# Patient Record
Sex: MALE | Race: WHITE | NOT HISPANIC OR LATINO | Employment: OTHER | URBAN - METROPOLITAN AREA
[De-identification: names, ages, dates, MRNs, and addresses within clinical notes are randomized per-mention and may not be internally consistent; named-entity substitution may affect disease eponyms.]

---

## 2017-01-31 DIAGNOSIS — E11.22 TYPE 2 DIABETES MELLITUS WITH STAGE 4 CHRONIC KIDNEY DISEASE, WITHOUT LONG-TERM CURRENT USE OF INSULIN (HCC): Primary | ICD-10-CM

## 2017-01-31 DIAGNOSIS — I10 ESSENTIAL HYPERTENSION: ICD-10-CM

## 2017-01-31 DIAGNOSIS — N18.4 CKD (CHRONIC KIDNEY DISEASE) STAGE 4, GFR 15-29 ML/MIN (HCC): ICD-10-CM

## 2017-01-31 DIAGNOSIS — N18.4 TYPE 2 DIABETES MELLITUS WITH STAGE 4 CHRONIC KIDNEY DISEASE, WITHOUT LONG-TERM CURRENT USE OF INSULIN (HCC): Primary | ICD-10-CM

## 2017-01-31 DIAGNOSIS — E78.5 HYPERLIPIDEMIA, UNSPECIFIED HYPERLIPIDEMIA TYPE: ICD-10-CM

## 2017-02-05 ENCOUNTER — RESULTS ENCOUNTER (OUTPATIENT)
Dept: FAMILY MEDICINE CLINIC | Facility: CLINIC | Age: 67
End: 2017-02-05

## 2017-02-05 DIAGNOSIS — I10 ESSENTIAL HYPERTENSION: ICD-10-CM

## 2017-02-05 DIAGNOSIS — E11.22 TYPE 2 DIABETES MELLITUS WITH STAGE 4 CHRONIC KIDNEY DISEASE, WITHOUT LONG-TERM CURRENT USE OF INSULIN (HCC): ICD-10-CM

## 2017-02-05 DIAGNOSIS — N18.4 TYPE 2 DIABETES MELLITUS WITH STAGE 4 CHRONIC KIDNEY DISEASE, WITHOUT LONG-TERM CURRENT USE OF INSULIN (HCC): ICD-10-CM

## 2017-02-05 DIAGNOSIS — E78.5 HYPERLIPIDEMIA, UNSPECIFIED HYPERLIPIDEMIA TYPE: ICD-10-CM

## 2017-02-05 DIAGNOSIS — N18.4 CKD (CHRONIC KIDNEY DISEASE) STAGE 4, GFR 15-29 ML/MIN (HCC): ICD-10-CM

## 2017-02-07 LAB
ALBUMIN SERPL-MCNC: 4.2 G/DL (ref 3.5–5.2)
ALBUMIN/CREAT UR: 1243.1 MG/G CREAT (ref 0–30)
ALBUMIN/GLOB SERPL: 1.8 G/DL
ALP SERPL-CCNC: 59 U/L (ref 39–117)
ALT SERPL-CCNC: 19 U/L (ref 1–41)
AST SERPL-CCNC: 18 U/L (ref 1–40)
BILIRUB SERPL-MCNC: 0.4 MG/DL (ref 0.1–1.2)
BUN SERPL-MCNC: 31 MG/DL (ref 8–23)
BUN/CREAT SERPL: 12.6 (ref 7–25)
CALCIUM SERPL-MCNC: 9.6 MG/DL (ref 8.6–10.5)
CHLORIDE SERPL-SCNC: 103 MMOL/L (ref 98–107)
CO2 SERPL-SCNC: 23.9 MMOL/L (ref 22–29)
CREAT SERPL-MCNC: 2.46 MG/DL (ref 0.76–1.27)
CREAT UR-MCNC: 190.6 MG/DL
ERYTHROCYTE [DISTWIDTH] IN BLOOD BY AUTOMATED COUNT: 13 % (ref 11.5–14.5)
GLOBULIN SER CALC-MCNC: 2.4 GM/DL
GLUCOSE SERPL-MCNC: 105 MG/DL (ref 65–99)
HBA1C MFR BLD: 6.2 % (ref 4.8–5.6)
HCT VFR BLD AUTO: 42.9 % (ref 40.4–52.2)
HGB BLD-MCNC: 14.3 G/DL (ref 13.7–17.6)
MCH RBC QN AUTO: 30.5 PG (ref 27–32.7)
MCHC RBC AUTO-ENTMCNC: 33.3 G/DL (ref 32.6–36.4)
MCV RBC AUTO: 91.5 FL (ref 79.8–96.2)
MICROALBUMIN UR-MCNC: 2369.3 UG/ML
PLATELET # BLD AUTO: 158 10*3/MM3 (ref 140–500)
POTASSIUM SERPL-SCNC: 4.3 MMOL/L (ref 3.5–5.2)
PROT SERPL-MCNC: 6.6 G/DL (ref 6–8.5)
RBC # BLD AUTO: 4.69 10*6/MM3 (ref 4.6–6)
SODIUM SERPL-SCNC: 142 MMOL/L (ref 136–145)
WBC # BLD AUTO: 7.74 10*3/MM3 (ref 4.5–10.7)

## 2017-02-10 RX ORDER — LISINOPRIL 20 MG/1
20 TABLET ORAL DAILY
Qty: 90 TABLET | Refills: 0 | Status: SHIPPED | OUTPATIENT
Start: 2017-02-10 | End: 2017-05-08 | Stop reason: SDUPTHER

## 2017-02-10 RX ORDER — METOPROLOL SUCCINATE 50 MG/1
50 TABLET, EXTENDED RELEASE ORAL DAILY
Qty: 90 TABLET | Refills: 0 | Status: SHIPPED | OUTPATIENT
Start: 2017-02-10 | End: 2017-05-08 | Stop reason: SDUPTHER

## 2017-02-13 ENCOUNTER — OFFICE VISIT (OUTPATIENT)
Dept: FAMILY MEDICINE CLINIC | Facility: CLINIC | Age: 67
End: 2017-02-13

## 2017-02-13 VITALS
OXYGEN SATURATION: 97 % | WEIGHT: 243 LBS | SYSTOLIC BLOOD PRESSURE: 134 MMHG | BODY MASS INDEX: 34.79 KG/M2 | DIASTOLIC BLOOD PRESSURE: 88 MMHG | HEART RATE: 85 BPM | HEIGHT: 70 IN

## 2017-02-13 DIAGNOSIS — I10 ESSENTIAL HYPERTENSION: ICD-10-CM

## 2017-02-13 DIAGNOSIS — J30.9 ALLERGIC RHINITIS, UNSPECIFIED ALLERGIC RHINITIS TRIGGER, UNSPECIFIED RHINITIS SEASONALITY: ICD-10-CM

## 2017-02-13 DIAGNOSIS — N18.4 TYPE 2 DIABETES MELLITUS WITH STAGE 4 CHRONIC KIDNEY DISEASE, WITHOUT LONG-TERM CURRENT USE OF INSULIN (HCC): Primary | ICD-10-CM

## 2017-02-13 DIAGNOSIS — E78.5 HYPERLIPIDEMIA, UNSPECIFIED HYPERLIPIDEMIA TYPE: ICD-10-CM

## 2017-02-13 DIAGNOSIS — E11.22 TYPE 2 DIABETES MELLITUS WITH STAGE 4 CHRONIC KIDNEY DISEASE, WITHOUT LONG-TERM CURRENT USE OF INSULIN (HCC): Primary | ICD-10-CM

## 2017-02-13 DIAGNOSIS — K21.9 GASTROESOPHAGEAL REFLUX DISEASE, ESOPHAGITIS PRESENCE NOT SPECIFIED: ICD-10-CM

## 2017-02-13 PROCEDURE — 99214 OFFICE O/P EST MOD 30 MIN: CPT | Performed by: INTERNAL MEDICINE

## 2017-02-13 NOTE — PROGRESS NOTES
Subjective   Lefty Cai is a 66 y.o. male who presents today for:    Diabetes (4 month f/u & review labs); Hypertension; and Hyperlipidemia    History of Present Illness   Arterial HTN X years, maintained on the current regimen for quite some time. He also has CKD-IV (with proteinuria) following partial nephrectomy for cancer in 2007, followed by Dr. Reyes. BPs at home have been in the 100-120/60 range most days. He denies any side-effects or orthostatic symptoms.     He also has T2DM (since 2009) that is treated with TLCs only, partially due to his advanced renal disease.    He has been on cholesterol medicine for several years, tolerating the simvastatin without side effect.  He denies any cardiovascular or neurologic symptoms.    Mr. Cai  reports that he has quit smoking. His smoking use included Cigarettes. His smokeless tobacco use includes Snuff. He reports that he does not drink alcohol or use illicit drugs.         Current Outpatient Prescriptions:   •  aspirin 81 MG tablet, Take 81 mg by mouth daily., Disp: , Rfl:   •  Cholecalciferol (VITAMIN D3) 5000 UNITS capsule capsule, Take 5,000 Units by mouth 2 (two) times a week., Disp: , Rfl:   •  lisinopril (PRINIVIL,ZESTRIL) 20 MG tablet, Take 1 tablet by mouth Daily., Disp: 90 tablet, Rfl: 0  •  metoprolol succinate XL (TOPROL-XL) 50 MG 24 hr tablet, Take 1 tablet by mouth Daily., Disp: 90 tablet, Rfl: 0  •  simvastatin (ZOCOR) 40 MG tablet, Take 1 tablet by mouth Every Night., Disp: 90 tablet, Rfl: 1  •  traZODone (DESYREL) 50 MG tablet, Take 1 tablet by mouth Every Night., Disp: 90 tablet, Rfl: 1      The following portions of the patient's history were reviewed and updated as appropriate: allergies, current medications, past social history and problem list.    Review of Systems   Constitutional: Negative for diaphoresis.   HENT: Positive for postnasal drip.    Eyes: Negative for visual disturbance.   Respiratory: Negative for cough and chest  "tightness.    Cardiovascular: Negative for chest pain, palpitations and leg swelling.   Gastrointestinal: Negative for abdominal pain.        GERD with late meals.   Musculoskeletal: Negative for myalgias.   Allergic/Immunologic: Positive for environmental allergies.   Neurological: Negative for dizziness, syncope, numbness and headaches.   Hematological: Does not bruise/bleed easily.         Objective   Vitals:    02/13/17 1036   BP: 134/88   BP Location: Right arm   Patient Position: Sitting   Cuff Size: Large Adult   Pulse: 85   SpO2: 97%   Weight: 243 lb (110 kg)   Height: 70\" (177.8 cm)     Physical Exam  Obese white male, in no acute distress.  He is in good spirits.  Sclerae are anicteric and the conjunctivae are pink.  There is no periorbital edema.   No carotid bruit.  No thyromegaly or mass appreciated.  Regular rate and rhythm without murmur appreciated.  Abdomen is obese, but soft, nontender, without hepatomegaly or mass.  No abdominal bruit is noted.  No lower extremity edema and pedal pulses are full bilaterally.    Assessment/Plan   Lefty was seen today for diabetes, hypertension and hyperlipidemia.    Diagnoses and all orders for this visit:    Type 2 diabetes mellitus with stage 4 chronic kidney disease, without long-term current use of insulin  Comments:  A1c is 6.2%, which is a continued improvement from 6.6 and 6.3 last year.  Microalbuminuria has worsened since last time we checked.  Creatinine is stable.  Orders:  -     Hemoglobin A1c; Future  -     Comprehensive Metabolic Panel; Future  -     CBC & Differential; Future  -     Vitamin D 25 Hydroxy; Future    Essential hypertension  Comments:  Blood pressure is adequately controlled.  We will make no change in his medications.    Hyperlipidemia, unspecified hyperlipidemia type  Comments:  Labs from last year were perfect.  Recheck this coming summer.  Orders:  -     Lipid Panel; Future    Allergic rhinitis, unspecified allergic rhinitis " trigger, unspecified rhinitis seasonality  Comments:  OTC antihistamiines as outlined.    Gastroesophageal reflux disease, esophagitis presence not specified  Comments:  Famotidine 10 mg as needed; or omeprazole 20 mg sporadically.    Overall, he is doing fairly well.  Blood pressure is adequately controlled, creatinine is stable, microalbumin/creatinine ratio is 1240, but he was 1000 at one point last year, and he's been down in the 800s within the past 2 years, also.  He will see Dr. Reyes in 3 months, at which time he should have this rechecked.  We will make no change in his medications at this time.    His diabetes is very well controlled with therapeutic lifestyle changes only; he is on no medicines for diabetes.  His A1c is excellent at 6.2%.

## 2017-05-09 RX ORDER — LISINOPRIL 20 MG/1
20 TABLET ORAL DAILY
Qty: 90 TABLET | Refills: 1 | Status: SHIPPED | OUTPATIENT
Start: 2017-05-09 | End: 2017-11-07 | Stop reason: SDUPTHER

## 2017-05-09 RX ORDER — METOPROLOL SUCCINATE 50 MG/1
50 TABLET, EXTENDED RELEASE ORAL DAILY
Qty: 90 TABLET | Refills: 1 | Status: SHIPPED | OUTPATIENT
Start: 2017-05-09 | End: 2017-11-07 | Stop reason: SDUPTHER

## 2017-05-09 RX ORDER — TRAZODONE HYDROCHLORIDE 50 MG/1
50 TABLET ORAL NIGHTLY
Qty: 90 TABLET | Refills: 1 | Status: SHIPPED | OUTPATIENT
Start: 2017-05-09 | End: 2017-11-07 | Stop reason: SDUPTHER

## 2017-05-09 RX ORDER — SIMVASTATIN 40 MG
40 TABLET ORAL NIGHTLY
Qty: 90 TABLET | Refills: 1 | Status: SHIPPED | OUTPATIENT
Start: 2017-05-09 | End: 2017-11-07 | Stop reason: SDUPTHER

## 2017-08-01 ENCOUNTER — RESULTS ENCOUNTER (OUTPATIENT)
Dept: FAMILY MEDICINE CLINIC | Facility: CLINIC | Age: 67
End: 2017-08-01

## 2017-08-01 DIAGNOSIS — E78.5 HYPERLIPIDEMIA, UNSPECIFIED HYPERLIPIDEMIA TYPE: ICD-10-CM

## 2017-08-01 DIAGNOSIS — N18.4 TYPE 2 DIABETES MELLITUS WITH STAGE 4 CHRONIC KIDNEY DISEASE, WITHOUT LONG-TERM CURRENT USE OF INSULIN (HCC): ICD-10-CM

## 2017-08-01 DIAGNOSIS — E11.22 TYPE 2 DIABETES MELLITUS WITH STAGE 4 CHRONIC KIDNEY DISEASE, WITHOUT LONG-TERM CURRENT USE OF INSULIN (HCC): ICD-10-CM

## 2017-08-07 LAB
25(OH)D3+25(OH)D2 SERPL-MCNC: 27.6 NG/ML (ref 30–100)
ALBUMIN SERPL-MCNC: 4.1 G/DL (ref 3.5–5.2)
ALBUMIN/GLOB SERPL: 1.4 G/DL
ALP SERPL-CCNC: 55 U/L (ref 39–117)
ALT SERPL-CCNC: 22 U/L (ref 1–41)
AST SERPL-CCNC: 15 U/L (ref 1–40)
BASOPHILS # BLD AUTO: 0.03 10*3/MM3 (ref 0–0.2)
BASOPHILS NFR BLD AUTO: 0.4 % (ref 0–1.5)
BILIRUB SERPL-MCNC: 0.3 MG/DL (ref 0.1–1.2)
BUN SERPL-MCNC: 33 MG/DL (ref 8–23)
BUN/CREAT SERPL: 12.1 (ref 7–25)
CALCIUM SERPL-MCNC: 10.2 MG/DL (ref 8.6–10.5)
CHLORIDE SERPL-SCNC: 99 MMOL/L (ref 98–107)
CHOLEST SERPL-MCNC: 138 MG/DL (ref 0–200)
CO2 SERPL-SCNC: 23.9 MMOL/L (ref 22–29)
CREAT SERPL-MCNC: 2.72 MG/DL (ref 0.76–1.27)
EOSINOPHIL # BLD AUTO: 0.14 10*3/MM3 (ref 0–0.7)
EOSINOPHIL NFR BLD AUTO: 1.9 % (ref 0.3–6.2)
ERYTHROCYTE [DISTWIDTH] IN BLOOD BY AUTOMATED COUNT: 12.8 % (ref 11.5–14.5)
GLOBULIN SER CALC-MCNC: 3 GM/DL
GLUCOSE SERPL-MCNC: 113 MG/DL (ref 65–99)
HBA1C MFR BLD: 6.49 % (ref 4.8–5.6)
HCT VFR BLD AUTO: 45.6 % (ref 40.4–52.2)
HDLC SERPL-MCNC: 44 MG/DL (ref 40–60)
HGB BLD-MCNC: 15 G/DL (ref 13.7–17.6)
IMM GRANULOCYTES # BLD: 0 10*3/MM3 (ref 0–0.03)
IMM GRANULOCYTES NFR BLD: 0 % (ref 0–0.5)
LDLC SERPL CALC-MCNC: 57 MG/DL (ref 0–100)
LYMPHOCYTES # BLD AUTO: 2.21 10*3/MM3 (ref 0.9–4.8)
LYMPHOCYTES NFR BLD AUTO: 30.4 % (ref 19.6–45.3)
MCH RBC QN AUTO: 30.8 PG (ref 27–32.7)
MCHC RBC AUTO-ENTMCNC: 32.9 G/DL (ref 32.6–36.4)
MCV RBC AUTO: 93.6 FL (ref 79.8–96.2)
MONOCYTES # BLD AUTO: 0.57 10*3/MM3 (ref 0.2–1.2)
MONOCYTES NFR BLD AUTO: 7.8 % (ref 5–12)
NEUTROPHILS # BLD AUTO: 4.32 10*3/MM3 (ref 1.9–8.1)
NEUTROPHILS NFR BLD AUTO: 59.5 % (ref 42.7–76)
PLATELET # BLD AUTO: 158 10*3/MM3 (ref 140–500)
POTASSIUM SERPL-SCNC: 4.3 MMOL/L (ref 3.5–5.2)
PROT SERPL-MCNC: 7.1 G/DL (ref 6–8.5)
RBC # BLD AUTO: 4.87 10*6/MM3 (ref 4.6–6)
SODIUM SERPL-SCNC: 140 MMOL/L (ref 136–145)
TRIGL SERPL-MCNC: 186 MG/DL (ref 0–150)
VLDLC SERPL CALC-MCNC: 37.2 MG/DL (ref 5–40)
WBC # BLD AUTO: 7.27 10*3/MM3 (ref 4.5–10.7)

## 2017-08-14 ENCOUNTER — OFFICE VISIT (OUTPATIENT)
Dept: FAMILY MEDICINE CLINIC | Facility: CLINIC | Age: 67
End: 2017-08-14

## 2017-08-14 VITALS
HEART RATE: 77 BPM | DIASTOLIC BLOOD PRESSURE: 84 MMHG | HEIGHT: 70 IN | WEIGHT: 247.9 LBS | BODY MASS INDEX: 35.49 KG/M2 | OXYGEN SATURATION: 97 % | SYSTOLIC BLOOD PRESSURE: 150 MMHG

## 2017-08-14 DIAGNOSIS — N18.4 TYPE 2 DIABETES MELLITUS WITH STAGE 4 CHRONIC KIDNEY DISEASE, WITHOUT LONG-TERM CURRENT USE OF INSULIN (HCC): Primary | ICD-10-CM

## 2017-08-14 DIAGNOSIS — I10 ESSENTIAL HYPERTENSION: ICD-10-CM

## 2017-08-14 DIAGNOSIS — E78.5 HYPERLIPIDEMIA, UNSPECIFIED HYPERLIPIDEMIA TYPE: ICD-10-CM

## 2017-08-14 DIAGNOSIS — E11.22 TYPE 2 DIABETES MELLITUS WITH STAGE 4 CHRONIC KIDNEY DISEASE, WITHOUT LONG-TERM CURRENT USE OF INSULIN (HCC): Primary | ICD-10-CM

## 2017-08-14 DIAGNOSIS — E55.9 VITAMIN D DEFICIENCY: ICD-10-CM

## 2017-08-14 PROCEDURE — 99214 OFFICE O/P EST MOD 30 MIN: CPT | Performed by: INTERNAL MEDICINE

## 2017-08-14 RX ORDER — SODIUM BICARBONATE 650 MG/1
650 TABLET ORAL 3 TIMES DAILY
Refills: 0 | COMMUNITY
Start: 2017-08-12

## 2017-08-14 NOTE — PROGRESS NOTES
Subjective   Lefty Cai is a 67 y.o. male who presents today for:    Diabetes (6 month f/u & review labs); Hypertension; and Hyperlipidemia    History of Present Illness   Chronic, benign, essentia HTN for years, maintained on the current regimen for quite some time. He also has CKD-IV (with proteinuria) following partial nephrectomy for cancer in 2007, followed by Dr. Reyes. He denies any side-effects or orthostatic symptoms.      He also has T2DM (since 2009) that is treated with TLCs only, partially due to his advanced renal disease. He has not had an eye exam since his cataract surgery 2 years ago.     He has Vit D deficiency, also, treated with Vitamin d 2 days per week.    Mr. Cai  reports that he has quit smoking. His smoking use included Cigarettes. His smokeless tobacco use includes Snuff. He reports that he does not drink alcohol or use illicit drugs.     Allergies   Allergen Reactions   • Latex Other (See Comments)     Blisters       Current Outpatient Prescriptions:   •  aspirin 81 MG tablet, Take 81 mg by mouth daily., Disp: , Rfl:   •  Cholecalciferol (VITAMIN D3) 5000 UNITS capsule capsule, Take 5,000 Units by mouth 2 (two) times a week., Disp: , Rfl:   •  lisinopril (PRINIVIL,ZESTRIL) 20 MG tablet, Take 1 tablet by mouth Daily., Disp: 90 tablet, Rfl: 1  •  metoprolol succinate XL (TOPROL-XL) 50 MG 24 hr tablet, Take 1 tablet by mouth Daily., Disp: 90 tablet, Rfl: 1  •  simvastatin (ZOCOR) 40 MG tablet, Take 1 tablet by mouth Every Night., Disp: 90 tablet, Rfl: 1  •  sodium bicarbonate 650 MG tablet, Take 1 tablet by mouth 3 (Three) Times a Day., Disp: , Rfl: 0  •  traZODone (DESYREL) 50 MG tablet, Take 1 tablet by mouth Every Night., Disp: 90 tablet, Rfl: 1      Review of Systems   Constitutional: Positive for unexpected weight change (gain). Negative for diaphoresis and fatigue.   Eyes: Negative for visual disturbance.   Respiratory: Negative for cough and chest tightness.   "  Cardiovascular: Negative for chest pain, palpitations and leg swelling.   Gastrointestinal: Negative for abdominal pain.   Endocrine: Negative for polydipsia and polyuria.   Musculoskeletal: Negative for myalgias.   Skin: Negative for wound.   Neurological: Negative for dizziness, syncope, numbness and headaches.   Hematological: Does not bruise/bleed easily.     Sister has Type 1 DM with multiple complications.    Objective   Vitals:    08/14/17 1017   BP: 150/90   BP Location: Right arm   Patient Position: Sitting   Cuff Size: Large Adult   Pulse: 77   SpO2: 97%   Weight: 247 lb 14.4 oz (112 kg)   Height: 70\" (177.8 cm)     Physical Exam  Obese white male, in no acute distress.  He is in good spirits.  Sclerae are anicteric and the conjunctivae are pink.  There is no periorbital edema.   No carotid bruit.  No thyromegaly or mass appreciated.  Regular rate and rhythm without murmur appreciated.  Abdomen is obese, but soft, nontender, without hepatomegaly or mass.  No abdominal bruit is noted.  No lower extremity edema and pedal pulses are full bilaterally.  Monofilament testing is normal bilaterally.    Assessment/Plan   Lefty was seen today for diabetes, hypertension and hyperlipidemia.    Diagnoses and all orders for this visit:    Type 2 diabetes mellitus with stage 4 chronic kidney disease, without long-term current use of insulin - worsening    Essential hypertension- uncontrolled    Hyperlipidemia, unspecified hyperlipidemia type    Vitamin D deficiency- worsening.    See pt instructions.  No changes in meds except for the Vit D.  RTO in 3 months for recheck of DM and BP.  Labs were reviewed with the patient today.  "

## 2017-08-14 NOTE — PATIENT INSTRUCTIONS
Increase the vitamin D to 5000 Units once EVERY day.    Decrease the carbs in your diet (fewer starches and sweets).    Call [facility] for a diabetic eye exam.

## 2017-11-08 RX ORDER — TRAZODONE HYDROCHLORIDE 50 MG/1
TABLET ORAL
Qty: 90 TABLET | Refills: 1 | Status: SHIPPED | OUTPATIENT
Start: 2017-11-08 | End: 2018-05-06 | Stop reason: SDUPTHER

## 2017-11-08 RX ORDER — METOPROLOL SUCCINATE 50 MG/1
TABLET, EXTENDED RELEASE ORAL
Qty: 90 TABLET | Refills: 1 | Status: SHIPPED | OUTPATIENT
Start: 2017-11-08 | End: 2017-11-14 | Stop reason: SDUPTHER

## 2017-11-08 RX ORDER — LISINOPRIL 20 MG/1
TABLET ORAL
Qty: 90 TABLET | Refills: 1 | Status: SHIPPED | OUTPATIENT
Start: 2017-11-08 | End: 2018-05-07 | Stop reason: SDUPTHER

## 2017-11-08 RX ORDER — SIMVASTATIN 40 MG
TABLET ORAL
Qty: 90 TABLET | Refills: 1 | Status: SHIPPED | OUTPATIENT
Start: 2017-11-08 | End: 2018-05-06 | Stop reason: SDUPTHER

## 2017-11-14 ENCOUNTER — OFFICE VISIT (OUTPATIENT)
Dept: FAMILY MEDICINE CLINIC | Facility: CLINIC | Age: 67
End: 2017-11-14

## 2017-11-14 VITALS
HEART RATE: 81 BPM | BODY MASS INDEX: 35.42 KG/M2 | HEIGHT: 70 IN | WEIGHT: 247.4 LBS | DIASTOLIC BLOOD PRESSURE: 94 MMHG | OXYGEN SATURATION: 98 % | SYSTOLIC BLOOD PRESSURE: 146 MMHG

## 2017-11-14 DIAGNOSIS — Z23 FLU VACCINE NEED: ICD-10-CM

## 2017-11-14 DIAGNOSIS — E11.22 TYPE 2 DIABETES MELLITUS WITH STAGE 4 CHRONIC KIDNEY DISEASE, WITHOUT LONG-TERM CURRENT USE OF INSULIN (HCC): Primary | ICD-10-CM

## 2017-11-14 DIAGNOSIS — N18.4 TYPE 2 DIABETES MELLITUS WITH STAGE 4 CHRONIC KIDNEY DISEASE, WITHOUT LONG-TERM CURRENT USE OF INSULIN (HCC): Primary | ICD-10-CM

## 2017-11-14 DIAGNOSIS — I10 ESSENTIAL HYPERTENSION: ICD-10-CM

## 2017-11-14 LAB — HBA1C MFR BLD: 6.4 %

## 2017-11-14 PROCEDURE — 90662 IIV NO PRSV INCREASED AG IM: CPT | Performed by: INTERNAL MEDICINE

## 2017-11-14 PROCEDURE — G0008 ADMIN INFLUENZA VIRUS VAC: HCPCS | Performed by: INTERNAL MEDICINE

## 2017-11-14 PROCEDURE — 83036 HEMOGLOBIN GLYCOSYLATED A1C: CPT | Performed by: INTERNAL MEDICINE

## 2017-11-14 PROCEDURE — 99214 OFFICE O/P EST MOD 30 MIN: CPT | Performed by: INTERNAL MEDICINE

## 2017-11-14 RX ORDER — METOPROLOL SUCCINATE 100 MG/1
100 TABLET, EXTENDED RELEASE ORAL DAILY
Qty: 30 TABLET | Refills: 2 | Status: SHIPPED | OUTPATIENT
Start: 2017-11-14 | End: 2018-02-28 | Stop reason: SDUPTHER

## 2017-11-14 NOTE — PROGRESS NOTES
Subjective   Lefty Cai is a 67 y.o. male who presents today for:    Diabetes (3 month f/u) and Hypertension    History of Present Illness   Arterial HTN X years, maintained on the current regimen for quite some time.     He also has CKD-IV (with proteinuria) following partial nephrectomy for cancer in 2007, followed by Dr. Reyes.     He also has T2DM (since 2009) that is treated with TLCs only, partially due to his advanced renal disease.     He has been on cholesterol medicine for several years, tolerating the simvastatin without side effect.  He denies any cardiovascular or neurologic symptoms.    Mr. Cai  reports that he has quit smoking. His smoking use included Cigarettes. His smokeless tobacco use includes Snuff. He reports that he does not drink alcohol or use illicit drugs.     Allergies   Allergen Reactions   • Latex Other (See Comments)     Blisters       Current Outpatient Prescriptions:   •  aspirin 81 MG tablet, Take 81 mg by mouth daily., Disp: , Rfl:   •  Cholecalciferol (VITAMIN D3) 5000 UNITS capsule capsule, Take 5,000 Units by mouth 2 (two) times a week., Disp: , Rfl:   •  lisinopril (PRINIVIL,ZESTRIL) 20 MG tablet, take 1 tablet by mouth once daily, Disp: 90 tablet, Rfl: 1  •  metoprolol succinate XL (TOPROL-XL) 50 MG 24 hr tablet, take 1 tablet by mouth once daily, Disp: 90 tablet, Rfl: 1  •  simvastatin (ZOCOR) 40 MG tablet, take 1 tablet by mouth at bedtime, Disp: 90 tablet, Rfl: 1  •  sodium bicarbonate 650 MG tablet, Take 1 tablet by mouth 3 (Three) Times a Day., Disp: , Rfl: 0  •  traZODone (DESYREL) 50 MG tablet, take 1 tablet by mouth at bedtime if needed, Disp: 90 tablet, Rfl: 1      Review of Systems   Constitutional: Negative for diaphoresis and fatigue.   Eyes: Negative for visual disturbance.   Respiratory: Negative for cough and chest tightness.    Cardiovascular: Positive for leg swelling. Negative for chest pain and palpitations.   Endocrine: Negative for polydipsia  "and polyuria.   Skin: Negative for wound.   Neurological: Negative for dizziness, syncope, numbness and headaches.       Objective   Vitals:    11/14/17 1047   BP: 150/92   BP Location: Left arm   Patient Position: Sitting   Cuff Size: Adult   Pulse: 81   SpO2: 98%   Weight: 247 lb 6.4 oz (112 kg)   Height: 70\" (177.8 cm)     Physical Exam     Obese white male, in no acute distress.  He is in good spirits.  Sclerae are anicteric and the conjunctivae are pink.  There is no periorbital edema.   No carotid bruit.  No thyromegaly or mass appreciated.  Regular rate and rhythm without murmur appreciated.  Abdomen is obese, but soft, nontender, without hepatomegaly or mass.  No abdominal bruit is noted.  No lower extremity edema and pedal pulses are full bilaterally.    Assessment/Plan   Lefty was seen today for diabetes and hypertension.    Diagnoses and all orders for this visit:    Type 2 diabetes mellitus with stage 4 chronic kidney disease, without long-term current use of insulin  -     POC Glycosylated Hemoglobin (Hb A1C)    Essential hypertension  -     metoprolol succinate XL (TOPROL-XL) 100 MG 24 hr tablet; Take 1 tablet by mouth Daily.    Flu vaccine need  -     Flu Vaccine High Dose PF 65YR+ ([id number])    A1c is 6.4%, so we'll hold off prescribing any medications for him at this point.  Given his chronic kidney disease, options are limited to Actos and insulin.  We discussed this with him at length, especially in the context of his diet being the cornerstone of his treatment.  Exercise will be limited because of arthritis in his knees.    Blood pressure is elevated.  Diet changes will help with that also. We will have him increase the metoprolol to 100 mg once daily.  "

## 2018-01-30 DIAGNOSIS — E78.5 HYPERLIPIDEMIA, UNSPECIFIED HYPERLIPIDEMIA TYPE: ICD-10-CM

## 2018-01-30 DIAGNOSIS — I10 ESSENTIAL HYPERTENSION: Primary | ICD-10-CM

## 2018-01-30 DIAGNOSIS — N18.4 CKD (CHRONIC KIDNEY DISEASE) STAGE 4, GFR 15-29 ML/MIN (HCC): ICD-10-CM

## 2018-02-04 ENCOUNTER — RESULTS ENCOUNTER (OUTPATIENT)
Dept: FAMILY MEDICINE CLINIC | Facility: CLINIC | Age: 68
End: 2018-02-04

## 2018-02-04 DIAGNOSIS — E78.5 HYPERLIPIDEMIA, UNSPECIFIED HYPERLIPIDEMIA TYPE: ICD-10-CM

## 2018-02-04 DIAGNOSIS — I10 ESSENTIAL HYPERTENSION: ICD-10-CM

## 2018-02-04 DIAGNOSIS — N18.4 CKD (CHRONIC KIDNEY DISEASE) STAGE 4, GFR 15-29 ML/MIN (HCC): ICD-10-CM

## 2018-02-07 LAB
ALBUMIN SERPL-MCNC: 3.8 G/DL (ref 3.5–5.2)
ALBUMIN/GLOB SERPL: 1.6 G/DL
ALP SERPL-CCNC: 60 U/L (ref 39–117)
ALT SERPL-CCNC: 18 U/L (ref 1–41)
AST SERPL-CCNC: 16 U/L (ref 1–40)
BILIRUB SERPL-MCNC: 0.4 MG/DL (ref 0.1–1.2)
BUN SERPL-MCNC: 38 MG/DL (ref 8–23)
BUN/CREAT SERPL: 13.1 (ref 7–25)
CALCIUM SERPL-MCNC: 9.8 MG/DL (ref 8.6–10.5)
CHLORIDE SERPL-SCNC: 102 MMOL/L (ref 98–107)
CHOLEST SERPL-MCNC: 123 MG/DL (ref 0–200)
CO2 SERPL-SCNC: 22.4 MMOL/L (ref 22–29)
CREAT SERPL-MCNC: 2.9 MG/DL (ref 0.76–1.27)
ERYTHROCYTE [DISTWIDTH] IN BLOOD BY AUTOMATED COUNT: 13.3 % (ref 11.5–14.5)
GFR SERPLBLD CREATININE-BSD FMLA CKD-EPI: 22 ML/MIN/1.73
GFR SERPLBLD CREATININE-BSD FMLA CKD-EPI: 26 ML/MIN/1.73
GLOBULIN SER CALC-MCNC: 2.4 GM/DL
GLUCOSE SERPL-MCNC: 124 MG/DL (ref 65–99)
HCT VFR BLD AUTO: 43.6 % (ref 40.4–52.2)
HDLC SERPL-MCNC: 38 MG/DL (ref 40–60)
HGB BLD-MCNC: 14.3 G/DL (ref 13.7–17.6)
LDLC SERPL CALC-MCNC: 56 MG/DL (ref 0–100)
MCH RBC QN AUTO: 29.8 PG (ref 27–32.7)
MCHC RBC AUTO-ENTMCNC: 32.8 G/DL (ref 32.6–36.4)
MCV RBC AUTO: 90.8 FL (ref 79.8–96.2)
PLATELET # BLD AUTO: 189 10*3/MM3 (ref 140–500)
POTASSIUM SERPL-SCNC: 4.6 MMOL/L (ref 3.5–5.2)
PROT SERPL-MCNC: 6.2 G/DL (ref 6–8.5)
RBC # BLD AUTO: 4.8 10*6/MM3 (ref 4.6–6)
SODIUM SERPL-SCNC: 140 MMOL/L (ref 136–145)
TRIGL SERPL-MCNC: 146 MG/DL (ref 0–150)
VLDLC SERPL CALC-MCNC: 29.2 MG/DL (ref 5–40)
WBC # BLD AUTO: 9.35 10*3/MM3 (ref 4.5–10.7)

## 2018-02-14 ENCOUNTER — OFFICE VISIT (OUTPATIENT)
Dept: FAMILY MEDICINE CLINIC | Facility: CLINIC | Age: 68
End: 2018-02-14

## 2018-02-14 VITALS
HEIGHT: 70 IN | WEIGHT: 250.2 LBS | SYSTOLIC BLOOD PRESSURE: 124 MMHG | HEART RATE: 73 BPM | OXYGEN SATURATION: 98 % | DIASTOLIC BLOOD PRESSURE: 82 MMHG | BODY MASS INDEX: 35.82 KG/M2

## 2018-02-14 DIAGNOSIS — I10 ESSENTIAL HYPERTENSION: Primary | ICD-10-CM

## 2018-02-14 DIAGNOSIS — N18.4 TYPE 2 DIABETES MELLITUS WITH STAGE 4 CHRONIC KIDNEY DISEASE, WITHOUT LONG-TERM CURRENT USE OF INSULIN (HCC): ICD-10-CM

## 2018-02-14 DIAGNOSIS — E78.5 HYPERLIPIDEMIA, UNSPECIFIED HYPERLIPIDEMIA TYPE: ICD-10-CM

## 2018-02-14 DIAGNOSIS — N18.4 CKD (CHRONIC KIDNEY DISEASE) STAGE 4, GFR 15-29 ML/MIN (HCC): ICD-10-CM

## 2018-02-14 DIAGNOSIS — E11.22 TYPE 2 DIABETES MELLITUS WITH STAGE 4 CHRONIC KIDNEY DISEASE, WITHOUT LONG-TERM CURRENT USE OF INSULIN (HCC): ICD-10-CM

## 2018-02-14 PROCEDURE — 99214 OFFICE O/P EST MOD 30 MIN: CPT | Performed by: INTERNAL MEDICINE

## 2018-02-14 NOTE — PROGRESS NOTES
Subjective   Lefty Cai is a 67 y.o. male who presents today for:    Hypertension (3 month f/u & review labs) and Hyperlipidemia    History of Present Illness   Arterial HTN X years, maintained on the current regimen for quite some time. Toprol XL 50 was increased to 100 mg in November, 2017 when his BP was elevated.     He also has CKD-IV (with proteinuria) following partial nephrectomy for cancer in 2007, followed by Dr. Reyes.      He also has T2DM (since 2009) that is treated with TLCs only, partially due to his advanced renal disease.      He has been on cholesterol medicine for several years, tolerating the simvastatin without side effect.  He denies any cardiovascular or neurologic symptoms.       Mr. Cai  reports that he has quit smoking. His smoking use included Cigarettes. His smokeless tobacco use includes Snuff. He reports that he does not drink alcohol or use illicit drugs.     Allergies   Allergen Reactions   • Latex Other (See Comments)     Blisters       Current Outpatient Prescriptions:   •  aspirin 81 MG tablet, Take 81 mg by mouth daily., Disp: , Rfl:   •  Cholecalciferol (VITAMIN D3) 5000 UNITS capsule capsule, Take 5,000 Units by mouth 2 (two) times a week., Disp: , Rfl:   •  lisinopril (PRINIVIL,ZESTRIL) 20 MG tablet, take 1 tablet by mouth once daily, Disp: 90 tablet, Rfl: 1  •  metoprolol succinate XL (TOPROL-XL) 100 MG 24 hr tablet, Take 1 tablet by mouth Daily., Disp: 30 tablet, Rfl: 2  •  simvastatin (ZOCOR) 40 MG tablet, take 1 tablet by mouth at bedtime, Disp: 90 tablet, Rfl: 1  •  sodium bicarbonate 650 MG tablet, Take 1 tablet by mouth 3 (Three) Times a Day., Disp: , Rfl: 0  •  traZODone (DESYREL) 50 MG tablet, take 1 tablet by mouth at bedtime if needed, Disp: 90 tablet, Rfl: 1      Review of Systems  No lightheadedness or dizziness.  No chest pain.  No myalgias.  No cough.      Objective   Vitals:    02/14/18 1111   BP: 124/82   BP Location: Left arm   Patient Position:  "Sitting   Cuff Size: Large Adult   Pulse: 73   SpO2: 98%   Weight: 113 kg (250 lb 3.2 oz)   Height: 177.8 cm (70\")     Physical Exam  In good spirits.  Regular rate and rhythm.  No LE edema.    Assessment/Plan   Lefty was seen today for hypertension and hyperlipidemia.    Diagnoses and all orders for this visit:    Essential hypertension    Hyperlipidemia, unspecified hyperlipidemia type    Type 2 diabetes mellitus with stage 4 chronic kidney disease, without long-term current use of insulin    CKD (chronic kidney disease) stage 4, GFR 15-29 ml/min    BP isdoing well on the current regimen.      He is due to see his nephrologist in 3 months, at which time he should have his renal function rechecked.  His creatinine has risen a bit from 2.7 to 2.9.      Cholesterol levels look good. Labs were reviewed with the patient today.    Most recent A1c was 6.4%.  We will recheck that at his next office visit.  "

## 2018-02-28 DIAGNOSIS — I10 ESSENTIAL HYPERTENSION: ICD-10-CM

## 2018-02-28 RX ORDER — METOPROLOL SUCCINATE 100 MG/1
TABLET, EXTENDED RELEASE ORAL
Qty: 30 TABLET | Refills: 5 | Status: SHIPPED | OUTPATIENT
Start: 2018-02-28 | End: 2018-09-02 | Stop reason: SDUPTHER

## 2018-05-07 RX ORDER — TRAZODONE HYDROCHLORIDE 50 MG/1
TABLET ORAL
Qty: 90 TABLET | Refills: 1 | Status: SHIPPED | OUTPATIENT
Start: 2018-05-07 | End: 2018-12-10 | Stop reason: SDUPTHER

## 2018-05-07 RX ORDER — SIMVASTATIN 40 MG
TABLET ORAL
Qty: 90 TABLET | Refills: 1 | Status: SHIPPED | OUTPATIENT
Start: 2018-05-07 | End: 2018-12-10 | Stop reason: SDUPTHER

## 2018-05-07 RX ORDER — LISINOPRIL 20 MG/1
20 TABLET ORAL DAILY
Qty: 90 TABLET | Refills: 1 | Status: SHIPPED | OUTPATIENT
Start: 2018-05-07 | End: 2018-12-10 | Stop reason: SDUPTHER

## 2018-06-14 ENCOUNTER — OFFICE VISIT (OUTPATIENT)
Dept: FAMILY MEDICINE CLINIC | Facility: CLINIC | Age: 68
End: 2018-06-14

## 2018-06-14 VITALS
HEART RATE: 70 BPM | WEIGHT: 247.7 LBS | OXYGEN SATURATION: 98 % | HEIGHT: 70 IN | SYSTOLIC BLOOD PRESSURE: 120 MMHG | DIASTOLIC BLOOD PRESSURE: 88 MMHG | BODY MASS INDEX: 35.46 KG/M2

## 2018-06-14 DIAGNOSIS — N18.4 CKD (CHRONIC KIDNEY DISEASE) STAGE 4, GFR 15-29 ML/MIN (HCC): ICD-10-CM

## 2018-06-14 DIAGNOSIS — I10 ESSENTIAL HYPERTENSION: ICD-10-CM

## 2018-06-14 DIAGNOSIS — E11.22 TYPE 2 DIABETES MELLITUS WITH STAGE 4 CHRONIC KIDNEY DISEASE, WITHOUT LONG-TERM CURRENT USE OF INSULIN (HCC): Primary | ICD-10-CM

## 2018-06-14 DIAGNOSIS — N18.4 TYPE 2 DIABETES MELLITUS WITH STAGE 4 CHRONIC KIDNEY DISEASE, WITHOUT LONG-TERM CURRENT USE OF INSULIN (HCC): Primary | ICD-10-CM

## 2018-06-14 LAB
EXPIRATION DATE: ABNORMAL
HBA1C MFR BLD: 6.5 % (ref 4.8–5.6)
Lab: ABNORMAL

## 2018-06-14 PROCEDURE — 83036 HEMOGLOBIN GLYCOSYLATED A1C: CPT | Performed by: INTERNAL MEDICINE

## 2018-06-14 PROCEDURE — 36416 COLLJ CAPILLARY BLOOD SPEC: CPT | Performed by: INTERNAL MEDICINE

## 2018-06-14 PROCEDURE — 99213 OFFICE O/P EST LOW 20 MIN: CPT | Performed by: INTERNAL MEDICINE

## 2018-06-14 NOTE — PROGRESS NOTES
Subjective   Lefty Cai is a 68 y.o. male who presents today for:    Diabetes (4 month f/u)    History of Present Illness   He has T2DM (since 2009) that is treated with TLCs only, partially due to his advanced renal disease.  He does not check his sugars at home.  He denies any paresthesias.    Arterial HTN X years, maintained on the current regimen for quite some time. Toprol XL 50 was increased to 100 mg in November, 2017 when his BP was elevated.     He also has CKD-IV (with proteinuria) following partial nephrectomy for cancer in 2007, followed by Dr. Reyes.        Mr. Cai  reports that he has quit smoking. His smoking use included Cigarettes. His smokeless tobacco use includes Snuff. He reports that he does not drink alcohol or use drugs.     Allergies   Allergen Reactions   • Latex Other (See Comments)     Blisters       Current Outpatient Prescriptions:   •  aspirin 81 MG tablet, Take 81 mg by mouth daily., Disp: , Rfl:   •  Cholecalciferol (VITAMIN D3) 5000 UNITS capsule capsule, Take 5,000 Units by mouth 2 (two) times a week., Disp: , Rfl:   •  lisinopril (PRINIVIL,ZESTRIL) 20 MG tablet, Take 1 tablet by mouth Daily., Disp: 90 tablet, Rfl: 1  •  metoprolol succinate XL (TOPROL-XL) 100 MG 24 hr tablet, take 1 tablet by mouth once daily, Disp: 30 tablet, Rfl: 5  •  simvastatin (ZOCOR) 40 MG tablet, take 1 tablet by mouth at bedtime, Disp: 90 tablet, Rfl: 1  •  sodium bicarbonate 650 MG tablet, Take 1 tablet by mouth 3 (Three) Times a Day., Disp: , Rfl: 0  •  traZODone (DESYREL) 50 MG tablet, take 1 tablet by mouth at bedtime if needed, Disp: 90 tablet, Rfl: 1      Review of Systems   Constitutional: Negative for fatigue and unexpected weight change.   HENT: Positive for postnasal drip.    Eyes: Negative for visual disturbance.   Respiratory: Positive for shortness of breath (with exertion, chronic).    Cardiovascular: Negative for chest pain and leg swelling.   Endocrine: Negative for  "polydipsia and polyuria.   Musculoskeletal: Negative for myalgias.   Skin: Negative for wound.   Allergic/Immunologic: Positive for environmental allergies.   Neurological: Negative for numbness.         Objective   Vitals:    06/14/18 1115   BP: 120/88   BP Location: Left arm   Patient Position: Sitting   Cuff Size: Large Adult   Pulse: 70   SpO2: 98%   Weight: 112 kg (247 lb 11.2 oz)   Height: 177.8 cm (70\")     Physical Exam   Constitutional: He is oriented to person, place, and time.   Obese   Cardiovascular: Normal rate and regular rhythm.    Musculoskeletal:   No foot deformities.   Neurological: He is alert and oriented to person, place, and time.   Monofilament.   Skin:   No pedal lesions or ulcerations.             Lefty was seen today for diabetes.    Diagnoses and all orders for this visit:    Type 2 diabetes mellitus with stage 4 chronic kidney disease, without long-term current use of insulin  -     POC Glycated Hemoglobin, 6.5%, unchanged over the past year.    CKD (chronic kidney disease) stage 4, GFR 15-29 ml/min  Followed by Dr. Reyes.    Essential hypertension  BP OK, no change in meds at this time.    "

## 2018-09-02 DIAGNOSIS — I10 ESSENTIAL HYPERTENSION: ICD-10-CM

## 2018-09-05 RX ORDER — METOPROLOL SUCCINATE 100 MG/1
TABLET, EXTENDED RELEASE ORAL
Qty: 30 TABLET | Refills: 3 | Status: SHIPPED | OUTPATIENT
Start: 2018-09-05 | End: 2018-12-31 | Stop reason: SDUPTHER

## 2018-10-17 ENCOUNTER — OFFICE VISIT (OUTPATIENT)
Dept: FAMILY MEDICINE CLINIC | Facility: CLINIC | Age: 68
End: 2018-10-17

## 2018-10-17 VITALS
SYSTOLIC BLOOD PRESSURE: 138 MMHG | HEIGHT: 70 IN | HEART RATE: 67 BPM | OXYGEN SATURATION: 97 % | BODY MASS INDEX: 35.16 KG/M2 | DIASTOLIC BLOOD PRESSURE: 82 MMHG | WEIGHT: 245.6 LBS

## 2018-10-17 DIAGNOSIS — N18.4 TYPE 2 DIABETES MELLITUS WITH STAGE 4 CHRONIC KIDNEY DISEASE, WITHOUT LONG-TERM CURRENT USE OF INSULIN (HCC): Primary | ICD-10-CM

## 2018-10-17 DIAGNOSIS — N18.4 CKD (CHRONIC KIDNEY DISEASE) STAGE 4, GFR 15-29 ML/MIN (HCC): ICD-10-CM

## 2018-10-17 DIAGNOSIS — Z23 FLU VACCINE NEED: ICD-10-CM

## 2018-10-17 DIAGNOSIS — I10 ESSENTIAL HYPERTENSION: ICD-10-CM

## 2018-10-17 DIAGNOSIS — E11.22 TYPE 2 DIABETES MELLITUS WITH STAGE 4 CHRONIC KIDNEY DISEASE, WITHOUT LONG-TERM CURRENT USE OF INSULIN (HCC): Primary | ICD-10-CM

## 2018-10-17 LAB
EXPIRATION DATE: ABNORMAL
HBA1C MFR BLD: 6.3 % (ref 4.8–5.6)
Lab: ABNORMAL

## 2018-10-17 PROCEDURE — 99213 OFFICE O/P EST LOW 20 MIN: CPT | Performed by: INTERNAL MEDICINE

## 2018-10-17 PROCEDURE — 83036 HEMOGLOBIN GLYCOSYLATED A1C: CPT | Performed by: INTERNAL MEDICINE

## 2018-10-17 PROCEDURE — 36416 COLLJ CAPILLARY BLOOD SPEC: CPT | Performed by: INTERNAL MEDICINE

## 2018-10-17 PROCEDURE — G0008 ADMIN INFLUENZA VIRUS VAC: HCPCS | Performed by: INTERNAL MEDICINE

## 2018-10-17 PROCEDURE — 90662 IIV NO PRSV INCREASED AG IM: CPT | Performed by: INTERNAL MEDICINE

## 2018-10-17 NOTE — PROGRESS NOTES
"Subjective   Lefty Cai is a 68 y.o. male who presents today for:    Diabetes (4 month f/u)    History of Present Illness   He has T2DM (since 2009) that is treated with TLCs only, partially due to his advanced renal disease.  He does not check his sugars at home.  He denies any paresthesias.    Arterial HTN X years, maintained on the current regimen for quite some time. Toprol XL 50 was increased to 100 mg in November, 2017 when his BP was elevated.      Mr. Cai  reports that he has quit smoking. His smoking use included Cigarettes. His smokeless tobacco use includes Snuff. He reports that he does not drink alcohol or use drugs.     Allergies   Allergen Reactions   • Latex Other (See Comments)     Blisters       Current Outpatient Prescriptions:   •  aspirin 81 MG tablet, Take 81 mg by mouth daily., Disp: , Rfl:   •  Cholecalciferol (VITAMIN D3) 2000 units tablet, Take 2,000 Units by mouth Daily., Disp: , Rfl:   •  lisinopril (PRINIVIL,ZESTRIL) 20 MG tablet, Take 1 tablet by mouth Daily., Disp: 90 tablet, Rfl: 1  •  metoprolol succinate XL (TOPROL-XL) 100 MG 24 hr tablet, TAKE 1 TABLET BY MOUTH EVERY DAY, Disp: 30 tablet, Rfl: 3  •  simvastatin (ZOCOR) 40 MG tablet, take 1 tablet by mouth at bedtime, Disp: 90 tablet, Rfl: 1  •  sodium bicarbonate 650 MG tablet, Take 1 tablet by mouth 3 (Three) Times a Day., Disp: , Rfl: 0  •  traZODone (DESYREL) 50 MG tablet, take 1 tablet by mouth at bedtime if needed, Disp: 90 tablet, Rfl: 1      Review of Systems   Constitutional: Negative for unexpected weight change.   Respiratory: Negative for shortness of breath.    Endocrine: Negative for polydipsia and polyuria.         Objective   Vitals:    10/17/18 1039   BP: 152/96   BP Location: Right arm   Patient Position: Sitting   Cuff Size: Large Adult   Pulse: 67   SpO2: 97%   Weight: 111 kg (245 lb 9.6 oz)   Height: 177.8 cm (70\")     Physical Exam   Constitutional: He is oriented to person, place, and time. He " appears well-developed. No distress.   Obese   Cardiovascular: Normal rate and regular rhythm.    Neurological: He is alert and oriented to person, place, and time.           Lefty was seen today for diabetes.    Diagnoses and all orders for this visit:    Type 2 diabetes mellitus with stage 4 chronic kidney disease, without long-term current use of insulin (CMS/[facility])  -     POC Glycated Hemoglobin, Total    CKD (chronic kidney disease) stage 4, GFR 15-29 ml/min (CMS/[facility])    Essential hypertension    Flu vaccine need  -     Fluzone High Dose =>65Years    Diabetes is well-controlled with an A1c of 6.3%.  He is doing this through diet changes alone since he cannot exercise because of end-stage arthritis in his knees.  He is doing well in this regard, keeping his carbs level, but he should be decreasing the starches in his diet so that he can drop a few more pounds.  Our goal is to get him down to 200 pounds over the next 2-3 years.  We discussed those diet changes at length today.

## 2018-12-10 RX ORDER — TRAZODONE HYDROCHLORIDE 50 MG/1
TABLET ORAL
Qty: 90 TABLET | Refills: 1 | Status: SHIPPED | OUTPATIENT
Start: 2018-12-10 | End: 2019-06-10 | Stop reason: SDUPTHER

## 2018-12-10 RX ORDER — SIMVASTATIN 40 MG
TABLET ORAL
Qty: 90 TABLET | Refills: 1 | Status: SHIPPED | OUTPATIENT
Start: 2018-12-10 | End: 2019-06-10 | Stop reason: SDUPTHER

## 2018-12-10 RX ORDER — LISINOPRIL 20 MG/1
TABLET ORAL
Qty: 90 TABLET | Refills: 1 | Status: SHIPPED | OUTPATIENT
Start: 2018-12-10 | End: 2019-06-10 | Stop reason: SDUPTHER

## 2018-12-31 DIAGNOSIS — I10 ESSENTIAL HYPERTENSION: ICD-10-CM

## 2018-12-31 RX ORDER — METOPROLOL SUCCINATE 100 MG/1
TABLET, EXTENDED RELEASE ORAL
Qty: 30 TABLET | Refills: 5 | Status: SHIPPED | OUTPATIENT
Start: 2018-12-31 | End: 2019-06-28 | Stop reason: SDUPTHER

## 2019-04-08 DIAGNOSIS — N18.4 TYPE 2 DIABETES MELLITUS WITH STAGE 4 CHRONIC KIDNEY DISEASE, WITHOUT LONG-TERM CURRENT USE OF INSULIN (HCC): ICD-10-CM

## 2019-04-08 DIAGNOSIS — E55.9 VITAMIN D DEFICIENCY: ICD-10-CM

## 2019-04-08 DIAGNOSIS — I10 ESSENTIAL HYPERTENSION: Primary | ICD-10-CM

## 2019-04-08 DIAGNOSIS — N18.4 CKD (CHRONIC KIDNEY DISEASE) STAGE 4, GFR 15-29 ML/MIN (HCC): ICD-10-CM

## 2019-04-08 DIAGNOSIS — E11.22 TYPE 2 DIABETES MELLITUS WITH STAGE 4 CHRONIC KIDNEY DISEASE, WITHOUT LONG-TERM CURRENT USE OF INSULIN (HCC): ICD-10-CM

## 2019-04-08 DIAGNOSIS — E78.5 HYPERLIPIDEMIA, UNSPECIFIED HYPERLIPIDEMIA TYPE: ICD-10-CM

## 2019-04-11 LAB
25(OH)D3+25(OH)D2 SERPL-MCNC: 31.2 NG/ML (ref 30–100)
ALBUMIN SERPL-MCNC: 4.4 G/DL (ref 3.5–5.2)
ALBUMIN/CREAT UR: 2321.8 MG/G CREAT (ref 0–30)
ALBUMIN/GLOB SERPL: 1.8 G/DL
ALP SERPL-CCNC: 65 U/L (ref 39–117)
ALT SERPL-CCNC: 20 U/L (ref 1–41)
AST SERPL-CCNC: 16 U/L (ref 1–40)
BILIRUB SERPL-MCNC: 0.4 MG/DL (ref 0.2–1.2)
BUN SERPL-MCNC: 37 MG/DL (ref 8–23)
BUN/CREAT SERPL: 10.7 (ref 7–25)
CALCIUM SERPL-MCNC: 9.8 MG/DL (ref 8.6–10.5)
CHLORIDE SERPL-SCNC: 102 MMOL/L (ref 98–107)
CO2 SERPL-SCNC: 24 MMOL/L (ref 22–29)
CREAT SERPL-MCNC: 3.46 MG/DL (ref 0.76–1.27)
CREAT UR-MCNC: 108.2 MG/DL
ERYTHROCYTE [DISTWIDTH] IN BLOOD BY AUTOMATED COUNT: 13 % (ref 12.3–15.4)
GLOBULIN SER CALC-MCNC: 2.5 GM/DL
GLUCOSE SERPL-MCNC: 124 MG/DL (ref 65–99)
HBA1C MFR BLD: 6.5 % (ref 4.8–5.6)
HCT VFR BLD AUTO: 44.9 % (ref 37.5–51)
HGB BLD-MCNC: 14.1 G/DL (ref 13–17.7)
MCH RBC QN AUTO: 30.2 PG (ref 26.6–33)
MCHC RBC AUTO-ENTMCNC: 31.4 G/DL (ref 31.5–35.7)
MCV RBC AUTO: 96.1 FL (ref 79–97)
MICROALBUMIN UR-MCNC: 2512.2 UG/ML
PLATELET # BLD AUTO: 162 10*3/MM3 (ref 140–450)
POTASSIUM SERPL-SCNC: 4.7 MMOL/L (ref 3.5–5.2)
PROT SERPL-MCNC: 6.9 G/DL (ref 6–8.5)
RBC # BLD AUTO: 4.67 10*6/MM3 (ref 4.14–5.8)
SODIUM SERPL-SCNC: 140 MMOL/L (ref 136–145)
WBC # BLD AUTO: 6.41 10*3/MM3 (ref 3.4–10.8)

## 2019-04-15 ENCOUNTER — OFFICE VISIT (OUTPATIENT)
Dept: FAMILY MEDICINE CLINIC | Facility: CLINIC | Age: 69
End: 2019-04-15

## 2019-04-15 VITALS
SYSTOLIC BLOOD PRESSURE: 130 MMHG | DIASTOLIC BLOOD PRESSURE: 78 MMHG | HEIGHT: 70 IN | HEART RATE: 74 BPM | RESPIRATION RATE: 18 BRPM | OXYGEN SATURATION: 98 % | TEMPERATURE: 98.6 F | BODY MASS INDEX: 35.82 KG/M2 | WEIGHT: 250.2 LBS

## 2019-04-15 DIAGNOSIS — N18.4 CKD (CHRONIC KIDNEY DISEASE) STAGE 4, GFR 15-29 ML/MIN (HCC): ICD-10-CM

## 2019-04-15 DIAGNOSIS — I10 ESSENTIAL HYPERTENSION: ICD-10-CM

## 2019-04-15 DIAGNOSIS — N18.4 TYPE 2 DIABETES MELLITUS WITH STAGE 4 CHRONIC KIDNEY DISEASE, WITHOUT LONG-TERM CURRENT USE OF INSULIN (HCC): Primary | ICD-10-CM

## 2019-04-15 DIAGNOSIS — E11.22 TYPE 2 DIABETES MELLITUS WITH STAGE 4 CHRONIC KIDNEY DISEASE, WITHOUT LONG-TERM CURRENT USE OF INSULIN (HCC): Primary | ICD-10-CM

## 2019-04-15 PROCEDURE — 99214 OFFICE O/P EST MOD 30 MIN: CPT | Performed by: NURSE PRACTITIONER

## 2019-04-15 NOTE — PROGRESS NOTES
Subjective   Lefty Cai is a 68 y.o. male.     Chief Complaint   Patient presents with   • Diabetes     with lab review    • Chronic Kidney Disease   • Hypertension     Diabetes   He presents for his follow-up diabetic visit. He has type 2 diabetes mellitus. His disease course has been stable. There are no hypoglycemic associated symptoms. Pertinent negatives for hypoglycemia include no headaches. There are no diabetic associated symptoms. Pertinent negatives for diabetes include no blurred vision and no chest pain. There are no hypoglycemic complications. Symptoms are stable. Diabetic complications include heart disease and nephropathy. Risk factors for coronary artery disease include diabetes mellitus, dyslipidemia and hypertension. Current diabetic treatment includes diet. He is compliant with treatment most of the time. Meal planning includes avoidance of concentrated sweets. He has not had a previous visit with a dietitian. He participates in exercise intermittently. An ACE inhibitor/angiotensin II receptor blocker is being taken. Eye exam current: last one 2 years ago.   Hypertension   This is a chronic problem. The current episode started more than 1 year ago. The problem has been waxing and waning since onset. Pertinent negatives include no blurred vision, chest pain, headaches, malaise/fatigue, palpitations, peripheral edema or shortness of breath. Risk factors for coronary artery disease include male gender, diabetes mellitus and obesity. Current antihypertension treatment includes beta blockers and ACE inhibitors. Compliance problems include diet and exercise.       I have reviewed the patient's medical history in detail and updated the computerized patient record.    The following portions of the patient's history were reviewed and updated as appropriate: allergies, current medications, past family history, past medical history, past social history, past surgical history and problem list.  "      Current Outpatient Medications:   •  aspirin 81 MG tablet, Take 81 mg by mouth daily., Disp: , Rfl:   •  Cholecalciferol (VITAMIN D3) 2000 units tablet, Take 2,000 Units by mouth Daily., Disp: , Rfl:   •  lisinopril (PRINIVIL,ZESTRIL) 20 MG tablet, TAKE 1 TABLET BY MOUTH ONCE DAILY, Disp: 90 tablet, Rfl: 1  •  metoprolol succinate XL (TOPROL-XL) 100 MG 24 hr tablet, TAKE 1 TABLET BY MOUTH EVERY DAY, Disp: 30 tablet, Rfl: 5  •  simvastatin (ZOCOR) 40 MG tablet, TAKE 1 TABLET BY MOUTH EVERY NIGHT AT BEDTIME, Disp: 90 tablet, Rfl: 1  •  sodium bicarbonate 650 MG tablet, Take 1 tablet by mouth 3 (Three) Times a Day., Disp: , Rfl: 0  •  traZODone (DESYREL) 50 MG tablet, TAKE 1 TABLET BY MOUTH AT BEDTIME IF NEEDED, Disp: 90 tablet, Rfl: 1    Review of Systems   Constitutional: Negative.  Negative for malaise/fatigue.   Eyes: Negative for blurred vision.   Respiratory: Negative.  Negative for shortness of breath.    Cardiovascular: Negative.  Negative for chest pain and palpitations.   Musculoskeletal: Negative.    Skin: Negative.    Neurological: Negative.         Vitals:    04/15/19 1258   BP: 130/78   BP Location: Left arm   Patient Position: Sitting   Cuff Size: Adult   Pulse: 74   Resp: 18   Temp: 98.6 °F (37 °C)   TempSrc: Oral   SpO2: 98%   Weight: 113 kg (250 lb 3.2 oz)   Height: 177.8 cm (70\")       Objective   Physical Exam   Constitutional: He is oriented to person, place, and time. He appears well-developed and well-nourished.   Cardiovascular: Normal rate, regular rhythm, normal heart sounds and intact distal pulses.   Pulmonary/Chest: Effort normal and breath sounds normal.   Musculoskeletal: Normal range of motion. He exhibits no edema.   Neurological: He is alert and oriented to person, place, and time.   Skin: Skin is warm and dry.   Psychiatric:   No acute distress   Vitals reviewed.        Assessment/Plan   Lefty was seen today for diabetes, chronic kidney disease and hypertension.    Diagnoses " and all orders for this visit:    Type 2 diabetes mellitus with stage 4 chronic kidney disease, without long-term current use of insulin (CMS/[facility])    Essential hypertension    CKD (chronic kidney disease) stage 4, GFR 15-29 ml/min (CMS/[facility])    1. I have reviewed his labs with him today. His renal functions is worsening. His BUN is 37, creatinine is 3.46, and eGFR is 18. He has an appointment to follow up with nephrology in June 2019.   2. His fasting glucose is 124 and his A1C is 6.50. He his to continue to control his glucose levels with diet and exercise.   3. His blood pressure is 130/78 today and stable. Continue with Metoprolol Succinate  mg daily and Lisinopril 20 mg daily.   4. Vitamin D level is 31.2 .   5. Follow up in 6 months of his diabetes.

## 2019-06-10 RX ORDER — TRAZODONE HYDROCHLORIDE 50 MG/1
50 TABLET ORAL NIGHTLY PRN
Qty: 90 TABLET | Refills: 1 | Status: SHIPPED | OUTPATIENT
Start: 2019-06-10 | End: 2019-11-22 | Stop reason: SDUPTHER

## 2019-06-10 RX ORDER — SIMVASTATIN 40 MG
40 TABLET ORAL
Qty: 90 TABLET | Refills: 1 | Status: SHIPPED | OUTPATIENT
Start: 2019-06-10 | End: 2019-11-22 | Stop reason: SDUPTHER

## 2019-06-10 RX ORDER — LISINOPRIL 20 MG/1
20 TABLET ORAL DAILY
Qty: 90 TABLET | Refills: 1 | Status: SHIPPED | OUTPATIENT
Start: 2019-06-10 | End: 2019-08-07

## 2019-06-28 DIAGNOSIS — I10 ESSENTIAL HYPERTENSION: ICD-10-CM

## 2019-06-28 RX ORDER — METOPROLOL SUCCINATE 100 MG/1
100 TABLET, EXTENDED RELEASE ORAL DAILY
Qty: 90 TABLET | Refills: 1 | Status: SHIPPED | OUTPATIENT
Start: 2019-06-28 | End: 2019-12-23

## 2019-08-07 ENCOUNTER — APPOINTMENT (OUTPATIENT)
Dept: PREADMISSION TESTING | Facility: HOSPITAL | Age: 69
End: 2019-08-07

## 2019-08-07 VITALS
TEMPERATURE: 97.6 F | HEART RATE: 65 BPM | SYSTOLIC BLOOD PRESSURE: 156 MMHG | RESPIRATION RATE: 20 BRPM | WEIGHT: 251 LBS | HEIGHT: 70 IN | DIASTOLIC BLOOD PRESSURE: 79 MMHG | OXYGEN SATURATION: 97 % | BODY MASS INDEX: 35.93 KG/M2

## 2019-08-07 LAB
ALBUMIN SERPL-MCNC: 4.4 G/DL (ref 3.5–5.2)
ALBUMIN/GLOB SERPL: 1.4 G/DL
ALP SERPL-CCNC: 58 U/L (ref 39–117)
ALT SERPL W P-5'-P-CCNC: 20 U/L (ref 1–41)
ANION GAP SERPL CALCULATED.3IONS-SCNC: 15 MMOL/L (ref 5–15)
AST SERPL-CCNC: 19 U/L (ref 1–40)
BILIRUB SERPL-MCNC: 0.4 MG/DL (ref 0.2–1.2)
BUN BLD-MCNC: 40 MG/DL (ref 8–23)
BUN/CREAT SERPL: 11.4 (ref 7–25)
CALCIUM SPEC-SCNC: 9.6 MG/DL (ref 8.6–10.5)
CHLORIDE SERPL-SCNC: 104 MMOL/L (ref 98–107)
CO2 SERPL-SCNC: 21 MMOL/L (ref 22–29)
CREAT BLD-MCNC: 3.5 MG/DL (ref 0.76–1.27)
DEPRECATED RDW RBC AUTO: 40.9 FL (ref 37–54)
ERYTHROCYTE [DISTWIDTH] IN BLOOD BY AUTOMATED COUNT: 12.4 % (ref 12.3–15.4)
GFR SERPL CREATININE-BSD FRML MDRD: 17 ML/MIN/1.73
GLOBULIN UR ELPH-MCNC: 3.1 GM/DL
GLUCOSE BLD-MCNC: 126 MG/DL (ref 65–99)
HCT VFR BLD AUTO: 42.2 % (ref 37.5–51)
HGB BLD-MCNC: 13.9 G/DL (ref 13–17.7)
MCH RBC QN AUTO: 29.8 PG (ref 26.6–33)
MCHC RBC AUTO-ENTMCNC: 32.9 G/DL (ref 31.5–35.7)
MCV RBC AUTO: 90.6 FL (ref 79–97)
PLATELET # BLD AUTO: 177 10*3/MM3 (ref 140–450)
PMV BLD AUTO: 10.4 FL (ref 6–12)
POTASSIUM BLD-SCNC: 4.6 MMOL/L (ref 3.5–5.2)
PROT SERPL-MCNC: 7.5 G/DL (ref 6–8.5)
RBC # BLD AUTO: 4.66 10*6/MM3 (ref 4.14–5.8)
SODIUM BLD-SCNC: 140 MMOL/L (ref 136–145)
WBC NRBC COR # BLD: 6.52 10*3/MM3 (ref 3.4–10.8)

## 2019-08-07 PROCEDURE — 93010 ELECTROCARDIOGRAM REPORT: CPT | Performed by: INTERNAL MEDICINE

## 2019-08-07 PROCEDURE — 80053 COMPREHEN METABOLIC PANEL: CPT | Performed by: SURGERY

## 2019-08-07 PROCEDURE — 93005 ELECTROCARDIOGRAM TRACING: CPT

## 2019-08-07 PROCEDURE — 85027 COMPLETE CBC AUTOMATED: CPT | Performed by: SURGERY

## 2019-08-07 PROCEDURE — 36415 COLL VENOUS BLD VENIPUNCTURE: CPT

## 2019-08-07 RX ORDER — HYDRALAZINE HYDROCHLORIDE 50 MG/1
50 TABLET, FILM COATED ORAL 3 TIMES DAILY
COMMUNITY
End: 2020-02-11 | Stop reason: SDUPTHER

## 2019-08-07 NOTE — DISCHARGE INSTRUCTIONS
Take the following medications the morning of surgery with a small sip of water:  HYDRALAZINE    ARRIVE TO MAIN SURGERY AT 9:30 AM ON 8/15/19      General Instructions:  • Do not eat solid food after midnight the night before surgery.  • You may drink clear liquids day of surgery but must stop at least one hour before your hospital arrival time.  • It is beneficial for you to have a clear drink that contains carbohydrates the day of surgery.  We suggest a 12 to 20 ounce bottle of Gatorade or Powerade for non-diabetic patients or a 12 to 20 ounce bottle of G2 or Powerade Zero for diabetic patients. (Pediatric patients, are not advised to drink a 12 to 20 ounce carbohydrate drink)    Clear liquids are liquids you can see through.  Nothing red in color.     Plain water                               Sports drinks  Sodas                                   Gelatin (Jell-O)  Fruit juices without pulp such as white grape juice and apple juice  Popsicles that contain no fruit or yogurt  Tea or coffee (no cream or milk added)  Gatorade / Powerade  G2 / Powerade Zero    • Infants may have breast milk up to four hours before surgery.  • Infants drinking formula may drink formula up to six hours before surgery.   • Patients who avoid smoking, chewing tobacco and alcohol for 4 weeks prior to surgery have a reduced risk of post-operative complications.  Quit smoking as many days before surgery as you can.  • Do not smoke, use chewing tobacco or drink alcohol the day of surgery.   • If applicable bring your C-PAP/ BI-PAP machine.  • Bring any papers given to you in the doctor’s office.  • Wear clean comfortable clothes and socks.  • Do not wear contact lenses, false eyelashes or make-up.  Bring a case for your glasses.   • Bring crutches or walker if applicable.  • Remove all piercings.  Leave jewelry and any other valuables at home.  • Hair extensions with metal clips must be removed prior to surgery.  • The Pre-Admission Testing  nurse will instruct you to bring medications if unable to obtain an accurate list in Pre-Admission Testing.          Preventing a Surgical Site Infection:  • For 2 to 3 days before surgery, avoid shaving with a razor because the razor can irritate skin and make it easier to develop an infection.    • Any areas of open skin can increase the risk of a post-operative wound infection by allowing bacteria to enter and travel throughout the body.  Notify your surgeon if you have any skin wounds / rashes even if it is not near the expected surgical site.  The area will need assessed to determine if surgery should be delayed until it is healed.  • The night prior to surgery sleep in a clean bed with clean clothing.  Do not allow pets to sleep with you.  • Shower on the morning of surgery using a fresh bar of anti-bacterial soap (such as Dial) and clean washcloth.  Dry with a clean towel and dress in clean clothing.  • Ask your surgeon if you will be receiving antibiotics prior to surgery.  • Make sure you, your family, and all healthcare providers clean their hands with soap and water or an alcohol based hand  before caring for you or your wound.    Day of surgery:  Upon arrival, a Pre-op nurse and Anesthesiologist will review your health history, obtain vital signs, and answer questions you may have.  The only belongings needed at this time will be a list of your home medications and if applicable your C-PAP/BI-PAP machine.  If you are staying overnight your family can leave the rest of your belongings in the car and bring them to your room later.  A Pre-op nurse will start an IV and you may receive medication in preparation for surgery, including something to help you relax.  Your family will be able to see you in the Pre-op area.  While you are in surgery your family should notify the waiting room  if they leave the waiting room area and provide a contact phone number.    Please be aware that surgery  does come with discomfort.  We want to make every effort to control your discomfort so please discuss any uncontrolled symptoms with your nurse.   Your doctor will most likely have prescribed pain medications.      If you are going home after surgery you will receive individualized written care instructions before being discharged.  A responsible adult must drive you to and from the hospital on the day of your surgery and stay with you for 24 hours.    If you are staying overnight following surgery, you will be transported to your hospital room following the recovery period.  [facility] has all private rooms.    You have received a list of surgical assistants for your reference.  If you have any questions please call Pre-Admission Testing at [phone].  Deductibles and co-payments are collected on the day of service. Please be prepared to pay the required co-pay, deductible or deposit on the day of service as defined by your plan.

## 2019-08-15 ENCOUNTER — HOSPITAL ENCOUNTER (OUTPATIENT)
Facility: HOSPITAL | Age: 69
Setting detail: HOSPITAL OUTPATIENT SURGERY
Discharge: HOME | End: 2019-08-15
Attending: SURGERY | Admitting: SURGERY

## 2019-08-15 ENCOUNTER — ANESTHESIA (OUTPATIENT)
Dept: PERIOP | Facility: HOSPITAL | Age: 69
End: 2019-08-15

## 2019-08-15 ENCOUNTER — ANESTHESIA EVENT (OUTPATIENT)
Dept: PERIOP | Facility: HOSPITAL | Age: 69
End: 2019-08-15

## 2019-08-15 VITALS
RESPIRATION RATE: 18 BRPM | HEART RATE: 69 BPM | SYSTOLIC BLOOD PRESSURE: 169 MMHG | BODY MASS INDEX: 36.22 KG/M2 | OXYGEN SATURATION: 100 % | DIASTOLIC BLOOD PRESSURE: 94 MMHG | HEIGHT: 70 IN | TEMPERATURE: 97.6 F | WEIGHT: 253 LBS

## 2019-08-15 LAB — GLUCOSE BLDC GLUCOMTR-MCNC: 93 MG/DL (ref 70–130)

## 2019-08-15 PROCEDURE — 25010000002 HEPARIN (PORCINE) PER 1000 UNITS: Performed by: NURSE ANESTHETIST, CERTIFIED REGISTERED

## 2019-08-15 PROCEDURE — 25010000003 LIDOCAINE 1 % SOLUTION 20 ML VIAL: Performed by: SURGERY

## 2019-08-15 PROCEDURE — 25010000002 MIDAZOLAM PER 1 MG: Performed by: ANESTHESIOLOGY

## 2019-08-15 PROCEDURE — 25010000003 CEFAZOLIN PER 500 MG: Performed by: SURGERY

## 2019-08-15 PROCEDURE — 25010000002 HEPARIN (PORCINE) PER 1000 UNITS: Performed by: SURGERY

## 2019-08-15 PROCEDURE — 25010000002 PROPOFOL 10 MG/ML EMULSION: Performed by: NURSE ANESTHETIST, CERTIFIED REGISTERED

## 2019-08-15 PROCEDURE — 25010000003 CEFAZOLIN IN DEXTROSE 2-4 GM/100ML-% SOLUTION: Performed by: SURGERY

## 2019-08-15 PROCEDURE — 82962 GLUCOSE BLOOD TEST: CPT

## 2019-08-15 PROCEDURE — 25010000002 PROTAMINE SULFATE PER 10 MG: Performed by: NURSE ANESTHETIST, CERTIFIED REGISTERED

## 2019-08-15 RX ORDER — PROTAMINE SULFATE 10 MG/ML
INJECTION, SOLUTION INTRAVENOUS AS NEEDED
Status: DISCONTINUED | OUTPATIENT
Start: 2019-08-15 | End: 2019-08-15 | Stop reason: SURG

## 2019-08-15 RX ORDER — SODIUM CHLORIDE 0.9 % (FLUSH) 0.9 %
1-10 SYRINGE (ML) INJECTION AS NEEDED
Status: DISCONTINUED | OUTPATIENT
Start: 2019-08-15 | End: 2019-08-15 | Stop reason: HOSPADM

## 2019-08-15 RX ORDER — FAMOTIDINE 10 MG/ML
20 INJECTION, SOLUTION INTRAVENOUS ONCE
Status: COMPLETED | OUTPATIENT
Start: 2019-08-15 | End: 2019-08-15

## 2019-08-15 RX ORDER — FENTANYL CITRATE 50 UG/ML
50 INJECTION, SOLUTION INTRAMUSCULAR; INTRAVENOUS
Status: DISCONTINUED | OUTPATIENT
Start: 2019-08-15 | End: 2019-08-15 | Stop reason: HOSPADM

## 2019-08-15 RX ORDER — SODIUM CHLORIDE 9 MG/ML
9 INJECTION, SOLUTION INTRAVENOUS CONTINUOUS PRN
Status: DISCONTINUED | OUTPATIENT
Start: 2019-08-15 | End: 2019-08-15 | Stop reason: HOSPADM

## 2019-08-15 RX ORDER — PROMETHAZINE HYDROCHLORIDE 25 MG/ML
12.5 INJECTION, SOLUTION INTRAMUSCULAR; INTRAVENOUS ONCE AS NEEDED
Status: DISCONTINUED | OUTPATIENT
Start: 2019-08-15 | End: 2019-08-15 | Stop reason: HOSPADM

## 2019-08-15 RX ORDER — HEPARIN SODIUM 1000 [USP'U]/ML
INJECTION, SOLUTION INTRAVENOUS; SUBCUTANEOUS AS NEEDED
Status: DISCONTINUED | OUTPATIENT
Start: 2019-08-15 | End: 2019-08-15 | Stop reason: SURG

## 2019-08-15 RX ORDER — PROMETHAZINE HYDROCHLORIDE 25 MG/1
25 SUPPOSITORY RECTAL ONCE AS NEEDED
Status: DISCONTINUED | OUTPATIENT
Start: 2019-08-15 | End: 2019-08-15 | Stop reason: HOSPADM

## 2019-08-15 RX ORDER — LIDOCAINE HYDROCHLORIDE 20 MG/ML
INJECTION, SOLUTION INFILTRATION; PERINEURAL AS NEEDED
Status: DISCONTINUED | OUTPATIENT
Start: 2019-08-15 | End: 2019-08-15 | Stop reason: SURG

## 2019-08-15 RX ORDER — LIDOCAINE HYDROCHLORIDE 10 MG/ML
0.5 INJECTION, SOLUTION EPIDURAL; INFILTRATION; INTRACAUDAL; PERINEURAL ONCE AS NEEDED
Status: DISCONTINUED | OUTPATIENT
Start: 2019-08-15 | End: 2019-08-15 | Stop reason: HOSPADM

## 2019-08-15 RX ORDER — PROPOFOL 10 MG/ML
VIAL (ML) INTRAVENOUS AS NEEDED
Status: DISCONTINUED | OUTPATIENT
Start: 2019-08-15 | End: 2019-08-15 | Stop reason: SURG

## 2019-08-15 RX ORDER — PROPOFOL 10 MG/ML
VIAL (ML) INTRAVENOUS CONTINUOUS PRN
Status: DISCONTINUED | OUTPATIENT
Start: 2019-08-15 | End: 2019-08-15 | Stop reason: SURG

## 2019-08-15 RX ORDER — MIDAZOLAM HYDROCHLORIDE 1 MG/ML
1 INJECTION INTRAMUSCULAR; INTRAVENOUS
Status: DISCONTINUED | OUTPATIENT
Start: 2019-08-15 | End: 2019-08-15 | Stop reason: HOSPADM

## 2019-08-15 RX ORDER — MIDAZOLAM HYDROCHLORIDE 1 MG/ML
2 INJECTION INTRAMUSCULAR; INTRAVENOUS
Status: DISCONTINUED | OUTPATIENT
Start: 2019-08-15 | End: 2019-08-15 | Stop reason: HOSPADM

## 2019-08-15 RX ORDER — CEFAZOLIN SODIUM 2 G/100ML
2 INJECTION, SOLUTION INTRAVENOUS ONCE
Status: COMPLETED | OUTPATIENT
Start: 2019-08-15 | End: 2019-08-15

## 2019-08-15 RX ORDER — PROMETHAZINE HYDROCHLORIDE 25 MG/1
25 TABLET ORAL ONCE AS NEEDED
Status: DISCONTINUED | OUTPATIENT
Start: 2019-08-15 | End: 2019-08-15 | Stop reason: HOSPADM

## 2019-08-15 RX ADMIN — Medication 1 MG: at 12:50

## 2019-08-15 RX ADMIN — PROTAMINE SULFATE 20 MG: 10 INJECTION, SOLUTION INTRAVENOUS at 14:23

## 2019-08-15 RX ADMIN — HEPARIN SODIUM 5000 UNITS: 1000 INJECTION, SOLUTION INTRAVENOUS; SUBCUTANEOUS at 14:04

## 2019-08-15 RX ADMIN — SODIUM CHLORIDE 9 ML/HR: 9 INJECTION, SOLUTION INTRAVENOUS at 11:04

## 2019-08-15 RX ADMIN — Medication 1 MG: at 11:05

## 2019-08-15 RX ADMIN — PROPOFOL 80 MG: 10 INJECTION, EMULSION INTRAVENOUS at 13:50

## 2019-08-15 RX ADMIN — CEFAZOLIN SODIUM 2 G: 2 INJECTION, SOLUTION INTRAVENOUS at 13:50

## 2019-08-15 RX ADMIN — LIDOCAINE HYDROCHLORIDE 100 MG: 20 INJECTION, SOLUTION INFILTRATION; PERINEURAL at 13:50

## 2019-08-15 RX ADMIN — FAMOTIDINE 20 MG: 10 INJECTION INTRAVENOUS at 11:05

## 2019-08-15 RX ADMIN — PROPOFOL 130 MCG/KG/MIN: 10 INJECTION, EMULSION INTRAVENOUS at 13:50

## 2019-08-15 NOTE — ANESTHESIA POSTPROCEDURE EVALUATION
Patient: Lefty Cai    Procedure Summary     Date:  08/15/19 Room / Location:  [facility] OR 03 / [facility] MAIN OR    Anesthesia Start:  1345 Anesthesia Stop:  1446    Procedure:  LEFT MID FOREARM RADIAL CEPHALIC ARTERIAL VENOUS FISTULA (Left ) Diagnosis:      Surgeon:  Louann Miles Jr., MD Provider:  Floresita Huntley MD    Anesthesia Type:  MAC ASA Status:  3          Anesthesia Type: MAC  Last vitals  BP   150/93 (08/15/19 1500)   Temp   36.4 °C (97.6 °F) (08/15/19 1443)   Pulse   64 (08/15/19 1500)   Resp   18 (08/15/19 1500)     SpO2   100 % (08/15/19 1500)     Post Anesthesia Care and Evaluation    Patient location during evaluation: bedside  Patient participation: complete - patient participated  Level of consciousness: awake and alert  Pain management: adequate  Airway patency: patent  Anesthetic complications: No anesthetic complications  PONV Status: controlled  Cardiovascular status: acceptable  Respiratory status: acceptable  Hydration status: acceptable

## 2019-08-15 NOTE — OP NOTE
Operative Note    Pre-op Diagnosis: Chronic Kidney Disease Stage IV    Post-op Diagnosis: Same    Procedure(s):  LEFT MID FOREARM RADIAL CEPHALIC ARTERIAL VENOUS FISTULA    Surgeon(s):  Louann Miles Jr., MD    Assistant: Cata URBAN and they provided critical assistance during the case including suctioning, exposure, retraction, and reduction of blood loss.    Anesthesia: Monitored Anesthesia Care    Estimated Blood Loss: Minimal    Specimens:   None    Staff:   Circulator: Lucila Alvarez RN  Scrub Person: Karen Cooper; Lobito Biggs  Assistant: Cata Cisneros CSA    Complications: None    Findings:  The vein was of good quality, the artery was of fair quality, the thrill was of fair quality.     Indications:  The patient is an 69 y.o. male referred for evaluation for AV fistula placement.  The patient has Chronic Kidney Disease Stage IV .  After evaluation in the office and ultrasound vein mapping of the upper extremities the patient was determined to be a candidate for a midforearm radiocephalic arteriovenous fistula.  The risks, benefits, and alternatives were discussed with the patient who agreed to proceed.  This includes but is not limited to nerve injury, vascular compromise, infection, and failure to mature.    Procedure:  The patient was taken to Operating Room and identified as Lefty Cai and the procedure verified as left radiocephalic AVF. A Time Out was held and the above information confirmed.    In the operating room with the patient sedated the arm was mapped under ultrasound again to help determine the best location for incision.  A decision was made to make mid forearm longitudinal incision below the antecubital fossa.  Skin and subcutaneous tissues were anesthetized.  The skin and subcutaneous was divided sharply.  The cephalic vein was identified and dissected out circumferentially for a significant segment.  It was marked.  The fascia was then incised  and muscle retracted medially. The radial artery was exposed circumferentially.  Enough of the cephalic vein was exposed so that it could be ligated distally with a silk ligature and then transposed over to the radial artery.  The vein is spatulated in order to fit the arterial anastomosis.  It was Straight with the markings to reduce the risk of twisting.  The radiial artery was opened up with 11 blade scalpel and small Quinones scissors.  A running anastomosis is done with a 6-0 Prolene suture.  It was flushed and de-aired prior to completion.  There was a good thrill to completion of the case.  There was a good Doppler signal at the wrist as well.  The wound was copious irrigated with an antibiotic irrigation and closed in 2 layers with Vicryl sutures.  Dermabond glue was used for the skin.  Patient tolerated it well and no intraoperative complications.        There are no hospital problems to display for this patient.     Louann Miles Jr., MD     Date: 8/15/2019  Time: 2:31 PM

## 2019-08-15 NOTE — ANESTHESIA PREPROCEDURE EVALUATION
Anesthesia Evaluation     Patient summary reviewed and Nursing notes reviewed                Airway   Mallampati: III  Possible difficult intubation  Dental      Pulmonary    (+) a smoker Former, sleep apnea,   Cardiovascular     ECG reviewed  Rhythm: regular  Rate: normal    (+) hypertension, hyperlipidemia,       Neuro/Psych- negative ROS  GI/Hepatic/Renal/Endo    (+) morbid obesity, GERD,  diabetes mellitus type 2,     Musculoskeletal (-) negative ROS    Abdominal    Substance History - negative use     OB/GYN negative ob/gyn ROS         Other                        Anesthesia Plan    ASA 3     MAC     intravenous induction   Anesthetic plan, all risks, benefits, and alternatives have been provided, discussed and informed consent has been obtained with: patient.

## 2019-08-15 NOTE — DISCHARGE INSTRUCTIONS
SEDATION DISCHARGE INSTRUCTIONS.    IMPORTANT: The following information will help you return to your best level of health.  Sedation.  You have had a procedure that called for some medicine to reduce anxiety and pain. This medicine (or medicines) is called  sedation. After receiving the medicine, you may be sleepy, but able to breathe on your own. The effects of the medicine may last for several hours.    Follow these instructions after sedation:   Go right home. Rest quietly at home today, then you can be up and about.   Do not drink alcohol, drive or operate machinery for 24 hours.   Do not do anything where light-headedness or clumsiness would be dangerous.   Do not make important decisions or sign any legal papers for the next 24 hours.   Make sure A RESPONSIBLE PERSON stays with you the rest of today and overnight for your protection and safety.   Start your diet with fluids and light foods (jello, soup, juice, toast). Then, slowly progress to your usual diet if you are not sick to your stomach.  · If you have sleep apnea, surgery and certain medicines can increase your risk for breathing problems. Follow instructions from your health care provider about wearing your sleep device:  ? Anytime you are sleeping, including during daytime naps.  ? While taking prescription pain medicines, sleeping medicines, or medicines that make you drowsy.      Call your doctor if you have:   a gray or blue skin color.   excess sleepiness.   repeated vomiting.   trouble breathing.   any new problems or concerns.          [facility]  [address], M[address] [place], [place]  [address], [address]  [phone]    Post-Operative Instructions for AV Fistula / Graft   Diet: Regular Diet    Medications: Take your regularly scheduled medications on the day of your surgery, unless your doctor has directed you otherwise. You may be sent home with a prescription for pain medication, follow the  "directions as prescribed.    Activity Restrictions / Driving: Avoid lifting more than 15 pounds or other activities that stress or compress the access area. No driving for the remainder of the day after surgery. You may drive when you no longer are taking narcotic pain medications. If a nerve block was done to numb your arm for surgery, you will be placed in an arm sling.  This numbness and inability to move the arm can last for as little as 6 hours but as many as 18.  The sling should be used during this time but can be removed when sensation and movement of your arm is normal and does not need to be used after that. Use of the arm is encouraged after the surgery.    Incision Care: Some bruising is normal. If you have drainage from the incision please notify the office. Dressing should be removed in 48 hours. After dressing is removed, it is OK to shower. Do not submerge incision until cleared by your surgeon (bath or swimming).    Bathing and Showering: You may shower after you remove your dressing.    Follow-up Appointments: You will need to return to the office for a follow-up visit within 1-3 weeks after your surgery. Please make sure you have your appointment scheduled, call [phone].    The patient (you) should:  1. Avoid wearing tight constrictive clothing over that arm.  2. Avoid wearing jewelry that is tight, such as a watch on the access arm.  3. Avoid carrying heavy objects.  4. Avoid purse straps over the fistula.  5. Avoid sleeping on the arm or keeping it bent for extended periods of time.  6. Each day, using your opposite hand, feel over the fistula for the \"thrill\" or vibration that is normally present.    Fistula Information / Care:  ·  It is normal to have swelling in the surgical area. To help control this swelling, you should elevate your arm on a pillow.  ·  Wiggle your fingers and clinch your fist 10 times every hour, while awake, for the first 5-7 days. Also, bend and straighten at the elbow " to regain normal range of motion. These exercises are designed to promote circulation in the fingers and aid in draining away the excess fluid accumulation in the immediate area.  · No blood pressures or needle sticks in the arm with your access.    Call the office for the followin. Fever greater than 101.0  2. Uncontrolled pain. This is on a scale of 1-10 (10 being the worst pain imaginable) your pain is a level 7 or above.  3. It is important that you notify our office if you are having numbness and significant pain in the extremity in which you have just had surgery!  4. Decreased or absent thrill.  5. Nausea, diarrhea, and/or vomiting that continue for 12-24 hours.  6. Signs of an infection: redness, increased swelling, drainage, fever and/or chills.  7. Chest pain or difficulty breathing.    The fistula or graft CAN NOT be used until the MD has given written approval. Generally, a graft will be ready to use in 2 weeks, and a fistula will be ready to use in 6-8 weeks.     If you have further questions after reading this handout, the office is open from 8:30am to 5:00pm Monday through Friday. Call [phone].

## 2019-10-17 ENCOUNTER — OFFICE VISIT (OUTPATIENT)
Dept: FAMILY MEDICINE CLINIC | Facility: CLINIC | Age: 69
End: 2019-10-17

## 2019-10-17 VITALS
DIASTOLIC BLOOD PRESSURE: 82 MMHG | WEIGHT: 254.1 LBS | HEART RATE: 77 BPM | HEIGHT: 70 IN | BODY MASS INDEX: 36.38 KG/M2 | SYSTOLIC BLOOD PRESSURE: 128 MMHG | RESPIRATION RATE: 20 BRPM | OXYGEN SATURATION: 98 %

## 2019-10-17 DIAGNOSIS — Z23 FLU VACCINE NEED: ICD-10-CM

## 2019-10-17 DIAGNOSIS — E11.22 TYPE 2 DIABETES MELLITUS WITH STAGE 4 CHRONIC KIDNEY DISEASE, WITHOUT LONG-TERM CURRENT USE OF INSULIN (HCC): Primary | ICD-10-CM

## 2019-10-17 DIAGNOSIS — N18.4 TYPE 2 DIABETES MELLITUS WITH STAGE 4 CHRONIC KIDNEY DISEASE, WITHOUT LONG-TERM CURRENT USE OF INSULIN (HCC): Primary | ICD-10-CM

## 2019-10-17 LAB — HBA1C MFR BLD: 6.3 %

## 2019-10-17 PROCEDURE — 36416 COLLJ CAPILLARY BLOOD SPEC: CPT | Performed by: NURSE PRACTITIONER

## 2019-10-17 PROCEDURE — 99213 OFFICE O/P EST LOW 20 MIN: CPT | Performed by: NURSE PRACTITIONER

## 2019-10-17 PROCEDURE — 90653 IIV ADJUVANT VACCINE IM: CPT | Performed by: NURSE PRACTITIONER

## 2019-10-17 PROCEDURE — 83036 HEMOGLOBIN GLYCOSYLATED A1C: CPT | Performed by: NURSE PRACTITIONER

## 2019-10-17 PROCEDURE — G0008 ADMIN INFLUENZA VIRUS VAC: HCPCS | Performed by: NURSE PRACTITIONER

## 2019-10-17 NOTE — PROGRESS NOTES
Subjective   Lefty Cai is a 69 y.o. male.     Chief Complaint   Patient presents with   • Diabetes     Diabetes   He presents for his follow-up diabetic visit. He has type 2 diabetes mellitus. His disease course has been improving. There are no hypoglycemic associated symptoms. There are no diabetic associated symptoms. Pertinent negatives for diabetes include no polydipsia, no polyphagia and no polyuria. There are no hypoglycemic complications. Symptoms are stable. There are no diabetic complications. Risk factors for coronary artery disease include male sex, obesity, diabetes mellitus, hypertension and dyslipidemia. Current diabetic treatment includes diet. He is compliant with treatment all of the time. He is following a generally healthy diet. Meal planning includes avoidance of concentrated sweets and carbohydrate counting. He participates in exercise intermittently. Home blood sugar record trend: stable. An ACE inhibitor/angiotensin II receptor blocker is being taken. He does not see a podiatrist.Eye exam is not current.      I have reviewed the patient's medical history in detail and updated the computerized patient record.    The following portions of the patient's history were reviewed and updated as appropriate: allergies, current medications, past family history, past medical history, past social history, past surgical history and problem list.         Current Outpatient Medications:   •  aspirin 81 MG tablet, Take 81 mg by mouth Every Night. INSTRUCTED TO CONT UNTIL DAY OF SURGERY, Disp: , Rfl:   •  Cholecalciferol (VITAMIN D3) 2000 units tablet, Take 2,000 Units by mouth Every Night., Disp: , Rfl:   •  hydrALAZINE (APRESOLINE) 50 MG tablet, Take 50 mg by mouth 3 (Three) Times a Day., Disp: , Rfl:   •  metoprolol succinate XL (TOPROL-XL) 100 MG 24 hr tablet, Take 1 tablet by mouth Daily. (Patient taking differently: Take 100 mg by mouth Every Night.), Disp: 90 tablet, Rfl: 1  •  simvastatin  "(ZOCOR) 40 MG tablet, Take 1 tablet by mouth every night at bedtime., Disp: 90 tablet, Rfl: 1  •  sodium bicarbonate 650 MG tablet, Take 1 tablet by mouth 3 (Three) Times a Day., Disp: , Rfl: 0  •  traZODone (DESYREL) 50 MG tablet, Take 1 tablet by mouth At Night As Needed for Sleep., Disp: 90 tablet, Rfl: 1    Review of Systems   Constitutional: Negative.    Respiratory: Negative.    Cardiovascular: Negative.    Endocrine: Negative for polydipsia, polyphagia and polyuria.   Genitourinary: Positive for difficulty urinating.   Neurological: Negative.    Psychiatric/Behavioral: Negative.         Vitals:    10/17/19 1020   BP: 128/82   BP Location: Right arm   Patient Position: Sitting   Cuff Size: Adult   Pulse: 77   Resp: 20   SpO2: 98%   Weight: 115 kg (254 lb 1.6 oz)   Height: 177.8 cm (70\")       Objective   Physical Exam   Constitutional: He is oriented to person, place, and time. He appears well-developed and well-nourished.   Cardiovascular: Normal rate, regular rhythm and normal heart sounds.   AV fistula is patent   Pulmonary/Chest: Effort normal and breath sounds normal.   Musculoskeletal: Normal range of motion. He exhibits no edema.   Neurological: He is alert and oriented to person, place, and time.   Skin: Skin is warm and dry.   Psychiatric:   No acute distress   Vitals reviewed.        Assessment/Plan   Lefty was seen today for diabetes.    Diagnoses and all orders for this visit:    Type 2 diabetes mellitus with stage 4 chronic kidney disease, without long-term current use of insulin (CMS/[facility])  -     POC Glycosylated Hemoglobin (Hb A1C)    Flu vaccine need  -     Fluad Tri 65yr+    Other orders  -     SCANNED - INFLUENZA  -     SCANNED - INFLUENZA      Mr. Cai presents today to follow up on his glucose control. His A1C is 6.3 today. He is to continue to manage his glucose levels with diet and exercise.   He is to continue all medications as prescribed.   He received an influenza vaccine today " in the office.  He is to follow up in six months for an annual medicare wellness exam with fasting labs the week before.

## 2019-11-25 RX ORDER — SIMVASTATIN 40 MG
40 TABLET ORAL
Qty: 90 TABLET | Refills: 1 | Status: SHIPPED | OUTPATIENT
Start: 2019-11-25 | End: 2020-02-11 | Stop reason: SDUPTHER

## 2019-11-25 RX ORDER — TRAZODONE HYDROCHLORIDE 50 MG/1
50 TABLET ORAL NIGHTLY PRN
Qty: 90 TABLET | Refills: 1 | Status: SHIPPED | OUTPATIENT
Start: 2019-11-25 | End: 2020-02-11 | Stop reason: SDUPTHER

## 2019-12-22 DIAGNOSIS — I10 ESSENTIAL HYPERTENSION: ICD-10-CM

## 2019-12-23 RX ORDER — METOPROLOL SUCCINATE 100 MG/1
100 TABLET, EXTENDED RELEASE ORAL NIGHTLY
Qty: 90 TABLET | Refills: 0 | Status: SHIPPED | OUTPATIENT
Start: 2019-12-23 | End: 2020-02-11 | Stop reason: SDUPTHER

## 2020-02-11 DIAGNOSIS — I10 ESSENTIAL HYPERTENSION: ICD-10-CM

## 2020-02-11 RX ORDER — HYDRALAZINE HYDROCHLORIDE 50 MG/1
50 TABLET, FILM COATED ORAL 3 TIMES DAILY
Qty: 270 TABLET | Refills: 0 | Status: SHIPPED | OUTPATIENT
Start: 2020-02-11 | End: 2020-04-13

## 2020-02-11 RX ORDER — METOPROLOL SUCCINATE 100 MG/1
100 TABLET, EXTENDED RELEASE ORAL NIGHTLY
Qty: 90 TABLET | Refills: 0 | Status: SHIPPED | OUTPATIENT
Start: 2020-02-11 | End: 2020-04-13

## 2020-02-11 RX ORDER — TRAZODONE HYDROCHLORIDE 50 MG/1
50 TABLET ORAL NIGHTLY PRN
Qty: 90 TABLET | Refills: 0 | Status: SHIPPED | OUTPATIENT
Start: 2020-02-11 | End: 2020-04-13

## 2020-02-12 RX ORDER — SIMVASTATIN 40 MG
40 TABLET ORAL
Qty: 90 TABLET | Refills: 0 | Status: SHIPPED | OUTPATIENT
Start: 2020-02-12 | End: 2020-04-13

## 2020-02-19 DIAGNOSIS — I10 ESSENTIAL HYPERTENSION: ICD-10-CM

## 2020-04-13 DIAGNOSIS — I10 ESSENTIAL HYPERTENSION: ICD-10-CM

## 2020-04-13 RX ORDER — SIMVASTATIN 40 MG
TABLET ORAL
Qty: 90 TABLET | Refills: 0 | Status: SHIPPED | OUTPATIENT
Start: 2020-04-13 | End: 2020-07-28

## 2020-04-13 RX ORDER — TRAZODONE HYDROCHLORIDE 50 MG/1
50 TABLET ORAL NIGHTLY PRN
Qty: 90 TABLET | Refills: 0 | Status: SHIPPED | OUTPATIENT
Start: 2020-04-13 | End: 2020-07-28

## 2020-04-13 RX ORDER — HYDRALAZINE HYDROCHLORIDE 50 MG/1
TABLET, FILM COATED ORAL
Qty: 270 TABLET | Refills: 0 | Status: SHIPPED | OUTPATIENT
Start: 2020-04-13 | End: 2020-07-28

## 2020-04-13 RX ORDER — METOPROLOL SUCCINATE 100 MG/1
100 TABLET, EXTENDED RELEASE ORAL NIGHTLY
Qty: 90 TABLET | Refills: 0 | Status: SHIPPED | OUTPATIENT
Start: 2020-04-13 | End: 2020-07-28

## 2020-06-09 DIAGNOSIS — N18.4 CKD (CHRONIC KIDNEY DISEASE) STAGE 4, GFR 15-29 ML/MIN (HCC): ICD-10-CM

## 2020-06-09 DIAGNOSIS — E78.5 HYPERLIPIDEMIA, UNSPECIFIED HYPERLIPIDEMIA TYPE: ICD-10-CM

## 2020-06-09 DIAGNOSIS — E55.9 VITAMIN D DEFICIENCY: ICD-10-CM

## 2020-06-09 DIAGNOSIS — I10 ESSENTIAL HYPERTENSION: Primary | ICD-10-CM

## 2020-06-09 DIAGNOSIS — E11.22 TYPE 2 DIABETES MELLITUS WITH STAGE 4 CHRONIC KIDNEY DISEASE, WITHOUT LONG-TERM CURRENT USE OF INSULIN (HCC): ICD-10-CM

## 2020-06-09 DIAGNOSIS — N18.4 TYPE 2 DIABETES MELLITUS WITH STAGE 4 CHRONIC KIDNEY DISEASE, WITHOUT LONG-TERM CURRENT USE OF INSULIN (HCC): ICD-10-CM

## 2020-06-11 LAB
25(OH)D3+25(OH)D2 SERPL-MCNC: 44.4 NG/ML (ref 30–100)
ALBUMIN SERPL-MCNC: 4.1 G/DL (ref 3.5–5.2)
ALBUMIN/CREAT UR: 1546 MG/G CREAT (ref 0–29)
ALBUMIN/GLOB SERPL: 1.6 G/DL
ALP SERPL-CCNC: 64 U/L (ref 39–117)
ALT SERPL-CCNC: 12 U/L (ref 1–41)
AST SERPL-CCNC: 9 U/L (ref 1–40)
BILIRUB SERPL-MCNC: 0.3 MG/DL (ref 0.2–1.2)
BUN SERPL-MCNC: 40 MG/DL (ref 8–23)
BUN/CREAT SERPL: 9.1 (ref 7–25)
CALCIUM SERPL-MCNC: 9.4 MG/DL (ref 8.6–10.5)
CHLORIDE SERPL-SCNC: 104 MMOL/L (ref 98–107)
CHOLEST SERPL-MCNC: 107 MG/DL (ref 0–200)
CO2 SERPL-SCNC: 21.6 MMOL/L (ref 22–29)
CREAT SERPL-MCNC: 4.4 MG/DL (ref 0.76–1.27)
CREAT UR-MCNC: 139.3 MG/DL
ERYTHROCYTE [DISTWIDTH] IN BLOOD BY AUTOMATED COUNT: 13 % (ref 12.3–15.4)
GLOBULIN SER CALC-MCNC: 2.6 GM/DL
GLUCOSE SERPL-MCNC: 140 MG/DL (ref 65–99)
HBA1C MFR BLD: 6.6 % (ref 4.8–5.6)
HCT VFR BLD AUTO: 39.4 % (ref 37.5–51)
HDLC SERPL-MCNC: 39 MG/DL (ref 40–60)
HGB BLD-MCNC: 13 G/DL (ref 13–17.7)
LDLC SERPL CALC-MCNC: 43 MG/DL (ref 0–100)
MCH RBC QN AUTO: 30.3 PG (ref 26.6–33)
MCHC RBC AUTO-ENTMCNC: 33 G/DL (ref 31.5–35.7)
MCV RBC AUTO: 91.8 FL (ref 79–97)
MICROALBUMIN UR-MCNC: 2154 UG/ML
PHOSPHATE SERPL-MCNC: 4.4 MG/DL (ref 2.5–4.5)
PLATELET # BLD AUTO: 171 10*3/MM3 (ref 140–450)
POTASSIUM SERPL-SCNC: 4.6 MMOL/L (ref 3.5–5.2)
PROT SERPL-MCNC: 6.7 G/DL (ref 6–8.5)
RBC # BLD AUTO: 4.29 10*6/MM3 (ref 4.14–5.8)
SODIUM SERPL-SCNC: 139 MMOL/L (ref 136–145)
TRIGL SERPL-MCNC: 123 MG/DL (ref 0–150)
VLDLC SERPL CALC-MCNC: 24.6 MG/DL
WBC # BLD AUTO: 6.45 10*3/MM3 (ref 3.4–10.8)

## 2020-06-17 ENCOUNTER — OFFICE VISIT (OUTPATIENT)
Dept: FAMILY MEDICINE CLINIC | Facility: CLINIC | Age: 70
End: 2020-06-17

## 2020-06-17 VITALS
HEART RATE: 69 BPM | WEIGHT: 251 LBS | SYSTOLIC BLOOD PRESSURE: 144 MMHG | TEMPERATURE: 99.8 F | HEIGHT: 70 IN | DIASTOLIC BLOOD PRESSURE: 72 MMHG | BODY MASS INDEX: 35.93 KG/M2 | OXYGEN SATURATION: 97 %

## 2020-06-17 DIAGNOSIS — Z00.00 MEDICARE ANNUAL WELLNESS VISIT, SUBSEQUENT: Primary | ICD-10-CM

## 2020-06-17 PROCEDURE — G0439 PPPS, SUBSEQ VISIT: HCPCS | Performed by: NURSE PRACTITIONER

## 2020-06-17 PROCEDURE — 96160 PT-FOCUSED HLTH RISK ASSMT: CPT | Performed by: NURSE PRACTITIONER

## 2020-06-17 NOTE — PROGRESS NOTES
The ABCs of the Annual Wellness Visit  Subsequent Medicare Wellness Visit    Chief Complaint   Patient presents with   • Medicare Wellness-subsequent       Subjective   History of Present Illness:  Lefty Cai is a 70 y.o. male who presents for a Subsequent Medicare Wellness Visit.    HEALTH RISK ASSESSMENT    Recent Hospitalizations:  No hospitalization(s) within the last year.    Current Medical Providers:  Patient Care Team:  Daksha Ng APRN as PCP - General (Family Medicine)  Quang Reyes MD as PCP - Claims Attributed  Rudy Faith MD as Consulting Physician (Ophthalmology)  Jose Mccall MD as Consulting Physician (Nephrology)  Quang Reyes MD as Consulting Physician (Nephrology)    Smoking Status:  Social History     Tobacco Use   Smoking Status Former Smoker   • Packs/day: 2.00   • Types: Cigarettes   Smokeless Tobacco Current User   • Types: Snuff   Tobacco Comment    QUIT 20 YRS       Alcohol Consumption:  Social History     Substance and Sexual Activity   Alcohol Use No       Depression Screen:   PHQ-2/PHQ-9 Depression Screening 6/17/2020   Little interest or pleasure in doing things 0   Feeling down, depressed, or hopeless 0   Total Score 0       Fall Risk Screen:  STEADI Fall Risk Assessment was completed, and patient is at LOW risk for falls.Assessment completed on:6/17/2020    Health Habits and Functional and Cognitive Screening:  Functional & Cognitive Status 6/17/2020   Do you have difficulty preparing food and eating? No   Do you have difficulty bathing yourself, getting dressed or grooming yourself? No   Do you have difficulty using the toilet? No   Do you have difficulty moving around from place to place? No   Do you have trouble with steps or getting out of a bed or a chair? No   Current Diet Well Balanced Diet   Dental Exam Not up to date   Eye Exam Not up to date   Exercise (times per week) 2 times per week   Current Exercise Activities Include Yard  Work   Do you need help using the phone?  No   Are you deaf or do you have serious difficulty hearing?  No   Do you need help with transportation? No   Do you need help shopping? No   Do you need help preparing meals?  No   Do you need help with housework?  No   Do you need help with laundry? No   Do you need help taking your medications? No   Do you need help managing money? No   Do you ever drive or ride in a car without wearing a seat belt? No   Have you felt unusual stress, anger or loneliness in the last month? No   Who do you live with? Spouse   If you need help, do you have trouble finding someone available to you? No   Have you been bothered in the last four weeks by sexual problems? No   Do you have difficulty concentrating, remembering or making decisions? No         Does the patient have evidence of cognitive impairment? No    Asprin use counseling:Taking ASA appropriately as indicated    Age-appropriate Screening Schedule:  Refer to the list below for future screening recommendations based on patient's age, sex and/or medical conditions. Orders for these recommended tests are listed in the plan section. The patient has been provided with a written plan.    Health Maintenance   Topic Date Due   • DIABETIC FOOT EXAM  1950   • COLONOSCOPY  1950   • TDAP/TD VACCINES (1 - Tdap) 05/04/1960   • ZOSTER VACCINE (1 of 2) 05/04/1999   • INFLUENZA VACCINE  08/01/2020   • DIABETIC EYE EXAM  12/05/2020   • HEMOGLOBIN A1C  12/10/2020   • LIPID PANEL  06/10/2021   • URINE MICROALBUMIN  06/10/2021          The following portions of the patient's history were reviewed and updated as appropriate: allergies, current medications, past family history, past medical history, past social history, past surgical history and problem list.    Outpatient Medications Prior to Visit   Medication Sig Dispense Refill   • aspirin 81 MG tablet Take 81 mg by mouth Every Night. INSTRUCTED TO CONT UNTIL DAY OF SURGERY     •  Cholecalciferol (VITAMIN D3) 2000 units tablet Take 2,000 Units by mouth Every Night.     • hydrALAZINE (APRESOLINE) 50 MG tablet TAKE 1 TABLET THREE TIMES DAILY 270 tablet 0   • metoprolol succinate XL (TOPROL-XL) 100 MG 24 hr tablet TAKE 1 TABLET BY MOUTH EVERY NIGHT. 90 tablet 0   • simvastatin (ZOCOR) 40 MG tablet TAKE 1 TABLET AT BEDTIME 90 tablet 0   • sodium bicarbonate 650 MG tablet Take 1 tablet by mouth 3 (Three) Times a Day.  0   • traZODone (DESYREL) 50 MG tablet TAKE 1 TABLET BY MOUTH AT NIGHT AS NEEDED FOR SLEEP. 90 tablet 0     No facility-administered medications prior to visit.        Patient Active Problem List   Diagnosis   • Type 2 diabetes mellitus with diabetic chronic kidney disease (CMS/[facility])   • Essential hypertension   • Hyperlipidemia   • Primary insomnia   • CKD (chronic kidney disease) stage 4, GFR 15-29 ml/min (CMS/[facility])   • Vitamin D deficiency       Advanced Care Planning:  ACP discussion was held with the patient during this visit. Patient has an advance directive in EMR which is still valid.     Review of Systems   Constitutional: Positive for fatigue.   Respiratory: Negative.    Cardiovascular: Negative.    Gastrointestinal: Negative.    Genitourinary: Positive for decreased urine volume.        Has new AV shunt in left arm   Musculoskeletal: Negative.    Skin: Negative.    Neurological: Negative.    Psychiatric/Behavioral: Negative.        Compared to one year ago, the patient feels his physical health is the same.  Compared to one year ago, the patient feels his mental health is the same.    Reviewed chart for potential of high risk medication in the elderly: yes  Reviewed chart for potential of harmful drug interactions in the elderly:yes    Objective         Vitals:    06/17/20 1246   BP: 144/72   BP Location: Right arm   Patient Position: Sitting   Cuff Size: Large Adult   Pulse: 69   Temp: 99.8 °F (37.7 °C)   TempSrc: Temporal   SpO2: 97%   Weight: 114 kg (251 lb)   Height:  "177.8 cm (70\")       Body mass index is 36.01 kg/m².  Discussed the patient's BMI with him. The BMI is above average; BMI management plan is completed.    Physical Exam   Constitutional: He is oriented to person, place, and time. He appears well-developed and well-nourished.   Neurological: He is alert and oriented to person, place, and time.   Skin: Skin is warm and dry.   Psychiatric:   No acute distress   Vitals reviewed.      Lab Results   Component Value Date     (H) 06/10/2020    CHLPL 107 06/10/2020    TRIG 123 06/10/2020    HDL 39 (L) 06/10/2020    LDL 43 06/10/2020    VLDL 24.6 06/10/2020    HGBA1C 6.60 (H) 06/10/2020        Assessment/Plan   Medicare Risks and Personalized Health Plan  CMS Preventative Services Quick Reference  Immunizations Discussed/Encouraged (specific immunizations; Pneumococcal 23 )  Obesity/Overweight   will need diabetic foot exam    The above risks/problems have been discussed with the patient.  Pertinent information has been shared with the patient in the After Visit Summary.  Follow up plans and orders are seen below in the Assessment/Plan Section.    Diagnoses and all orders for this visit:    1. Medicare annual wellness visit, subsequent (Primary)      Follow Up:  No follow-ups on file.     An After Visit Summary and PPPS were given to the patient.             High BMI Plan  General weight loss/lifestyle modification strategies discussed (elicit support from others; identify saboteurs; non-food rewards, etc).  "

## 2020-06-17 NOTE — PATIENT INSTRUCTIONS
Medicare Wellness  Personal Prevention Plan of Service     Date of Office Visit:  2020  Encounter Provider:  DUSTY Anaya  Place of Service:  [facility]  Patient Name: Lefty Cai  :  1950    As part of the Medicare Wellness portion of your visit today, we are providing you with this personalized preventive plan of services (PPPS). This plan is based upon recommendations of the [place] Preventive Services Task Force (USPSTF) and the Advisory Committee on Immunization Practices (ACIP).    This lists the preventive care services that should be considered, and provides dates of when you are due. Items listed as completed are up-to-date and do not require any further intervention.    Health Maintenance   Topic Date Due   • HEPATITIS C SCREENING  1950   • MEDICARE ANNUAL WELLNESS  1950   • DIABETIC FOOT EXAM  1950   • COLONOSCOPY  1950   • TDAP/TD VACCINES (1 - Tdap) 1960   • ZOSTER VACCINE (1 of 2) 1999   • Pneumococcal Vaccine Once at 65 Years Old  2014   • AAA SCREEN (ONE-TIME)  2014   • INFLUENZA VACCINE  2020   • DIABETIC EYE EXAM  2020   • HEMOGLOBIN A1C  12/10/2020   • LIPID PANEL  06/10/2021   • URINE MICROALBUMIN  06/10/2021       No orders of the defined types were placed in this encounter.      No follow-ups on file.

## 2020-07-28 DIAGNOSIS — I10 ESSENTIAL HYPERTENSION: ICD-10-CM

## 2020-07-28 RX ORDER — METOPROLOL SUCCINATE 100 MG/1
100 TABLET, EXTENDED RELEASE ORAL NIGHTLY
Qty: 90 TABLET | Refills: 1 | Status: SHIPPED | OUTPATIENT
Start: 2020-07-28 | End: 2021-01-07

## 2020-07-28 RX ORDER — HYDRALAZINE HYDROCHLORIDE 50 MG/1
TABLET, FILM COATED ORAL
Qty: 270 TABLET | Refills: 1 | Status: SHIPPED | OUTPATIENT
Start: 2020-07-28 | End: 2021-01-07

## 2020-07-28 RX ORDER — SIMVASTATIN 40 MG
TABLET ORAL
Qty: 90 TABLET | Refills: 1 | Status: SHIPPED | OUTPATIENT
Start: 2020-07-28 | End: 2021-01-07

## 2020-07-28 RX ORDER — TRAZODONE HYDROCHLORIDE 50 MG/1
50 TABLET ORAL NIGHTLY PRN
Qty: 90 TABLET | Refills: 1 | Status: SHIPPED | OUTPATIENT
Start: 2020-07-28 | End: 2021-01-07

## 2020-12-16 ENCOUNTER — OFFICE VISIT (OUTPATIENT)
Dept: FAMILY MEDICINE CLINIC | Facility: CLINIC | Age: 70
End: 2020-12-16

## 2020-12-16 VITALS
SYSTOLIC BLOOD PRESSURE: 156 MMHG | HEART RATE: 72 BPM | BODY MASS INDEX: 35.93 KG/M2 | DIASTOLIC BLOOD PRESSURE: 82 MMHG | TEMPERATURE: 97.5 F | HEIGHT: 70 IN | WEIGHT: 251 LBS | OXYGEN SATURATION: 96 %

## 2020-12-16 DIAGNOSIS — E11.9 ENCOUNTER FOR DIABETIC FOOT EXAM (HCC): ICD-10-CM

## 2020-12-16 DIAGNOSIS — Z23 NEED FOR INFLUENZA VACCINATION: ICD-10-CM

## 2020-12-16 DIAGNOSIS — N18.4 TYPE 2 DIABETES MELLITUS WITH STAGE 4 CHRONIC KIDNEY DISEASE, WITHOUT LONG-TERM CURRENT USE OF INSULIN (HCC): Primary | ICD-10-CM

## 2020-12-16 DIAGNOSIS — E11.22 TYPE 2 DIABETES MELLITUS WITH STAGE 4 CHRONIC KIDNEY DISEASE, WITHOUT LONG-TERM CURRENT USE OF INSULIN (HCC): Primary | ICD-10-CM

## 2020-12-16 LAB
EXPIRATION DATE: ABNORMAL
HBA1C MFR BLD: 6.6 % (ref 4.8–5.6)
Lab: ABNORMAL

## 2020-12-16 PROCEDURE — 36416 COLLJ CAPILLARY BLOOD SPEC: CPT | Performed by: NURSE PRACTITIONER

## 2020-12-16 PROCEDURE — G0008 ADMIN INFLUENZA VIRUS VAC: HCPCS | Performed by: NURSE PRACTITIONER

## 2020-12-16 PROCEDURE — 83036 HEMOGLOBIN GLYCOSYLATED A1C: CPT | Performed by: NURSE PRACTITIONER

## 2020-12-16 PROCEDURE — 90694 VACC AIIV4 NO PRSRV 0.5ML IM: CPT | Performed by: NURSE PRACTITIONER

## 2020-12-16 PROCEDURE — 99213 OFFICE O/P EST LOW 20 MIN: CPT | Performed by: NURSE PRACTITIONER

## 2020-12-16 NOTE — PROGRESS NOTES
Subjective   Lefty Cai is a 70 y.o. male.     Chief Complaint   Patient presents with   • Diabetes     Diabetes  He presents for his follow-up diabetic visit. He has type 2 diabetes mellitus. His disease course has been improving. There are no hypoglycemic associated symptoms. There are no diabetic associated symptoms. There are no hypoglycemic complications. Symptoms are stable. Diabetic complications include nephropathy. Pertinent negatives for diabetic complications include no peripheral neuropathy. Risk factors for coronary artery disease include obesity, male sex and diabetes mellitus. Current diabetic treatment includes diet. He is compliant with treatment most of the time. He rarely participates in exercise. An ACE inhibitor/angiotensin II receptor blocker is not being taken. He does not see a podiatrist.Eye exam is not current.        I have reviewed the patient's medical history in detail and updated the computerized patient record.    The following portions of the patient's history were reviewed and updated as appropriate: allergies, current medications, past family history, past medical history, past social history, past surgical history and problem list.      Current Outpatient Medications:   •  aspirin 81 MG tablet, Take 81 mg by mouth Every Night., Disp: , Rfl:   •  Cholecalciferol (VITAMIN D3) 2000 units tablet, Take 2,000 Units by mouth Daily., Disp: , Rfl:   •  hydrALAZINE (APRESOLINE) 50 MG tablet, TAKE 1 TABLET THREE TIMES DAILY, Disp: 270 tablet, Rfl: 1  •  metoprolol succinate XL (TOPROL-XL) 100 MG 24 hr tablet, TAKE 1 TABLET BY MOUTH EVERY NIGHT., Disp: 90 tablet, Rfl: 1  •  simvastatin (ZOCOR) 40 MG tablet, TAKE 1 TABLET AT BEDTIME, Disp: 90 tablet, Rfl: 1  •  sodium bicarbonate 650 MG tablet, Take 1 tablet by mouth 3 (Three) Times a Day., Disp: , Rfl: 0  •  traZODone (DESYREL) 50 MG tablet, TAKE 1 TABLET BY MOUTH AT NIGHT AS NEEDED FOR SLEEP., Disp: 90 tablet, Rfl: 1     Review of  Systems    Objective    Vitals:    12/16/20 1334   BP: 156/82   Pulse: 72   Temp: 97.5 °F (36.4 °C)   SpO2: 96%     Physical Exam  Constitutional:       Appearance: Normal appearance. He is obese.   Cardiovascular:      Rate and Rhythm: Normal rate and regular rhythm.      Pulses:           Dorsalis pedis pulses are 1+ on the right side and 1+ on the left side.        Posterior tibial pulses are 1+ on the right side and 1+ on the left side.      Heart sounds: Normal heart sounds.   Pulmonary:      Effort: Pulmonary effort is normal.      Breath sounds: Normal breath sounds.   Musculoskeletal:      Right lower leg: No edema.      Left lower leg: No edema.      Right foot: Normal range of motion.      Left foot: Normal range of motion.   Feet:      Right foot:      Protective Sensation: 9 sites tested. 9 sites sensed.      Skin integrity: Skin integrity normal.      Toenail Condition: Right toenails are normal.      Left foot:      Protective Sensation: 9 sites tested. 9 sites sensed.      Skin integrity: Skin integrity normal.      Toenail Condition: Left toenails are normal.   Skin:     General: Skin is warm and dry.   Neurological:      Mental Status: He is alert and oriented to person, place, and time.   Psychiatric:      Comments: No acute distress           Assessment/Plan   Diagnoses and all orders for this visit:    1. Type 2 diabetes mellitus with stage 4 chronic kidney disease, without long-term current use of insulin (CMS/[facility]) (Primary)  -     POC Glycated Hemoglobin, Total    2. Encounter for diabetic foot exam (CMS/[facility])    Mr. Cai presents today to follow up on his glucose control.   His A1C is 6.6 today which is unchanged from his previous A1C six months ago.  He is to continue all medications as prescribed.   His foot exam was normal today.   He did receive an influenza vaccine today in the office.   He is to follow up in 6 months for medicare wellness exam.

## 2021-01-06 DIAGNOSIS — I10 ESSENTIAL HYPERTENSION: ICD-10-CM

## 2021-01-07 RX ORDER — SIMVASTATIN 40 MG
TABLET ORAL
Qty: 90 TABLET | Refills: 3 | Status: SHIPPED | OUTPATIENT
Start: 2021-01-07 | End: 2021-12-16

## 2021-01-07 RX ORDER — TRAZODONE HYDROCHLORIDE 50 MG/1
TABLET ORAL
Qty: 90 TABLET | Refills: 3 | Status: SHIPPED | OUTPATIENT
Start: 2021-01-07 | End: 2021-12-16

## 2021-01-07 RX ORDER — HYDRALAZINE HYDROCHLORIDE 50 MG/1
TABLET, FILM COATED ORAL
Qty: 270 TABLET | Refills: 3 | Status: SHIPPED | OUTPATIENT
Start: 2021-01-07 | End: 2021-09-12 | Stop reason: HOSPADM

## 2021-01-07 RX ORDER — METOPROLOL SUCCINATE 100 MG/1
TABLET, EXTENDED RELEASE ORAL
Qty: 90 TABLET | Refills: 3 | Status: SHIPPED | OUTPATIENT
Start: 2021-01-07 | End: 2021-12-16

## 2021-01-27 ENCOUNTER — OFFICE VISIT (OUTPATIENT)
Dept: SURGERY | Facility: CLINIC | Age: 71
End: 2021-01-27

## 2021-01-27 VITALS — WEIGHT: 245.2 LBS | HEIGHT: 70 IN | BODY MASS INDEX: 35.1 KG/M2

## 2021-01-27 DIAGNOSIS — N18.5 CKD (CHRONIC KIDNEY DISEASE) STAGE 5, GFR LESS THAN 15 ML/MIN (HCC): Primary | ICD-10-CM

## 2021-01-27 PROCEDURE — 99204 OFFICE O/P NEW MOD 45 MIN: CPT | Performed by: SURGERY

## 2021-01-29 NOTE — PROGRESS NOTES
Chief Complaint   Patient presents with   • PD CATHETER       Subjective      Lefty Cai is a 70 y.o. male who is referred by Dr. Reyes to be evaluated for peritoneal dialysis catheter placement. Patient has CKD secondary to diabetic nephropathy and hypertensive nephrosclerosis and  is not on dialysis. Patient does have a AV access.  Patient has not had a recent intraabdominal infection. She reports having regular bowel movements.  Patient did not have a Colonoscopy.     Home Evaluation: No    Past Medical History:   Diagnosis Date   • CKD (chronic kidney disease)    • Diabetes mellitus, type 2 (CMS/HCC)    • GERD (gastroesophageal reflux disease)    • H/O renal cell carcinoma 2007    partial nephrectomy   • Hyperlipidemia    • Hypertension    • Primary insomnia 6/13/2016   • Proteinuria    • Risk factors for obstructive sleep apnea     STOP BANG 6   • Sinus drainage    • Vitamin D deficiency 8/14/2017       Past Surgical History:   Procedure Laterality Date   • ARTERIOVENOUS FISTULA/SHUNT SURGERY Left 8/15/2019    Procedure: LEFT MID FOREARM RADIAL CEPHALIC ARTERIAL VENOUS FISTULA;  Surgeon: Louann Miles Jr., MD;  Location: [facility];  Service: Vascular   • EYE SURGERY      CATARACTS   • KNEE ARTHROSCOPY     • NEPHRECTOMY PARTIAL  2007    Dr. Victor         Current Outpatient Medications:   •  aspirin 81 MG tablet, Take 81 mg by mouth Every Night., Disp: , Rfl:   •  Cholecalciferol (vitamin D3) 125 MCG (5000 UT) tablet tablet, Take 5,000 Units by mouth Daily., Disp: , Rfl:   •  hydrALAZINE (APRESOLINE) 50 MG tablet, TAKE 1 TABLET THREE TIMES DAILY, Disp: 270 tablet, Rfl: 3  •  metoprolol succinate XL (TOPROL-XL) 100 MG 24 hr tablet, TAKE 1 TABLET EVERY NIGHT, Disp: 90 tablet, Rfl: 3  •  simvastatin (ZOCOR) 40 MG tablet, TAKE 1 TABLET AT BEDTIME, Disp: 90 tablet, Rfl: 3  •  sodium bicarbonate 650 MG tablet, Take 1 tablet by mouth 3 (Three) Times a Day., Disp: , Rfl: 0  •  traZODone (DESYREL)  50 MG tablet, TAKE 1 TABLET AT NIGHT AS NEEDED FOR SLEEP, Disp: 90 tablet, Rfl: 3    Allergies   Allergen Reactions   • Latex Other (See Comments)     Blisters   • Contrast Dye Other (See Comments)     KIDNEY DISEASE       Family History   Problem Relation Age of Onset   • Hypertension Mother    • Heart disease Father         ASCVD   • Macular degeneration Father    • Cancer Father         bladder   • Diabetes type I Sister    • Malig Hyperthermia Neg Hx        Social History     Socioeconomic History   • Marital status:      Spouse name: Not on file   • Number of children: Not on file   • Years of education: Not on file   • Highest education level: Not on file   Tobacco Use   • Smoking status: Former Smoker     Packs/day: 2.00     Years: 25.00     Pack years: 50.00     Types: Cigarettes     Quit date:      Years since quittin.0   • Smokeless tobacco: Current User     Types: Snuff   Substance and Sexual Activity   • Alcohol use: No   • Drug use: No   • Sexual activity: Defer     REVIEW OF SYSTEMS    Review of Systems   Constitutional: Positive for appetite change and fatigue.   HENT: Negative for congestion and nosebleeds.    Eyes: Negative for discharge and itching.   Respiratory: Negative for apnea and chest tightness.    Gastrointestinal: Negative for abdominal pain, constipation, diarrhea and nausea.   Endocrine: Positive for cold intolerance and polyuria. Negative for heat intolerance.   Genitourinary: Positive for frequency. Negative for difficulty urinating.   Musculoskeletal: Positive for arthralgias and back pain.   Skin: Negative for color change and pallor.   Allergic/Immunologic: Negative for environmental allergies and food allergies.   Neurological: Positive for light-headedness. Negative for dizziness and headaches.   Hematological: Negative for adenopathy. Does not bruise/bleed easily.   Psychiatric/Behavioral: Negative for agitation and confusion.       Physical Examination  Ht 177.8  "cm (70\")   Wt 111 kg (245 lb 3.2 oz)   BMI 35.18 kg/m²   Body mass index is 35.18 kg/m².  Physical Exam  Constitutional:       Appearance: He is obese.   HENT:      Head: Normocephalic and atraumatic.      Nose: Nose normal.      Mouth/Throat:      Mouth: Mucous membranes are moist.      Pharynx: Oropharynx is clear.   Eyes:      General: No scleral icterus.     Conjunctiva/sclera: Conjunctivae normal.   Neck:      Musculoskeletal: Normal range of motion and neck supple.   Cardiovascular:      Rate and Rhythm: Normal rate and regular rhythm.   Pulmonary:      Effort: Pulmonary effort is normal.      Breath sounds: Normal breath sounds.   Abdominal:      General: Abdomen is flat. Bowel sounds are normal.      Palpations: There is no mass.      Hernia: No hernia is present.          Comments: Well-healed left upper flank incision, no hernias  Left arm AV fistula   Genitourinary:     Penis: Normal.    Musculoskeletal: Normal range of motion.   Skin:     General: Skin is warm and dry.      Capillary Refill: Capillary refill takes less than 2 seconds.   Neurological:      General: No focal deficit present.      Mental Status: He is alert. Mental status is at baseline.   Psychiatric:         Mood and Affect: Mood normal.         Behavior: Behavior normal.         Labs reviewed on nephrologist note    Assessment:   Lefty Cai is a 70 y.o. male with CKD due to  diabetic nephropathy and hypertensive nephrosclerosis that is not on dialysis and will need peritoneal dialysis catheter placement.       The procedure was explained in detail to the patient including risks and benefits.  The benefits including the possibility of having dialysis at home without the side effects of the hemodialysis.  The risks including but not limited to catheter dislodgment, obstruction, malfunction, bleeding, infection and possible injury to surrounding organs during peritoneal access. He is interested in proceeding with laparoscopic " placement of peritoneal dialysis catheter whenever active by his nephrologist instructed by his nephrologist. The patient understands that the catheter will not be used for dialysis for a period of approximately 2 weeks after its placement and that during this time they should not shower until the exit site is completely healed. The patient underwent at least one training session with the peritoneal dialysis nurse and their house was evaluated and is ready for the peritoneal dialysis. Patient verbalized understanding and agreed with the plan. All questions were answered at this time.      Plan:     -Patient to call whenever ready for peritoneal dialysis catheter placement.    Orders Placed This Encounter   Procedures   • Basic metabolic panel     Standing Status:   Future     Standing Expiration Date:   1/28/2022   • Follow anesthesia standing orders.   • Obtain informed consent     Order Specific Question:   Informed Consent Given For     Answer:   Laparoscopic peritoneal dialysis catheter placement   • Provide NPO Instructions to Patient     Standing Status:   Future   • Chlorhexidine Skin Prep     Chlorhexidine Skin Prep and Instructions For All Patients Having A Procedure Requiring an Outward Incision if Not Allergic. If Allergic, Give Antibacterial Skin Wipes and Instructions. Do Not Use For Facial Cases or on Any Mucus Membranes.     Standing Status:   Future   • CBC and Differential     Standing Status:   Future     Standing Expiration Date:   1/28/2022     Order Specific Question:   Manual Differential     Answer:   No         Murali Ferreira MD  General, Minimally Invasive and Endoscopic Surgery  [facility]    [address] [place] [address], [address]  [place], [place], [facility]  P: [phone]  F: [phone]

## 2021-03-15 ENCOUNTER — BULK ORDERING (OUTPATIENT)
Dept: CASE MANAGEMENT | Facility: OTHER | Age: 71
End: 2021-03-15

## 2021-03-15 DIAGNOSIS — Z23 IMMUNIZATION DUE: ICD-10-CM

## 2021-06-14 ENCOUNTER — TELEPHONE (OUTPATIENT)
Dept: FAMILY MEDICINE CLINIC | Facility: CLINIC | Age: 71
End: 2021-06-14

## 2021-06-14 DIAGNOSIS — N18.4 TYPE 2 DIABETES MELLITUS WITH STAGE 4 CHRONIC KIDNEY DISEASE, WITHOUT LONG-TERM CURRENT USE OF INSULIN (HCC): ICD-10-CM

## 2021-06-14 DIAGNOSIS — E11.22 TYPE 2 DIABETES MELLITUS WITH STAGE 4 CHRONIC KIDNEY DISEASE, WITHOUT LONG-TERM CURRENT USE OF INSULIN (HCC): ICD-10-CM

## 2021-06-14 DIAGNOSIS — N18.4 CKD (CHRONIC KIDNEY DISEASE) STAGE 4, GFR 15-29 ML/MIN (HCC): ICD-10-CM

## 2021-06-14 DIAGNOSIS — E78.5 HYPERLIPIDEMIA, UNSPECIFIED HYPERLIPIDEMIA TYPE: ICD-10-CM

## 2021-06-14 DIAGNOSIS — E55.9 VITAMIN D DEFICIENCY: ICD-10-CM

## 2021-06-14 DIAGNOSIS — I10 ESSENTIAL HYPERTENSION: Primary | ICD-10-CM

## 2021-06-14 NOTE — TELEPHONE ENCOUNTER
PATIENTS SPOUSE CALLED IN TO SCHEDULE LABS, I WAS UNABLE TO WARM TRANSFER.    BEST CALL BACK # [phone]

## 2021-06-24 LAB
25(OH)D3+25(OH)D2 SERPL-MCNC: 32.7 NG/ML (ref 30–100)
ALBUMIN SERPL-MCNC: 4.2 G/DL (ref 3.7–4.7)
ALBUMIN/CREAT UR: 2052 MG/G CREAT (ref 0–29)
ALBUMIN/GLOB SERPL: 1.8 {RATIO} (ref 1.2–2.2)
ALP SERPL-CCNC: 78 IU/L (ref 48–121)
ALT SERPL-CCNC: 12 IU/L (ref 0–44)
AST SERPL-CCNC: 15 IU/L (ref 0–40)
BILIRUB SERPL-MCNC: 0.3 MG/DL (ref 0–1.2)
BUN SERPL-MCNC: 59 MG/DL (ref 8–27)
BUN/CREAT SERPL: 11 (ref 10–24)
CALCIUM SERPL-MCNC: 9.3 MG/DL (ref 8.6–10.2)
CHLORIDE SERPL-SCNC: 100 MMOL/L (ref 96–106)
CHOLEST SERPL-MCNC: 126 MG/DL (ref 100–199)
CO2 SERPL-SCNC: 17 MMOL/L (ref 20–29)
CREAT SERPL-MCNC: 5.27 MG/DL (ref 0.76–1.27)
CREAT UR-MCNC: 71.9 MG/DL
ERYTHROCYTE [DISTWIDTH] IN BLOOD BY AUTOMATED COUNT: 12.8 % (ref 11.6–15.4)
GLOBULIN SER CALC-MCNC: 2.4 G/DL (ref 1.5–4.5)
GLUCOSE SERPL-MCNC: 121 MG/DL (ref 65–99)
HBA1C MFR BLD: 6.5 % (ref 4.8–5.6)
HCT VFR BLD AUTO: 40.9 % (ref 37.5–51)
HDLC SERPL-MCNC: 37 MG/DL
HGB BLD-MCNC: 13.6 G/DL (ref 13–17.7)
LDLC SERPL CALC-MCNC: 62 MG/DL (ref 0–99)
MCH RBC QN AUTO: 29.6 PG (ref 26.6–33)
MCHC RBC AUTO-ENTMCNC: 33.3 G/DL (ref 31.5–35.7)
MCV RBC AUTO: 89 FL (ref 79–97)
MICROALBUMIN UR-MCNC: 1475.7 UG/ML
PLATELET # BLD AUTO: 203 X10E3/UL (ref 150–450)
POTASSIUM SERPL-SCNC: 5.1 MMOL/L (ref 3.5–5.2)
PROT SERPL-MCNC: 6.6 G/DL (ref 6–8.5)
RBC # BLD AUTO: 4.6 X10E6/UL (ref 4.14–5.8)
SODIUM SERPL-SCNC: 134 MMOL/L (ref 134–144)
TRIGL SERPL-MCNC: 157 MG/DL (ref 0–149)
VLDLC SERPL CALC-MCNC: 27 MG/DL (ref 5–40)
WBC # BLD AUTO: 9.2 X10E3/UL (ref 3.4–10.8)

## 2021-06-30 ENCOUNTER — OFFICE VISIT (OUTPATIENT)
Dept: FAMILY MEDICINE CLINIC | Facility: CLINIC | Age: 71
End: 2021-06-30

## 2021-06-30 VITALS
SYSTOLIC BLOOD PRESSURE: 158 MMHG | BODY MASS INDEX: 34.93 KG/M2 | HEART RATE: 74 BPM | HEIGHT: 70 IN | WEIGHT: 244 LBS | OXYGEN SATURATION: 98 % | DIASTOLIC BLOOD PRESSURE: 74 MMHG

## 2021-06-30 DIAGNOSIS — Z00.00 MEDICARE ANNUAL WELLNESS VISIT, SUBSEQUENT: Primary | ICD-10-CM

## 2021-06-30 DIAGNOSIS — Z12.11 SCREENING FOR COLON CANCER: ICD-10-CM

## 2021-06-30 PROCEDURE — G0439 PPPS, SUBSEQ VISIT: HCPCS | Performed by: NURSE PRACTITIONER

## 2021-06-30 PROCEDURE — 1159F MED LIST DOCD IN RCRD: CPT | Performed by: NURSE PRACTITIONER

## 2021-06-30 PROCEDURE — 1170F FXNL STATUS ASSESSED: CPT | Performed by: NURSE PRACTITIONER

## 2021-06-30 PROCEDURE — 96160 PT-FOCUSED HLTH RISK ASSMT: CPT | Performed by: NURSE PRACTITIONER

## 2021-06-30 NOTE — PROGRESS NOTES
The ABCs of the Annual Wellness Visit  Subsequent Medicare Wellness Visit    Chief Complaint   Patient presents with   • Annual Exam     AWV       Subjective   History of Present Illness:  Lefty Cai is a 71 y.o. male who presents for a Subsequent Medicare Wellness Visit.    HEALTH RISK ASSESSMENT    Recent Hospitalizations:  No hospitalization(s) within the last year.    Current Medical Providers:  Patient Care Team:  Daksha Ng APRN as PCP - General (Family Medicine)  Rudy Faith MD as Consulting Physician (Ophthalmology)  Jose Mccall MD as Consulting Physician (Nephrology)  Quang Reyes MD as Consulting Physician (Nephrology)    Smoking Status:  Social History     Tobacco Use   Smoking Status Former Smoker   • Packs/day: 2.00   • Years: 25.00   • Pack years: 50.00   • Types: Cigarettes   • Quit date:    • Years since quittin.5   Smokeless Tobacco Current User   • Types: Snuff       Alcohol Consumption:  Social History     Substance and Sexual Activity   Alcohol Use No       Depression Screen:   PHQ-2/PHQ-9 Depression Screening 2021   Little interest or pleasure in doing things 0   Feeling down, depressed, or hopeless 0   Total Score 0       Fall Risk Screen:  STEADI Fall Risk Assessment was completed, and patient is at LOW risk for falls.Assessment completed on:2021    Health Habits and Functional and Cognitive Screening:  Functional & Cognitive Status 2021   Do you have difficulty preparing food and eating? No   Do you have difficulty bathing yourself, getting dressed or grooming yourself? No   Do you have difficulty using the toilet? No   Do you have difficulty moving around from place to place? No   Do you have trouble with steps or getting out of a bed or a chair? No   Current Diet Limited Junk Food   Dental Exam Not up to date   Eye Exam Not up to date   Exercise (times per week) 0 times per week   Current Exercises Include No Regular Exercise    Current Exercise Activities Include -   Do you need help using the phone?  No   Are you deaf or do you have serious difficulty hearing?  No   Do you need help with transportation? No   Do you need help shopping? No   Do you need help preparing meals?  No   Do you need help with housework?  No   Do you need help with laundry? No   Do you need help taking your medications? No   Do you need help managing money? No   Do you ever drive or ride in a car without wearing a seat belt? No   Have you felt unusual stress, anger or loneliness in the last month? Yes   Who do you live with? Spouse   If you need help, do you have trouble finding someone available to you? No   Have you been bothered in the last four weeks by sexual problems? No   Do you have difficulty concentrating, remembering or making decisions? No         Does the patient have evidence of cognitive impairment? No    Asprin use counseling:Taking ASA appropriately as indicated    Age-appropriate Screening Schedule:  Refer to the list below for future screening recommendations based on patient's age, sex and/or medical conditions. Orders for these recommended tests are listed in the plan section. The patient has been provided with a written plan.    Health Maintenance   Topic Date Due   • TDAP/TD VACCINES (1 - Tdap) Never done   • ZOSTER VACCINE (1 of 2) Never done   • DIABETIC EYE EXAM  12/05/2020   • INFLUENZA VACCINE  08/01/2021   • DIABETIC FOOT EXAM  12/16/2021   • HEMOGLOBIN A1C  12/23/2021   • LIPID PANEL  06/23/2022   • URINE MICROALBUMIN  06/23/2022          The following portions of the patient's history were reviewed and updated as appropriate: allergies, current medications, past family history, past medical history, past social history, past surgical history and problem list.    Outpatient Medications Prior to Visit   Medication Sig Dispense Refill   • aspirin 81 MG tablet Take 81 mg by mouth Every Night.     • Cholecalciferol (vitamin D3) 125 MCG  "(5000 UT) tablet tablet Take 5,000 Units by mouth Daily.     • hydrALAZINE (APRESOLINE) 50 MG tablet TAKE 1 TABLET THREE TIMES DAILY 270 tablet 3   • metoprolol succinate XL (TOPROL-XL) 100 MG 24 hr tablet TAKE 1 TABLET EVERY NIGHT 90 tablet 3   • simvastatin (ZOCOR) 40 MG tablet TAKE 1 TABLET AT BEDTIME 90 tablet 3   • sodium bicarbonate 650 MG tablet Take 5 tablets by mouth Daily.  0   • traZODone (DESYREL) 50 MG tablet TAKE 1 TABLET AT NIGHT AS NEEDED FOR SLEEP 90 tablet 3     No facility-administered medications prior to visit.       Patient Active Problem List   Diagnosis   • Type 2 diabetes mellitus with diabetic chronic kidney disease (CMS/[facility])   • Essential hypertension   • Hyperlipidemia   • Primary insomnia   • CKD (chronic kidney disease) stage 4, GFR 15-29 ml/min (CMS/[facility])   • Vitamin D deficiency       Advanced Care Planning:  ACP discussion was held with the patient during this visit. Patient has an advance directive in EMR which is still valid.     Review of Systems   Constitutional: Negative.    HENT: Hearing loss: worsening.    Eyes: Negative.    Respiratory: Positive for shortness of breath (with exertion).    Cardiovascular: Negative.    Gastrointestinal: Negative.    Genitourinary: Negative.    Musculoskeletal: Negative.    Skin: Negative.    Neurological: Negative.    Psychiatric/Behavioral: The patient is nervous/anxious.         Due to family concerns       Compared to one year ago, the patient feels his physical health is the same.  Compared to one year ago, the patient feels his mental health is worse.    Reviewed chart for potential of high risk medication in the elderly: yes  Reviewed chart for potential of harmful drug interactions in the elderly:yes    Objective         Vitals:    06/30/21 1140   BP: 158/74   Pulse: 74   SpO2: 98%   Weight: 111 kg (244 lb)   Height: 177.8 cm (70\")       Body mass index is 35.01 kg/m².  Discussed the patient's BMI with him. The BMI is above average; BMI " management plan is completed.    Physical Exam  Vitals reviewed.   Constitutional:       Appearance: Normal appearance.   HENT:      Right Ear: Tympanic membrane normal.   Cardiovascular:      Rate and Rhythm: Normal rate and regular rhythm.      Pulses: Normal pulses.      Heart sounds: Normal heart sounds.   Pulmonary:      Effort: Pulmonary effort is normal.      Breath sounds: Normal breath sounds.   Abdominal:      General: Bowel sounds are normal.      Palpations: Abdomen is soft.   Musculoskeletal:         General: Normal range of motion.      Right lower leg: No edema.      Left lower leg: No edema.   Neurological:      Mental Status: He is alert and oriented to person, place, and time.   Psychiatric:      Comments: No acute distress         Lab Results   Component Value Date     (H) 06/23/2021    CHLPL 126 06/23/2021    TRIG 157 (H) 06/23/2021    HDL 37 (L) 06/23/2021    LDL 62 06/23/2021    VLDL 27 06/23/2021    HGBA1C 6.5 (H) 06/23/2021        Assessment/Plan   Medicare Risks and Personalized Health Plan  CMS Preventative Services Quick Reference  Colon Cancer Screening    The above risks/problems have been discussed with the patient.  Pertinent information has been shared with the patient in the After Visit Summary.  Follow up plans and orders are seen below in the Assessment/Plan Section.    Diagnoses and all orders for this visit:    1. Medicare annual wellness visit, subsequent (Primary)      Follow Up:  No follow-ups on file.     An After Visit Summary and PPPS were given to the patient.               High BMI Plan  General weight loss/lifestyle modification strategies discussed (elicit support from others; identify saboteurs; non-food rewards, etc).

## 2021-06-30 NOTE — PATIENT INSTRUCTIONS
Medicare Wellness  Personal Prevention Plan of Service     Date of Office Visit:  2021  Encounter Provider:  DUSTY Anaya  Place of Service:  [facility]  Patient Name: Lefty Cai  :  1950    As part of the Medicare Wellness portion of your visit today, we are providing you with this personalized preventive plan of services (PPPS). This plan is based upon recommendations of the [place] Preventive Services Task Force (USPSTF) and the Advisory Committee on Immunization Practices (ACIP).    This lists the preventive care services that should be considered, and provides dates of when you are due. Items listed as completed are up-to-date and do not require any further intervention.    Health Maintenance   Topic Date Due   • HEPATITIS C SCREENING  Never done   • COLORECTAL CANCER SCREENING  Never done   • TDAP/TD VACCINES (1 - Tdap) Never done   • ZOSTER VACCINE (1 of 2) Never done   • Pneumococcal Vaccine 65+ (1 of 1 - PPSV23) Never done   • DIABETIC EYE EXAM  2020   • ANNUAL WELLNESS VISIT  2021   • INFLUENZA VACCINE  2021   • DIABETIC FOOT EXAM  2021   • HEMOGLOBIN A1C  2021   • LIPID PANEL  2022   • URINE MICROALBUMIN  2022   • COVID-19 Vaccine  Completed   • AAA SCREEN (ONE-TIME)  Completed       No orders of the defined types were placed in this encounter.      No follow-ups on file.

## 2021-09-06 ENCOUNTER — HOSPITAL ENCOUNTER (INPATIENT)
Facility: HOSPITAL | Age: 71
LOS: 5 days | Discharge: HOME HEALTH | End: 2021-09-12
Attending: EMERGENCY MEDICINE | Admitting: SURGERY

## 2021-09-06 ENCOUNTER — ANESTHESIA (OUTPATIENT)
Dept: PERIOP | Facility: HOSPITAL | Age: 71
End: 2021-09-06

## 2021-09-06 ENCOUNTER — APPOINTMENT (OUTPATIENT)
Dept: GENERAL RADIOLOGY | Facility: HOSPITAL | Age: 71
End: 2021-09-06

## 2021-09-06 ENCOUNTER — APPOINTMENT (OUTPATIENT)
Dept: CT IMAGING | Facility: HOSPITAL | Age: 71
End: 2021-09-06

## 2021-09-06 DIAGNOSIS — E66.01 MORBID OBESITY (HCC): ICD-10-CM

## 2021-09-06 DIAGNOSIS — K57.20 PERFORATION OF SIGMOID COLON DUE TO DIVERTICULITIS: Primary | ICD-10-CM

## 2021-09-06 DIAGNOSIS — N18.6 ESRD (END STAGE RENAL DISEASE) (HCC): ICD-10-CM

## 2021-09-06 DIAGNOSIS — K66.8 FREE INTRAPERITONEAL AIR: ICD-10-CM

## 2021-09-06 DIAGNOSIS — N18.4 CKD (CHRONIC KIDNEY DISEASE) STAGE 4, GFR 15-29 ML/MIN (HCC): ICD-10-CM

## 2021-09-06 DIAGNOSIS — E11.69 TYPE 2 DIABETES MELLITUS WITH OTHER SPECIFIED COMPLICATION, UNSPECIFIED WHETHER LONG TERM INSULIN USE (HCC): ICD-10-CM

## 2021-09-06 DIAGNOSIS — R19.8 PERFORATED ABDOMINAL VISCUS: ICD-10-CM

## 2021-09-06 LAB
ALBUMIN SERPL-MCNC: 3.8 G/DL (ref 3.5–5.2)
ALBUMIN/GLOB SERPL: 1 G/DL
ALP SERPL-CCNC: 67 U/L (ref 39–117)
ALT SERPL W P-5'-P-CCNC: 11 U/L (ref 1–41)
ANION GAP SERPL CALCULATED.3IONS-SCNC: 16.2 MMOL/L (ref 5–15)
AST SERPL-CCNC: 13 U/L (ref 1–40)
BASOPHILS # BLD AUTO: 0.02 10*3/MM3 (ref 0–0.2)
BASOPHILS NFR BLD AUTO: 0.2 % (ref 0–1.5)
BILIRUB SERPL-MCNC: 0.3 MG/DL (ref 0–1.2)
BUN SERPL-MCNC: 65 MG/DL (ref 8–23)
BUN/CREAT SERPL: 11.8 (ref 7–25)
CALCIUM SPEC-SCNC: 9.3 MG/DL (ref 8.6–10.5)
CHLORIDE SERPL-SCNC: 100 MMOL/L (ref 98–107)
CO2 SERPL-SCNC: 17.8 MMOL/L (ref 22–29)
CREAT SERPL-MCNC: 5.53 MG/DL (ref 0.76–1.27)
D-LACTATE SERPL-SCNC: 1.3 MMOL/L (ref 0.5–2)
DEPRECATED RDW RBC AUTO: 46 FL (ref 37–54)
EOSINOPHIL # BLD AUTO: 0.09 10*3/MM3 (ref 0–0.4)
EOSINOPHIL NFR BLD AUTO: 0.7 % (ref 0.3–6.2)
ERYTHROCYTE [DISTWIDTH] IN BLOOD BY AUTOMATED COUNT: 13.8 % (ref 12.3–15.4)
GFR SERPL CREATININE-BSD FRML MDRD: 10 ML/MIN/1.73
GFR SERPL CREATININE-BSD FRML MDRD: ABNORMAL ML/MIN/{1.73_M2}
GLOBULIN UR ELPH-MCNC: 3.8 GM/DL
GLUCOSE SERPL-MCNC: 164 MG/DL (ref 65–99)
HCT VFR BLD AUTO: 40.4 % (ref 37.5–51)
HGB BLD-MCNC: 13.6 G/DL (ref 13–17.7)
IMM GRANULOCYTES # BLD AUTO: 0.05 10*3/MM3 (ref 0–0.05)
IMM GRANULOCYTES NFR BLD AUTO: 0.4 % (ref 0–0.5)
INR PPP: 1.14 (ref 0.9–1.1)
LYMPHOCYTES # BLD AUTO: 0.92 10*3/MM3 (ref 0.7–3.1)
LYMPHOCYTES NFR BLD AUTO: 7.1 % (ref 19.6–45.3)
MCH RBC QN AUTO: 30.2 PG (ref 26.6–33)
MCHC RBC AUTO-ENTMCNC: 33.7 G/DL (ref 31.5–35.7)
MCV RBC AUTO: 89.6 FL (ref 79–97)
MONOCYTES # BLD AUTO: 0.63 10*3/MM3 (ref 0.1–0.9)
MONOCYTES NFR BLD AUTO: 4.9 % (ref 5–12)
NEUTROPHILS NFR BLD AUTO: 11.2 10*3/MM3 (ref 1.7–7)
NEUTROPHILS NFR BLD AUTO: 86.7 % (ref 42.7–76)
NRBC BLD AUTO-RTO: 0 /100 WBC (ref 0–0.2)
PLATELET # BLD AUTO: 169 10*3/MM3 (ref 140–450)
PMV BLD AUTO: 9.9 FL (ref 6–12)
POTASSIUM SERPL-SCNC: 4.5 MMOL/L (ref 3.5–5.2)
PROCALCITONIN SERPL-MCNC: 7.18 NG/ML (ref 0–0.25)
PROT SERPL-MCNC: 7.6 G/DL (ref 6–8.5)
PROTHROMBIN TIME: 14.4 SECONDS (ref 11.7–14.2)
RBC # BLD AUTO: 4.51 10*6/MM3 (ref 4.14–5.8)
SARS-COV-2 RNA RESP QL NAA+PROBE: NOT DETECTED
SODIUM SERPL-SCNC: 134 MMOL/L (ref 136–145)
WBC # BLD AUTO: 12.91 10*3/MM3 (ref 3.4–10.8)

## 2021-09-06 PROCEDURE — 80053 COMPREHEN METABOLIC PANEL: CPT | Performed by: EMERGENCY MEDICINE

## 2021-09-06 PROCEDURE — 74176 CT ABD & PELVIS W/O CONTRAST: CPT

## 2021-09-06 PROCEDURE — 83605 ASSAY OF LACTIC ACID: CPT | Performed by: EMERGENCY MEDICINE

## 2021-09-06 PROCEDURE — 85610 PROTHROMBIN TIME: CPT | Performed by: EMERGENCY MEDICINE

## 2021-09-06 PROCEDURE — U0003 INFECTIOUS AGENT DETECTION BY NUCLEIC ACID (DNA OR RNA); SEVERE ACUTE RESPIRATORY SYNDROME CORONAVIRUS 2 (SARS-COV-2) (CORONAVIRUS DISEASE [COVID-19]), AMPLIFIED PROBE TECHNIQUE, MAKING USE OF HIGH THROUGHPUT TECHNOLOGIES AS DESCRIBED BY CMS-2020-01-R: HCPCS | Performed by: EMERGENCY MEDICINE

## 2021-09-06 PROCEDURE — 84145 PROCALCITONIN (PCT): CPT | Performed by: EMERGENCY MEDICINE

## 2021-09-06 PROCEDURE — 99285 EMERGENCY DEPT VISIT HI MDM: CPT

## 2021-09-06 PROCEDURE — 71045 X-RAY EXAM CHEST 1 VIEW: CPT

## 2021-09-06 PROCEDURE — 85025 COMPLETE CBC W/AUTO DIFF WBC: CPT | Performed by: EMERGENCY MEDICINE

## 2021-09-06 RX ORDER — HYDROMORPHONE HYDROCHLORIDE 1 MG/ML
0.5 INJECTION, SOLUTION INTRAMUSCULAR; INTRAVENOUS; SUBCUTANEOUS ONCE
Status: COMPLETED | OUTPATIENT
Start: 2021-09-06 | End: 2021-09-07

## 2021-09-06 RX ORDER — SODIUM CHLORIDE 0.9 % (FLUSH) 0.9 %
10 SYRINGE (ML) INJECTION AS NEEDED
Status: DISCONTINUED | OUTPATIENT
Start: 2021-09-06 | End: 2021-09-12 | Stop reason: HOSPADM

## 2021-09-06 RX ORDER — SODIUM CHLORIDE, SODIUM LACTATE, POTASSIUM CHLORIDE, CALCIUM CHLORIDE 600; 310; 30; 20 MG/100ML; MG/100ML; MG/100ML; MG/100ML
100 INJECTION, SOLUTION INTRAVENOUS CONTINUOUS
Status: DISCONTINUED | OUTPATIENT
Start: 2021-09-06 | End: 2021-09-07

## 2021-09-06 RX ORDER — ONDANSETRON 4 MG/1
4 TABLET, ORALLY DISINTEGRATING ORAL ONCE
Status: DISCONTINUED | OUTPATIENT
Start: 2021-09-06 | End: 2021-09-07

## 2021-09-07 ENCOUNTER — ANESTHESIA EVENT (OUTPATIENT)
Dept: PERIOP | Facility: HOSPITAL | Age: 71
End: 2021-09-07

## 2021-09-07 PROBLEM — K66.8 FREE INTRAPERITONEAL AIR: Status: ACTIVE | Noted: 2021-09-07

## 2021-09-07 PROBLEM — E66.9 OBESITY (BMI 30-39.9): Status: ACTIVE | Noted: 2021-09-07

## 2021-09-07 PROBLEM — R19.8 PERFORATED ABDOMINAL VISCUS: Status: ACTIVE | Noted: 2021-09-07

## 2021-09-07 PROBLEM — K57.20 DIVERTICULITIS OF LARGE INTESTINE WITH PERFORATION AND ABSCESS WITHOUT BLEEDING: Status: ACTIVE | Noted: 2021-09-07

## 2021-09-07 LAB
ANION GAP SERPL CALCULATED.3IONS-SCNC: 14.4 MMOL/L (ref 5–15)
BACTERIA UR QL AUTO: NORMAL /HPF
BILIRUB UR QL STRIP: NEGATIVE
BUN SERPL-MCNC: 65 MG/DL (ref 8–23)
BUN/CREAT SERPL: 12.6 (ref 7–25)
BURR CELLS BLD QL SMEAR: ABNORMAL
CALCIUM SPEC-SCNC: 8.7 MG/DL (ref 8.6–10.5)
CHLORIDE SERPL-SCNC: 102 MMOL/L (ref 98–107)
CLARITY UR: CLEAR
CO2 SERPL-SCNC: 17.6 MMOL/L (ref 22–29)
COLOR UR: YELLOW
CREAT SERPL-MCNC: 5.16 MG/DL (ref 0.76–1.27)
DEPRECATED RDW RBC AUTO: 42.1 FL (ref 37–54)
ERYTHROCYTE [DISTWIDTH] IN BLOOD BY AUTOMATED COUNT: 12.9 % (ref 12.3–15.4)
GFR SERPL CREATININE-BSD FRML MDRD: 11 ML/MIN/1.73
GFR SERPL CREATININE-BSD FRML MDRD: ABNORMAL ML/MIN/{1.73_M2}
GLUCOSE BLDC GLUCOMTR-MCNC: 114 MG/DL (ref 70–130)
GLUCOSE BLDC GLUCOMTR-MCNC: 115 MG/DL (ref 70–130)
GLUCOSE BLDC GLUCOMTR-MCNC: 131 MG/DL (ref 70–130)
GLUCOSE BLDC GLUCOMTR-MCNC: 146 MG/DL (ref 70–130)
GLUCOSE SERPL-MCNC: 109 MG/DL (ref 65–99)
GLUCOSE UR STRIP-MCNC: NEGATIVE MG/DL
HCT VFR BLD AUTO: 35.9 % (ref 37.5–51)
HGB BLD-MCNC: 11.8 G/DL (ref 13–17.7)
HGB UR QL STRIP.AUTO: ABNORMAL
HYALINE CASTS UR QL AUTO: NORMAL /LPF
KETONES UR QL STRIP: NEGATIVE
LEUKOCYTE ESTERASE UR QL STRIP.AUTO: NEGATIVE
LYMPHOCYTES # BLD MANUAL: 0.6 10*3/MM3 (ref 0.7–3.1)
LYMPHOCYTES NFR BLD MANUAL: 4 % (ref 5–12)
LYMPHOCYTES NFR BLD MANUAL: 5.1 % (ref 19.6–45.3)
MCH RBC QN AUTO: 29.1 PG (ref 26.6–33)
MCHC RBC AUTO-ENTMCNC: 32.9 G/DL (ref 31.5–35.7)
MCV RBC AUTO: 88.4 FL (ref 79–97)
MONOCYTES # BLD AUTO: 0.47 10*3/MM3 (ref 0.1–0.9)
NEUTROPHILS # BLD AUTO: 10.75 10*3/MM3 (ref 1.7–7)
NEUTROPHILS NFR BLD MANUAL: 90.9 % (ref 42.7–76)
NITRITE UR QL STRIP: NEGATIVE
OVALOCYTES BLD QL SMEAR: ABNORMAL
PH UR STRIP.AUTO: 6.5 [PH] (ref 5–8)
PLAT MORPH BLD: NORMAL
PLATELET # BLD AUTO: 168 10*3/MM3 (ref 140–450)
PMV BLD AUTO: 9.9 FL (ref 6–12)
POIKILOCYTOSIS BLD QL SMEAR: ABNORMAL
POTASSIUM SERPL-SCNC: 5.1 MMOL/L (ref 3.5–5.2)
PROT UR QL STRIP: ABNORMAL
RBC # BLD AUTO: 4.06 10*6/MM3 (ref 4.14–5.8)
RBC # UR: NORMAL /HPF
REF LAB TEST METHOD: NORMAL
SODIUM SERPL-SCNC: 134 MMOL/L (ref 136–145)
SP GR UR STRIP: 1.01 (ref 1–1.03)
SQUAMOUS #/AREA URNS HPF: NORMAL /HPF
UROBILINOGEN UR QL STRIP: ABNORMAL
WBC # BLD AUTO: 11.83 10*3/MM3 (ref 3.4–10.8)
WBC MORPH BLD: NORMAL
WBC UR QL AUTO: NORMAL /HPF

## 2021-09-07 PROCEDURE — 25010000002 FENTANYL CITRATE (PF) 50 MCG/ML SOLUTION: Performed by: NURSE ANESTHETIST, CERTIFIED REGISTERED

## 2021-09-07 PROCEDURE — 25010000002 ONDANSETRON PER 1 MG: Performed by: SURGERY

## 2021-09-07 PROCEDURE — 25010000002 ONDANSETRON PER 1 MG: Performed by: NURSE ANESTHETIST, CERTIFIED REGISTERED

## 2021-09-07 PROCEDURE — 87077 CULTURE AEROBIC IDENTIFY: CPT | Performed by: SURGERY

## 2021-09-07 PROCEDURE — 87186 SC STD MICRODIL/AGAR DIL: CPT | Performed by: SURGERY

## 2021-09-07 PROCEDURE — 85007 BL SMEAR W/DIFF WBC COUNT: CPT | Performed by: SURGERY

## 2021-09-07 PROCEDURE — P9041 ALBUMIN (HUMAN),5%, 50ML: HCPCS | Performed by: NURSE ANESTHETIST, CERTIFIED REGISTERED

## 2021-09-07 PROCEDURE — 25010000002 HYDROMORPHONE PER 4 MG: Performed by: EMERGENCY MEDICINE

## 2021-09-07 PROCEDURE — 99222 1ST HOSP IP/OBS MODERATE 55: CPT | Performed by: SURGERY

## 2021-09-07 PROCEDURE — 88304 TISSUE EXAM BY PATHOLOGIST: CPT | Performed by: SURGERY

## 2021-09-07 PROCEDURE — 25010000002 ALBUMIN HUMAN 5% PER 50 ML: Performed by: NURSE ANESTHETIST, CERTIFIED REGISTERED

## 2021-09-07 PROCEDURE — 44955 APPENDECTOMY ADD-ON: CPT | Performed by: SURGERY

## 2021-09-07 PROCEDURE — 25010000003 HYDROMORPHONE HCL PF 50 MG/5ML SOLUTION: Performed by: SURGERY

## 2021-09-07 PROCEDURE — 25010000003 BUPIVACAINE LIPOSOME 1.3 % SUSPENSION: Performed by: SURGERY

## 2021-09-07 PROCEDURE — 44141 PARTIAL REMOVAL OF COLON: CPT | Performed by: PHYSICIAN ASSISTANT

## 2021-09-07 PROCEDURE — C1889 IMPLANT/INSERT DEVICE, NOC: HCPCS | Performed by: SURGERY

## 2021-09-07 PROCEDURE — 0DTN0ZZ RESECTION OF SIGMOID COLON, OPEN APPROACH: ICD-10-PCS | Performed by: SURGERY

## 2021-09-07 PROCEDURE — 25010000002 PIPERACILLIN SOD-TAZOBACTAM PER 1 G: Performed by: EMERGENCY MEDICINE

## 2021-09-07 PROCEDURE — 25010000002 SUCCINYLCHOLINE PER 20 MG: Performed by: NURSE ANESTHETIST, CERTIFIED REGISTERED

## 2021-09-07 PROCEDURE — 80048 BASIC METABOLIC PNL TOTAL CA: CPT | Performed by: SURGERY

## 2021-09-07 PROCEDURE — 25010000002 FENTANYL CITRATE (PF) 50 MCG/ML SOLUTION: Performed by: ANESTHESIOLOGY

## 2021-09-07 PROCEDURE — 87015 SPECIMEN INFECT AGNT CONCNTJ: CPT | Performed by: SURGERY

## 2021-09-07 PROCEDURE — 25010000002 PIPERACILLIN SOD-TAZOBACTAM PER 1 G: Performed by: SURGERY

## 2021-09-07 PROCEDURE — 0DTJ0ZZ RESECTION OF APPENDIX, OPEN APPROACH: ICD-10-PCS | Performed by: SURGERY

## 2021-09-07 PROCEDURE — 87075 CULTR BACTERIA EXCEPT BLOOD: CPT | Performed by: SURGERY

## 2021-09-07 PROCEDURE — C9290 INJ, BUPIVACAINE LIPOSOME: HCPCS | Performed by: SURGERY

## 2021-09-07 PROCEDURE — 25010000002 PHENYLEPHRINE PER 1 ML: Performed by: NURSE ANESTHETIST, CERTIFIED REGISTERED

## 2021-09-07 PROCEDURE — 44141 PARTIAL REMOVAL OF COLON: CPT | Performed by: SURGERY

## 2021-09-07 PROCEDURE — 44955 APPENDECTOMY ADD-ON: CPT | Performed by: PHYSICIAN ASSISTANT

## 2021-09-07 PROCEDURE — 87070 CULTURE OTHR SPECIMN AEROBIC: CPT | Performed by: SURGERY

## 2021-09-07 PROCEDURE — 0D1M0Z4 BYPASS DESCENDING COLON TO CUTANEOUS, OPEN APPROACH: ICD-10-PCS | Performed by: SURGERY

## 2021-09-07 PROCEDURE — 81001 URINALYSIS AUTO W/SCOPE: CPT | Performed by: EMERGENCY MEDICINE

## 2021-09-07 PROCEDURE — 25010000002 HYDROMORPHONE PER 4 MG: Performed by: NURSE ANESTHETIST, CERTIFIED REGISTERED

## 2021-09-07 PROCEDURE — 88307 TISSUE EXAM BY PATHOLOGIST: CPT | Performed by: SURGERY

## 2021-09-07 PROCEDURE — 25010000002 PROPOFOL 10 MG/ML EMULSION: Performed by: NURSE ANESTHETIST, CERTIFIED REGISTERED

## 2021-09-07 PROCEDURE — 85025 COMPLETE CBC W/AUTO DIFF WBC: CPT | Performed by: SURGERY

## 2021-09-07 PROCEDURE — 87205 SMEAR GRAM STAIN: CPT | Performed by: SURGERY

## 2021-09-07 PROCEDURE — 25010000002 ONDANSETRON PER 1 MG: Performed by: EMERGENCY MEDICINE

## 2021-09-07 PROCEDURE — 82962 GLUCOSE BLOOD TEST: CPT

## 2021-09-07 DEVICE — PROXIMATE RELOADABLE LINEAR CUTTER WITH SAFETY LOCK-OUT, 75MM
Type: IMPLANTABLE DEVICE | Site: ABDOMEN | Status: FUNCTIONAL
Brand: PROXIMATE

## 2021-09-07 DEVICE — PROXIMATE LINEAR CUTTER RELOAD, BLUE, 75MM
Type: IMPLANTABLE DEVICE | Site: ABDOMEN | Status: FUNCTIONAL
Brand: PROXIMATE

## 2021-09-07 RX ORDER — MIDAZOLAM HYDROCHLORIDE 1 MG/ML
0.5 INJECTION INTRAMUSCULAR; INTRAVENOUS
Status: DISCONTINUED | OUTPATIENT
Start: 2021-09-07 | End: 2021-09-07 | Stop reason: HOSPADM

## 2021-09-07 RX ORDER — OXYCODONE AND ACETAMINOPHEN 10; 325 MG/1; MG/1
1 TABLET ORAL EVERY 4 HOURS PRN
Status: DISCONTINUED | OUTPATIENT
Start: 2021-09-07 | End: 2021-09-07

## 2021-09-07 RX ORDER — NICOTINE POLACRILEX 4 MG
15 LOZENGE BUCCAL
Status: DISCONTINUED | OUTPATIENT
Start: 2021-09-07 | End: 2021-09-12 | Stop reason: HOSPADM

## 2021-09-07 RX ORDER — SODIUM CHLORIDE 0.9 % (FLUSH) 0.9 %
3-10 SYRINGE (ML) INJECTION AS NEEDED
Status: DISCONTINUED | OUTPATIENT
Start: 2021-09-07 | End: 2021-09-07 | Stop reason: HOSPADM

## 2021-09-07 RX ORDER — HYDROMORPHONE HCL 110MG/55ML
PATIENT CONTROLLED ANALGESIA SYRINGE INTRAVENOUS AS NEEDED
Status: DISCONTINUED | OUTPATIENT
Start: 2021-09-07 | End: 2021-09-07 | Stop reason: SURG

## 2021-09-07 RX ORDER — HYDROCODONE BITARTRATE AND ACETAMINOPHEN 7.5; 325 MG/1; MG/1
1 TABLET ORAL ONCE AS NEEDED
Status: DISCONTINUED | OUTPATIENT
Start: 2021-09-07 | End: 2021-09-07

## 2021-09-07 RX ORDER — FAMOTIDINE 10 MG/ML
20 INJECTION, SOLUTION INTRAVENOUS ONCE
Status: COMPLETED | OUTPATIENT
Start: 2021-09-07 | End: 2021-09-07

## 2021-09-07 RX ORDER — SODIUM CHLORIDE 9 MG/ML
125 INJECTION, SOLUTION INTRAVENOUS CONTINUOUS
Status: DISCONTINUED | OUTPATIENT
Start: 2021-09-07 | End: 2021-09-08

## 2021-09-07 RX ORDER — FENTANYL CITRATE 50 UG/ML
50 INJECTION, SOLUTION INTRAMUSCULAR; INTRAVENOUS
Status: DISCONTINUED | OUTPATIENT
Start: 2021-09-07 | End: 2021-09-07

## 2021-09-07 RX ORDER — EPHEDRINE SULFATE 50 MG/ML
5 INJECTION, SOLUTION INTRAVENOUS ONCE AS NEEDED
Status: DISCONTINUED | OUTPATIENT
Start: 2021-09-07 | End: 2021-09-07

## 2021-09-07 RX ORDER — ONDANSETRON 2 MG/ML
4 INJECTION INTRAMUSCULAR; INTRAVENOUS EVERY 6 HOURS PRN
Status: DISCONTINUED | OUTPATIENT
Start: 2021-09-07 | End: 2021-09-12 | Stop reason: HOSPADM

## 2021-09-07 RX ORDER — SODIUM BICARBONATE 650 MG/1
3250 TABLET ORAL DAILY
Status: DISCONTINUED | OUTPATIENT
Start: 2021-09-07 | End: 2021-09-07

## 2021-09-07 RX ORDER — DIPHENHYDRAMINE HYDROCHLORIDE 50 MG/ML
12.5 INJECTION INTRAMUSCULAR; INTRAVENOUS
Status: DISCONTINUED | OUTPATIENT
Start: 2021-09-07 | End: 2021-09-07

## 2021-09-07 RX ORDER — ATORVASTATIN CALCIUM 20 MG/1
20 TABLET, FILM COATED ORAL NIGHTLY
Status: DISCONTINUED | OUTPATIENT
Start: 2021-09-07 | End: 2021-09-12 | Stop reason: HOSPADM

## 2021-09-07 RX ORDER — HYDROMORPHONE HCL IN 0.9% NACL 10 MG/50ML
PATIENT CONTROLLED ANALGESIA SYRINGE INTRAVENOUS CONTINUOUS
Status: DISCONTINUED | OUTPATIENT
Start: 2021-09-07 | End: 2021-09-08

## 2021-09-07 RX ORDER — GLYCOPYRROLATE 0.2 MG/ML
INJECTION INTRAMUSCULAR; INTRAVENOUS AS NEEDED
Status: DISCONTINUED | OUTPATIENT
Start: 2021-09-07 | End: 2021-09-07 | Stop reason: SURG

## 2021-09-07 RX ORDER — LABETALOL HYDROCHLORIDE 5 MG/ML
5 INJECTION, SOLUTION INTRAVENOUS
Status: DISCONTINUED | OUTPATIENT
Start: 2021-09-07 | End: 2021-09-07

## 2021-09-07 RX ORDER — SUCCINYLCHOLINE CHLORIDE 20 MG/ML
INJECTION INTRAMUSCULAR; INTRAVENOUS AS NEEDED
Status: DISCONTINUED | OUTPATIENT
Start: 2021-09-07 | End: 2021-09-07 | Stop reason: SURG

## 2021-09-07 RX ORDER — ALBUMIN, HUMAN INJ 5% 5 %
SOLUTION INTRAVENOUS CONTINUOUS PRN
Status: DISCONTINUED | OUTPATIENT
Start: 2021-09-07 | End: 2021-09-07 | Stop reason: SURG

## 2021-09-07 RX ORDER — FLUMAZENIL 0.1 MG/ML
0.2 INJECTION INTRAVENOUS AS NEEDED
Status: DISCONTINUED | OUTPATIENT
Start: 2021-09-07 | End: 2021-09-07

## 2021-09-07 RX ORDER — HYDRALAZINE HYDROCHLORIDE 20 MG/ML
5 INJECTION INTRAMUSCULAR; INTRAVENOUS
Status: DISCONTINUED | OUTPATIENT
Start: 2021-09-07 | End: 2021-09-07

## 2021-09-07 RX ORDER — FENTANYL CITRATE 50 UG/ML
50 INJECTION, SOLUTION INTRAMUSCULAR; INTRAVENOUS
Status: DISCONTINUED | OUTPATIENT
Start: 2021-09-07 | End: 2021-09-07 | Stop reason: HOSPADM

## 2021-09-07 RX ORDER — BUPIVACAINE HYDROCHLORIDE AND EPINEPHRINE 5; 5 MG/ML; UG/ML
INJECTION, SOLUTION PERINEURAL AS NEEDED
Status: DISCONTINUED | OUTPATIENT
Start: 2021-09-07 | End: 2021-09-07 | Stop reason: HOSPADM

## 2021-09-07 RX ORDER — SODIUM CHLORIDE 0.9 % (FLUSH) 0.9 %
10 SYRINGE (ML) INJECTION AS NEEDED
Status: DISCONTINUED | OUTPATIENT
Start: 2021-09-07 | End: 2021-09-12 | Stop reason: HOSPADM

## 2021-09-07 RX ORDER — ONDANSETRON 2 MG/ML
4 INJECTION INTRAMUSCULAR; INTRAVENOUS ONCE
Status: COMPLETED | OUTPATIENT
Start: 2021-09-07 | End: 2021-09-07

## 2021-09-07 RX ORDER — NALOXONE HCL 0.4 MG/ML
0.1 VIAL (ML) INJECTION
Status: DISCONTINUED | OUTPATIENT
Start: 2021-09-07 | End: 2021-09-10

## 2021-09-07 RX ORDER — ROCURONIUM BROMIDE 10 MG/ML
INJECTION, SOLUTION INTRAVENOUS AS NEEDED
Status: DISCONTINUED | OUTPATIENT
Start: 2021-09-07 | End: 2021-09-07 | Stop reason: SURG

## 2021-09-07 RX ORDER — PROMETHAZINE HYDROCHLORIDE 25 MG/1
25 TABLET ORAL ONCE AS NEEDED
Status: DISCONTINUED | OUTPATIENT
Start: 2021-09-07 | End: 2021-09-07

## 2021-09-07 RX ORDER — MAGNESIUM HYDROXIDE 1200 MG/15ML
LIQUID ORAL AS NEEDED
Status: DISCONTINUED | OUTPATIENT
Start: 2021-09-07 | End: 2021-09-07 | Stop reason: HOSPADM

## 2021-09-07 RX ORDER — PROPOFOL 10 MG/ML
VIAL (ML) INTRAVENOUS AS NEEDED
Status: DISCONTINUED | OUTPATIENT
Start: 2021-09-07 | End: 2021-09-07 | Stop reason: SURG

## 2021-09-07 RX ORDER — TRAZODONE HYDROCHLORIDE 50 MG/1
50 TABLET ORAL NIGHTLY PRN
Status: DISCONTINUED | OUTPATIENT
Start: 2021-09-07 | End: 2021-09-12 | Stop reason: HOSPADM

## 2021-09-07 RX ORDER — INSULIN LISPRO 100 [IU]/ML
0-7 INJECTION, SOLUTION INTRAVENOUS; SUBCUTANEOUS
Status: DISCONTINUED | OUTPATIENT
Start: 2021-09-07 | End: 2021-09-12 | Stop reason: HOSPADM

## 2021-09-07 RX ORDER — HYDROMORPHONE HYDROCHLORIDE 1 MG/ML
0.5 INJECTION, SOLUTION INTRAMUSCULAR; INTRAVENOUS; SUBCUTANEOUS
Status: DISCONTINUED | OUTPATIENT
Start: 2021-09-07 | End: 2021-09-07

## 2021-09-07 RX ORDER — SODIUM CHLORIDE 0.9 % (FLUSH) 0.9 %
10 SYRINGE (ML) INJECTION EVERY 12 HOURS SCHEDULED
Status: DISCONTINUED | OUTPATIENT
Start: 2021-09-07 | End: 2021-09-12 | Stop reason: HOSPADM

## 2021-09-07 RX ORDER — IBUPROFEN 600 MG/1
600 TABLET ORAL ONCE AS NEEDED
Status: DISCONTINUED | OUTPATIENT
Start: 2021-09-07 | End: 2021-09-07

## 2021-09-07 RX ORDER — HYDRALAZINE HYDROCHLORIDE 50 MG/1
50 TABLET, FILM COATED ORAL 3 TIMES DAILY
Status: DISCONTINUED | OUTPATIENT
Start: 2021-09-07 | End: 2021-09-09

## 2021-09-07 RX ORDER — METOPROLOL SUCCINATE 100 MG/1
100 TABLET, EXTENDED RELEASE ORAL NIGHTLY
Status: DISCONTINUED | OUTPATIENT
Start: 2021-09-07 | End: 2021-09-12 | Stop reason: HOSPADM

## 2021-09-07 RX ORDER — SODIUM CHLORIDE 0.9 % (FLUSH) 0.9 %
3 SYRINGE (ML) INJECTION EVERY 12 HOURS SCHEDULED
Status: DISCONTINUED | OUTPATIENT
Start: 2021-09-07 | End: 2021-09-07 | Stop reason: HOSPADM

## 2021-09-07 RX ORDER — ONDANSETRON 2 MG/ML
INJECTION INTRAMUSCULAR; INTRAVENOUS AS NEEDED
Status: DISCONTINUED | OUTPATIENT
Start: 2021-09-07 | End: 2021-09-07 | Stop reason: SURG

## 2021-09-07 RX ORDER — LIDOCAINE HYDROCHLORIDE 10 MG/ML
0.5 INJECTION, SOLUTION EPIDURAL; INFILTRATION; INTRACAUDAL; PERINEURAL ONCE AS NEEDED
Status: DISCONTINUED | OUTPATIENT
Start: 2021-09-07 | End: 2021-09-07 | Stop reason: HOSPADM

## 2021-09-07 RX ORDER — DEXTROSE MONOHYDRATE 25 G/50ML
25 INJECTION, SOLUTION INTRAVENOUS
Status: DISCONTINUED | OUTPATIENT
Start: 2021-09-07 | End: 2021-09-12 | Stop reason: HOSPADM

## 2021-09-07 RX ORDER — VASOPRESSIN 20 U/ML
INJECTION PARENTERAL AS NEEDED
Status: DISCONTINUED | OUTPATIENT
Start: 2021-09-07 | End: 2021-09-07 | Stop reason: SURG

## 2021-09-07 RX ORDER — PROMETHAZINE HYDROCHLORIDE 25 MG/1
25 SUPPOSITORY RECTAL ONCE AS NEEDED
Status: DISCONTINUED | OUTPATIENT
Start: 2021-09-07 | End: 2021-09-07

## 2021-09-07 RX ORDER — SODIUM CHLORIDE 9 MG/ML
INJECTION, SOLUTION INTRAVENOUS CONTINUOUS PRN
Status: DISCONTINUED | OUTPATIENT
Start: 2021-09-07 | End: 2021-09-07 | Stop reason: SURG

## 2021-09-07 RX ORDER — SODIUM CHLORIDE, SODIUM LACTATE, POTASSIUM CHLORIDE, CALCIUM CHLORIDE 600; 310; 30; 20 MG/100ML; MG/100ML; MG/100ML; MG/100ML
9 INJECTION, SOLUTION INTRAVENOUS CONTINUOUS
Status: DISCONTINUED | OUTPATIENT
Start: 2021-09-07 | End: 2021-09-07

## 2021-09-07 RX ORDER — ONDANSETRON 2 MG/ML
4 INJECTION INTRAMUSCULAR; INTRAVENOUS ONCE AS NEEDED
Status: DISCONTINUED | OUTPATIENT
Start: 2021-09-07 | End: 2021-09-07

## 2021-09-07 RX ORDER — DIPHENHYDRAMINE HCL 25 MG
25 CAPSULE ORAL
Status: DISCONTINUED | OUTPATIENT
Start: 2021-09-07 | End: 2021-09-07

## 2021-09-07 RX ORDER — LIDOCAINE HYDROCHLORIDE 20 MG/ML
INJECTION, SOLUTION INFILTRATION; PERINEURAL AS NEEDED
Status: DISCONTINUED | OUTPATIENT
Start: 2021-09-07 | End: 2021-09-07 | Stop reason: SURG

## 2021-09-07 RX ORDER — NALOXONE HCL 0.4 MG/ML
0.2 VIAL (ML) INJECTION AS NEEDED
Status: DISCONTINUED | OUTPATIENT
Start: 2021-09-07 | End: 2021-09-07

## 2021-09-07 RX ADMIN — VASOPRESSIN 1 UNITS: 20 INJECTION INTRAVENOUS at 01:53

## 2021-09-07 RX ADMIN — LIDOCAINE HYDROCHLORIDE 40 MG: 20 INJECTION, SOLUTION INFILTRATION; PERINEURAL at 01:16

## 2021-09-07 RX ADMIN — FENTANYL CITRATE 50 MCG: 50 INJECTION INTRAMUSCULAR; INTRAVENOUS at 03:02

## 2021-09-07 RX ADMIN — SODIUM CHLORIDE 125 ML/HR: 9 INJECTION, SOLUTION INTRAVENOUS at 23:44

## 2021-09-07 RX ADMIN — VASOPRESSIN 1 UNITS: 20 INJECTION INTRAVENOUS at 01:44

## 2021-09-07 RX ADMIN — VASOPRESSIN 1 UNITS: 20 INJECTION INTRAVENOUS at 02:10

## 2021-09-07 RX ADMIN — PROPOFOL 150 MG: 10 INJECTION, EMULSION INTRAVENOUS at 01:16

## 2021-09-07 RX ADMIN — HYDROMORPHONE HYDROCHLORIDE: 10 INJECTION, SOLUTION INTRAMUSCULAR; INTRAVENOUS; SUBCUTANEOUS at 03:20

## 2021-09-07 RX ADMIN — PHENYLEPHRINE HYDROCHLORIDE 100 MCG: 10 INJECTION INTRAVENOUS at 01:22

## 2021-09-07 RX ADMIN — TAZOBACTAM SODIUM AND PIPERACILLIN SODIUM 3.38 G: 375; 3 INJECTION, SOLUTION INTRAVENOUS at 00:10

## 2021-09-07 RX ADMIN — PHENYLEPHRINE HYDROCHLORIDE 100 MCG: 10 INJECTION INTRAVENOUS at 01:30

## 2021-09-07 RX ADMIN — ONDANSETRON 4 MG: 2 INJECTION INTRAMUSCULAR; INTRAVENOUS at 00:07

## 2021-09-07 RX ADMIN — HYDROMORPHONE HYDROCHLORIDE 0.5 MG: 2 INJECTION, SOLUTION INTRAMUSCULAR; INTRAVENOUS; SUBCUTANEOUS at 02:47

## 2021-09-07 RX ADMIN — TAZOBACTAM SODIUM AND PIPERACILLIN SODIUM 3.38 G: 375; 3 INJECTION, SOLUTION INTRAVENOUS at 23:44

## 2021-09-07 RX ADMIN — HYDROMORPHONE HYDROCHLORIDE 0.5 MG: 1 INJECTION, SOLUTION INTRAMUSCULAR; INTRAVENOUS; SUBCUTANEOUS at 00:09

## 2021-09-07 RX ADMIN — ROCURONIUM BROMIDE 5 MG: 50 INJECTION INTRAVENOUS at 01:16

## 2021-09-07 RX ADMIN — ONDANSETRON 4 MG: 2 INJECTION INTRAMUSCULAR; INTRAVENOUS at 02:29

## 2021-09-07 RX ADMIN — HYDROMORPHONE HYDROCHLORIDE 0.5 MG: 1 INJECTION, SOLUTION INTRAMUSCULAR; INTRAVENOUS; SUBCUTANEOUS at 03:07

## 2021-09-07 RX ADMIN — SUCCINYLCHOLINE CHLORIDE 160 MG: 20 INJECTION, SOLUTION INTRAMUSCULAR; INTRAVENOUS; PARENTERAL at 01:16

## 2021-09-07 RX ADMIN — ONDANSETRON 4 MG: 2 INJECTION INTRAMUSCULAR; INTRAVENOUS at 13:22

## 2021-09-07 RX ADMIN — VASOPRESSIN 1 UNITS: 20 INJECTION INTRAVENOUS at 01:25

## 2021-09-07 RX ADMIN — PHENYLEPHRINE HYDROCHLORIDE 100 MCG: 10 INJECTION INTRAVENOUS at 01:18

## 2021-09-07 RX ADMIN — SODIUM CHLORIDE: 9 INJECTION, SOLUTION INTRAVENOUS at 01:10

## 2021-09-07 RX ADMIN — PHENYLEPHRINE HYDROCHLORIDE 100 MCG: 10 INJECTION INTRAVENOUS at 01:25

## 2021-09-07 RX ADMIN — PHENYLEPHRINE HYDROCHLORIDE 100 MCG: 10 INJECTION INTRAVENOUS at 01:23

## 2021-09-07 RX ADMIN — HYDRALAZINE HYDROCHLORIDE 50 MG: 50 TABLET, FILM COATED ORAL at 09:27

## 2021-09-07 RX ADMIN — SODIUM CHLORIDE 125 ML/HR: 9 INJECTION, SOLUTION INTRAVENOUS at 10:54

## 2021-09-07 RX ADMIN — FENTANYL CITRATE 50 MCG: 50 INJECTION INTRAMUSCULAR; INTRAVENOUS at 03:31

## 2021-09-07 RX ADMIN — FENTANYL CITRATE 50 MCG: 50 INJECTION INTRAMUSCULAR; INTRAVENOUS at 01:16

## 2021-09-07 RX ADMIN — ROCURONIUM BROMIDE 35 MG: 50 INJECTION INTRAVENOUS at 01:23

## 2021-09-07 RX ADMIN — PHENYLEPHRINE HYDROCHLORIDE 100 MCG: 10 INJECTION INTRAVENOUS at 01:21

## 2021-09-07 RX ADMIN — TRAZODONE HYDROCHLORIDE 50 MG: 50 TABLET ORAL at 22:00

## 2021-09-07 RX ADMIN — FAMOTIDINE 20 MG: 10 INJECTION INTRAVENOUS at 00:43

## 2021-09-07 RX ADMIN — SODIUM CHLORIDE, PRESERVATIVE FREE 10 ML: 5 INJECTION INTRAVENOUS at 22:01

## 2021-09-07 RX ADMIN — FENTANYL CITRATE 50 MCG: 50 INJECTION INTRAMUSCULAR; INTRAVENOUS at 02:00

## 2021-09-07 RX ADMIN — HYDRALAZINE HYDROCHLORIDE 50 MG: 50 TABLET, FILM COATED ORAL at 22:00

## 2021-09-07 RX ADMIN — SODIUM CHLORIDE, POTASSIUM CHLORIDE, SODIUM LACTATE AND CALCIUM CHLORIDE 100 ML/HR: 600; 310; 30; 20 INJECTION, SOLUTION INTRAVENOUS at 03:12

## 2021-09-07 RX ADMIN — PHENYLEPHRINE HYDROCHLORIDE 100 MCG: 10 INJECTION INTRAVENOUS at 01:36

## 2021-09-07 RX ADMIN — GLYCOPYRROLATE 0.2 MG: 0.2 INJECTION INTRAMUSCULAR; INTRAVENOUS at 01:36

## 2021-09-07 RX ADMIN — PHENYLEPHRINE HYDROCHLORIDE 100 MCG: 10 INJECTION INTRAVENOUS at 01:20

## 2021-09-07 RX ADMIN — SUGAMMADEX 200 MG: 100 INJECTION, SOLUTION INTRAVENOUS at 02:38

## 2021-09-07 RX ADMIN — ALBUMIN HUMAN: 0.05 INJECTION, SOLUTION INTRAVENOUS at 01:25

## 2021-09-07 RX ADMIN — ONDANSETRON 4 MG: 2 INJECTION INTRAMUSCULAR; INTRAVENOUS at 21:26

## 2021-09-07 RX ADMIN — METOPROLOL SUCCINATE 100 MG: 100 TABLET, EXTENDED RELEASE ORAL at 22:00

## 2021-09-07 RX ADMIN — PHENYLEPHRINE HYDROCHLORIDE 100 MCG: 10 INJECTION INTRAVENOUS at 01:41

## 2021-09-07 RX ADMIN — TAZOBACTAM SODIUM AND PIPERACILLIN SODIUM 3.38 G: 375; 3 INJECTION, SOLUTION INTRAVENOUS at 12:41

## 2021-09-07 RX ADMIN — ATORVASTATIN CALCIUM 20 MG: 20 TABLET, FILM COATED ORAL at 22:00

## 2021-09-07 RX ADMIN — EPHEDRINE SULFATE 10 MG: 50 INJECTION INTRAMUSCULAR; INTRAVENOUS; SUBCUTANEOUS at 01:47

## 2021-09-07 NOTE — ED TRIAGE NOTES
Pt to ED via PV from home, accompanied by spouse and assisted to wheelchair by triage staff. Pt is a ESRD patient followed by Dr. Reyes. Pt instructed to come to ED this evening by Dr. Ramirez for abdominal pain, nausea, and vomiting intermittently since 9/3. Pt reports right lower abdominal pain that increased in severity tonight and pt reports that the pain now radiates to his right flank/lower right back. Pt is not a dialysis patient. Pt appears in moderated discomfort at the time of triage.     This RN in appropriate PPE for all patient care interactions. Pt masked and redirected for proper mask use when necessary. Hand hygiene performed before and after all patient care interactions.

## 2021-09-07 NOTE — ANESTHESIA PREPROCEDURE EVALUATION
Anesthesia Evaluation     Patient summary reviewed and Nursing notes reviewed   NPO Solid Status: Waived due to emergency  NPO Liquid Status: Waived due to emergency           Airway   Mallampati: III  TM distance: >3 FB  Neck ROM: full  Possible difficult intubation  Dental - normal exam     Pulmonary - normal exam    breath sounds clear to auscultation  (+) a smoker (50 pack years) Former, sleep apnea,   Cardiovascular - normal exam    ECG reviewed  Rhythm: regular  Rate: normal    (+) hypertension, hyperlipidemia,       Neuro/Psych- negative ROS  GI/Hepatic/Renal/Endo    (+) morbid obesity, GERD,  renal disease ESRD, diabetes mellitus type 2,     Musculoskeletal (-) negative ROS    Abdominal    Substance History - negative use     OB/GYN negative ob/gyn ROS         Other      history of cancer (H/O renal cell carcinoma)                      Anesthesia Plan    ASA 3 - emergent     general     intravenous induction     Anesthetic plan, all risks, benefits, and alternatives have been provided, discussed and informed consent has been obtained with: patient.

## 2021-09-07 NOTE — PROGRESS NOTES
Name: Lefty Cai ADMIT: 2021   : 1950  PCP: Berenice Daksha JASSO, UDSTY    MRN: [id number] LOS: 0 days   AGE/SEX: 71 y.o. male  ROOM: [facility]     Subjective   Subjective   Patient appears obese, generally weak, mildly sedated, relatively comfortable, no apparent distress.  Wife at bedside.  Voices no new concerns.    Review of Systems   Reason unable to perform ROS: Limited ROS secondary to mild sedation.   Respiratory: Negative for cough and shortness of breath.    Cardiovascular: Negative for chest pain and leg swelling.   Gastrointestinal: Positive for abdominal pain (Postoperative pain expected finding--PCA). Negative for nausea.   Musculoskeletal: Positive for gait problem (Secondary generalized weakness and abdominal pain).   Neurological: Positive for weakness.   Psychiatric/Behavioral: Negative for confusion.        Objective   Objective   Vital Signs  Temp:  [97.1 °F (36.2 °C)-98.8 °F (37.1 °C)] 98.8 °F (37.1 °C)  Heart Rate:  [80-96] 93  Resp:  [14-18] 18  BP: (125-207)/() 133/71  SpO2:  [93 %-97 %] (P) 95 %  on  Flow (L/min):  [2] 2;   Device (Oxygen Therapy): (P) nasal cannula  Body mass index is 34.44 kg/m².     Physical Exam  Constitutional:       General: He is not in acute distress.     Appearance: He is obese. He is ill-appearing. He is not toxic-appearing.      Comments: Mildly sedated   HENT:      Head: Normocephalic and atraumatic.   Eyes:      Extraocular Movements: Extraocular movements intact.      Conjunctiva/sclera: Conjunctivae normal.   Cardiovascular:      Rate and Rhythm: Normal rate.      Heart sounds: Normal heart sounds.   Pulmonary:      Effort: Pulmonary effort is normal.      Breath sounds: Normal breath sounds.   Abdominal:      General: There is distension.      Palpations: Abdomen is soft.      Tenderness: There is abdominal tenderness. There is no guarding.      Comments: Ostomy, drain, midline ventral incision   Musculoskeletal:         General: No  tenderness.      Cervical back: Normal range of motion and neck supple.      Right lower leg: No edema.      Left lower leg: No edema.   Skin:     General: Skin is warm and dry.   Neurological:      Mental Status: He is oriented to person, place, and time.      Cranial Nerves: No cranial nerve deficit.      Motor: Weakness (generalized) present.   Psychiatric:         Behavior: Behavior normal.         Thought Content: Thought content normal.         Results Review     I reviewed the patient's new clinical results.  Results from last 7 days   Lab Units 09/07/21  0835 09/06/21  2202   WBC 10*3/mm3 11.83* 12.91*   HEMOGLOBIN g/dL 11.8* 13.6   PLATELETS 10*3/mm3 168 169     Results from last 7 days   Lab Units 09/07/21  0835 09/06/21  2202   SODIUM mmol/L 134* 134*   POTASSIUM mmol/L 5.1 4.5   CHLORIDE mmol/L 102 100   CO2 mmol/L 17.6* 17.8*   BUN mg/dL 65* 65*   CREATININE mg/dL 5.16* 5.53*   GLUCOSE mg/dL 109* 164*   Estimated Creatinine Clearance: 16 mL/min (A) (by C-G formula based on SCr of 5.16 mg/dL (H)).  Results from last 7 days   Lab Units 09/06/21  2202   ALBUMIN g/dL 3.80   BILIRUBIN mg/dL 0.3   ALK PHOS U/L 67   AST (SGOT) U/L 13   ALT (SGPT) U/L 11     Results from last 7 days   Lab Units 09/07/21  0835 09/06/21  2202   CALCIUM mg/dL 8.7 9.3   ALBUMIN g/dL  --  3.80     Results from last 7 days   Lab Units 09/06/21  2202   PROCALCITONIN ng/mL 7.18*   LACTATE mmol/L 1.3     COVID19   Date Value Ref Range Status   09/06/2021 Not Detected Not Detected - Ref. Range Final     Glucose   Date/Time Value Ref Range Status   09/07/2021 1029 115 70 - 130 mg/dL Final     Comment:     Meter: [id number] : [id number] Marielena Angeles RN   09/07/2021 0755 114 70 - 130 mg/dL Final     Comment:     Meter: [id number] : [id number] Marielena Angeles RN       CT Abdomen Pelvis Without Contrast  Narrative: CT OF THE ABDOMEN AND PELVIS WITHOUT CONTRAST     HISTORY: Right lower quadrant abdominal pain     COMPARISON:  None available.     TECHNIQUE: Axial CT imaging was obtained through the abdomen and pelvis.  No IV contrast was administered.     FINDINGS:  Images through the lung bases do not demonstrate any acute  abnormalities. The patient is noted to have a large volume of  intraperitoneal free air. The source images suspected to be due to  perforated diverticulitis within the right lower quadrant, as the  patient has sigmoid diverticular disease within this area. There is  extensive inflammatory stranding within the right lower quadrant. This  does involve some loops of small bowel within this area, although this  is favored to be secondary to the inflammation within the colon. The  patient does not have any evidence of bowel obstruction. The appendix is  normal. No focal hepatic lesions are seen. The stomach is unremarkable.  There is a duodenal diverticula arising from the third portion of the  duodenum. Adrenal glands are normal. Gallbladder is grossly  unremarkable. Spleen and pancreas are within normal limits. There is  calcification of the aorta. No hydronephrosis is seen on either side.  There is a simple appearing right renal cyst. No additional follow-up is  necessary. Left kidney does appear mildly atrophic. There is aneurysmal  dilatation of the infrarenal abdominal aorta, measuring 3.0 x 3.0 cm.  Urinary bladder is decompressed. Prostate gland is within normal limits.  No acute osseous abnormalities are seen.     Impression:    1. Large volume of intraperitoneal free air, with suspected source  within the right lower quadrant, where the patient has an inflamed loop  of sigmoid colon. A discrete drainable abscess is not seen. There are  also some inflamed loops of small bowel within this area, although this  is also be secondary involvement from the sigmoid colon. Urgent general  surgical consultation is recommended.     FINDINGS were called to Dr. Sharp at 11:10 PM.     Radiation dose reduction techniques were  utilized, including automated  exposure control and exposure modulation based on body size.     This report was finalized on 9/6/2021 11:17 PM by Dr. Mini Segura M.D.     XR Chest 1 View  Narrative: SINGLE VIEW OF THE CHEST     HISTORY: Dyspnea     COMPARISON: None available.     FINDINGS:  Heart size is within normal limits. No pneumothorax, pleural effusion,  or acute infiltrate is seen. There is a suggestion of a lucency  underneath the right hemidiaphragm. Clinical significance is uncertain.  It may represent normal bowel gas, but correlation with any symptoms of  abdominal pain is suggested.     Impression:    1. No acute intrathoracic findings.  2. Nonspecific lucency seen underlying the right diaphragm. Correlation  with any symptoms of abdominal pain is suggested.     This report was finalized on 9/6/2021 10:15 PM by Dr. Mini Segura M.D.       Scheduled Medications  atorvastatin, 20 mg, Oral, Nightly  hydrALAZINE, 50 mg, Oral, TID  [START ON 9/8/2021] influenza vaccine, 0.5 mL, Intramuscular, Once  insulin lispro, 0-7 Units, Subcutaneous, TID AC  metoprolol succinate XL, 100 mg, Oral, Nightly  piperacillin-tazobactam, 3.375 g, Intravenous, Q12H  sodium chloride, 10 mL, Intravenous, Q12H    Infusions  HYDROmorphone HCl-NaCl,   sodium chloride, 125 mL/hr, Last Rate: 125 mL/hr (09/07/21 1054)    Diet  Diet Clear Liquid       Assessment/Plan     Active Hospital Problems    Diagnosis  POA   • **Diverticulitis of large intestine with perforation and abscess without bleeding [K57.20]  Yes   • Free intraperitoneal air [K66.8]  Yes   • Obesity (BMI 30-39.9) [E66.9]  Yes   • Essential hypertension [I10]  Yes   • CKD (chronic kidney disease) stage 4, GFR 15-29 ml/min (CMS/HCC) [N18.4]  Yes   • Type 2 diabetes mellitus with diabetic chronic kidney disease (CMS/[facility]) [E11.22]  Yes      Resolved Hospital Problems   No resolved problems to display.       71 y.o. male admitted with Diverticulitis of large  intestine with perforation and abscess without bleeding.    Appreciate input all consultants      Diverticulitis of large intestine with perforation and abscess without bleeding / free intraperitoneal air  General surgery following status post--open sigmoid colectomy, colostomy, appendectomy on 9/7/2021  IV Dilaudid PCA, continuous pulse oximetry  7 days course empiric therapy IV Zosyn  Body fluid cultures pending   COVID-19 not detected      Type 2 diabetes mellitus with diabetic chronic kidney disease (CMS/[facility])  A1c 6.5 June 23, 2021  Glucose readings acceptable, consistent   Low-dose lispro sliding scale      Essential hypertension  BP acceptable, hemodynamically stable  Continue hydralazine 50 mg p.o. 3 times daily, metoprolol 100 mg p.o. nightly with hold parameters      CKD (chronic kidney disease) stage 4, GFR 15-29 ml/min (CMS/[facility])  Serum creatinine appears at baseline  Left upper extremity AV fistula maturing--restricted extremity  Nephrology following recommend continue IV fluids changed to normal saline and monitoring labs    Obesity  Complicating of problems    · SCD for DVT prophylaxis.  · CPR  · Discussed with Dr. Crandall.  · Anticipate discharge pending clinical course / plan likely Home at discharge    DUSTY Ford  [facility]  09/07/21  14:16 EDT

## 2021-09-07 NOTE — ANESTHESIA PROCEDURE NOTES
Airway  Urgency: elective    Date/Time: 9/7/2021 1:17 AM  Airway not difficult    General Information and Staff    Patient location during procedure: OR  Anesthesiologist: Jd Isaac MD  CRNA: Rupinder Nieto CRNA    Indications and Patient Condition  Indications for airway management: airway protection    Preoxygenated: yes  Mask difficulty assessment: 0 - not attempted    Final Airway Details  Final airway type: endotracheal airway      Successful airway: ETT  Cuffed: yes   Successful intubation technique: video laryngoscopy  Facilitating devices/methods: intubating stylet  Endotracheal tube insertion site: oral  Blade: CMAC  Blade size: D  ETT size (mm): 8.0  Cormack-Lehane Classification: grade I - full view of glottis  Placement verified by: chest auscultation and capnometry   Cuff volume (mL): 7  Measured from: gums  ETT/EBT to gums (cm): 22  Number of attempts at approach: 1  Assessment: lips, teeth, and gum same as pre-op and atraumatic intubation

## 2021-09-07 NOTE — OP NOTE
Operative Note :  Yelena Guerra MD      Lefty DOLL Trell  1950    Procedure Date: 09/07/21    Pre-op Diagnosis:  Free intraperitoneal air from perforated viscus    Post-Operative Diagnosis:  Perforated sigmoid diverticulitis with abscess    Procedure:   Open sigmoind colectomy, colostomy, and appendectomy    Surgeon: Yelena Guerra MD    Assistant: Yordy Portillo PA-C (Yordy was responsible for suctioning, retracting, suturing of all surgical incisions, and application of sterile dressings at the completion of the case)    Anesthesia:  General (general endotracheal tube)    Estimated Blood Loss: 75 mL    Specimens:   Intra-abdominal fluid culture  Sigmoid colon  Appendix    Complications: None    Indications:  The patient is a 71-year-old gentleman who came to the emergency room this evening with acute onset of abdominal pain 3 days ago.  On CT imaging he was found to have a large amount of free intraperitoneal air concerning for perforated viscus.  He is being taken urgently to the operating room for exploratory laparotomy with possible colon resection, possible colostomy, etc.  He understands the risks of the procedure to include bleeding, possible wound infection, and possible damage to surrounding structures such as the ureters or small bowel.  Despite these risks, he has consented to proceed.    Findings: Perforated acute sigmoid diverticulitis with pericolonic abscess    Description of procedure:  The patient was brought to the operating room and placed on the OR table in supine position with his legs split laterally.  An endotracheal tube was inserted and general anesthesia induced.  A Reddy catheter was inserted into the urinary bladder using sterile technique.  His anterior abdominal wall was shaved, prepped, and draped in a sterile fashion.  A surgical timeout was completed.  A midline laparotomy incision was made after instilling 0.5% Marcaine mixed with Exparel.  The fascia was incised  sharply and an extra-large Alessandro wound protector inserted.  The omentum was reflected superiorly and initially there was no sign of gross contamination of the abdomen.  I manually palpated the sigmoid colon and traced it down towards the right lower quadrant where it was stuck to the appendix and the pelvic sidewall.  As a finger fractured the sigmoid colon off of the structures, I unroofed a large abscess containing thick purulent fluid.  A fluid culture was obtained and sent to microbiology.  The sigmoid colon appeared to have a perforated diverticula as the source of his free air and purulent peritonitis.  A few centimeters downstream from the indurated colonic tissues, the colon was transected using a blue load on the 75 mm MORIAH stapler.  The colonic mesentery was then divided working proximally along the sigmoid colon using the harmonic scalpel, taking care to stay very close to the mesenteric edge of the colon to avoid any damage to the underlying ureter.  The left ureter was identified within the left retroperitoneum and appeared somewhat atrophic.  It was protected during the dissection and at no point was it damaged.  I then divided the descending colon at its juncture with the sigmoid colon using an additional blue load on the MORIAH stapler and passed off the sigmoid colon informal into the pathology department.  A few small venous bleeders were oversewn using 3-0 silk Lembert style sutures along the mesenteric transection line.  I then inspected the appendix which had been stuck to the diverticular perforation.  The appendix was thickened and indurated because of the adjacent infection and I thought it would be best to go ahead and remove the appendix to prevent chronic appendicitis.  The appendiceal mesentery was therefore divided using the harmonic scalpel and the base of the appendix transected off of the cecum using a blue load on the MORIAH stapler.  The appendix was also passed off to pathology in  formalin.  The appendiceal staple line along the cecum was oversewn using interrupted 3-0 silk Lembert style sutures.  The small bowel was then run from ligament of Treitz down to terminal ileum and showed no gross abnormalities other than a thick fibrinous rind on the terminal ileum where it had been plastered to the side of the sigmoid colon.  I left this alone so as not to create a deserosalization of the small bowel.  The abdomen was copiously irrigated with 1.5 L of warm normal saline and suctioned dry.  A 19 Sami DIONY drain was positioned through a right lower quadrant stab incision coursing down into the pelvis anterior to the rectal stump.  The rectal stump was marked with a 2-0 Prolene stitch along the right lateral corner for future identification if he proceeds with colostomy reversal a few months from now.  A circular skin flitting incision was then made along the left upper quadrant abdominal wall above the umbilicus and the anterior and posterior rectus sheath divided longitudinally.  The descending colon was then brought out through this muscle-splitting incision to the skin surface in preparation for colostomy maturation.  The fascia in the midline was then closed using running looped #1 PDS suture and skin closed with skin staples.  The colostomy was then matured in a standard fashion using interrupted 3-0 Vicryl sutures.  Sterile dressings were applied as well as a colostomy appliance.  He was then extubated and transferred to PACU in stable condition with all counts correct per nursing.    Yelena Guerra MD  General, Robotic, and Endoscopic Surgery  [facility]    [address], [address]  [place] [address]  P: [phone]  F: [phone]      Cellulitis of face

## 2021-09-07 NOTE — CONSULTS
General Surgery Consultation    Consulting Physician: Yelena Guerra MD  Referring Physician: Kimani Sharp MD    Reason for consultation: Free air    CC: Abdominal pain    HPI:   The patient is a very pleasant 71 y.o. male that presented to the hospital with acute onset of abdominal pain that started 3 days ago.  The pain is located within the right lower quadrant without radiation.  The pain started suddenly after he ate pizza the other night and he thought he was simply having indigestion.  He has had intermittent nausea with vomiting.  The pain has progressively worsened and feels like a stabbing sensation that seems to be made worse with any sort of palpation and is not better with any interventions.  He came to the emergency room where work-up involved a CT abdomen/pelvis which demonstrated significant free air in the abdominal cavity concerning for perforated viscus.  I have been consulted for management of this.     Past Medical History:  Hypertension  Hyperlipidemia  Stage IV chronic kidney disease  Type 2 diabetes  GERD  Renal cell carcinoma status post partial left nephrectomy  Obstructive sleep apnea    Past Surgical History:  Partial left nephrectomy (2007, Dr. Victor)  Cataract extraction  Left arm AV fistula (2019, Dr. Louann Miles)  Knee arthroscopy    Medications:  Reviewed in epic and include aspirin, metoprolol succinate, sodium bicarbonate, and hydralazine    Allergies: Latex (blisters), contrast dye (contraindicated due to previous partial nephrectomy and chronic kidney disease)    Social History: , former smoker but quit in 1999, no regular alcohol use    Family History: Mother with hypertension, father with bladder cancer and coronary artery disease    Review of Systems:  Constitutional: denies any weight changes, fatigue, or weakness  Eyes: denies blurred/double vision or scleral icterus  Cardiovascular: denies chest pain, palpitations, or edemas  Respiratory: denies cough, sputum,  or dyspnea  Gastrointestinal: Positive for abdominal pain, nausea, and vomiting; denies melena or hematochezia  Genitourinary: denies dysuria or hematuria  Endocrine: denies cold intolerance, lethargy, or flushing  Hematologic: denies excessive bruising or bleeding  Musculoskeletal: denies weakness, joint swelling, joint pain, or stiffness  Neurologic: denies seizures, CVA, paresthesia, or peripheral neuropathy  Skin: denies change in nevi, rashes, masses, or jaundice     All other systems reviewed and were negative.    Physical Exam:   Vitals:    09/06/21 2147   BP: (!) 174/103   Pulse: 85   Resp: 18   Temp:    SpO2: 97%     Height: 177 cm  Weight: 111 kg  GENERAL: awake and alert, no acute distress, oriented to person, place, and time  HEENT: normocephalic, atraumatic, no scleral icterus, moist mucous membranes  NECK: Supple, there is no thyromegaly or lymphadenopathy  RESPIRATORY: clear to auscultation, no wheezes, rales or rhonchi, symmetric air entry  CARDIOVASCULAR: regular rate and rhythm    GASTROINTESTINAL: soft, nondistended, obese, moderate diffuse tenderness to palpation with some guarding, well-healed LUQ subcostal scar  MUSCULOSKELETAL: no cyanosis, clubbing, or edema   NEUROLOGIC: alert and oriented, normal speech, cranial nerves 2-12 grossly intact, no focal deficits   SKIN: Moist, warm, no rashes, no jaundice      Diagnostic workup:     Pertinent labs:   Results from last 7 days   Lab Units 09/06/21  2202   WBC 10*3/mm3 12.91*   HEMOGLOBIN g/dL 13.6   HEMATOCRIT % 40.4   PLATELETS 10*3/mm3 169     Results from last 7 days   Lab Units 09/06/21  2202   SODIUM mmol/L 134*   POTASSIUM mmol/L 4.5   CHLORIDE mmol/L 100   CO2 mmol/L 17.8*   BUN mg/dL 65*   CREATININE mg/dL 5.53*   CALCIUM mg/dL 9.3   BILIRUBIN mg/dL 0.3   ALK PHOS U/L 67   ALT (SGPT) U/L 11   AST (SGOT) U/L 13   GLUCOSE mg/dL 164*       IMAGING:  CT ABD/PELVIS:  IMPRESSION:  Large volume of intraperitoneal free air, with suspected source  within the right lower quadrant, where the patient has an inflamed loop of sigmoid colon. A discrete drainable abscess is not seen. There are also some inflamed loops of small bowel within this area, although this is also be secondary involvement from the sigmoid colon. Urgent general surgical consultation is recommended.    Assessment and plan:     The patient is a 71 y.o. male with free intraperitoneal air possibly from perforated sigmoid diverticulitis    I reviewed the CT abdomen/pelvis and blood work that were done in the emergency room this evening and have recommended we proceed urgently to the operating room for exploratory laparotomy with possible colon resection and possible colostomy.  He understands that a perforated viscus is the source of his free intraperitoneal air, and he will progress into septic shock and death if not operated on urgently.  He also understands that the surgical outcomes are variable depending on the source for his free air.  He has been counseled on the risks of surgery to include bleeding, possible wound infection, and possible damage to intra-abdominal structures such as the bowel or ureter. He will be admitted to the medicine service given his multitude of other issues including CKD/ESRD and poorly controlled HTN. I discussed his case with Dr. Michael Obando of the admitting hospitalist team.    Yelena Guerra MD  General, Robotic, and Endoscopic Surgery  [facility]    [address], [address]  [place] [address]  P: [phone]  F: [phone]

## 2021-09-07 NOTE — H&P
"    Patient Name:  Lefty Cai  YOB: 1950  MRN:  [id number]  Admit Date:  9/6/2021  Patient Care Team:  Daksha Ng APRN as PCP - General (Family Medicine)  Rudy Faith MD as Consulting Physician (Ophthalmology)  Jose Mccall MD as Consulting Physician (Nephrology)  Quang Reyes MD as Consulting Physician (Nephrology)      Subjective   History Present Illness     Chief Complaint   Patient presents with   • Abdominal Pain   • Nausea   • Vomiting   • Back Pain       Mr. Cai is a 71 y.o. former smoker with a history of CKD stage IV, diabetes mellitus type 2, GERD, hypertension, hyperlipidemia, obesity who presents to [facility] ER chief complaint of abdominal pain, nausea, vomiting, back pain admitted for diverticulitis of large intestine with perforation and abscess without bleeding.    Wife at bedside serves as primary historian following emergent OR for perforated diverticulitis.  Wife states patient ate a pizza on Friday, 9/3/2021 and developed abdominal pain nausea vomiting without chest pain or shortness of breath on Saturday, 9/4/2021.  Wife initially thought symptoms secondary to \"food poisoning\".  Symptoms improved on Sunday, 9/5/2021 then returned on Monday, 9/6/2021; therefore, notified nephrology office and primary care provider who advised ER evaluation.    Denies known history of diverticular disease.    AVSS on room air with exception of hypertension blood pressure 174/103 most likely secondary acute abdominal pain and improved 133/71 following emergent surgery.  Metabolic acidosis with increased anion gap 16, bicarb 17, procalcitonin 7.1, unremarkable serum lactate 1.3, leukocytosis WBC count 12,000, urinalysis negative, CT abdomen pelvis without contrast report findings large volume intraperitoneal free air and suspected source within the right lower quadrant, discrete drainable abscess.    Patient treated with empiric antibiotic therapy IV Zosyn.  " Further recommendation per hospital course.  See additional details on assessment/plan.    History of Present Illness  Review of Systems   Reason unable to perform ROS: Limited ROS secondary to mild sedation.   Respiratory: Negative for cough and shortness of breath.    Cardiovascular: Negative for chest pain and leg swelling.   Gastrointestinal: Positive for abdominal pain (Postoperative pain expected finding--PCA). Negative for nausea.   Musculoskeletal: Positive for gait problem (Secondary generalized weakness and abdominal pain).   Neurological: Positive for weakness.   Psychiatric/Behavioral: Negative for confusion.     Personal History     Past Medical History:   Diagnosis Date   • CKD (chronic kidney disease)    • Diabetes mellitus, type 2 (CMS/HCC)    • GERD (gastroesophageal reflux disease)    • H/O renal cell carcinoma     partial nephrectomy   • Hyperlipidemia    • Hypertension    • Primary insomnia 2016   • Proteinuria    • Risk factors for obstructive sleep apnea     STOP BANG 6   • Sinus drainage    • Vitamin D deficiency 2017     Past Surgical History:   Procedure Laterality Date   • ARTERIOVENOUS FISTULA/SHUNT SURGERY Left 8/15/2019    Procedure: LEFT MID FOREARM RADIAL CEPHALIC ARTERIAL VENOUS FISTULA;  Surgeon: Louann Miles Jr., MD;  Location: [facility];  Service: Vascular   • EYE SURGERY      CATARACTS   • KNEE ARTHROSCOPY     • NEPHRECTOMY PARTIAL      Dr. Victor     Family History   Problem Relation Age of Onset   • Hypertension Mother    • Heart disease Father         ASCVD   • Macular degeneration Father    • Cancer Father         bladder   • Diabetes type I Sister    • Malig Hyperthermia Neg Hx      Social History     Tobacco Use   • Smoking status: Former Smoker     Packs/day: 2.00     Years: 25.00     Pack years: 50.00     Types: Cigarettes     Quit date:      Years since quittin.6   • Smokeless tobacco: Current User     Types: Snuff   Substance Use  Topics   • Alcohol use: No   • Drug use: No     No current facility-administered medications on file prior to encounter.     Current Outpatient Medications on File Prior to Encounter   Medication Sig Dispense Refill   • aspirin 81 MG tablet Take 81 mg by mouth Every Night.     • Cholecalciferol (vitamin D3) 125 MCG (5000 UT) tablet tablet Take 5,000 Units by mouth Daily.     • hydrALAZINE (APRESOLINE) 50 MG tablet TAKE 1 TABLET THREE TIMES DAILY 270 tablet 3   • metoprolol succinate XL (TOPROL-XL) 100 MG 24 hr tablet TAKE 1 TABLET EVERY NIGHT 90 tablet 3   • simvastatin (ZOCOR) 40 MG tablet TAKE 1 TABLET AT BEDTIME 90 tablet 3   • sodium bicarbonate 650 MG tablet Take 5 tablets by mouth Daily.  0   • traZODone (DESYREL) 50 MG tablet TAKE 1 TABLET AT NIGHT AS NEEDED FOR SLEEP 90 tablet 3     Allergies   Allergen Reactions   • Latex Other (See Comments)     Blisters   • Contrast Dye Other (See Comments)     KIDNEY DISEASE       Objective    Objective     Vital Signs  Temp:  [97.1 °F (36.2 °C)-98.8 °F (37.1 °C)] 98.8 °F (37.1 °C)  Heart Rate:  [80-96] 93  Resp:  [14-18] 18  BP: (125-207)/() 133/71  SpO2:  [93 %-97 %] 95 %  on  Flow (L/min):  [2] 2;   Device (Oxygen Therapy): nasal cannula  Body mass index is 34.44 kg/m².    Physical Exam   Constitutional:       General: He is not in acute distress.     Appearance: He is obese. He is ill-appearing. He is not toxic-appearing.      Comments: Mildly sedated   HENT:      Head: Normocephalic and atraumatic.   Eyes:      Extraocular Movements: Extraocular movements intact.      Conjunctiva/sclera: Conjunctivae normal.   Cardiovascular:      Rate and Rhythm: Normal rate.      Heart sounds: Normal heart sounds.   Pulmonary:      Effort: Pulmonary effort is normal.      Breath sounds: Normal breath sounds.   Abdominal:      General: There is distension.      Palpations: Abdomen is soft.      Tenderness: There is abdominal tenderness. There is no guarding.      Comments:  Ostomy, drain, midline ventral incision   Musculoskeletal:         General: No tenderness.      Cervical back: Normal range of motion and neck supple.      Right lower leg: No edema.      Left lower leg: No edema.   Skin:     General: Skin is warm and dry.   Neurological:      Mental Status: He is oriented to person, place, and time.      Cranial Nerves: No cranial nerve deficit.      Motor: Weakness (generalized) present.   Psychiatric:         Behavior: Behavior normal.         Thought Content: Thought content normal.     Results Review:  I reviewed the patient's new clinical results.  I reviewed the patient's new imaging results and agree with the interpretation.  I reviewed the patient's other test results and agree with the interpretation  I personally viewed and interpreted the patient's EKG/Telemetry data  Discussed with ED provider.    Lab Results (last 24 hours)     Procedure Component Value Units Date/Time    CBC & Differential [[id number]]  (Abnormal) Collected: 09/06/21 2202    Specimen: Blood Updated: 09/06/21 2210    Narrative:      The following orders were created for panel order CBC & Differential.  Procedure                               Abnormality         Status                     ---------                               -----------         ------                     CBC Auto Differential[[id number]]        Abnormal            Final result                 Please view results for these tests on the individual orders.    Comprehensive Metabolic Panel [[id number]]  (Abnormal) Collected: 09/06/21 2202    Specimen: Blood Updated: 09/06/21 2248     Glucose 164 mg/dL      BUN 65 mg/dL      Creatinine 5.53 mg/dL      Sodium 134 mmol/L      Potassium 4.5 mmol/L      Chloride 100 mmol/L      CO2 17.8 mmol/L      Calcium 9.3 mg/dL      Total Protein 7.6 g/dL      Albumin 3.80 g/dL      ALT (SGPT) 11 U/L      AST (SGOT) 13 U/L      Alkaline Phosphatase 67 U/L      Total Bilirubin 0.3 mg/dL      eGFR Non   "Amer 10 mL/min/1.73      Comment: <15 Indicative of kidney failure.        eGFR   Amer --     Comment: <15 Indicative of kidney failure.        Globulin 3.8 gm/dL      A/G Ratio 1.0 g/dL      BUN/Creatinine Ratio 11.8     Anion Gap 16.2 mmol/L     Narrative:      GFR Normal >60  Chronic Kidney Disease <60  Kidney Failure <15      Protime-INR [[id number]]  (Abnormal) Collected: 09/06/21 2202    Specimen: Blood Updated: 09/06/21 2219     Protime 14.4 Seconds      INR 1.14    Procalcitonin [[id number]]  (Abnormal) Collected: 09/06/21 2202    Specimen: Blood Updated: 09/06/21 2251     Procalcitonin 7.18 ng/mL     Narrative:      As a Marker for Sepsis (Non-Neonates):     1. <0.5 ng/mL represents a low risk of severe sepsis and/or septic shock.  2. >2 ng/mL represents a high risk of severe sepsis and/or septic shock.    As a Marker for Lower Respiratory Tract Infections that require antibiotic therapy:  PCT on Admission     Antibiotic Therapy             6-12 Hrs later  >0.5                          Strongly Recommended            >0.25 - <0.5             Recommended  0.1 - 0.25                  Discouraged                       Remeasure/reassess PCT  <0.1                         Strongly Discouraged         Remeasure/reassess PCT      As 28 day mortality risk marker: \"Change in Procalcitonin Result\" (>80% or <=80%) if Day 0 (or Day 1) and Day 4 values are available. Refer to http://www.[employer]-pct-calculator.com/    Change in PCT <=80 %   A decrease of PCT levels below or equal to 80% defines a positive change in PCT test result representing a higher risk for 28-day all-cause mortality of patients diagnosed with severe sepsis or septic shock.    Change in PCT >80 %   A decrease of PCT levels of more than 80% defines a negative change in PCT result representing a lower risk for 28-day all-cause mortality of patients diagnosed with severe sepsis or septic shock.              Results may be falsely decreased if " patient taking Biotin.     Lactic Acid, Plasma [[id number]]  (Normal) Collected: 09/06/21 2202    Specimen: Blood Updated: 09/06/21 2227     Lactate 1.3 mmol/L     CBC Auto Differential [[id number]]  (Abnormal) Collected: 09/06/21 2202    Specimen: Blood Updated: 09/06/21 2210     WBC 12.91 10*3/mm3      RBC 4.51 10*6/mm3      Hemoglobin 13.6 g/dL      Hematocrit 40.4 %      MCV 89.6 fL      MCH 30.2 pg      MCHC 33.7 g/dL      RDW 13.8 %      RDW-SD 46.0 fl      MPV 9.9 fL      Platelets 169 10*3/mm3      Neutrophil % 86.7 %      Lymphocyte % 7.1 %      Monocyte % 4.9 %      Eosinophil % 0.7 %      Basophil % 0.2 %      Immature Grans % 0.4 %      Neutrophils, Absolute 11.20 10*3/mm3      Lymphocytes, Absolute 0.92 10*3/mm3      Monocytes, Absolute 0.63 10*3/mm3      Eosinophils, Absolute 0.09 10*3/mm3      Basophils, Absolute 0.02 10*3/mm3      Immature Grans, Absolute 0.05 10*3/mm3      nRBC 0.0 /100 WBC     COVID PRE-OP / PRE-PROCEDURE SCREENING ORDER (NO ISOLATION) - Swab, Nasopharynx [[id number]]  (Normal) Collected: 09/06/21 2215    Specimen: Swab from Nasopharynx Updated: 09/06/21 2330    Narrative:      The following orders were created for panel order COVID PRE-OP / PRE-PROCEDURE SCREENING ORDER (NO ISOLATION) - Swab, Nasopharynx.  Procedure                               Abnormality         Status                     ---------                               -----------         ------                     COVID-19,[facility]...[[id number]]  Normal              Final result                 Please view results for these tests on the individual orders.    COVID-19,[facility] CEPHEID/FINESSE NP SWAB IN TRANSPORT MEDIA 8-12 HR TAT - Swab, Nasopharynx [[id number]]  (Normal) Collected: 09/06/21 2215    Specimen: Swab from Nasopharynx Updated: 09/06/21 2330     COVID19 Not Detected    Narrative:      Fact sheet for providers: https://www.fda.gov/media/212717/download     Fact sheet for patients:  https://www.fda.gov/media/904790/download    Urinalysis With Culture If Indicated - Urine, Clean Catch [[id number]]  (Abnormal) Collected: 09/07/21 0014    Specimen: Urine, Clean Catch Updated: 09/07/21 0045     Color, UA Yellow     Appearance, UA Clear     pH, UA 6.5     Specific Gravity, UA 1.013     Glucose, UA Negative     Ketones, UA Negative     Bilirubin, UA Negative     Blood, UA Small (1+)     Protein, UA >=300 mg/dL (3+)     Leuk Esterase, UA Negative     Nitrite, UA Negative     Urobilinogen, UA 0.2 E.U./dL    Urinalysis, Microscopic Only - Urine, Clean Catch [[id number]] Collected: 09/07/21 0014    Specimen: Urine, Clean Catch Updated: 09/07/21 0103     RBC, UA 0-2 /HPF      WBC, UA None Seen /HPF      Bacteria, UA None Seen /HPF      Squamous Epithelial Cells, UA None Seen /HPF      Hyaline Casts, UA None Seen /LPF      Methodology Manual Light Microscopy    Tissue Pathology Exam [[id number]] Collected: 09/07/21 0156    Specimen: Tissue from Large Intestine, Sigmoid Colon; Tissue from Large Intestine, Appendix Updated: 09/07/21 0706    Body Fluid Culture - Body Fluid, Peritoneum [[id number]] Collected: 09/07/21 0205    Specimen: Body Fluid from Peritoneum Updated: 09/07/21 1415     Body Fluid Culture Abnormal Stain     Gram Stain Many (4+) WBCs per low power field      Moderate (3+) Gram positive cocci      Rare (1+) Gram negative bacilli    Anaerobic Culture - Wound, Abdomen, Left [[id number]] Collected: 09/07/21 0205    Specimen: Wound from Abdomen, Left Updated: 09/07/21 0212    POC Glucose Once [[id number]]  (Normal) Collected: 09/07/21 0755    Specimen: Blood Updated: 09/07/21 0757     Glucose 114 mg/dL      Comment: Meter: [id number] : [id number] Marielena Angeles RN       Basic Metabolic Panel [[id number]]  (Abnormal) Collected: 09/07/21 0835    Specimen: Blood Updated: 09/07/21 0928     Glucose 109 mg/dL      BUN 65 mg/dL      Creatinine 5.16 mg/dL      Sodium 134 mmol/L      Potassium 5.1 mmol/L       Chloride 102 mmol/L      CO2 17.6 mmol/L      Calcium 8.7 mg/dL      eGFR   Amer --     Comment: <15 Indicative of kidney failure.        eGFR Non African Amer 11 mL/min/1.73      Comment: <15 Indicative of kidney failure.        BUN/Creatinine Ratio 12.6     Anion Gap 14.4 mmol/L     Narrative:      GFR Normal >60  Chronic Kidney Disease <60  Kidney Failure <15      CBC & Differential [[id number]]  (Abnormal) Collected: 09/07/21 0835    Specimen: Blood Updated: 09/07/21 1255    Narrative:      The following orders were created for panel order CBC & Differential.  Procedure                               Abnormality         Status                     ---------                               -----------         ------                     CBC Auto Differential[[id number]]        Abnormal            Final result                 Please view results for these tests on the individual orders.    CBC Auto Differential [[id number]]  (Abnormal) Collected: 09/07/21 0835    Specimen: Blood Updated: 09/07/21 1255     WBC 11.83 10*3/mm3      RBC 4.06 10*6/mm3      Hemoglobin 11.8 g/dL      Hematocrit 35.9 %      MCV 88.4 fL      MCH 29.1 pg      MCHC 32.9 g/dL      RDW 12.9 %      RDW-SD 42.1 fl      MPV 9.9 fL      Platelets 168 10*3/mm3     Manual Differential [[id number]]  (Abnormal) Collected: 09/07/21 0835    Specimen: Blood Updated: 09/07/21 1255     Neutrophil % 90.9 %      Lymphocyte % 5.1 %      Monocyte % 4.0 %      Neutrophils Absolute 10.75 10*3/mm3      Lymphocytes Absolute 0.60 10*3/mm3      Monocytes Absolute 0.47 10*3/mm3      Isabel Cells Mod/2+     Ovalocytes Slight/1+     Poikilocytes Large/3+     WBC Morphology Normal     Platelet Morphology Normal    POC Glucose Once [[id number]]  (Normal) Collected: 09/07/21 1029    Specimen: Blood Updated: 09/07/21 1031     Glucose 115 mg/dL      Comment: Meter: [id number] : [id number] Marielena Angeles RN             Imaging Results (Last 24 Hours)     Procedure  Component Value Units Date/Time    CT Abdomen Pelvis Without Contrast [[id number]] Collected: 09/06/21 2307     Updated: 09/06/21 2321    Narrative:      CT OF THE ABDOMEN AND PELVIS WITHOUT CONTRAST     HISTORY: Right lower quadrant abdominal pain     COMPARISON: None available.     TECHNIQUE: Axial CT imaging was obtained through the abdomen and pelvis.  No IV contrast was administered.     FINDINGS:  Images through the lung bases do not demonstrate any acute  abnormalities. The patient is noted to have a large volume of  intraperitoneal free air. The source images suspected to be due to  perforated diverticulitis within the right lower quadrant, as the  patient has sigmoid diverticular disease within this area. There is  extensive inflammatory stranding within the right lower quadrant. This  does involve some loops of small bowel within this area, although this  is favored to be secondary to the inflammation within the colon. The  patient does not have any evidence of bowel obstruction. The appendix is  normal. No focal hepatic lesions are seen. The stomach is unremarkable.  There is a duodenal diverticula arising from the third portion of the  duodenum. Adrenal glands are normal. Gallbladder is grossly  unremarkable. Spleen and pancreas are within normal limits. There is  calcification of the aorta. No hydronephrosis is seen on either side.  There is a simple appearing right renal cyst. No additional follow-up is  necessary. Left kidney does appear mildly atrophic. There is aneurysmal  dilatation of the infrarenal abdominal aorta, measuring 3.0 x 3.0 cm.  Urinary bladder is decompressed. Prostate gland is within normal limits.  No acute osseous abnormalities are seen.       Impression:         1. Large volume of intraperitoneal free air, with suspected source  within the right lower quadrant, where the patient has an inflamed loop  of sigmoid colon. A discrete drainable abscess is not seen. There are  also some  inflamed loops of small bowel within this area, although this  is also be secondary involvement from the sigmoid colon. Urgent general  surgical consultation is recommended.     FINDINGS were called to Dr. Sharp at 11:10 PM.     Radiation dose reduction techniques were utilized, including automated  exposure control and exposure modulation based on body size.     This report was finalized on 9/6/2021 11:17 PM by Dr. Mini Segura M.D.       XR Chest 1 View [[id number]] Collected: 09/06/21 2213     Updated: 09/06/21 2218    Narrative:      SINGLE VIEW OF THE CHEST     HISTORY: Dyspnea     COMPARISON: None available.     FINDINGS:  Heart size is within normal limits. No pneumothorax, pleural effusion,  or acute infiltrate is seen. There is a suggestion of a lucency  underneath the right hemidiaphragm. Clinical significance is uncertain.  It may represent normal bowel gas, but correlation with any symptoms of  abdominal pain is suggested.       Impression:         1. No acute intrathoracic findings.  2. Nonspecific lucency seen underlying the right diaphragm. Correlation  with any symptoms of abdominal pain is suggested.     This report was finalized on 9/6/2021 10:15 PM by Dr. Mini Sgeura M.D.             Results for orders placed in visit on 07/07/06    SCANNED - ECHOCARDIOGRAM      ECG 12 Lead    (Results Pending)        Assessment/Plan     Active Hospital Problems    Diagnosis  POA   • **Diverticulitis of large intestine with perforation and abscess without bleeding [K57.20]  Yes   • Free intraperitoneal air [K66.8]  Yes   • Obesity (BMI 30-39.9) [E66.9]  Yes   • Essential hypertension [I10]  Yes   • CKD (chronic kidney disease) stage 4, GFR 15-29 ml/min (CMS/HCC) [N18.4]  Yes   • Type 2 diabetes mellitus with diabetic chronic kidney disease (CMS/HCC) [E11.22]  Yes      Resolved Hospital Problems   No resolved problems to display.       Mr. Cai is a 71 y.o. former smoker with a history of CKD stage  IV, diabetes mellitus type 2, GERD, hypertension, hyperlipidemia, obesity who presents to [facility] ER chief complaint of abdominal pain, nausea, vomiting, back pain admitted for diverticulitis of large intestine with perforation and abscess without bleeding.    Appreciate input all consultants       Diverticulitis of large intestine with perforation and abscess without bleeding / free intraperitoneal air  General surgery following status post--open sigmoid colectomy, colostomy, appendectomy on 9/7/2021  IV Dilaudid PCA, continuous pulse oximetry  7 days course empiric therapy IV Zosyn  Body fluid cultures pending   COVID-19 not detected       Type 2 diabetes mellitus with diabetic chronic kidney disease (CMS/[facility])  A1c 6.5 June 23, 2021  Glucose readings acceptable, consistent   Low-dose lispro sliding scale       Essential hypertension  BP acceptable, hemodynamically stable  Continue hydralazine 50 mg p.o. 3 times daily, metoprolol 100 mg p.o. nightly with hold parameters       CKD (chronic kidney disease) stage 4, GFR 15-29 ml/min (CMS/[facility])  Serum creatinine appears at baseline  Left upper extremity AV fistula maturing--restricted extremity  Nephrology following recommend continue IV fluids changed to normal saline and monitoring labs     Obesity  Complicating of problems    · I discussed the patient's findings and my recommendations with Dr. Crandall.    VTE Prophylaxis - SCDs.  Code Status - Full code.       DUSTY Ford  [facility]  09/07/21  15:35 EDT

## 2021-09-07 NOTE — ED PROVIDER NOTES
EMERGENCY DEPARTMENT ENCOUNTER    Room Number:  21/21  Date of encounter:  9/7/2021  PCP: Daksha Ng APRN  Historian: Patient    Patient was placed in face mask during triage process. Patient was wearing facemask when I entered the room and throughout our encounter. I wore full protective equipment throughout this patient encounter including a face mask, eye protection, and gloves. Hand hygiene was performed before donning protective equipment and again following doffing of PPE after leaving the room.    HPI:  Chief Complaint: Abdominal discomfort  A complete HPI/ROS/PMH/PSH/SH/FH are unobtainable due to: N/A   Context: Lefty Cai is a 71 y.o. male who presents to the ED c/o right lower quadrant abdominal pain with associated nausea waxing and waning since onset 3 days ago.  Patient relates it to eating pizza after which she became nauseated and vomited.  Has had ongoing abdominal discomfort now more focused in the right lower quadrant since then with another episode of vomiting today.  His appetite has been very poor.  He reports no fevers.  He was constipated but after taking some MiraLAX has had several good bowel movements today that were nonblack nonbloody.  He denies any dysuria or hematuria.  He feels like he has to inhale more than usual to get his breath but has not had any cough or fever.  Moderately severe discomfort at this time.  No clear exacerbating relieving factors identified.      MEDICAL HISTORY REVIEW  Prior surgical history the abdomen is partial nephrectomy left side.    PAST MEDICAL HISTORY  Active Ambulatory Problems     Diagnosis Date Noted   • Type 2 diabetes mellitus with diabetic chronic kidney disease (CMS/[facility]) 06/13/2016   • Essential hypertension 06/13/2016   • Hyperlipidemia 06/13/2016   • Primary insomnia 06/13/2016   • CKD (chronic kidney disease) stage 4, GFR 15-29 ml/min (CMS/[facility]) 06/13/2016   • Vitamin D deficiency 08/14/2017     Resolved Ambulatory Problems      Diagnosis Date Noted   • No Resolved Ambulatory Problems     Past Medical History:   Diagnosis Date   • CKD (chronic kidney disease)    • Diabetes mellitus, type 2 (CMS/HCC)    • GERD (gastroesophageal reflux disease)    • H/O renal cell carcinoma    • Hypertension    • Proteinuria    • Risk factors for obstructive sleep apnea    • Sinus drainage          PAST SURGICAL HISTORY  Past Surgical History:   Procedure Laterality Date   • ARTERIOVENOUS FISTULA/SHUNT SURGERY Left 8/15/2019    Procedure: LEFT MID FOREARM RADIAL CEPHALIC ARTERIAL VENOUS FISTULA;  Surgeon: Louann Miles Jr., MD;  Location: [facility] OR;  Service: Vascular   • EYE SURGERY      CATARACTS   • KNEE ARTHROSCOPY     • NEPHRECTOMY PARTIAL      Dr. Victor         FAMILY HISTORY  Family History   Problem Relation Age of Onset   • Hypertension Mother    • Heart disease Father         ASCVD   • Macular degeneration Father    • Cancer Father         bladder   • Diabetes type I Sister    • Malig Hyperthermia Neg Hx          SOCIAL HISTORY  Social History     Socioeconomic History   • Marital status:      Spouse name: Not on file   • Number of children: Not on file   • Years of education: Not on file   • Highest education level: Not on file   Tobacco Use   • Smoking status: Former Smoker     Packs/day: 2.00     Years: 25.00     Pack years: 50.00     Types: Cigarettes     Quit date:      Years since quittin.6   • Smokeless tobacco: Current User     Types: Snuff   Substance and Sexual Activity   • Alcohol use: No   • Drug use: No   • Sexual activity: Defer         ALLERGIES  Latex and Contrast dye        REVIEW OF SYSTEMS  Review of Systems     All systems reviewed and negative except for those discussed in HPI.       PHYSICAL EXAM    I have reviewed the triage vital signs and nursing notes.    ED Triage Vitals   Temp Heart Rate Resp BP SpO2   21 2135 21 2135 21   97.1 °F  (36.2 °C) 92 14 (!) 174/103 97 %      Temp src Heart Rate Source Patient Position BP Location FiO2 (%)   09/06/21 2135 09/06/21 2135 09/06/21 2147 09/06/21 2147 --   Tympanic Monitor Sitting Right arm          Physical Exam    Physical Exam   Constitutional: No distress.  Uncomfortable appearing though not overtly toxic.  HENT:  Head: Normocephalic and atraumatic.   Oropharynx: Mucous membranes are moist.   Eyes: No scleral icterus. No conjunctival pallor.  Neck: Painless range of motion noted. Neck supple.   Cardiovascular: Normal rate, regular rhythm and intact distal pulses.  Pulmonary/Chest: Mild tachypnea appreciated with diminished breath sounds bilaterally.  No clear respiratory distress however.    Abdominal: Very large habitus.  Soft. There is diffuse discomfort with palpation with more focal tenderness in the right lower quadrant. There is no rebound and no guarding.   Musculoskeletal: Moves all extremities equally.  Patient does have pulsatile mass left forearm consistent with history of AV shunt placement.    Neurological: Alert.  Baseline strength and sensation noted.   Skin: Skin is pink, warm, and dry. No pallor.   Psychiatric: Mood and affect normal.   Nursing note and vitals reviewed.    LAB RESULTS  Recent Results (from the past 24 hour(s))   Comprehensive Metabolic Panel    Collection Time: 09/06/21 10:02 PM    Specimen: Blood   Result Value Ref Range    Glucose 164 (H) 65 - 99 mg/dL    BUN 65 (H) 8 - 23 mg/dL    Creatinine 5.53 (H) 0.76 - 1.27 mg/dL    Sodium 134 (L) 136 - 145 mmol/L    Potassium 4.5 3.5 - 5.2 mmol/L    Chloride 100 98 - 107 mmol/L    CO2 17.8 (L) 22.0 - 29.0 mmol/L    Calcium 9.3 8.6 - 10.5 mg/dL    Total Protein 7.6 6.0 - 8.5 g/dL    Albumin 3.80 3.50 - 5.20 g/dL    ALT (SGPT) 11 1 - 41 U/L    AST (SGOT) 13 1 - 40 U/L    Alkaline Phosphatase 67 39 - 117 U/L    Total Bilirubin 0.3 0.0 - 1.2 mg/dL    eGFR Non African Amer 10 (L) >60 mL/min/1.73    eGFR  African Amer      Globulin  3.8 gm/dL    A/G Ratio 1.0 g/dL    BUN/Creatinine Ratio 11.8 7.0 - 25.0    Anion Gap 16.2 (H) 5.0 - 15.0 mmol/L   Protime-INR    Collection Time: 09/06/21 10:02 PM    Specimen: Blood   Result Value Ref Range    Protime 14.4 (H) 11.7 - 14.2 Seconds    INR 1.14 (H) 0.90 - 1.10   Procalcitonin    Collection Time: 09/06/21 10:02 PM    Specimen: Blood   Result Value Ref Range    Procalcitonin 7.18 (H) 0.00 - 0.25 ng/mL   Lactic Acid, Plasma    Collection Time: 09/06/21 10:02 PM    Specimen: Blood   Result Value Ref Range    Lactate 1.3 0.5 - 2.0 mmol/L   CBC Auto Differential    Collection Time: 09/06/21 10:02 PM    Specimen: Blood   Result Value Ref Range    WBC 12.91 (H) 3.40 - 10.80 10*3/mm3    RBC 4.51 4.14 - 5.80 10*6/mm3    Hemoglobin 13.6 13.0 - 17.7 g/dL    Hematocrit 40.4 37.5 - 51.0 %    MCV 89.6 79.0 - 97.0 fL    MCH 30.2 26.6 - 33.0 pg    MCHC 33.7 31.5 - 35.7 g/dL    RDW 13.8 12.3 - 15.4 %    RDW-SD 46.0 37.0 - 54.0 fl    MPV 9.9 6.0 - 12.0 fL    Platelets 169 140 - 450 10*3/mm3    Neutrophil % 86.7 (H) 42.7 - 76.0 %    Lymphocyte % 7.1 (L) 19.6 - 45.3 %    Monocyte % 4.9 (L) 5.0 - 12.0 %    Eosinophil % 0.7 0.3 - 6.2 %    Basophil % 0.2 0.0 - 1.5 %    Immature Grans % 0.4 0.0 - 0.5 %    Neutrophils, Absolute 11.20 (H) 1.70 - 7.00 10*3/mm3    Lymphocytes, Absolute 0.92 0.70 - 3.10 10*3/mm3    Monocytes, Absolute 0.63 0.10 - 0.90 10*3/mm3    Eosinophils, Absolute 0.09 0.00 - 0.40 10*3/mm3    Basophils, Absolute 0.02 0.00 - 0.20 10*3/mm3    Immature Grans, Absolute 0.05 0.00 - 0.05 10*3/mm3    nRBC 0.0 0.0 - 0.2 /100 WBC   COVID-19,[facility] IN-HOUSE CEPHEID/FINESSE NP SWAB IN TRANSPORT MEDIA 8-12 HR TAT - Swab, Nasopharynx    Collection Time: 09/06/21 10:15 PM    Specimen: Nasopharynx; Swab   Result Value Ref Range    COVID19 Not Detected Not Detected - Ref. Range       Ordered the above labs and independently reviewed the results.        RADIOLOGY  CT Abdomen Pelvis Without Contrast    Result Date: 9/6/2021  CT OF  THE ABDOMEN AND PELVIS WITHOUT CONTRAST  HISTORY: Right lower quadrant abdominal pain  COMPARISON: None available.  TECHNIQUE: Axial CT imaging was obtained through the abdomen and pelvis. No IV contrast was administered.  FINDINGS: Images through the lung bases do not demonstrate any acute abnormalities. The patient is noted to have a large volume of intraperitoneal free air. The source images suspected to be due to perforated diverticulitis within the right lower quadrant, as the patient has sigmoid diverticular disease within this area. There is extensive inflammatory stranding within the right lower quadrant. This does involve some loops of small bowel within this area, although this is favored to be secondary to the inflammation within the colon. The patient does not have any evidence of bowel obstruction. The appendix is normal. No focal hepatic lesions are seen. The stomach is unremarkable. There is a duodenal diverticula arising from the third portion of the duodenum. Adrenal glands are normal. Gallbladder is grossly unremarkable. Spleen and pancreas are within normal limits. There is calcification of the aorta. No hydronephrosis is seen on either side. There is a simple appearing right renal cyst. No additional follow-up is necessary. Left kidney does appear mildly atrophic. There is aneurysmal dilatation of the infrarenal abdominal aorta, measuring 3.0 x 3.0 cm. Urinary bladder is decompressed. Prostate gland is within normal limits. No acute osseous abnormalities are seen.       1. Large volume of intraperitoneal free air, with suspected source within the right lower quadrant, where the patient has an inflamed loop of sigmoid colon. A discrete drainable abscess is not seen. There are also some inflamed loops of small bowel within this area, although this is also be secondary involvement from the sigmoid colon. Urgent general surgical consultation is recommended.  FINDINGS were called to Dr. Sharp at 11:10  PM.  Radiation dose reduction techniques were utilized, including automated exposure control and exposure modulation based on body size.  This report was finalized on 9/6/2021 11:17 PM by Dr. Mini Segura M.D.      XR Chest 1 View    Result Date: 9/6/2021  SINGLE VIEW OF THE CHEST  HISTORY: Dyspnea  COMPARISON: None available.  FINDINGS: Heart size is within normal limits. No pneumothorax, pleural effusion, or acute infiltrate is seen. There is a suggestion of a lucency underneath the right hemidiaphragm. Clinical significance is uncertain. It may represent normal bowel gas, but correlation with any symptoms of abdominal pain is suggested.       1. No acute intrathoracic findings. 2. Nonspecific lucency seen underlying the right diaphragm. Correlation with any symptoms of abdominal pain is suggested.  This report was finalized on 9/6/2021 10:15 PM by Dr. Mini Segura M.D.        I ordered the above noted radiological studies. Reviewed by me and discussed with radiologist.  See dictation for official radiology interpretation.      PROCEDURES    Procedures        MEDICATIONS GIVEN IN ER    Medications   sodium chloride 0.9 % flush 10 mL (has no administration in time range)   piperacillin-tazobactam (ZOSYN) 3.375 g in iso-osmotic dextrose 50 ml (premix) (has no administration in time range)   HYDROmorphone (DILAUDID) injection 0.5 mg (has no administration in time range)   lactated ringers infusion (has no administration in time range)   ondansetron (ZOFRAN) injection 4 mg (has no administration in time range)         PROGRESS, DATA ANALYSIS, CONSULTS, AND MEDICAL DECISION MAKING    My differential diagnosis for abdominal pain includes but is not limited to:  Gastritis, gastroenteritis, peptic ulcer disease, GERD, esophageal perforation, acute appendicitis, mesenteric adenitis, Meckel’s diverticulum, epiploic appendagitis, diverticulitis, colon cancer, ulcerative colitis, Crohn’s disease, intussusception,  small bowel obstruction, adhesions, ischemic bowel, perforated viscus, ileus, obstipation, biliary colic, cholecystitis, cholelithiasis, Gregory-Celestine Hal, hepatitis, pancreatitis, common bile duct obstruction, cholangitis, bile leak, splenic trauma, splenic rupture, splenic infarction, splenic abscess, abdominal abscess, ascites, spontaneous bacterial peritonitis, hernia, UTI, cystitis, prostatitis, ureterolithiasis, urinary obstruction, AAA, myocardial infarction, pneumonia, cancer, porphyria, DKA, medications, sickle cell, viral syndrome, zoster        All labs have been independently reviewed by me.  All radiology studies have been reviewed by me and discussed with radiologist dictating the report.   EKG's independently viewed and interpreted by me.  Discussion below represents my analysis of pertinent findings related to patient's condition, differential diagnosis, treatment plan and final disposition.      ED Course as of Sep 07 0011   Mon Sep 06, 2021   2316 CT abdomen pelvis reviewed with radiologist, Dr. Segura.  Free air in the abdomen suspected from diverticular disease.    [RS]   2317 Potassium: 4.5 [RS]   2317 Sodium(!): 134 [RS]   2317 Creatinine(!): 5.53 [RS]   2317 WBC(!): 12.91 [RS]   2317 Hemoglobin: 13.6 [RS]   2317 INR(!): 1.14 [RS]   2317 Lactate: 1.3 [RS]   2317 Procalcitonin(!): 7.18 [RS]   2326 Patient and spouse updated with critical result.  Antibiotics ordered.  Pain medicine ordered.  Patient agreeable with consultation with surgery.    [RS]   2335 COVID19: Not Detected [RS]   2341 CONSULT        Provider: Dr. Guerra-General surgery    Discussion: Reviewed patient history, ED presentation and evaluation.  She will plan to take the patient to the OR this evening but requests that medicine be primary admitting service.    Agreeable c treatment and planned disposition.            [RS]   2347 CONSULT        Provider: Dr. Ramirez-nephrology    Discussion: Reviewed patient history, ED  presentation and evaluation.  He is available for consultation as needed.    Agreeable c treatment and planned disposition.            [RS]   Tue Sep 07, 2021   0008 CONSULT        Provider: UCHE BryanROC    Discussion: Reviewed patient history, ED presentation and evaluation as well as consultation with general surgery and nephrology.  Agreeable to accept patient for full admission with telemetry postoperatively assuming patient is stable for telemetry.    Agreeable c treatment and planned disposition.            [RS]      ED Course User Index  [RS] Kimani Sharp MD       AS OF 00:11 EDT VITALS:    BP - 166/84  HR - 81  TEMP - 97.1 °F (36.2 °C) (Tympanic)  O2 SATS - 94%        DIAGNOSIS  Final diagnoses:   Perforated abdominal viscus   ESRD (end stage renal disease) (CMS/[facility])   Morbid obesity (CMS/[facility])   Type 2 diabetes mellitus with other specified complication, unspecified whether long term insulin use (CMS/[facility])         DISPOSITION  To OR then Admit to [facility]     Kimani Sharp MD  09/07/21 0011

## 2021-09-07 NOTE — PROGRESS NOTES
"General Surgery  Progress Note    CC: Follow-up perforated diverticultis    POD#0 open Hartmanns    S: He complains of thirst and a moderate amount of postoperative abdominal pain.  He has had no nausea or vomiting.    O:/73 (BP Location: Right arm, Patient Position: Lying)   Pulse 88   Temp 98.8 °F (37.1 °C) (Oral)   Resp 18   Ht 177.8 cm (70\")   Wt 109 kg (240 lb)   SpO2 94%   BMI 34.44 kg/m²     Intake & Output:  UOP: 400 mL/24 hrs  DIONY: Not recorded    GENERAL: alert, well appearing, and in no distress  HEENT: normocephalic, atraumatic, moist mucous membranes, clear sclerae   CHEST: clear to auscultation, no wheezes, rales or rhonchi, symmetric air entry  CARDIAC: regular rate and rhythm    ABDOMEN: Soft, distended/obese, appropriate incisional tenderness, incision covered and dry in midline, right lower quadrant DIONY output serosanguineous, colostomy and left lateral abdomen looks somewhat dusky today and has an empty bag  EXTREMITIES: no cyanosis, clubbing, or edema   SKIN: Warm and moist, no rashes    LABS  Results from last 7 days   Lab Units 09/07/21  0835 09/06/21  2202   WBC 10*3/mm3 11.83* 12.91*   HEMOGLOBIN g/dL 11.8* 13.6   HEMATOCRIT % 35.9* 40.4   PLATELETS 10*3/mm3 168 169   MONOCYTES % % 4.0*  --      Results from last 7 days   Lab Units 09/07/21  0835 09/06/21  2202   SODIUM mmol/L 134* 134*   POTASSIUM mmol/L 5.1 4.5   CHLORIDE mmol/L 102 100   CO2 mmol/L 17.6* 17.8*   BUN mg/dL 65* 65*   CREATININE mg/dL 5.16* 5.53*   CALCIUM mg/dL 8.7 9.3   BILIRUBIN mg/dL  --  0.3   ALK PHOS U/L  --  67   ALT (SGPT) U/L  --  11   AST (SGOT) U/L  --  13   GLUCOSE mg/dL 109* 164*     Results from last 7 days   Lab Units 09/06/21  2202   INR  1.14*         A/P: 71 y.o. male POD#0 open Dafne's for perforated diverticulitis    Continue clear liquids for today.  He is highly likely to develop a postoperative ileus. Monitor for any sign of further ischemia at the level of the colostomy, as this may " warrant performing a colostomy revision in the operating room in the next day or 2.  Nephrology is managing his chronic kidney disease, and I will keep his Reddy catheter for today.  Follow-up results of intraoperative fluid cultures and continue empiric Zosyn for now.    Yelena Guerra MD  General, Robotic, and Endoscopic Surgery  [facility]    [address], [address]  [place] [address]  P: [phone]  F: [phone]

## 2021-09-07 NOTE — ANESTHESIA POSTPROCEDURE EVALUATION
"Patient: Lefty Cai    Procedure Summary     Date: 09/07/21 Room / Location: [facility] OR [address] [place] [address] MAIN OR    Anesthesia Start: 0109 Anesthesia Stop: 0259    Procedure: Open sigmoind colectomy, colostomy, and appendectomy (N/A Abdomen) Diagnosis:     Surgeons: Yelena Guerra MD Provider: Jd Isaac MD    Anesthesia Type: general ASA Status: 3 - Emergent          Anesthesia Type: general    Vitals  Vitals Value Taken Time   /96 09/07/21 0601   Temp 37.1 °C (98.7 °F) 09/07/21 0254   Pulse 92 09/07/21 0657   Resp 16 09/07/21 0600   SpO2 96 % 09/07/21 0657   Vitals shown include unvalidated device data.        Post Anesthesia Care and Evaluation    Patient location during evaluation: PACU  Patient participation: complete - patient participated  Level of consciousness: awake  Pain management: adequate  Airway patency: patent  Anesthetic complications: No anesthetic complications    Cardiovascular status: acceptable  Respiratory status: acceptable  Hydration status: acceptable    Comments: /96 (BP Location: Right arm, Patient Position: Lying)   Pulse 90   Temp 37.1 °C (98.7 °F) (Oral)   Resp 16   Ht 177.8 cm (70\")   SpO2 96%   BMI 35.01 kg/m²         "

## 2021-09-07 NOTE — PLAN OF CARE
Goal Outcome Evaluation:  Plan of Care Reviewed With: patient        Progress: no change  Outcome Summary: Pt admitted directly from PACU, diagnosed with a perforated bowel on admission, emergency surgery performed, pt now on floor with a dilaudid PCA pump in place, also on IV fluids, can have clear liquids, spouse at bedside, will continue to monitor

## 2021-09-07 NOTE — NURSING NOTE
Dr Guerra stated to d/c new wound culture. Called lab to d/c order because unable to d/c from computer. Lab confirmed they will d/c order.

## 2021-09-07 NOTE — CONSULTS
[facility] Consult Note      Patient Name: Lefty Cai  : 1950  MRN: [id number]  Primary Care Physician:  Daksha Ng APRN  Referring Physician: Michael Obando MD  Date of admission: 2021    Subjective     Reason for Consult:  CKD5    HPI:   Lefty Cai is a 71 y.o. male with CKD 5 (baseline SCR 5 mg/dL) followed by Dr. Quang Reyes of our group, admitted to the hospital yesterday for further evaluation of severe abdominal pain for the preceding 3 days.  Imaging revealed large volume of intraperitoneal free air, prompting surgical consultation and culminating in laparotomy very early this morning.  Perforated sigmoid diverticulitis with abscess found; sigmoid colectomy, colostomy, and appendectomy performed.  SCR on arrival 5.5, with value today 5.2.  Full PMH outlined below; pertinent is left arm AV fistula created 1 year ago; partial left nephrectomy in ; DM2; hypertension; and obesity.    · States that pain is fairly well controlled  · No shortness of breath or chest pain  · Is very thirsty, but is not hungry  · Reports stable weight for the preceding few months  · No urinary complaints        Review of Systems:   14 point review of systems is otherwise negative except for mentioned above on HPI    Personal History     Past Medical History:   Diagnosis Date   • CKD (chronic kidney disease)    • Diabetes mellitus, type 2 (CMS/HCC)    • GERD (gastroesophageal reflux disease)    • H/O renal cell carcinoma     partial nephrectomy   • Hyperlipidemia    • Hypertension    • Primary insomnia 2016   • Proteinuria    • Risk factors for obstructive sleep apnea     STOP BANG 6   • Sinus drainage    • Vitamin D deficiency 2017       Past Surgical History:   Procedure Laterality Date   • ARTERIOVENOUS FISTULA/SHUNT SURGERY Left 8/15/2019    Procedure: LEFT MID FOREARM RADIAL CEPHALIC ARTERIAL VENOUS FISTULA;  Surgeon: Louann Miles Jr., MD;   Location: [facility] OR;  Service: Vascular   • EYE SURGERY      CATARACTS   • KNEE ARTHROSCOPY     • NEPHRECTOMY PARTIAL  2007    Dr. Victor       Family History: family history includes Cancer in his father; Diabetes type I in his sister; Heart disease in his father; Hypertension in his mother; Macular degeneration in his father.    Social History:  reports that he quit smoking about 22 years ago. His smoking use included cigarettes. He has a 50.00 pack-year smoking history. His smokeless tobacco use includes snuff. He reports that he does not drink alcohol and does not use drugs.    Home Medications:  Prior to Admission medications    Medication Sig Start Date End Date Taking? Authorizing Provider   aspirin 81 MG tablet Take 81 mg by mouth Every Night.    ProviderRina MD   Cholecalciferol (vitamin D3) 125 MCG (5000 UT) tablet tablet Take 5,000 Units by mouth Daily.    ProviderRina MD   hydrALAZINE (APRESOLINE) 50 MG tablet TAKE 1 TABLET THREE TIMES DAILY 1/7/21   Daksha Ng APRN   metoprolol succinate XL (TOPROL-XL) 100 MG 24 hr tablet TAKE 1 TABLET EVERY NIGHT 1/7/21   Daksha Ng APRN   simvastatin (ZOCOR) 40 MG tablet TAKE 1 TABLET AT BEDTIME 1/7/21   Daksha Ng APRN   sodium bicarbonate 650 MG tablet Take 5 tablets by mouth Daily. 8/12/17   ProviderRina MD   traZODone (DESYREL) 50 MG tablet TAKE 1 TABLET AT NIGHT AS NEEDED FOR SLEEP 1/7/21   Daksha Ng, DUSTY       Allergies:  Allergies   Allergen Reactions   • Latex Other (See Comments)     Blisters   • Contrast Dye Other (See Comments)     KIDNEY DISEASE       Objective     Vitals:   Temp:  [97.1 °F (36.2 °C)-98.7 °F (37.1 °C)] 98.7 °F (37.1 °C)  Heart Rate:  [80-96] 88  Resp:  [14-18] 16  BP: (125-207)/() 135/74  Flow (L/min):  [2] 2    Intake/Output Summary (Last 24 hours) at 9/7/2021 1034  Last data filed at 9/7/2021 0500  Gross per 24 hour   Intake 697 ml   Output 200 ml   Net 497 ml       Physical  Exam:   Constitutional: Awake, alert, NAD, overweight  HEENT: Sclera anicteric, dry MM, AT/NC  Neck: Supple, trachea at midline, no JVD  Resp: Diminished air movement either flank; no wheezes, nonlabored on 2 L/min  Cardiovascular: RRR, no rub  GI: BS absent, firm, tender, +distended, colostomy LLQ, midline incision C/D/I  : No palpable bladder  Musculoskeletal: Trace edema  Vascular: Left arm AV fistula patent  Psychiatric: Appropriate affect, cooperative, oriented fully  Neurologic: No asterixis; moving all extremities, normal speech and mental status  Skin: Warm and dry       Scheduled Meds:     atorvastatin, 20 mg, Oral, Nightly  hydrALAZINE, 50 mg, Oral, TID  insulin lispro, 0-7 Units, Subcutaneous, TID AC  metoprolol succinate XL, 100 mg, Oral, Nightly  piperacillin-tazobactam, 3.375 g, Intravenous, Q12H  sodium bicarbonate, 3,250 mg, Oral, Daily  sodium chloride, 10 mL, Intravenous, Q12H      IV Meds:   HYDROmorphone HCl-NaCl,   lactated ringers, 100 mL/hr, Last Rate: 100 mL/hr (09/07/21 0312)        Results Reviewed:   I have personally reviewed the results from the time of this admission to 9/7/2021 10:34 EDT     Lab Results   Component Value Date    GLUCOSE 109 (H) 09/07/2021    CALCIUM 8.7 09/07/2021     (L) 09/07/2021    K 5.1 09/07/2021    CO2 17.6 (L) 09/07/2021     09/07/2021    BUN 65 (H) 09/07/2021    CREATININE 5.16 (H) 09/07/2021    EGFRIFAFRI  09/07/2021      Comment:      <15 Indicative of kidney failure.    EGFRIFNONA 11 (L) 09/07/2021    BCR 12.6 09/07/2021    ANIONGAP 14.4 09/07/2021      Lab Results   Component Value Date    ALBUMIN 3.80 09/06/2021           Assessment / Plan     ASSESSMENT:  1.  CKD5 with surprisingly stable function thus far.  Volume status fine; mild hyperkalemia on LR; normal anion gap  2.  Perforated sigmoid diverticulitis with abscess, s/p sigmoid colectomy and colostomy creation 9/7  3.  Hypertension, controlled  4.  DM2  5.  Anemia    PLAN:  1.   Continue IVF but change to normal saline  2.  Place hold orders on hydralazine to avoid hypotension   3.  Stop oral bicarb for now until GI fxn returns  4.  Surveillance labs    Thank you for involving us in the care of Lefty Cai.  Please feel free to call with any questions.    Jose Mccall MD  09/07/21  10:34 EDT    [facility]  [phone]      Much of this encounter note is an electronic transcription/translation of spoken language to printed text. The electronic translation of spoken language may permit erroneous, or at times, nonsensical words or phrases to be inadvertently transcribed; Although I have reviewed the note for such errors, some may still exist.

## 2021-09-08 LAB
ALBUMIN SERPL-MCNC: 3 G/DL (ref 3.5–5.2)
ANION GAP SERPL CALCULATED.3IONS-SCNC: 14.5 MMOL/L (ref 5–15)
BUN SERPL-MCNC: 62 MG/DL (ref 8–23)
BUN/CREAT SERPL: 12.2 (ref 7–25)
BURR CELLS BLD QL SMEAR: ABNORMAL
CALCIUM SPEC-SCNC: 8.7 MG/DL (ref 8.6–10.5)
CHLORIDE SERPL-SCNC: 100 MMOL/L (ref 98–107)
CO2 SERPL-SCNC: 15.5 MMOL/L (ref 22–29)
CREAT SERPL-MCNC: 5.09 MG/DL (ref 0.76–1.27)
CYTO UR: NORMAL
DEPRECATED RDW RBC AUTO: 42.7 FL (ref 37–54)
ERYTHROCYTE [DISTWIDTH] IN BLOOD BY AUTOMATED COUNT: 12.8 % (ref 12.3–15.4)
GFR SERPL CREATININE-BSD FRML MDRD: 11 ML/MIN/1.73
GFR SERPL CREATININE-BSD FRML MDRD: ABNORMAL ML/MIN/{1.73_M2}
GLUCOSE BLDC GLUCOMTR-MCNC: 118 MG/DL (ref 70–130)
GLUCOSE BLDC GLUCOMTR-MCNC: 125 MG/DL (ref 70–130)
GLUCOSE BLDC GLUCOMTR-MCNC: 143 MG/DL (ref 70–130)
GLUCOSE BLDC GLUCOMTR-MCNC: 144 MG/DL (ref 70–130)
GLUCOSE SERPL-MCNC: 143 MG/DL (ref 65–99)
HCT VFR BLD AUTO: 36.2 % (ref 37.5–51)
HGB BLD-MCNC: 11.7 G/DL (ref 13–17.7)
LAB AP CASE REPORT: NORMAL
LYMPHOCYTES # BLD MANUAL: 0.5 10*3/MM3 (ref 0.7–3.1)
LYMPHOCYTES NFR BLD MANUAL: 5 % (ref 19.6–45.3)
LYMPHOCYTES NFR BLD MANUAL: 5 % (ref 5–12)
MAGNESIUM SERPL-MCNC: 2.2 MG/DL (ref 1.6–2.4)
MCH RBC QN AUTO: 29.5 PG (ref 26.6–33)
MCHC RBC AUTO-ENTMCNC: 32.3 G/DL (ref 31.5–35.7)
MCV RBC AUTO: 91.2 FL (ref 79–97)
MONOCYTES # BLD AUTO: 0.5 10*3/MM3 (ref 0.1–0.9)
NEUTROPHILS # BLD AUTO: 9 10*3/MM3 (ref 1.7–7)
NEUTROPHILS NFR BLD MANUAL: 90 % (ref 42.7–76)
PATH REPORT.FINAL DX SPEC: NORMAL
PATH REPORT.GROSS SPEC: NORMAL
PHOSPHATE SERPL-MCNC: 4.4 MG/DL (ref 2.5–4.5)
PLAT MORPH BLD: NORMAL
PLATELET # BLD AUTO: 173 10*3/MM3 (ref 140–450)
PMV BLD AUTO: 9.8 FL (ref 6–12)
POIKILOCYTOSIS BLD QL SMEAR: ABNORMAL
POTASSIUM SERPL-SCNC: 4.9 MMOL/L (ref 3.5–5.2)
RBC # BLD AUTO: 3.97 10*6/MM3 (ref 4.14–5.8)
SODIUM SERPL-SCNC: 130 MMOL/L (ref 136–145)
URATE SERPL-MCNC: 7.7 MG/DL (ref 3.4–7)
WBC # BLD AUTO: 10 10*3/MM3 (ref 3.4–10.8)
WBC MORPH BLD: NORMAL

## 2021-09-08 PROCEDURE — G0008 ADMIN INFLUENZA VIRUS VAC: HCPCS | Performed by: INTERNAL MEDICINE

## 2021-09-08 PROCEDURE — 85007 BL SMEAR W/DIFF WBC COUNT: CPT | Performed by: SURGERY

## 2021-09-08 PROCEDURE — 25010000002 INFLUENZA VAC SPLIT QUAD 0.5 ML SUSPENSION PREFILLED SYRINGE: Performed by: INTERNAL MEDICINE

## 2021-09-08 PROCEDURE — 25010000002 PIPERACILLIN SOD-TAZOBACTAM PER 1 G: Performed by: SURGERY

## 2021-09-08 PROCEDURE — 99024 POSTOP FOLLOW-UP VISIT: CPT | Performed by: SURGERY

## 2021-09-08 PROCEDURE — 83735 ASSAY OF MAGNESIUM: CPT | Performed by: INTERNAL MEDICINE

## 2021-09-08 PROCEDURE — 85025 COMPLETE CBC W/AUTO DIFF WBC: CPT | Performed by: SURGERY

## 2021-09-08 PROCEDURE — 25010000003 HYDROMORPHONE HCL PF 50 MG/5ML SOLUTION: Performed by: SURGERY

## 2021-09-08 PROCEDURE — 90686 IIV4 VACC NO PRSV 0.5 ML IM: CPT | Performed by: INTERNAL MEDICINE

## 2021-09-08 PROCEDURE — 80069 RENAL FUNCTION PANEL: CPT | Performed by: INTERNAL MEDICINE

## 2021-09-08 PROCEDURE — 82962 GLUCOSE BLOOD TEST: CPT

## 2021-09-08 PROCEDURE — 84550 ASSAY OF BLOOD/URIC ACID: CPT | Performed by: INTERNAL MEDICINE

## 2021-09-08 RX ORDER — HYDROMORPHONE HYDROCHLORIDE 1 MG/ML
0.5 INJECTION, SOLUTION INTRAMUSCULAR; INTRAVENOUS; SUBCUTANEOUS
Status: DISCONTINUED | OUTPATIENT
Start: 2021-09-08 | End: 2021-09-10

## 2021-09-08 RX ORDER — OXYCODONE AND ACETAMINOPHEN 7.5; 325 MG/1; MG/1
1 TABLET ORAL EVERY 4 HOURS PRN
Status: DISCONTINUED | OUTPATIENT
Start: 2021-09-08 | End: 2021-09-12 | Stop reason: HOSPADM

## 2021-09-08 RX ORDER — ACETAMINOPHEN 325 MG/1
650 TABLET ORAL EVERY 4 HOURS PRN
Status: DISCONTINUED | OUTPATIENT
Start: 2021-09-08 | End: 2021-09-12 | Stop reason: HOSPADM

## 2021-09-08 RX ORDER — CETIRIZINE HYDROCHLORIDE 10 MG/1
5 TABLET ORAL DAILY
Status: DISCONTINUED | OUTPATIENT
Start: 2021-09-08 | End: 2021-09-12 | Stop reason: HOSPADM

## 2021-09-08 RX ORDER — UREA 10 %
1 LOTION (ML) TOPICAL NIGHTLY PRN
Status: DISCONTINUED | OUTPATIENT
Start: 2021-09-08 | End: 2021-09-12 | Stop reason: HOSPADM

## 2021-09-08 RX ADMIN — HYDRALAZINE HYDROCHLORIDE 50 MG: 50 TABLET, FILM COATED ORAL at 19:51

## 2021-09-08 RX ADMIN — HYDRALAZINE HYDROCHLORIDE 50 MG: 50 TABLET, FILM COATED ORAL at 08:38

## 2021-09-08 RX ADMIN — INFLUENZA VIRUS VACCINE 0.5 ML: 15; 15; 15; 15 SUSPENSION INTRAMUSCULAR at 19:50

## 2021-09-08 RX ADMIN — SODIUM BICARBONATE 150 MEQ: 84 INJECTION, SOLUTION INTRAVENOUS at 13:33

## 2021-09-08 RX ADMIN — SODIUM CHLORIDE 125 ML/HR: 9 INJECTION, SOLUTION INTRAVENOUS at 11:48

## 2021-09-08 RX ADMIN — CETIRIZINE HYDROCHLORIDE 5 MG: 10 TABLET ORAL at 19:49

## 2021-09-08 RX ADMIN — TAZOBACTAM SODIUM AND PIPERACILLIN SODIUM 3.38 G: 375; 3 INJECTION, SOLUTION INTRAVENOUS at 23:05

## 2021-09-08 RX ADMIN — METOPROLOL SUCCINATE 100 MG: 100 TABLET, EXTENDED RELEASE ORAL at 19:51

## 2021-09-08 RX ADMIN — ATORVASTATIN CALCIUM 20 MG: 20 TABLET, FILM COATED ORAL at 19:51

## 2021-09-08 RX ADMIN — TAZOBACTAM SODIUM AND PIPERACILLIN SODIUM 3.38 G: 375; 3 INJECTION, SOLUTION INTRAVENOUS at 11:56

## 2021-09-08 RX ADMIN — HYDRALAZINE HYDROCHLORIDE 50 MG: 50 TABLET, FILM COATED ORAL at 15:58

## 2021-09-08 RX ADMIN — HYDROMORPHONE HYDROCHLORIDE: 10 INJECTION, SOLUTION INTRAMUSCULAR; INTRAVENOUS; SUBCUTANEOUS at 01:05

## 2021-09-08 NOTE — NURSING NOTE
"   09/08/21 1110   Colostomy LUQ   Placement Date/Time: 09/07/21 0214   Inserted by: estelle  Location: LUQ   Stomal Appliance 2 piece;Changed;Drainable   Stoma Appearance round;rosebud appearance;moist;pink   Peristomal Assessment Clean;Intact   Accessories/Skin Care cleansed with water;skin barrier ring;skin sealant;wafer barrier over peristomal skin   Stoma Function serosanguineous     CWON note: pt seen post-op day 1 for colostomy assessment and teaching. Pt's daughter and granddaughter at bedside. Daughter was a CNA and was able to watch part of the appliance change before becoming hot and dizzy and she had to sit down. Pt is unable to visualize his stoma, so pt will need his wife to assist him; however, she was not available today. Colostomy is 1 3/8\" pink, viable, budded, slight dimpling of skin at 9 o'clock. Peristomal skin is  clear and intact. Instructed in appliance change using 2-piece Laila 2 3/4\" barrier and pouch with barrier ring and barrier spray. Instructed in surgical procedure, A&P of GI systems, frequency of pouch change, emptying and use of lock and roll closure, skin care/ showering, diet, supplies, and return to ADL's. Teaching packing and supplies left at bedside. Reviewed handouts in the teaching packet. All questions answered. Plan to do teaching with pt daily throughout the week.   "

## 2021-09-08 NOTE — PROGRESS NOTES
[facility] Progress Note      Patient Name: Lefty Cai  : 1950  MRN: [id number]  Primary Care Physician:  Daksha Ng APRN  Date of admission: 2021    Subjective     Interval History:   Is thirsty and a bit hungry  No shortness of breath on 2 L/min  Abdominal pain is controlled; no output from colostomy yet  Has passed no gas yet  Wants to get out of bed    Review of Systems:   As noted above    Objective     Vitals:   Temp:  [97.9 °F (36.6 °C)-98.8 °F (37.1 °C)] 97.9 °F (36.6 °C)  Heart Rate:  [82-93] 82  Resp:  [18] 18  BP: (133-182)/(71-97) 163/82  Flow (L/min):  [2] 2    Intake/Output Summary (Last 24 hours) at 2021 1146  Last data filed at 2021 0502  Gross per 24 hour   Intake 1448 ml   Output 1730 ml   Net -282 ml       Physical Exam:    Constitutional: Awake, alert, NAD, overweight  HEENT: Sclera anicteric, dry MM, AT/NC  Neck: Supple, trachea at midline, no JVD  Resp: Diminished air movement either flank; no wheezes, nonlabored on 2 L/min  Cardiovascular: RRR, no rub  GI: BS hypoactive, tender, +distended, colostomy LLQ, midline incision C/D/I  : No palpable bladder; Reddy catheter anchored  Musculoskeletal: Trace edema  Vascular: Left arm AV fistula patent  Psychiatric: Appropriate affect, cooperative, oriented fully  Neurologic: No asterixis; moving all extremities, normal speech and mental status  Skin: Warm and dry    Scheduled Meds:     atorvastatin, 20 mg, Oral, Nightly  hydrALAZINE, 50 mg, Oral, TID  influenza vaccine, 0.5 mL, Intramuscular, Once  insulin lispro, 0-7 Units, Subcutaneous, TID AC  metoprolol succinate XL, 100 mg, Oral, Nightly  piperacillin-tazobactam, 3.375 g, Intravenous, Q12H  sodium chloride, 10 mL, Intravenous, Q12H      IV Meds:   HYDROmorphone HCl-NaCl,   sodium chloride, 125 mL/hr, Last Rate: 125 mL/hr (21 2344)        Results Reviewed:   I have personally reviewed the results from the time of this admission  to 9/8/2021 11:46 EDT     Results from last 7 days   Lab Units 09/08/21  0700 09/07/21  0835 09/06/21  2202   SODIUM mmol/L 130* 134* 134*   POTASSIUM mmol/L 4.9 5.1 4.5   CHLORIDE mmol/L 100 102 100   CO2 mmol/L 15.5* 17.6* 17.8*   BUN mg/dL 62* 65* 65*   CREATININE mg/dL 5.09* 5.16* 5.53*   CALCIUM mg/dL 8.7 8.7 9.3   BILIRUBIN mg/dL  --   --  0.3   ALK PHOS U/L  --   --  67   ALT (SGPT) U/L  --   --  11   AST (SGOT) U/L  --   --  13   GLUCOSE mg/dL 143* 109* 164*       Estimated Creatinine Clearance: 16.2 mL/min (A) (by C-G formula based on SCr of 5.09 mg/dL (H)).    Results from last 7 days   Lab Units 09/08/21  0700   MAGNESIUM mg/dL 2.2   PHOSPHORUS mg/dL 4.4       Results from last 7 days   Lab Units 09/08/21  0700   URIC ACID mg/dL 7.7*       Results from last 7 days   Lab Units 09/08/21  0700 09/07/21  0835 09/06/21  2202   WBC 10*3/mm3 10.00 11.83* 12.91*   HEMOGLOBIN g/dL 11.7* 11.8* 13.6   PLATELETS 10*3/mm3 173 168 169       Results from last 7 days   Lab Units 09/06/21  2202   INR  1.14*       Assessment / Plan     ASSESSMENT:  1.  CKD5 with surprisingly stable function thus far.  Volume status fine; normal potassium; following serum bicarbonate but stable anion gap.  Hyponatremia, likely in part from enhanced ADH secretion post-op  2.  Perforated sigmoid diverticulitis with abscess, s/p sigmoid colectomy and colostomy creation 9/7  3.  Hypertension, acceptable  4.  DM2  5.  Anemia    PLAN:  1.  Will give 1 L of alkaline IVF  2.  Surveillance labs    Thank you for involving us in the care of Lefty Cai.  Please feel free to call with any questions.    Jose Mccall MD  09/08/21  11:46 EDT    [facility]  [phone]      Much of this encounter note is an electronic transcription/translation of spoken language to printed text. The electronic translation of spoken language may permit erroneous, or at times, nonsensical words or phrases to be inadvertently  transcribed; Although I have reviewed the note for such errors, some may still exist.

## 2021-09-08 NOTE — DISCHARGE PLACEMENT REQUEST
"Lefty Cai (71 y.o. Male)     Date of Birth Social Security Number Address Home Phone MRN    1950  [address] [place] [id number] [phone] [id number]    Muslim Marital Status          Unknown        Admission Date Admission Type Admitting Provider Attending Provider Department, Room/Bed    9/6/21 Emergency Papo Crandall MD Broaddus, Emmett J., MD [address], [address]/1    Discharge Date Discharge Disposition Discharge Destination                       Attending Provider: Baldev Becker MD    Allergies: Latex, Contrast Dye    Isolation: None   Infection: None   Code Status: CPR    Ht: 177.8 cm (70\")   Wt: 109 kg (240 lb)    Admission Cmt: None   Principal Problem: Diverticulitis of large intestine with perforation and abscess without bleeding [K57.20]                 Active Insurance as of 9/6/2021     Primary Coverage     Payor Plan Insurance Group Employer/Plan Group    HUMANA MEDICARE REPLACEMENT HUMANA MEDICARE REPLACEMENT [id number]     Payor Plan Address Payor Plan Phone Number Payor Plan Fax Number Effective Dates    [address] [address] [phone]  1/1/2020 - None Entered    [facility] [id number]       Subscriber Name Subscriber Birth Date Member ID       LEFTY CAI 1950 [id number]                 Emergency Contacts      (Rel.) Home Phone Work Phone Mobile Phone    Caterina Cai (Spouse) [phone] -- [phone]              "

## 2021-09-08 NOTE — PROGRESS NOTES
Name: Lefty Cai ADMIT: 2021   : 1950  PCP: Daksha Ng ROMERO, DUSTY    MRN: [id number] LOS: 1 days   AGE/SEX: 71 y.o. male  ROOM: [facility]     Subjective   Subjective   Patient appears obese, generally weak, relatively comfortable, no apparent distress.  Family at bedside.  Still with lower abdominal pain; however, improved since prior to surgery.  Reports visual hallucinations and insomnia during hospital admission thus far.  Encourage decrease stimulus and sleep when possible to avoid delirium.  Discussed with RN.    Review of Systems   Constitutional: Negative for chills and fever.   Respiratory: Negative for cough and shortness of breath.    Cardiovascular: Negative for chest pain and leg swelling.   Gastrointestinal: Positive for abdominal pain. Negative for constipation, diarrhea, nausea and vomiting.   Endocrine: Negative for polydipsia, polyphagia and polyuria.   Genitourinary: Negative for difficulty urinating and dysuria.   Musculoskeletal: Positive for back pain. Negative for arthralgias.   Neurological: Positive for weakness (generalized). Negative for dizziness, tremors, seizures, syncope, facial asymmetry, speech difficulty, light-headedness, numbness and headaches.   Psychiatric/Behavioral: Positive for hallucinations (visual). Negative for confusion.        Objective   Objective   Vital Signs  Temp:  [97.6 °F (36.4 °C)-98.3 °F (36.8 °C)] 97.6 °F (36.4 °C)  Heart Rate:  [78-93] 80  Resp:  [18-20] 20  BP: (157-182)/(82-97) 157/86  SpO2:  [93 %-95 %] 93 %  on   ;   Device (Oxygen Therapy): room air  Body mass index is 34.44 kg/m².     Physical Exam   Constitutional:       General: He is not in acute distress.     Appearance: He is obese. He is ill-appearing. He is not toxic-appearing.   HENT:      Head: Normocephalic and atraumatic.   Eyes:      Extraocular Movements: Extraocular movements intact.      Conjunctiva/sclera: Conjunctivae normal.   Cardiovascular:      Rate and Rhythm:  Normal rate.      Heart sounds: Normal heart sounds.   Pulmonary:      Effort: Pulmonary effort is normal.      Breath sounds: Normal breath sounds.   Abdominal:      General: There is distension.      Palpations: Abdomen is soft.      Tenderness: There is abdominal tenderness. There is no guarding.      Comments: Ostomy, drain, midline ventral incision   Musculoskeletal:         General: No tenderness.      Cervical back: Normal range of motion and neck supple.      Right lower leg: No edema.      Left lower leg: No edema.   Skin:     General: Skin is warm and dry.   Neurological:      Mental Status: He is oriented to person, place, and time.      Cranial Nerves: No cranial nerve deficit.      Motor: Weakness (generalized) present.   Psychiatric:         Behavior: Behavior normal.         Thought Content: Thought content normal.     Results Review     I reviewed the patient's new clinical results.  Results from last 7 days   Lab Units 09/08/21  0700 09/07/21 0835 09/06/21  2202   WBC 10*3/mm3 10.00 11.83* 12.91*   HEMOGLOBIN g/dL 11.7* 11.8* 13.6   PLATELETS 10*3/mm3 173 168 169     Results from last 7 days   Lab Units 09/08/21  0700 09/07/21  0835 09/06/21  2202   SODIUM mmol/L 130* 134* 134*   POTASSIUM mmol/L 4.9 5.1 4.5   CHLORIDE mmol/L 100 102 100   CO2 mmol/L 15.5* 17.6* 17.8*   BUN mg/dL 62* 65* 65*   CREATININE mg/dL 5.09* 5.16* 5.53*   GLUCOSE mg/dL 143* 109* 164*   Estimated Creatinine Clearance: 16.2 mL/min (A) (by C-G formula based on SCr of 5.09 mg/dL (H)).  Results from last 7 days   Lab Units 09/08/21  0700 09/06/21  2202   ALBUMIN g/dL 3.00* 3.80   BILIRUBIN mg/dL  --  0.3   ALK PHOS U/L  --  67   AST (SGOT) U/L  --  13   ALT (SGPT) U/L  --  11     Results from last 7 days   Lab Units 09/08/21  0700 09/07/21  0835 09/06/21  2202   CALCIUM mg/dL 8.7 8.7 9.3   ALBUMIN g/dL 3.00*  --  3.80   MAGNESIUM mg/dL 2.2  --   --    PHOSPHORUS mg/dL 4.4  --   --      Results from last 7 days   Lab Units  09/06/21  2202   PROCALCITONIN ng/mL 7.18*   LACTATE mmol/L 1.3     COVID19   Date Value Ref Range Status   09/06/2021 Not Detected Not Detected - Ref. Range Final     Glucose   Date/Time Value Ref Range Status   09/08/2021 1209 118 70 - 130 mg/dL Final     Comment:     Meter: [id number] : [id number] Ramez Hager NA   09/08/2021 0615 144 (H) 70 - 130 mg/dL Final     Comment:     Meter: [id number] : [id number] Lucius Lee NA   09/07/2021 2007 146 (H) 70 - 130 mg/dL Final     Comment:     Meter: [id number] : [id number] Lucius Rosa NA   09/07/2021 1603 131 (H) 70 - 130 mg/dL Final     Comment:     Meter: [id number] : [id number] Bert Jacobson NA   09/07/2021 1029 115 70 - 130 mg/dL Final     Comment:     Meter: [id number] : [id number] Marielena Angeles RN   09/07/2021 0755 114 70 - 130 mg/dL Final     Comment:     Meter: [id number] : [id number] Marielena Angeles RN       CT Abdomen Pelvis Without Contrast  Narrative: CT OF THE ABDOMEN AND PELVIS WITHOUT CONTRAST     HISTORY: Right lower quadrant abdominal pain     COMPARISON: None available.     TECHNIQUE: Axial CT imaging was obtained through the abdomen and pelvis.  No IV contrast was administered.     FINDINGS:  Images through the lung bases do not demonstrate any acute  abnormalities. The patient is noted to have a large volume of  intraperitoneal free air. The source images suspected to be due to  perforated diverticulitis within the right lower quadrant, as the  patient has sigmoid diverticular disease within this area. There is  extensive inflammatory stranding within the right lower quadrant. This  does involve some loops of small bowel within this area, although this  is favored to be secondary to the inflammation within the colon. The  patient does not have any evidence of bowel obstruction. The appendix is  normal. No focal hepatic lesions are seen. The stomach is unremarkable.  There is a duodenal diverticula arising from the third  portion of the  duodenum. Adrenal glands are normal. Gallbladder is grossly  unremarkable. Spleen and pancreas are within normal limits. There is  calcification of the aorta. No hydronephrosis is seen on either side.  There is a simple appearing right renal cyst. No additional follow-up is  necessary. Left kidney does appear mildly atrophic. There is aneurysmal  dilatation of the infrarenal abdominal aorta, measuring 3.0 x 3.0 cm.  Urinary bladder is decompressed. Prostate gland is within normal limits.  No acute osseous abnormalities are seen.     Impression:    1. Large volume of intraperitoneal free air, with suspected source  within the right lower quadrant, where the patient has an inflamed loop  of sigmoid colon. A discrete drainable abscess is not seen. There are  also some inflamed loops of small bowel within this area, although this  is also be secondary involvement from the sigmoid colon. Urgent general  surgical consultation is recommended.     FINDINGS were called to Dr. Sharp at 11:10 PM.     Radiation dose reduction techniques were utilized, including automated  exposure control and exposure modulation based on body size.     This report was finalized on 9/6/2021 11:17 PM by Dr. Mini Segura M.D.     XR Chest 1 View  Narrative: SINGLE VIEW OF THE CHEST     HISTORY: Dyspnea     COMPARISON: None available.     FINDINGS:  Heart size is within normal limits. No pneumothorax, pleural effusion,  or acute infiltrate is seen. There is a suggestion of a lucency  underneath the right hemidiaphragm. Clinical significance is uncertain.  It may represent normal bowel gas, but correlation with any symptoms of  abdominal pain is suggested.     Impression:    1. No acute intrathoracic findings.  2. Nonspecific lucency seen underlying the right diaphragm. Correlation  with any symptoms of abdominal pain is suggested.     This report was finalized on 9/6/2021 10:15 PM by Dr. Mini Segura M.D.       Scheduled  Medications  atorvastatin, 20 mg, Oral, Nightly  hydrALAZINE, 50 mg, Oral, TID  influenza vaccine, 0.5 mL, Intramuscular, Once  insulin lispro, 0-7 Units, Subcutaneous, TID AC  metoprolol succinate XL, 100 mg, Oral, Nightly  piperacillin-tazobactam, 3.375 g, Intravenous, Q12H  sodium chloride, 10 mL, Intravenous, Q12H    Infusions  sodium bicarbonate, 150 mEq, Last Rate: 150 mEq (09/08/21 1333)    Diet  Diet Full Liquid       Assessment/Plan     Active Hospital Problems    Diagnosis  POA   • **Diverticulitis of large intestine with perforation and abscess without bleeding [K57.20]  Yes   • Free intraperitoneal air [K66.8]  Yes   • Obesity (BMI 30-39.9) [E66.9]  Yes   • Essential hypertension [I10]  Yes   • CKD (chronic kidney disease) stage 4, GFR 15-29 ml/min (CMS/[facility]) [N18.4]  Yes   • Type 2 diabetes mellitus with diabetic chronic kidney disease (CMS/[facility]) [E11.22]  Yes      Resolved Hospital Problems   No resolved problems to display.       71 y.o. male admitted with Diverticulitis of large intestine with perforation and abscess without bleeding.      Diverticulitis of large intestine with perforation and abscess without bleeding / free intraperitoneal air  General surgery following status post--open sigmoid colectomy, colostomy, appendectomy on 9/7/2021  DC IV Dilaudid PCA & transition to PO, continuous pulse oximetry  7 days course empiric therapy IV Zosyn  Body fluid cultures gram negative bacilli, anaerobic pending  COVID-19 not detected  Diet full per GS  Encourage sleep hygiene to avoid delirium--discussed with patient & RN  Ordering melatonin nightly PRN insomnia       Type 2 diabetes mellitus with diabetic chronic kidney disease (CMS/[facility])  A1c 6.5 June 23, 2021  Glucose readings acceptable, consistent   Low-dose lispro sliding scale       Essential hypertension  BP acceptable, hemodynamically stable  Continue hydralazine 50 mg p.o. 3 times daily, metoprolol 100 mg p.o. nightly with hold  parameters       CKD (chronic kidney disease) stage 4, GFR 15-29 ml/min (CMS/HCC)  Serum creatinine appears at baseline  Left upper extremity AV fistula matured--restricted extremity  Nephrology following & managing IVF, surveillance labs  JOSE RAMON Reddy if ok with nephrology     Obesity  Complicating of problems    · SCD for DVT prophylaxis.  · CPR  · Discussed with Dr. Becker.  · Anticipate discharge pending clinical course / plan home at DC      DUSTY Ford  [facility]  09/08/21  16:16 EDT

## 2021-09-08 NOTE — PROGRESS NOTES
"General Surgery  Progress Note    CC: Follow-up perforated diverticultis    POD#1 open Hartmanns    S: He just passed a small amount of flatus from the colostomy while I was examining him but has had no further output. He denies any nausea or vomiting. He is tolerating clear liquids. Pain is very well-controlled.    O:/82 (BP Location: Right arm, Patient Position: Lying)   Pulse 82   Temp 97.9 °F (36.6 °C) (Oral)   Resp 18   Ht 177.8 cm (70\")   Wt 109 kg (240 lb)   SpO2 93%   BMI 34.44 kg/m²     Intake & Output:  UOP: 1700 mL/24 hrs  DIONY: 30 ml/24 hrs, serosanguinous    GENERAL: alert, well appearing, and in no distress  HEENT: normocephalic, atraumatic, moist mucous membranes, clear sclerae   CHEST: clear to auscultation, no wheezes, rales or rhonchi, symmetric air entry  CARDIAC: regular rate and rhythm    ABDOMEN: Soft, distended/obese, appropriate incisional tenderness, incision covered and dry in midline, right lower quadrant DIONY output serosanguineous, colostomy in left lateral abdomen beefy red today  EXTREMITIES: no cyanosis, clubbing, or edema   SKIN: Warm and moist, no rashes    LABS  Results from last 7 days   Lab Units 09/08/21  0700 09/07/21  0835 09/06/21  2202   WBC 10*3/mm3 10.00 11.83* 12.91*   HEMOGLOBIN g/dL 11.7* 11.8* 13.6   HEMATOCRIT % 36.2* 35.9* 40.4   PLATELETS 10*3/mm3 173 168 169   MONOCYTES % % 5.0 4.0*  --      Results from last 7 days   Lab Units 09/08/21  0700 09/07/21  0835 09/06/21  2202   SODIUM mmol/L 130* 134* 134*   POTASSIUM mmol/L 4.9 5.1 4.5   CHLORIDE mmol/L 100 102 100   CO2 mmol/L 15.5* 17.6* 17.8*   BUN mg/dL 62* 65* 65*   CREATININE mg/dL 5.09* 5.16* 5.53*   CALCIUM mg/dL 8.7 8.7 9.3   BILIRUBIN mg/dL  --   --  0.3   ALK PHOS U/L  --   --  67   ALT (SGPT) U/L  --   --  11   AST (SGOT) U/L  --   --  13   GLUCOSE mg/dL 143* 109* 164*     Results from last 7 days   Lab Units 09/06/21  2202   INR  1.14*         A/P: 71 y.o. male POD#1 open Dafne's for " perforated diverticulitis    His colostomy looks much better today with a beefy red appearance. He will not require surgery to revise the colostomy. Discontinue xiao if OK with nephrology. Advance to full liquids. Discontinue PCA and start PO pain medication.    Yelena Guerra MD  General, Robotic, and Endoscopic Surgery  [facility]    [address], [address]  [place] [address]  P: [phone]  F: [phone]

## 2021-09-08 NOTE — CASE MANAGEMENT/SOCIAL WORK
Continued Stay Note  [facility]     Patient Name: Lefty Cai  MRN: [id number]  Today's Date: 9/8/2021    Admit Date: 9/6/2021    Discharge Plan     Row Name 09/08/21 1549       Plan    Plan  Home with family and [facility] for new ostomy. Family to transport.    Patient/Family in Agreement with Plan  yes    Plan Comments  Met with pt. and daughter at bedside. Pt gave permission to speak with family present. Explained roll of . Face sheet and pharmacy verified. Pt lives with Caterina Cai, wife, [phone], adult daughter, and 2 grandkids age 18 and 12 yrs. old. There are 3 steps to enter home. Denies the use of any DME.  Pt is independent with ADLs. Pt has never been to Rehab or used HH. Discussed need for HH at D/C for new ostomy. Pt requests [facility] see at D/C as they have a certified ostomy nurse. Referral placed in [facility] and [facility] has accepted.  Pt’s PCP is Daksha MONTANO. [facility]ses [facility]. Pt normally drives. At discharge, family will transport. Explained that CCP would follow to assess for discharge needs.  Bhaskar Bonds RN-BC        Discharge Codes    No documentation.             Bhaskar Bonds RN

## 2021-09-08 NOTE — CASE MANAGEMENT/SOCIAL WORK
Discharge Planning Assessment  [facility]     Patient Name: Lefty Cai  MRN: [id number]  Today's Date: 9/8/2021    Admit Date: 9/6/2021    Discharge Needs Assessment     Row Name 09/08/21 1537       Living Environment    Lives With  spouse    Name(s) of Who Lives With Patient  Caterina Cai, wife, [phone], adult daughter, and 2 grandkids age 18 and 12 yrs old.    Current Living Arrangements  home/apartment/condo    Primary Care Provided by  self    Provides Primary Care For  no one    Family Caregiver if Needed  child(chandrika), adult;grandchild(chandrika), adult;spouse    Quality of Family Relationships  supportive;involved;helpful    Able to Return to Prior Arrangements  yes       Resource/Environmental Concerns    Resource/Environmental Concerns  home accessibility    Home Accessibility Concerns  stairs to enter home       Transition Planning    Patient/Family Anticipates Transition to  home with family    Patient/Family Anticipated Services at Transition  home health care    Transportation Anticipated  family or friend will provide       Discharge Needs Assessment    Readmission Within the Last 30 Days  no previous admission in last 30 days    Equipment Currently Used at Home  none    Concerns to be Addressed  care coordination/care conferences;decision-making;discharge planning    Anticipated Changes Related to Illness  none    Equipment Needed After Discharge  colostomy/ostomy supplies    Discharge Facility/Level of Care Needs  home with home health    Provided Post Acute Provider List?  Yes    Post Acute Provider List  Home Health    Provided Post Acute Provider Quality & Resource List?  Yes    Post Acute Provider Quality and Resource List  Home Health    Delivered To  Patient    Method of Delivery  In person    Patient's Choice of Community Agency(s)  [facility]         Discharge Plan     Row Name 09/08/21 3211       Plan    Plan  Home with family and [facility]  for new ostomy. Family to transport.     Patient/Family in Agreement with Plan  yes    Plan Comments  Met with pt. and daughter at bedside. Pt gave permission to speak with family present. Explained roll of . Face sheet and pharmacy verified. Pt lives with Caterina Cai, wife, [phone], adult daughter, and 2 grandkids age 18 and 12 yrs. old. There are 3 steps to enter home. Denies the use of any DME.  Pt is independent with ADLs. Pt has never been to Rehab or used HH. Discussed need for HH at D/C for new ostomy. Pt requests [facility] see at D/C as they have a certified ostomy nurse. Referral placed in [facility] and [facility] has accepted.  Pt’s PCP is Daksha MONTANO. Uses [facility] on [facility]. Pt normally drives. At discharge, family will transport. Explained that CCP would follow to assess for discharge needs.  Bhaskar Bonds RN-BC        Continued Care and Services - Admitted Since 9/6/2021     Home Medical Care Coordination complete.    Service Provider Request Status Selected Services Address Phone Fax Patient Preferred    [facility]   Selected Home Health Services [address] [address] [phone] --                Demographic Summary     Row Name 09/08/21 1536       General Information    Admission Type  inpatient    Arrived From  PACU/recovery room    Required Notices Provided  Important Message from Medicare    Reason for Consult  discharge planning;care coordination/care conference;decision-making    Preferred Language  English     Used During This Interaction  no        Functional Status     Row Name 09/08/21 1536       Functional Status    Usual Activity Tolerance  good    Current Activity Tolerance  moderate       Functional Status, IADL    Medications  independent    Meal Preparation  independent    Housekeeping  independent    Laundry  independent    Shopping  independent       Mental Status    General Appearance WDL  WDL       Mental Status Summary     Recent Changes in Mental Status/Cognitive Functioning  no changes                Bhaskar Bonds RN

## 2021-09-08 NOTE — PLAN OF CARE
"Goal Outcome Evaluation:  Plan of Care Reviewed With: patient        Progress: no change  Outcome Summary: pt did okay overnight. no output from ostomy. reluctant to use PCA-states he feels \"loopy\" on it and doesn't like that feeling. tolerating clears with no c/o nausea. DIONY with minimal output. xiao draining well. consulted WOCN for assistance with ostomy. c/o some post nasal drip and allergy congestion-tomaser provided. VSS. continue to monitor  "

## 2021-09-08 NOTE — PLAN OF CARE
Goal Outcome Evaluation:  Plan of Care Reviewed With: patient        Progress: improving  Outcome Summary: No major issues over shift, VS stable, pt's pain under control, pt also declines further pain medicine at this time, pt's PCA discontinued, has options for percocet and IV dilaudid, pt's diet upgraded to full liquid, no output from colostomy yet but no complaints of nausea and no vomiting, wound RN saw pt this am, pt's xiao also removed this afternoon, pt has since voided at least twice, spouse at bedside, IV fluids also switched to D5W with NaBicarb, will continue to monitor

## 2021-09-09 LAB
ALBUMIN SERPL-MCNC: 2.8 G/DL (ref 3.5–5.2)
ANION GAP SERPL CALCULATED.3IONS-SCNC: 15.1 MMOL/L (ref 5–15)
BASOPHILS # BLD AUTO: 0.03 10*3/MM3 (ref 0–0.2)
BASOPHILS NFR BLD AUTO: 0.3 % (ref 0–1.5)
BUN SERPL-MCNC: 62 MG/DL (ref 8–23)
BUN/CREAT SERPL: 12.8 (ref 7–25)
CALCIUM SPEC-SCNC: 9 MG/DL (ref 8.6–10.5)
CHLORIDE SERPL-SCNC: 99 MMOL/L (ref 98–107)
CO2 SERPL-SCNC: 21.9 MMOL/L (ref 22–29)
CREAT SERPL-MCNC: 4.86 MG/DL (ref 0.76–1.27)
DEPRECATED RDW RBC AUTO: 42.5 FL (ref 37–54)
EOSINOPHIL # BLD AUTO: 0.19 10*3/MM3 (ref 0–0.4)
EOSINOPHIL NFR BLD AUTO: 2 % (ref 0.3–6.2)
ERYTHROCYTE [DISTWIDTH] IN BLOOD BY AUTOMATED COUNT: 13.1 % (ref 12.3–15.4)
GFR SERPL CREATININE-BSD FRML MDRD: 12 ML/MIN/1.73
GFR SERPL CREATININE-BSD FRML MDRD: ABNORMAL ML/MIN/{1.73_M2}
GLUCOSE BLDC GLUCOMTR-MCNC: 117 MG/DL (ref 70–130)
GLUCOSE BLDC GLUCOMTR-MCNC: 119 MG/DL (ref 70–130)
GLUCOSE BLDC GLUCOMTR-MCNC: 130 MG/DL (ref 70–130)
GLUCOSE BLDC GLUCOMTR-MCNC: 142 MG/DL (ref 70–130)
GLUCOSE SERPL-MCNC: 123 MG/DL (ref 65–99)
HCT VFR BLD AUTO: 35.6 % (ref 37.5–51)
HGB BLD-MCNC: 11.9 G/DL (ref 13–17.7)
IMM GRANULOCYTES # BLD AUTO: 0.1 10*3/MM3 (ref 0–0.05)
IMM GRANULOCYTES NFR BLD AUTO: 1 % (ref 0–0.5)
LYMPHOCYTES # BLD AUTO: 0.79 10*3/MM3 (ref 0.7–3.1)
LYMPHOCYTES NFR BLD AUTO: 8.3 % (ref 19.6–45.3)
MCH RBC QN AUTO: 29.5 PG (ref 26.6–33)
MCHC RBC AUTO-ENTMCNC: 33.4 G/DL (ref 31.5–35.7)
MCV RBC AUTO: 88.3 FL (ref 79–97)
MONOCYTES # BLD AUTO: 0.86 10*3/MM3 (ref 0.1–0.9)
MONOCYTES NFR BLD AUTO: 9 % (ref 5–12)
NEUTROPHILS NFR BLD AUTO: 7.56 10*3/MM3 (ref 1.7–7)
NEUTROPHILS NFR BLD AUTO: 79.4 % (ref 42.7–76)
NRBC BLD AUTO-RTO: 0 /100 WBC (ref 0–0.2)
PHOSPHATE SERPL-MCNC: 3.5 MG/DL (ref 2.5–4.5)
PLATELET # BLD AUTO: 188 10*3/MM3 (ref 140–450)
PMV BLD AUTO: 9.3 FL (ref 6–12)
POTASSIUM SERPL-SCNC: 4.3 MMOL/L (ref 3.5–5.2)
RBC # BLD AUTO: 4.03 10*6/MM3 (ref 4.14–5.8)
SODIUM SERPL-SCNC: 136 MMOL/L (ref 136–145)
WBC # BLD AUTO: 9.53 10*3/MM3 (ref 3.4–10.8)

## 2021-09-09 PROCEDURE — 80069 RENAL FUNCTION PANEL: CPT | Performed by: INTERNAL MEDICINE

## 2021-09-09 PROCEDURE — 99024 POSTOP FOLLOW-UP VISIT: CPT | Performed by: SURGERY

## 2021-09-09 PROCEDURE — 82962 GLUCOSE BLOOD TEST: CPT

## 2021-09-09 PROCEDURE — 25010000002 PIPERACILLIN SOD-TAZOBACTAM PER 1 G: Performed by: SURGERY

## 2021-09-09 PROCEDURE — 85025 COMPLETE CBC W/AUTO DIFF WBC: CPT | Performed by: SURGERY

## 2021-09-09 RX ORDER — MINOXIDIL 2.5 MG/1
2.5 TABLET ORAL EVERY 12 HOURS SCHEDULED
Status: DISCONTINUED | OUTPATIENT
Start: 2021-09-09 | End: 2021-09-12 | Stop reason: HOSPADM

## 2021-09-09 RX ORDER — HYDRALAZINE HYDROCHLORIDE 50 MG/1
50 TABLET, FILM COATED ORAL ONCE
Status: COMPLETED | OUTPATIENT
Start: 2021-09-09 | End: 2021-09-09

## 2021-09-09 RX ORDER — IPRATROPIUM BROMIDE AND ALBUTEROL SULFATE 2.5; .5 MG/3ML; MG/3ML
3 SOLUTION RESPIRATORY (INHALATION) EVERY 6 HOURS PRN
Status: DISCONTINUED | OUTPATIENT
Start: 2021-09-09 | End: 2021-09-12 | Stop reason: HOSPADM

## 2021-09-09 RX ORDER — AMLODIPINE BESYLATE 5 MG/1
5 TABLET ORAL
Status: DISCONTINUED | OUTPATIENT
Start: 2021-09-09 | End: 2021-09-12 | Stop reason: HOSPADM

## 2021-09-09 RX ORDER — HYDRALAZINE HYDROCHLORIDE 50 MG/1
100 TABLET, FILM COATED ORAL 3 TIMES DAILY
Status: DISCONTINUED | OUTPATIENT
Start: 2021-09-09 | End: 2021-09-09

## 2021-09-09 RX ORDER — GUAIFENESIN 600 MG/1
600 TABLET, EXTENDED RELEASE ORAL EVERY 12 HOURS SCHEDULED
Status: DISCONTINUED | OUTPATIENT
Start: 2021-09-09 | End: 2021-09-12 | Stop reason: HOSPADM

## 2021-09-09 RX ADMIN — ATORVASTATIN CALCIUM 20 MG: 20 TABLET, FILM COATED ORAL at 20:27

## 2021-09-09 RX ADMIN — TAZOBACTAM SODIUM AND PIPERACILLIN SODIUM 3.38 G: 375; 3 INJECTION, SOLUTION INTRAVENOUS at 12:55

## 2021-09-09 RX ADMIN — MINOXIDIL 2.5 MG: 2.5 TABLET ORAL at 20:27

## 2021-09-09 RX ADMIN — OXYCODONE AND ACETAMINOPHEN 1 TABLET: 7.5; 325 TABLET ORAL at 15:27

## 2021-09-09 RX ADMIN — GUAIFENESIN 600 MG: 600 TABLET, EXTENDED RELEASE ORAL at 14:08

## 2021-09-09 RX ADMIN — ACETAMINOPHEN 650 MG: 325 TABLET, FILM COATED ORAL at 09:51

## 2021-09-09 RX ADMIN — MINOXIDIL 2.5 MG: 2.5 TABLET ORAL at 12:34

## 2021-09-09 RX ADMIN — SODIUM CHLORIDE, PRESERVATIVE FREE 10 ML: 5 INJECTION INTRAVENOUS at 20:28

## 2021-09-09 RX ADMIN — CETIRIZINE HYDROCHLORIDE 5 MG: 10 TABLET ORAL at 08:15

## 2021-09-09 RX ADMIN — HYDRALAZINE HYDROCHLORIDE 50 MG: 50 TABLET, FILM COATED ORAL at 08:15

## 2021-09-09 RX ADMIN — HYDRALAZINE HYDROCHLORIDE 50 MG: 50 TABLET, FILM COATED ORAL at 09:51

## 2021-09-09 RX ADMIN — GUAIFENESIN 600 MG: 600 TABLET, EXTENDED RELEASE ORAL at 20:27

## 2021-09-09 RX ADMIN — AMLODIPINE BESYLATE 5 MG: 5 TABLET ORAL at 12:34

## 2021-09-09 RX ADMIN — METOPROLOL SUCCINATE 100 MG: 100 TABLET, EXTENDED RELEASE ORAL at 20:27

## 2021-09-09 RX ADMIN — SODIUM CHLORIDE, PRESERVATIVE FREE 10 ML: 5 INJECTION INTRAVENOUS at 08:15

## 2021-09-09 NOTE — PROGRESS NOTES
[facility] Progress Note      Patient Name: Lefty Cai  : 1950  MRN: [id number]  Primary Care Physician:  Daksha Ng APRN  Date of admission: 2021    Subjective     Interval History:   Tolerating liquid diet fine  Gas and stool in colostomy bag  Abdominal pain is controlled  Walked in the room earlier this morning  Blood pressures rising    Review of Systems:   As noted above    Objective     Vitals:   Temp:  [97.6 °F (36.4 °C)-98.6 °F (37 °C)] 97.8 °F (36.6 °C)  Heart Rate:  [70-83] 74  Resp:  [18-20] 18  BP: (157-209)/() 209/105    Intake/Output Summary (Last 24 hours) at 2021 1014  Last data filed at 2021 0940  Gross per 24 hour   Intake 1849 ml   Output 3230 ml   Net -1381 ml       Physical Exam:    Constitutional: Awake, alert, NAD, overweight  HEENT: Sclerae anicteric, dry MM, AT/NC  Neck: Supple, trachea at midline, no JVD  Resp: Diminished air movement either flank; no wheezes, nonlabored on RA  Cardiovascular: RRR, no rub  GI: BS hypoactive, tender, +distended, colostomy LLQ, midline incision C/D/I  : No palpable bladder  Musculoskeletal: Trace edema  Vascular: Left arm AV fistula patent  Psychiatric: Appropriate affect, cooperative, oriented fully  Neurologic: No asterixis; moving all extremities, normal speech and mental status  Skin: Warm and dry    Scheduled Meds:     atorvastatin, 20 mg, Oral, Nightly  cetirizine, 5 mg, Oral, Daily  hydrALAZINE, 100 mg, Oral, TID  insulin lispro, 0-7 Units, Subcutaneous, TID AC  metoprolol succinate XL, 100 mg, Oral, Nightly  piperacillin-tazobactam, 3.375 g, Intravenous, Q12H  sodium chloride, 10 mL, Intravenous, Q12H      IV Meds:        Results Reviewed:   I have personally reviewed the results from the time of this admission to 2021 10:14 EDT     Results from last 7 days   Lab Units 21  0550 21  0700 21  0835 0921   SODIUM mmol/L 136 130* 134*   < >  134*   POTASSIUM mmol/L 4.3 4.9 5.1   < > 4.5   CHLORIDE mmol/L 99 100 102   < > 100   CO2 mmol/L 21.9* 15.5* 17.6*   < > 17.8*   BUN mg/dL 62* 62* 65*   < > 65*   CREATININE mg/dL 4.86* 5.09* 5.16*   < > 5.53*   CALCIUM mg/dL 9.0 8.7 8.7   < > 9.3   BILIRUBIN mg/dL  --   --   --   --  0.3   ALK PHOS U/L  --   --   --   --  67   ALT (SGPT) U/L  --   --   --   --  11   AST (SGOT) U/L  --   --   --   --  13   GLUCOSE mg/dL 123* 143* 109*   < > 164*    < > = values in this interval not displayed.       Estimated Creatinine Clearance: 17 mL/min (A) (by C-G formula based on SCr of 4.86 mg/dL (H)).    Results from last 7 days   Lab Units 09/09/21  0550 09/08/21  0700   MAGNESIUM mg/dL  --  2.2   PHOSPHORUS mg/dL 3.5 4.4       Results from last 7 days   Lab Units 09/08/21  0700   URIC ACID mg/dL 7.7*       Results from last 7 days   Lab Units 09/09/21  0550 09/08/21  0700 09/07/21  0835 09/06/21  2202   WBC 10*3/mm3 9.53 10.00 11.83* 12.91*   HEMOGLOBIN g/dL 11.9* 11.7* 11.8* 13.6   PLATELETS 10*3/mm3 188 173 168 169       Results from last 7 days   Lab Units 09/06/21  2202   INR  1.14*       Assessment / Plan     ASSESSMENT:  1.  CKD5 with surprisingly stable function thus far.  Volume status fine; normal potassium and anion gap.  Hyponatremia, improving, likely in part from enhanced ADH secretion post-op  2.  Perforated sigmoid diverticulitis with abscess, s/p sigmoid colectomy and colostomy creation 9/7  3.  Hypertension, not controlled  4.  DM2  5.  Anemia    PLAN:  1.  Discontinue hydralazine and begin instead minoxidil 2.5 mg twice daily.  Will also add amlodipine 5 mg daily  2.  Surveillance labs    Thank you for involving us in the care of Lefty Cai.  Please feel free to call with any questions.    Jose Mccall MD  09/09/21  10:14 EDT    [facility]  [phone]      Much of this encounter note is an electronic transcription/translation of spoken language to printed  text. The electronic translation of spoken language may permit erroneous, or at times, nonsensical words or phrases to be inadvertently transcribed; Although I have reviewed the note for such errors, some may still exist.

## 2021-09-09 NOTE — PLAN OF CARE
Goal Outcome Evaluation:  Plan of Care Reviewed With: patient        Progress: improving  Outcome Summary: patient resting comfortably between care, IVF's complete, voiding without difficulty post cath removal, no complaints of pain at this time, IV abx given, small presence of stool at stoma site

## 2021-09-09 NOTE — PLAN OF CARE
Goal Outcome Evaluation:              Outcome Summary: Pt BP elevated today. Pt reports he takes higher doses of hydralazine at home. Medication adjusted. DIONY drain removed. Diet advanced to regular. Tolerating well. Pt reported pain 9/10 when coughing treated with PRN tylenol in the monring and PRN percocet in the afternoon. No other complaints, will continue to monitor.

## 2021-09-09 NOTE — PROGRESS NOTES
"General Surgery  Progress Note    CC: Follow-up perforated diverticultis    POD#2 open Hartmanns    S: His xiao was removed yesterday and he has had no trouble with urination.  He is tolerating a full liquid diet but complains about the food not tasting good.  His colostomy began producing a small amount of liquid brown stool this morning.    O:BP (!) 209/105 (BP Location: Right arm, Patient Position: Lying)   Pulse 74   Temp 97.8 °F (36.6 °C) (Oral)   Resp 18   Ht 177.8 cm (70\")   Wt 109 kg (240 lb)   SpO2 97%   BMI 34.44 kg/m²     Intake & Output:  UOP: 3050 mL/24 hrs  DIONY: 30 ml/24 hrs, serosanguinous    GENERAL: alert, well appearing, and in no distress  HEENT: normocephalic, atraumatic, moist mucous membranes, clear sclerae   CHEST: clear to auscultation, no wheezes, rales or rhonchi, symmetric air entry  CARDIAC: regular rate and rhythm    ABDOMEN: Soft, distended/obese, appropriate incisional tenderness, incision clean/dry/intact with staples, right lower quadrant DIONY output serosanguineous, colostomy in left lateral abdomen beefy red with some stool in bag  EXTREMITIES: no cyanosis, clubbing, or edema   SKIN: Warm and moist, no rashes    LABS  Results from last 7 days   Lab Units 09/09/21  0550 09/08/21  0700 09/07/21  0835   WBC 10*3/mm3 9.53 10.00 11.83*   HEMOGLOBIN g/dL 11.9* 11.7* 11.8*   HEMATOCRIT % 35.6* 36.2* 35.9*   PLATELETS 10*3/mm3 188 173 168   MONOCYTES % %  --  5.0 4.0*     Results from last 7 days   Lab Units 09/09/21  0550 09/08/21  0700 09/07/21  0835 09/06/21  2202 09/06/21  2202   SODIUM mmol/L 136 130* 134*   < > 134*   POTASSIUM mmol/L 4.3 4.9 5.1   < > 4.5   CHLORIDE mmol/L 99 100 102   < > 100   CO2 mmol/L 21.9* 15.5* 17.6*   < > 17.8*   BUN mg/dL 62* 62* 65*   < > 65*   CREATININE mg/dL 4.86* 5.09* 5.16*   < > 5.53*   CALCIUM mg/dL 9.0 8.7 8.7   < > 9.3   BILIRUBIN mg/dL  --   --   --   --  0.3   ALK PHOS U/L  --   --   --   --  67   ALT (SGPT) U/L  --   --   --   --  11   AST " (SGOT) U/L  --   --   --   --  13   GLUCOSE mg/dL 123* 143* 109*   < > 164*    < > = values in this interval not displayed.     Results from last 7 days   Lab Units 09/06/21  2202   INR  1.14*         A/P: 71 y.o. male POD#2 open Dafne's for perforated diverticulitis    Advance to regular diet  Discontinue DIONY drain  Follow-up results of intra-abdominal fluid cultures and continue empiric Zosyn  Continue colostomy teaching  Stop IV fluids once okay with nephrology    Yelena Guerra MD  General, Robotic, and Endoscopic Surgery  [facility]    [address], [address]  [place] [address]  P: [phone]  F: [phone]

## 2021-09-09 NOTE — NURSING NOTE
CWOCN- follow up for colostomy teaching. Patient's daughter was at bedside. Instructed on gas, stool characteristics with colostomy, emptying, pouch change schedule, supplies. I showed patient how to empty and he assisted with velcro closure. Discussed emptying when the pouch is half full and talked about the closed end pouch. It might be helpful to show him one of these. Additionally, he may have trouble with the 2 piece Liala related to his large hands- a Coloplast flex or a 1 piece might end up being easier for him.   His wife is going to be here in the am and wants to be present for pouch change. [facility] nurse will meet with them tomorrow for hands on teaching. Patient & daughter verbalized understanding of the plan. He has no additional questions at this time.      09/09/21 1459   Colostomy LUQ   Placement Date/Time: 09/07/21 0214   Inserted by: estelle  Location: LUQ   Stomal Appliance 2 piece;Intact;Drainable   Stoma Appearance round;rosebud appearance;moist;red   Peristomal Assessment PADMINI   Stoma Function flatus;stool   Stool Color brown   Stool Consistency loose   Output (mL) 25 mL

## 2021-09-09 NOTE — PROGRESS NOTES
Name: Lefty Cai ADMIT: 2021   : 1950  PCP: Daksha Ng, DUSTY    MRN: [id number] LOS: 2 days   AGE/SEX: 71 y.o. male  ROOM: [facility]     Subjective   Subjective   Patient appears obese, generally weak, relatively comfortable, no apparent distress.  Family at bedside.  Still with lower abdominal pain aggravated by cough. Discussed with RN--BP elevated due to reduced home dose hydralazine.    Review of Systems   Constitutional: Negative for chills and fever.   Respiratory: Negative for cough and shortness of breath.    Cardiovascular: Negative for chest pain and leg swelling.   Gastrointestinal: Positive for abdominal pain (improving). Negative for constipation, diarrhea, nausea and vomiting.   Endocrine: Negative for polydipsia, polyphagia and polyuria.   Genitourinary: Negative for difficulty urinating and dysuria.   Musculoskeletal: Positive for back pain. Negative for arthralgias.   Neurological: Positive for weakness (generalized). Negative for dizziness, tremors, seizures, syncope, facial asymmetry, speech difficulty, light-headedness, numbness and headaches.   Psychiatric/Behavioral: Negative for confusion and hallucinations.        Objective   Objective   Vital Signs  Temp:  [97.6 °F (36.4 °C)-98.6 °F (37 °C)] 97.8 °F (36.6 °C)  Heart Rate:  [70-83] 74  Resp:  [18-20] 18  BP: (157-209)/() 209/105  SpO2:  [93 %-97 %] 97 %  on   ;   Device (Oxygen Therapy): room air  Body mass index is 34.44 kg/m².     Physical Exam   Constitutional:       General: He is not in acute distress.     Appearance: He is obese. He is ill-appearing. He is not toxic-appearing.   HENT:      Head: Normocephalic and atraumatic.   Eyes:      Extraocular Movements: Extraocular movements intact.      Conjunctiva/sclera: Conjunctivae normal.   Cardiovascular:      Rate and Rhythm: Normal rate.      Heart sounds: Normal heart sounds.   Pulmonary:      Effort: Pulmonary effort is normal.      Breath sounds: Normal  breath sounds.   Abdominal:      Hypoactive.     Palpations: Abdomen is soft.      Tenderness: There is abdominal tenderness. There is no guarding.      Comments: Ostomy, drain, midline ventral incision   Musculoskeletal:         General: No tenderness.      Cervical back: Normal range of motion and neck supple.      Right lower leg: No edema.      Left lower leg: No edema.   Skin:     General: Skin is warm and dry.   Neurological:      Mental Status: He is oriented to person, place, and time.      Cranial Nerves: No cranial nerve deficit.      Motor: Weakness (generalized) present.   Psychiatric:         Behavior: Behavior normal.         Thought Content: Thought content normal.     Results Review     I reviewed the patient's new clinical results.  Results from last 7 days   Lab Units 09/09/21 0550 09/08/21  0700 09/07/21  0835 09/06/21  2202   WBC 10*3/mm3 9.53 10.00 11.83* 12.91*   HEMOGLOBIN g/dL 11.9* 11.7* 11.8* 13.6   PLATELETS 10*3/mm3 188 173 168 169     Results from last 7 days   Lab Units 09/09/21 0550 09/08/21  0700 09/07/21  0835 09/06/21  2202   SODIUM mmol/L 136 130* 134* 134*   POTASSIUM mmol/L 4.3 4.9 5.1 4.5   CHLORIDE mmol/L 99 100 102 100   CO2 mmol/L 21.9* 15.5* 17.6* 17.8*   BUN mg/dL 62* 62* 65* 65*   CREATININE mg/dL 4.86* 5.09* 5.16* 5.53*   GLUCOSE mg/dL 123* 143* 109* 164*   Estimated Creatinine Clearance: 17 mL/min (A) (by C-G formula based on SCr of 4.86 mg/dL (H)).  Results from last 7 days   Lab Units 09/09/21 0550 09/08/21 0700 09/06/21  2202   ALBUMIN g/dL 2.80* 3.00* 3.80   BILIRUBIN mg/dL  --   --  0.3   ALK PHOS U/L  --   --  67   AST (SGOT) U/L  --   --  13   ALT (SGPT) U/L  --   --  11     Results from last 7 days   Lab Units 09/09/21 0550 09/08/21  0700 09/07/21  0835 09/06/21  2202   CALCIUM mg/dL 9.0 8.7 8.7 9.3   ALBUMIN g/dL 2.80* 3.00*  --  3.80   MAGNESIUM mg/dL  --  2.2  --   --    PHOSPHORUS mg/dL 3.5 4.4  --   --      Results from last 7 days   Lab Units  09/06/21  2202   PROCALCITONIN ng/mL 7.18*   LACTATE mmol/L 1.3     COVID19   Date Value Ref Range Status   09/06/2021 Not Detected Not Detected - Ref. Range Final     Glucose   Date/Time Value Ref Range Status   09/09/2021 1103 142 (H) 70 - 130 mg/dL Final     Comment:     Meter: [id number] : [id number] Bert Jacobson NA   09/09/2021 0611 130 70 - 130 mg/dL Final     Comment:     Meter: [id number] : [id number] Lucius Lee NA   09/08/2021 2023 143 (H) 70 - 130 mg/dL Final     Comment:     Meter: [id number] : [id number] Lucius Rosa NA   09/08/2021 1647 125 70 - 130 mg/dL Final     Comment:     Meter: [id number] : [id number] Ramez Hager NA   09/08/2021 1209 118 70 - 130 mg/dL Final     Comment:     Meter: [id number] : [id number] Ramez Hager NA   09/08/2021 0615 144 (H) 70 - 130 mg/dL Final     Comment:     Meter: [id number] : [id number] Lucius Rosa NA   09/07/2021 2007 146 (H) 70 - 130 mg/dL Final     Comment:     Meter: [id number] : [id number] Lucius Lee NA       CT Abdomen Pelvis Without Contrast  Narrative: CT OF THE ABDOMEN AND PELVIS WITHOUT CONTRAST     HISTORY: Right lower quadrant abdominal pain     COMPARISON: None available.     TECHNIQUE: Axial CT imaging was obtained through the abdomen and pelvis.  No IV contrast was administered.     FINDINGS:  Images through the lung bases do not demonstrate any acute  abnormalities. The patient is noted to have a large volume of  intraperitoneal free air. The source images suspected to be due to  perforated diverticulitis within the right lower quadrant, as the  patient has sigmoid diverticular disease within this area. There is  extensive inflammatory stranding within the right lower quadrant. This  does involve some loops of small bowel within this area, although this  is favored to be secondary to the inflammation within the colon. The  patient does not have any evidence of bowel obstruction. The appendix is  normal. No focal hepatic  lesions are seen. The stomach is unremarkable.  There is a duodenal diverticula arising from the third portion of the  duodenum. Adrenal glands are normal. Gallbladder is grossly  unremarkable. Spleen and pancreas are within normal limits. There is  calcification of the aorta. No hydronephrosis is seen on either side.  There is a simple appearing right renal cyst. No additional follow-up is  necessary. Left kidney does appear mildly atrophic. There is aneurysmal  dilatation of the infrarenal abdominal aorta, measuring 3.0 x 3.0 cm.  Urinary bladder is decompressed. Prostate gland is within normal limits.  No acute osseous abnormalities are seen.     Impression:    1. Large volume of intraperitoneal free air, with suspected source  within the right lower quadrant, where the patient has an inflamed loop  of sigmoid colon. A discrete drainable abscess is not seen. There are  also some inflamed loops of small bowel within this area, although this  is also be secondary involvement from the sigmoid colon. Urgent general  surgical consultation is recommended.     FINDINGS were called to Dr. Sharp at 11:10 PM.     Radiation dose reduction techniques were utilized, including automated  exposure control and exposure modulation based on body size.     This report was finalized on 9/6/2021 11:17 PM by Dr. Mini Segura M.D.     XR Chest 1 View  Narrative: SINGLE VIEW OF THE CHEST     HISTORY: Dyspnea     COMPARISON: None available.     FINDINGS:  Heart size is within normal limits. No pneumothorax, pleural effusion,  or acute infiltrate is seen. There is a suggestion of a lucency  underneath the right hemidiaphragm. Clinical significance is uncertain.  It may represent normal bowel gas, but correlation with any symptoms of  abdominal pain is suggested.     Impression:    1. No acute intrathoracic findings.  2. Nonspecific lucency seen underlying the right diaphragm. Correlation  with any symptoms of abdominal pain is  suggested.     This report was finalized on 9/6/2021 10:15 PM by Dr. Mini Segura M.D.       Scheduled Medications  amLODIPine, 5 mg, Oral, Q24H  atorvastatin, 20 mg, Oral, Nightly  cetirizine, 5 mg, Oral, Daily  guaiFENesin, 600 mg, Oral, Q12H  insulin lispro, 0-7 Units, Subcutaneous, TID AC  metoprolol succinate XL, 100 mg, Oral, Nightly  minoxidil, 2.5 mg, Oral, Q12H  piperacillin-tazobactam, 3.375 g, Intravenous, Q12H  sodium chloride, 10 mL, Intravenous, Q12H    Infusions   Diet  Diet Regular; Consistent Carbohydrate, Renal       Assessment/Plan     Active Hospital Problems    Diagnosis  POA   • **Diverticulitis of large intestine with perforation and abscess without bleeding [K57.20]  Yes   • Free intraperitoneal air [K66.8]  Yes   • Obesity (BMI 30-39.9) [E66.9]  Yes   • Essential hypertension [I10]  Yes   • CKD (chronic kidney disease) stage 4, GFR 15-29 ml/min (CMS/[facility]) [N18.4]  Yes   • Type 2 diabetes mellitus with diabetic chronic kidney disease (CMS/[facility]) [E11.22]  Yes      Resolved Hospital Problems   No resolved problems to display.       71 y.o. male admitted with Diverticulitis of large intestine with perforation and abscess without bleeding.    Appreciate input all consultants      Diverticulitis of large intestine with perforation and abscess without bleeding / free intraperitoneal air  General surgery following status post--open sigmoid colectomy, colostomy, appendectomy on 9/7/2021  DIONY removed on 9/9  Colostomy teaching  PO opioid PRN yet prefers acetaminophen, continuous pulse oximetry  7 days course empiric therapy IV Zosyn  Body fluid cultures gram negative bacilli, anaerobic pending  COVID-19 not detected  Diet regular at lunch per GS 9/9  Melatonin nightly PRN insomnia seems to help       Type 2 diabetes mellitus with diabetic chronic kidney disease (CMS/[facility])  A1c 6.5 June 23, 2021  Glucose readings acceptable, consistent   Low-dose lispro sliding scale       Essential hypertension  BP  greater than optimal, hemodynamically stable  Original plan resume home dose hydralazine 100 mg p.o. 3 times daily; however, nephrology prefers DC hydralazine & add minoxidil 2.5 mg PO BID & amlodipine 5 mg PO daily; continue metoprolol 100 mg p.o. nightly with hold parameters       CKD (chronic kidney disease) stage 4, GFR 15-29 ml/min (CMS/HCC) / metabolic acidosis  Serum creatinine at baseline continues to improve  Left upper extremity AV fistula matured--restricted extremity  Nephrology following & managing IVF, surveillance labs, sodium bicarb  Reddy removed on 9/8-->voiding independently, UOP noted     Obesity  Complicating of problems    · SCD for DVT prophylaxis.  · CPR  · Discussed with Dr. Becker.  · Anticipate discharge possibly home with [place] LIM (new ostomy) on 9/9 / CCP following      DUSTY Ford  [facility]  09/09/21  12:08 EDT

## 2021-09-10 LAB
ALBUMIN SERPL-MCNC: 2.8 G/DL (ref 3.5–5.2)
ALBUMIN SERPL-MCNC: 3.1 G/DL (ref 3.5–5.2)
ALBUMIN/GLOB SERPL: 1 G/DL
ALP SERPL-CCNC: 121 U/L (ref 39–117)
ALT SERPL W P-5'-P-CCNC: 29 U/L (ref 1–41)
ANION GAP SERPL CALCULATED.3IONS-SCNC: 13 MMOL/L (ref 5–15)
ANION GAP SERPL CALCULATED.3IONS-SCNC: 15.5 MMOL/L (ref 5–15)
AST SERPL-CCNC: 26 U/L (ref 1–40)
BILIRUB SERPL-MCNC: 0.5 MG/DL (ref 0–1.2)
BUN SERPL-MCNC: 68 MG/DL (ref 8–23)
BUN SERPL-MCNC: 69 MG/DL (ref 8–23)
BUN/CREAT SERPL: 13.8 (ref 7–25)
BUN/CREAT SERPL: 14.6 (ref 7–25)
CALCIUM SPEC-SCNC: 9.1 MG/DL (ref 8.6–10.5)
CALCIUM SPEC-SCNC: 9.4 MG/DL (ref 8.6–10.5)
CHLORIDE SERPL-SCNC: 100 MMOL/L (ref 98–107)
CHLORIDE SERPL-SCNC: 98 MMOL/L (ref 98–107)
CO2 SERPL-SCNC: 19.5 MMOL/L (ref 22–29)
CO2 SERPL-SCNC: 21 MMOL/L (ref 22–29)
CREAT SERPL-MCNC: 4.66 MG/DL (ref 0.76–1.27)
CREAT SERPL-MCNC: 5 MG/DL (ref 0.76–1.27)
DEPRECATED RDW RBC AUTO: 42.8 FL (ref 37–54)
ERYTHROCYTE [DISTWIDTH] IN BLOOD BY AUTOMATED COUNT: 13 % (ref 12.3–15.4)
GFR SERPL CREATININE-BSD FRML MDRD: 11 ML/MIN/1.73
GFR SERPL CREATININE-BSD FRML MDRD: 12 ML/MIN/1.73
GFR SERPL CREATININE-BSD FRML MDRD: ABNORMAL ML/MIN/{1.73_M2}
GFR SERPL CREATININE-BSD FRML MDRD: ABNORMAL ML/MIN/{1.73_M2}
GLOBULIN UR ELPH-MCNC: 3.2 GM/DL
GLUCOSE BLDC GLUCOMTR-MCNC: 105 MG/DL (ref 70–130)
GLUCOSE BLDC GLUCOMTR-MCNC: 111 MG/DL (ref 70–130)
GLUCOSE BLDC GLUCOMTR-MCNC: 117 MG/DL (ref 70–130)
GLUCOSE BLDC GLUCOMTR-MCNC: 132 MG/DL (ref 70–130)
GLUCOSE SERPL-MCNC: 113 MG/DL (ref 65–99)
GLUCOSE SERPL-MCNC: 120 MG/DL (ref 65–99)
HCT VFR BLD AUTO: 36.1 % (ref 37.5–51)
HGB BLD-MCNC: 11.8 G/DL (ref 13–17.7)
MCH RBC QN AUTO: 29.2 PG (ref 26.6–33)
MCHC RBC AUTO-ENTMCNC: 32.7 G/DL (ref 31.5–35.7)
MCV RBC AUTO: 89.4 FL (ref 79–97)
PHOSPHATE SERPL-MCNC: 3.9 MG/DL (ref 2.5–4.5)
PLATELET # BLD AUTO: 248 10*3/MM3 (ref 140–450)
PMV BLD AUTO: 9.2 FL (ref 6–12)
POTASSIUM SERPL-SCNC: 3.7 MMOL/L (ref 3.5–5.2)
POTASSIUM SERPL-SCNC: 3.9 MMOL/L (ref 3.5–5.2)
PROT SERPL-MCNC: 6.3 G/DL (ref 6–8.5)
RBC # BLD AUTO: 4.04 10*6/MM3 (ref 4.14–5.8)
SODIUM SERPL-SCNC: 132 MMOL/L (ref 136–145)
SODIUM SERPL-SCNC: 135 MMOL/L (ref 136–145)
WBC # BLD AUTO: 11.5 10*3/MM3 (ref 3.4–10.8)

## 2021-09-10 PROCEDURE — 80069 RENAL FUNCTION PANEL: CPT | Performed by: INTERNAL MEDICINE

## 2021-09-10 PROCEDURE — 87040 BLOOD CULTURE FOR BACTERIA: CPT | Performed by: NURSE PRACTITIONER

## 2021-09-10 PROCEDURE — 25010000002 MEROPENEM PER 100 MG: Performed by: INTERNAL MEDICINE

## 2021-09-10 PROCEDURE — 80053 COMPREHEN METABOLIC PANEL: CPT | Performed by: NURSE PRACTITIONER

## 2021-09-10 PROCEDURE — 25010000002 PIPERACILLIN SOD-TAZOBACTAM PER 1 G: Performed by: SURGERY

## 2021-09-10 PROCEDURE — 82962 GLUCOSE BLOOD TEST: CPT

## 2021-09-10 PROCEDURE — 99223 1ST HOSP IP/OBS HIGH 75: CPT | Performed by: INTERNAL MEDICINE

## 2021-09-10 PROCEDURE — 97161 PT EVAL LOW COMPLEX 20 MIN: CPT | Performed by: PHYSICAL THERAPIST

## 2021-09-10 PROCEDURE — 99024 POSTOP FOLLOW-UP VISIT: CPT | Performed by: SURGERY

## 2021-09-10 PROCEDURE — 85027 COMPLETE CBC AUTOMATED: CPT | Performed by: NURSE PRACTITIONER

## 2021-09-10 RX ADMIN — METOPROLOL SUCCINATE 100 MG: 100 TABLET, EXTENDED RELEASE ORAL at 21:12

## 2021-09-10 RX ADMIN — MINOXIDIL 2.5 MG: 2.5 TABLET ORAL at 08:28

## 2021-09-10 RX ADMIN — ACETAMINOPHEN 650 MG: 325 TABLET, FILM COATED ORAL at 23:24

## 2021-09-10 RX ADMIN — GUAIFENESIN 600 MG: 600 TABLET, EXTENDED RELEASE ORAL at 08:28

## 2021-09-10 RX ADMIN — CETIRIZINE HYDROCHLORIDE 5 MG: 10 TABLET ORAL at 08:28

## 2021-09-10 RX ADMIN — ACETAMINOPHEN 650 MG: 325 TABLET, FILM COATED ORAL at 18:13

## 2021-09-10 RX ADMIN — MINOXIDIL 2.5 MG: 2.5 TABLET ORAL at 21:12

## 2021-09-10 RX ADMIN — MEROPENEM 500 MG: 500 INJECTION INTRAVENOUS at 12:08

## 2021-09-10 RX ADMIN — SODIUM CHLORIDE, PRESERVATIVE FREE 10 ML: 5 INJECTION INTRAVENOUS at 08:29

## 2021-09-10 RX ADMIN — ACETAMINOPHEN 650 MG: 325 TABLET, FILM COATED ORAL at 08:28

## 2021-09-10 RX ADMIN — AMLODIPINE BESYLATE 5 MG: 5 TABLET ORAL at 08:28

## 2021-09-10 RX ADMIN — GUAIFENESIN 600 MG: 600 TABLET, EXTENDED RELEASE ORAL at 21:12

## 2021-09-10 RX ADMIN — MEROPENEM 500 MG: 500 INJECTION INTRAVENOUS at 23:25

## 2021-09-10 RX ADMIN — SODIUM CHLORIDE, PRESERVATIVE FREE 10 ML: 5 INJECTION INTRAVENOUS at 21:12

## 2021-09-10 RX ADMIN — TAZOBACTAM SODIUM AND PIPERACILLIN SODIUM 3.38 G: 375; 3 INJECTION, SOLUTION INTRAVENOUS at 00:26

## 2021-09-10 RX ADMIN — ATORVASTATIN CALCIUM 20 MG: 20 TABLET, FILM COATED ORAL at 21:12

## 2021-09-10 NOTE — THERAPY DISCHARGE NOTE
Patient Name: Lefty Cai  : 1950    MRN: [id number]                              Today's Date: 9/10/2021       Admit Date: 2021    Visit Dx:     ICD-10-CM ICD-9-CM   1. Perforation of sigmoid colon due to diverticulitis  K57.20 562.11   2. Perforated abdominal viscus  R19.8 799.89   3. ESRD (end stage renal disease) (CMS/HCC)  N18.6 585.6   4. Morbid obesity (CMS/HCC)  E66.01 278.01   5. Type 2 diabetes mellitus with other specified complication, unspecified whether long term insulin use (CMS/HCC)  E11.69 250.80   6. Free intraperitoneal air  K66.8 568.89     Patient Active Problem List   Diagnosis   • Type 2 diabetes mellitus with diabetic chronic kidney disease (CMS/HCC)   • Essential hypertension   • Hyperlipidemia   • Primary insomnia   • CKD (chronic kidney disease) stage 4, GFR 15-29 ml/min (CMS/HCC)   • Vitamin D deficiency   • Diverticulitis of large intestine with perforation and abscess without bleeding   • Free intraperitoneal air   • Obesity (BMI 30-39.9)     Past Medical History:   Diagnosis Date   • CKD (chronic kidney disease)    • Diabetes mellitus, type 2 (CMS/HCC)    • GERD (gastroesophageal reflux disease)    • H/O renal cell carcinoma     partial nephrectomy   • Hyperlipidemia    • Hypertension    • Primary insomnia 2016   • Proteinuria    • Risk factors for obstructive sleep apnea     STOP BANG 6   • Sinus drainage    • Vitamin D deficiency 2017     Past Surgical History:   Procedure Laterality Date   • ARTERIOVENOUS FISTULA/SHUNT SURGERY Left 8/15/2019    Procedure: LEFT MID FOREARM RADIAL CEPHALIC ARTERIAL VENOUS FISTULA;  Surgeon: Louann Miles Jr., MD;  Location: [facility];  Service: Vascular   • EYE SURGERY      CATARACTS   • KNEE ARTHROSCOPY     • NEPHRECTOMY PARTIAL      Dr. Victor     General Information     Row Name 09/10/21 1126          Physical Therapy Time and Intention    Document Type  evaluation  -KH     Mode of Treatment   individual therapy;physical therapy  -[facility] Name 09/10/21 1126          General Information    Prior Level of Function  independent:  -     Existing Precautions/Restrictions  fall  -     Barriers to Rehab  none identified  -[facility] Name 09/10/21 1126          Living Environment    Lives With  spouse  -[facility] Name 09/10/21 1126          Cognition    Orientation Status (Cognition)  oriented x 4  -       User Key  (r) = Recorded By, (t) = Taken By, (c) = Cosigned By    Initials Name Provider Type    Kyara Tirado PT Physical Therapist        Mobility     [facility] Name 09/10/21 1126          Bed Mobility    Bed Mobility  supine-sit  -     Supine-Sit Morton (Bed Mobility)  standby assist  -[facility] Name 09/10/21 1126          Sit-Stand Transfer    Sit-Stand Morton (Transfers)  standby assist  -[facility] Name 09/10/21 1126          Gait/Stairs (Locomotion)    Morton Level (Gait)  standby assist  -     Distance in Feet (Gait)  300 ft  -       User Key  (r) = Recorded By, (t) = Taken By, (c) = Cosigned By    Initials Name Provider Type    Kyara Tirado PT Physical Therapist        Obj/Interventions     [facility] Name 09/10/21 1127          Range of Motion Comprehensive    General Range of Motion  no range of motion deficits identified  -[facility] Name 09/10/21 1127          Strength Comprehensive (MMT)    General Manual Muscle Testing (MMT) Assessment  no strength deficits identified  -[facility] Name 09/10/21 1127          Balance    Balance Assessment  sitting static balance;sitting dynamic balance;standing static balance;standing dynamic balance  -     Static Sitting Balance  WNL  -     Dynamic Sitting Balance  WNL  -     Static Standing Balance  WFL  -     Dynamic Standing Balance  WFL  -       User Key  (r) = Recorded By, (t) = Taken By, (c) = Cosigned By    Initials Name Provider Type    Kyara Tirado PT Physical Therapist         Goals/Plan    No documentation.       Clinical Impression     Row Name 09/10/21 1127          Pain    Additional Documentation  Pain Scale: Numbers Pre/Post-Treatment (Group)  -     Row Name 09/10/21 1127          Pain Scale: Numbers Pre/Post-Treatment    Pretreatment Pain Rating  0/10 - no pain  -     Posttreatment Pain Rating  0/10 - no pain  -     Row Name 09/10/21 1127          Plan of Care Review    Plan of Care Reviewed With  patient  -     Outcome Summary  Pt is s/p colectomy, colostomy and appendectomy. He reports he was independently mobile prior to admission and plans to d/c home soon. Pt ambulated 300 ft with SBA and appears to be functioning at baseline level. Pt is ssafe to d/c home when medically stable. PT will sign off.  -     Row Name 09/10/21 1127          Therapy Assessment/Plan (PT)    Patient/Family Therapy Goals Statement (PT)  home with spouse  -     Criteria for Skilled Interventions Met (PT)  no problems identified which require skilled intervention  -     Row Name 09/10/21 1127          Positioning and Restraints    Pre-Treatment Position  in bed  -     Post Treatment Position  chair  -     In Chair  sitting;call light within reach;with family/caregiver;with nsg NA in room  -       User Key  (r) = Recorded By, (t) = Taken By, (c) = Cosigned By    Initials Name Provider Type    Kyara Tirado, PT Physical Therapist        Outcome Measures     Row Name 09/10/21 1132          How much help from another person do you currently need...    Turning from your back to your side while in flat bed without using bedrails?  4  -KH     Moving from lying on back to sitting on the side of a flat bed without bedrails?  4  -KH     Moving to and from a bed to a chair (including a wheelchair)?  4  -KH     Standing up from a chair using your arms (e.g., wheelchair, bedside chair)?  4  -KH     Climbing 3-5 steps with a railing?  3  -KH     To walk in hospital room?  4  -KH      AM-PAC 6 Clicks Score (PT)  23  -KH     Row Name 09/10/21 1132          Functional Assessment    Outcome Measure Options  AM-PAC 6 Clicks Basic Mobility (PT)  -KH       User Key  (r) = Recorded By, (t) = Taken By, (c) = Cosigned By    Initials Name Provider Type    Kyara Tirado PT Physical Therapist          PT Recommendation and Plan     Plan of Care Reviewed With: patient  Outcome Summary: Pt is s/p colectomy, colostomy and appendectomy. He reports he was independently mobile prior to admission and plans to d/c home soon. Pt ambulated 300 ft with SBA and appears to be functioning at baseline level. Pt is ssafe to d/c home when medically stable. PT will sign off.     Time Calculation:   PT Charges     Row Name 09/10/21 1132             Time Calculation    Start Time  1046  -KH      Stop Time  1110  -KH      Time Calculation (min)  24 min  -KH         Untimed Charges    PT Eval/Re-eval Minutes  24  -KH         Total Minutes    Untimed Charges Total Minutes  24  -KH       Total Minutes  24  -KH        User Key  (r) = Recorded By, (t) = Taken By, (c) = Cosigned By    Initials Name Provider Type    Kyara Tirado, PT Physical Therapist        Therapy Charges for Today     Code Description Service Date Service Provider Modifiers Qty    [id number] HC PT EVAL LOW COMPLEXITY 1 9/10/2021 Kyara Sunshine, PT GP 1          PT G-Codes  Outcome Measure Options: AM-PAC 6 Clicks Basic Mobility (PT)  AM-PAC 6 Clicks Score (PT): 23    PT Discharge Summary  Anticipated Discharge Disposition (PT): home with assist    Kyara Sunshine, PT  9/10/2021

## 2021-09-10 NOTE — PROGRESS NOTES
"General Surgery  Progress Note    CC: Follow-up perforated diverticultis    POD#3 open [place]    S: He is tolerating a regular diet with good output from his colostomy. He reported some nausea to his wife this morning, but denies any currently. He is very grumpy, stating he cannot sleep in the hospital and wants to go home.    O:/86 (BP Location: Right arm, Patient Position: Sitting)   Pulse 75   Temp 98 °F (36.7 °C) (Oral)   Resp 18   Ht 177.8 cm (70\")   Wt 109 kg (240 lb)   SpO2 96%   BMI 34.44 kg/m²     Intake & Output:  UOP: 1600 mL/24 hrs  Colostomy: 375 ml/24 hrs yesterday, another 250 ml today so far    GENERAL: alert, well appearing, and in no distress  HEENT: normocephalic, atraumatic, moist mucous membranes, clear sclerae   CHEST: clear to auscultation, no wheezes, rales or rhonchi, symmetric air entry  CARDIAC: regular rate and rhythm    ABDOMEN: Soft, distended/obese, appropriate incisional tenderness, incision clean/dry/intact with staples, colostomy in left lateral abdomen beefy red with stool in bag  EXTREMITIES: no cyanosis, clubbing, or edema   SKIN: Warm and moist, no rashes    LABS  Results from last 7 days   Lab Units 09/09/21  0550 09/08/21  0700 09/07/21  0835   WBC 10*3/mm3 9.53 10.00 11.83*   HEMOGLOBIN g/dL 11.9* 11.7* 11.8*   HEMATOCRIT % 35.6* 36.2* 35.9*   PLATELETS 10*3/mm3 188 173 168   MONOCYTES % %  --  5.0 4.0*     Results from last 7 days   Lab Units 09/10/21  0517 09/09/21  0550 09/08/21  0700 09/07/21  0835 09/06/21  2202   SODIUM mmol/L 135*  132* 136 130*   < > 134*   POTASSIUM mmol/L 3.9  3.7 4.3 4.9   < > 4.5   CHLORIDE mmol/L 100  98 99 100   < > 100   CO2 mmol/L 19.5*  21.0* 21.9* 15.5*   < > 17.8*   BUN mg/dL 69*  68* 62* 62*   < > 65*   CREATININE mg/dL 5.00*  4.66* 4.86* 5.09*   < > 5.53*   CALCIUM mg/dL 9.4  9.1 9.0 8.7   < > 9.3   BILIRUBIN mg/dL 0.5  --   --   --  0.3   ALK PHOS U/L 121*  --   --   --  67   ALT (SGPT) U/L 29  --   --   --  11 "   AST (SGOT) U/L 26  --   --   --  13   GLUCOSE mg/dL 120*  113* 123* 143*   < > 164*    < > = values in this interval not displayed.     Results from last 7 days   Lab Units 09/06/21  2202   INR  1.14*     Microbiology:  Abdominal fluid culture from intra-operative specimen: Light growth Klebsiella oxytoca, Light growth ESBL EColi, Rare Pseudomonas, Light growth enterococcus species    A/P: 71 y.o. male POD#3 open [place] for perforated diverticulitis    Consult Infectious Disease given his polymicrobial intra-abdominal fluid infection with ESBL Ecoli, Pseudomonas, Klebsiella, etc. I washed out the abdomen very well, so I don't feel strongly that he will need long-term antibiotics at home. Continue regular diet and colostomy teaching. He can be discharged home potentially this weekend if he proves to be steady on his feet.    Yelena Guerra MD  General, Robotic, and Endoscopic Surgery  [facility]    [address], [address]  [place] [address]  P: [phone]  F: [phone]

## 2021-09-10 NOTE — PROGRESS NOTES
[facility] Progress Note      Patient Name: Lefty Cai  : 1950  MRN: [id number]  Primary Care Physician:  Daksha Ng APRN  Date of admission: 2021    Subjective     Interval History:   Blood pressures remain high but are improving on minoxidil  No shortness of breath on room air  Walked earlier without difficulty  Appetite fine; abdominal pain well-controlled    Review of Systems:   As noted above    Objective     Vitals:   Temp:  [97.7 °F (36.5 °C)-98.3 °F (36.8 °C)] 97.7 °F (36.5 °C)  Heart Rate:  [72-79] 75  Resp:  [16-18] 18  BP: (145-171)/(74-94) 145/74    Intake/Output Summary (Last 24 hours) at 9/10/2021 1932  Last data filed at 9/10/2021 1912  Gross per 24 hour   Intake 122 ml   Output 1500 ml   Net -1378 ml       Physical Exam:    Constitutional: Awake, alert, NAD, overweight  HEENT: Sclerae anicteric, MMM, AT/NC  Neck: Supple, trachea at midline, no JVD  Resp: Diminished air movement either flank; no wheezes, nonlabored on RA  Cardiovascular: RRR, no rub  GI: BS hypoactive, tender, +distended, colostomy LLQ, midline incision C/D/I  : No palpable bladder  Musculoskeletal: Trace edema  Vascular: Left arm AV fistula patent  Psychiatric: Appropriate affect, cooperative, oriented fully  Neurologic: No asterixis; moving all extremities, normal speech and mental status  Skin: Warm and dry    Scheduled Meds:     amLODIPine, 5 mg, Oral, Q24H  atorvastatin, 20 mg, Oral, Nightly  cetirizine, 5 mg, Oral, Daily  guaiFENesin, 600 mg, Oral, Q12H  insulin lispro, 0-7 Units, Subcutaneous, TID AC  meropenem, 500 mg, Intravenous, Q12H  metoprolol succinate XL, 100 mg, Oral, Nightly  minoxidil, 2.5 mg, Oral, Q12H  sodium chloride, 10 mL, Intravenous, Q12H      IV Meds:        Results Reviewed:   I have personally reviewed the results from the time of this admission to 9/10/2021 19:32 EDT     Results from last 7 days   Lab Units 09/10/21  0517 21  0550  09/08/21  0700 09/07/21  0835 09/06/21  2202   SODIUM mmol/L 135*  132* 136 130*   < > 134*   POTASSIUM mmol/L 3.9  3.7 4.3 4.9   < > 4.5   CHLORIDE mmol/L 100  98 99 100   < > 100   CO2 mmol/L 19.5*  21.0* 21.9* 15.5*   < > 17.8*   BUN mg/dL 69*  68* 62* 62*   < > 65*   CREATININE mg/dL 5.00*  4.66* 4.86* 5.09*   < > 5.53*   CALCIUM mg/dL 9.4  9.1 9.0 8.7   < > 9.3   BILIRUBIN mg/dL 0.5  --   --   --  0.3   ALK PHOS U/L 121*  --   --   --  67   ALT (SGPT) U/L 29  --   --   --  11   AST (SGOT) U/L 26  --   --   --  13   GLUCOSE mg/dL 120*  113* 123* 143*   < > 164*    < > = values in this interval not displayed.       Estimated Creatinine Clearance: 17.7 mL/min (A) (by C-G formula based on SCr of 4.66 mg/dL (H)).    Results from last 7 days   Lab Units 09/10/21  0517 09/09/21  0550 09/08/21  0700   MAGNESIUM mg/dL  --   --  2.2   PHOSPHORUS mg/dL 3.9 3.5 4.4       Results from last 7 days   Lab Units 09/08/21  0700   URIC ACID mg/dL 7.7*       Results from last 7 days   Lab Units 09/10/21  1137 09/09/21  0550 09/08/21  0700 09/07/21  0835 09/06/21  2202   WBC 10*3/mm3 11.50* 9.53 10.00 11.83* 12.91*   HEMOGLOBIN g/dL 11.8* 11.9* 11.7* 11.8* 13.6   PLATELETS 10*3/mm3 248 188 173 168 169       Results from last 7 days   Lab Units 09/06/21  2202   INR  1.14*       Assessment / Plan     ASSESSMENT:  1.  CKD5 with surprisingly stable function thus far.  Volume status fine; normal potassium and anion gap.  Hyponatremia, stable, likely in part from enhanced ADH secretion post-op  2.  Perforated sigmoid diverticulitis with abscess, s/p sigmoid colectomy and colostomy creation 9/7  3.  Hypertension, improving  4.  DM2  5.  Anemia    PLAN:  1.  Continue minoxidil 2.5 mg twice daily, though will likely transition back to his home-dose of hydralazine soon (patient is understandably reluctant to change his home regimen that was working very well, he states, prior to hospitalization)  2.  Will resume his home regimen of  sodium bicarbonate once abdominal pain and distention better and bowel function more reliable  3.  Surveillance labs    Thank you for involving us in the care of Lefty LAVON Cai.  Please feel free to call with any questions.    Jose Mccall MD  09/10/21  19:32 EDT    [facility]  [phone]      Much of this encounter note is an electronic transcription/translation of spoken language to printed text. The electronic translation of spoken language may permit erroneous, or at times, nonsensical words or phrases to be inadvertently transcribed; Although I have reviewed the note for such errors, some may still exist.

## 2021-09-10 NOTE — PROGRESS NOTES
Name: Lefty Cai ADMIT: 2021   : 1950  PCP: Daksha Ng, DUSTY    MRN: [id number] LOS: 3 days   AGE/SEX: 71 y.o. male  ROOM: [facility]     Subjective   Subjective   Family at bedside.  BM in ostomy bag. Urinating without issue. Still with lower abdominal pain expected post op. Discussed culture results  Review of Systems   Constitutional: Negative for chills and fever.   Respiratory: Negative for cough and shortness of breath.    Cardiovascular: Negative for chest pain and leg swelling.   Gastrointestinal: Positive for abdominal pain (improving). Negative for constipation, diarrhea, nausea and vomiting.   Endocrine: Negative for polydipsia, polyphagia and polyuria.   Genitourinary: Negative for difficulty urinating and dysuria.   Musculoskeletal: Positive for back pain. Negative for arthralgias.   Neurological: Positive for weakness (generalized). Negative for dizziness, tremors, seizures, syncope, facial asymmetry, speech difficulty, light-headedness, numbness and headaches.   Psychiatric/Behavioral: Negative for confusion and hallucinations.        Objective   Objective   Vital Signs  Temp:  [97.4 °F (36.3 °C)-98.1 °F (36.7 °C)] 98 °F (36.7 °C)  Heart Rate:  [70-77] 75  Resp:  [16-18] 18  BP: (155-184)/(82-95) 171/86  SpO2:  [95 %-96 %] 96 %  on   ;   Device (Oxygen Therapy): room air  Body mass index is 34.44 kg/m².     Physical Exam Constitutional:       General: He is not in acute distress.     Appearance: He is obese. He is ill-appearing. He is not toxic-appearing.   HENT:      Head: Normocephalic and atraumatic.   Eyes:      Extraocular Movements: Extraocular movements intact.      Conjunctiva/sclera: Conjunctivae normal.   Cardiovascular:      Rate and Rhythm: Normal rate.      Heart sounds: Normal heart sounds.   Pulmonary:      Effort: Pulmonary effort is normal.      Breath sounds: Normal breath sounds.   Abdominal:      Hypoactive.     Palpations: Abdomen is soft.      Tenderness:  There is abdominal tenderness. There is no guarding.      Comments: Ostomy, drain, midline ventral incision   Musculoskeletal:         General: No tenderness.      Cervical back: Normal range of motion and neck supple.      Right lower leg: No edema.      Left lower leg: No edema.   Skin:     General: Skin is warm and dry.   Neurological:      Mental Status: He is oriented to person, place, and time.      Cranial Nerves: No cranial nerve deficit.      Motor: Weakness (generalized) present.   Psychiatric:         Behavior: Behavior normal.         Thought Content: Thought content normal.     Results Review     I reviewed the patient's new clinical results.  Results from last 7 days   Lab Units 09/09/21  0550 09/08/21  0700 09/07/21  0835 09/06/21  2202   WBC 10*3/mm3 9.53 10.00 11.83* 12.91*   HEMOGLOBIN g/dL 11.9* 11.7* 11.8* 13.6   PLATELETS 10*3/mm3 188 173 168 169     Results from last 7 days   Lab Units 09/10/21  0517 09/09/21  0550 09/08/21  0700 09/07/21  0835   SODIUM mmol/L 132* 136 130* 134*   POTASSIUM mmol/L 3.7 4.3 4.9 5.1   CHLORIDE mmol/L 98 99 100 102   CO2 mmol/L 21.0* 21.9* 15.5* 17.6*   BUN mg/dL 68* 62* 62* 65*   CREATININE mg/dL 4.66* 4.86* 5.09* 5.16*   GLUCOSE mg/dL 113* 123* 143* 109*   Estimated Creatinine Clearance: 17.7 mL/min (A) (by C-G formula based on SCr of 4.66 mg/dL (H)).  Results from last 7 days   Lab Units 09/10/21  0517 09/09/21  0550 09/08/21  0700 09/06/21  2202   ALBUMIN g/dL 2.80* 2.80* 3.00* 3.80   BILIRUBIN mg/dL  --   --   --  0.3   ALK PHOS U/L  --   --   --  67   AST (SGOT) U/L  --   --   --  13   ALT (SGPT) U/L  --   --   --  11     Results from last 7 days   Lab Units 09/10/21  0517 09/09/21  0550 09/08/21  0700 09/07/21  0835 09/06/21 2202 09/06/21 2202   CALCIUM mg/dL 9.1 9.0 8.7 8.7   < > 9.3   ALBUMIN g/dL 2.80* 2.80* 3.00*  --   --  3.80   MAGNESIUM mg/dL  --   --  2.2  --   --   --    PHOSPHORUS mg/dL 3.9 3.5 4.4  --   --   --     < > = values in this interval  not displayed.     Results from last 7 days   Lab Units 09/06/21  2202   PROCALCITONIN ng/mL 7.18*   LACTATE mmol/L 1.3     COVID19   Date Value Ref Range Status   09/06/2021 Not Detected Not Detected - Ref. Range Final     Glucose   Date/Time Value Ref Range Status   09/10/2021 0625 105 70 - 130 mg/dL Final     Comment:     Meter: [id number] : [id number] Stephen James NA   09/09/2021 2018 119 70 - 130 mg/dL Final     Comment:     Meter: [id number] : [id number] Laurence Salas NA   09/09/2021 1612 117 70 - 130 mg/dL Final     Comment:     Meter: [id number] : [id number] Bert Jacobson NA   09/09/2021 1103 142 (H) 70 - 130 mg/dL Final     Comment:     Meter: [id number] : [id number] Bert Indira NA   09/09/2021 0611 130 70 - 130 mg/dL Final     Comment:     Meter: [id number] : [id number] Lucius Rosa NA   09/08/2021 2023 143 (H) 70 - 130 mg/dL Final     Comment:     Meter: [id number] : [id number] Lucius Rosa NA   09/08/2021 1647 125 70 - 130 mg/dL Final     Comment:     Meter: [id number] : [id number] Ramez ROSALES       CT Abdomen Pelvis Without Contrast  Narrative: CT OF THE ABDOMEN AND PELVIS WITHOUT CONTRAST     HISTORY: Right lower quadrant abdominal pain     COMPARISON: None available.     TECHNIQUE: Axial CT imaging was obtained through the abdomen and pelvis.  No IV contrast was administered.     FINDINGS:  Images through the lung bases do not demonstrate any acute  abnormalities. The patient is noted to have a large volume of  intraperitoneal free air. The source images suspected to be due to  perforated diverticulitis within the right lower quadrant, as the  patient has sigmoid diverticular disease within this area. There is  extensive inflammatory stranding within the right lower quadrant. This  does involve some loops of small bowel within this area, although this  is favored to be secondary to the inflammation within the colon. The  patient does not have any evidence of bowel  obstruction. The appendix is  normal. No focal hepatic lesions are seen. The stomach is unremarkable.  There is a duodenal diverticula arising from the third portion of the  duodenum. Adrenal glands are normal. Gallbladder is grossly  unremarkable. Spleen and pancreas are within normal limits. There is  calcification of the aorta. No hydronephrosis is seen on either side.  There is a simple appearing right renal cyst. No additional follow-up is  necessary. Left kidney does appear mildly atrophic. There is aneurysmal  dilatation of the infrarenal abdominal aorta, measuring 3.0 x 3.0 cm.  Urinary bladder is decompressed. Prostate gland is within normal limits.  No acute osseous abnormalities are seen.     Impression:    1. Large volume of intraperitoneal free air, with suspected source  within the right lower quadrant, where the patient has an inflamed loop  of sigmoid colon. A discrete drainable abscess is not seen. There are  also some inflamed loops of small bowel within this area, although this  is also be secondary involvement from the sigmoid colon. Urgent general  surgical consultation is recommended.     FINDINGS were called to Dr. Sharp at 11:10 PM.     Radiation dose reduction techniques were utilized, including automated  exposure control and exposure modulation based on body size.     This report was finalized on 9/6/2021 11:17 PM by Dr. Mini Segura M.D.     XR Chest 1 View  Narrative: SINGLE VIEW OF THE CHEST     HISTORY: Dyspnea     COMPARISON: None available.     FINDINGS:  Heart size is within normal limits. No pneumothorax, pleural effusion,  or acute infiltrate is seen. There is a suggestion of a lucency  underneath the right hemidiaphragm. Clinical significance is uncertain.  It may represent normal bowel gas, but correlation with any symptoms of  abdominal pain is suggested.     Impression:    1. No acute intrathoracic findings.  2. Nonspecific lucency seen underlying the right diaphragm.  Correlation  with any symptoms of abdominal pain is suggested.     This report was finalized on 9/6/2021 10:15 PM by Dr. Mini Segura M.D.       Scheduled Medications  amLODIPine, 5 mg, Oral, Q24H  atorvastatin, 20 mg, Oral, Nightly  cetirizine, 5 mg, Oral, Daily  guaiFENesin, 600 mg, Oral, Q12H  insulin lispro, 0-7 Units, Subcutaneous, TID AC  metoprolol succinate XL, 100 mg, Oral, Nightly  minoxidil, 2.5 mg, Oral, Q12H  piperacillin-tazobactam, 3.375 g, Intravenous, Q12H  sodium chloride, 10 mL, Intravenous, Q12H    Infusions   Diet  Diet Regular; Consistent Carbohydrate, Renal       Assessment/Plan     Active Hospital Problems    Diagnosis  POA   • **Diverticulitis of large intestine with perforation and abscess without bleeding [K57.20]  Yes   • Free intraperitoneal air [K66.8]  Yes   • Obesity (BMI 30-39.9) [E66.9]  Yes   • Essential hypertension [I10]  Yes   • CKD (chronic kidney disease) stage 4, GFR 15-29 ml/min (CMS/HCC) [N18.4]  Yes   • Type 2 diabetes mellitus with diabetic chronic kidney disease (CMS/HCC) [E11.22]  Yes      Resolved Hospital Problems   No resolved problems to display.       71 y.o. male admitted with Diverticulitis of large intestine with perforation and abscess without bleeding.    Appreciate input all consultants     Diverticulitis of large intestine with perforation and abscess without bleeding / free intraperitoneal air  General surgery following status post--open sigmoid colectomy, colostomy, appendectomy on 9/7/2021  He has been on zosyn   Body fluid cultures w/ ESBL, klebsiella. ID consult placed. Switch to meropenem but no plans for DC on abx after thorough surgical washout.      DM2  A1c 6.5    Glucose readings acceptable, consistent   Low-dose lispro sliding scale       Essential hypertension   medication management per renal         CKD stage 5/ hyponatremia   Serum creatinine at baseline continues to improve  Left upper extremity AV fistula matured--restricted  extremity  Nephrology following & managing IVF, surveillance labs, sodium    Reddy removed on 9/8-->voiding independently, UOP noted     Obesity  Complicating of problems    · SCD for DVT prophylaxis.  · CPR  · Discussed with Dr. Becker.  · Anticipate discharge possibly home with [facility] (new ostomy) 1-2days  ·  CCP following      DUSTY Chiu  [facility]  09/10/21  09:27 EDT

## 2021-09-10 NOTE — PROGRESS NOTES
"Pharmacy to dose Merrem    Patient: Lefty Cai (E467/1,  LOS: 3 days )  Relevant clinical data and objective history reviewed:  71 y.o. male 177.8 cm (70\") 109 kg (240 lb)  Ideal body weight: 73 kg (160 lb 15 oz)  Adjusted ideal body weight: 87.3 kg (192 lb 9 oz) [unfilled] Body mass index is 34.44 kg/m².  he has a past medical history of CKD (chronic kidney disease), Diabetes mellitus, type 2 (CMS/HCC), GERD (gastroesophageal reflux disease), H/O renal cell carcinoma (2007), Hyperlipidemia, Hypertension, Primary insomnia (6/13/2016), Proteinuria, Risk factors for obstructive sleep apnea, Sinus drainage, and Vitamin D deficiency (8/14/2017).    Day #1     Consult for Dr Mckeon  Indication: Sepsis secondary to sigmoid perforated diverticulitis     Microbiology Results (last 10 days)     Procedure Component Value - Date/Time    Anaerobic Culture - Wound, Abdomen, Left [[id number]] Collected: 09/07/21 0205    Lab Status: Preliminary result Specimen: Wound from Abdomen, Left Updated: 09/10/21 0652     Anaerobic Culture Screening for Anaerobes    Body Fluid Culture - Body Fluid, Peritoneum [[id number]]  (Abnormal)  (Susceptibility) Collected: 09/07/21 0205    Lab Status: Preliminary result Specimen: Body Fluid from Peritoneum Updated: 09/10/21 0842     Body Fluid Culture Light growth (2+) Klebsiella oxytoca      Light growth (2+) Escherichia coli ESBL     Comment: Consider infectious disease consult.  Susceptibility results may not correlate to clinical outcomes.         Rare Pseudomonas species      Light growth (2+) Enterococcus species     Gram Stain Many (4+) WBCs per low power field      Moderate (3+) Gram positive cocci      Rare (1+) Gram negative bacilli    Susceptibility      Klebsiella oxytoca Escherichia coli ESBL      DARRELL DARRELL      Ampicillin Resistant       Ampicillin + Sulbactam Susceptible       Cefepime Susceptible       Ceftazidime Susceptible       Ceftriaxone Susceptible       " Ertapenem  Susceptible      Gentamicin Susceptible       Levofloxacin Susceptible       Meropenem  Susceptible      Piperacillin + Tazobactam Susceptible       Trimethoprim + Sulfamethoxazole Susceptible                  Linear View               Susceptibility Comments     Klebsiella oxytoca    Cefazolin sensitivity will not be reported for Enterobacteriaceae in non-urine isolates. If cefazolin is preferred, please call the microbiology lab to request an E-test.  With the exception of urinary-sourced infections, aminoglycosides should not be used as monotherapy.    Escherichia coli ESBL    Cefazolin sensitivity will not be reported for Enterobacteriaceae in non-urine isolates. If cefazolin is preferred, please call the microbiology lab to request an E-test.  With the exception of urinary-sourced infections, aminoglycosides should not be used as monotherapy.             COVID PRE-OP / PRE-PROCEDURE SCREENING ORDER (NO ISOLATION) - Swab, Nasopharynx [[id number]]  (Normal) Collected: 09/06/21 2215    Lab Status: Final result Specimen: Swab from Nasopharynx Updated: 09/06/21 2330    Narrative:      The following orders were created for panel order COVID PRE-OP / PRE-PROCEDURE SCREENING ORDER (NO ISOLATION) - Swab, Nasopharynx.  Procedure                               Abnormality         Status                     ---------                               -----------         ------                     COVID-19,[facility]-HOUSE...[[id number]]  Normal              Final result                 Please view results for these tests on the individual orders.    COVID-19,[facility]HOU[facility] CEPHEID/FINESSE NP SWAB IN TRANSPORT MEDIA 8-12 HR TAT - Swab, Nasopharynx [[id number]]  (Normal) Collected: 09/06/21 2215    Lab Status: Final result Specimen: Swab from Nasopharynx Updated: 09/06/21 2330     COVID19 Not Detected    Narrative:      Fact sheet for providers: https://www.fda.gov/media/331428/download     Fact sheet for patients:  https://www.fda.gov/media/184981/download          Radiology:  Date Location/type Results               Imaging Results (All)     Procedure Component Value Units Date/Time    CT Abdomen Pelvis Without Contrast [[id number]] Collected: 09/06/21 2307     Updated: 09/06/21 2321    Narrative:      CT OF THE ABDOMEN AND PELVIS WITHOUT CONTRAST     HISTORY: Right lower quadrant abdominal pain     COMPARISON: None available.     TECHNIQUE: Axial CT imaging was obtained through the abdomen and pelvis.  No IV contrast was administered.     FINDINGS:  Images through the lung bases do not demonstrate any acute  abnormalities. The patient is noted to have a large volume of  intraperitoneal free air. The source images suspected to be due to  perforated diverticulitis within the right lower quadrant, as the  patient has sigmoid diverticular disease within this area. There is  extensive inflammatory stranding within the right lower quadrant. This  does involve some loops of small bowel within this area, although this  is favored to be secondary to the inflammation within the colon. The  patient does not have any evidence of bowel obstruction. The appendix is  normal. No focal hepatic lesions are seen. The stomach is unremarkable.  There is a duodenal diverticula arising from the third portion of the  duodenum. Adrenal glands are normal. Gallbladder is grossly  unremarkable. Spleen and pancreas are within normal limits. There is  calcification of the aorta. No hydronephrosis is seen on either side.  There is a simple appearing right renal cyst. No additional follow-up is  necessary. Left kidney does appear mildly atrophic. There is aneurysmal  dilatation of the infrarenal abdominal aorta, measuring 3.0 x 3.0 cm.  Urinary bladder is decompressed. Prostate gland is within normal limits.  No acute osseous abnormalities are seen.       Impression:         1. Large volume of intraperitoneal free air, with suspected source  within the  right lower quadrant, where the patient has an inflamed loop  of sigmoid colon. A discrete drainable abscess is not seen. There are  also some inflamed loops of small bowel within this area, although this  is also be secondary involvement from the sigmoid colon. Urgent general  surgical consultation is recommended.     FINDINGS were called to Dr. Sharp at 11:10 PM.     Radiation dose reduction techniques were utilized, including automated  exposure control and exposure modulation based on body size.     This report was finalized on 9/6/2021 11:17 PM by Dr. Mini Segura M.D.       XR Chest 1 View [[id number]] Collected: 09/06/21 2213     Updated: 09/06/21 2218    Narrative:      SINGLE VIEW OF THE CHEST     HISTORY: Dyspnea     COMPARISON: None available.     FINDINGS:  Heart size is within normal limits. No pneumothorax, pleural effusion,  or acute infiltrate is seen. There is a suggestion of a lucency  underneath the right hemidiaphragm. Clinical significance is uncertain.  It may represent normal bowel gas, but correlation with any symptoms of  abdominal pain is suggested.       Impression:         1. No acute intrathoracic findings.  2. Nonspecific lucency seen underlying the right diaphragm. Correlation  with any symptoms of abdominal pain is suggested.     This report was finalized on 9/6/2021 10:15 PM by Dr. Mini Segura M.D.               Anti-Infectives (From admission, onward)    Ordered     Dose/Rate Route Frequency Start Stop    09/10/21 1534  meropenem (MERREM) 500mg/100 mL 0.9% NS IVPB (mbp)     Ordering Provider: Harvinder Mckeon MD    500 mg  over 180 Minutes Intravenous Every 12 Hours 09/10/21 2315 09/17/21 1114    09/06/21 2317  piperacillin-tazobactam (ZOSYN) 3.375 g in iso-osmotic dextrose 50 ml (premix)     Ordering Provider: Kimani Sharp MD    3.375 g  over 30 Minutes Intravenous Once 09/06/21 2319 09/07/21 0040        Temp Readings from Last 1 Encounters:   09/10/21 98.3  °F (36.8 °C) (Oral)     Serum creatinine: 4.66 mg/dL (H) 09/10/21 0517  Estimated creatinine clearance: 17.7 mL/min (A)    Assessment/Plan:  Start merrem 500 mg IV q12h per [facility] extended interval dosing protocol for CrCl <20     Mendel Baker, PharmD  09/10/21 15:34 EDT

## 2021-09-10 NOTE — CONSULTS
Referring Provider:  KIRA Andrade For ESBL    Subjective   History of present illness: This is a very nice 71-year-old gentleman we are asked for evaluation opinion regarding perforated diverticulitis with ESBL.  Per the patient and his wife, is in his usual state of health until 9/3/2021 when developed nausea and vomiting as well as acute onset of constant right lower quadrant abdominal pain which persisted and was not associate with any fevers or chills or night sweats.  He ended up coming to the emergency department on 9/6/2021 and CT imaging showed a large amount of free intraperitoneal air.  He was found to have perforated sigmoid diverticulitis with abscess and was taken to the operating room for open sigmoid colectomy, colostomy and appendectomy by Dr. Guerra.  Postoperatively he has done well.  He has been managed with Zosyn but operative cultures today resulted with E. coli ESBL, Pseudomonas, Klebsiella oxytoca and Enterococcus species.    Past Medical History:   Diagnosis Date   • CKD (chronic kidney disease)    • Diabetes mellitus, type 2 (CMS/HCC)    • GERD (gastroesophageal reflux disease)    • H/O renal cell carcinoma 2007    partial nephrectomy   • Hyperlipidemia    • Hypertension    • Primary insomnia 6/13/2016   • Proteinuria    • Risk factors for obstructive sleep apnea     STOP BANG 6   • Sinus drainage    • Vitamin D deficiency 8/14/2017       Past Surgical History:   Procedure Laterality Date   • ARTERIOVENOUS FISTULA/SHUNT SURGERY Left 8/15/2019    Procedure: LEFT MID FOREARM RADIAL CEPHALIC ARTERIAL VENOUS FISTULA;  Surgeon: Louann Miles Jr., MD;  Location: [facility];  Service: Vascular   • EYE SURGERY      CATARACTS   • KNEE ARTHROSCOPY     • NEPHRECTOMY PARTIAL  2007    Dr. Victor       No family history of infectious diseases  Social history: He is  and lives here in [place], [place].  Uses smokeless tobacco    Allergies   Allergen Reactions   • Latex Other  (See Comments)     Blisters   • Contrast Dye Other (See Comments)     KIDNEY DISEASE       Medication:  Antibiotics:  Anti-Infectives (From admission, onward)    Ordered     Dose/Rate Route Frequency Start Stop    09/10/21 1029  meropenem (MERREM) 500mg/100 mL 0.9% NS IVPB (mbp)     Ordering Provider: Harvinder Mckeon MD    500 mg  over 30 Minutes Intravenous Every 12 Hours 09/10/21 1115 09/13/21 2314    09/10/21 1029  Pharmacy to Dose meropenem (MERREM)     Ordering Provider: Harvinder Mckeon MD     Does not apply Continuous PRN 09/10/21 1028 09/17/21 1027    09/06/21 2317  piperacillin-tazobactam (ZOSYN) 3.375 g in iso-osmotic dextrose 50 ml (premix)     Ordering Provider: Kimani Sharp MD    3.375 g  over 30 Minutes Intravenous Once 09/06/21 2319 09/07/21 0040          Review of Systems  Pertinent items are noted in HPI, all other systems reviewed and negative    Objective     Physical Exam:   Vital Signs   Temp:  [97.4 °F (36.3 °C)-98.1 °F (36.7 °C)] 98 °F (36.7 °C)  Heart Rate:  [70-77] 75  Resp:  [16-18] 18  BP: (155-184)/(82-95) 171/86    GENERAL: Awake and alert, in no acute distress.   HEENT: Oropharynx is clear. Hearing is grossly normal.   EYES: PERRL. No conjunctival injection. No lid lag.   LYMPHATICS: No lymphadenopathy of the neck or inguinal regions.   HEART: Regular rate and regular rhythm.  1+ peripheral edema.   LUNGS: Clear to auscultation anteriorly with normal respiratory effort.   GI: Soft, nontender, nondistended.  Postoperative changes in place with ostomy and midline incision which appears clean.  No appreciable organomegaly.   SKIN: Warm and dry without cutaneous eruptions   PSYCHIATRIC: Appropriate mood, affect, insight, and judgment.     Results Review:       Lab Results   Component Value Date    WBC 9.53 09/09/2021    HGB 11.9 (L) 09/09/2021    HCT 35.6 (L) 09/09/2021    MCV 88.3 09/09/2021     09/09/2021     Creatinine 4.6      Estimated Creatinine  Clearance: 17.7 mL/min (A) (by C-G formula based on SCr of 4.66 mg/dL (H)).      Microbiology:  9/7 body fluid culture light growth Klebsiella oxytocin, ESBL E. coli, rare Pseudomonas, light Enterococcus      Radiology: As per HPI      Assessment/Plan   1.  Sepsis secondary to sigmoid perforated diverticulitis with abscess and peritonitis  2.  Chronic kidney disease stage IV    Discussed with Dr. Guerra this morning and given aggressive surgical washout, do not anticipate need for long-term antibiotics.  The Zosyn he was on probably provide some coverage even against the ESBL E. coli and he is not behaving as if he has ongoing sepsis.  Therefore I think we can just switch him to meropenem for enhanced ESBL coverage while inpatient but not plan to discharge him on any additional antibiotics. Start merrem 500mg IV q12.         Thank you for this consult.  We will be available if additional issues arise.      Harvinder Mckeon MD  09/10/21  10:56 EDT

## 2021-09-10 NOTE — NURSING NOTE
"   09/10/21 1430   Colostomy LUQ   Placement Date/Time: 09/07/21 0214   Inserted by: estelle  Location: LUQ   Stomal Appliance 2 piece;Changed   Stoma Appearance round;pink;black;flush with skin  (distal stoma edge is black but moist, notified MD)   Peristomal Assessment Clean;Intact   Accessories/Skin Care skin barrier ring;skin sealant;wafer barrier over peristomal skin   Stoma Function flatus;stool   Stool Color brown, dark   Stool Consistency loose     CWON note: pt seen for follow up colostomy assessment and teaching, post-op day 3. Pt's wife at bedside and observed pouch change today. Pt is unable to visualize his stoma, so pt will need his wife to assist him or we discussed changing his appliance standing up looking into a mirror. Colostomy is 1 3/8\" pink; however, distal portion of stoma is moist but black (Dr Guerra notified of this), slight dimpling of skin at 9 o'clock. Peristomal skin is  clear and intact, small fluid filled blister at edge of incision, but not near the stoma. Instructed in appliance change using 2-piece Laila 2 3/4\" barrier and pouch with barrier ring and barrier spray. Instructed in surgical procedure, A&P of GI systems, frequency of pouch change, emptying and use of lock and roll closure with drainable pouches vs a closed end pouch once stool gets more formed, skin care/ showering, diet, supplies, and return to ADL's. Teaching packing and supplies left at bedside. Reviewed handouts in the teaching packet. All questions answered. Will have samples from [employer] sent to pt's home with verbal consent of pt.   "

## 2021-09-10 NOTE — CASE MANAGEMENT/SOCIAL WORK
Continued Stay Note  [facility]     Patient Name: Lefty Cai  MRN: [id number]  Today's Date: 9/10/2021    Admit Date: 9/6/2021    Discharge Plan     Row Name 09/10/21 1919       Plan    Plan  Home with family and [employer]  for new ostomy. Family to transport. Per ID, can just switch him to meropenem for enhanced ESBL coverage while inpatient but not plan to discharge him on any additional antibiotics.    Patient/Family in Agreement with Plan  yes    Plan Comments  Ambulated 300 ft with PT today. Culture of peritoneal fluid shows ESBL. Per ID, can just switch him to meropenem for enhanced ESBL coverage while inpatient but not plan to discharge him on any additional antibiotics. [employer]  setup to see at D/C. Bhaskar Bonds RN-BC        Discharge Codes    No documentation.       Expected Discharge Date and Time     Expected Discharge Date Expected Discharge Time    Sep 12, 2021             Bhaskar Bonds RN

## 2021-09-10 NOTE — PLAN OF CARE
Goal Outcome Evaluation:  Plan of Care Reviewed With: patient        Progress: improving  Outcome Summary: Ostomy bag checked frequently, BM looks good. Multiple urine output throughout the night; No c/o pain and slept for the most of the night. IV zoysn given; VSS and cont. monitor.

## 2021-09-10 NOTE — PLAN OF CARE
Goal Outcome Evaluation:  Plan of Care Reviewed With: patient           Outcome Summary: Pt is s/p colectomy, colostomy and appendectomy. He reports he was independently mobile prior to admission and plans to d/c home soon. Pt ambulated 300 ft with SBA and appears to be functioning at baseline level. Pt is ssafe to d/c home when medically stable. PT will sign off.

## 2021-09-10 NOTE — PLAN OF CARE
Goal Outcome Evaluation:              Outcome Summary: Pt put in contact isolation for positive culture for E. coli and ESBL. Merrem started. WOCN seen pt and noticed dark discoloration on stoma. MD notified. No other complaints, will continue to monitor.

## 2021-09-11 LAB
ALBUMIN SERPL-MCNC: 3 G/DL (ref 3.5–5.2)
ANION GAP SERPL CALCULATED.3IONS-SCNC: 16 MMOL/L (ref 5–15)
BACTERIA FLD CULT: ABNORMAL
BUN SERPL-MCNC: 69 MG/DL (ref 8–23)
BUN/CREAT SERPL: 14.7 (ref 7–25)
CALCIUM SPEC-SCNC: 8.9 MG/DL (ref 8.6–10.5)
CHLORIDE SERPL-SCNC: 100 MMOL/L (ref 98–107)
CO2 SERPL-SCNC: 19 MMOL/L (ref 22–29)
CREAT SERPL-MCNC: 4.7 MG/DL (ref 0.76–1.27)
DEPRECATED RDW RBC AUTO: 44.8 FL (ref 37–54)
ERYTHROCYTE [DISTWIDTH] IN BLOOD BY AUTOMATED COUNT: 13.3 % (ref 12.3–15.4)
GFR SERPL CREATININE-BSD FRML MDRD: 12 ML/MIN/1.73
GFR SERPL CREATININE-BSD FRML MDRD: ABNORMAL ML/MIN/{1.73_M2}
GLUCOSE BLDC GLUCOMTR-MCNC: 104 MG/DL (ref 70–130)
GLUCOSE BLDC GLUCOMTR-MCNC: 154 MG/DL (ref 70–130)
GLUCOSE BLDC GLUCOMTR-MCNC: 96 MG/DL (ref 70–130)
GLUCOSE SERPL-MCNC: 91 MG/DL (ref 65–99)
GRAM STN SPEC: ABNORMAL
HCT VFR BLD AUTO: 36.8 % (ref 37.5–51)
HGB BLD-MCNC: 11.9 G/DL (ref 13–17.7)
MAGNESIUM SERPL-MCNC: 2.1 MG/DL (ref 1.6–2.4)
MCH RBC QN AUTO: 29.2 PG (ref 26.6–33)
MCHC RBC AUTO-ENTMCNC: 32.3 G/DL (ref 31.5–35.7)
MCV RBC AUTO: 90.4 FL (ref 79–97)
PHOSPHATE SERPL-MCNC: 4.4 MG/DL (ref 2.5–4.5)
PLATELET # BLD AUTO: 243 10*3/MM3 (ref 140–450)
PMV BLD AUTO: 9.4 FL (ref 6–12)
POTASSIUM SERPL-SCNC: 4 MMOL/L (ref 3.5–5.2)
RBC # BLD AUTO: 4.07 10*6/MM3 (ref 4.14–5.8)
SODIUM SERPL-SCNC: 135 MMOL/L (ref 136–145)
WBC # BLD AUTO: 10.82 10*3/MM3 (ref 3.4–10.8)

## 2021-09-11 PROCEDURE — 85027 COMPLETE CBC AUTOMATED: CPT | Performed by: NURSE PRACTITIONER

## 2021-09-11 PROCEDURE — 82962 GLUCOSE BLOOD TEST: CPT

## 2021-09-11 PROCEDURE — 83735 ASSAY OF MAGNESIUM: CPT | Performed by: INTERNAL MEDICINE

## 2021-09-11 PROCEDURE — 99024 POSTOP FOLLOW-UP VISIT: CPT | Performed by: SURGERY

## 2021-09-11 PROCEDURE — 25010000002 MEROPENEM PER 100 MG: Performed by: INTERNAL MEDICINE

## 2021-09-11 PROCEDURE — 80069 RENAL FUNCTION PANEL: CPT | Performed by: INTERNAL MEDICINE

## 2021-09-11 RX ADMIN — MINOXIDIL 2.5 MG: 2.5 TABLET ORAL at 09:36

## 2021-09-11 RX ADMIN — CETIRIZINE HYDROCHLORIDE 5 MG: 10 TABLET ORAL at 09:36

## 2021-09-11 RX ADMIN — OXYCODONE AND ACETAMINOPHEN 1 TABLET: 7.5; 325 TABLET ORAL at 14:35

## 2021-09-11 RX ADMIN — METOPROLOL SUCCINATE 100 MG: 100 TABLET, EXTENDED RELEASE ORAL at 20:52

## 2021-09-11 RX ADMIN — ATORVASTATIN CALCIUM 20 MG: 20 TABLET, FILM COATED ORAL at 20:52

## 2021-09-11 RX ADMIN — OXYCODONE AND ACETAMINOPHEN 1 TABLET: 7.5; 325 TABLET ORAL at 09:02

## 2021-09-11 RX ADMIN — GUAIFENESIN 600 MG: 600 TABLET, EXTENDED RELEASE ORAL at 09:36

## 2021-09-11 RX ADMIN — MINOXIDIL 2.5 MG: 2.5 TABLET ORAL at 20:52

## 2021-09-11 RX ADMIN — MEROPENEM 500 MG: 500 INJECTION INTRAVENOUS at 23:41

## 2021-09-11 RX ADMIN — GUAIFENESIN 600 MG: 600 TABLET, EXTENDED RELEASE ORAL at 20:52

## 2021-09-11 RX ADMIN — SODIUM CHLORIDE, PRESERVATIVE FREE 10 ML: 5 INJECTION INTRAVENOUS at 09:36

## 2021-09-11 RX ADMIN — AMLODIPINE BESYLATE 5 MG: 5 TABLET ORAL at 09:36

## 2021-09-11 RX ADMIN — MEROPENEM 500 MG: 500 INJECTION INTRAVENOUS at 12:24

## 2021-09-11 RX ADMIN — SODIUM CHLORIDE, PRESERVATIVE FREE 10 ML: 5 INJECTION INTRAVENOUS at 20:57

## 2021-09-11 NOTE — PROGRESS NOTES
Chief Complaint:    POD 4, S/P Davies's procedure for perforated diverticulitis    Subjective:    Feels okay.  Tolerating regular diet.  Having an [employer] beef and chedder for lunch today.    Objective:    Vitals:    09/10/21 2112 09/10/21 2302 09/11/21 0738 09/11/21 1320   BP: 145/74 144/68 152/79 140/65   BP Location:  Right arm Right arm Right arm   Patient Position:  Sitting Lying Sitting   Pulse: 73 69 68 75   Resp:  18 20 20   Temp:  97.7 °F (36.5 °C) 97.4 °F (36.3 °C) 97.4 °F (36.3 °C)   TempSrc:  Oral Oral Oral   SpO2:  95% 97%    Weight:       Height:           Lungs: Clear  Heart: Regular  Abdomen: Incision looks okay.. BS present.  Colostomy functioning.   Extremities: Warm    Labs reviewed.  WBC 10.82, Hgb 11.9, platelets 243.  Creatinine 4.70.    Pathology showed perforated diverticulitis.    Assessment:    POD 4, S/P Davies's procedure for perforated diverticulitis    Plan:    Tolerating diet.    Colostomy is functioning.  From surgical standpoint, it is okay for discharge.

## 2021-09-11 NOTE — PROGRESS NOTES
"DAILY PROGRESS NOTE  [facility]    Patient Identification:  Name: Lefty Cai  Age: 71 y.o.  Sex: male  :  1950  MRN: [id number]         Primary Care Physician: Daksha Ng APRN      Subjective  No acute complaints.  Eating well.  Good ostomy output.  No abdominal pain.  He does need to be more active.    Objective:  General Appearance:  Comfortable, well-appearing, in no acute distress and not in pain (Obese.).    Vital signs: (most recent): Blood pressure 140/65, pulse 75, temperature 97.4 °F (36.3 °C), temperature source Oral, resp. rate 20, height 177.8 cm (70\"), weight 109 kg (240 lb), SpO2 97 %.    Lungs:  Normal effort and normal respiratory rate.  Breath sounds clear to auscultation.    Heart: Normal rate.  Regular rhythm.    Abdomen: Abdomen is soft and non-distended.  (Ostomy working well.).  Bowel sounds are normal.   There is no abdominal tenderness.     Extremities: There is no dependent edema.    Neurological: Patient is alert and oriented to person, place and time.    Skin:  Warm and dry.                Vital signs in last 24 hours:  Temp:  [97.4 °F (36.3 °C)-97.7 °F (36.5 °C)] 97.4 °F (36.3 °C)  Heart Rate:  [68-75] 75  Resp:  [18-20] 20  BP: (140-152)/(65-79) 140/65    Intake/Output:    Intake/Output Summary (Last 24 hours) at 2021 1554  Last data filed at 2021 1435  Gross per 24 hour   Intake 362 ml   Output 1925 ml   Net -1563 ml         Results from last 7 days   Lab Units 21  0635 09/10/21  1137 21  0550 21  0700 21  0835 21  2202   WBC 10*3/mm3 10.82* 11.50* 9.53 10.00 11.83* 12.91*   HEMOGLOBIN g/dL 11.9* 11.8* 11.9* 11.7* 11.8* 13.6   PLATELETS 10*3/mm3 243 248 188 173 168 169     Results from last 7 days   Lab Units 21  0635 09/10/21  0517 21  0550 21  0700 21  0835 212   SODIUM mmol/L 135* 135*  132* 136 130* 134* 134*   POTASSIUM mmol/L 4.0 3.9  3.7 4.3 4.9 5.1 4.5   CHLORIDE " mmol/L 100 100  98 99 100 102 100   CO2 mmol/L 19.0* 19.5*  21.0* 21.9* 15.5* 17.6* 17.8*   BUN mg/dL 69* 69*  68* 62* 62* 65* 65*   CREATININE mg/dL 4.70* 5.00*  4.66* 4.86* 5.09* 5.16* 5.53*   GLUCOSE mg/dL 91 120*  113* 123* 143* 109* 164*   Estimated Creatinine Clearance: 17.6 mL/min (A) (by C-G formula based on SCr of 4.7 mg/dL (H)).  Results from last 7 days   Lab Units 09/11/21  0635 09/10/21  0517 09/09/21  0550 09/08/21  0700 09/07/21  0835 09/06/21  2202   CALCIUM mg/dL 8.9 9.4  9.1 9.0 8.7 8.7 9.3   ALBUMIN g/dL 3.00* 3.10*  2.80* 2.80* 3.00*  --  3.80   MAGNESIUM mg/dL 2.1  --   --  2.2  --   --    PHOSPHORUS mg/dL 4.4 3.9 3.5 4.4  --   --      Results from last 7 days   Lab Units 09/11/21  0635 09/10/21  0517 09/09/21  0550 09/08/21  0700 09/06/21  2202   ALBUMIN g/dL 3.00* 3.10*  2.80* 2.80* 3.00* 3.80   BILIRUBIN mg/dL  --  0.5  --   --  0.3   ALK PHOS U/L  --  121*  --   --  67   AST (SGOT) U/L  --  26  --   --  13   ALT (SGPT) U/L  --  29  --   --  11       Assessment:    Diverticulitis of large intestine with perforation and abscess without bleeding    Type 2 diabetes mellitus with diabetic chronic kidney disease (CMS/[facility])    Essential hypertension    CKD (chronic kidney disease) stage 4, GFR 15-29 ml/min (CMS/[facility])    Free intraperitoneal air    Obesity (BMI 30-39.9)        Diverticulitis of large intestine with perforation and abscess without bleeding / free intraperitoneal air  General surgery following status post--open sigmoid colectomy, colostomy, appendectomy on 9/7/2021  Initially on Zosyn.  Switch to Merrem yesterday based on fluid cultures.  Body fluid cultures w/ ESBL, klebsiella. ID consult placed. Switch to meropenem but no plans for DC on abx after thorough surgical washout.      DM2  A1c 6.5    Glucose readings acceptable, consistent   Low-dose lispro sliding scale       Essential hypertension   medication management per renal         CKD stage 5/ hyponatremia   Serum  creatinine at baseline continues to improve  Left upper extremity AV fistula matured--restricted extremity  Nephrology following & managing IVF, surveillance labs, sodium    Redyd removed on 9/8-->voiding independently, UOP noted     Obesity  Complicating of problems     · SCD for DVT prophylaxis.  · CPR  ·  CCP following    Plan:  Please see above.  Discharge when okay with all.  Increase activity.    Baldev Becker MD  9/11/2021  15:54 EDT

## 2021-09-11 NOTE — PROGRESS NOTES
[facility] Progress Note      Patient Name: Lefty Cai  : 1950  MRN: [id number]  Primary Care Physician:  Daksha Ng APRN  Date of admission: 2021    Subjective     Interval History:     Patient resting comfortably  Blood pressure better overnight  No chest pain, shortness of breath, nausea or vomiting    Review of Systems:   As noted above    Objective     Vitals:   Temp:  [97.4 °F (36.3 °C)-98.3 °F (36.8 °C)] 97.4 °F (36.3 °C)  Heart Rate:  [68-79] 68  Resp:  [16-20] 20  BP: (144-169)/(68-84) 152/79    Intake/Output Summary (Last 24 hours) at 2021 1112  Last data filed at 2021 0738  Gross per 24 hour   Intake 122 ml   Output 1550 ml   Net -1428 ml       Physical Exam:    Constitutional: Awake, alert, NAD, overweight  HEENT: Sclerae anicteric, MMM, AT/NC  Neck: Supple, trachea at midline, no JVD  Resp: Diminished air movement either flank; no wheezes, nonlabored on RA  Cardiovascular: RRR, no rub  GI:  Mild tenderness, +distended, colostomy LLQ, midline incision C/D/I  : No palpable bladder  Musculoskeletal: Trace edema  Vascular: Left arm AV fistula patent  Psychiatric: Appropriate affect, cooperative, oriented fully  Neurologic: No asterixis; moving all extremities, normal speech and mental status  Skin: Warm and dry    Scheduled Meds:     amLODIPine, 5 mg, Oral, Q24H  atorvastatin, 20 mg, Oral, Nightly  cetirizine, 5 mg, Oral, Daily  guaiFENesin, 600 mg, Oral, Q12H  insulin lispro, 0-7 Units, Subcutaneous, TID AC  meropenem, 500 mg, Intravenous, Q12H  metoprolol succinate XL, 100 mg, Oral, Nightly  minoxidil, 2.5 mg, Oral, Q12H  sodium chloride, 10 mL, Intravenous, Q12H      IV Meds:        Results Reviewed:   I have personally reviewed the results from the time of this admission to 2021 11:12 EDT     Results from last 7 days   Lab Units 21  0635 09/10/21  0517 21  0550 21  0835 21  2202   SODIUM mmol/L 135* 135*   132* 136   < > 134*   POTASSIUM mmol/L 4.0 3.9  3.7 4.3   < > 4.5   CHLORIDE mmol/L 100 100  98 99   < > 100   CO2 mmol/L 19.0* 19.5*  21.0* 21.9*   < > 17.8*   BUN mg/dL 69* 69*  68* 62*   < > 65*   CREATININE mg/dL 4.70* 5.00*  4.66* 4.86*   < > 5.53*   CALCIUM mg/dL 8.9 9.4  9.1 9.0   < > 9.3   BILIRUBIN mg/dL  --  0.5  --   --  0.3   ALK PHOS U/L  --  121*  --   --  67   ALT (SGPT) U/L  --  29  --   --  11   AST (SGOT) U/L  --  26  --   --  13   GLUCOSE mg/dL 91 120*  113* 123*   < > 164*    < > = values in this interval not displayed.       Estimated Creatinine Clearance: 17.6 mL/min (A) (by C-G formula based on SCr of 4.7 mg/dL (H)).    Results from last 7 days   Lab Units 09/11/21  0635 09/10/21  0517 09/09/21  0550 09/08/21  0700 09/08/21  0700   MAGNESIUM mg/dL 2.1  --   --   --  2.2   PHOSPHORUS mg/dL 4.4 3.9 3.5   < > 4.4    < > = values in this interval not displayed.       Results from last 7 days   Lab Units 09/08/21  0700   URIC ACID mg/dL 7.7*       Results from last 7 days   Lab Units 09/11/21  0635 09/10/21  1137 09/09/21  0550 09/08/21  0700 09/07/21  0835   WBC 10*3/mm3 10.82* 11.50* 9.53 10.00 11.83*   HEMOGLOBIN g/dL 11.9* 11.8* 11.9* 11.7* 11.8*   PLATELETS 10*3/mm3 243 248 188 173 168       Results from last 7 days   Lab Units 09/06/21  2202   INR  1.14*       Assessment / Plan     ASSESSMENT:  1.  CKD5 with surprisingly stable function thus far.  Volume status fine; normal potassium and anion gap.  Mild hyponatremia but stable  2.  Perforated sigmoid diverticulitis with abscess, s/p sigmoid colectomy and colostomy creation 9/7  3.  Hypertension, improving  4.  DM2  5.  Anemia    PLAN:  1.  Continue current antihypertensives  2.  Will resume his home regimen of sodium bicarbonate once abdominal pain and distention better and bowel function more reliable  3.  Surveillance labs    Thank you for involving us in the care of Lefty Cai.  Please feel free to call with any  questions.    Oscar Grace MD  09/11/21  11:12 EDT    [facility]  [phone]      Much of this encounter note is an electronic transcription/translation of spoken language to printed text. The electronic translation of spoken language may permit erroneous, or at times, nonsensical words or phrases to be inadvertently transcribed; Although I have reviewed the note for such errors, some may still exist.

## 2021-09-11 NOTE — PLAN OF CARE
Goal Outcome Evaluation:  Plan of Care Reviewed With: patient        Progress: improving  Outcome Summary: Contact iso maintained.Tylenol given for pain. Stoma discoloartion monitored and compared between beginning of the shift and now, no changes noticed. VSS; cont. to monitor.

## 2021-09-11 NOTE — PLAN OF CARE
Problem: Adult Inpatient Plan of Care  Goal: Plan of Care Review  Outcome: Ongoing, Progressing  Flowsheets (Taken 9/11/2021 1551)  Progress: improving  Outcome Summary: VSS. Contact precautions maintained. Ostomy in place +output. PRN pain meds given per MAR. IV antibiotics continued. Will continue to monitor.

## 2021-09-12 ENCOUNTER — HOME HEALTH ADMISSION (OUTPATIENT)
Dept: HOME HEALTH SERVICES | Facility: HOME HEALTHCARE | Age: 71
End: 2021-09-12

## 2021-09-12 ENCOUNTER — READMISSION MANAGEMENT (OUTPATIENT)
Dept: CALL CENTER | Facility: HOSPITAL | Age: 71
End: 2021-09-12

## 2021-09-12 VITALS
HEART RATE: 71 BPM | DIASTOLIC BLOOD PRESSURE: 66 MMHG | OXYGEN SATURATION: 97 % | WEIGHT: 240 LBS | BODY MASS INDEX: 34.36 KG/M2 | TEMPERATURE: 98.4 F | SYSTOLIC BLOOD PRESSURE: 116 MMHG | RESPIRATION RATE: 20 BRPM | HEIGHT: 70 IN

## 2021-09-12 PROBLEM — Z78.9 IMPAIRED MOBILITY AND ADLS: Status: ACTIVE | Noted: 2021-09-12

## 2021-09-12 PROBLEM — Z74.09 IMPAIRED MOBILITY AND ADLS: Status: ACTIVE | Noted: 2021-09-12

## 2021-09-12 LAB
ANION GAP SERPL CALCULATED.3IONS-SCNC: 14.4 MMOL/L (ref 5–15)
BACTERIA SPEC ANAEROBE CULT: NORMAL
BUN SERPL-MCNC: 72 MG/DL (ref 8–23)
BUN/CREAT SERPL: 14.8 (ref 7–25)
CALCIUM SPEC-SCNC: 8.6 MG/DL (ref 8.6–10.5)
CHLORIDE SERPL-SCNC: 102 MMOL/L (ref 98–107)
CO2 SERPL-SCNC: 17.6 MMOL/L (ref 22–29)
CREAT SERPL-MCNC: 4.86 MG/DL (ref 0.76–1.27)
DEPRECATED RDW RBC AUTO: 44.7 FL (ref 37–54)
ERYTHROCYTE [DISTWIDTH] IN BLOOD BY AUTOMATED COUNT: 13.5 % (ref 12.3–15.4)
GFR SERPL CREATININE-BSD FRML MDRD: 12 ML/MIN/1.73
GFR SERPL CREATININE-BSD FRML MDRD: ABNORMAL ML/MIN/{1.73_M2}
GLUCOSE BLDC GLUCOMTR-MCNC: 117 MG/DL (ref 70–130)
GLUCOSE BLDC GLUCOMTR-MCNC: 118 MG/DL (ref 70–130)
GLUCOSE SERPL-MCNC: 127 MG/DL (ref 65–99)
HCT VFR BLD AUTO: 32.7 % (ref 37.5–51)
HGB BLD-MCNC: 11 G/DL (ref 13–17.7)
MCH RBC QN AUTO: 30 PG (ref 26.6–33)
MCHC RBC AUTO-ENTMCNC: 33.6 G/DL (ref 31.5–35.7)
MCV RBC AUTO: 89.1 FL (ref 79–97)
PLATELET # BLD AUTO: 255 10*3/MM3 (ref 140–450)
PMV BLD AUTO: 8.7 FL (ref 6–12)
POTASSIUM SERPL-SCNC: 4.1 MMOL/L (ref 3.5–5.2)
RBC # BLD AUTO: 3.67 10*6/MM3 (ref 4.14–5.8)
SODIUM SERPL-SCNC: 134 MMOL/L (ref 136–145)
WBC # BLD AUTO: 11.77 10*3/MM3 (ref 3.4–10.8)

## 2021-09-12 PROCEDURE — 82962 GLUCOSE BLOOD TEST: CPT

## 2021-09-12 PROCEDURE — 99024 POSTOP FOLLOW-UP VISIT: CPT | Performed by: SURGERY

## 2021-09-12 PROCEDURE — 25010000002 MEROPENEM PER 100 MG: Performed by: INTERNAL MEDICINE

## 2021-09-12 PROCEDURE — 85027 COMPLETE CBC AUTOMATED: CPT | Performed by: NURSE PRACTITIONER

## 2021-09-12 PROCEDURE — 80048 BASIC METABOLIC PNL TOTAL CA: CPT | Performed by: INTERNAL MEDICINE

## 2021-09-12 RX ORDER — MINOXIDIL 2.5 MG/1
2.5 TABLET ORAL EVERY 12 HOURS SCHEDULED
Qty: 60 TABLET | Refills: 0 | Status: SHIPPED | OUTPATIENT
Start: 2021-09-12 | End: 2022-04-04

## 2021-09-12 RX ORDER — AMLODIPINE BESYLATE 5 MG/1
5 TABLET ORAL
Qty: 30 TABLET | Refills: 0 | Status: SHIPPED | OUTPATIENT
Start: 2021-09-13 | End: 2021-09-17 | Stop reason: SINTOL

## 2021-09-12 RX ORDER — NITROGLYCERIN 0.4 MG/1
0.4 TABLET SUBLINGUAL
Status: DISCONTINUED | OUTPATIENT
Start: 2021-09-12 | End: 2021-09-12 | Stop reason: HOSPADM

## 2021-09-12 RX ORDER — OXYCODONE AND ACETAMINOPHEN 7.5; 325 MG/1; MG/1
1 TABLET ORAL EVERY 4 HOURS PRN
Qty: 15 TABLET | Refills: 0 | Status: SHIPPED | OUTPATIENT
Start: 2021-09-12 | End: 2021-09-21

## 2021-09-12 RX ORDER — SODIUM BICARBONATE 650 MG/1
650 TABLET ORAL 2 TIMES DAILY
Status: DISCONTINUED | OUTPATIENT
Start: 2021-09-12 | End: 2021-09-12 | Stop reason: HOSPADM

## 2021-09-12 RX ADMIN — ACETAMINOPHEN 650 MG: 325 TABLET, FILM COATED ORAL at 06:11

## 2021-09-12 RX ADMIN — MINOXIDIL 2.5 MG: 2.5 TABLET ORAL at 08:33

## 2021-09-12 RX ADMIN — MEROPENEM 500 MG: 500 INJECTION INTRAVENOUS at 12:02

## 2021-09-12 RX ADMIN — GUAIFENESIN 600 MG: 600 TABLET, EXTENDED RELEASE ORAL at 08:33

## 2021-09-12 RX ADMIN — CETIRIZINE HYDROCHLORIDE 5 MG: 10 TABLET ORAL at 08:33

## 2021-09-12 RX ADMIN — SODIUM CHLORIDE, PRESERVATIVE FREE 10 ML: 5 INJECTION INTRAVENOUS at 08:33

## 2021-09-12 RX ADMIN — OXYCODONE AND ACETAMINOPHEN 1 TABLET: 7.5; 325 TABLET ORAL at 02:49

## 2021-09-12 RX ADMIN — SODIUM BICARBONATE 650 MG: 650 TABLET ORAL at 14:08

## 2021-09-12 RX ADMIN — AMLODIPINE BESYLATE 5 MG: 5 TABLET ORAL at 08:33

## 2021-09-12 NOTE — PROGRESS NOTES
[facility] to provide Home care services. Patient agreeable. Contact information confirmed.

## 2021-09-12 NOTE — PLAN OF CARE
Goal Outcome Evaluation:     Progress: improving  Outcome Summary: VSS. Ostomy in place; +stool & flatus. PRN pain meds given per MAR. IV abx continued. Contact precautions maintained. Will continue to monitor.

## 2021-09-12 NOTE — PLAN OF CARE
Problem: Adult Inpatient Plan of Care  Goal: Plan of Care Review  Outcome: Met  Flowsheets (Taken 9/12/2021 0195)  Progress: improving  Plan of Care Reviewed With: patient  Outcome Summary: Vitals stable. ostomy functioning. pt discharged home with HH

## 2021-09-12 NOTE — PROGRESS NOTES
Chief Complaint:    POD 5, S/P Davies's procedure for perforated diverticulitis    Subjective:    Feels okay.  Tolerating regular diet.     Objective:    Vitals:    09/11/21 1911 09/11/21 2052 09/11/21 2307 09/12/21 0738   BP: 138/63 134/60 134/63 153/69   BP Location: Right arm  Right arm Right arm   Patient Position: Lying  Lying Lying   Pulse: 73 73 73 67   Resp: 16  16 20   Temp: 97.4 °F (36.3 °C)  98.2 °F (36.8 °C) 98.1 °F (36.7 °C)   TempSrc: Oral  Oral Oral   SpO2:       Weight:       Height:           Lungs: Clear  Heart: Regular  Abdomen: Incision looks okay. BS present.  Colostomy functioning.   Extremities: Warm    Labs reviewed.  WBC 11.77, Hgb 11.0, platelets 255.    Pathology showed perforated diverticulitis.    Assessment:    POD 5, S/P Davies's procedure for perforated diverticulitis    Plan:    Tolerating diet.    Colostomy is functioning.  From surgical standpoint, it is okay for discharge.  Follow up with Dr. Guerra in two weeks.  Home nursing set up with [facility] to assist with colostomy care.

## 2021-09-12 NOTE — PROGRESS NOTES
[facility] Progress Note      Patient Name: Lefty Cai  : 1950  MRN: [id number]  Primary Care Physician:  Daksha Ng APRN  Date of admission: 2021    Subjective     Interval History:     Patient resting comfortably  No chest pain, shortness of breath, nausea or vomiting    Review of Systems:   As noted above    Objective     Vitals:   Temp:  [97.4 °F (36.3 °C)-98.2 °F (36.8 °C)] 98.1 °F (36.7 °C)  Heart Rate:  [67-75] 67  Resp:  [16-20] 20  BP: (134-153)/(60-69) 153/69    Intake/Output Summary (Last 24 hours) at 2021 1116  Last data filed at 2021 0738  Gross per 24 hour   Intake 120 ml   Output 1250 ml   Net -1130 ml       Physical Exam:    Constitutional: Awake, alert, NAD, overweight  HEENT: Sclerae anicteric, MMM, AT/NC  Neck: Supple, trachea at midline, no JVD  Resp: Diminished air movement either flank; no wheezes, nonlabored on RA  Cardiovascular: RRR, no rub  GI:  Mild tenderness, +distended, colostomy LLQ, midline incision C/D/I  : No palpable bladder  Musculoskeletal: Trace edema  Vascular: Left arm AV fistula patent  Psychiatric: Appropriate affect, cooperative, oriented fully  Neurologic: No asterixis; moving all extremities, normal speech and mental status  Skin: Warm and dry    Scheduled Meds:     amLODIPine, 5 mg, Oral, Q24H  atorvastatin, 20 mg, Oral, Nightly  cetirizine, 5 mg, Oral, Daily  guaiFENesin, 600 mg, Oral, Q12H  insulin lispro, 0-7 Units, Subcutaneous, TID AC  meropenem, 500 mg, Intravenous, Q12H  metoprolol succinate XL, 100 mg, Oral, Nightly  minoxidil, 2.5 mg, Oral, Q12H  sodium chloride, 10 mL, Intravenous, Q12H      IV Meds:        Results Reviewed:   I have personally reviewed the results from the time of this admission to 2021 11:16 EDT     Results from last 7 days   Lab Units 21  0635 09/10/21  0517 21  0550 21  0835 21  2202   SODIUM mmol/L 135* 135*  132* 136   < > 134*   POTASSIUM  mmol/L 4.0 3.9  3.7 4.3   < > 4.5   CHLORIDE mmol/L 100 100  98 99   < > 100   CO2 mmol/L 19.0* 19.5*  21.0* 21.9*   < > 17.8*   BUN mg/dL 69* 69*  68* 62*   < > 65*   CREATININE mg/dL 4.70* 5.00*  4.66* 4.86*   < > 5.53*   CALCIUM mg/dL 8.9 9.4  9.1 9.0   < > 9.3   BILIRUBIN mg/dL  --  0.5  --   --  0.3   ALK PHOS U/L  --  121*  --   --  67   ALT (SGPT) U/L  --  29  --   --  11   AST (SGOT) U/L  --  26  --   --  13   GLUCOSE mg/dL 91 120*  113* 123*   < > 164*    < > = values in this interval not displayed.       Estimated Creatinine Clearance: 17.6 mL/min (A) (by C-G formula based on SCr of 4.7 mg/dL (H)).    Results from last 7 days   Lab Units 09/11/21  0635 09/10/21  0517 09/09/21  0550 09/08/21  0700 09/08/21  0700   MAGNESIUM mg/dL 2.1  --   --   --  2.2   PHOSPHORUS mg/dL 4.4 3.9 3.5   < > 4.4    < > = values in this interval not displayed.       Results from last 7 days   Lab Units 09/08/21  0700   URIC ACID mg/dL 7.7*       Results from last 7 days   Lab Units 09/11/21  0635 09/10/21  1137 09/09/21  0550 09/08/21  0700 09/07/21  0835   WBC 10*3/mm3 10.82* 11.50* 9.53 10.00 11.83*   HEMOGLOBIN g/dL 11.9* 11.8* 11.9* 11.7* 11.8*   PLATELETS 10*3/mm3 243 248 188 173 168       Results from last 7 days   Lab Units 09/06/21  2202   INR  1.14*       Assessment / Plan     ASSESSMENT:  1.  CKD5.  Renal function has been stable.  Electrolytes acceptable.  Mild metabolic acidosis noticed  2.  Perforated sigmoid diverticulitis with abscess, s/p sigmoid colectomy and colostomy creation 9/7  3.  Hypertension, improving  4.  DM2  5.  Anemia    PLAN:  1.  Continue current antihypertensives  2.  Restart low-dose oral sodium bicarbonate  3.  Surveillance labs    Thank you for involving us in the care of Lefty Cai.  Please feel free to call with any questions.    Oscar Grace MD  09/12/21  11:16 EDT    [facility]  [phone]      Much of this encounter note is an electronic  transcription/translation of spoken language to printed text. The electronic translation of spoken language may permit erroneous, or at times, nonsensical words or phrases to be inadvertently transcribed; Although I have reviewed the note for such errors, some may still exist.

## 2021-09-12 NOTE — OUTREACH NOTE
Prep Survey      Responses   [facility] patient discharged from?  [place]   Is LACE score < 7 ?  No   Emergency Room discharge w/ pulse ox?  No   Eligibility  [facility]   Date of Admission  09/06/21   Date of Discharge  09/12/21   Discharge Disposition  Home-Health Care [facility] [place]   Discharge diagnosis  Diverticulitis of large intestine with perforation and abscess without bleeding  exploratory laparotomy with possible colon resection and possible colostomy.    Does the patient have one of the following disease processes/diagnoses(primary or secondary)?  General Surgery   Does the patient have Home health ordered?  Yes   What is the Home health agency?   [facility]   Is there a DME ordered?  No   Prep survey completed?  Yes          Sharmaine Padilla RN

## 2021-09-12 NOTE — DISCHARGE SUMMARY
Patient Name: Lefty Cai  : 1950  MRN: [id number]    Date of Admission: 2021  Date of Discharge:  2021  Primary Care Physician: Daksha Ng APRN      Chief Complaint:   Abdominal Pain, Nausea, Vomiting, and Back Pain      Discharge Diagnoses     Active Hospital Problems    Diagnosis  POA   • **Diverticulitis of large intestine with perforation and abscess without bleeding [K57.20]  Yes   • Impaired mobility and ADLs [Z74.09, Z78.9]  Yes   • Free intraperitoneal air [K66.8]  Yes   • Obesity (BMI 30-39.9) [E66.9]  Yes   • Essential hypertension [I10]  Yes   • CKD (chronic kidney disease) stage 5,  [N18.4]  Yes   • Type 2 diabetes mellitus with diabetic chronic kidney disease (CMS/HCC) [E11.22]  Yes      Resolved Hospital Problems   No resolved problems to display.        Hospital Course     Mr. Cai is a 71 y.o. male with a history of type 2 diabetes chronic kidney disease stage V hypertension and obesity who presented to [facility] initially complaining of abdominal pain.  Please see the admitting history and physical for further details.  He was found to have bowel perforation and was admitted to the hospital for further evaluation and treatment.  He was admitted to the hospital and taken for surgical procedure on the day of admission by Dr. Guerra.  He was found to have acute sigmoid diverticulitis with a pericolonic abscess and perforation.  He underwent an open sigmoid colectomy colostomy and appendectomy.  The procedure itself was tolerated well he did have a rather prolonged hospitalization during the recovery from this procedure.  Cultures from his abdomen did grow ESBL E. coli and he was treated with antibiotics at the direction of infectious disease with recommendations for no antibiotics at discharge.  Nephrology followed throughout the hospitalization and renal function remained stable.  No plans for initiation of dialysis at this time.  He was  restarted on sodium bicarb and is tolerating diet.  Discharge plan was discussed with the patient as well as follow-up arrangements and at this point he stable for discharge home.  He should follow-up with Dr. Guerra in 2 weeks      Day of Discharge     Subjective:  On the day of discharge he is feeling better has been up and ambulating doing well.  Wants to go home today if possible.    Physical Exam:  Temp:  [97.4 °F (36.3 °C)-98.2 °F (36.8 °C)] 98.1 °F (36.7 °C)  Heart Rate:  [67-75] 67  Resp:  [16-20] 20  BP: (134-153)/(60-69) 153/69  Body mass index is 34.44 kg/m².  Physical Exam  Vitals and nursing note reviewed.   Constitutional:       Appearance: Normal appearance.   Cardiovascular:      Rate and Rhythm: Normal rate and regular rhythm.   Pulmonary:      Effort: No respiratory distress.      Breath sounds: Normal breath sounds.   Abdominal:      Palpations: Abdomen is soft.      Comments: Expected postoperative tenderness   Neurological:      General: No focal deficit present.      Mental Status: He is alert and oriented to person, place, and time.         Consultants     Consult Orders (all) (From admission, onward)     Start     Ordered    09/10/21 0939  Inpatient Infectious Diseases Consult  Once     Specialty:  Infectious Diseases  Provider:  Harvinder Mckeon MD    09/10/21 0939    09/07/21 0544  Inpatient Nephrology Consult  Once     Specialty:  Nephrology  Provider:  Jose Mccall MD    09/07/21 0543    09/06/21 2341  [facility] (on-call MD unless specified) Details  Once     Specialty:  Hospitalist  Provider:  (Not yet assigned)    09/06/21 2341    09/06/21 2336  Nephrology (on -call MD unless specified)  Once     Specialty:  Nephrology  Provider:  (Not yet assigned)    09/06/21 2335    09/06/21 2321  Surgery (on-call MD unless specified)  Once     Specialty:  General Surgery  Provider:  (Not yet assigned)    09/06/21 2320              Procedures     Open sigmoind colectomy, colostomy,  and appendectomy      Imaging Results (All)     Procedure Component Value Units Date/Time    CT Abdomen Pelvis Without Contrast [[id number]] Collected: 09/06/21 2307     Updated: 09/06/21 2321    Narrative:      CT OF THE ABDOMEN AND PELVIS WITHOUT CONTRAST     HISTORY: Right lower quadrant abdominal pain     COMPARISON: None available.     TECHNIQUE: Axial CT imaging was obtained through the abdomen and pelvis.  No IV contrast was administered.     FINDINGS:  Images through the lung bases do not demonstrate any acute  abnormalities. The patient is noted to have a large volume of  intraperitoneal free air. The source images suspected to be due to  perforated diverticulitis within the right lower quadrant, as the  patient has sigmoid diverticular disease within this area. There is  extensive inflammatory stranding within the right lower quadrant. This  does involve some loops of small bowel within this area, although this  is favored to be secondary to the inflammation within the colon. The  patient does not have any evidence of bowel obstruction. The appendix is  normal. No focal hepatic lesions are seen. The stomach is unremarkable.  There is a duodenal diverticula arising from the third portion of the  duodenum. Adrenal glands are normal. Gallbladder is grossly  unremarkable. Spleen and pancreas are within normal limits. There is  calcification of the aorta. No hydronephrosis is seen on either side.  There is a simple appearing right renal cyst. No additional follow-up is  necessary. Left kidney does appear mildly atrophic. There is aneurysmal  dilatation of the infrarenal abdominal aorta, measuring 3.0 x 3.0 cm.  Urinary bladder is decompressed. Prostate gland is within normal limits.  No acute osseous abnormalities are seen.       Impression:         1. Large volume of intraperitoneal free air, with suspected source  within the right lower quadrant, where the patient has an inflamed loop  of sigmoid colon. A  discrete drainable abscess is not seen. There are  also some inflamed loops of small bowel within this area, although this  is also be secondary involvement from the sigmoid colon. Urgent general  surgical consultation is recommended.     FINDINGS were called to Dr. Sharp at 11:10 PM.     Radiation dose reduction techniques were utilized, including automated  exposure control and exposure modulation based on body size.     This report was finalized on 9/6/2021 11:17 PM by Dr. Mini Segura M.D.       XR Chest 1 View [[id number]] Collected: 09/06/21 2213     Updated: 09/06/21 2218    Narrative:      SINGLE VIEW OF THE CHEST     HISTORY: Dyspnea     COMPARISON: None available.     FINDINGS:  Heart size is within normal limits. No pneumothorax, pleural effusion,  or acute infiltrate is seen. There is a suggestion of a lucency  underneath the right hemidiaphragm. Clinical significance is uncertain.  It may represent normal bowel gas, but correlation with any symptoms of  abdominal pain is suggested.       Impression:         1. No acute intrathoracic findings.  2. Nonspecific lucency seen underlying the right diaphragm. Correlation  with any symptoms of abdominal pain is suggested.     This report was finalized on 9/6/2021 10:15 PM by Dr. Mini Segura M.D.             Results for orders placed in visit on 07/07/06    SCANNED - ECHOCARDIOGRAM    Pertinent Labs     Results from last 7 days   Lab Units 09/12/21  1149 09/11/21  0635 09/10/21  1137 09/09/21  0550   WBC 10*3/mm3 11.77* 10.82* 11.50* 9.53   HEMOGLOBIN g/dL 11.0* 11.9* 11.8* 11.9*   PLATELETS 10*3/mm3 255 243 248 188     Results from last 7 days   Lab Units 09/12/21  1148 09/11/21  0635 09/10/21  0517 09/09/21  0550   SODIUM mmol/L 134* 135* 135*  132* 136   POTASSIUM mmol/L 4.1 4.0 3.9  3.7 4.3   CHLORIDE mmol/L 102 100 100  98 99   CO2 mmol/L 17.6* 19.0* 19.5*  21.0* 21.9*   BUN mg/dL 72* 69* 69*  68* 62*   CREATININE mg/dL 4.86* 4.70* 5.00*   4.66* 4.86*   GLUCOSE mg/dL 127* 91 120*  113* 123*   Estimated Creatinine Clearance: 17 mL/min (A) (by C-G formula based on SCr of 4.86 mg/dL (H)).  Results from last 7 days   Lab Units 09/11/21  0635 09/10/21  0517 09/09/21  0550 09/08/21  0700 09/06/21  2202 09/06/21  2202   ALBUMIN g/dL 3.00* 3.10*  2.80* 2.80* 3.00*   < > 3.80   BILIRUBIN mg/dL  --  0.5  --   --   --  0.3   ALK PHOS U/L  --  121*  --   --   --  67   AST (SGOT) U/L  --  26  --   --   --  13   ALT (SGPT) U/L  --  29  --   --   --  11    < > = values in this interval not displayed.     Results from last 7 days   Lab Units 09/12/21  1148 09/11/21  0635 09/10/21  0517 09/09/21  0550 09/08/21  0700 09/08/21  0700   CALCIUM mg/dL 8.6 8.9 9.4  9.1 9.0   < > 8.7   ALBUMIN g/dL  --  3.00* 3.10*  2.80* 2.80*  --  3.00*   MAGNESIUM mg/dL  --  2.1  --   --   --  2.2   PHOSPHORUS mg/dL  --  4.4 3.9 3.5  --  4.4    < > = values in this interval not displayed.         Results from last 7 days   Lab Units 09/08/21  0700   URIC ACID mg/dL 7.7*         Invalid input(s): LDLCALC  Results from last 7 days   Lab Units 09/10/21  1203 09/10/21  1137 09/07/21  0205   BLOODCX  No growth at 2 days No growth at 2 days  --    BODYFLDCX   --   --  Light growth (2+) Klebsiella oxytoca*  Light growth (2+) Escherichia coli ESBL*  Rare Pseudomonas aeruginosa*  Light growth (2+) Enterococcus faecalis*     Results from last 7 days   Lab Units 09/06/21  2215   COVID19  Not Detected       Test Results Pending at Discharge     Pending Labs     Order Current Status    Blood Culture - Blood, Arm, Right Preliminary result    Blood Culture - Blood, Arm, Right Preliminary result          Discharge Details        Discharge Medications      New Medications      Instructions Start Date   amLODIPine 5 MG tablet  Commonly known as: NORVASC   5 mg, Oral, Every 24 Hours Scheduled   Start Date: September 13, 2021     Bath/Shower Seat misc   Use as needed for bathing and completion of  ADL      minoxidil 2.5 MG tablet  Commonly known as: LONITEN   2.5 mg, Oral, Every 12 Hours Scheduled      oxyCODONE-acetaminophen 7.5-325 MG per tablet  Commonly known as: PERCOCET   1 tablet, Oral, Every 4 Hours PRN         Continue These Medications      Instructions Start Date   aspirin 81 MG tablet   81 mg, Oral, Nightly      metoprolol succinate  MG 24 hr tablet  Commonly known as: TOPROL-XL   TAKE 1 TABLET EVERY NIGHT      simvastatin 40 MG tablet  Commonly known as: ZOCOR   TAKE 1 TABLET AT BEDTIME      sodium bicarbonate 650 MG tablet   5 tablets, Oral, Daily      traZODone 50 MG tablet  Commonly known as: DESYREL   TAKE 1 TABLET AT NIGHT AS NEEDED FOR SLEEP      vitamin D3 125 MCG (5000 UT) tablet tablet   5,000 Units, Oral, Daily         Stop These Medications    hydrALAZINE 50 MG tablet  Commonly known as: APRESOLINE            Allergies   Allergen Reactions   • Latex Other (See Comments)     Blisters   • Contrast Dye Other (See Comments)     KIDNEY DISEASE       Discharge Disposition:  [facility]-Health Care [facility] [place]      Discharge Diet:  Diet Order   Procedures   • Diet Regular; Consistent Carbohydrate, Renal       Discharge Activity:   Activity Instructions     Activity as Tolerated      Discharge activity      1) May walk and climb stairs as tolerated.   2) May shower tomorrow.  3) Do not lift / push / pull more then 10 lbs for 4 weeks.  4) May drive in 2 weeks.          CODE STATUS:    Code Status and Medical Interventions:   Ordered at: 09/07/21 0301     Level Of Support Discussed With:    Patient     Code Status:    CPR     Medical Interventions (Level of Support Prior to Arrest):    Full       Future Appointments   Date Time Provider Department Center   6/28/2022  9:00 AM [facility]   7/5/2022 11:45 AM Daksha Ng APRN [facility]  [facility]     Additional Instructions for the Follow-ups that You Need to Schedule     Ambulatory Referral to Home Health   As directed      Face  to Face Visit Date: 9/9/2021    Follow-up provider for Plan of Care?: I will be treating the patient on an ongoing basis.  Please send me the Plan of Care for signature.    Follow-up provider: NIKKI GUERRA [[id number]]    Reason/Clinical Findings: New colostomy    Describe mobility limitations that make leaving home difficult: Obesity, recovering from open colon resection for perforated diverticulitis    Nursing/Therapeutic Services Requested: Skilled Nursing    Skilled nursing orders: Ostomy instruction    Frequency: 1 Week 1         Discharge Follow-up with PCP   As directed       Currently Documented PCP:    Daksha Ng, DUSTY    PCP Phone Number:    [phone]     Follow Up Details: 2-3 weeks         Discharge Follow-up with Specified Provider: Dr Guerra; 2 Weeks   As directed      To: Dr Guerra    Follow Up: 2 Weeks    Follow Up Details: Call the office [phone] to schedule an appointment         Discharge Follow-up with Specified Provider: Dr Guerra; 2 Weeks   As directed      To: Dr Guerra    Follow Up: 2 Weeks         Discharge Follow-up with Specified Provider: Dr Reyes; 1 Month   As directed      To: Dr Reyes    Follow Up: 1 Month         Renal Function Panel    Sep 19, 2021 (Approximate)      Release to patient: Immediate            Contact information for follow-up providers     Nikki Guerra MD Follow up in 2 week(s).    Specialty: General Surgery  Why: Call for appointment. Your staples will be removed at that appointment.  Contact information:  [address]  [facility] [id number] [phone]             Daksha Ng APRN .    Specialty: Family Medicine  Why: 2-3 weeks  Contact information:  [address] [facility] [address]  [facility] [address]  [phone]                   Contact information for after-discharge care     Home Medical Care     [facility] .    Service: Home Health Services  Contact information:  [address] [facility]  [address]  [facility] [address] [phone]                             Additional Instructions for the Follow-ups that You Need to Schedule     Ambulatory Referral to Home Health   As directed      Face to Face Visit Date: 9/9/2021    Follow-up provider for Plan of Care?: I will be treating the patient on an ongoing basis.  Please send me the Plan of Care for signature.    Follow-up provider: NIKKI GUERRA [[id number]]    Reason/Clinical Findings: New colostomy    Describe mobility limitations that make leaving home difficult: Obesity, recovering from open colon resection for perforated diverticulitis    Nursing/Therapeutic Services Requested: Skilled Nursing    Skilled nursing orders: Ostomy instruction    Frequency: 1 Week 1         Discharge Follow-up with PCP   As directed       Currently Documented PCP:    Daksha Ng APRN    PCP Phone Number:    [phone]     Follow Up Details: 2-3 weeks         Discharge Follow-up with Specified Provider: Dr Guerra; 2 Weeks   As directed      To: Dr Guerra    Follow Up: 2 Weeks    Follow Up Details: Call the office [phone] to schedule an appointment         Discharge Follow-up with Specified Provider: Dr Guerra; 2 Weeks   As directed      To: Dr Guerar    Follow Up: 2 Weeks         Discharge Follow-up with Specified Provider: Dr Reyes; 1 Month   As directed      To: Dr Reyes    Follow Up: 1 Month         Renal Function Panel    Sep 19, 2021 (Approximate)      Release to patient: Immediate           Time Spent on Discharge:  Greater than 30 minutes      Lefty Maciel MD  [facility]  09/12/21  13:03 EDT

## 2021-09-13 ENCOUNTER — HOME CARE VISIT (OUTPATIENT)
Dept: HOME HEALTH SERVICES | Facility: HOME HEALTHCARE | Age: 71
End: 2021-09-13

## 2021-09-13 ENCOUNTER — TRANSITIONAL CARE MANAGEMENT TELEPHONE ENCOUNTER (OUTPATIENT)
Dept: CALL CENTER | Facility: HOSPITAL | Age: 71
End: 2021-09-13

## 2021-09-13 VITALS
SYSTOLIC BLOOD PRESSURE: 154 MMHG | OXYGEN SATURATION: 96 % | HEART RATE: 78 BPM | RESPIRATION RATE: 16 BRPM | DIASTOLIC BLOOD PRESSURE: 60 MMHG | TEMPERATURE: 97.1 F

## 2021-09-13 PROCEDURE — G0299 HHS/HOSPICE OF RN EA 15 MIN: HCPCS

## 2021-09-13 NOTE — OUTREACH NOTE
Call Center TCM Note      Responses   [facility] patient discharged from?  [place]   Does the patient have one of the following disease processes/diagnoses(primary or secondary)?  General Surgery   TCM attempt successful?  Yes   Call start time  1118   Call end time  1126   Discharge diagnosis  Diverticulitis of large intestine with perforation and abscess without bleeding  exploratory laparotomy with possible colon resection and possible colostomy.    Meds reviewed with patient/caregiver?  Yes   Is the patient having any side effects they believe may be caused by any medication additions or changes?  No   Does the patient have all medications related to this admission filled (includes all antibiotics, pain medications, etc.)  Yes   Is the patient taking all medications as directed (includes completed medication regime)?  Yes   Medication comments  Pt reports he has not needed the pain meds.    Does the patient have a follow up appointment scheduled with their surgeon?  No   What is preventing the patient from scheduling follow up appointments?  Haven't had time   Nursing Interventions  Educated patient on importance of making appointment, Advised patient to make appointment [Wife will make today. ]   Has the patient kept scheduled appointments due by today?  N/A   Comments  PCP HOSP DC FU appt 9/17/21 @ 1:30pm.   What is the Home health agency?   [facility]   Has home health visited the patient within 72 hours of discharge?  Call prior to 72 hours   Home health comments  Will be coming out today.   Psychosocial issues?  No   Did the patient receive a copy of their discharge instructions?  Yes   Nursing interventions  Reviewed instructions with patient   What is the patient's perception of their health status since discharge?  Improving   Nursing interventions  Nurse provided patient education   Is the patient /caregiver able to teach back basic post-op care?  Continue use of incentive spirometry at least 1 week  post discharge, Drive as instructed by MD in discharge instructions, Take showers only when approved by MD-sponge bathe until then, No tub bath, swimming, or hot tub until instructed by MD, Keep incision areas clean,dry and protected, Lifting as instructed by MD in discharge instructions   Is the patient/caregiver able to teach back signs and symptoms of incisional infection?  Fever, Pus or odor from incision, Incisional warmth, Increased drainage or bleeding, Increased redness, swelling or pain at the incisonal site   Is the patient/caregiver able to teach back steps to recovery at home?  Rest and rebuild strength, gradually increase activity, Eat a well-balance diet   If the patient is a current smoker, are they able to teach back resources for cessation?  Not a smoker [Quit years ago. Still has snuff. ]   Is the patient/caregiver able to teach back the hierarchy of who to call/visit for symptoms/problems? PCP, Specialist, Home health nurse, Urgent Care, ED, 911  Yes   TCM call completed?  Yes   Wrap up additional comments  Colostomy nurse coming today. Pt states he is doing much better.          Nichelle Elkins RN    9/13/2021, 11:28 EDT

## 2021-09-14 NOTE — CASE MANAGEMENT/SOCIAL WORK
Case Management Discharge Note      Final Note: Home with [facility].  [facility]    Provided Post Acute Provider List?: Yes  Post Acute Provider List: Home Health  Provided Post Acute Provider Quality & Resource List?: Yes  Post Acute Provider Quality and Resource List: Home Health  Delivered To: Patient  Method of Delivery: In person    Selected Continued Care - Discharged on 9/12/2021 Admission date: 9/6/2021 - Discharge disposition: Home-Health Care Svc    Destination    No services have been selected for the patient.              Durable Medical Equipment    No services have been selected for the patient.              Dialysis/Infusion    No services have been selected for the patient.              Home Medical Care Coordination complete.    Service Provider Selected Services Address Phone Fax Patient Preferred     [facility]  [facility] [address] [address] [phone] --          Therapy    No services have been selected for the patient.              Community Resources    No services have been selected for the patient.              Community & DME    No services have been selected for the patient.                  Transportation Services  Private: Car    Final Discharge Disposition Code: 06 - home with home health care

## 2021-09-15 LAB
BACTERIA SPEC AEROBE CULT: NORMAL
BACTERIA SPEC AEROBE CULT: NORMAL

## 2021-09-16 ENCOUNTER — HOME CARE VISIT (OUTPATIENT)
Dept: HOME HEALTH SERVICES | Facility: HOME HEALTHCARE | Age: 71
End: 2021-09-16

## 2021-09-16 VITALS
SYSTOLIC BLOOD PRESSURE: 136 MMHG | TEMPERATURE: 96.7 F | HEIGHT: 69 IN | HEART RATE: 81 BPM | BODY MASS INDEX: 35.7 KG/M2 | DIASTOLIC BLOOD PRESSURE: 70 MMHG | RESPIRATION RATE: 18 BRPM | OXYGEN SATURATION: 98 % | WEIGHT: 241 LBS

## 2021-09-16 PROCEDURE — G0299 HHS/HOSPICE OF RN EA 15 MIN: HCPCS

## 2021-09-17 ENCOUNTER — OFFICE VISIT (OUTPATIENT)
Dept: FAMILY MEDICINE CLINIC | Facility: CLINIC | Age: 71
End: 2021-09-17

## 2021-09-17 VITALS
HEART RATE: 81 BPM | HEIGHT: 70 IN | SYSTOLIC BLOOD PRESSURE: 104 MMHG | WEIGHT: 245 LBS | DIASTOLIC BLOOD PRESSURE: 54 MMHG | BODY MASS INDEX: 35.07 KG/M2 | OXYGEN SATURATION: 99 %

## 2021-09-17 DIAGNOSIS — Z76.89 ENCOUNTER FOR SUPPORT AND COORDINATION OF TRANSITION OF CARE: Primary | ICD-10-CM

## 2021-09-17 DIAGNOSIS — K57.20 DIVERTICULITIS OF LARGE INTESTINE WITH PERFORATION AND ABSCESS WITHOUT BLEEDING: ICD-10-CM

## 2021-09-17 DIAGNOSIS — Z09 HOSPITAL DISCHARGE FOLLOW-UP: ICD-10-CM

## 2021-09-17 PROCEDURE — 1111F DSCHRG MED/CURRENT MED MERGE: CPT | Performed by: NURSE PRACTITIONER

## 2021-09-17 PROCEDURE — 99495 TRANSJ CARE MGMT MOD F2F 14D: CPT | Performed by: NURSE PRACTITIONER

## 2021-09-17 RX ORDER — HYDRALAZINE HYDROCHLORIDE 50 MG/1
50 TABLET, FILM COATED ORAL 2 TIMES DAILY
COMMUNITY
End: 2021-12-16

## 2021-09-17 NOTE — PROGRESS NOTES
Chief Complaint  Transitional Care Management    Subjective          Lefty Cai presents to [facility]  Mr. Cai presents today for a TCM to follow up on a recent hospital admission for rupture diverticuli. While he was in the hospital his blood pressure medications were changed. The Vistaril was discontinued and he was started Amlodipine which is causing edema in his hands and feet.   He has a colostomy placed due to the ruptured diverticuli. Home health is coming out to help him manage his colostomy for now. He reports he may have a colostomy reversal in December.     I have reviewed the patient's medical history in detail and updated the computerized patient record.    Current Outpatient Medications:   •  acetaminophen (TYLENOL) 325 MG tablet, Take 650 mg by mouth Every 6 (Six) Hours As Needed for Mild Pain ., Disp: , Rfl:   •  aspirin 81 MG tablet, Take 81 mg by mouth Every Night., Disp: , Rfl:   •  Cholecalciferol (vitamin D3) 125 MCG (5000 UT) tablet tablet, Take 5,000 Units by mouth Daily., Disp: , Rfl:   •  hydrALAZINE (APRESOLINE) 50 MG tablet, Take 50 mg by mouth 2 (Two) Times a Day. Two tabs in the am and 1 tab in the pm, Disp: , Rfl:   •  metoprolol succinate XL (TOPROL-XL) 100 MG 24 hr tablet, TAKE 1 TABLET EVERY NIGHT, Disp: 90 tablet, Rfl: 3  •  minoxidil (LONITEN) 2.5 MG tablet, Take 1 tablet by mouth Every 12 (Twelve) Hours., Disp: 60 tablet, Rfl: 0  •  Misc. Devices (Bath/Shower Seat) misc, Use as needed for bathing and completion of ADL, Disp: 1 each, Rfl: 0  •  simvastatin (ZOCOR) 40 MG tablet, TAKE 1 TABLET AT BEDTIME, Disp: 90 tablet, Rfl: 3  •  sodium bicarbonate 650 MG tablet, Take 5 tablets by mouth Daily., Disp: , Rfl: 0  •  traZODone (DESYREL) 50 MG tablet, TAKE 1 TABLET AT NIGHT AS NEEDED FOR SLEEP, Disp: 90 tablet, Rfl: 3  •  oxyCODONE-acetaminophen (PERCOCET) 7.5-325 MG per tablet, Take 1 tablet by mouth Every 4 (Four) Hours As Needed for  "Moderate Pain ., Disp: 15 tablet, Rfl: 0     Objective   Vital Signs:   /54 (BP Location: Right arm, Patient Position: Sitting, Cuff Size: Large Adult)   Pulse 81   Ht 177.8 cm (70\")   Wt 111 kg (245 lb)   SpO2 99%   BMI 35.15 kg/m²       Physical Exam  Vitals reviewed.   Constitutional:       Appearance: Normal appearance.   Cardiovascular:      Rate and Rhythm: Normal rate and regular rhythm.      Pulses: Normal pulses.      Heart sounds: Normal heart sounds.   Pulmonary:      Effort: Pulmonary effort is normal.      Breath sounds: Normal breath sounds.   Abdominal:      General: Bowel sounds are normal.      Tenderness: There is abdominal tenderness.   Musculoskeletal:      Right lower le+ Edema present.      Left lower le+ Edema present.   Skin:     General: Skin is warm and dry.   Neurological:      Mental Status: He is alert and oriented to person, place, and time.        Result Review :                 Assessment and Plan    Diagnoses and all orders for this visit:    1. Encounter for support and coordination of transition of care (Primary)    2. Hospital discharge follow-up    3. Diverticulitis of large intestine with perforation and abscess without bleeding    I have reviewed and reconciled Mr. Cai's medications with him today. He is to discontinue the Amlodipine due to 2+ peripheral edema.   I will restart him on Hydralazine 50 mg as he was taking prior to his hospitalization. He was well controlled on the Hydralazine.   He is to follow up with nephrology as scheduled and with GI.     Follow Up   Return if symptoms worsen or fail to improve, for Next scheduled follow up.  Patient was given instructions and counseling regarding his condition or for health maintenance advice. Please see specific information pulled into the AVS if appropriate.       "

## 2021-09-20 ENCOUNTER — HOME CARE VISIT (OUTPATIENT)
Dept: HOME HEALTH SERVICES | Facility: HOME HEALTHCARE | Age: 71
End: 2021-09-20

## 2021-09-20 VITALS
OXYGEN SATURATION: 98 % | DIASTOLIC BLOOD PRESSURE: 68 MMHG | RESPIRATION RATE: 20 BRPM | HEART RATE: 72 BPM | SYSTOLIC BLOOD PRESSURE: 126 MMHG | TEMPERATURE: 96.5 F

## 2021-09-20 PROCEDURE — G0299 HHS/HOSPICE OF RN EA 15 MIN: HCPCS

## 2021-09-21 ENCOUNTER — OFFICE VISIT (OUTPATIENT)
Dept: SURGERY | Facility: CLINIC | Age: 71
End: 2021-09-21

## 2021-09-21 VITALS — WEIGHT: 241 LBS | HEIGHT: 70 IN | BODY MASS INDEX: 34.5 KG/M2

## 2021-09-21 DIAGNOSIS — K57.20 DIVERTICULITIS OF LARGE INTESTINE WITH PERFORATION AND ABSCESS WITHOUT BLEEDING: ICD-10-CM

## 2021-09-21 DIAGNOSIS — Z09 SURGICAL FOLLOWUP: Primary | ICD-10-CM

## 2021-09-21 PROCEDURE — 99024 POSTOP FOLLOW-UP VISIT: CPT | Performed by: SURGERY

## 2021-09-22 ENCOUNTER — READMISSION MANAGEMENT (OUTPATIENT)
Dept: CALL CENTER | Facility: HOSPITAL | Age: 71
End: 2021-09-22

## 2021-09-22 NOTE — OUTREACH NOTE
General Surgery Week 2 Survey      Responses   [facility] patient discharged from?  [place]   Does the patient have one of the following disease processes/diagnoses(primary or secondary)?  General Surgery   Week 2 attempt successful?  No   Unsuccessful attempts  Attempt 1          Sepideh Barbosa RN

## 2021-09-23 ENCOUNTER — HOME CARE VISIT (OUTPATIENT)
Dept: HOME HEALTH SERVICES | Facility: HOME HEALTHCARE | Age: 71
End: 2021-09-23

## 2021-09-23 VITALS
HEART RATE: 76 BPM | RESPIRATION RATE: 18 BRPM | DIASTOLIC BLOOD PRESSURE: 62 MMHG | BODY MASS INDEX: 34.15 KG/M2 | TEMPERATURE: 97.2 F | SYSTOLIC BLOOD PRESSURE: 112 MMHG | OXYGEN SATURATION: 98 % | WEIGHT: 238 LBS

## 2021-09-23 PROCEDURE — G0299 HHS/HOSPICE OF RN EA 15 MIN: HCPCS

## 2021-09-23 NOTE — HOME HEALTH
"PATIENT'S STOMA CONTINUES TO HAVE SLOUGH BUT IS FUNCTIONING. THE STOMA IS RETRACTED SLIGHTLY BELOW SKIN LEVEL. THERE IS A 0.2 MM RING OF REDNESS AROUND THE STOMA. THIS SHOULD RESOLVE WITH PROPER ATTENTION TO THE BARRIER RING PLACEMENT. DR. LOPEZ IS AWARE OF THE SLOUGH AND RETRACTION. NO CHANGE IN CAREPLAN AT THIS TIME. CONTINUE WITH THE JULI 70MM FLAT TWO PIECE WITH BARRIER RING AND SKIN PREP. DEODORANT/LUBRICANT TO BE USED IN THE POUCH. DO NOT USE A CONVEX OR A BELT AT THIS TIME.   PATIENT'S HOMEWORK IS TO CUT OUT TWO WAFERS AT A 1 3/4\" ROUND AND TO REMOVE POUCH FROM WAFER AND CHANGE TO A NEW POUCH WITHOUT REMOVING THE EXISTING WAFER. ON NEXT HOME VISIT PATIENT IS TO PERFORM HANDS ON WITH HIS COLOSTOMY APPLIANCE CHANGE.   I CALLED DR. LOPEZ, SPOKE TO SPRING AND OBTAINED VERBAL ORDER FOR PATIENT TO USE OTC GAS-X PER PACKAGE INSTRUCTIONS.     PRODUCTS USED  JULI 70MM FLAT WAFER: 62515  JULI 70MM POUCH: [id number]  BARRIER RIN  SKIN PREP: [id number]  ADHESIVE REMOVER: [id number]  OSTOMY DEODORANT/LUBRICANT: [id number]"

## 2021-09-24 PROCEDURE — G0180 MD CERTIFICATION HHA PATIENT: HCPCS | Performed by: SURGERY

## 2021-09-24 NOTE — PROGRESS NOTES
CHIEF COMPLAINT:   Chief Complaint   Patient presents with   • Post-op     Open sigmoind colectomy, colostomy, and appendectomy 9/7/21       HISTORY OF PRESENT ILLNESS:  This is a 71 y.o. male who presents for a post-operative visit after undergoing open sigmoid colectomy, colostomy, and incidental appendectomy on 9/7/2021 for perforated diverticulitis with large abscess in the pelvis.  Postoperatively, he was discharged home after a few days of observation in the hospital.  He has been tolerating a regular diet with good bowel function and improving appetite.  The home health nurses have been concerned about some necrosis of his colostomy with overlying fibrinous exudate and retraction of the colostomy from the skin edge between the 6:00 and 10:00 portion of the stoma.  He and his wife have been learning how to care for the colostomy and have no questions for me today.    Pathology:   1. Sigmoid Colon, Resection:               A. Colon with perforated diverticulitis and associated serositis.               B. Surgical margins of resection appear viable.               C. Four benign lymph nodes.     2. Appendix, Appendectomy:               A. Acute appendicitis with marked serositis and serosal abscess formation.    PHYSICAL EXAM:  Lungs: Clear  Heart: RRR  ABD: Midline incision is healing well, staples intact, colostomy in left upper quadrant with some overlying fibrinous exudate that is stuck down to the stoma, with a small portion of exposed pink mucosa between the 10:00 and 12 o'clock position of the stoma, there is retraction of the mucosa from the skin between 6:00 and 10:00 with healthy pink granulation tissue nearly flush with the skin and no tunneling of any open sinus tract within the subcutaneous fat or granulation tissue  Ext: no significant edema, calves nontender    A/P:  This is a 71 y.o. male patient who is S/P open sigmoid colectomy, colostomy, and appendectomy on 9/7/2021    I removed the ostomy  appliance and reapplied a new bag today.  I tried to bluntly debride the exudative tissue off of the stoma, but was unable to do so using a 4 x 4 gauze as it was quite adherent.  This will slough off on its own over the next couple of weeks and I reassured the patient and his wife that if he has some bleeding from the stoma, it is likely due to the sloughing of this dead tissue.  Beneath the exudate, I can clearly see a healthy pink stoma so he will not require any sort of surgical revision of his colostomy.  I would like to see him back in about 2 to 3 weeks so I can reassess the stoma to be sure it has not retracted at all.  He should continue to avoid lifting anything heavier than 15 pounds for a full 6 to 8 weeks after the operation to minimize his chances of developing an incisional hernia.  I also briefly mentioned the possibility of performing colostomy reversal in about 6 months time, but we would need to perform a colonoscopy and flexible proctoscopy beforehand as he has never previously had a colonoscopy for screening purposes.    Yelena Guerra MD  General, Robotic, and Endoscopic Surgery  [facility]    [address], [address]  [place] [address]  P: [phone]  F: [phone]

## 2021-09-27 ENCOUNTER — HOME CARE VISIT (OUTPATIENT)
Dept: HOME HEALTH SERVICES | Facility: HOME HEALTHCARE | Age: 71
End: 2021-09-27

## 2021-09-27 VITALS
BODY MASS INDEX: 33.29 KG/M2 | RESPIRATION RATE: 20 BRPM | OXYGEN SATURATION: 98 % | DIASTOLIC BLOOD PRESSURE: 72 MMHG | HEART RATE: 80 BPM | SYSTOLIC BLOOD PRESSURE: 144 MMHG | WEIGHT: 232 LBS | TEMPERATURE: 97.9 F

## 2021-09-27 PROCEDURE — G0299 HHS/HOSPICE OF RN EA 15 MIN: HCPCS

## 2021-09-30 ENCOUNTER — HOME CARE VISIT (OUTPATIENT)
Dept: HOME HEALTH SERVICES | Facility: HOME HEALTHCARE | Age: 71
End: 2021-09-30

## 2021-09-30 VITALS
HEART RATE: 76 BPM | SYSTOLIC BLOOD PRESSURE: 126 MMHG | DIASTOLIC BLOOD PRESSURE: 68 MMHG | TEMPERATURE: 96.7 F | RESPIRATION RATE: 20 BRPM | WEIGHT: 234 LBS | BODY MASS INDEX: 33.58 KG/M2

## 2021-09-30 PROCEDURE — G0299 HHS/HOSPICE OF RN EA 15 MIN: HCPCS

## 2021-10-04 ENCOUNTER — TRANSCRIBE ORDERS (OUTPATIENT)
Dept: ADMINISTRATIVE | Facility: HOSPITAL | Age: 71
End: 2021-10-04

## 2021-10-04 ENCOUNTER — HOME CARE VISIT (OUTPATIENT)
Dept: HOME HEALTH SERVICES | Facility: HOME HEALTHCARE | Age: 71
End: 2021-10-04

## 2021-10-04 VITALS
RESPIRATION RATE: 20 BRPM | TEMPERATURE: 97.7 F | SYSTOLIC BLOOD PRESSURE: 146 MMHG | HEART RATE: 56 BPM | DIASTOLIC BLOOD PRESSURE: 62 MMHG | OXYGEN SATURATION: 98 %

## 2021-10-04 DIAGNOSIS — D63.1 ANEMIA IN CHRONIC KIDNEY DISEASE (CODE): Primary | ICD-10-CM

## 2021-10-04 DIAGNOSIS — N18.5 CHRONIC KIDNEY DISEASE, STAGE V (HCC): ICD-10-CM

## 2021-10-04 PROCEDURE — G0299 HHS/HOSPICE OF RN EA 15 MIN: HCPCS

## 2021-10-05 ENCOUNTER — HOSPITAL ENCOUNTER (OUTPATIENT)
Dept: INFUSION THERAPY | Facility: HOSPITAL | Age: 71
Discharge: HOME | End: 2021-10-05
Admitting: INTERNAL MEDICINE

## 2021-10-05 VITALS
RESPIRATION RATE: 20 BRPM | HEART RATE: 60 BPM | HEIGHT: 69 IN | OXYGEN SATURATION: 97 % | WEIGHT: 232 LBS | BODY MASS INDEX: 34.36 KG/M2 | DIASTOLIC BLOOD PRESSURE: 66 MMHG | TEMPERATURE: 96.9 F | SYSTOLIC BLOOD PRESSURE: 136 MMHG

## 2021-10-05 DIAGNOSIS — N18.4 CKD (CHRONIC KIDNEY DISEASE) STAGE 4, GFR 15-29 ML/MIN (HCC): ICD-10-CM

## 2021-10-05 DIAGNOSIS — D63.1 ANEMIA IN CHRONIC KIDNEY DISEASE (CODE): Primary | ICD-10-CM

## 2021-10-05 DIAGNOSIS — N18.5 CHRONIC KIDNEY DISEASE, STAGE V (HCC): ICD-10-CM

## 2021-10-05 PROCEDURE — 96374 THER/PROPH/DIAG INJ IV PUSH: CPT

## 2021-10-05 PROCEDURE — 96365 THER/PROPH/DIAG IV INF INIT: CPT

## 2021-10-05 PROCEDURE — 25010000002 FERUMOXYTOL 510 MG/17ML SOLUTION 510 MG VIAL: Performed by: INTERNAL MEDICINE

## 2021-10-05 RX ADMIN — FERUMOXYTOL 510 MG: 510 INJECTION INTRAVENOUS at 14:00

## 2021-10-05 NOTE — PATIENT INSTRUCTIONS
Ferumoxytol injection  What is this medicine?  FERUMOXYTOL is an iron complex. Iron is used to make healthy red blood cells, which carry oxygen and nutrients throughout the body. This medicine is used to treat iron deficiency anemia.  This medicine may be used for other purposes; ask your health care provider or pharmacist if you have questions.  COMMON BRAND NAME(S): Feraheme  What should I tell my health care provider before I take this medicine?  They need to know if you have any of these conditions:  · anemia not caused by low iron levels  · high levels of iron in the blood  · magnetic resonance imaging (MRI) test scheduled  · an unusual or allergic reaction to iron, other medicines, foods, dyes, or preservatives  · pregnant or trying to get pregnant  · breast-feeding  How should I use this medicine?  This medicine is for injection into a vein. It is given by a health care professional in a hospital or clinic setting.  Talk to your pediatrician regarding the use of this medicine in children. Special care may be needed.  Overdosage: If you think you have taken too much of this medicine contact a poison control center or emergency room at once.  NOTE: This medicine is only for you. Do not share this medicine with others.  What if I miss a dose?  It is important not to miss your dose. Call your doctor or health care professional if you are unable to keep an appointment.  What may interact with this medicine?  This medicine may interact with the following medications:  · other iron products  This list may not describe all possible interactions. Give your health care provider a list of all the medicines, herbs, non-prescription drugs, or dietary supplements you use. Also tell them if you smoke, drink alcohol, or use illegal drugs. Some items may interact with your medicine.  What should I watch for while using this medicine?  Visit your doctor or healthcare professional regularly. Tell your doctor or healthcare  professional if your symptoms do not start to get better or if they get worse. You may need blood work done while you are taking this medicine.  You may need to follow a special diet. Talk to your doctor. Foods that contain iron include: whole grains/cereals, dried fruits, beans, or peas, leafy green vegetables, and organ meats (liver, kidney).  What side effects may I notice from receiving this medicine?  Side effects that you should report to your doctor or health care professional as soon as possible:  · allergic reactions like skin rash, itching or hives, swelling of the face, lips, or tongue  · breathing problems  · changes in blood pressure  · feeling faint or lightheaded, falls  · fever or chills  · flushing, sweating, or hot feelings  · swelling of the ankles or feet  Side effects that usually do not require medical attention (report to your doctor or health care professional if they continue or are bothersome):  · diarrhea  · headache  · nausea, vomiting  · stomach pain  This list may not describe all possible side effects. Call your doctor for medical advice about side effects. You may report side effects to FDA at 7-861-FDA-7051.  Where should I keep my medicine?  This drug is given in a hospital or clinic and will not be stored at home.  NOTE: This sheet is a summary. It may not cover all possible information. If you have questions about this medicine, talk to your doctor, pharmacist, or health care provider.  © 2021 Elsevier/Gold Standard (2018-02-05 20:21:10)

## 2021-10-07 ENCOUNTER — HOME CARE VISIT (OUTPATIENT)
Dept: HOME HEALTH SERVICES | Facility: HOME HEALTHCARE | Age: 71
End: 2021-10-07

## 2021-10-07 PROCEDURE — G0300 HHS/HOSPICE OF LPN EA 15 MIN: HCPCS

## 2021-10-08 VITALS
DIASTOLIC BLOOD PRESSURE: 80 MMHG | WEIGHT: 315 LBS | TEMPERATURE: 96.6 F | BODY MASS INDEX: 75.2 KG/M2 | OXYGEN SATURATION: 98 % | HEART RATE: 76 BPM | SYSTOLIC BLOOD PRESSURE: 140 MMHG | RESPIRATION RATE: 20 BRPM

## 2021-10-11 ENCOUNTER — HOME CARE VISIT (OUTPATIENT)
Dept: HOME HEALTH SERVICES | Facility: HOME HEALTHCARE | Age: 71
End: 2021-10-11

## 2021-10-11 VITALS
TEMPERATURE: 97.3 F | HEART RATE: 72 BPM | SYSTOLIC BLOOD PRESSURE: 130 MMHG | OXYGEN SATURATION: 98 % | DIASTOLIC BLOOD PRESSURE: 76 MMHG

## 2021-10-11 PROCEDURE — G0299 HHS/HOSPICE OF RN EA 15 MIN: HCPCS

## 2021-10-12 ENCOUNTER — HOME CARE VISIT (OUTPATIENT)
Dept: HOME HEALTH SERVICES | Facility: HOME HEALTHCARE | Age: 71
End: 2021-10-12

## 2021-10-12 ENCOUNTER — OFFICE VISIT (OUTPATIENT)
Dept: SURGERY | Facility: CLINIC | Age: 71
End: 2021-10-12

## 2021-10-12 DIAGNOSIS — K57.20 DIVERTICULITIS OF LARGE INTESTINE WITH PERFORATION AND ABSCESS WITHOUT BLEEDING: ICD-10-CM

## 2021-10-12 DIAGNOSIS — Z09 SURGICAL FOLLOWUP: Primary | ICD-10-CM

## 2021-10-12 PROCEDURE — 99024 POSTOP FOLLOW-UP VISIT: CPT | Performed by: SURGERY

## 2021-10-12 NOTE — CASE COMMUNICATION
ORDER WAS COMPLETED 10/12/21 FOR THE FOLLOWING ITEMS:  SEAL - [facility] [id number]  ADHESIVE REMOVAL SPRAY - [facility] [id number]  FLANGES(WAFER) -  [facility] [id number]  M9 ODOR REMOVER DROPS (8OZ) - HTP [id number]    ORDER #[id number]

## 2021-10-13 NOTE — PROGRESS NOTES
CHIEF COMPLAINT:   Chief Complaint   Patient presents with   • Post-op Follow-up   • Wound Check       HISTORY OF PRESENT ILLNESS:  This is a 71 y.o. male who presents for a post-operative visit after undergoing open sigmoid colectomy, colostomy, and incidental appendectomy on 9/7/2021 for perforated diverticulitis with large abscess in the pelvis.  He developed some necrosis of the colostomy between 6-10 o'clock along the edge of the stoma, which has now granulated. There has been good stool output from his stoma and his appetite has been robust. His energy level has also improved with IV iron infusions.    Pathology:   1. Sigmoid Colon, Resection:               A. Colon with perforated diverticulitis and associated serositis.               B. Surgical margins of resection appear viable.               C. Four benign lymph nodes.     2. Appendix, Appendectomy:               A. Acute appendicitis with marked serositis and serosal abscess formation.    PHYSICAL EXAM:  Lungs: Clear  Heart: RRR  ABD: Midline incision is healing well, no evidence for hernia, colostomy in left upper quadrant with a ring of heaped up granulation tissue circumferentially and healthy pink stoma at the center of this ring  Ext: no significant edema, calves nontender    A/P:  This is a 71 y.o. male patient who is S/P open sigmoid colectomy, colostomy, and appendectomy on 9/7/2021    He appears to be healing well.  He should avoid heavy lifting for another week, after which he can start introducing heavy lifting, mowing his grass, weed eating, etc. again.  I would like to see him back in early January at which point we can begin planning for potential colostomy reversal.  He understands this will involve performing a colonoscopy and flexible proctoscopy preoperatively.  He will call back in the interim between now in January with any issues related to his stoma.    Yelena Guerra MD  General, Robotic, and Endoscopic Surgery  [facility]    [address], [address]  [place] [address]  P: [phone]  F: [phone]

## 2021-10-14 ENCOUNTER — HOME CARE VISIT (OUTPATIENT)
Dept: HOME HEALTH SERVICES | Facility: HOME HEALTHCARE | Age: 71
End: 2021-10-14

## 2021-10-14 VITALS
OXYGEN SATURATION: 99 % | SYSTOLIC BLOOD PRESSURE: 130 MMHG | TEMPERATURE: 97.7 F | RESPIRATION RATE: 20 BRPM | WEIGHT: 229 LBS | HEART RATE: 64 BPM | DIASTOLIC BLOOD PRESSURE: 78 MMHG | BODY MASS INDEX: 33.82 KG/M2

## 2021-10-14 PROCEDURE — G0299 HHS/HOSPICE OF RN EA 15 MIN: HCPCS

## 2021-10-15 ENCOUNTER — HOSPITAL ENCOUNTER (OUTPATIENT)
Dept: INFUSION THERAPY | Facility: HOSPITAL | Age: 71
Discharge: HOME | End: 2021-10-15
Admitting: INTERNAL MEDICINE

## 2021-10-15 VITALS
OXYGEN SATURATION: 97 % | DIASTOLIC BLOOD PRESSURE: 69 MMHG | RESPIRATION RATE: 20 BRPM | SYSTOLIC BLOOD PRESSURE: 149 MMHG | TEMPERATURE: 97.5 F | HEART RATE: 64 BPM

## 2021-10-15 DIAGNOSIS — N18.4 CKD (CHRONIC KIDNEY DISEASE) STAGE 4, GFR 15-29 ML/MIN (HCC): Primary | ICD-10-CM

## 2021-10-15 PROCEDURE — 96374 THER/PROPH/DIAG INJ IV PUSH: CPT

## 2021-10-15 PROCEDURE — 96365 THER/PROPH/DIAG IV INF INIT: CPT

## 2021-10-15 PROCEDURE — 25010000002 FERUMOXYTOL 510 MG/17ML SOLUTION 510 MG VIAL: Performed by: INTERNAL MEDICINE

## 2021-10-15 RX ADMIN — FERUMOXYTOL 510 MG: 510 INJECTION INTRAVENOUS at 15:50

## 2021-10-17 ENCOUNTER — HOME CARE VISIT (OUTPATIENT)
Dept: HOME HEALTH SERVICES | Facility: HOME HEALTHCARE | Age: 71
End: 2021-10-17

## 2021-10-18 ENCOUNTER — HOME CARE VISIT (OUTPATIENT)
Dept: HOME HEALTH SERVICES | Facility: HOME HEALTHCARE | Age: 71
End: 2021-10-18

## 2021-10-18 VITALS
SYSTOLIC BLOOD PRESSURE: 152 MMHG | HEART RATE: 76 BPM | TEMPERATURE: 97 F | RESPIRATION RATE: 20 BRPM | OXYGEN SATURATION: 98 % | DIASTOLIC BLOOD PRESSURE: 86 MMHG

## 2021-10-18 PROCEDURE — G0299 HHS/HOSPICE OF RN EA 15 MIN: HCPCS

## 2021-10-19 ENCOUNTER — HOME CARE VISIT (OUTPATIENT)
Dept: HOME HEALTH SERVICES | Facility: HOME HEALTHCARE | Age: 71
End: 2021-10-19

## 2021-10-19 NOTE — CASE COMMUNICATION
ORDER WAS COMPLETED 10/19/21 FOR THE FOLLOWING ITEMS:   extenders 1 box (insurance allows 1 box per month)- [id number]  yohannes belt - 1 (insurance allows 1 per month) - [id number]  ORDER #[id number]

## 2021-10-21 ENCOUNTER — HOME CARE VISIT (OUTPATIENT)
Dept: HOME HEALTH SERVICES | Facility: HOME HEALTHCARE | Age: 71
End: 2021-10-21

## 2021-10-21 PROCEDURE — G0300 HHS/HOSPICE OF LPN EA 15 MIN: HCPCS

## 2021-10-22 VITALS
TEMPERATURE: 97.1 F | WEIGHT: 315 LBS | HEART RATE: 64 BPM | DIASTOLIC BLOOD PRESSURE: 63 MMHG | BODY MASS INDEX: 72.6 KG/M2 | SYSTOLIC BLOOD PRESSURE: 127 MMHG | RESPIRATION RATE: 18 BRPM | OXYGEN SATURATION: 97 %

## 2021-10-25 ENCOUNTER — HOME CARE VISIT (OUTPATIENT)
Dept: HOME HEALTH SERVICES | Facility: HOME HEALTHCARE | Age: 71
End: 2021-10-25

## 2021-10-25 PROCEDURE — G0299 HHS/HOSPICE OF RN EA 15 MIN: HCPCS

## 2021-10-26 VITALS
OXYGEN SATURATION: 98 % | HEART RATE: 72 BPM | BODY MASS INDEX: 33.08 KG/M2 | RESPIRATION RATE: 20 BRPM | TEMPERATURE: 96.6 F | DIASTOLIC BLOOD PRESSURE: 80 MMHG | WEIGHT: 224 LBS | SYSTOLIC BLOOD PRESSURE: 156 MMHG

## 2021-10-28 ENCOUNTER — HOME CARE VISIT (OUTPATIENT)
Dept: HOME HEALTH SERVICES | Facility: HOME HEALTHCARE | Age: 71
End: 2021-10-28

## 2021-10-28 VITALS
HEART RATE: 72 BPM | SYSTOLIC BLOOD PRESSURE: 138 MMHG | RESPIRATION RATE: 20 BRPM | DIASTOLIC BLOOD PRESSURE: 78 MMHG | OXYGEN SATURATION: 98 % | TEMPERATURE: 97.3 F

## 2021-10-28 PROCEDURE — G0299 HHS/HOSPICE OF RN EA 15 MIN: HCPCS

## 2021-10-28 NOTE — HOME HEALTH
PT SEEN BY  FOR  COLOSTOMY CARE  RN PLANS  2 MORE VISITS,   [facility] NEXT WEEK THURSDAY.  EDN NOT SIGNED YET  PT SUPPLIES ORDERED THROUGH [employer]

## 2021-10-29 ENCOUNTER — HOME CARE VISIT (OUTPATIENT)
Dept: HOME HEALTH SERVICES | Facility: HOME HEALTHCARE | Age: 71
End: 2021-10-29

## 2021-10-29 NOTE — CASE COMMUNICATION
THE FOLLOWING SUPPLIES WERE ORDERED (#[id number]) 10/29/21:    BARRIER - [id number] - 2 BOXES  SKIN PREP WIPES [id number] - 1 BOX (NO OPTIONS FOR SPRAY AVAILABLE FOR THIS PATIENT)  ADHESIVE REMOVER SPRAY [id number] - 1 BOTTLE

## 2021-11-01 ENCOUNTER — HOME CARE VISIT (OUTPATIENT)
Dept: HOME HEALTH SERVICES | Facility: HOME HEALTHCARE | Age: 71
End: 2021-11-01

## 2021-11-01 VITALS
DIASTOLIC BLOOD PRESSURE: 68 MMHG | RESPIRATION RATE: 20 BRPM | TEMPERATURE: 97.1 F | BODY MASS INDEX: 33.52 KG/M2 | HEART RATE: 68 BPM | OXYGEN SATURATION: 97 % | SYSTOLIC BLOOD PRESSURE: 150 MMHG | WEIGHT: 227 LBS

## 2021-11-01 PROCEDURE — G0299 HHS/HOSPICE OF RN EA 15 MIN: HCPCS

## 2021-11-04 ENCOUNTER — HOME CARE VISIT (OUTPATIENT)
Dept: HOME HEALTH SERVICES | Facility: HOME HEALTHCARE | Age: 71
End: 2021-11-04

## 2021-11-04 VITALS
RESPIRATION RATE: 20 BRPM | TEMPERATURE: 97.5 F | OXYGEN SATURATION: 99 % | HEART RATE: 80 BPM | DIASTOLIC BLOOD PRESSURE: 74 MMHG | SYSTOLIC BLOOD PRESSURE: 150 MMHG

## 2021-11-04 PROCEDURE — G0299 HHS/HOSPICE OF RN EA 15 MIN: HCPCS

## 2021-11-05 NOTE — HOME HEALTH
PT WITH COLOSTOMY AFTER ABSCESS.    HAS 2 MORE VISITS [facility] THURSDAY  OSOTOMY IS DECREASING IN SIZE

## 2021-11-08 ENCOUNTER — HOME CARE VISIT (OUTPATIENT)
Dept: HOME HEALTH SERVICES | Facility: HOME HEALTHCARE | Age: 71
End: 2021-11-08

## 2021-11-08 VITALS
RESPIRATION RATE: 20 BRPM | TEMPERATURE: 98 F | HEART RATE: 72 BPM | BODY MASS INDEX: 33.37 KG/M2 | WEIGHT: 226 LBS | DIASTOLIC BLOOD PRESSURE: 80 MMHG | SYSTOLIC BLOOD PRESSURE: 154 MMHG | OXYGEN SATURATION: 98 %

## 2021-11-08 PROCEDURE — G0299 HHS/HOSPICE OF RN EA 15 MIN: HCPCS

## 2021-11-09 ENCOUNTER — HOME CARE VISIT (OUTPATIENT)
Dept: HOME HEALTH SERVICES | Facility: HOME HEALTHCARE | Age: 71
End: 2021-11-09

## 2021-11-09 NOTE — CASE COMMUNICATION
THE FOLLOWING SUPPLIES WERE ORDERED 11/9/21:    POUCH - [id number] (1 BOX)  BARRIER - [id number] (1 BOX)    ORDER #[id number]

## 2021-11-11 ENCOUNTER — HOME CARE VISIT (OUTPATIENT)
Dept: HOME HEALTH SERVICES | Facility: HOME HEALTHCARE | Age: 71
End: 2021-11-11

## 2021-11-11 VITALS
DIASTOLIC BLOOD PRESSURE: 76 MMHG | TEMPERATURE: 96.9 F | OXYGEN SATURATION: 98 % | BODY MASS INDEX: 33.23 KG/M2 | HEART RATE: 68 BPM | SYSTOLIC BLOOD PRESSURE: 150 MMHG | RESPIRATION RATE: 20 BRPM | WEIGHT: 225 LBS

## 2021-11-11 PROCEDURE — G0299 HHS/HOSPICE OF RN EA 15 MIN: HCPCS

## 2021-12-15 DIAGNOSIS — I10 ESSENTIAL HYPERTENSION: ICD-10-CM

## 2021-12-16 RX ORDER — HYDRALAZINE HYDROCHLORIDE 50 MG/1
TABLET, FILM COATED ORAL
Qty: 270 TABLET | Refills: 3 | Status: SHIPPED | OUTPATIENT
Start: 2021-12-16 | End: 2023-01-09

## 2021-12-16 RX ORDER — METOPROLOL SUCCINATE 100 MG/1
100 TABLET, EXTENDED RELEASE ORAL DAILY
Qty: 90 TABLET | Refills: 3 | Status: SHIPPED | OUTPATIENT
Start: 2021-12-16 | End: 2023-01-09

## 2021-12-16 RX ORDER — TRAZODONE HYDROCHLORIDE 50 MG/1
TABLET ORAL
Qty: 90 TABLET | Refills: 3 | Status: SHIPPED | OUTPATIENT
Start: 2021-12-16 | End: 2023-01-09

## 2021-12-16 RX ORDER — SIMVASTATIN 40 MG
TABLET ORAL
Qty: 90 TABLET | Refills: 3 | Status: SHIPPED | OUTPATIENT
Start: 2021-12-16 | End: 2023-02-03

## 2022-01-11 ENCOUNTER — OFFICE VISIT (OUTPATIENT)
Dept: SURGERY | Facility: CLINIC | Age: 72
End: 2022-01-11

## 2022-01-11 VITALS — HEIGHT: 69 IN | BODY MASS INDEX: 34.8 KG/M2 | WEIGHT: 235 LBS

## 2022-01-11 DIAGNOSIS — Z93.3 COLOSTOMY IN PLACE: Primary | ICD-10-CM

## 2022-01-11 DIAGNOSIS — Z90.49 HISTORY OF COLON RESECTION: ICD-10-CM

## 2022-01-11 PROCEDURE — 99213 OFFICE O/P EST LOW 20 MIN: CPT | Performed by: SURGERY

## 2022-01-20 ENCOUNTER — TELEPHONE (OUTPATIENT)
Dept: SURGERY | Facility: CLINIC | Age: 72
End: 2022-01-20

## 2022-01-20 NOTE — PROGRESS NOTES
"CHIEF COMPLAINT:   Chief Complaint   Patient presents with   • Follow-up     3 month follow-up        HISTORY OF PRESENT ILLNESS:  This is a 71 y.o. male who presents for a post-operative visit after undergoing open sigmoid colectomy, colostomy, and incidental appendectomy on 9/7/2021 for perforated diverticulitis with large abscess in the pelvis.  He has been doing very well since I saw him last, with no significant abdominal pain, fever, or chills.  He has been managing his colostomy with 2 piece disposable bags, noting that every now and then he will have a \"blowout\" which I explained is to be expected from time to time.    ROS: Denies fever, chills, or weight loss    Pathology:   1. Sigmoid Colon, Resection:               A. Colon with perforated diverticulitis and associated serositis.               B. Surgical margins of resection appear viable.               C. Four benign lymph nodes.     2. Appendix, Appendectomy:               A. Acute appendicitis with marked serositis and serosal abscess formation.    PHYSICAL EXAM:  Lungs: Clear  Heart: RRR  ABD: Midline incision is well-healed, no evidence for hernia, colostomy in left upper quadrant with soft brown stool in bag  Ext: no significant edema, calves nontender    A/P:  This is a 71 y.o. male patient who is S/P open sigmoid colectomy, colostomy, and appendectomy on 9/7/2021    He appears to be healing well.  He should avoid heavy lifting for another week, after which he can start introducing heavy lifting, mowing his grass, we is doing quite well and I am ready to begin the process of planning for colostomy reversal.  I have recommended we proceed with colonoscopy through the stoma and flexible proctoscopy of the rectal stump at his earliest convenience.  He should hold his aspirin for 5 days beforehand.  Once we have been cleared to perform elective surgeries that require postoperative admission to the hospital, we can then schedule his colostomy " reversal.    Yelena uGerra MD  General, Robotic, and Endoscopic Surgery  [facility]    [address], [address]  [place] [address]  P: [phone]  F: [phone]

## 2022-03-08 ENCOUNTER — TELEPHONE (OUTPATIENT)
Dept: SURGERY | Facility: CLINIC | Age: 72
End: 2022-03-08

## 2022-03-08 NOTE — TELEPHONE ENCOUNTER
Caller: Caterina Cai    Relationship to patient: Emergency Contact    Best call back number: [phone]    Type of visit: COLONOSCOPY    Requested date: ASAP    Additional notes: PT HAD A COLONOSCOPY SCHEDULED PREVIOUSLY BUT HAD COVID AND HAD TO CANCEL. THEY ARE CALLING TO RESCHEDULE AND GET DONE ASAP. PT HAS STAGE 5 KIDNEY DISEASE AND NEEDS TO START DIALYSIS AND WAS TOLD IT WOULD BE BEST TO HAVE THE REVERSAL SURGERY FIRST TO GET RID OF COLOSTOMY BAG. WANTS TO HAVE HOME DIALYSIS AND CAN'T DO IT UNTIL THE BAG IS GONE.

## 2022-03-18 ENCOUNTER — TELEPHONE (OUTPATIENT)
Dept: SURGERY | Facility: CLINIC | Age: 72
End: 2022-03-18

## 2022-03-18 NOTE — TELEPHONE ENCOUNTER
Left message informing patient of new arrival time for colonoscopy on 3/24. Patient now needs to arrive at 830. I did ask patient to call back and confirm she received my message.

## 2022-03-24 ENCOUNTER — ANESTHESIA EVENT (OUTPATIENT)
Dept: GASTROENTEROLOGY | Facility: HOSPITAL | Age: 72
End: 2022-03-24

## 2022-03-24 ENCOUNTER — ANESTHESIA (OUTPATIENT)
Dept: GASTROENTEROLOGY | Facility: HOSPITAL | Age: 72
End: 2022-03-24

## 2022-03-24 ENCOUNTER — HOSPITAL ENCOUNTER (OUTPATIENT)
Facility: HOSPITAL | Age: 72
Setting detail: HOSPITAL OUTPATIENT SURGERY
Discharge: HOME | End: 2022-03-24
Attending: SURGERY | Admitting: SURGERY

## 2022-03-24 VITALS
HEART RATE: 62 BPM | WEIGHT: 234 LBS | OXYGEN SATURATION: 98 % | HEIGHT: 70 IN | SYSTOLIC BLOOD PRESSURE: 116 MMHG | DIASTOLIC BLOOD PRESSURE: 78 MMHG | RESPIRATION RATE: 18 BRPM | BODY MASS INDEX: 33.5 KG/M2

## 2022-03-24 DIAGNOSIS — Z90.49 HISTORY OF COLON RESECTION: ICD-10-CM

## 2022-03-24 DIAGNOSIS — Z93.3 COLOSTOMY IN PLACE: ICD-10-CM

## 2022-03-24 PROBLEM — K57.90 DIVERTICULOSIS: Status: ACTIVE | Noted: 2022-03-24

## 2022-03-24 PROBLEM — K63.5 COLON POLYPS: Status: ACTIVE | Noted: 2022-03-24

## 2022-03-24 LAB — GLUCOSE BLDC GLUCOMTR-MCNC: 91 MG/DL (ref 70–130)

## 2022-03-24 PROCEDURE — S0260 H&P FOR SURGERY: HCPCS | Performed by: SURGERY

## 2022-03-24 PROCEDURE — 88305 TISSUE EXAM BY PATHOLOGIST: CPT | Performed by: SURGERY

## 2022-03-24 PROCEDURE — 25010000002 PHENYLEPHRINE 10 MG/ML SOLUTION: Performed by: ANESTHESIOLOGY

## 2022-03-24 PROCEDURE — 82962 GLUCOSE BLOOD TEST: CPT

## 2022-03-24 PROCEDURE — 44394 COLONOSCOPY W/SNARE: CPT | Performed by: SURGERY

## 2022-03-24 PROCEDURE — 25010000002 PROPOFOL 10 MG/ML EMULSION: Performed by: ANESTHESIOLOGY

## 2022-03-24 RX ORDER — DIPHENHYDRAMINE HYDROCHLORIDE 50 MG/ML
6.25 INJECTION INTRAMUSCULAR; INTRAVENOUS
Status: DISCONTINUED | OUTPATIENT
Start: 2022-03-24 | End: 2022-03-24 | Stop reason: HOSPADM

## 2022-03-24 RX ORDER — SODIUM CHLORIDE 9 MG/ML
50 INJECTION, SOLUTION INTRAVENOUS CONTINUOUS
Status: DISCONTINUED | OUTPATIENT
Start: 2022-03-24 | End: 2022-03-24 | Stop reason: HOSPADM

## 2022-03-24 RX ORDER — HYDRALAZINE HYDROCHLORIDE 20 MG/ML
10 INJECTION INTRAMUSCULAR; INTRAVENOUS
Status: DISCONTINUED | OUTPATIENT
Start: 2022-03-24 | End: 2022-03-24 | Stop reason: HOSPADM

## 2022-03-24 RX ORDER — PROPOFOL 10 MG/ML
VIAL (ML) INTRAVENOUS AS NEEDED
Status: DISCONTINUED | OUTPATIENT
Start: 2022-03-24 | End: 2022-03-24 | Stop reason: SURG

## 2022-03-24 RX ORDER — NALOXONE HCL 0.4 MG/ML
0.4 VIAL (ML) INJECTION AS NEEDED
Status: DISCONTINUED | OUTPATIENT
Start: 2022-03-24 | End: 2022-03-24 | Stop reason: HOSPADM

## 2022-03-24 RX ORDER — LIDOCAINE HYDROCHLORIDE 20 MG/ML
INJECTION, SOLUTION INFILTRATION; PERINEURAL AS NEEDED
Status: DISCONTINUED | OUTPATIENT
Start: 2022-03-24 | End: 2022-03-24 | Stop reason: SURG

## 2022-03-24 RX ORDER — FENTANYL CITRATE 50 UG/ML
25 INJECTION, SOLUTION INTRAMUSCULAR; INTRAVENOUS
Status: DISCONTINUED | OUTPATIENT
Start: 2022-03-24 | End: 2022-03-24 | Stop reason: HOSPADM

## 2022-03-24 RX ORDER — EPHEDRINE SULFATE 50 MG/ML
5 INJECTION, SOLUTION INTRAVENOUS ONCE AS NEEDED
Status: DISCONTINUED | OUTPATIENT
Start: 2022-03-24 | End: 2022-03-24 | Stop reason: HOSPADM

## 2022-03-24 RX ORDER — PHENYLEPHRINE HYDROCHLORIDE 10 MG/ML
INJECTION INTRAVENOUS AS NEEDED
Status: DISCONTINUED | OUTPATIENT
Start: 2022-03-24 | End: 2022-03-24 | Stop reason: SURG

## 2022-03-24 RX ORDER — LABETALOL HYDROCHLORIDE 5 MG/ML
5 INJECTION, SOLUTION INTRAVENOUS
Status: DISCONTINUED | OUTPATIENT
Start: 2022-03-24 | End: 2022-03-24 | Stop reason: HOSPADM

## 2022-03-24 RX ORDER — ONDANSETRON 2 MG/ML
4 INJECTION INTRAMUSCULAR; INTRAVENOUS ONCE AS NEEDED
Status: DISCONTINUED | OUTPATIENT
Start: 2022-03-24 | End: 2022-03-24 | Stop reason: HOSPADM

## 2022-03-24 RX ADMIN — PROPOFOL 160 MCG/KG/MIN: 10 INJECTION, EMULSION INTRAVENOUS at 08:42

## 2022-03-24 RX ADMIN — PROPOFOL 120 MG: 10 INJECTION, EMULSION INTRAVENOUS at 08:40

## 2022-03-24 RX ADMIN — PHENYLEPHRINE HYDROCHLORIDE 200 MCG: 10 INJECTION, SOLUTION INTRAVENOUS at 09:13

## 2022-03-24 RX ADMIN — LIDOCAINE HYDROCHLORIDE 60 MG: 20 INJECTION, SOLUTION INFILTRATION; PERINEURAL at 08:40

## 2022-03-24 RX ADMIN — SODIUM CHLORIDE 50 ML/HR: 9 INJECTION, SOLUTION INTRAVENOUS at 07:56

## 2022-03-24 NOTE — ANESTHESIA PREPROCEDURE EVALUATION
Anesthesia Evaluation     no history of anesthetic complications:  NPO Solid Status: > 8 hours  NPO Liquid Status: > 2 hours           Airway   Mallampati: II  Neck ROM: full  no difficulty expected  Dental    (+) upper dentures    Pulmonary - normal exam   (+) a smoker Former,   (-) COPD, asthmaSleep apnea: STOPBANG=6.    PE comment: nonlabored  Cardiovascular - normal exam    Rhythm: regular  Rate: normal    (+) hypertension, hyperlipidemia,   (-) valvular problems/murmurs, past MI, CAD, dysrhythmias, angina      Neuro/Psych- negative ROS  (-) seizures, TIA, CVA  GI/Hepatic/Renal/Endo    (+) morbid obesity, GERD,  renal disease (L renal cancer--s/p L partial nephrectomy; CKD--stage 4-5; not yet needed dialysis), diabetes mellitus type 2,   (-) liver disease, no thyroid disorder    ROS Comment: H/o Colon resection;  H/o diverticulitis w/ perforation;  Currently w/ diverting ostomy in place    Musculoskeletal (-) negative ROS    Abdominal    Substance History      OB/GYN          Other   blood dyscrasia anemia,   history of cancer (renal cancer--s/p L partial nephrectomy) remission                  Anesthesia Plan    ASA 3     MAC       Anesthetic plan, all risks, benefits, and alternatives have been provided, discussed and informed consent has been obtained with: patient.        CODE STATUS:

## 2022-03-24 NOTE — OP NOTE
Operative Note :  Yelena Guerra MD      Lefty Cai  1950    Procedure Date: 03/24/22    Pre-op Diagnosis:  Colostomy in place (HCC) [Z93.3]  History of colon resection [Z90.49]    Post-Operative Diagnosis:  Colon polyps  Diverticulosis    Procedure:   Flexible colonoscopy through stoma to the cecum with hot snare polypectomy x4  Flexible proctoscopy of rectal stump    Surgeon: Yelena Guerra MD    Assistant: None    Anesthesia:  MAC (monitored anesthetic care)    Estimated Blood Loss: Minimal    Specimens:   Transverse colon polyps x3  Ascending colon polyp    Complications: None    Indications:  Mr. Cai is a 71-year-old gentleman with a colostomy after undergoing a sigmoid colectomy 6 months ago for perforated diverticulitis.  He is ready to have his colostomy reversed. In preparation for this reversal surgery I have proposed we perform a full colonoscopy today through the ostomy and proctoscopy of the rectal stump to assess for any adenomatous colon polyps or sign of malignancy.  He understands the risks of the procedure today include bleeding, possible gastrointestinal perforation, and possible missed pathology.  Despite these risks, he has consented to proceed.    Findings: 20 cm long rectal stump with impacted mucoid stool, 4 sessile colon polyps removed using hot snare    Description of procedure:  The patient was brought to the endoscopy suite and darci in the left lateral decubitus position.  Continuous propofol anesthesia was administered.  A surgical timeout was completed.  A digital exam was performed through his colostomy to dilate it, as the skin opening had narrowed quite significantly. An adult colonoscope was then attempted to be inserted through the ostomy, but the small skin opening was too narrow to accommodate the adult colonoscope.  I therefore switched out to the pediatric colonoscope which easily passed through the stoma into the descending colon.  The scope was passed  under direct visualization to the level of the cecum.  The cecum was identified via the ileocecal valve as well as the appendiceal orifice.  The scope was then slowly withdrawn, examining all circumferential walls of the ascending, transverse, and descending colon.  There were 3 separate polyps of the transverse colon in close approximation to 1 another.  They were all about 5 to 6 mm in diameter and removed using the hot snare at each location.  Within the cecum there was a 3 mm pedunculated polyp removed using the hot snare.  There was minimal diverticulosis of the distal descending colon with no evidence of fecal impaction or bleeding.  He was then turned into the right lateral decubitus position and a digital rectal exam performed.  There was inspissated mucus and impacted stool within the rectum which was decompressed with digital rectal exam.  I then inserted the pediatric colonoscope and passed it for a length of about 20 cm to the presumed end of the rectal stump, although I could not clearly see the blind end of the rectal stump as there was impacted stool there that I could not reach with my fingertips to disimpact.  There were no gross rectal abnormalities found, in particular no evidence of inflammation.  The scope was then withdrawn and the colon desufflated.  The patient had a very good bowel prep and was transferred to the recovery area in stable condition.     Recommendations:  I will call the patient in a few days with the pathology results of the 4 colon polyps removed, and will then schedule him for colostomy reversal thereafter.    Yelena Guerra MD  General, Robotic, and Endoscopic Surgery  [facility]    [address], [address]  [place] [address]  P: [phone]  F: [phone]

## 2022-03-24 NOTE — ANESTHESIA POSTPROCEDURE EVALUATION
"Patient: Lefty Cai    Procedure Summary     Date: 03/24/22 Room / Location:  [facility]    Anesthesia Start: 0836 Anesthesia Stop: 0928    Procedure: COLONOSCOPY TO CECUM WITH POLYPECTOMY  (HOT SNARE) (N/A ) Diagnosis:       Colostomy in place (HCC)      History of colon resection      (Colostomy in place (HCC) [Z93.3])      (History of colon resection [Z90.49])    Surgeons: Yelena Guerra MD Provider: Yordy Ardon MD    Anesthesia Type: MAC ASA Status: 3          Anesthesia Type: MAC    Vitals  Vitals Value Taken Time   /95 03/24/22 0927   Temp     Pulse 60 03/24/22 0927   Resp 18 03/24/22 0927   SpO2 97 % 03/24/22 0927           Post Anesthesia Care and Evaluation    Patient location during evaluation: bedside  Pain management: adequate  Airway patency: patent  Anesthetic complications: No anesthetic complications    Cardiovascular status: acceptable  Respiratory status: acceptable  Hydration status: acceptable    Comments: */95 (BP Location: Left arm, Patient Position: Lying)   Pulse 60   Resp 18   Ht 177.8 cm (70\")   Wt 106 kg (234 lb)   SpO2 97%   BMI 33.58 kg/m²         "

## 2022-03-24 NOTE — H&P
General Surgery  History and Physical    CC: Unwanted colostomy, history of perforated diverticulitis    HPI: The patient is a pleasant 71 y.o. year-old gentleman who presents today for a colonoscopy in preparation for possible colostomy reversal.  I performed an open sigmoid colectomy with colostomy in September of last year for perforated diverticulitis and he has made a full recovery.  He is now 6 months out from his initial surgery and would like to have his colostomy reversed.  I have recommended we perform a full colonoscopy as well as proctoscopy of the rectal stump today.  He understands the risks of the procedure and has consented to proceed.    Past Medical History:  Hypertension  Hyperlipidemia  Stage IV chronic kidney disease  Type 2 diabetes  GERD  Renal cell carcinoma status post partial left nephrectomy  Obstructive sleep apnea     Past Surgical History:  Open sigmoid colectomy with colostomy (September 2021 by me for perforated diverticulitis)  Partial left nephrectomy (2007, Dr. Victor)  Cataract extraction  Left arm AV fistula (2019, Dr. Louann Miles)  Knee arthroscopy    Medications:   Medications Prior to Admission   Medication Sig Dispense Refill Last Dose   • aspirin 81 MG tablet Take 81 mg by mouth Every Night.   3/17/2022 at Unknown time   • cholecalciferol (VITAMIN D3) 25 MCG (1000 UT) tablet Take 1,000 Units by mouth Daily.   3/23/2022 at Unknown time   • hydrALAZINE (APRESOLINE) 50 MG tablet TAKE 1 TABLET THREE TIMES DAILY 270 tablet 3 3/24/2022 at Unknown time   • metoprolol succinate XL (TOPROL-XL) 100 MG 24 hr tablet Take 1 tablet by mouth Daily. 90 tablet 3 3/24/2022 at Unknown time   • simethicone (MYLICON) 80 MG chewable tablet Chew 80 mg Every 6 (Six) Hours As Needed.   3/22/2022 at Unknown time   • simvastatin (ZOCOR) 40 MG tablet TAKE 1 TABLET AT BEDTIME 90 tablet 3 3/22/2022 at Unknown time   • sodium bicarbonate 650 MG tablet Take 5 tablets by mouth Daily.  0 3/23/2022 at  Unknown time   • traZODone (DESYREL) 50 MG tablet TAKE 1 TABLET AT NIGHT AS NEEDED FOR SLEEP 90 tablet 3 3/22/2022 at Unknown time   • acetaminophen (TYLENOL) 325 MG tablet Take 650 mg by mouth Every 6 (Six) Hours As Needed for Mild Pain .   More than a month at Unknown time   • minoxidil (LONITEN) 2.5 MG tablet Take 1 tablet by mouth Every 12 (Twelve) Hours. 60 tablet 0 Unknown at Unknown time   • Misc. Devices (Bath/Shower Seat) misc Use as needed for bathing and completion of ADL 1 each 0 Unknown at Unknown time       Allergies: Contrast dye (contraindicated due to chronic kidney disease), latex (blisters of skin)    Family History: Mother with hypertension, father with bladder cancer and coronary artery disease    Social History: , former smoker but quit in 1999, no regular alcohol use    ROS: A comprehensive review of systems was conducted and negative for abdominal pain, melena, or hematochezia from the colostomy  All other systems reviewed and negative    Physical Exam:  Vitals:    03/24/22 0745   BP: 163/85   Pulse: 67   Resp: 19   SpO2: 99%     Height: 177 cm  Weight: 106 kg  BMI: 33.58  General: No acute distress, well-nourished & well-developed  HEAD: normocephalic, atraumatic  EYES: normal conjunctiva, sclera anicteric  EARS: grossly normal hearing  NECK: supple, no thyromegaly  CARDIOVASCULAR: regular rate and rhythm  RESPIRATORY: clear to auscultation bilaterally  GASTROINTESTINAL: soft, nontender, non-distended, well-healed lower midline surgical scar, colostomy in left upper quadrant healthy appearing with no mucocutaneous separation  PSYCHIATRIC: oriented x3, normal mood and affect    ASSESSMENT & PLAN  Mr. Cai is a 71-year-old gentleman with a colostomy after undergoing a sigmoid colectomy 6 months ago for perforated diverticulitis.  He is ready to have his colostomy reversed in preparation for this reversal surgery I have proposed we perform a full colonoscopy today through the ostomy  and proctoscopy of the rectal stump to assess for any adenomatous colon polyps or sign of malignancy.  He understands the risks of the procedure today include bleeding, possible gastrointestinal perforation, and possible missed pathology.  Despite these risks, he has consented to proceed.    Yelena Guerra MD  General, Robotic, and Endoscopic Surgery  [facility]    [address], [address]  [place] [address]  P: [phone]  F: [phone]

## 2022-03-25 LAB
LAB AP CASE REPORT: NORMAL
PATH REPORT.FINAL DX SPEC: NORMAL
PATH REPORT.GROSS SPEC: NORMAL

## 2022-03-28 ENCOUNTER — TELEPHONE (OUTPATIENT)
Dept: SURGERY | Facility: CLINIC | Age: 72
End: 2022-03-28

## 2022-03-28 ENCOUNTER — PREP FOR SURGERY (OUTPATIENT)
Dept: OTHER | Facility: HOSPITAL | Age: 72
End: 2022-03-28

## 2022-03-28 DIAGNOSIS — Z90.49 HISTORY OF COLON RESECTION: ICD-10-CM

## 2022-03-28 DIAGNOSIS — K57.20 DIVERTICULITIS OF LARGE INTESTINE WITH PERFORATION AND ABSCESS WITHOUT BLEEDING: ICD-10-CM

## 2022-03-28 DIAGNOSIS — Z93.3 COLOSTOMY IN PLACE: Primary | ICD-10-CM

## 2022-03-28 RX ORDER — ALVIMOPAN 12 MG/1
12 CAPSULE ORAL ONCE
Status: CANCELLED | OUTPATIENT
Start: 2022-04-12 | End: 2022-04-04

## 2022-03-28 RX ORDER — CEFAZOLIN SODIUM 2 G/100ML
2 INJECTION, SOLUTION INTRAVENOUS ONCE
Status: CANCELLED | OUTPATIENT
Start: 2022-04-12 | End: 2022-03-28

## 2022-03-28 NOTE — TELEPHONE ENCOUNTER
I called the patient and discussed the benign pathology findings of multiple adenomatous as well as hyperplastic colon polyps.  I have recommended we go ahead and proceed with colostomy reversal if he is still interested.  He expressed a desire to do so.  Could someone please get his colostomy reversal scheduled and let here his wife know the details?  He will need to hold his aspirin for 5 days beforehand.    Thanks,  TONIE

## 2022-04-04 ENCOUNTER — PRE-ADMISSION TESTING (OUTPATIENT)
Dept: PREADMISSION TESTING | Facility: HOSPITAL | Age: 72
End: 2022-04-04

## 2022-04-04 VITALS
TEMPERATURE: 97.9 F | HEART RATE: 77 BPM | DIASTOLIC BLOOD PRESSURE: 75 MMHG | OXYGEN SATURATION: 97 % | HEIGHT: 70 IN | SYSTOLIC BLOOD PRESSURE: 147 MMHG | BODY MASS INDEX: 33.5 KG/M2 | RESPIRATION RATE: 18 BRPM | WEIGHT: 234 LBS

## 2022-04-04 DIAGNOSIS — Z93.3 COLOSTOMY IN PLACE: ICD-10-CM

## 2022-04-04 DIAGNOSIS — Z90.49 HISTORY OF COLON RESECTION: ICD-10-CM

## 2022-04-04 DIAGNOSIS — K57.20 DIVERTICULITIS OF LARGE INTESTINE WITH PERFORATION AND ABSCESS WITHOUT BLEEDING: ICD-10-CM

## 2022-04-04 LAB
ANION GAP SERPL CALCULATED.3IONS-SCNC: 14 MMOL/L (ref 5–15)
BUN SERPL-MCNC: 52 MG/DL (ref 8–23)
BUN/CREAT SERPL: 9.2 (ref 7–25)
CALCIUM SPEC-SCNC: 9 MG/DL (ref 8.6–10.5)
CHLORIDE SERPL-SCNC: 103 MMOL/L (ref 98–107)
CO2 SERPL-SCNC: 21 MMOL/L (ref 22–29)
CREAT SERPL-MCNC: 5.65 MG/DL (ref 0.76–1.27)
DEPRECATED RDW RBC AUTO: 42.2 FL (ref 37–54)
EGFRCR SERPLBLD CKD-EPI 2021: 10 ML/MIN/1.73
ERYTHROCYTE [DISTWIDTH] IN BLOOD BY AUTOMATED COUNT: 13.4 % (ref 12.3–15.4)
GLUCOSE SERPL-MCNC: 178 MG/DL (ref 65–99)
HCT VFR BLD AUTO: 36.1 % (ref 37.5–51)
HGB BLD-MCNC: 12.5 G/DL (ref 13–17.7)
MCH RBC QN AUTO: 30.4 PG (ref 26.6–33)
MCHC RBC AUTO-ENTMCNC: 34.6 G/DL (ref 31.5–35.7)
MCV RBC AUTO: 87.8 FL (ref 79–97)
PLATELET # BLD AUTO: 193 10*3/MM3 (ref 140–450)
PMV BLD AUTO: 10 FL (ref 6–12)
POTASSIUM SERPL-SCNC: 4.2 MMOL/L (ref 3.5–5.2)
QT INTERVAL: 421 MS
RBC # BLD AUTO: 4.11 10*6/MM3 (ref 4.14–5.8)
SODIUM SERPL-SCNC: 138 MMOL/L (ref 136–145)
WBC NRBC COR # BLD: 8.17 10*3/MM3 (ref 3.4–10.8)

## 2022-04-04 PROCEDURE — 93005 ELECTROCARDIOGRAM TRACING: CPT

## 2022-04-04 PROCEDURE — 93010 ELECTROCARDIOGRAM REPORT: CPT | Performed by: INTERNAL MEDICINE

## 2022-04-04 PROCEDURE — 36415 COLL VENOUS BLD VENIPUNCTURE: CPT

## 2022-04-04 PROCEDURE — 85027 COMPLETE CBC AUTOMATED: CPT

## 2022-04-04 PROCEDURE — 80048 BASIC METABOLIC PNL TOTAL CA: CPT

## 2022-04-04 RX ORDER — CETIRIZINE HYDROCHLORIDE 10 MG/1
10 TABLET ORAL DAILY PRN
COMMUNITY

## 2022-04-04 NOTE — DISCHARGE INSTRUCTIONS
Take the following medications the morning of surgery:  HYDRALAZINE    If you are on prescription narcotic pain medication to control your pain you may also take that medication the morning of surgery.    General Instructions:    Follow your surgeons instructions regarding when to stop solid foods and when to stop liquids.   Verify with your surgeon if you are to complete a bowel prep and when to do so.  Patients who avoid smoking, chewing tobacco and alcohol for 4 weeks prior to surgery have a reduced risk of post-operative complications.  Quit smoking as many days before surgery as you can.  Do not smoke, use chewing tobacco or drink alcohol the day of surgery.   If applicable bring your C-PAP/ BI-PAP machine.  Bring any papers given to you in the doctor’s office.  Wear clean comfortable clothes.  Do not wear contact lenses, false eyelashes or make-up.  Bring a case for your glasses.   Bring crutches or walker if applicable.  Remove all piercings.  Leave jewelry and any other valuables at home.  Hair extensions with metal clips must be removed prior to surgery.  The Pre-Admission Testing nurse will instruct you to bring medications if unable to obtain an accurate list in Pre-Admission Testing.        If you were given a blood bank ID arm band remember to bring it with you the day of surgery.    Preventing a Surgical Site Infection:  For 2 to 3 days before surgery, avoid shaving with a razor because the razor can irritate skin and make it easier to develop an infection.    Any areas of open skin can increase the risk of a post-operative wound infection by allowing bacteria to enter and travel throughout the body.  Notify your surgeon if you have any skin wounds / rashes even if it is not near the expected surgical site.  The area will need assessed to determine if surgery should be delayed until it is healed.  The night prior to surgery shower using a fresh bar of anti-bacterial soap (such as Dial) and clean  washcloth.  Sleep in a clean bed with clean clothing.  Do not allow pets to sleep with you.  Shower on the morning of surgery using a fresh bar of anti-bacterial soap (such as Dial) and clean washcloth.  Dry with a clean towel and dress in clean clothing.  Ask your surgeon if you will be receiving antibiotics prior to surgery.  Make sure you, your family, and all healthcare providers clean their hands with soap and water or an alcohol based hand  before caring for you or your wound.    Day of surgery: 4/12/2022 ARRIVAL TIME 10:00 AM  Your arrival time is approximately two hours before your scheduled surgery time.  Upon arrival, a Pre-op nurse and Anesthesiologist will review your health history, obtain vital signs, and answer questions you may have.  The only belongings needed at this time will be a list of your home medications and if applicable your C-PAP/BI-PAP machine.  A Pre-op nurse will start an IV and you may receive medication in preparation for surgery, including something to help you relax.     Please be aware that surgery does come with discomfort.  We want to make every effort to control your discomfort so please discuss any uncontrolled symptoms with your nurse.   Your doctor will most likely have prescribed pain medications.      If you are going home after surgery you will receive individualized written care instructions before being discharged.  A responsible adult must drive you to and from the hospital on the day of your surgery and stay with you for 24 hours.  Discharge prescriptions can be filled by the hospital pharmacy during regular pharmacy hours.  If you are having surgery late in the day/evening your prescription may be e-prescribed to your pharmacy.  Please verify your pharmacy hours or chose a 24 hour pharmacy to avoid not having access to your prescription because your pharmacy has closed for the day.    If you are staying overnight following surgery, you will be transported to  your hospital room following the recovery period.  [facility] has all private rooms.    If you have any questions please call Pre-Admission Testing at [phone].  Deductibles and co-payments are collected on the day of service. Please be prepared to pay the required co-pay, deductible or deposit on the day of service as defined by your plan.    Patient Education for Self-Quarantine Process    Following your COVID testing, we strongly recommend that you wear a mask when you are with other people and practice social distancing.   Limit your activities to only required outings.  Wash your hands with soap and water frequently for at least 20 seconds.   Avoid touching your eyes, nose and mouth with unwashed hands.  Do not share anything - utensils, drinking glasses, food from the same bowl.   Sanitize household surfaces daily. Include all high touch areas (door handles, light switches, phones, countertops, etc.)    Call your surgeon immediately if you experience any of the following symptoms:  Sore Throat  Shortness of Breath or difficulty breathing  Cough  Chills  Body soreness or muscle pain  Headache  Fever  New loss of taste or smell  Do not arrive for your surgery ill.  Your procedure will need to be rescheduled to another time.  You will need to call your physician before the day of surgery to avoid any unnecessary exposure to hospital staff as well as other patients.         CHLORHEXIDINE CLOTH INSTRUCTIONS  The morning of surgery follow these instructions using the Chlorhexidine cloths you've been given.  These steps reduce bacteria on the body.  Do not use the cloths near your eyes, ears mouth, genitalia or on open wounds.  Throw the cloths away after use but do not try to flush them down a toilet.      Open and remove one cloth at a time from the package.    Leave the cloth unfolded and begin the bathing.  Massage the skin with the cloths using gentle pressure to remove bacteria.  Do not  scrub harshly.   Follow the steps below with one 2% CHG cloth per area (6 total cloths).  One cloth for neck, shoulders and chest.  One cloth for both arms, hands, fingers and underarms (do underarms last).  One cloth for the abdomen followed by groin.  One cloth for right leg and foot including between the toes.  One cloth for left leg and foot including between the toes.  The last cloth is to be used for the back of the neck, back and buttocks.    Allow the CHG to air dry 3 minutes on the skin which will give it time to work and decrease the chance of irritation.  The skin may feel sticky until it is dry.  Do not rinse with water or any other liquid or you will lose the beneficial effects of the CHG.  If mild skin irritation occurs, do rinse the skin to remove the CHG.  Report this to the nurse at time of admission.  Do not apply lotions, creams, ointments, deodorants or perfumes after using the clothes. Dress in clean clothes before coming to the hospital.

## 2022-04-09 ENCOUNTER — LAB (OUTPATIENT)
Dept: LAB | Facility: HOSPITAL | Age: 72
End: 2022-04-09

## 2022-04-09 DIAGNOSIS — Z90.49 HISTORY OF COLON RESECTION: ICD-10-CM

## 2022-04-09 DIAGNOSIS — K57.20 DIVERTICULITIS OF LARGE INTESTINE WITH PERFORATION AND ABSCESS WITHOUT BLEEDING: ICD-10-CM

## 2022-04-09 DIAGNOSIS — Z93.3 COLOSTOMY IN PLACE: ICD-10-CM

## 2022-04-09 LAB — SARS-COV-2 ORF1AB RESP QL NAA+PROBE: NOT DETECTED

## 2022-04-09 PROCEDURE — U0004 COV-19 TEST NON-CDC HGH THRU: HCPCS

## 2022-04-09 PROCEDURE — C9803 HOPD COVID-19 SPEC COLLECT: HCPCS

## 2022-04-11 ENCOUNTER — ANESTHESIA EVENT (OUTPATIENT)
Dept: PERIOP | Facility: HOSPITAL | Age: 72
End: 2022-04-11

## 2022-04-12 ENCOUNTER — HOSPITAL ENCOUNTER (INPATIENT)
Facility: HOSPITAL | Age: 72
LOS: 3 days | Discharge: HOME | End: 2022-04-15
Attending: SURGERY | Admitting: SURGERY

## 2022-04-12 ENCOUNTER — ANESTHESIA (OUTPATIENT)
Dept: PERIOP | Facility: HOSPITAL | Age: 72
End: 2022-04-12

## 2022-04-12 DIAGNOSIS — Z93.3 COLOSTOMY IN PLACE: ICD-10-CM

## 2022-04-12 DIAGNOSIS — K57.20 DIVERTICULITIS OF LARGE INTESTINE WITH PERFORATION AND ABSCESS WITHOUT BLEEDING: ICD-10-CM

## 2022-04-12 DIAGNOSIS — R53.1 GENERALIZED WEAKNESS: Primary | ICD-10-CM

## 2022-04-12 DIAGNOSIS — Z90.49 HISTORY OF COLON RESECTION: ICD-10-CM

## 2022-04-12 LAB
GLUCOSE BLDC GLUCOMTR-MCNC: 144 MG/DL (ref 70–130)
GLUCOSE BLDC GLUCOMTR-MCNC: 94 MG/DL (ref 70–130)

## 2022-04-12 PROCEDURE — 25010000002 MAGNESIUM SULFATE PER 500 MG OF MAGNESIUM: Performed by: NURSE ANESTHETIST, CERTIFIED REGISTERED

## 2022-04-12 PROCEDURE — 25010000002 PHENYLEPHRINE 10 MG/ML SOLUTION 5 ML VIAL: Performed by: NURSE ANESTHETIST, CERTIFIED REGISTERED

## 2022-04-12 PROCEDURE — 25010000002 HYDROMORPHONE PER 4 MG: Performed by: NURSE ANESTHETIST, CERTIFIED REGISTERED

## 2022-04-12 PROCEDURE — 25010000002 CEFAZOLIN IN DEXTROSE 2-4 GM/100ML-% SOLUTION: Performed by: SURGERY

## 2022-04-12 PROCEDURE — 0DBE0ZZ EXCISION OF LARGE INTESTINE, OPEN APPROACH: ICD-10-PCS | Performed by: SURGERY

## 2022-04-12 PROCEDURE — 0 BUPIVACAINE LIPOSOME 1.3 % SUSPENSION 20 ML VIAL: Performed by: SURGERY

## 2022-04-12 PROCEDURE — 25010000002 FENTANYL CITRATE (PF) 50 MCG/ML SOLUTION: Performed by: NURSE ANESTHETIST, CERTIFIED REGISTERED

## 2022-04-12 PROCEDURE — 25010000002 DEXAMETHASONE PER 1 MG: Performed by: NURSE ANESTHETIST, CERTIFIED REGISTERED

## 2022-04-12 PROCEDURE — 25010000002 SUCCINYLCHOLINE PER 20 MG: Performed by: NURSE ANESTHETIST, CERTIFIED REGISTERED

## 2022-04-12 PROCEDURE — C9290 INJ, BUPIVACAINE LIPOSOME: HCPCS | Performed by: SURGERY

## 2022-04-12 PROCEDURE — 25010000002 ONDANSETRON PER 1 MG: Performed by: NURSE ANESTHETIST, CERTIFIED REGISTERED

## 2022-04-12 PROCEDURE — 44626 REPAIR BOWEL OPENING: CPT | Performed by: SURGERY

## 2022-04-12 PROCEDURE — P9041 ALBUMIN (HUMAN),5%, 50ML: HCPCS | Performed by: NURSE ANESTHETIST, CERTIFIED REGISTERED

## 2022-04-12 PROCEDURE — 82962 GLUCOSE BLOOD TEST: CPT

## 2022-04-12 PROCEDURE — 44626 REPAIR BOWEL OPENING: CPT | Performed by: SPECIALIST/TECHNOLOGIST, OTHER

## 2022-04-12 PROCEDURE — 0DN80ZZ RELEASE SMALL INTESTINE, OPEN APPROACH: ICD-10-PCS | Performed by: SURGERY

## 2022-04-12 PROCEDURE — S0260 H&P FOR SURGERY: HCPCS | Performed by: SURGERY

## 2022-04-12 PROCEDURE — 25010000002 ALBUMIN HUMAN 5% PER 50 ML: Performed by: NURSE ANESTHETIST, CERTIFIED REGISTERED

## 2022-04-12 PROCEDURE — 25010000002 MIDAZOLAM PER 1 MG: Performed by: ANESTHESIOLOGY

## 2022-04-12 PROCEDURE — 25010000002 PHENYLEPHRINE 10 MG/ML SOLUTION: Performed by: NURSE ANESTHETIST, CERTIFIED REGISTERED

## 2022-04-12 PROCEDURE — 25010000002 PROPOFOL 10 MG/ML EMULSION: Performed by: NURSE ANESTHETIST, CERTIFIED REGISTERED

## 2022-04-12 DEVICE — CELLULAR STAPLER WITH TRI-STAPLE TECHNOLOGY
Type: IMPLANTABLE DEVICE | Site: SIGMOID COLON | Status: FUNCTIONAL
Brand: EEA

## 2022-04-12 RX ORDER — ONDANSETRON 2 MG/ML
4 INJECTION INTRAMUSCULAR; INTRAVENOUS ONCE AS NEEDED
Status: DISCONTINUED | OUTPATIENT
Start: 2022-04-12 | End: 2022-04-12 | Stop reason: HOSPADM

## 2022-04-12 RX ORDER — MAGNESIUM SULFATE HEPTAHYDRATE 500 MG/ML
INJECTION, SOLUTION INTRAMUSCULAR; INTRAVENOUS AS NEEDED
Status: DISCONTINUED | OUTPATIENT
Start: 2022-04-12 | End: 2022-04-12 | Stop reason: SURG

## 2022-04-12 RX ORDER — ONDANSETRON 2 MG/ML
4 INJECTION INTRAMUSCULAR; INTRAVENOUS EVERY 6 HOURS PRN
Status: DISCONTINUED | OUTPATIENT
Start: 2022-04-12 | End: 2022-04-15 | Stop reason: HOSPADM

## 2022-04-12 RX ORDER — HYDROMORPHONE HYDROCHLORIDE 1 MG/ML
0.5 INJECTION, SOLUTION INTRAMUSCULAR; INTRAVENOUS; SUBCUTANEOUS
Status: DISCONTINUED | OUTPATIENT
Start: 2022-04-12 | End: 2022-04-12 | Stop reason: HOSPADM

## 2022-04-12 RX ORDER — MAGNESIUM HYDROXIDE 1200 MG/15ML
LIQUID ORAL AS NEEDED
Status: DISCONTINUED | OUTPATIENT
Start: 2022-04-12 | End: 2022-04-12 | Stop reason: HOSPADM

## 2022-04-12 RX ORDER — CETIRIZINE HYDROCHLORIDE 10 MG/1
10 TABLET ORAL NIGHTLY
Status: DISCONTINUED | OUTPATIENT
Start: 2022-04-12 | End: 2022-04-15 | Stop reason: HOSPADM

## 2022-04-12 RX ORDER — SODIUM CHLORIDE 9 MG/ML
100 INJECTION, SOLUTION INTRAVENOUS CONTINUOUS
Status: DISCONTINUED | OUTPATIENT
Start: 2022-04-12 | End: 2022-04-12

## 2022-04-12 RX ORDER — SODIUM CHLORIDE 0.9 % (FLUSH) 0.9 %
3 SYRINGE (ML) INJECTION EVERY 12 HOURS SCHEDULED
Status: DISCONTINUED | OUTPATIENT
Start: 2022-04-12 | End: 2022-04-12 | Stop reason: HOSPADM

## 2022-04-12 RX ORDER — SODIUM CHLORIDE 0.9 % (FLUSH) 0.9 %
3-10 SYRINGE (ML) INJECTION AS NEEDED
Status: DISCONTINUED | OUTPATIENT
Start: 2022-04-12 | End: 2022-04-12 | Stop reason: HOSPADM

## 2022-04-12 RX ORDER — TRAZODONE HYDROCHLORIDE 50 MG/1
50 TABLET ORAL NIGHTLY PRN
Status: DISCONTINUED | OUTPATIENT
Start: 2022-04-12 | End: 2022-04-15 | Stop reason: HOSPADM

## 2022-04-12 RX ORDER — HYDRALAZINE HYDROCHLORIDE 50 MG/1
50 TABLET, FILM COATED ORAL NIGHTLY
COMMUNITY
End: 2022-04-29 | Stop reason: SDUPTHER

## 2022-04-12 RX ORDER — ALVIMOPAN 12 MG/1
12 CAPSULE ORAL ONCE
Status: COMPLETED | OUTPATIENT
Start: 2022-04-12 | End: 2022-04-12

## 2022-04-12 RX ORDER — NALOXONE HCL 0.4 MG/ML
0.1 VIAL (ML) INJECTION
Status: DISCONTINUED | OUTPATIENT
Start: 2022-04-12 | End: 2022-04-15 | Stop reason: HOSPADM

## 2022-04-12 RX ORDER — FLUMAZENIL 0.1 MG/ML
0.2 INJECTION INTRAVENOUS AS NEEDED
Status: DISCONTINUED | OUTPATIENT
Start: 2022-04-12 | End: 2022-04-12 | Stop reason: HOSPADM

## 2022-04-12 RX ORDER — OXYCODONE AND ACETAMINOPHEN 7.5; 325 MG/1; MG/1
1 TABLET ORAL EVERY 4 HOURS PRN
Status: DISCONTINUED | OUTPATIENT
Start: 2022-04-12 | End: 2022-04-12 | Stop reason: HOSPADM

## 2022-04-12 RX ORDER — HYDROMORPHONE HCL IN 0.9% NACL 10 MG/50ML
PATIENT CONTROLLED ANALGESIA SYRINGE INTRAVENOUS CONTINUOUS
Status: DISCONTINUED | OUTPATIENT
Start: 2022-04-12 | End: 2022-04-12

## 2022-04-12 RX ORDER — HYDRALAZINE HYDROCHLORIDE 50 MG/1
50 TABLET, FILM COATED ORAL NIGHTLY
Status: DISCONTINUED | OUTPATIENT
Start: 2022-04-12 | End: 2022-04-13

## 2022-04-12 RX ORDER — HYDROCODONE BITARTRATE AND ACETAMINOPHEN 7.5; 325 MG/1; MG/1
1 TABLET ORAL ONCE AS NEEDED
Status: DISCONTINUED | OUTPATIENT
Start: 2022-04-12 | End: 2022-04-12 | Stop reason: HOSPADM

## 2022-04-12 RX ORDER — HYDRALAZINE HYDROCHLORIDE 20 MG/ML
5 INJECTION INTRAMUSCULAR; INTRAVENOUS
Status: DISCONTINUED | OUTPATIENT
Start: 2022-04-12 | End: 2022-04-12 | Stop reason: HOSPADM

## 2022-04-12 RX ORDER — DIPHENHYDRAMINE HYDROCHLORIDE 50 MG/ML
12.5 INJECTION INTRAMUSCULAR; INTRAVENOUS
Status: DISCONTINUED | OUTPATIENT
Start: 2022-04-12 | End: 2022-04-12 | Stop reason: HOSPADM

## 2022-04-12 RX ORDER — SODIUM CHLORIDE 0.9 % (FLUSH) 0.9 %
10 SYRINGE (ML) INJECTION EVERY 12 HOURS SCHEDULED
Status: DISCONTINUED | OUTPATIENT
Start: 2022-04-12 | End: 2022-04-15 | Stop reason: HOSPADM

## 2022-04-12 RX ORDER — HYDROMORPHONE HYDROCHLORIDE 1 MG/ML
0.5 INJECTION, SOLUTION INTRAMUSCULAR; INTRAVENOUS; SUBCUTANEOUS
Status: DISCONTINUED | OUTPATIENT
Start: 2022-04-12 | End: 2022-04-15 | Stop reason: HOSPADM

## 2022-04-12 RX ORDER — NALOXONE HCL 0.4 MG/ML
0.2 VIAL (ML) INJECTION AS NEEDED
Status: DISCONTINUED | OUTPATIENT
Start: 2022-04-12 | End: 2022-04-12 | Stop reason: HOSPADM

## 2022-04-12 RX ORDER — SODIUM CHLORIDE, SODIUM LACTATE, POTASSIUM CHLORIDE, CALCIUM CHLORIDE 600; 310; 30; 20 MG/100ML; MG/100ML; MG/100ML; MG/100ML
9 INJECTION, SOLUTION INTRAVENOUS CONTINUOUS
Status: DISCONTINUED | OUTPATIENT
Start: 2022-04-12 | End: 2022-04-12

## 2022-04-12 RX ORDER — DEXAMETHASONE SODIUM PHOSPHATE 10 MG/ML
INJECTION INTRAMUSCULAR; INTRAVENOUS AS NEEDED
Status: DISCONTINUED | OUTPATIENT
Start: 2022-04-12 | End: 2022-04-12 | Stop reason: SURG

## 2022-04-12 RX ORDER — SODIUM BICARBONATE 650 MG/1
1950 TABLET ORAL
Status: DISCONTINUED | OUTPATIENT
Start: 2022-04-13 | End: 2022-04-13

## 2022-04-12 RX ORDER — IPRATROPIUM BROMIDE AND ALBUTEROL SULFATE 2.5; .5 MG/3ML; MG/3ML
3 SOLUTION RESPIRATORY (INHALATION) ONCE AS NEEDED
Status: DISCONTINUED | OUTPATIENT
Start: 2022-04-12 | End: 2022-04-12 | Stop reason: HOSPADM

## 2022-04-12 RX ORDER — FENTANYL CITRATE 50 UG/ML
50 INJECTION, SOLUTION INTRAMUSCULAR; INTRAVENOUS
Status: DISCONTINUED | OUTPATIENT
Start: 2022-04-12 | End: 2022-04-12 | Stop reason: HOSPADM

## 2022-04-12 RX ORDER — SODIUM CHLORIDE 9 MG/ML
9 INJECTION, SOLUTION INTRAVENOUS CONTINUOUS
Status: DISCONTINUED | OUTPATIENT
Start: 2022-04-12 | End: 2022-04-12

## 2022-04-12 RX ORDER — SIMETHICONE 80 MG
80 TABLET,CHEWABLE ORAL EVERY 6 HOURS PRN
Status: DISCONTINUED | OUTPATIENT
Start: 2022-04-12 | End: 2022-04-15 | Stop reason: HOSPADM

## 2022-04-12 RX ORDER — DEXTROSE AND SODIUM CHLORIDE 5; .45 G/100ML; G/100ML
50 INJECTION, SOLUTION INTRAVENOUS CONTINUOUS
Status: DISCONTINUED | OUTPATIENT
Start: 2022-04-12 | End: 2022-04-13

## 2022-04-12 RX ORDER — LIDOCAINE HYDROCHLORIDE 20 MG/ML
INJECTION, SOLUTION INFILTRATION; PERINEURAL AS NEEDED
Status: DISCONTINUED | OUTPATIENT
Start: 2022-04-12 | End: 2022-04-12 | Stop reason: SURG

## 2022-04-12 RX ORDER — FAMOTIDINE 10 MG/ML
20 INJECTION, SOLUTION INTRAVENOUS ONCE
Status: COMPLETED | OUTPATIENT
Start: 2022-04-12 | End: 2022-04-12

## 2022-04-12 RX ORDER — ALBUMIN, HUMAN INJ 5% 5 %
SOLUTION INTRAVENOUS CONTINUOUS PRN
Status: DISCONTINUED | OUTPATIENT
Start: 2022-04-12 | End: 2022-04-12 | Stop reason: SURG

## 2022-04-12 RX ORDER — ALVIMOPAN 12 MG/1
12 CAPSULE ORAL 2 TIMES DAILY
Status: DISCONTINUED | OUTPATIENT
Start: 2022-04-12 | End: 2022-04-15

## 2022-04-12 RX ORDER — LABETALOL HYDROCHLORIDE 5 MG/ML
5 INJECTION, SOLUTION INTRAVENOUS
Status: DISCONTINUED | OUTPATIENT
Start: 2022-04-12 | End: 2022-04-12 | Stop reason: HOSPADM

## 2022-04-12 RX ORDER — KETAMINE HYDROCHLORIDE 50 MG/ML
INJECTION, SOLUTION, CONCENTRATE INTRAMUSCULAR; INTRAVENOUS AS NEEDED
Status: DISCONTINUED | OUTPATIENT
Start: 2022-04-12 | End: 2022-04-12 | Stop reason: SURG

## 2022-04-12 RX ORDER — LIDOCAINE HYDROCHLORIDE 10 MG/ML
0.5 INJECTION, SOLUTION EPIDURAL; INFILTRATION; INTRACAUDAL; PERINEURAL ONCE AS NEEDED
Status: DISCONTINUED | OUTPATIENT
Start: 2022-04-12 | End: 2022-04-12 | Stop reason: HOSPADM

## 2022-04-12 RX ORDER — ACETAMINOPHEN 325 MG/1
650 TABLET ORAL EVERY 6 HOURS PRN
Status: DISCONTINUED | OUTPATIENT
Start: 2022-04-12 | End: 2022-04-15 | Stop reason: HOSPADM

## 2022-04-12 RX ORDER — MELATONIN
1000 NIGHTLY
Status: DISCONTINUED | OUTPATIENT
Start: 2022-04-12 | End: 2022-04-15 | Stop reason: HOSPADM

## 2022-04-12 RX ORDER — SODIUM BICARBONATE 650 MG/1
1300 TABLET ORAL
Status: DISCONTINUED | OUTPATIENT
Start: 2022-04-13 | End: 2022-04-13

## 2022-04-12 RX ORDER — ROCURONIUM BROMIDE 10 MG/ML
INJECTION, SOLUTION INTRAVENOUS AS NEEDED
Status: DISCONTINUED | OUTPATIENT
Start: 2022-04-12 | End: 2022-04-12 | Stop reason: SURG

## 2022-04-12 RX ORDER — ONDANSETRON 2 MG/ML
INJECTION INTRAMUSCULAR; INTRAVENOUS AS NEEDED
Status: DISCONTINUED | OUTPATIENT
Start: 2022-04-12 | End: 2022-04-12 | Stop reason: SURG

## 2022-04-12 RX ORDER — EPHEDRINE SULFATE 50 MG/ML
INJECTION, SOLUTION INTRAVENOUS AS NEEDED
Status: DISCONTINUED | OUTPATIENT
Start: 2022-04-12 | End: 2022-04-12 | Stop reason: SURG

## 2022-04-12 RX ORDER — ATORVASTATIN CALCIUM 20 MG/1
20 TABLET, FILM COATED ORAL NIGHTLY
Status: DISCONTINUED | OUTPATIENT
Start: 2022-04-12 | End: 2022-04-15 | Stop reason: HOSPADM

## 2022-04-12 RX ORDER — GLYCOPYRROLATE 0.2 MG/ML
INJECTION INTRAMUSCULAR; INTRAVENOUS AS NEEDED
Status: DISCONTINUED | OUTPATIENT
Start: 2022-04-12 | End: 2022-04-12 | Stop reason: SURG

## 2022-04-12 RX ORDER — IBUPROFEN 600 MG/1
600 TABLET ORAL ONCE AS NEEDED
Status: DISCONTINUED | OUTPATIENT
Start: 2022-04-12 | End: 2022-04-12 | Stop reason: HOSPADM

## 2022-04-12 RX ORDER — HYDRALAZINE HYDROCHLORIDE 50 MG/1
100 TABLET, FILM COATED ORAL
Status: DISCONTINUED | OUTPATIENT
Start: 2022-04-13 | End: 2022-04-13

## 2022-04-12 RX ORDER — EPHEDRINE SULFATE 50 MG/ML
5 INJECTION, SOLUTION INTRAVENOUS ONCE AS NEEDED
Status: DISCONTINUED | OUTPATIENT
Start: 2022-04-12 | End: 2022-04-12 | Stop reason: HOSPADM

## 2022-04-12 RX ORDER — SUCCINYLCHOLINE CHLORIDE 20 MG/ML
INJECTION INTRAMUSCULAR; INTRAVENOUS AS NEEDED
Status: DISCONTINUED | OUTPATIENT
Start: 2022-04-12 | End: 2022-04-12 | Stop reason: SURG

## 2022-04-12 RX ORDER — CEFAZOLIN SODIUM 2 G/100ML
2 INJECTION, SOLUTION INTRAVENOUS ONCE
Status: COMPLETED | OUTPATIENT
Start: 2022-04-12 | End: 2022-04-12

## 2022-04-12 RX ORDER — SODIUM BICARBONATE 650 MG/1
1300 TABLET ORAL
COMMUNITY
End: 2022-07-29 | Stop reason: SDUPTHER

## 2022-04-12 RX ORDER — SODIUM CHLORIDE 0.9 % (FLUSH) 0.9 %
10 SYRINGE (ML) INJECTION AS NEEDED
Status: DISCONTINUED | OUTPATIENT
Start: 2022-04-12 | End: 2022-04-15 | Stop reason: HOSPADM

## 2022-04-12 RX ORDER — FENTANYL CITRATE 50 UG/ML
INJECTION, SOLUTION INTRAMUSCULAR; INTRAVENOUS AS NEEDED
Status: DISCONTINUED | OUTPATIENT
Start: 2022-04-12 | End: 2022-04-12 | Stop reason: SURG

## 2022-04-12 RX ORDER — OXYCODONE AND ACETAMINOPHEN 7.5; 325 MG/1; MG/1
1 TABLET ORAL EVERY 4 HOURS PRN
Status: DISCONTINUED | OUTPATIENT
Start: 2022-04-12 | End: 2022-04-15 | Stop reason: HOSPADM

## 2022-04-12 RX ORDER — MIDAZOLAM HYDROCHLORIDE 1 MG/ML
0.5 INJECTION INTRAMUSCULAR; INTRAVENOUS
Status: DISCONTINUED | OUTPATIENT
Start: 2022-04-12 | End: 2022-04-12 | Stop reason: HOSPADM

## 2022-04-12 RX ORDER — PROPOFOL 10 MG/ML
VIAL (ML) INTRAVENOUS AS NEEDED
Status: DISCONTINUED | OUTPATIENT
Start: 2022-04-12 | End: 2022-04-12 | Stop reason: SURG

## 2022-04-12 RX ORDER — DIPHENHYDRAMINE HCL 25 MG
25 CAPSULE ORAL
Status: DISCONTINUED | OUTPATIENT
Start: 2022-04-12 | End: 2022-04-12 | Stop reason: HOSPADM

## 2022-04-12 RX ORDER — PHENYLEPHRINE HYDROCHLORIDE 10 MG/ML
INJECTION INTRAVENOUS AS NEEDED
Status: DISCONTINUED | OUTPATIENT
Start: 2022-04-12 | End: 2022-04-12 | Stop reason: SURG

## 2022-04-12 RX ORDER — METOPROLOL SUCCINATE 100 MG/1
100 TABLET, EXTENDED RELEASE ORAL NIGHTLY
Status: DISCONTINUED | OUTPATIENT
Start: 2022-04-12 | End: 2022-04-15 | Stop reason: HOSPADM

## 2022-04-12 RX ADMIN — GLYCOPYRROLATE 0.2 MG: 0.2 INJECTION INTRAMUSCULAR; INTRAVENOUS at 12:17

## 2022-04-12 RX ADMIN — FENTANYL CITRATE 50 MCG: 0.05 INJECTION, SOLUTION INTRAMUSCULAR; INTRAVENOUS at 11:58

## 2022-04-12 RX ADMIN — Medication 10 ML: at 20:57

## 2022-04-12 RX ADMIN — DEXTROSE AND SODIUM CHLORIDE 50 ML/HR: 5; .45 INJECTION, SOLUTION INTRAVENOUS at 17:41

## 2022-04-12 RX ADMIN — KETAMINE HYDROCHLORIDE 10 MG: 50 INJECTION, SOLUTION INTRAMUSCULAR; INTRAVENOUS at 14:31

## 2022-04-12 RX ADMIN — EPHEDRINE SULFATE 5 MG: 50 INJECTION INTRAVENOUS at 12:25

## 2022-04-12 RX ADMIN — ROCURONIUM BROMIDE 25 MG: 50 INJECTION INTRAVENOUS at 12:16

## 2022-04-12 RX ADMIN — HYDROMORPHONE HYDROCHLORIDE 0.5 MG: 1 INJECTION, SOLUTION INTRAMUSCULAR; INTRAVENOUS; SUBCUTANEOUS at 15:20

## 2022-04-12 RX ADMIN — SODIUM CHLORIDE 9 ML/HR: 9 INJECTION, SOLUTION INTRAVENOUS at 10:41

## 2022-04-12 RX ADMIN — KETAMINE HYDROCHLORIDE 5 MG: 50 INJECTION, SOLUTION INTRAMUSCULAR; INTRAVENOUS at 14:00

## 2022-04-12 RX ADMIN — SUCCINYLCHOLINE CHLORIDE 100 MG: 20 INJECTION, SOLUTION INTRAMUSCULAR; INTRAVENOUS; PARENTERAL at 12:05

## 2022-04-12 RX ADMIN — ONDANSETRON 4 MG: 2 INJECTION INTRAMUSCULAR; INTRAVENOUS at 14:17

## 2022-04-12 RX ADMIN — SUGAMMADEX 200 MG: 100 INJECTION, SOLUTION INTRAVENOUS at 14:10

## 2022-04-12 RX ADMIN — ACETAMINOPHEN 650 MG: 325 TABLET ORAL at 17:42

## 2022-04-12 RX ADMIN — MIDAZOLAM 0.5 MG: 1 INJECTION INTRAMUSCULAR; INTRAVENOUS at 11:11

## 2022-04-12 RX ADMIN — EPHEDRINE SULFATE 5 MG: 50 INJECTION INTRAVENOUS at 12:40

## 2022-04-12 RX ADMIN — KETAMINE HYDROCHLORIDE 10 MG: 50 INJECTION, SOLUTION INTRAMUSCULAR; INTRAVENOUS at 12:04

## 2022-04-12 RX ADMIN — CETIRIZINE HYDROCHLORIDE 10 MG: 10 TABLET ORAL at 20:57

## 2022-04-12 RX ADMIN — PHENYLEPHRINE HYDROCHLORIDE 200 MCG: 10 INJECTION, SOLUTION INTRAVENOUS at 13:31

## 2022-04-12 RX ADMIN — CEFAZOLIN SODIUM 2 G: 2 INJECTION, SOLUTION INTRAVENOUS at 11:52

## 2022-04-12 RX ADMIN — EPHEDRINE SULFATE 10 MG: 50 INJECTION INTRAVENOUS at 12:12

## 2022-04-12 RX ADMIN — ALVIMOPAN 12 MG: 12 CAPSULE ORAL at 20:57

## 2022-04-12 RX ADMIN — HYDRALAZINE HYDROCHLORIDE 50 MG: 50 TABLET, FILM COATED ORAL at 20:57

## 2022-04-12 RX ADMIN — ROCURONIUM BROMIDE 10 MG: 50 INJECTION INTRAVENOUS at 13:16

## 2022-04-12 RX ADMIN — FAMOTIDINE 20 MG: 10 INJECTION INTRAVENOUS at 11:11

## 2022-04-12 RX ADMIN — PROPOFOL 70 MG: 10 INJECTION, EMULSION INTRAVENOUS at 12:04

## 2022-04-12 RX ADMIN — DEXAMETHASONE SODIUM PHOSPHATE 4 MG: 10 INJECTION INTRAMUSCULAR; INTRAVENOUS at 12:17

## 2022-04-12 RX ADMIN — METRONIDAZOLE 500 MG: 500 INJECTION, SOLUTION INTRAVENOUS at 11:52

## 2022-04-12 RX ADMIN — GLYCOPYRROLATE 0.2 MG: 0.2 INJECTION INTRAMUSCULAR; INTRAVENOUS at 12:40

## 2022-04-12 RX ADMIN — KETAMINE HYDROCHLORIDE 5 MG: 50 INJECTION, SOLUTION INTRAMUSCULAR; INTRAVENOUS at 13:13

## 2022-04-12 RX ADMIN — EPHEDRINE SULFATE 5 MG: 50 INJECTION INTRAVENOUS at 13:28

## 2022-04-12 RX ADMIN — EPHEDRINE SULFATE 5 MG: 50 INJECTION INTRAVENOUS at 13:31

## 2022-04-12 RX ADMIN — ROCURONIUM BROMIDE 5 MG: 50 INJECTION INTRAVENOUS at 12:03

## 2022-04-12 RX ADMIN — ROCURONIUM BROMIDE 10 MG: 50 INJECTION INTRAVENOUS at 13:50

## 2022-04-12 RX ADMIN — ATORVASTATIN CALCIUM 20 MG: 20 TABLET, FILM COATED ORAL at 20:56

## 2022-04-12 RX ADMIN — LIDOCAINE HYDROCHLORIDE 100 MG: 20 INJECTION, SOLUTION INFILTRATION; PERINEURAL at 12:03

## 2022-04-12 RX ADMIN — EPHEDRINE SULFATE 5 MG: 50 INJECTION INTRAVENOUS at 13:10

## 2022-04-12 RX ADMIN — PHENYLEPHRINE HYDROCHLORIDE 0.5 MCG/KG/MIN: 10 INJECTION INTRAVENOUS at 13:31

## 2022-04-12 RX ADMIN — MAGNESIUM SULFATE HEPTAHYDRATE 2 G: 500 INJECTION, SOLUTION INTRAMUSCULAR; INTRAVENOUS at 12:18

## 2022-04-12 RX ADMIN — ALVIMOPAN 12 MG: 12 CAPSULE ORAL at 10:55

## 2022-04-12 RX ADMIN — FENTANYL CITRATE 50 MCG: 50 INJECTION INTRAMUSCULAR; INTRAVENOUS at 14:47

## 2022-04-12 RX ADMIN — OXYCODONE HYDROCHLORIDE AND ACETAMINOPHEN 1 TABLET: 7.5; 325 TABLET ORAL at 20:56

## 2022-04-12 RX ADMIN — PHENYLEPHRINE HYDROCHLORIDE 100 MCG: 10 INJECTION, SOLUTION INTRAVENOUS at 12:08

## 2022-04-12 RX ADMIN — EPHEDRINE SULFATE 5 MG: 50 INJECTION INTRAVENOUS at 12:22

## 2022-04-12 RX ADMIN — PHENYLEPHRINE HYDROCHLORIDE 200 MCG: 10 INJECTION, SOLUTION INTRAVENOUS at 13:36

## 2022-04-12 RX ADMIN — Medication 1000 UNITS: at 20:57

## 2022-04-12 RX ADMIN — EPHEDRINE SULFATE 10 MG: 50 INJECTION INTRAVENOUS at 12:14

## 2022-04-12 RX ADMIN — METOPROLOL SUCCINATE 100 MG: 100 TABLET, EXTENDED RELEASE ORAL at 20:57

## 2022-04-12 RX ADMIN — SODIUM CHLORIDE 100 ML/HR: 9 INJECTION, SOLUTION INTRAVENOUS at 16:20

## 2022-04-12 RX ADMIN — ALBUMIN HUMAN: 0.05 INJECTION, SOLUTION INTRAVENOUS at 13:36

## 2022-04-12 RX ADMIN — PHENYLEPHRINE HYDROCHLORIDE 200 MCG: 10 INJECTION, SOLUTION INTRAVENOUS at 12:10

## 2022-04-12 RX ADMIN — FENTANYL CITRATE 50 MCG: 0.05 INJECTION, SOLUTION INTRAMUSCULAR; INTRAVENOUS at 14:22

## 2022-04-12 NOTE — ANESTHESIA PROCEDURE NOTES
Airway  Urgency: elective    Date/Time: 4/12/2022 12:06 PM  Airway not difficult    General Information and Staff    Patient location during procedure: OR  Anesthesiologist: Kahlil Sharp MD  CRNA: Caterina Arreguin CRNA    Indications and Patient Condition  Indications for airway management: airway protection    Preoxygenated: yes  MILS maintained throughout  Mask difficulty assessment: 2 - vent by mask + OA or adjuvant +/- NMBA    Final Airway Details  Final airway type: endotracheal airway      Successful airway: ETT  Cuffed: yes   Successful intubation technique: direct laryngoscopy  Endotracheal tube insertion site: oral  Blade: Georgina  Blade size: 4  ETT size (mm): 7.5  Cormack-Lehane Classification: grade I - full view of glottis  Placement verified by: chest auscultation and capnometry   Cuff volume (mL): 7  Measured from: lips  ETT/EBT  to lips (cm): 21  Number of attempts at approach: 1  Assessment: lips, teeth, and gum same as pre-op and atraumatic intubation    Additional Comments  Atraumatic ET Tube placement.  Teeth as pre-op. BLEBS.  -ABD sounds.  +ET CO2.  Secured to face

## 2022-04-12 NOTE — ANESTHESIA PREPROCEDURE EVALUATION
Anesthesia Evaluation     Patient summary reviewed and Nursing notes reviewed   no history of anesthetic complications:  NPO Solid Status: > 8 hours  NPO Liquid Status: > 8 hours           Airway   Mallampati: II  Dental    (+) upper dentures    Pulmonary - normal exam   (+) a smoker Former,   Cardiovascular - normal exam    (+) hypertension 2 medications or greater, hyperlipidemia,       Neuro/Psych- negative ROS  GI/Hepatic/Renal/Endo    (+) obesity,  GERD,  renal disease CRI, diabetes mellitus type 2,     Musculoskeletal     Abdominal    Substance History      OB/GYN          Other   arthritis,    history of cancer remission                    Anesthesia Plan    ASA 3     general     intravenous induction     Anesthetic plan, all risks, benefits, and alternatives have been provided, discussed and informed consent has been obtained with: patient.        CODE STATUS:

## 2022-04-12 NOTE — ANESTHESIA POSTPROCEDURE EVALUATION
"Patient: Lefty Cai    Procedure Summary     Date: 04/12/22 Room / Location: [facility] OR 06 / [facility]    Anesthesia Start: 1156 Anesthesia Stop: 1434    Procedure: open Colostomy reversal (N/A Abdomen) Diagnosis:       Colostomy in place (HCC)      History of colon resection      Diverticulitis of large intestine with perforation and abscess without bleeding      (Colostomy in place (HCC) [Z93.3])      (History of colon resection [Z90.49])      (Diverticulitis of large intestine with perforation and abscess without bleeding [K57.20])    Surgeons: Yelena Guerra MD Provider: Kahlil Shapr MD    Anesthesia Type: general ASA Status: 3          Anesthesia Type: general    Vitals  Vitals Value Taken Time   /69 04/12/22 1546   Temp 36.4 °C (97.5 °F) 04/12/22 1545   Pulse 75 04/12/22 1547   Resp 16 04/12/22 1545   SpO2 97 % 04/12/22 1547   Vitals shown include unvalidated device data.        Post Anesthesia Care and Evaluation    Patient location during evaluation: bedside  Patient participation: complete - patient participated  Level of consciousness: awake  Pain score: 2  Pain management: adequate  Airway patency: patent  Anesthetic complications: No anesthetic complications  PONV Status: none  Cardiovascular status: acceptable  Respiratory status: acceptable  Hydration status: acceptable    Comments: /69 (BP Location: Right arm, Patient Position: Lying)   Pulse 72   Temp 36.4 °C (97.5 °F) (Oral)   Resp 16   Ht 177.8 cm (70\")   Wt 106 kg (232 lb 11.2 oz)   SpO2 97%   BMI 33.39 kg/m²         "

## 2022-04-12 NOTE — OP NOTE
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]  History of colon resection [Z90.49]  Diverticulitis of large intestine with perforation and abscess without bleeding [K57.20]    Procedure:   Open colostomy reversal with extensive (2-hour) lysis of adhesions    Surgeon: Yelena Guerra MD    Assistant: Zee Yoon CSA (Zee was responsible for suctioning, retracting, suturing of all surgical incisions, and application of sterile dressings at the completion of the case)    Anesthesia:  General (general endotracheal tube)    Estimated Blood Loss: 100ml    Specimens: None    Complications: None    Indications:  The patient is a 71-year-old gentleman who was admitted to the hospital about 6 months ago with perforated diverticulitis of the sigmoid colon with purulent peritonitis.  I performed a sigmoid colectomy with colostomy at the time and he presents today for colostomy reversal.  He understands the risks of the procedure include bleeding, possible anastomotic leak, possible wound infection, and possible incisional hernia development.  Despite these risks, he has consented to proceed.    Findings: Extensive intra-abdominal adhesions involving small bowel    Description of procedure:  The patient was brought to the operating room and placed on the OR table in supine position with a split leg laterally.  An endotracheal tube was inserted and general anesthesia induced.  A Reddy catheter was inserted into the urinary bladder using sterile technique.  His abdominal wall was shaved, prepped, and draped in a sterile fashion after I closed his colostomy primarily with a running 2-0 silk stitch.  A surgical timeout was  completed.  A midline laparotomy incision was made through the previous scar after instilling 0.5% Marcaine mixed with Exparel.  The subcutaneous fat and fascia were divided sharply and the underlying omentum dissected off of the peritoneum circumferentially using electrocautery.  A large Alessandro wound protector was inserted and the Bookwalter set up for additional retraction.  I then performed about a 2-hour lysis of adhesions, mostly involving small bowel to small bowel adhesions, small bowel to peritoneal lining adhesions, and small bowel to omental adhesions.  Once all of the small bowel had been mobilized and a full lysis of adhesions carried out, the small bowel was retracted out of the way with Bookwalter attachments and moist lap sponges to reveal the rectal stump in the pelvis.  The bright blue Prolene stitch was identified and the rectum appeared soft and healthy.  He had a fairly long rectal stump and my assistant went down below the legs and was able to insert the small followed by the medium anal dilator up almost to the end of the rectal stump before running out of length along the anal dilator.  I then took down the colostomy in the left upper quadrant using electrocautery via a transverse elliptical skin incision.  The colostomy easily reached down to the rectal stump with no tension and there was about 6 inches of redundant colon that I transected using electrocautery.  The anvil of the 28 Covidien EEA stapler was inserted into the lumen of the colon and secured with a running 2-0 Prolene whipstitch.  My assistant then went back down below the legs and inserted the 28 EEA stapler up through the anus to the end of the rectum, although it did not fully reach the rectal staple line.  The stapler was deployed a couple of inches downstream from the rectal stump staple line along the anterior wall of the rectum.  The anvil was secured to the spike of the stapler, and the stapler closed upon itself.  The  stapler was then fired and then retracted out of the anus after holding the stapler in place for about 15 seconds in a closed position.  Both anastomotic donuts were intact circumferentially on the back table.  The anastomosis was then submerged in saline within the pelvis and a leak test performed, with no air extravasation on 3 consecutive insufflation attempts.  All dirty instruments and gloves were then set aside for clean versions of each.  The irrigation was suctioned dry from the pelvis and the small bowel returned to its normal position as was the omentum.  Before doing so, I palpated the colorectal anastomosis and made sure that it was not involving the bladder anteriorly, which had been kept well out of the way during stapling of the anastomosis using a malleable retractor on the Bookwalter.  The fascia in the left upper quadrant colostomy site was closed using interrupted 0 PDS figure-of-eight sutures x3.  The midline fascia was then closed using running loop 0 PDS sutures.  Skin staples were applied as were sterile dressings.  He was then extubated and transferred to PACU in stable condition with all counts correct per nursing.    Yelena Guerra MD  General, Robotic, and Endoscopic Surgery  [facility]    [address], [address]  [place] [address]  P: [phone]  F: [phone]

## 2022-04-12 NOTE — H&P
General Surgery  History and Physical    CC: Unwanted colostomy, history of perforated diverticulitis     HPI: The patient is a pleasant 71 y.o. year-old gentleman who presents today for possible colostomy reversal.  I performed an open sigmoid colectomy with colostomy in September of last year for perforated diverticulitis and he has made a full recovery.  He is now 6 months out from his initial surgery and would like to have his colostomy reversed.  I recently performed a colonoscopy and proctoscopy of the rectal stump and found three adenomatous colon polyps that were removed and a hyperplastic polyp that was removed.     Past Medical History:  Hypertension  Hyperlipidemia  Stage IV chronic kidney disease  Type 2 diabetes  GERD  Renal cell carcinoma status post partial left nephrectomy  Obstructive sleep apnea     Past Surgical History:  Open sigmoid colectomy with colostomy (September 2021 by me for perforated diverticulitis)  Partial left nephrectomy (2007, Dr. Victor)  Cataract extraction  Left arm AV fistula (2019, Dr. Louann Miles)  Knee arthroscopy    Medications:   Medications Prior to Admission   Medication Sig Dispense Refill Last Dose   • cetirizine (zyrTEC) 10 MG tablet Take 10 mg by mouth Every Night.   4/12/2022 at 0000   • CHLORHEXIDINE GLUCONATE CLOTH EX Apply 1 each topically Take As Directed. AS DIRECTED PREOP   4/12/2022 at 0800   • cholecalciferol (VITAMIN D3) 25 MCG (1000 UT) tablet Take 1,000 Units by mouth Every Night.   4/12/2022 at 0000   • hydrALAZINE (APRESOLINE) 50 MG tablet TAKE 1 TABLET THREE TIMES DAILY (Patient taking differently: Take 100 mg by mouth Every Morning.) 270 tablet 3 4/12/2022 at 0830   • hydrALAZINE (APRESOLINE) 50 MG tablet Take 50 mg by mouth Every Night.   4/12/2022 at 0000   • metoprolol succinate XL (TOPROL-XL) 100 MG 24 hr tablet Take 1 tablet by mouth Daily. (Patient taking differently: Take 100 mg by mouth Every Night.) 90 tablet 3 4/12/2022 at 0000   •  simvastatin (ZOCOR) 40 MG tablet TAKE 1 TABLET AT BEDTIME (Patient taking differently: Take 40 mg by mouth Every Night.) 90 tablet 3 4/12/2022 at 0000   • sodium bicarbonate 650 MG tablet Take 1,950 mg by mouth Every Morning.  0 4/11/2022 at 0900   • sodium bicarbonate 650 MG tablet Take 1,300 mg by mouth. Afternoon   4/11/2022 at 1500   • traZODone (DESYREL) 50 MG tablet TAKE 1 TABLET AT NIGHT AS NEEDED FOR SLEEP (Patient taking differently: Take 50 mg by mouth Every Night.) 90 tablet 3 4/12/2022 at 0000   • acetaminophen (TYLENOL) 325 MG tablet Take 650 mg by mouth Every 6 (Six) Hours As Needed for Mild Pain .   3/12/2022   • aspirin 81 MG tablet Take 81 mg by mouth Every Night. HOLDING FOR 5 DAYS BEFORE SURGERY PER MD   4/7/2022   • Misc. Devices (Bath/Shower Seat) misc Use as needed for bathing and completion of ADL 1 each 0    • simethicone (MYLICON) 80 MG chewable tablet Chew 80 mg Every 6 (Six) Hours As Needed.   3/29/2022       Allergies: Contrast dye (contraindicated due to chronic kidney disease), latex (blisters of skin)     Family History: Mother with hypertension, father with bladder cancer and coronary artery disease     Social History: , former smoker but quit in 1999, no regular alcohol use    ROS: A comprehensive review of systems was conducted and negative for melena, hematochezia, or unintentional weight loss  All other systems reviewed and negative    Physical Exam:  Vitals:    04/12/22 1024   BP: 159/86   Pulse: 71   Resp: 20   Temp: 98 °F (36.7 °C)   SpO2: 97%     Height: 177 cm  Weight: 106 kg  BMI: 33.39  General: No acute distress, well-nourished & well-developed  HEAD: normocephalic, atraumatic  EYES: normal conjunctiva, sclera anicteric  EARS: grossly normal hearing  NECK: supple, no thyromegaly  CARDIOVASCULAR: regular rate and rhythm  RESPIRATORY: clear to auscultation bilaterally  GASTROINTESTINAL: soft, nontender, non-distended, well-healed lower midline surgical scar, colostomy  in left upper quadrant healthy appearing with some stenosis at the skin level  PSYCHIATRIC: oriented x3, normal mood and affect    ASSESSMENT & PLAN  Mr. Cai is a 71-year-old gentleman who presents today for elective colostomy reversal.  He understands the risks of the procedure include bleeding, possible wound infection, possible anastomotic leak, and possible incisional hernia development.  Despite these risks he has consented to proceed.    Yelena Guerra MD  General, Robotic, and Endoscopic Surgery  [facility]    [address], [address]  [place] [address]  P: [phone]  F: [phone]

## 2022-04-12 NOTE — PLAN OF CARE
Goal Outcome Evaluation:      Patient transferred to unit from PACU alert and oriented. Full assessment performed, VSS on room air. No complaints of pain at this time. Family at bedside. Will ctm.

## 2022-04-13 LAB
ANION GAP SERPL CALCULATED.3IONS-SCNC: 15 MMOL/L (ref 5–15)
ANION GAP SERPL CALCULATED.3IONS-SCNC: 17.3 MMOL/L (ref 5–15)
BASOPHILS # BLD AUTO: 0.03 10*3/MM3 (ref 0–0.2)
BASOPHILS NFR BLD AUTO: 0.2 % (ref 0–1.5)
BUN SERPL-MCNC: 52 MG/DL (ref 8–23)
BUN SERPL-MCNC: 53 MG/DL (ref 8–23)
BUN/CREAT SERPL: 10.2 (ref 7–25)
BUN/CREAT SERPL: 9.3 (ref 7–25)
CALCIUM SPEC-SCNC: 8.9 MG/DL (ref 8.6–10.5)
CALCIUM SPEC-SCNC: 9.4 MG/DL (ref 8.6–10.5)
CHLORIDE SERPL-SCNC: 93 MMOL/L (ref 98–107)
CHLORIDE SERPL-SCNC: 99 MMOL/L (ref 98–107)
CO2 SERPL-SCNC: 12.7 MMOL/L (ref 22–29)
CO2 SERPL-SCNC: 17 MMOL/L (ref 22–29)
CREAT SERPL-MCNC: 5.12 MG/DL (ref 0.76–1.27)
CREAT SERPL-MCNC: 5.67 MG/DL (ref 0.76–1.27)
DEPRECATED RDW RBC AUTO: 43.1 FL (ref 37–54)
EGFRCR SERPLBLD CKD-EPI 2021: 10 ML/MIN/1.73
EGFRCR SERPLBLD CKD-EPI 2021: 11.3 ML/MIN/1.73
EOSINOPHIL # BLD AUTO: 0 10*3/MM3 (ref 0–0.4)
EOSINOPHIL NFR BLD AUTO: 0 % (ref 0.3–6.2)
ERYTHROCYTE [DISTWIDTH] IN BLOOD BY AUTOMATED COUNT: 13.3 % (ref 12.3–15.4)
GLUCOSE SERPL-MCNC: 109 MG/DL (ref 65–99)
GLUCOSE SERPL-MCNC: 137 MG/DL (ref 65–99)
HCT VFR BLD AUTO: 35.8 % (ref 37.5–51)
HGB BLD-MCNC: 11.9 G/DL (ref 13–17.7)
IMM GRANULOCYTES # BLD AUTO: 0.07 10*3/MM3 (ref 0–0.05)
IMM GRANULOCYTES NFR BLD AUTO: 0.4 % (ref 0–0.5)
LYMPHOCYTES # BLD AUTO: 0.7 10*3/MM3 (ref 0.7–3.1)
LYMPHOCYTES NFR BLD AUTO: 4.1 % (ref 19.6–45.3)
MCH RBC QN AUTO: 29.7 PG (ref 26.6–33)
MCHC RBC AUTO-ENTMCNC: 33.2 G/DL (ref 31.5–35.7)
MCV RBC AUTO: 89.3 FL (ref 79–97)
MONOCYTES # BLD AUTO: 1.36 10*3/MM3 (ref 0.1–0.9)
MONOCYTES NFR BLD AUTO: 7.9 % (ref 5–12)
NEUTROPHILS NFR BLD AUTO: 15.12 10*3/MM3 (ref 1.7–7)
NEUTROPHILS NFR BLD AUTO: 87.4 % (ref 42.7–76)
NRBC BLD AUTO-RTO: 0 /100 WBC (ref 0–0.2)
PLATELET # BLD AUTO: 194 10*3/MM3 (ref 140–450)
PMV BLD AUTO: 10.1 FL (ref 6–12)
POTASSIUM SERPL-SCNC: 4.4 MMOL/L (ref 3.5–5.2)
POTASSIUM SERPL-SCNC: 5.4 MMOL/L (ref 3.5–5.2)
RBC # BLD AUTO: 4.01 10*6/MM3 (ref 4.14–5.8)
SODIUM SERPL-SCNC: 125 MMOL/L (ref 136–145)
SODIUM SERPL-SCNC: 129 MMOL/L (ref 136–145)
WBC NRBC COR # BLD: 17.28 10*3/MM3 (ref 3.4–10.8)

## 2022-04-13 PROCEDURE — 97110 THERAPEUTIC EXERCISES: CPT

## 2022-04-13 PROCEDURE — 80048 BASIC METABOLIC PNL TOTAL CA: CPT | Performed by: SURGERY

## 2022-04-13 PROCEDURE — 85025 COMPLETE CBC W/AUTO DIFF WBC: CPT | Performed by: SURGERY

## 2022-04-13 PROCEDURE — 97161 PT EVAL LOW COMPLEX 20 MIN: CPT

## 2022-04-13 PROCEDURE — 99024 POSTOP FOLLOW-UP VISIT: CPT | Performed by: SURGERY

## 2022-04-13 RX ORDER — HYDRALAZINE HYDROCHLORIDE 25 MG/1
25 TABLET, FILM COATED ORAL EVERY 8 HOURS SCHEDULED
Status: DISCONTINUED | OUTPATIENT
Start: 2022-04-13 | End: 2022-04-15 | Stop reason: HOSPADM

## 2022-04-13 RX ORDER — SODIUM BICARBONATE 650 MG/1
1300 TABLET ORAL 3 TIMES DAILY
Status: DISCONTINUED | OUTPATIENT
Start: 2022-04-13 | End: 2022-04-15 | Stop reason: HOSPADM

## 2022-04-13 RX ADMIN — Medication 10 ML: at 09:11

## 2022-04-13 RX ADMIN — HYDRALAZINE HYDROCHLORIDE 100 MG: 50 TABLET, FILM COATED ORAL at 06:35

## 2022-04-13 RX ADMIN — ALVIMOPAN 12 MG: 12 CAPSULE ORAL at 23:00

## 2022-04-13 RX ADMIN — Medication 10 ML: at 23:00

## 2022-04-13 RX ADMIN — HYDRALAZINE HYDROCHLORIDE 25 MG: 50 TABLET, FILM COATED ORAL at 10:21

## 2022-04-13 RX ADMIN — SODIUM ZIRCONIUM CYCLOSILICATE 10 G: 10 POWDER, FOR SUSPENSION ORAL at 22:59

## 2022-04-13 RX ADMIN — METOPROLOL SUCCINATE 100 MG: 100 TABLET, EXTENDED RELEASE ORAL at 23:00

## 2022-04-13 RX ADMIN — ALVIMOPAN 12 MG: 12 CAPSULE ORAL at 09:11

## 2022-04-13 RX ADMIN — SODIUM BICARBONATE 1300 MG: 650 TABLET ORAL at 09:11

## 2022-04-13 RX ADMIN — ATORVASTATIN CALCIUM 20 MG: 20 TABLET, FILM COATED ORAL at 22:59

## 2022-04-13 RX ADMIN — SODIUM BICARBONATE 1950 MG: 650 TABLET ORAL at 06:35

## 2022-04-13 RX ADMIN — SODIUM ZIRCONIUM CYCLOSILICATE 10 G: 10 POWDER, FOR SUSPENSION ORAL at 10:21

## 2022-04-13 RX ADMIN — SODIUM ZIRCONIUM CYCLOSILICATE 10 G: 10 POWDER, FOR SUSPENSION ORAL at 17:45

## 2022-04-13 RX ADMIN — CETIRIZINE HYDROCHLORIDE 10 MG: 10 TABLET ORAL at 23:00

## 2022-04-13 RX ADMIN — SODIUM BICARBONATE 150 MEQ: 84 INJECTION, SOLUTION INTRAVENOUS at 10:59

## 2022-04-13 RX ADMIN — Medication 1000 UNITS: at 23:00

## 2022-04-13 RX ADMIN — HYDRALAZINE HYDROCHLORIDE 25 MG: 50 TABLET, FILM COATED ORAL at 23:00

## 2022-04-13 RX ADMIN — HYDRALAZINE HYDROCHLORIDE 25 MG: 50 TABLET, FILM COATED ORAL at 17:44

## 2022-04-13 NOTE — PROGRESS NOTES
Discharge Planning Assessment  [facility]     Patient Name: Lefty Cai  MRN: [id number]  Today's Date: 4/13/2022    Admit Date: 4/12/2022     Discharge Needs Assessment     Row Name 04/13/22 1558       Living Environment    People in Home spouse    Name(s) of People in Home WifeCaterina [phone]    Current Living Arrangements home    Primary Care Provided by self    Provides Primary Care For no one    Family Caregiver if Needed spouse    Quality of Family Relationships helpful;involved;supportive    Able to Return to Prior Arrangements yes       Resource/Environmental Concerns    Home Accessibility Concerns stairs to enter home       Transition Planning    Patient/Family Anticipates Transition to home with family    Patient/Family Anticipated Services at Transition durable medical equipment    Transportation Anticipated family or friend will provide       Discharge Needs Assessment    Equipment Currently Used at Home cane, straight    Concerns to be Addressed discharge planning    Equipment Needed After Discharge walker, rolling    Discharge Coordination/Progress Home               Discharge Plan     Row Name 04/13/22 6632       Plan    Plan Home with wife, walker via [facility]    Patient/Family in Agreement with Plan yes    Plan Comments IMM letter checked. CCP met with pt and family at bedside to verify information and discuss d/c planning. Pt resides with his wife in a two level home with basement steps, has a cane, and denies past HH/SNF. Pt uses [facility] [address] Pt agreeable to Meds to Beds for d/c prescriptions (updated) and to rolling walker per PT recommendation with no DME supply company prefernce (order requested via MD sticky note). CCP to follow. Mi Shaw LCSW              Continued Care and Services - Admitted Since 4/12/2022    Coordination has not been started for this encounter.       Expected Discharge Date and Time     Expected Discharge Date Expected Discharge  Time    Apr 14, 2022          Demographic Summary     Row Name 04/13/22 1550       General Information    Admission Type inpatient    Arrived From home    Required Notices Provided Important Message from Medicare    Referral Source admission list    Reason for Consult discharge planning    Preferred Language English               Functional Status     Row Name 04/13/22 1550       Functional Status    Usual Activity Tolerance good    Current Activity Tolerance moderate       Functional Status, IADL    Medications independent    Meal Preparation independent    Housekeeping independent    Laundry independent    Shopping independent       Mental Status Summary    Recent Changes in Mental Status/Cognitive Functioning no changes               Psychosocial    No documentation.                Abuse/Neglect    No documentation.                Legal    No documentation.                Substance Abuse    No documentation.                Patient Forms    No documentation.                   Eva Shaw LCSW

## 2022-04-13 NOTE — THERAPY EVALUATION
Acute Care - Physical Therapy Initial Evaluation  [facility]     Patient Name: Lefty Cai  : 1950  MRN: [id number]  Today's Date: 2022   Onset of Illness/Injury or Date of Surgery: 22  Visit Dx:     ICD-10-CM ICD-9-CM   1. Generalized weakness  R53.1 780.79   2. History of colon resection  Z90.49 V45.89   3. Colostomy in place ([facility])  Z93.3 V44.3   4. Diverticulitis of large intestine with perforation and abscess without bleeding  K57.20 562.11     Patient Active Problem List   Diagnosis   • Type 2 diabetes mellitus with diabetic chronic kidney disease ([facility])   • Essential hypertension   • Hyperlipidemia   • Primary insomnia   • CKD (chronic kidney disease) stage 4, GFR 15-29 ml/min ([facility])   • Vitamin D deficiency   • Diverticulitis of large intestine with perforation and abscess without bleeding   • Free intraperitoneal air   • Obesity (BMI 30-39.9)   • Impaired mobility and ADLs   • Colostomy in place ([facility])   • History of colon resection   • Colon polyps   • Diverticulosis     Past Medical History:   Diagnosis Date   • Anemia    • Anesthesia complication     HYPOTENSION   • Ankle fracture     RIGHT   • Arthritis    • Bowel obstruction ([facility])    • Cancer of kidney, left ([facility])    • CKD (chronic kidney disease)     STAGE 5   • Colostomy in place ([facility])    • Diabetes mellitus, type 2 ([facility])    • GERD (gastroesophageal reflux disease)    • H/O renal cell carcinoma 2007    partial nephrectomy   • [place] (hard of hearing)    • Hyperlipidemia    • Hypertension    • Primary insomnia 2016   • Proteinuria    • Risk factors for obstructive sleep apnea     STOP BANG 6   • Sinus drainage    • Vitamin D deficiency 2017     Past Surgical History:   Procedure Laterality Date   • ARTERIOVENOUS FISTULA/SHUNT SURGERY Left 08/15/2019    Procedure: LEFT MID FOREARM RADIAL CEPHALIC ARTERIAL VENOUS FISTULA;  Surgeon: Louann Miles Jr., MD;  Location: [facility] MAIN OR;  Service: Vascular   • COLON  RESECTION     • COLONOSCOPY  03/24/2022   • COLONOSCOPY N/A 3/24/2022    Procedure: COLONOSCOPY TO CECUM WITH POLYPECTOMY  (HOT SNARE);  Surgeon: Yelena Guerra MD;  Location: [facility] ENDOSCOPY;  Service: General;  Laterality: N/A;  PREOP/ HX OF DIVERTICULITIS, COLOSTOMY  POSTOP/ POLYPS, DIVERTICULOSIS    • COLOSTOMY     • COLOSTOMY CLOSURE N/A 4/12/2022    Procedure: open Colostomy reversal;  Surgeon: Yelena Guerra MD;  Location: [facility] OR;  Service: General;  Laterality: N/A;   • EXPLORATORY LAPAROTOMY N/A 09/06/2021    Procedure: Open sigmoind colectomy, colostomy, and appendectomy;  Surgeon: Yelena Guerra MD;  Location: [facility];  Service: General;  Laterality: N/A;   • EYE SURGERY      CATARACTS   • KNEE ARTHROSCOPY     • NEPHRECTOMY PARTIAL  2007    Dr. Victor     PT Assessment (last 12 hours)     PT Evaluation and Treatment     Row Name 04/13/22 1156          Physical Therapy Time and Intention    Subjective Information no complaints  -     Document Type evaluation  -     Mode of Treatment individual therapy;physical therapy  -     Patient Effort good  -     Symptoms Noted During/After Treatment none  -     Row Name 04/13/22 1156          General Information    Patient Profile Reviewed yes  -     Onset of Illness/Injury or Date of Surgery 04/12/22  -     Patient Observations alert;agree to therapy;cooperative  -     General Observations of Patient pt supine in bed no acute distress, spouse bedside  -     Prior Level of Function independent:  -     Equipment Currently Used at Home none  -     Pertinent History of Current Functional Problem POD 1 colostomy reversal  -     Existing Precautions/Restrictions fall  -     Risks Reviewed patient:  -     Benefits Reviewed patient:  -     Row Name 04/13/22 1156          Pain    Pretreatment Pain Rating 3/10  -     Posttreatment Pain Rating 3/10  -     Row Name 04/13/22 1156          Cognition     Affect/Mental Status (Cognition) WFL  -     Follows Commands (Cognition) WFL  -[facility] Name 04/13/22 1156          Range of Motion Comprehensive    General Range of Motion bilateral lower extremity ROM WFL  -[facility] Name 04/13/22 1156          Strength (Manual Muscle Testing)    Strength (Manual Muscle Testing) strength is WFL;bilateral lower extremities  -[facility] Name 04/13/22 1156          Bed Mobility    Bed Mobility supine-sit;sit-supine  -     Supine-Sit [place] (Bed Mobility) other (see comments)  pt seated EOB when entered room post catheter removal  -[facility] Name 04/13/22 1156          Transfers    Transfers sit-stand transfer;stand-sit transfer  -     Sit-Stand [place] (Transfers) contact guard  -     Stand-Sit [place] (Transfers) contact guard  -[facility] Name 04/13/22 1156          Sit-Stand Transfer    Assistive Device (Sit-Stand Transfers) walker, front-wheeled  -[facility] Name 04/13/22 1156          Stand-Sit Transfer    Assistive Device (Stand-Sit Transfers) walker, front-wheeled  -[facility] Name 04/13/22 1156          Gait/Stairs (Locomotion)    [place] Level (Gait) contact guard;verbal cues  -     Assistive Device (Gait) walker, front-wheeled  -     Distance in Feet (Gait) 60  -LH     Pattern (Gait) step-to;step-through  -     Deviations/Abnormal Patterns (Gait) gordon decreased  -     Bilateral Gait Deviations forward flexed posture;heel strike decreased  -[facility] Name 04/13/22 1156          Balance    Balance Assessment sitting static balance;standing static balance  -     Static Sitting Balance supervision  -     Position, Sitting Balance sitting edge of bed  -     Static Standing Balance contact guard  -     Position/Device Used, Standing Balance walker, front-wheeled  -     Row Name 04/13/22 1156          Motor Skills    Therapeutic Exercise --  APs, LAQs x 10  -     Row Name             Wound 04/12/22 1219 abdomen Incision     Wound - Properties Group Placement Date: 04/12/22  -AG Placement Time: 1219  -AG Present on Hospital Admission: N  -AG Location: abdomen  -AG Primary Wound Type: Incision  -AG     Retired Wound - Properties Group Placement Date: 04/12/22  -AG Placement Time: 1219  -AG Present on Hospital Admission: N  -AG Location: abdomen  -AG Primary Wound Type: Incision  -AG     Retired Wound - Properties Group Date first assessed: 04/12/22  -AG Time first assessed: 1219  -AG Present on Hospital Admission: N  -AG Location: abdomen  -AG Primary Wound Type: Incision  -AG     Row Name 04/13/22 1156          Plan of Care Review    Plan of Care Reviewed With patient  -     Outcome Evaluation Pt 72 yo male post op colostomy reversal. Pt baseline independent no DME, lives w spouse. Pt agreeable to skilled PT, ambulated 60ft CGA rwx, generalized weakness. Pt may benefit from skilled PT acutely to address increased independence w functional mobility, pt may benefit from rwx at [place].  -     Row Name 04/13/22 1151          Positioning and Restraints    Pre-Treatment Position in bed  -     Post Treatment Position chair  -     In Chair reclined;call light within reach;encouraged to call for assist;exit alarm on;with family/caregiver  -     Row Name 04/13/22 1152          Therapy Assessment/Plan (PT)    Rehab Potential (PT) good, to achieve stated therapy goals  -     Criteria for Skilled Interventions Met (PT) yes  -     Therapy Frequency (PT) 5 times/wk  -     Row Name 04/13/22 6866          PT Evaluation Complexity    History, PT Evaluation Complexity 1-2 personal factors and/or comorbidities  -     Examination of Body Systems (PT Eval Complexity) total of 3 or more elements  -     Clinical Presentation (PT Evaluation Complexity) evolving  -     Clinical Decision Making (PT Evaluation Complexity) moderate complexity  -     Overall Complexity (PT Evaluation Complexity) moderate complexity  -     Row Name 04/13/22 7472           Physical Therapy Goals    Bed Mobility Goal Selection (PT) bed mobility, PT goal 1  -     Transfer Goal Selection (PT) transfer, PT goal 1  -     Gait Training Goal Selection (PT) gait training, PT goal 1  -     Row Name 04/13/22 1156          Bed Mobility Goal 1 (PT)    Activity/Assistive Device (Bed Mobility Goal 1, PT) bed mobility activities, all  -     [place] Level/Cues Needed (Bed Mobility Goal 1, PT) modified independence  -LH     Time Frame (Bed Mobility Goal 1, PT) 1 week  -     Row Name 04/13/22 1156          Transfer Goal 1 (PT)    Activity/Assistive Device (Transfer Goal 1, PT) bed-to-chair/chair-to-bed;sit-to-stand/stand-to-sit;walker, rolling  -LH     [place] Level/Cues Needed (Transfer Goal 1, PT) modified independence  -LH     Time Frame (Transfer Goal 1, PT) 1 week  -     Row Name 04/13/22 1156          Gait Training Goal 1 (PT)    Activity/Assistive Device (Gait Training Goal 1, PT) assistive device use;walker, rolling  -LH     [place] Level (Gait Training Goal 1, PT) modified independence  -     Distance (Gait Training Goal 1, PT) 150  -LH     Time Frame (Gait Training Goal 1, PT) 1 week  -           User Key  (r) = Recorded By, (t) = Taken By, (c) = Cosigned By    Initials Name Provider Type     Becky Elkins, PT Physical Therapist     Inna Moy, RN Registered Nurse                Physical Therapy Education                 Title: PT OT SLP Therapies (Done)     Topic: Physical Therapy (Done)     Point: Mobility training (Done)     Learning Progress Summary           Patient Acceptance, E, VU,NR,DU by  at 4/13/2022 1220                   Point: Home exercise program (Done)     Learning Progress Summary           Patient Acceptance, E, VU,NR,DU by  at 4/13/2022 1220                   Point: Body mechanics (Done)     Learning Progress Summary           Patient Acceptance, E, VU,NR,DU by  at 4/13/2022 1220                   Point: Precautions  (Done)     Learning Progress Summary           Patient Acceptance, E, VU,NR,DU by  at 4/13/2022 1220                               User Key     Initials Effective Dates Name Provider Type Discipline     06/16/21 -  Becky Elkins, PT Physical Therapist PT              PT Recommendation and Plan  Anticipated Discharge Disposition (PT): home with assist  Planned Therapy Interventions (PT): balance training, bed mobility training, gait training, home exercise program, ROM (range of motion), stair training, strengthening, stretching, transfer training  Therapy Frequency (PT): 5 times/wk  Plan of Care Reviewed With: patient  Outcome Evaluation: Pt 72 yo male post op colostomy reversal. Pt baseline independent no DME, lives w spouse. Pt agreeable to skilled PT, ambulated 60ft CGA rwx, generalized weakness. Pt may benefit from skilled PT acutely to address increased independence w functional mobility, pt may benefit from rwx at [place].   Outcome Measures     Row Name 04/13/22 1200             How much help from another person do you currently need...    Turning from your back to your side while in flat bed without using bedrails? 3  -LH      Moving from lying on back to sitting on the side of a flat bed without bedrails? 3  -LH      Moving to and from a bed to a chair (including a wheelchair)? 3  -LH      Standing up from a chair using your arms (e.g., wheelchair, bedside chair)? 3  -LH      Climbing 3-5 steps with a railing? 3  -LH      To walk in hospital room? 3  -      AM-PAC 6 Clicks Score (PT) 18  -              Functional Assessment    Outcome Measure Options AM-PAC 6 Clicks Basic Mobility (PT)  -            User Key  (r) = Recorded By, (t) = Taken By, (c) = Cosigned By    Initials Name Provider Type     Becky Elkins, PT Physical Therapist                 Time Calculation:    PT Charges     Row Name 04/13/22 1221             Time Calculation    Start Time 1100  -      Stop Time 1125  -      Time  Calculation (min) 25 min  -      PT Received On 04/13/22  -      PT - Next Appointment 04/14/22  -      PT Goal Re-Cert Due Date 04/20/22  -              Time Calculation- PT    Total Timed Code Minutes- PT 13 minute(s)  -            User Key  (r) = Recorded By, (t) = Taken By, (c) = Cosigned By    Initials Name Provider Type     Becky Elkins, PT Physical Therapist              Therapy Charges for Today     Code Description Service Date Service Provider Modifiers Qty    48054138640 HC PT EVAL LOW COMPLEXITY 2 4/13/2022 Becky Elkins, PT GP 1    [id number] HC PT THER PROC EA 15 MIN 4/13/2022 Becky Elkins, PT GP 1          PT G-Codes  Outcome Measure Options: AM-PAC 6 Clicks Basic Mobility (PT)  AM-PAC 6 Clicks Score (PT): 18    Becky Elkins, PT  4/13/2022

## 2022-04-13 NOTE — CONSULTS
[facility] Consult Note      Patient Name: Lefty Cai  : 1950  MRN: [id number]  Primary Care Physician:  Daksha Ng APRN  Referring Physician: Yelena Guerra MD  Date of admission: 2022    Subjective     Reason for Consult: Kidney disease    HPI:   Lefty Cai is a 71 y.o. male past medical history of longstanding diabetes mellitus type 2, with complications including chronic kidney disease stage V status post left upper extremity AV fistula placement, history of renal cell carcinoma 2017 status post partial nephrectomy hypertension, dyslipidemia, gastroesophageal flux disease, dyslipidemia, vitamin D deficiency.  History of complicated diverticulitis with local abscess that  require bowel resection and colostomy placement  Patient was admitted for open colostomy reversal.    Patient has advanced chronic kidney disease that has been followed closely by renal clinic.  Nephrology consultation requested for the management    Review of Systems:   14 point review of systems is otherwise negative except for mentioned above on HPI    Constitutional:  + Weight Change, No Fever, No Chills, No Night Sweats, + Fatigue, No Malaise  ENT/Mouth:  No Hearing Changes, No Ear Pain, No Nasal Congestion, No Sinus Pain,   Eyes:  No Eye Pain,  No Discharge, No Vision Changes  Cardiovascular:  No Chest Pain, No SOB, No PND, No Dyspnea on Exertion, No Orthopnea  Respiratory:  No Cough, No Sputum, No Wheezing, No Smoke Exposure, No Dyspnea  Gastrointestinal:  As HPI   Genitourinary:   No Urinary Frequency, No Hematuria, No Urinary Incontinence, No Urgency,  Musculoskeletal:  No Arthralgias, No Myalgias, No Joint Swelling, No Joint Stiffness,   Skin:  No Skin Lesions, No Pruritis, No Hair Changes  Neuro:  No Weakness, No Numbness, No Paresthesias, No Loss of Consciousness, No Syncope, No Dizziness, No Headache,  Psych:  No Anxiety/Panic, No Depression, No Insomnia, No  Personality Changes  Heme/Lymph:  No Bruising, No Bleeding, No Transfusions History, No Lymphadenopathy  Endocrine:  No Polyuria, No Polydipsia, No Temperature Intolerance      Personal History     Past Medical History:   Diagnosis Date   • Anemia    • Anesthesia complication     HYPOTENSION   • Ankle fracture     RIGHT   • Arthritis    • Bowel obstruction (HCC)    • Cancer of kidney, left (HCC)    • CKD (chronic kidney disease)     STAGE 5   • Colostomy in place (HCC)    • Diabetes mellitus, type 2 (HCC)    • GERD (gastroesophageal reflux disease)    • H/O renal cell carcinoma 2007    partial nephrectomy   • [place] (hard of hearing)    • Hyperlipidemia    • Hypertension    • Primary insomnia 06/13/2016   • Proteinuria    • Risk factors for obstructive sleep apnea     STOP BANG 6   • Sinus drainage    • Vitamin D deficiency 08/14/2017       Past Surgical History:   Procedure Laterality Date   • ARTERIOVENOUS FISTULA/SHUNT SURGERY Left 08/15/2019    Procedure: LEFT MID FOREARM RADIAL CEPHALIC ARTERIAL VENOUS FISTULA;  Surgeon: Louann Miles Jr., MD;  Location: [facility] OR;  Service: Vascular   • COLON RESECTION     • COLONOSCOPY  03/24/2022   • COLONOSCOPY N/A 3/24/2022    Procedure: COLONOSCOPY TO CECUM WITH POLYPECTOMY  (HOT SNARE);  Surgeon: Yelena Guerra MD;  Location: [facility] ENDOSCOPY;  Service: General;  Laterality: N/A;  PREOP/ HX OF DIVERTICULITIS, COLOSTOMY  POSTOP/ POLYPS, DIVERTICULOSIS    • COLOSTOMY     • COLOSTOMY CLOSURE N/A 4/12/2022    Procedure: open Colostomy reversal;  Surgeon: Yelena Guerra MD;  Location: [facility] OR;  Service: General;  Laterality: N/A;   • EXPLORATORY LAPAROTOMY N/A 09/06/2021    Procedure: Open sigmoind colectomy, colostomy, and appendectomy;  Surgeon: Yelena Guerra MD;  Location: [facility] OR;  Service: General;  Laterality: N/A;   • EYE SURGERY      CATARACTS   • KNEE ARTHROSCOPY     • NEPHRECTOMY PARTIAL  2007    Dr. Valnete Juarez  History: family history includes Cancer in his father; Diabetes type I in his sister; Heart disease in his father; Hypertension in his mother; Macular degeneration in his father.    Social History:  reports that he quit smoking about 23 years ago. His smoking use included cigarettes. He has a 50.00 pack-year smoking history. His smokeless tobacco use includes snuff. He reports that he does not drink alcohol and does not use drugs.    Home Medications:  Prior to Admission medications    Medication Sig Start Date End Date Taking? Authorizing Provider   cetirizine (zyrTEC) 10 MG tablet Take 10 mg by mouth Every Night.   Yes Rina Montanez MD   cholecalciferol (VITAMIN D3) 25 MCG (1000 UT) tablet Take 1,000 Units by mouth Every Night.   Yes Rina Montanez MD   hydrALAZINE (APRESOLINE) 50 MG tablet TAKE 1 TABLET THREE TIMES DAILY  Patient taking differently: Take 100 mg by mouth Every Morning. 12/16/21  Yes Dkasha Ng APRN   hydrALAZINE (APRESOLINE) 50 MG tablet Take 50 mg by mouth Every Night.   Yes Rina Montanez MD   metoprolol succinate XL (TOPROL-XL) 100 MG 24 hr tablet Take 1 tablet by mouth Daily.  Patient taking differently: Take 100 mg by mouth Every Night. 12/16/21  Yes Daksha Ng APRN   simvastatin (ZOCOR) 40 MG tablet TAKE 1 TABLET AT BEDTIME  Patient taking differently: Take 40 mg by mouth Every Night. 12/16/21  Yes Daksha Ng APRN   sodium bicarbonate 650 MG tablet Take 1,950 mg by mouth Every Morning. 8/12/17  Yes Rina Montanez MD   sodium bicarbonate 650 MG tablet Take 1,300 mg by mouth. Afternoon   Yes Rina Montanez MD   traZODone (DESYREL) 50 MG tablet TAKE 1 TABLET AT NIGHT AS NEEDED FOR SLEEP  Patient taking differently: Take 50 mg by mouth Every Night. 12/16/21  Yes Daksha Ng APRN   acetaminophen (TYLENOL) 325 MG tablet Take 650 mg by mouth Every 6 (Six) Hours As Needed for Mild Pain . 9/16/21   Rina Montanez MD   aspirin 81 MG  tablet Take 81 mg by mouth Every Night. HOLDING FOR 5 DAYS BEFORE SURGERY PER MD    Provider, MD Rina   Misc. Devices (Bath/Shower Seat) misc Use as needed for bathing and completion of ADL 9/12/21   Lefty Maciel MD   simethicone (MYLICON) 80 MG chewable tablet Chew 80 mg Every 6 (Six) Hours As Needed. 9/23/21   Yelena Guerra MD       Allergies:  Allergies   Allergen Reactions   • Contrast Dye Other (See Comments)     KIDNEY DISEASE   • Latex Other (See Comments)     Blisters       Objective     Vitals:   Temp:  [96.5 °F (35.8 °C)-98.2 °F (36.8 °C)] 98.2 °F (36.8 °C)  Heart Rate:  [64-80] 72  Resp:  [16-20] 16  BP: (125-170)/(68-92) 125/71  Flow (L/min):  [2] 2    Intake/Output Summary (Last 24 hours) at 4/13/2022 0981  Last data filed at 4/13/2022 0605  Gross per 24 hour   Intake 1870 ml   Output 2000 ml   Net -130 ml       Physical Exam:   Constitutional: Awake, alert, no acute distress.  HEENT: Sclera anicteric, no conjunctival injection  Neck: Supple, no thyromegaly, no lymphadenopathy, trachea at midline, no JVD  Respiratory: Clear to auscultation bilaterally, nonlabored respiration  Cardiovascular: RRR, no murmurs, no rubs or gallops, no carotid bruit  Gastrointestinal: Midline surgical wound  with dressing in place decreased bowel sounds  : No palpable bladder  Musculoskeletal: No edema, no clubbing or cyanosis.  Left upper extremity AV fistula bruit and thrill present  Psychiatric: Appropriate affect, cooperative  Neurologic: Oriented x3, moving all extremities, normal speech and mental status  Skin: Warm and dry       Scheduled Meds:     alvimopan, 12 mg, Oral, BID  atorvastatin, 20 mg, Oral, Nightly  cetirizine, 10 mg, Oral, Nightly  cholecalciferol, 1,000 Units, Oral, Nightly  hydrALAZINE, 25 mg, Oral, Q8H  metoprolol succinate XL, 100 mg, Oral, Nightly  sodium bicarbonate, 1,300 mg, Oral, TID  sodium chloride, 10 mL, Intravenous, Q12H  sodium zirconium cyclosilicate, 10 g, Oral,  "TID      IV Meds:   sodium bicarbonate drip (greater than 75 mEq/bag), 150 mEq        Results Reviewed:   I have personally reviewed the results from the time of this admission to 4/13/2022 09:27 EDT     Lab Results   Component Value Date    GLUCOSE 137 (H) 04/13/2022    CALCIUM 8.9 04/13/2022     (L) 04/13/2022    K 5.4 (H) 04/13/2022    CO2 12.7 (L) 04/13/2022    CL 99 04/13/2022    BUN 52 (H) 04/13/2022    CREATININE 5.12 (H) 04/13/2022    EGFRIFAFRI  09/12/2021      Comment:      <15 Indicative of kidney failure.    EGFRIFNONA 12 (L) 09/12/2021    BCR 10.2 04/13/2022    ANIONGAP 17.3 (H) 04/13/2022      Lab Results   Component Value Date    MG 2.1 09/11/2021    PHOS 4.4 09/11/2021    ALBUMIN 3.00 (L) 09/11/2021           Lab Results   Lab Value Date/Time    CREATININE 5.12 (H) 04/13/2022 0618    CREATININE 5.65 (H) 04/04/2022 1321    CREATININE 4.86 (H) 09/12/2021 1148    CREATININE 4.70 (H) 09/11/2021 0635    CREATININE 4.66 (H) 09/10/2021 0517    CREATININE 5.00 (H) 09/10/2021 0517    CREATININE 4.86 (H) 09/09/2021 0550    CREATININE 5.09 (H) 09/08/2021 0700    CREATININE 5.16 (H) 09/07/2021 0835    CREATININE 5.53 (H) 09/06/2021 2202    CREATININE 5.27 (H) 06/23/2021 1002    CREATININE 4.40 (H) 06/10/2020 1023    CREATININE 3.50 (H) 08/07/2019 1206    CREATININE 3.46 (H) 04/10/2019 1014    CREATININE 2.90 (H) 02/07/2018 1038    CREATININE 2.72 (H) 08/07/2017 1023    CREATININE 2.46 (H) 02/06/2017 1019    CREATININE 2.50 (H) 10/06/2016 1041    CREATININE 2.53 (H) 06/06/2016 1122    CREATININE 2.68 (H) 02/16/2016 1252       Assessment / Plan     ASSESSMENT:    -Chronic kidney disease stage V .( progression )  Secondary to potential for nephrosclerosis and diabetic nephropathy   .Creatinine trend   4-5    . Urinalysis .  Gonorrhea images studies   . CT abdomen 2021 \" No hydronephrosis is seen on either side.There is a simple appearing right renal cyst. No additional follow-up is  necessary. Left kidney " "does appear mildly atrophic\"    . Will follow closely      . Avoid nephrotoxins      . Keep MAP > 65 . Avoid nephrotoxins  -Hyperkalemia Lokelma 10 g 3 times daily  -Metabolic acidosis we will continue sodium bicarbonate will put him on sodium bicarbonate drip.  We will continue to titrate based on CO2 trend  -Diabetes Mellitus type 2 w/ complications .Continue insulin regimen / hypoglycemic regimen .Hypoglycemic protocol . POC glucose 4 x day .Diabetic diet-  -Hypertension.  Continue  combination of   metoprolol with decreased hydralazine to avoid hypotension    . We will continue to follow closely. Heart healthy diet  - complicated diverticulitis with local abscess that  require bowel resection and colostomy placement s/p open colostomy reversal.  As per general surgery        PLAN:  -Lokelma for borderline hyperkalemia, decrease dose of antihypertensive to avoid hypotension while on narcotics afterwards recent surgical intervention, already started bicarbonate drip for metabolic acidosis and will titrate based on CO2 trend  -Follow renal function closely  -Avoid nephrotoxins  -Adjust medications to GFR      Thank you for involving us in the care of Lefty LAVON Cai.  Please feel free to call with any questions.    Enrique Linda MD  04/13/22  09:27 EDT    [facility]  [phone]      Much of this encounter note is an electronic transcription/translation of spoken language to printed text. The electronic translation of spoken language may permit erroneous, or at times, nonsensical words or phrases to be inadvertently transcribed; Although I have reviewed the note for such errors, some may still exist.    "

## 2022-04-13 NOTE — PROGRESS NOTES
"General Surgery  Progress Note    CC: Follow-up unwanted colostomy    POD#1 open colostomy reversal    S: He has had minimal abdominal pain and has only used a few Tylenol overnight.  He got up and walked last night with nursing staff.  He has some electrolyte abnormalities this morning, specifically some acidosis and hyperkalemia with stable chronic kidney disease.  Nephrology has added bicarb to his fluids.  He is belching but not yet passing flatus or had a bowel movement.    O:/71 (BP Location: Right arm, Patient Position: Sitting)   Pulse 72   Temp 98.2 °F (36.8 °C) (Oral)   Resp 16   Ht 177.8 cm (70\")   Wt 106 kg (232 lb 11.2 oz)   SpO2 97%   BMI 33.39 kg/m²     Intake & Output:  UOP: 1850 mL/24 hrs    GENERAL: alert, well appearing, and in no distress  HEENT: normocephalic, atraumatic, moist mucous membranes, clear sclerae   CHEST: clear to auscultation, no wheezes, rales or rhonchi, symmetric air entry  CARDIAC: regular rate and rhythm    ABDOMEN: Soft, minimal incisional tenderness, incisions covered and dry, no erythema  EXTREMITIES: no cyanosis, clubbing, or edema   SKIN: Warm and moist, no rashes    LABS  Results from last 7 days   Lab Units 04/13/22  0618   WBC 10*3/mm3 17.28*   HEMOGLOBIN g/dL 11.9*   HEMATOCRIT % 35.8*   PLATELETS 10*3/mm3 194     Results from last 7 days   Lab Units 04/13/22  0618   SODIUM mmol/L 129*   POTASSIUM mmol/L 5.4*   CHLORIDE mmol/L 99   CO2 mmol/L 12.7*   BUN mg/dL 52*   CREATININE mg/dL 5.12*   CALCIUM mg/dL 8.9   GLUCOSE mg/dL 137*             A/P: 71 y.o. male POD#1 open colostomy reversal    Continue IV fluids, I appreciate nephrology's assistance managing his electrolytes.  I will recheck a BMP on him later today to recheck his bicarb and potassium levels.  Discontinue Reddy and advance to full liquid diet.  Continue Entereg until return of bowel function.    Yelena Guerra MD  General, Robotic, and Endoscopic Surgery  [facility]    [address], [address]  [place] [address]  P: [phone]  F: [phone]

## 2022-04-13 NOTE — PLAN OF CARE
Goal Outcome Evaluation:  Plan of Care Reviewed With: patient           Outcome Evaluation: Pt 70 yo male post op colostomy reversal. Pt baseline independent no DME, lives w spouse. Pt agreeable to skilled PT, ambulated 60ft CGA rwx, generalized weakness. Pt may benefit from skilled PT acutely to address increased independence w functional mobility, pt may benefit from rwx at [place].    .Patient was not wearing a face mask during this therapy encounter. Therapist used appropriate personal protective equipment including eye protection, mask, and gloves.  Mask used was standard procedure mask. Appropriate PPE was worn during the entire therapy session. Hand hygiene was completed before and after therapy session. Patient is not in enhanced droplet precautions.

## 2022-04-14 LAB
ANION GAP SERPL CALCULATED.3IONS-SCNC: 14.9 MMOL/L (ref 5–15)
BASOPHILS # BLD AUTO: 0.03 10*3/MM3 (ref 0–0.2)
BASOPHILS NFR BLD AUTO: 0.3 % (ref 0–1.5)
BUN SERPL-MCNC: 53 MG/DL (ref 8–23)
BUN/CREAT SERPL: 10.4 (ref 7–25)
CALCIUM SPEC-SCNC: 9.1 MG/DL (ref 8.6–10.5)
CHLORIDE SERPL-SCNC: 96 MMOL/L (ref 98–107)
CO2 SERPL-SCNC: 21.1 MMOL/L (ref 22–29)
CREAT SERPL-MCNC: 5.12 MG/DL (ref 0.76–1.27)
DEPRECATED RDW RBC AUTO: 44.1 FL (ref 37–54)
EGFRCR SERPLBLD CKD-EPI 2021: 11.3 ML/MIN/1.73
EOSINOPHIL # BLD AUTO: 0.04 10*3/MM3 (ref 0–0.4)
EOSINOPHIL NFR BLD AUTO: 0.4 % (ref 0.3–6.2)
ERYTHROCYTE [DISTWIDTH] IN BLOOD BY AUTOMATED COUNT: 13.8 % (ref 12.3–15.4)
GLUCOSE SERPL-MCNC: 127 MG/DL (ref 65–99)
HCT VFR BLD AUTO: 30.6 % (ref 37.5–51)
HGB BLD-MCNC: 10.3 G/DL (ref 13–17.7)
IMM GRANULOCYTES # BLD AUTO: 0.04 10*3/MM3 (ref 0–0.05)
IMM GRANULOCYTES NFR BLD AUTO: 0.4 % (ref 0–0.5)
LYMPHOCYTES # BLD AUTO: 0.77 10*3/MM3 (ref 0.7–3.1)
LYMPHOCYTES NFR BLD AUTO: 7 % (ref 19.6–45.3)
MCH RBC QN AUTO: 29.8 PG (ref 26.6–33)
MCHC RBC AUTO-ENTMCNC: 33.7 G/DL (ref 31.5–35.7)
MCV RBC AUTO: 88.4 FL (ref 79–97)
MONOCYTES # BLD AUTO: 0.85 10*3/MM3 (ref 0.1–0.9)
MONOCYTES NFR BLD AUTO: 7.7 % (ref 5–12)
NEUTROPHILS NFR BLD AUTO: 84.2 % (ref 42.7–76)
NEUTROPHILS NFR BLD AUTO: 9.24 10*3/MM3 (ref 1.7–7)
NRBC BLD AUTO-RTO: 0 /100 WBC (ref 0–0.2)
PLATELET # BLD AUTO: 140 10*3/MM3 (ref 140–450)
PMV BLD AUTO: 9.7 FL (ref 6–12)
POTASSIUM SERPL-SCNC: 4.3 MMOL/L (ref 3.5–5.2)
RBC # BLD AUTO: 3.46 10*6/MM3 (ref 4.14–5.8)
SODIUM SERPL-SCNC: 132 MMOL/L (ref 136–145)
WBC NRBC COR # BLD: 10.97 10*3/MM3 (ref 3.4–10.8)

## 2022-04-14 PROCEDURE — 85025 COMPLETE CBC W/AUTO DIFF WBC: CPT | Performed by: SURGERY

## 2022-04-14 PROCEDURE — 99024 POSTOP FOLLOW-UP VISIT: CPT | Performed by: SURGERY

## 2022-04-14 PROCEDURE — 80048 BASIC METABOLIC PNL TOTAL CA: CPT | Performed by: SURGERY

## 2022-04-14 PROCEDURE — 25010000002 FUROSEMIDE PER 20 MG: Performed by: INTERNAL MEDICINE

## 2022-04-14 RX ORDER — FUROSEMIDE 10 MG/ML
60 INJECTION INTRAMUSCULAR; INTRAVENOUS ONCE
Status: COMPLETED | OUTPATIENT
Start: 2022-04-14 | End: 2022-04-14

## 2022-04-14 RX ADMIN — Medication 1000 UNITS: at 20:56

## 2022-04-14 RX ADMIN — CETIRIZINE HYDROCHLORIDE 10 MG: 10 TABLET ORAL at 20:56

## 2022-04-14 RX ADMIN — Medication 15 G: at 15:26

## 2022-04-14 RX ADMIN — Medication 10 ML: at 10:02

## 2022-04-14 RX ADMIN — SODIUM BICARBONATE 1300 MG: 650 TABLET ORAL at 09:59

## 2022-04-14 RX ADMIN — ALVIMOPAN 12 MG: 12 CAPSULE ORAL at 10:01

## 2022-04-14 RX ADMIN — SODIUM BICARBONATE 150 MEQ: 84 INJECTION, SOLUTION INTRAVENOUS at 06:52

## 2022-04-14 RX ADMIN — Medication 15 G: at 22:59

## 2022-04-14 RX ADMIN — HYDRALAZINE HYDROCHLORIDE 25 MG: 50 TABLET, FILM COATED ORAL at 22:59

## 2022-04-14 RX ADMIN — SODIUM BICARBONATE 1300 MG: 650 TABLET ORAL at 20:56

## 2022-04-14 RX ADMIN — FUROSEMIDE 60 MG: 10 INJECTION, SOLUTION INTRAMUSCULAR; INTRAVENOUS at 10:46

## 2022-04-14 RX ADMIN — SODIUM BICARBONATE 1300 MG: 650 TABLET ORAL at 17:34

## 2022-04-14 RX ADMIN — ATORVASTATIN CALCIUM 20 MG: 20 TABLET, FILM COATED ORAL at 20:56

## 2022-04-14 RX ADMIN — SODIUM BICARBONATE 150 MEQ: 84 INJECTION, SOLUTION INTRAVENOUS at 20:36

## 2022-04-14 RX ADMIN — HYDRALAZINE HYDROCHLORIDE 25 MG: 50 TABLET, FILM COATED ORAL at 06:48

## 2022-04-14 RX ADMIN — ALVIMOPAN 12 MG: 12 CAPSULE ORAL at 20:56

## 2022-04-14 RX ADMIN — SODIUM BICARBONATE 1300 MG: 650 TABLET ORAL at 00:56

## 2022-04-14 RX ADMIN — ACETAMINOPHEN 650 MG: 325 TABLET ORAL at 19:50

## 2022-04-14 RX ADMIN — HYDRALAZINE HYDROCHLORIDE 25 MG: 50 TABLET, FILM COATED ORAL at 15:26

## 2022-04-14 RX ADMIN — METOPROLOL SUCCINATE 100 MG: 100 TABLET, EXTENDED RELEASE ORAL at 20:56

## 2022-04-14 NOTE — PROGRESS NOTES
"General Surgery  Progress Note    CC: Follow-up unwanted colostomy    POD#2 open colostomy reversal    S: He denies any belching or nausea today.  His biggest complaint is of having to urinate in the provided urinal for accurate I&O measurement.    O:/74 (BP Location: Right arm, Patient Position: Lying)   Pulse 70   Temp 97.7 °F (36.5 °C) (Oral)   Resp 16   Ht 177.8 cm (70\")   Wt 106 kg (232 lb 11.2 oz)   SpO2 95%   BMI 33.39 kg/m²     Intake & Output:  UOP: 3025 mL/24 hrs    GENERAL: alert, well appearing, and in no distress  HEENT: normocephalic, atraumatic, moist mucous membranes, clear sclerae   CHEST: clear to auscultation, no wheezes, rales or rhonchi, symmetric air entry  CARDIAC: regular rate and rhythm    ABDOMEN: Soft, minimal incisional tenderness, incisions covered and dry, no erythema  EXTREMITIES: no cyanosis, clubbing, or edema   SKIN: Warm and moist, no rashes    LABS  Results from last 7 days   Lab Units 04/14/22  0954 04/13/22  0618   WBC 10*3/mm3 10.97* 17.28*   HEMOGLOBIN g/dL 10.3* 11.9*   HEMATOCRIT % 30.6* 35.8*   PLATELETS 10*3/mm3 140 194     Results from last 7 days   Lab Units 04/14/22  0954 04/13/22  1655 04/13/22  0618   SODIUM mmol/L 132* 125* 129*   POTASSIUM mmol/L 4.3 4.4 5.4*   CHLORIDE mmol/L 96* 93* 99   CO2 mmol/L 21.1* 17.0* 12.7*   BUN mg/dL 53* 53* 52*   CREATININE mg/dL 5.12* 5.67* 5.12*   CALCIUM mg/dL 9.1 9.4 8.9   GLUCOSE mg/dL 127* 109* 137*             A/P: 71 y.o. male POD#2 open colostomy reversal    Advance to regular diet.  Nephrology is managing his IV fluids and his hyponatremia, which has improved as of this morning.  His acidosis is also improved.  Continue Entereg until return of bowel function.  I encouraged him to increase the amount of ambulation he is doing daily to help minimize risk of developing an ileus.  Continue working with PT.    Yelena Guerra MD  General, Robotic, and Endoscopic Surgery  [facility]    [address], [address]  [place] [address]  P: [phone]  F: [phone]

## 2022-04-14 NOTE — PROGRESS NOTES
[facility] Progress Note      Patient Name: Lefty Cai  : 1950  MRN: [id number]  Primary Care Physician:  Daksha Ng APRN  Date of admission: 2022    Subjective     Interval History:     Patient has been drinking a significant amount of free water  Continues to be on clear liquid diet  Patient is not passing gas or having a bowel movement  Denies any abdominal pain    Urinating spontaneously  No fevers or chills    Review of Systems:   As noted above    Objective     Vitals:   Temp:  [97.1 °F (36.2 °C)-97.7 °F (36.5 °C)] 97.7 °F (36.5 °C)  Heart Rate:  [66-76] 70  Resp:  [16-18] 16  BP: (119-151)/(70-74) 142/74    Intake/Output Summary (Last 24 hours) at 2022 0840  Last data filed at 2022 0544  Gross per 24 hour   Intake 1500 ml   Output 3025 ml   Net -1525 ml       Physical Exam:      Constitutional: Awake, alert, no acute distress.  HEENT: Sclera anicteric, no conjunctival injection  Neck: Supple, no thyromegaly, no lymphadenopathy, trachea at midline, no JVD  Respiratory: Clear to auscultation bilaterally, nonlabored respiration  Cardiovascular: RRR, no murmurs, no rubs or gallops, no carotid bruit  Gastrointestinal: Midline surgical wound  with dressing in place decreased bowel sounds  : No palpable bladder  Musculoskeletal: No edema, no clubbing or cyanosis.  Left upper extremity AV fistula bruit and thrill present  Psychiatric: Appropriate affect, cooperative  Neurologic: Oriented x3, moving all extremities, normal speech and mental status  Skin: Warm and dry           Scheduled Meds:     alvimopan, 12 mg, Oral, BID  atorvastatin, 20 mg, Oral, Nightly  cetirizine, 10 mg, Oral, Nightly  cholecalciferol, 1,000 Units, Oral, Nightly  hydrALAZINE, 25 mg, Oral, Q8H  metoprolol succinate XL, 100 mg, Oral, Nightly  sodium bicarbonate, 1,300 mg, Oral, TID  sodium chloride, 10 mL, Intravenous, Q12H  Urea, 15 g, Oral, TID      IV Meds:   sodium  "bicarbonate drip (greater than 75 mEq/bag), 150 mEq        Results Reviewed:   I have personally reviewed the results from the time of this admission to 4/14/2022 08:40 EDT     Results from last 7 days   Lab Units 04/13/22  1655 04/13/22  0618   SODIUM mmol/L 125* 129*   POTASSIUM mmol/L 4.4 5.4*   CHLORIDE mmol/L 93* 99   CO2 mmol/L 17.0* 12.7*   BUN mg/dL 53* 52*   CREATININE mg/dL 5.67* 5.12*   CALCIUM mg/dL 9.4 8.9   GLUCOSE mg/dL 109* 137*       Estimated Creatinine Clearance: 14.4 mL/min (A) (by C-G formula based on SCr of 5.67 mg/dL (H)).                Results from last 7 days   Lab Units 04/13/22  0618   WBC 10*3/mm3 17.28*   HEMOGLOBIN g/dL 11.9*   PLATELETS 10*3/mm3 194             Assessment / Plan       ASSESSMENT:     -Chronic kidney disease stage V .( progression )  Secondary to potential for nephrosclerosis and diabetic nephropathy   .Creatinine trend   4-5    . Urinalysis .  Gonorrhea images studies   . CT abdomen 2021 \" No hydronephrosis is seen on either side.There is a simple appearing right renal cyst. No additional follow-up is  necessary. Left kidney does appear mildly atrophic\"    . Will follow closely      . Avoid nephrotoxins      . Keep MAP > 65 . Avoid nephrotoxins  -Hyperkalemia Lokelma 10 g 3 times daily, improved  -Metabolic acidosis we will continue sodium bicarbonate tablets +  sodium bicarbonate drip.  We will continue to titrate based on CO2 trend  -Diabetes Mellitus type 2 w/ complications .Continue insulin regimen / hypoglycemic regimen .Hypoglycemic protocol . POC glucose 4 x day .Diabetic diet-  -Hypertension.  Continue  combination of   metoprolol with decreased hydralazine to avoid hypotension    . We will continue to follow closely. Heart healthy diet  - complicated diverticulitis with local abscess that  require bowel resection and colostomy placement s/p open colostomy reversal.  As per general surgery  -Hyponatremia likely secondary to chronic kidney disease stage V and " liquid diet.  We will place patient on fluid restriction 1.5 L + furosemide 60 mg once + UREA 15 gr BID         PLAN:  -Adjusted bicarb drip  -Worsening hyponatremia likely secondary to increased free water ,will place on fluid restriction 1.5 L + UREA 15 gr BID , we will follow sodium trend  -Ordered furosemide 60 mg IV once  -Follow renal function closely  -Avoid nephrotoxins  -Adjust medications to GFR    Thank you for involving us in the care of Lefty DOLL Trell.  Please feel free to call with any questions.    Enrique Linda MD  04/14/22  08:40 EDT    [facility]  [phone]      Much of this encounter note is an electronic transcription/translation of spoken language to printed text. The electronic translation of spoken language may permit erroneous, or at times, nonsensical words or phrases to be inadvertently transcribed; Although I have reviewed the note for such errors, some may still exist.

## 2022-04-14 NOTE — PLAN OF CARE
Goal Outcome Evaluation:  Plan of Care Reviewed With: patient, family        Progress: improving  Outcome Evaluation: Pt. stated that he is feeling much better. Claims he is sore from surgery but does not need any pain medication. He walked the unit all the way round and is able to ambulate to bathroom on his own. He had family at bedside  today and was pleasant and cooperative. Pt had 2 BM today that contained some blood. It looked like risidual from surgery. He has been passing gas as well. Pt. states that he feels ready to go home. Will continue to monitor and reassess for pain

## 2022-04-15 ENCOUNTER — READMISSION MANAGEMENT (OUTPATIENT)
Dept: CALL CENTER | Facility: HOSPITAL | Age: 72
End: 2022-04-15

## 2022-04-15 VITALS
WEIGHT: 232.7 LBS | HEIGHT: 70 IN | HEART RATE: 68 BPM | RESPIRATION RATE: 16 BRPM | TEMPERATURE: 97.5 F | BODY MASS INDEX: 33.31 KG/M2 | SYSTOLIC BLOOD PRESSURE: 135 MMHG | DIASTOLIC BLOOD PRESSURE: 67 MMHG | OXYGEN SATURATION: 97 %

## 2022-04-15 PROCEDURE — 99024 POSTOP FOLLOW-UP VISIT: CPT | Performed by: SURGERY

## 2022-04-15 PROCEDURE — 97116 GAIT TRAINING THERAPY: CPT

## 2022-04-15 RX ORDER — OXYCODONE HYDROCHLORIDE AND ACETAMINOPHEN 5; 325 MG/1; MG/1
1 TABLET ORAL EVERY 4 HOURS PRN
Qty: 7 TABLET | Refills: 0 | Status: SHIPPED | OUTPATIENT
Start: 2022-04-15 | End: 2022-04-29

## 2022-04-15 RX ADMIN — SODIUM BICARBONATE 1300 MG: 650 TABLET ORAL at 08:42

## 2022-04-15 RX ADMIN — Medication 15 G: at 08:42

## 2022-04-15 RX ADMIN — HYDRALAZINE HYDROCHLORIDE 25 MG: 50 TABLET, FILM COATED ORAL at 06:54

## 2022-04-15 NOTE — DISCHARGE SUMMARY
Discharge Summary    Patient name: Lefty Cai    Medical record number: [id number]    Admission date: 4/12/2022  Discharge date: 4/15/2022     Attending physician: Yelena Guerra MD    Primary care physician: Daksha Ng APRN    Consulting physician(s): Nephrology    Condition on discharge: improving    Admitting diagnosis:   Unwanted colostomy after remote sigmoid colectomy for perforated diverticulitis  Chronic kidney disease    Final diagnosis:   Unwanted colostomy after remote sigmoid colectomy for perforated diverticulitis  Chronic kidney disease, stage V    Procedures: Open colostomy reversal    History of present illness: The patient is a  71 y.o. male that was admitted to the hospital with an unwanted colostomy after remote sigmoid colectomy with colostomy for perforated diverticulitis 6 months prior.  He presented for elective colostomy reversal surgery.    Hospital course: He was brought in electively for planned elective colostomy reversal.  He was admitted to [facility] for observation thereafter and was kept for 3 days while we awaited return of bowel function.  His diet was advanced, he showed no evidence of ileus, and he did very well.  The nephrology service was consulted while he was here to manage his chronic kidney disease and initially started him on a bicarbonate drip for acidosis.  He also received medication for treatment of hyperkalemia.  He had hyponatremia, which responded with conservative measures.  On the date of discharge all of his electrolyte abnormalities and acidosis have improved.  He is being discharged home with instructions to follow-up with me in 2 weeks for staple removal.    Discharge medications:      Discharge Medications      New Medications      Instructions Start Date   oxyCODONE-acetaminophen 5-325 MG per tablet  Commonly known as: Percocet   1 tablet, Oral, Every 4 Hours PRN         Changes to Medications      Instructions Start Date   hydrALAZINE 50  MG tablet  Commonly known as: APRESOLINE  What changed: Another medication with the same name was changed. Make sure you understand how and when to take each.   50 mg, Oral, Nightly      hydrALAZINE 50 MG tablet  Commonly known as: APRESOLINE  What changed:   · how much to take  · when to take this   TAKE 1 TABLET THREE TIMES DAILY      metoprolol succinate  MG 24 hr tablet  Commonly known as: TOPROL-XL  What changed: when to take this   100 mg, Oral, Daily      simvastatin 40 MG tablet  Commonly known as: ZOCOR  What changed: when to take this   TAKE 1 TABLET AT BEDTIME      traZODone 50 MG tablet  Commonly known as: DESYREL  What changed: when to take this   TAKE 1 TABLET AT NIGHT AS NEEDED FOR SLEEP         Continue These Medications      Instructions Start Date   acetaminophen 325 MG tablet  Commonly known as: TYLENOL   650 mg, Oral, Every 6 Hours PRN      aspirin 81 MG tablet   81 mg, Oral, Nightly, HOLDING FOR 5 DAYS BEFORE SURGERY PER MD      Bath/Shower Seat misc   Use as needed for bathing and completion of ADL      cetirizine 10 MG tablet  Commonly known as: zyrTEC   10 mg, Oral, Nightly      cholecalciferol 25 MCG (1000 UT) tablet  Commonly known as: VITAMIN D3   1,000 Units, Oral, Nightly      simethicone 80 MG chewable tablet  Commonly known as: MYLICON   80 mg, Oral, Every 6 Hours PRN      sodium bicarbonate 650 MG tablet   1,300 mg, Oral, Afternoon      sodium bicarbonate 650 MG tablet   1,950 mg, Oral, Every Early Morning             Discharge instructions:    - Resume regular diet as tolerated.  - No lifting anything heavier than 15 pounds for 6 weeks postop. You may resume all other activities as tolerated once your pain level allows.  - No driving while taking narcotic pain medications.  - You may resume showering, no tub bathing for two weeks while your incisions heal.  - Wash your incisions with soap and water daily in the shower, pat dry, and leave open to air.    Follow-up appointment:  Follow up with Yelena Guerra MD in the office in 2 weeks. Call for appointment at [phone].    CODE STATUS: Full code

## 2022-04-15 NOTE — PLAN OF CARE
Goal Outcome Evaluation:  Plan of Care Reviewed With: patient        Progress: improving  Outcome Evaluation: Pt has progressed well with mobility. Pt is independent with transfers. Pt was able to ambulate 800ft without AD, supervision/independent. No balance issues. No skilled PT needed at this time. Will sign off. Encourage pt to ambulate throughout the day.    ..Patient was wearing a face mask during this therapy encounter. Therapist used appropriate personal protective equipment including eye protection, mask, and gloves.  Mask used was standard procedure mask. Appropriate PPE was worn during the entire therapy session. Hand hygiene was completed before and after therapy session. Patient is not in enhanced droplet precautions.

## 2022-04-15 NOTE — SIGNIFICANT NOTE
04/15/22 1308   PT Discharge Summary   Anticipated Discharge Disposition (PT) home;home with assist   Reason for Discharge Independent   Discharge Destination Home;Home with assist

## 2022-04-15 NOTE — THERAPY TREATMENT NOTE
Patient Name: Lefty Cai  : 1950    MRN: [id number]                              Today's Date: 4/15/2022       Admit Date: 2022    Visit Dx:     ICD-10-CM ICD-9-CM   1. Generalized weakness  R53.1 780.79   2. History of colon resection  Z90.49 V45.89   3. Colostomy in place ([facility])  Z93.3 V44.3   4. Diverticulitis of large intestine with perforation and abscess without bleeding  K57.20 562.11     Patient Active Problem List   Diagnosis   • Type 2 diabetes mellitus with diabetic chronic kidney disease ([facility])   • Essential hypertension   • Hyperlipidemia   • Primary insomnia   • CKD (chronic kidney disease) stage 4, GFR 15-29 ml/min ([facility])   • Vitamin D deficiency   • Diverticulitis of large intestine with perforation and abscess without bleeding   • Free intraperitoneal air   • Obesity (BMI 30-39.9)   • Impaired mobility and ADLs   • Colostomy in place ([facility])   • History of colon resection   • Colon polyps   • Diverticulosis     Past Medical History:   Diagnosis Date   • Anemia    • Anesthesia complication     HYPOTENSION   • Ankle fracture     RIGHT   • Arthritis    • Bowel obstruction ([facility])    • Cancer of kidney, left ([facility])    • CKD (chronic kidney disease)     STAGE 5   • Colostomy in place ([facility])    • Diabetes mellitus, type 2 ([facility])    • GERD (gastroesophageal reflux disease)    • H/O renal cell carcinoma 2007    partial nephrectomy   • [place] hearing[place]    • Hyperlipidemia    • Hypertension    • Primary insomnia 2016   • Proteinuria    • Risk factors for obstructive sleep apnea     STOP BANG 6   • Sinus drainage    • Vitamin D deficiency 2017     Past Surgical History:   Procedure Laterality Date   • ARTERIOVENOUS FISTULA/SHUNT SURGERY Left 08/15/2019    Procedure: LEFT MID FOREARM RADIAL CEPHALIC ARTERIAL VENOUS FISTULA;  Surgeon: Louann Miles Jr., MD;  Location: [facility];  Service: Vascular   • COLON RESECTION     • COLONOSCOPY  2022   • COLONOSCOPY N/A  3/24/2022    Procedure: COLONOSCOPY TO CECUM WITH POLYPECTOMY  (HOT SNARE);  Surgeon: Yelena Guerra MD;  Location: [facility] ENDOSCOPY;  Service: General;  Laterality: N/A;  PREOP/ HX OF DIVERTICULITIS, COLOSTOMY  POSTOP/ POLYPS, DIVERTICULOSIS    • COLOSTOMY     • COLOSTOMY CLOSURE N/A 4/12/2022    Procedure: open Colostomy reversal;  Surgeon: Yelena Guerra MD;  Location: [facility] MAIN OR;  Service: General;  Laterality: N/A;   • EXPLORATORY LAPAROTOMY N/A 09/06/2021    Procedure: Open sigmoind colectomy, colostomy, and appendectomy;  Surgeon: Yelena Guerra MD;  Location: [facility] MAIN OR;  Service: General;  Laterality: N/A;   • EYE SURGERY      CATARACTS   • KNEE ARTHROSCOPY     • NEPHRECTOMY PARTIAL  2007    Dr. Victor      General Information     Row Name 04/15/22 1305          Physical Therapy Time and Intention    Document Type therapy note (daily note)  -EB     Mode of Treatment individual therapy;physical therapy  -EB     Row Name 04/15/22 1305          Cognition    Orientation Status (Cognition) oriented x 4  -EB           User Key  (r) = Recorded By, (t) = Taken By, (c) = Cosigned By    Initials Name Provider Type    EB Susana Ferris PTA Physical Therapist Assistant               Mobility     Row Name 04/15/22 1305          Bed Mobility    Comment, (Bed Mobility) NT- UIC  -EB     Row Name 04/15/22 1305          Sit-Stand Transfer    Sit-Stand [place] (Transfers) independent  -EB     Row Name 04/15/22 1305          Gait/Stairs (Locomotion)    [place] Level (Gait) supervision;independent  -EB     Distance in Feet (Gait) 800ft  -EB     Deviations/Abnormal Patterns (Gait) gordon decreased  -EB     Comment, (Gait/Stairs) no balance issues  -EB           User Key  (r) = Recorded By, (t) = Taken By, (c) = Cosigned By    Initials Name Provider Type    Susana Fonseca PTA Physical Therapist Assistant               Obj/Interventions    No documentation.                Goals/Plan    No  documentation.                Clinical Impression     Row Name 04/15/22 1305          Plan of Care Review    Plan of Care Reviewed With patient  -EB     Progress improving  -EB     Outcome Evaluation Pt has progressed well with mobility. Pt is independent with transfers. Pt was able to ambulate 800ft without AD, supervision/independent. No balance issues. No skilled PT needed at this time. Will sign off. Encourage pt to ambulate throughout the day.  -EB     Row Name 04/15/22 1305          Therapy Assessment/Plan (PT)    Therapy Frequency (PT) 5 times/wk  -EB     Row Name 04/15/22 1305          Positioning and Restraints    Pre-Treatment Position sitting in chair/recliner  -EB     Post Treatment Position chair  -EB     In Chair sitting;call light within reach;encouraged to call for assist  -EB           User Key  (r) = Recorded By, (t) = Taken By, (c) = Cosigned By    Initials Name Provider Type    Susana Fonseca PTA Physical Therapist Assistant               Outcome Measures     Row Name 04/15/22 1307          How much help from another person do you currently need...    Turning from your back to your side while in flat bed without using bedrails? 4  -EB     Moving from lying on back to sitting on the side of a flat bed without bedrails? 4  -EB     Moving to and from a bed to a chair (including a wheelchair)? 4  -EB     Standing up from a chair using your arms (e.g., wheelchair, bedside chair)? 4  -EB     Climbing 3-5 steps with a railing? 3  -EB     To walk in hospital room? 4  -EB     AM-PAC 6 Clicks Score (PT) 23  -EB           User Key  (r) = Recorded By, (t) = Taken By, (c) = Cosigned By    Initials Name Provider Type    Susana Fonseca PTA Physical Therapist Assistant                             Physical Therapy Education                 Title: PT OT SLP Therapies (Done)     Topic: Physical Therapy (Done)     Point: Mobility training (Done)     Learning Progress Summary           Patient Acceptance, EJO ANN  by  at 4/14/2022 1753    Acceptance, E, VU,NR,DU by  at 4/13/2022 1220                   Point: Home exercise program (Done)     Learning Progress Summary           Patient Acceptance, E, VU by  at 4/14/2022 1753    Acceptance, E, VU,NR,DU by  at 4/13/2022 1220                   Point: Body mechanics (Done)     Learning Progress Summary           Patient Acceptance, E, VU by  at 4/14/2022 1753    Acceptance, E, VU,NR,DU by  at 4/13/2022 1220                   Point: Precautions (Done)     Learning Progress Summary           Patient Acceptance, E, VU by  at 4/14/2022 1753    Acceptance, E, VU,NR,DU by  at 4/13/2022 1220                               User Key     Initials Effective Dates Name Provider Type Discipline     06/16/21 -  Becky Elkins, PT Physical Therapist PT     06/22/21 -  Kay Peng, RN Registered Nurse Nurse              PT Recommendation and Plan     Plan of Care Reviewed With: patient  Progress: improving  Outcome Evaluation: Pt has progressed well with mobility. Pt is independent with transfers. Pt was able to ambulate 800ft without AD, supervision/independent. No balance issues. No skilled PT needed at this time. Will sign off. Encourage pt to ambulate throughout the day.     Time Calculation:    PT Charges     Row Name 04/15/22 1304             Time Calculation    Start Time 1012  -EB      Stop Time 1023  -EB      Time Calculation (min) 11 min  -      PT Received On 04/15/22  -      PT - Next Appointment 04/18/22  -              Time Calculation- PT    Total Timed Code Minutes- PT 11 minute(s)  -            User Key  (r) = Recorded By, (t) = Taken By, (c) = Cosigned By    Initials Name Provider Type    EB Susana Ferris PTA Physical Therapist Assistant              Therapy Charges for Today     Code Description Service Date Service Provider Modifiers Qty    [id number] HC GAIT TRAINING EA 15 MIN 4/15/2022 Susana Ferris PTA GP 1          PT G-Codes  Outcome  Measure Options: AM-PAC 6 Clicks Basic Mobility (PT)  -[facility] 6 Clicks Score (PT): 23    Susana Ferris, PTA  4/15/2022

## 2022-04-15 NOTE — PROGRESS NOTES
[facility] Progress Note      Patient Name: Lefty Cai  : 1950  MRN: [id number]  Primary Care Physician:  Daksha Ng APRN  Date of admission: 2022    Subjective     Interval History:     Patient sitting on recliner  Watching TV  Ambulating   urinating spontaneously  Having regular movements    Review of Systems:   As noted above    Objective     Vitals:   Temp:  [97 °F (36.1 °C)-98.6 °F (37 °C)] 98.5 °F (36.9 °C)  Heart Rate:  [68-69] 69  Resp:  [16] 16  BP: (114-147)/(70-76) 114/73    Intake/Output Summary (Last 24 hours) at 4/15/2022 0959  Last data filed at 4/15/2022 0908  Gross per 24 hour   Intake 705 ml   Output --   Net 705 ml       Physical Exam:      Constitutional: Awake, alert, no acute distress.  HEENT: Sclera anicteric, no conjunctival injection  Neck: Supple, no thyromegaly, no lymphadenopathy, trachea at midline, no JVD  Respiratory: Clear to auscultation bilaterally, nonlabored respiration  Cardiovascular: RRR, no murmurs, no rubs or gallops, no carotid bruit  Gastrointestinal: Midline surgical wound  with dressing in place decreased bowel sounds  : No palpable bladder  Musculoskeletal: No edema, no clubbing or cyanosis.  Left upper extremity AV fistula bruit and thrill present  Psychiatric: Appropriate affect, cooperative  Neurologic: Oriented x3, moving all extremities, normal speech and mental status  Skin: Warm and dry           Scheduled Meds:     atorvastatin, 20 mg, Oral, Nightly  cetirizine, 10 mg, Oral, Nightly  cholecalciferol, 1,000 Units, Oral, Nightly  hydrALAZINE, 25 mg, Oral, Q8H  metoprolol succinate XL, 100 mg, Oral, Nightly  sodium bicarbonate, 1,300 mg, Oral, TID  sodium chloride, 10 mL, Intravenous, Q12H  Urea, 15 g, Oral, BID      IV Meds:        Results Reviewed:   I have personally reviewed the results from the time of this admission to 4/15/2022 09:59 EDT     Results from last 7 days   Lab Units 22  0953  "04/13/22  1655 04/13/22  0618   SODIUM mmol/L 132* 125* 129*   POTASSIUM mmol/L 4.3 4.4 5.4*   CHLORIDE mmol/L 96* 93* 99   CO2 mmol/L 21.1* 17.0* 12.7*   BUN mg/dL 53* 53* 52*   CREATININE mg/dL 5.12* 5.67* 5.12*   CALCIUM mg/dL 9.1 9.4 8.9   GLUCOSE mg/dL 127* 109* 137*       Estimated Creatinine Clearance: 15.9 mL/min (A) (by C-G formula based on SCr of 5.12 mg/dL (H)).                Results from last 7 days   Lab Units 04/14/22  0954 04/13/22  0618   WBC 10*3/mm3 10.97* 17.28*   HEMOGLOBIN g/dL 10.3* 11.9*   PLATELETS 10*3/mm3 140 194             Assessment / Plan       ASSESSMENT:     -Chronic kidney disease stage V .( progression )  Secondary to potential for nephrosclerosis and diabetic nephropathy   .Creatinine trend   4-5    . Urinalysis .  Gonorrhea images studies   . CT abdomen 2021 \" No hydronephrosis is seen on either side.There is a simple appearing right renal cyst. No additional follow-up is  necessary. Left kidney does appear mildly atrophic\"    . Will follow closely      . Avoid nephrotoxins      . Keep MAP > 65 . Avoid nephrotoxins  -Hyperkalemia Lokelma 10 g 3 times daily, improved K   -Metabolic acidosis we will continue sodium bicarbonate tablets We will continue to titrate based on CO2 trend  -Diabetes Mellitus type 2 w/ complications .Continue insulin regimen / hypoglycemic regimen .Hypoglycemic protocol . POC glucose 4 x day .Diabetic diet-  -Hypertension.  Continue  combination of   metoprolol  And hydralazine  . We will continue to follow closely. Heart healthy diet  - complicated diverticulitis with local abscess that  require bowel resection and colostomy placement s/p open colostomy reversal.  As per general surgery  -Hyponatremia likely secondary to chronic kidney disease stage V and liquid diet.  We will place patient on fluid restriction 1.5 L + furosemide 60 mg once + UREA 15 gr BID , with improved sodium trend        PLAN:  -Discontinue sodium bicarbonate drip  and will continue " oral tablets   -Renal function at baseline , CO2 stable, and  sodium improved  -Patient can be safely be discharged from renal point of view and will arrange for close follow-up, renal clinic.  Patient was advised to continue same medications prior to admission  -Follow renal function closely  -Avoid nephrotoxins  -Adjust medications to GFR    Discussed with nursing staff.    Thank you for involving us in the care of Lefty DOLL Trell.  Please feel free to call with any questions.    Enrique Linda MD  04/15/22  09:59 EDT    [facility]  [phone]      Much of this encounter note is an electronic transcription/translation of spoken language to printed text. The electronic translation of spoken language may permit erroneous, or at times, nonsensical words or phrases to be inadvertently transcribed; Although I have reviewed the note for such errors, some may still exist.

## 2022-04-15 NOTE — PLAN OF CARE
Problem: Adult Inpatient Plan of Care  Goal: Plan of Care Review  Outcome: Ongoing, Progressing  Flowsheets (Taken 4/15/2022 0246)  Progress: improving  Plan of Care Reviewed With:   patient   family  Outcome Evaluation: Patient was given PRN Tylenol for incisional pain and he refuses narcotics for pain. Patient is up ad cherrie to the bathroom. Family went hoem for the night. IVF infusing. Patient had a couple of BM's this shift. Incisional site with staples dry and intact. Incentive Spirometer in use. Will continue to monitor vital signs, labs and comfort.   Goal Outcome Evaluation:  Plan of Care Reviewed With: patient, family        Progress: improving  Outcome Evaluation: Patient was given PRN Tylenol for incisional pain and he refuses narcotics for pain. Patient is up ad cherrie to the bathroom. Family went hoem for the night. IVF infusing. Patient had a couple of BM's this shift. Incisional site with staples dry and intact. Incentive Spirometer in use. Will continue to monitor vital signs, labs and comfort.

## 2022-04-15 NOTE — DISCHARGE INSTR - ACTIVITY
Resume regular diet as tolerated.  - No lifting anything heavier than 15 pounds for 6 weeks postop. You may resume all other activities as tolerated once your pain level allows.  - No driving while taking narcotic pain medications.  - You may resume showering, no tub bathing for two weeks while your incisions heal.  - Wash your incisions with soap and water daily in the shower, pat dry, and leave open to air.

## 2022-04-15 NOTE — PROGRESS NOTES
Case Management Discharge Note      Final Note: Pt discharging home with his wife. Pt now up ad cherrie without assistive device, has cane at home if needed, no walker ordered/needed. Mi Shaw LCSW         Selected Continued Care - Admitted Since 4/12/2022     Destination    No services have been selected for the patient.              Durable Medical Equipment    No services have been selected for the patient.              Dialysis/Infusion    No services have been selected for the patient.              Home Medical Care    No services have been selected for the patient.              Therapy    No services have been selected for the patient.              Community Resources    No services have been selected for the patient.              Community & DME    No services have been selected for the patient.                  Transportation Services  Private: Car    Final Discharge Disposition Code: 01 - home or self-care

## 2022-04-15 NOTE — PLAN OF CARE
Problem: Adult Inpatient Plan of Care  Goal: Plan of Care Review  Outcome: Met  Goal: Patient-Specific Goal (Individualized)  Outcome: Met  Goal: Absence of Hospital-Acquired Illness or Injury  Outcome: Met  Intervention: Identify and Manage Fall Risk  Description: Perform standard risk assessment on admission using a validated tool or comprehensive approach appropriate to the patient; reassess fall risk frequently, with change in status or transfer to another level of care.  Communicate fall injury risk to interprofessional healthcare team.  Determine need for increased observation, equipment and environmental modification, such as low bed, signage and supportive, nonskid footwear.  Adjust safety measures to individual developmental age, stage and identified risk factors.  Reinforce the importance of safety and physical activity with patient and family.  Perform regular intentional rounding to assess need for position change, pain assessment and personal needs, including assistance with toileting.  Recent Flowsheet Documentation  Taken 4/15/2022 0842 by Isabel Cisneros RN  Safety Promotion/Fall Prevention:   safety round/check completed   room organization consistent   nonskid shoes/slippers when out of bed  Intervention: Prevent Skin Injury  Description: Perform a screening for skin injury risk, such as pressure or moisture associated skin damage on admission and at regular intervals throughout hospital stay.  Keep all areas of skin (especially folds) clean and dry.  Maintain adequate skin hydration.  Relieve and redistribute pressure and protect bony prominences; implement measures based on patient-specific risk factors.  Match turning and repositioning schedule to clinical condition.  Encourage weight shift frequently; assist with reposition if unable to complete independently.  Float heels off bed; avoid pressure on the Achilles tendon.  Keep skin free from extended contact with medical  devices.  Encourage functional activity and mobility, as early as tolerated.  Use aids (e.g., slide boards, mechanical lift) during transfer.  Recent Flowsheet Documentation  Taken 4/15/2022 0842 by Isabel Cisneros RN  Body Position: position changed independently  Skin Protection:   adhesive use limited   tubing/devices free from skin contact  Intervention: Prevent and Manage VTE (Venous Thromboembolism) Risk  Description: Assess for VTE (venous thromboembolism) risk.  Encourage and assist with early ambulation.  Initiate and maintain compression or other therapy, as indicated, based on identified risk in accordance with organizational protocol and provider order.  Encourage both active and passive leg exercises while in bed, if unable to ambulate.  Recent Flowsheet Documentation  Taken 4/15/2022 0842 by Isabel Cisneros RN  Activity Management:   activity adjusted per tolerance   up ad hcerrie  VTE Prevention/Management:   bilateral   dorsiflexion/plantar flexion performed  Range of Motion: active ROM (range of motion) encouraged  Intervention: Prevent Infection  Description: Maintain skin and mucous membrane integrity; promote hand, oral and pulmonary hygiene.  Optimize fluid balance, nutrition, sleep and glycemic control to maximize infection resistance.  Identify potential sources of infection early to prevent or mitigate progression of infection (e.g., wound, lines, devices).  Evaluate ongoing need for invasive devices; remove promptly when no longer indicated.  Recent Flowsheet Documentation  Taken 4/15/2022 1000 by Isabel Cisneros RN  Infection Prevention:   hand hygiene promoted   personal protective equipment utilized   single patient room provided   visitors restricted/screened  Taken 4/15/2022 0800 by Isabel Cisneros RN  Infection Prevention:   hand hygiene promoted   personal protective equipment utilized   single patient room provided   visitors restricted/screened  Goal: Optimal Comfort and  Wellbeing  Outcome: Met  Intervention: Provide Person-Centered Care  Description: Use a family-focused approach to care.  Develop trust and rapport by proactively providing information, encouraging questions, addressing concerns and offering reassurance.  Acknowledge emotional response to hospitalization.  Recognize and utilize personal coping strategies.  Honor spiritual and cultural preferences.  Recent Flowsheet Documentation  Taken 4/15/2022 0842 by Isabel Cisneros RN  Trust Relationship/Rapport:   care explained   choices provided   emotional support provided   empathic listening provided   questions answered   questions encouraged   reassurance provided   thoughts/feelings acknowledged  Goal: Readiness for Transition of Care  Outcome: Met     Problem: Suicide Risk  Goal: Absence of Self-Harm  Outcome: Met  Intervention: Promote Psychosocial Wellbeing  Description: Establish a trusting and safe relationship with patient and family; promote positive communication patterns (e.g., available to listen; calm, nonjudgmental approach).  Discuss patient and family’s present perception, precipitating events, stressors, warning signs; determine short-term goals and potential solutions to problems.  Identify coping strategies and promote personal protective factors (e.g., social support, personal resilience and strengths).  Encourage positive health behaviors, such as optimal nutrition, healthy sleep hygiene and physical activity.  Encourage support system involvement to enhance patient’s sense of connectedness; identify additional resources for support.  Consider the impact of suicidality on entire family, especially children.  Recent Flowsheet Documentation  Taken 4/15/2022 0842 by Isabel Cisneros RN  Supportive Measures:   active listening utilized   self-care encouraged   Goal Outcome Evaluation:

## 2022-04-16 NOTE — OUTREACH NOTE
Prep Survey    Flowsheet Row Responses   [facility] facility patient discharged from? [facility]   Is LACE score < 7 ? No   Emergency Room discharge w/ pulse ox? No   Eligibility [facility]   Date of Admission 04/12/22   Date of Discharge 04/15/22   Discharge Disposition Home or Self Care   Discharge diagnosis Open colostomy reversal   Does the patient have one of the following disease processes/diagnoses(primary or secondary)? General Surgery   Does the patient have Home health ordered? No   Is there a DME ordered? Yes   What DME was ordered? [facility] via [place]   Prep survey completed? Yes          MIRNA BRIAN - Registered Nurse

## 2022-04-18 ENCOUNTER — TRANSITIONAL CARE MANAGEMENT TELEPHONE ENCOUNTER (OUTPATIENT)
Dept: CALL CENTER | Facility: HOSPITAL | Age: 72
End: 2022-04-18

## 2022-04-18 NOTE — OUTREACH NOTE
Call Center TCM Note    Flowsheet Row Responses   [facility] patient discharged from? [facility]   Does the patient have one of the following disease processes/diagnoses(primary or secondary)? General Surgery   TCM attempt successful? Yes   Call start time 0907   Call end time 0912   Discharge diagnosis Open colostomy reversal   Is patient permission given to speak with other caregiver? Yes   List who call center can speak with Wife   Person spoke with today (if not patient) and relationship Patient   Meds reviewed with patient/caregiver? Yes   Is the patient having any side effects they believe may be caused by any medication additions or changes? No   Does the patient have all medications related to this admission filled (includes all antibiotics, pain medications, etc.) Yes   Is the patient taking all medications as directed (includes completed medication regime)? Yes   Medication comments States has not needed to take pain medication.  Has taken tylenol and it has controlled pain.   Does the patient have a follow up appointment scheduled with their surgeon? No   What is preventing the patient from scheduling follow up appointments? Haven't had time   Nursing Interventions Advised patient to make appointment   Has the patient kept scheduled appointments due by today? N/A   Comments States he is going to contact surgeon's office for an appt and then he states he is to follow up with his kidney dr.  After he gets those appts he will make appt with PCP   Psychosocial issues? No   Did the patient receive a copy of their discharge instructions? Yes   Nursing interventions Reviewed instructions with patient   What is the patient's perception of their health status since discharge? Improving   Nursing interventions Nurse provided patient education   Is the patient /caregiver able to teach back basic post-op care? Drive as instructed by MD in discharge instructions, Take showers only when approved by MD-sponge  "bathe until then, No tub bath, swimming, or hot tub until instructed by MD, Keep incision areas clean,dry and protected, Lifting as instructed by MD in discharge instructions   Is the patient/caregiver able to teach back signs and symptoms of incisional infection? Increased redness, swelling or pain at the incisonal site, Increased drainage or bleeding, Incisional warmth, Pus or odor from incision, Fever   Is the patient/caregiver able to teach back steps to recovery at home? Set small, achievable goals for return to baseline health, Rest and rebuild strength, gradually increase activity   If the patient is a current smoker, are they able to teach back resources for cessation? Not a smoker   Is the patient/caregiver able to teach back the hierarchy of who to call/visit for symptoms/problems? PCP, Specialist, Home health nurse, Urgent Care, ED, 911 Yes   Additional teach back comments States he is doing \"great\".     TCM call completed? Yes   Wrap up additional comments Denies questions or needs at this time.          Hyacinth Rodriguez LPN    4/18/2022, 09:15 EDT      "

## 2022-04-21 ENCOUNTER — TELEPHONE (OUTPATIENT)
Dept: SURGERY | Facility: CLINIC | Age: 72
End: 2022-04-21

## 2022-04-21 NOTE — TELEPHONE ENCOUNTER
Hub staff attempted to follow warm transfer process and was unsuccessful     Caller: Caterina Cai    Relationship to patient: Emergency Contact    Best call back number:[phone]  Patient is needing: PT HAS CLEAR FLUID COMING FROM RECTUM. PT WANTS TO SPEAK W/ NURSE OR MA

## 2022-04-21 NOTE — TELEPHONE ENCOUNTER
Called Caterina back. They were just checking to ensure clear rectal mucous after the patient's colostomy reversal on 4/12/22 is normal. Having complete BMs with no problems. Advised that this is very common after colon surgery and considered normal post-op. Advised that this is produced by the bowels to help keep the stool lubricated for easier BMs. Wife verbalized understanding.

## 2022-04-29 ENCOUNTER — OFFICE VISIT (OUTPATIENT)
Dept: SURGERY | Facility: CLINIC | Age: 72
End: 2022-04-29

## 2022-04-29 DIAGNOSIS — Z09 SURGICAL FOLLOWUP: Primary | ICD-10-CM

## 2022-04-29 DIAGNOSIS — Z98.890 HISTORY OF COLOSTOMY REVERSAL: ICD-10-CM

## 2022-04-29 PROBLEM — Z93.3 COLOSTOMY IN PLACE: Status: RESOLVED | Noted: 2022-01-11 | Resolved: 2022-04-29

## 2022-04-29 PROCEDURE — 99024 POSTOP FOLLOW-UP VISIT: CPT | Performed by: SURGERY

## 2022-04-29 NOTE — PROGRESS NOTES
CHIEF COMPLAINT:   Chief Complaint   Patient presents with   • Post-op     Open colostomy reversal with extensive (2-hour) lysis of adhesions 4/12/22       HISTORY OF PRESENT ILLNESS:  This is a 71 y.o. male who presents for a post-operative visit after undergoing open colostomy reversal with extensive lysis of adhesions on 4/12/2022.  He has been doing very well since surgery with minimal postoperative abdominal pain and no drainage or erythema from his incision.  His bowels have been moving well and he denies any fevers or chills.  He has had some mucus discharge from his rectum, which he has struggled to control so he has been using adult diapers in case there is any mucus leakage throughout the day.    PHYSICAL EXAM:  Lungs: Clear  Heart: RRR  ABD: Incisions are healing well without any erythema or signs of infection, staples intact  Ext: no significant edema, calves nontender    A/P:  This is a 71 y.o. male patient who is S/P open colostomy reversal on 4/12/2022    He is doing wonderfully.  I removed all of his staples and placed Mastisol with Steri-Strips.  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  I encouraged him to start taking a daily probiotic to help restore normal gut health in the meantime.  I would like to see him back in a month for recheck of his incision and to be sure the mucus rectal drainage has stopped.  He knows to continue to avoid lifting anything heavier than 15 pounds in the interim so as to avoid development of an incisional hernia.    Yelena Guerra MD  General, Robotic, and Endoscopic Surgery  [facility]    [address], [address]  [place] [address]  P: [phone]  F: [phone]

## 2022-05-13 ENCOUNTER — TELEPHONE (OUTPATIENT)
Dept: FAMILY MEDICINE CLINIC | Facility: CLINIC | Age: 72
End: 2022-05-13

## 2022-05-13 NOTE — TELEPHONE ENCOUNTER
[employer] needs info on pt.  Call [phone] and follow prompts for Provider Support regarding case #[id number].  Thanks, AJ

## 2022-05-16 NOTE — TELEPHONE ENCOUNTER
S/w Arben in regard to an old [employer] order.  Patient is being followed by general surgery for issue and order was cancelled.

## 2022-05-31 ENCOUNTER — OFFICE VISIT (OUTPATIENT)
Dept: SURGERY | Facility: CLINIC | Age: 72
End: 2022-05-31

## 2022-05-31 VITALS — WEIGHT: 230.2 LBS | BODY MASS INDEX: 32.96 KG/M2 | HEIGHT: 70 IN

## 2022-05-31 DIAGNOSIS — Z09 SURGICAL FOLLOWUP: Primary | ICD-10-CM

## 2022-05-31 DIAGNOSIS — Z98.890 HISTORY OF COLOSTOMY REVERSAL: ICD-10-CM

## 2022-05-31 PROCEDURE — 99024 POSTOP FOLLOW-UP VISIT: CPT | Performed by: SURGERY

## 2022-05-31 RX ORDER — CETIRIZINE HYDROCHLORIDE 10 MG/1
1 TABLET ORAL DAILY
COMMUNITY
End: 2022-05-31

## 2022-05-31 RX ORDER — MELATONIN
1 DAILY
COMMUNITY
End: 2022-07-05 | Stop reason: SDUPTHER

## 2022-06-21 DIAGNOSIS — I10 ESSENTIAL HYPERTENSION: Primary | ICD-10-CM

## 2022-06-21 DIAGNOSIS — E11.22 TYPE 2 DIABETES MELLITUS WITH STAGE 4 CHRONIC KIDNEY DISEASE, WITHOUT LONG-TERM CURRENT USE OF INSULIN: ICD-10-CM

## 2022-06-21 DIAGNOSIS — D64.9 ANEMIA, UNSPECIFIED TYPE: ICD-10-CM

## 2022-06-21 DIAGNOSIS — N18.4 TYPE 2 DIABETES MELLITUS WITH STAGE 4 CHRONIC KIDNEY DISEASE, WITHOUT LONG-TERM CURRENT USE OF INSULIN: ICD-10-CM

## 2022-06-21 DIAGNOSIS — E78.5 HYPERLIPIDEMIA, UNSPECIFIED HYPERLIPIDEMIA TYPE: ICD-10-CM

## 2022-07-05 ENCOUNTER — OFFICE VISIT (OUTPATIENT)
Dept: FAMILY MEDICINE CLINIC | Facility: CLINIC | Age: 72
End: 2022-07-05

## 2022-07-05 VITALS
HEART RATE: 72 BPM | DIASTOLIC BLOOD PRESSURE: 70 MMHG | HEIGHT: 70 IN | WEIGHT: 233 LBS | SYSTOLIC BLOOD PRESSURE: 130 MMHG | OXYGEN SATURATION: 98 % | BODY MASS INDEX: 33.36 KG/M2

## 2022-07-05 DIAGNOSIS — Z00.00 MEDICARE ANNUAL WELLNESS VISIT, SUBSEQUENT: Primary | ICD-10-CM

## 2022-07-05 DIAGNOSIS — N18.4 TYPE 2 DIABETES MELLITUS WITH STAGE 4 CHRONIC KIDNEY DISEASE, WITHOUT LONG-TERM CURRENT USE OF INSULIN: ICD-10-CM

## 2022-07-05 DIAGNOSIS — E11.22 TYPE 2 DIABETES MELLITUS WITH STAGE 4 CHRONIC KIDNEY DISEASE, WITHOUT LONG-TERM CURRENT USE OF INSULIN: ICD-10-CM

## 2022-07-05 LAB
EXPIRATION DATE: ABNORMAL
HBA1C MFR BLD: 5.7 % (ref 4.5–5.6)
Lab: ABNORMAL

## 2022-07-05 PROCEDURE — 96160 PT-FOCUSED HLTH RISK ASSMT: CPT | Performed by: NURSE PRACTITIONER

## 2022-07-05 PROCEDURE — 83036 HEMOGLOBIN GLYCOSYLATED A1C: CPT | Performed by: NURSE PRACTITIONER

## 2022-07-05 PROCEDURE — G0439 PPPS, SUBSEQ VISIT: HCPCS | Performed by: NURSE PRACTITIONER

## 2022-07-05 PROCEDURE — 1170F FXNL STATUS ASSESSED: CPT | Performed by: NURSE PRACTITIONER

## 2022-07-05 PROCEDURE — 1159F MED LIST DOCD IN RCRD: CPT | Performed by: NURSE PRACTITIONER

## 2022-07-05 PROCEDURE — 3044F HG A1C LEVEL LT 7.0%: CPT | Performed by: NURSE PRACTITIONER

## 2022-07-05 NOTE — PROGRESS NOTES
The ABCs of the Annual Wellness Visit  Subsequent Medicare Wellness Visit    Chief Complaint   Patient presents with   • Medicare Wellness-subsequent     Review labs.       Subjective    History of Present Illness:  Lefty Cai is a 72 y.o. male who presents for a Subsequent Medicare Wellness Visit.    The following portions of the patient's history were reviewed and   updated as appropriate: allergies, current medications, past family history, past medical history, past social history, past surgical history and problem list.    Compared to one year ago, the patient feels his physical   health is worse.    Compared to one year ago, the patient feels his mental   health is the same.    Recent Hospitalizations:  This patient has had a [facility] admission record on file within the last 365 days.    Current Medical Providers:  Patient Care Team:  Daksha Ng APRN as PCP - General (Family Medicine)  Rudy Faith MD as Consulting Physician (Ophthalmology)  Jose Mccall MD as Consulting Physician (Nephrology)  Quang Reyes MD as Consulting Physician (Nephrology)    Outpatient Medications Prior to Visit   Medication Sig Dispense Refill   • acetaminophen (TYLENOL) 325 MG tablet Take 650 mg by mouth Every 6 (Six) Hours As Needed for Mild Pain .     • aspirin 81 MG tablet Take 81 mg by mouth Every Night.     • cetirizine (zyrTEC) 10 MG tablet Take 10 mg by mouth Every Night.     • cholecalciferol (VITAMIN D3) 25 MCG (1000 UT) tablet Take 1,000 Units by mouth Every Night.     • hydrALAZINE (APRESOLINE) 50 MG tablet TAKE 1 TABLET THREE TIMES DAILY (Patient taking differently: Take 100 mg by mouth Every Morning.) 270 tablet 3   • metoprolol succinate XL (TOPROL-XL) 100 MG 24 hr tablet Take 1 tablet by mouth Daily. (Patient taking differently: Take 100 mg by mouth Every Night.) 90 tablet 3   • Misc. Devices (Bath/Shower Seat) misc Use as needed for bathing and completion of ADL 1 each 0   •  simethicone (MYLICON) 80 MG chewable tablet Chew 80 mg Every 6 (Six) Hours As Needed.     • simvastatin (ZOCOR) 40 MG tablet TAKE 1 TABLET AT BEDTIME (Patient taking differently: Take 40 mg by mouth Every Night.) 90 tablet 3   • sodium bicarbonate 650 MG tablet Take 1,950 mg by mouth Every Morning.  0   • sodium bicarbonate 650 MG tablet Take 1,300 mg by mouth. Afternoon     • traZODone (DESYREL) 50 MG tablet TAKE 1 TABLET AT NIGHT AS NEEDED FOR SLEEP (Patient taking differently: Take 50 mg by mouth Every Night.) 90 tablet 3   • cholecalciferol (VITAMIN D3) 25 MCG (1000 UT) tablet Take 1 tablet by mouth Daily.       No facility-administered medications prior to visit.       No opioid medication identified on active medication list. I have reviewed chart for other potential  high risk medication/s and harmful drug interactions in the elderly.          Aspirin is on active medication list. Aspirin use is indicated based on review of current medical condition/s. Pros and cons of this therapy have been discussed today. Benefits of this medication outweigh potential harm.  Patient has been encouraged to continue taking this medication.  .      Patient Active Problem List   Diagnosis   • Type 2 diabetes mellitus with diabetic chronic kidney disease (HCC)   • Essential hypertension   • Hyperlipidemia   • Primary insomnia   • CKD (chronic kidney disease) stage 4, GFR 15-29 ml/min ([facility])   • Vitamin D deficiency   • Diverticulitis of large intestine with perforation and abscess without bleeding   • Free intraperitoneal air   • Obesity (BMI 30-39.9)   • Impaired mobility and ADLs   • History of colon resection   • Colon polyps   • Diverticulosis     Advance Care Planning  Advance Directive is not on file.  ACP discussion was held with the patient during this visit. Patient has an advance directive (not in EMR), copy requested.    Review of Systems   Constitutional: Negative.    HENT: Positive for hearing loss (decreased in  "hearing).    Eyes: Negative.    Respiratory: Negative.    Cardiovascular: Negative.    Gastrointestinal: Negative.    Genitourinary: Negative.    Musculoskeletal: Negative.    Skin: Negative.    Neurological: Negative.    Psychiatric/Behavioral: Negative.         Objective    Vitals:    07/05/22 1139   BP: 130/70   BP Location: Right arm   Patient Position: Sitting   Cuff Size: Adult   Pulse: 72   SpO2: 98%   Weight: 106 kg (233 lb)   Height: 177.8 cm (70\")     Estimated body mass index is 33.43 kg/m² as calculated from the following:    Height as of this encounter: 177.8 cm (70\").    Weight as of this encounter: 106 kg (233 lb).    BMI is >= 30 and <35. (Class 1 Obesity). The following options were offered after discussion;: .      Does the patient have evidence of cognitive impairment? No    Physical Exam  Constitutional:       Appearance: Normal appearance. He is obese.   HENT:      Right Ear: Tympanic membrane normal.      Left Ear: Tympanic membrane normal.   Neck:      Vascular: No carotid bruit.   Cardiovascular:      Rate and Rhythm: Normal rate and regular rhythm.   Pulmonary:      Effort: Pulmonary effort is normal.      Breath sounds: Normal breath sounds.   Abdominal:      General: Bowel sounds are normal.      Palpations: Abdomen is soft.   Musculoskeletal:         General: Normal range of motion.      Cervical back: Normal range of motion and neck supple.      Right lower leg: No edema.      Left lower leg: No edema.   Lymphadenopathy:      Cervical: No cervical adenopathy.   Skin:     General: Skin is warm and dry.   Neurological:      Mental Status: He is alert and oriented to person, place, and time.   Psychiatric:         Mood and Affect: Mood normal.         Behavior: Behavior normal.         Thought Content: Thought content normal.         Judgment: Judgment normal.       Lab Results   Component Value Date    CHLPL 117 06/28/2022    TRIG 103 06/28/2022    HDL 37 (L) 06/28/2022    LDL 61 " 2022    VLDL 2022            HEALTH RISK ASSESSMENT    Smoking Status:  Social History     Tobacco Use   Smoking Status Former Smoker   • Packs/day: 2.00   • Years: 25.00   • Pack years: 50.00   • Types: Cigarettes   • Quit date:    • Years since quittin.5   Smokeless Tobacco Current User   • Types: Snuff     Alcohol Consumption:  Social History     Substance and Sexual Activity   Alcohol Use No     Fall Risk Screen:    STEADI Fall Risk Assessment was completed, and patient is at LOW risk for falls.Assessment completed on:2022    Depression Screening:  PHQ-2/PHQ-9 Depression Screening 2022   Retired PHQ-9 Total Score -   Retired Total Score -   Little Interest or Pleasure in Doing Things 0-->not at all   Feeling Down, Depressed or Hopeless 0-->not at all   PHQ-9: Brief Depression Severity Measure Score 0       Health Habits and Functional and Cognitive Screening:  Functional & Cognitive Status 2021   Do you have difficulty preparing food and eating? No   Do you have difficulty bathing yourself, getting dressed or grooming yourself? No   Do you have difficulty using the toilet? No   Do you have difficulty moving around from place to place? No   Do you have trouble with steps or getting out of a bed or a chair? No   Current Diet Limited Junk Food   Dental Exam Not up to date   Eye Exam Not up to date   Exercise (times per week) 0 times per week   Current Exercises Include No Regular Exercise   Current Exercise Activities Include -   Do you need help using the phone?  No   Are you deaf or do you have serious difficulty hearing?  No   Do you need help with transportation? No   Do you need help shopping? No   Do you need help preparing meals?  No   Do you need help with housework?  No   Do you need help with laundry? No   Do you need help taking your medications? No   Do you need help managing money? No   Do you ever drive or ride in a car without wearing a seat belt? No   Have you  felt unusual stress, anger or loneliness in the last month? Yes   Who do you live with? Spouse   If you need help, do you have trouble finding someone available to you? No   Have you been bothered in the last four weeks by sexual problems? No   Do you have difficulty concentrating, remembering or making decisions? No       Age-appropriate Screening Schedule:  Refer to the list below for future screening recommendations based on patient's age, sex and/or medical conditions. Orders for these recommended tests are listed in the plan section. The patient has been provided with a written plan.    Health Maintenance   Topic Date Due   • TDAP/TD VACCINES (1 - Tdap) Never done   • ZOSTER VACCINE (1 of 2) Never done   • DIABETIC EYE EXAM  12/05/2020   • DIABETIC FOOT EXAM  12/16/2021   • HEMOGLOBIN A1C  12/23/2021   • URINE MICROALBUMIN  06/23/2022   • INFLUENZA VACCINE  10/01/2022   • LIPID PANEL  06/28/2023              Assessment & Plan   CMS Preventative Services Quick Reference  Risk Factors Identified During Encounter  Immunizations Discussed/Encouraged (specific Immunizations; Pneumococcal 23 and Shingrix  The above risks/problems have been discussed with the patient.  Follow up actions/plans if indicated are seen below in the Assessment/Plan Section.  Pertinent information has been shared with the patient in the After Visit Summary.    Diagnoses and all orders for this visit:    1. Medicare annual wellness visit, subsequent (Primary)        Follow Up:   No follow-ups on file.     An After Visit Summary and PPPS were made available to the patient.

## 2022-07-05 NOTE — PATIENT INSTRUCTIONS
Medicare Wellness  Personal Prevention Plan of Service     Date of Office Visit:    Encounter Provider:  DUSTY Anaya  Place of Service:  [facility]  Patient Name: Lefty Cai  :  1950    As part of the Medicare Wellness portion of your visit today, we are providing you with this personalized preventive plan of services (PPPS). This plan is based upon recommendations of the [place] Preventive Services Task Force (USPSTF) and the Advisory Committee on Immunization Practices (ACIP).    This lists the preventive care services that should be considered, and provides dates of when you are due. Items listed as completed are up-to-date and do not require any further intervention.    Health Maintenance   Topic Date Due    HEPATITIS C SCREENING  Never done    Pneumococcal Vaccine 65+ (1 - PCV) Never done    TDAP/TD VACCINES (1 - Tdap) Never done    ZOSTER VACCINE (1 of 2) Never done    DIABETIC EYE EXAM  2020    DIABETIC FOOT EXAM  2021    HEMOGLOBIN A1C  2021    COVID-19 Vaccine (4 - Booster for Pfizer series) 2022    URINE MICROALBUMIN  2022    ANNUAL WELLNESS VISIT  2022    INFLUENZA VACCINE  10/01/2022    LIPID PANEL  2023    COLORECTAL CANCER SCREENING  2032    AAA SCREEN (ONE-TIME)  Completed    Hepatitis B  Aged Out       No orders of the defined types were placed in this encounter.      No follow-ups on file.

## 2022-07-29 ENCOUNTER — OFFICE VISIT (OUTPATIENT)
Dept: SURGERY | Facility: CLINIC | Age: 72
End: 2022-07-29

## 2022-07-29 VITALS — HEIGHT: 70 IN | BODY MASS INDEX: 32.93 KG/M2 | WEIGHT: 230 LBS

## 2022-07-29 DIAGNOSIS — N18.5 CKD (CHRONIC KIDNEY DISEASE) STAGE 5, GFR LESS THAN 15 ML/MIN: Primary | ICD-10-CM

## 2022-07-29 PROCEDURE — 99214 OFFICE O/P EST MOD 30 MIN: CPT | Performed by: SURGERY

## 2022-07-29 NOTE — PROGRESS NOTES
Chief Complaint   Patient presents with   • PD catheter consult       Subjective      Lefty Cai is a 72 y.o. male who is referred by Dr. Reyes to be evaluated for peritoneal dialysis catheter placement. Patient has CKD secondary to diabetic nephropathy and  is not on dialysis . Patient does have a AV access.  Patient has had open single colectomy with colostomy on 9/20/2021 with appendectomy done by Dr. Guerra.  He underwent open colostomy takedown on 4 2022.  At that time he was found to have adhesions mostly from small bowel to small bowel as well as some to anterior abdominal wall.  Patient reports having constipation of 1 bowel movement every 2 days.   Patient did have a Colonoscopy.     Home Evaluation: No    Past Medical History:   Diagnosis Date   • Anemia    • Anesthesia complication     HYPOTENSION   • Ankle fracture     RIGHT   • Arthritis    • Bowel obstruction (HCC)    • Cancer of kidney, left (HCC)    • CKD (chronic kidney disease)     STAGE 5   • Colostomy in place (HCC)    • Diabetes mellitus, type 2 (HCC)    • GERD (gastroesophageal reflux disease)    • H/O renal cell carcinoma 2007    partial nephrectomy   • [place] ([place] of hearing[place]    • Hyperlipidemia    • Hypertension    • Primary insomnia 06/13/2016   • Proteinuria    • Risk factors for obstructive sleep apnea     STOP BANG 6   • Sinus drainage    • Vitamin D deficiency 08/14/2017       Past Surgical History:   Procedure Laterality Date   • ARTERIOVENOUS FISTULA/SHUNT SURGERY Left 08/15/2019    Procedure: LEFT MID FOREARM RADIAL CEPHALIC ARTERIAL VENOUS FISTULA;  Surgeon: Louann Miles Jr., MD;  Location: [facility] OR;  Service: Vascular   • COLONOSCOPY  03/24/2022   • COLONOSCOPY N/A 03/24/2022    Procedure: COLONOSCOPY TO CECUM WITH POLYPECTOMY  (HOT SNARE);  Surgeon: Yelena Guerra MD;  Location: [facility] ENDOSCOPY;  Service: General;  Laterality: N/A;  PREOP/ HX OF DIVERTICULITIS, COLOSTOMY  POSTOP/ POLYPS,  DIVERTICULOSIS    • COLOSTOMY  09/06/2021   • COLOSTOMY CLOSURE N/A 04/12/2022    Procedure: open Colostomy reversal;  Surgeon: Yelena Guerra MD;  Location: [facility] OR;  Service: General;  Laterality: N/A;   • EXPLORATORY LAPAROTOMY N/A 09/06/2021    Procedure: Open sigmoind colectomy, colostomy, and appendectomy;  Surgeon: Yelena Guerra MD;  Location: [facility] OR;  Service: General;  Laterality: N/A;   • EYE SURGERY      CATARACTS   • KNEE ARTHROSCOPY     • NEPHRECTOMY PARTIAL  2007    Dr. Victor         Current Outpatient Medications:   •  acetaminophen (TYLENOL) 325 MG tablet, Take 650 mg by mouth Every 6 (Six) Hours As Needed for Mild Pain ., Disp: , Rfl:   •  aspirin 81 MG tablet, Take 81 mg by mouth Every Night., Disp: , Rfl:   •  cetirizine (zyrTEC) 10 MG tablet, Take 10 mg by mouth Daily As Needed., Disp: , Rfl:   •  cholecalciferol (VITAMIN D3) 25 MCG (1000 UT) tablet, Take 1,000 Units by mouth Every Night., Disp: , Rfl:   •  hydrALAZINE (APRESOLINE) 50 MG tablet, TAKE 1 TABLET THREE TIMES DAILY (Patient taking differently: Take 100 mg by mouth Every Morning.), Disp: 270 tablet, Rfl: 3  •  metoprolol succinate XL (TOPROL-XL) 100 MG 24 hr tablet, Take 1 tablet by mouth Daily. (Patient taking differently: Take 100 mg by mouth Every Night.), Disp: 90 tablet, Rfl: 3  •  Misc. Devices (Bath/Shower Seat) misc, Use as needed for bathing and completion of ADL, Disp: 1 each, Rfl: 0  •  simethicone (MYLICON) 80 MG chewable tablet, Chew 80 mg Every 6 (Six) Hours As Needed., Disp: , Rfl:   •  simvastatin (ZOCOR) 40 MG tablet, TAKE 1 TABLET AT BEDTIME (Patient taking differently: Take 40 mg by mouth Every Night.), Disp: 90 tablet, Rfl: 3  •  sodium bicarbonate 650 MG tablet, Take 650 mg by mouth 3 (Three) Times a Day., Disp: , Rfl: 0  •  traZODone (DESYREL) 50 MG tablet, TAKE 1 TABLET AT NIGHT AS NEEDED FOR SLEEP (Patient taking differently: Take 50 mg by mouth Every Night.), Disp: 90 tablet, Rfl:  "3    Allergies   Allergen Reactions   • Contrast Dye Other (See Comments)     KIDNEY DISEASE   • Latex Other (See Comments)     Blisters       Family History   Problem Relation Age of Onset   • Hypertension Mother    • Heart disease Father         ASCVD   • Macular degeneration Father    • Cancer Father         bladder   • Diabetes type I Sister    • Malig Hyperthermia Neg Hx        Social History     Socioeconomic History   • Marital status:    Tobacco Use   • Smoking status: Former Smoker     Packs/day: 2.00     Years: 25.00     Pack years: 50.00     Types: Cigarettes     Quit date:      Years since quittin.5   • Smokeless tobacco: Current User     Types: Snuff   Vaping Use   • Vaping Use: Never used   Substance and Sexual Activity   • Alcohol use: No   • Drug use: No   • Sexual activity: Defer     REVIEW OF SYSTEMS    Review of Systems   Constitutional: Negative for activity change and appetite change.   HENT: Negative for congestion and dental problem.    Eyes: Negative for discharge and itching.   Respiratory: Negative for apnea, cough and choking.    Cardiovascular: Negative for chest pain and leg swelling.   Gastrointestinal: Negative for abdominal distention and abdominal pain.   Endocrine: Negative for cold intolerance and heat intolerance.   Genitourinary: Negative for difficulty urinating and hematuria.   Musculoskeletal: Negative for arthralgias and back pain.   Skin: Negative for color change and pallor.   Allergic/Immunologic: Negative for environmental allergies and food allergies.   Neurological: Negative for dizziness and facial asymmetry.   Hematological: Negative for adenopathy. Does not bruise/bleed easily.   Psychiatric/Behavioral: Negative for agitation and suicidal ideas.       Physical Examination  Ht 177.8 cm (70\")   Wt 104 kg (230 lb)   BMI 33.00 kg/m²   Body mass index is 33 kg/m².  Physical Exam  Constitutional:       Appearance: He is obese.   HENT:      Head: " Normocephalic and atraumatic.      Mouth/Throat:      Pharynx: Oropharynx is clear.   Eyes:      General: No scleral icterus.     Conjunctiva/sclera: Conjunctivae normal.   Cardiovascular:      Rate and Rhythm: Normal rate and regular rhythm.   Pulmonary:      Effort: Pulmonary effort is normal.      Breath sounds: Normal breath sounds.   Abdominal:      General: Bowel sounds are normal. There is no distension.      Palpations: Abdomen is soft. There is no mass.      Tenderness: There is no abdominal tenderness.      Hernia: No hernia is present.          Comments: Multiple abdominal scars, no evidence of hernia   Genitourinary:     Penis: Normal.       Testes: Normal.   Musculoskeletal:         General: Normal range of motion.      Cervical back: Normal range of motion and neck supple.   Skin:     General: Skin is warm and dry.      Capillary Refill: Capillary refill takes less than 2 seconds.   Neurological:      General: No focal deficit present.      Mental Status: He is alert and oriented to person, place, and time.   Psychiatric:         Mood and Affect: Mood normal.         Behavior: Behavior normal.       Labs 4/14/2022:   Creatinine 5.1, BUN 53, sodium 132, chloride 96, CO2 21, glucose 127  7/5/2022  1 blood cell count 7.4, hemoglobin 12.2, platelets 173    Assessment:   Lefty Cai is a 72 y.o. male with CKD due to  diabetic nephropathy that is not on dialysis and will need peritoneal dialysis catheter placement.  He had recent abdominal surgery for colostomy takedown and was found to have large amount of intra-abdominal adhesions.  I discussed the case with Dr. Guerra.  She remembered that most of the lesion were in between small bowel and retroperitoneum and less adhesions from the small bowel to the anterior abdominal wall.  I discussed with the patient that due to multiple abdominal surgeries and likely increased risk of having intra-abdominal adhesion and compartmentalization of the abdominal  cavity that he may not be a good candidate for peritoneal dialysis.  I think the likelihood that the abdominal cavity is appropriate for peritoneal dialysis is really low.  I discussed with him the after 3 more months I will be happy to try to perform diagnostic laparoscopy to see if he can have peritoneal dialysis catheter placed.  I discussed about technical aspects of peritoneal dialysis catheter and the need for healing of approximately 2 or 3 weeks.  He will not be able to shower in the meantime.  He understands about the risk and benefits of procedure including bleeding, infection, catheter malfunction, intra-abdominal injuries.  He verbalized understanding.      Plan:     -He will follow-up in my office in 3 months for reevaluation if he want to proceed with diagnostic laparoscopy with possible peritoneal dialysis catheter placement.  -If he needs to start dialysis then he will need to start hemodialysis for now.    He understood    Murali Ferreira MD  General, Minimally Invasive and Endoscopic Surgery  [facility], [facility]    [address], [address]  [place], [place], [address]  P: [phone]  F: [phone]

## 2023-01-03 ENCOUNTER — PREP FOR SURGERY (OUTPATIENT)
Dept: OTHER | Facility: HOSPITAL | Age: 73
End: 2023-01-03
Payer: MEDICARE

## 2023-01-03 ENCOUNTER — OFFICE VISIT (OUTPATIENT)
Dept: SURGERY | Facility: CLINIC | Age: 73
End: 2023-01-03
Payer: MEDICARE

## 2023-01-03 VITALS — WEIGHT: 231.4 LBS | HEIGHT: 70 IN | BODY MASS INDEX: 33.13 KG/M2

## 2023-01-03 DIAGNOSIS — N18.5 CKD (CHRONIC KIDNEY DISEASE) STAGE 5, GFR LESS THAN 15 ML/MIN: Primary | ICD-10-CM

## 2023-01-03 DIAGNOSIS — Z99.2 PERITONEAL DIALYSIS CATHETER IN PLACE: Primary | ICD-10-CM

## 2023-01-03 PROCEDURE — 99214 OFFICE O/P EST MOD 30 MIN: CPT | Performed by: SURGERY

## 2023-01-03 PROCEDURE — 1159F MED LIST DOCD IN RCRD: CPT | Performed by: SURGERY

## 2023-01-03 PROCEDURE — 1160F RVW MEDS BY RX/DR IN RCRD: CPT | Performed by: SURGERY

## 2023-01-03 RX ORDER — CEFAZOLIN SODIUM IN 0.9 % NACL 3 G/100 ML
3 INTRAVENOUS SOLUTION, PIGGYBACK (ML) INTRAVENOUS ONCE
Status: CANCELLED | OUTPATIENT
Start: 2023-01-03 | End: 2023-01-03

## 2023-01-03 NOTE — PROGRESS NOTES
Chief Complaint   Patient presents with   • PD Cath Counsult       Subjective      Lefty Cai is a 72 y.o. male who is referred by Dr. Reyes to be evaluated for peritoneal dialysis catheter placement. Patient has CKD secondary to diabetic nephropathy and  is not on dialysis . Patient does have a AV access.  Patient has had open single colectomy with colostomy on 9/20/2021 with appendectomy done by Dr. Guerra.  He underwent open colostomy takedown on 4 2022.  At that time he was found to have adhesions mostly from small bowel to small bowel as well as some to anterior abdominal wall.  Patient reports having constipation of 1 bowel movement every 2 days.   Patient did have a Colonoscopy.  Denies any other complaints    Past Medical History:   Diagnosis Date   • Anemia    • Anesthesia complication     HYPOTENSION   • Ankle fracture     RIGHT   • Arthritis    • Bowel obstruction (HCC)    • Cancer of kidney, left (HCC)    • CKD (chronic kidney disease)     STAGE 5   • Colostomy in place ([facility]) 03/2022   • Diabetes mellitus, type 2 ([facility])    • GERD (gastroesophageal reflux disease)    • H/O renal cell carcinoma 2007    partial nephrectomy   • History of colostomy reversal 04/2022   • [place] (hard of hearing)    • Hyperlipidemia    • Hypertension    • Primary insomnia 06/13/2016   • Proteinuria    • Risk factors for obstructive sleep apnea     STOP BANG 6   • Sinus drainage    • Vitamin D deficiency 08/14/2017       Past Surgical History:   Procedure Laterality Date   • ARTERIOVENOUS FISTULA/SHUNT SURGERY Left 08/15/2019    Procedure: LEFT MID FOREARM RADIAL CEPHALIC ARTERIAL VENOUS FISTULA;  Surgeon: Louann Miles Jr., MD;  Location: [facility] MAIN OR;  Service: Vascular   • COLONOSCOPY  03/24/2022   • COLONOSCOPY N/A 03/24/2022    Procedure: COLONOSCOPY TO CECUM WITH POLYPECTOMY  (HOT SNARE);  Surgeon: Yelena Guerra MD;  Location: [facility] ENDOSCOPY;  Service: General;  Laterality: N/A;  PREOP/ HX OF  DIVERTICULITIS, COLOSTOMY  POSTOP/ POLYPS, DIVERTICULOSIS    • COLOSTOMY  09/06/2021   • COLOSTOMY CLOSURE N/A 04/12/2022    Procedure: open Colostomy reversal;  Surgeon: Yelena Guerra MD;  Location: [facility] MAIN OR;  Service: General;  Laterality: N/A;   • EXPLORATORY LAPAROTOMY N/A 09/06/2021    Procedure: Open sigmoind colectomy, colostomy, and appendectomy;  Surgeon: Yelena Guerra MD;  Location: [facility] MAIN OR;  Service: General;  Laterality: N/A;   • EYE SURGERY      CATARACTS   • KNEE ARTHROSCOPY     • NEPHRECTOMY PARTIAL  2007    Dr. Victor         Current Outpatient Medications:   •  acetaminophen (TYLENOL) 325 MG tablet, Take 650 mg by mouth Every 6 (Six) Hours As Needed for Mild Pain ., Disp: , Rfl:   •  aspirin 81 MG tablet, Take 81 mg by mouth Every Night., Disp: , Rfl:   •  cetirizine (zyrTEC) 10 MG tablet, Take 10 mg by mouth Daily As Needed., Disp: , Rfl:   •  cholecalciferol (VITAMIN D3) 25 MCG (1000 UT) tablet, Take 1,000 Units by mouth Every Night., Disp: , Rfl:   •  hydrALAZINE (APRESOLINE) 50 MG tablet, TAKE 1 TABLET THREE TIMES DAILY (Patient taking differently: Take 100 mg by mouth Every Morning.), Disp: 270 tablet, Rfl: 3  •  metoprolol succinate XL (TOPROL-XL) 100 MG 24 hr tablet, Take 1 tablet by mouth Daily. (Patient taking differently: Take 100 mg by mouth Every Night.), Disp: 90 tablet, Rfl: 3  •  Misc. Devices (Bath/Shower Seat) misc, Use as needed for bathing and completion of ADL, Disp: 1 each, Rfl: 0  •  simethicone (MYLICON) 80 MG chewable tablet, Chew 80 mg Every 6 (Six) Hours As Needed., Disp: , Rfl:   •  simvastatin (ZOCOR) 40 MG tablet, TAKE 1 TABLET AT BEDTIME (Patient taking differently: Take 40 mg by mouth Every Night.), Disp: 90 tablet, Rfl: 3  •  sodium bicarbonate 650 MG tablet, Take 650 mg by mouth 3 (Three) Times a Day., Disp: , Rfl: 0  •  traZODone (DESYREL) 50 MG tablet, TAKE 1 TABLET AT NIGHT AS NEEDED FOR SLEEP (Patient taking differently: Take 50 mg by  mouth Every Night.), Disp: 90 tablet, Rfl: 3    Allergies   Allergen Reactions   • Contrast Dye Other (See Comments)     KIDNEY DISEASE   • Latex Other (See Comments)     Blisters       Family History   Problem Relation Age of Onset   • Hypertension Mother    • Heart disease Father         ASCVD   • Macular degeneration Father    • Cancer Father         bladder   • Diabetes type I Sister    • Malig Hyperthermia Neg Hx        Social History     Socioeconomic History   • Marital status:    Tobacco Use   • Smoking status: Former     Packs/day: 2.00     Years: 25.00     Pack years: 50.00     Types: Cigarettes     Quit date:      Years since quittin.0   • Smokeless tobacco: Current     Types: Snuff   Vaping Use   • Vaping Use: Never used   Substance and Sexual Activity   • Alcohol use: No   • Drug use: No   • Sexual activity: Defer     REVIEW OF SYSTEMS    Review of Systems   Constitutional: Negative for activity change and appetite change.   HENT: Negative for congestion and rhinorrhea.    Eyes: Negative for discharge and itching.   Respiratory: Negative for apnea, shortness of breath and stridor.    Cardiovascular: Negative for chest pain and leg swelling.   Gastrointestinal: Negative for abdominal distention, abdominal pain, nausea and rectal pain.   Endocrine: Negative for cold intolerance and heat intolerance.   Genitourinary: Negative for difficulty urinating and hematuria.   Musculoskeletal: Negative for arthralgias and back pain.   Skin: Negative for color change and pallor.   Allergic/Immunologic: Negative for environmental allergies and food allergies.   Neurological: Negative for dizziness and light-headedness.   Hematological: Negative for adenopathy. Does not bruise/bleed easily.   Psychiatric/Behavioral: Negative for agitation and hallucinations.       Physical Examination  Ht 177.8 cm (70\")   Wt 105 kg (231 lb 6.4 oz)   BMI 33.20 kg/m²   Body mass index is 33.2 kg/m².  Physical  Exam  Constitutional:       Appearance: He is obese.   HENT:      Head: Normocephalic and atraumatic.      Nose: Nose normal.      Mouth/Throat:      Pharynx: Oropharynx is clear.   Eyes:      General: No scleral icterus.     Conjunctiva/sclera: Conjunctivae normal.   Cardiovascular:      Rate and Rhythm: Normal rate and regular rhythm.   Abdominal:      General: Abdomen is flat. There is no distension.      Palpations: Abdomen is soft. There is no mass.      Tenderness: There is no abdominal tenderness.      Hernia: No hernia is present.          Comments: There is a well-healed midline incision.  Well-healed left upper quadrant incision from prior nephrectomy, small left mid abdominal wall that is well-healed, no evidence of hernia.  I do not feel any inguinal hernia   Musculoskeletal:         General: Normal range of motion.      Cervical back: Normal range of motion and neck supple.   Skin:     General: Skin is warm and dry.      Capillary Refill: Capillary refill takes less than 2 seconds.   Neurological:      General: No focal deficit present.      Mental Status: He is alert and oriented to person, place, and time.   Psychiatric:         Mood and Affect: Mood normal.         Behavior: Behavior normal.       Labs 6/28/2022:  While cell count 7.4, hemoglobin 12 point  Platelets 173      Assessment:   Lefty Cai is a 72 y.o. male with chronic kidney disease stage V that wants to have peritoneal dialysis catheter placement.  I discussed with the patient about treatment options.  I think he may be candidate for peritoneal dialysis but there is a high chance that he may have intra-abdominal addition from prior procedures and the catheter may not be able to be placed.    The procedure was explained in detail to the patient including risks and benefits.  The benefits including the possibility of having dialysis at home without the side effects of the hemodialysis.  The risks including but not limited to  catheter dislodgment, obstruction, malfunction, bleeding, infection and possible injury to surrounding organs during peritoneal access. He is interested in proceeding with laparoscopic placement of peritoneal dialysis catheter. The patient understands that the catheter will not be used for dialysis for a period of approximately 2 weeks after its placement and that during this time they should not shower until the exit site is completely healed. The patient underwent at least one training session with the peritoneal dialysis nurse and their house was evaluated and is ready for the peritoneal dialysis. Patient verbalized understanding and agreed with the plan. All questions were answered at this time.      Plan:     - Laparoscopic peritoneal dialysis catheter placement, possible open.  - Preparation for surgery orders have been placed  - Surgery scheduling.     Murali Ferreira MD  General, Minimally Invasive and Endoscopic Surgery  [facility]    [address], [address]  [place], [place], [address]  P: [phone]  F: [phone]

## 2023-01-03 NOTE — LETTER
January 3, 2023     Quang Reyes MD  [address]  [address]  [place] [id number]    Patient: Lefty Cai   YOB: 1950   Date of Visit: 1/3/2023       Dear Quang Reyes MD,    Thank you for referring Lefty Cai to me for evaluation. Below is a copy of my consult note.    If you have questions, please do not hesitate to call me. I look forward to following Lefty along with you.         Sincerely,        Murali Ferreira MD        CC: DUSTY Anaya    Chief Complaint   Patient presents with   • PD Cath Counsult       Subjective       Lefty Cai is a 72 y.o. male who is referred by Dr. Reyes to be evaluated for peritoneal dialysis catheter placement. Patient has CKD secondary to diabetic nephropathy and  is not on dialysis . Patient does have a AV access.  Patient has had open single colectomy with colostomy on 9/20/2021 with appendectomy done by Dr. Guerra.  He underwent open colostomy takedown on 4 2022.  At that time he was found to have adhesions mostly from small bowel to small bowel as well as some to anterior abdominal wall.  Patient reports having constipation of 1 bowel movement every 2 days.   Patient did have a Colonoscopy.  Denies any other complaints    Past Medical History:   Diagnosis Date   • Anemia    • Anesthesia complication     HYPOTENSION   • Ankle fracture     RIGHT   • Arthritis    • Bowel obstruction (HCC)    • Cancer of kidney, left (HCC)    • CKD (chronic kidney disease)     STAGE 5   • Colostomy in place (HCC) 03/2022   • Diabetes mellitus, type 2 (HCC)    • GERD (gastroesophageal reflux disease)    • H/O renal cell carcinoma 2007    partial nephrectomy   • History of colostomy reversal 04/2022   • [place] (hard of hearing)    • Hyperlipidemia    • Hypertension    • Primary insomnia 06/13/2016   • Proteinuria    • Risk factors for obstructive sleep apnea     STOP BANG 6   • Sinus drainage    • Vitamin D deficiency  08/14/2017       Past Surgical History:   Procedure Laterality Date   • ARTERIOVENOUS FISTULA/SHUNT SURGERY Left 08/15/2019    Procedure: LEFT MID FOREARM RADIAL CEPHALIC ARTERIAL VENOUS FISTULA;  Surgeon: Louann Miles Jr., MD;  Location: [facility];  Service: Vascular   • COLONOSCOPY  03/24/2022   • COLONOSCOPY N/A 03/24/2022    Procedure: COLONOSCOPY TO CECUM WITH POLYPECTOMY  (HOT SNARE);  Surgeon: Yelena Guerra MD;  Location: [facility];  Service: General;  Laterality: N/A;  PREOP/ HX OF DIVERTICULITIS, COLOSTOMY  POSTOP/ POLYPS, DIVERTICULOSIS    • COLOSTOMY  09/06/2021   • COLOSTOMY CLOSURE N/A 04/12/2022    Procedure: open Colostomy reversal;  Surgeon: Yelena Guerra MD;  Location: [facility];  Service: General;  Laterality: N/A;   • EXPLORATORY LAPAROTOMY N/A 09/06/2021    Procedure: Open sigmoind colectomy, colostomy, and appendectomy;  Surgeon: Yelena Guerra MD;  Location: [facility];  Service: General;  Laterality: N/A;   • EYE SURGERY      CATARACTS   • KNEE ARTHROSCOPY     • NEPHRECTOMY PARTIAL  2007    Dr. Victor         Current Outpatient Medications:   •  acetaminophen (TYLENOL) 325 MG tablet, Take 650 mg by mouth Every 6 (Six) Hours As Needed for Mild Pain ., Disp: , Rfl:   •  aspirin 81 MG tablet, Take 81 mg by mouth Every Night., Disp: , Rfl:   •  cetirizine (zyrTEC) 10 MG tablet, Take 10 mg by mouth Daily As Needed., Disp: , Rfl:   •  cholecalciferol (VITAMIN D3) 25 MCG (1000 UT) tablet, Take 1,000 Units by mouth Every Night., Disp: , Rfl:   •  hydrALAZINE (APRESOLINE) 50 MG tablet, TAKE 1 TABLET THREE TIMES DAILY (Patient taking differently: Take 100 mg by mouth Every Morning.), Disp: 270 tablet, Rfl: 3  •  metoprolol succinate XL (TOPROL-XL) 100 MG 24 hr tablet, Take 1 tablet by mouth Daily. (Patient taking differently: Take 100 mg by mouth Every Night.), Disp: 90 tablet, Rfl: 3  •  Misc. Devices (Bath/Shower Seat) misc, Use as needed for bathing and  completion of ADL, Disp: 1 each, Rfl: 0  •  simethicone (MYLICON) 80 MG chewable tablet, Chew 80 mg Every 6 (Six) Hours As Needed., Disp: , Rfl:   •  simvastatin (ZOCOR) 40 MG tablet, TAKE 1 TABLET AT BEDTIME (Patient taking differently: Take 40 mg by mouth Every Night.), Disp: 90 tablet, Rfl: 3  •  sodium bicarbonate 650 MG tablet, Take 650 mg by mouth 3 (Three) Times a Day., Disp: , Rfl: 0  •  traZODone (DESYREL) 50 MG tablet, TAKE 1 TABLET AT NIGHT AS NEEDED FOR SLEEP (Patient taking differently: Take 50 mg by mouth Every Night.), Disp: 90 tablet, Rfl: 3    Allergies   Allergen Reactions   • Contrast Dye Other (See Comments)     KIDNEY DISEASE   • Latex Other (See Comments)     Blisters       Family History   Problem Relation Age of Onset   • Hypertension Mother    • Heart disease Father         ASCVD   • Macular degeneration Father    • Cancer Father         bladder   • Diabetes type I Sister    • Malig Hyperthermia Neg Hx        Social History     Socioeconomic History   • Marital status:    Tobacco Use   • Smoking status: Former     Packs/day: 2.00     Years: 25.00     Pack years: 50.00     Types: Cigarettes     Quit date:      Years since quittin.0   • Smokeless tobacco: Current     Types: Snuff   Vaping Use   • Vaping Use: Never used   Substance and Sexual Activity   • Alcohol use: No   • Drug use: No   • Sexual activity: Defer     REVIEW OF SYSTEMS    Review of Systems   Constitutional: Negative for activity change and appetite change.   HENT: Negative for congestion and rhinorrhea.    Eyes: Negative for discharge and itching.   Respiratory: Negative for apnea, shortness of breath and stridor.    Cardiovascular: Negative for chest pain and leg swelling.   Gastrointestinal: Negative for abdominal distention, abdominal pain, nausea and rectal pain.   Endocrine: Negative for cold intolerance and heat intolerance.   Genitourinary: Negative for difficulty urinating and hematuria.    Musculoskeletal: Negative for arthralgias and back pain.   Skin: Negative for color change and pallor.   Allergic/Immunologic: Negative for environmental allergies and food allergies.   Neurological: Negative for dizziness and light-headedness.   Hematological: Negative for adenopathy. Does not bruise/bleed easily.   Psychiatric/Behavioral: Negative for agitation and hallucinations.       Physical Examination  Ht 177.8 cm (70\")   Wt 105 kg (231 lb 6.4 oz)   BMI 33.20 kg/m²   Body mass index is 33.2 kg/m².  Physical Exam  Constitutional:       Appearance: He is obese.   HENT:      Head: Normocephalic and atraumatic.      Nose: Nose normal.      Mouth/Throat:      Pharynx: Oropharynx is clear.   Eyes:      General: No scleral icterus.     Conjunctiva/sclera: Conjunctivae normal.   Cardiovascular:      Rate and Rhythm: Normal rate and regular rhythm.   Abdominal:      General: Abdomen is flat. There is no distension.      Palpations: Abdomen is soft. There is no mass.      Tenderness: There is no abdominal tenderness.      Hernia: No hernia is present.          Comments: There is a well-healed midline incision.  Well-healed left upper quadrant incision from prior nephrectomy, small left mid abdominal wall that is well-healed, no evidence of hernia.  I do not feel any inguinal hernia   Musculoskeletal:         General: Normal range of motion.      Cervical back: Normal range of motion and neck supple.   Skin:     General: Skin is warm and dry.      Capillary Refill: Capillary refill takes less than 2 seconds.   Neurological:      General: No focal deficit present.      Mental Status: He is alert and oriented to person, place, and time.   Psychiatric:         Mood and Affect: Mood normal.         Behavior: Behavior normal.       Labs 6/28/2022:  While cell count 7.4, hemoglobin 12 point  Platelets 173      Assessment:   Lefty Cai is a 72 y.o. male with chronic kidney disease stage V that wants to have  peritoneal dialysis catheter placement.  I discussed with the patient about treatment options.  I think he may be candidate for peritoneal dialysis but there is a high chance that he may have intra-abdominal addition from prior procedures and the catheter may not be able to be placed.    The procedure was explained in detail to the patient including risks and benefits.  The benefits including the possibility of having dialysis at home without the side effects of the hemodialysis.  The risks including but not limited to catheter dislodgment, obstruction, malfunction, bleeding, infection and possible injury to surrounding organs during peritoneal access. He is interested in proceeding with laparoscopic placement of peritoneal dialysis catheter. The patient understands that the catheter will not be used for dialysis for a period of approximately 2 weeks after its placement and that during this time they should not shower until the exit site is completely healed. The patient underwent at least one training session with the peritoneal dialysis nurse and their house was evaluated and is ready for the peritoneal dialysis. Patient verbalized understanding and agreed with the plan. All questions were answered at this time.      Plan:     - Laparoscopic peritoneal dialysis catheter placement, possible open.  - Preparation for surgery orders have been placed  - Surgery scheduling.     Murali Ferreira MD  General, Minimally Invasive and Endoscopic Surgery  [facility]    [address], [address]  [place], [place], [address]  P: [phone]  F: [phone]

## 2023-01-04 ENCOUNTER — TELEPHONE (OUTPATIENT)
Dept: SURGERY | Facility: CLINIC | Age: 73
End: 2023-01-04
Payer: MEDICARE

## 2023-01-06 ENCOUNTER — OFFICE VISIT (OUTPATIENT)
Dept: FAMILY MEDICINE CLINIC | Facility: CLINIC | Age: 73
End: 2023-01-06
Payer: MEDICARE

## 2023-01-06 VITALS
HEIGHT: 70 IN | BODY MASS INDEX: 33.56 KG/M2 | OXYGEN SATURATION: 97 % | SYSTOLIC BLOOD PRESSURE: 135 MMHG | WEIGHT: 234.4 LBS | HEART RATE: 72 BPM | DIASTOLIC BLOOD PRESSURE: 70 MMHG

## 2023-01-06 DIAGNOSIS — F32.1 CURRENT MODERATE EPISODE OF MAJOR DEPRESSIVE DISORDER, UNSPECIFIED WHETHER RECURRENT: Primary | ICD-10-CM

## 2023-01-06 PROCEDURE — 99214 OFFICE O/P EST MOD 30 MIN: CPT | Performed by: NURSE PRACTITIONER

## 2023-01-06 RX ORDER — DULOXETIN HYDROCHLORIDE 30 MG/1
30 CAPSULE, DELAYED RELEASE ORAL DAILY
Qty: 30 CAPSULE | Refills: 5 | Status: SHIPPED | OUTPATIENT
Start: 2023-01-06 | End: 2023-02-03 | Stop reason: SDUPTHER

## 2023-01-06 NOTE — PROGRESS NOTES
Chief Complaint  Anxiety and Depression (ANXIETY, DEPRESSION)    Subjective        Lefty Cai presents to [facility]  Anxiety  Presents for initial visit. Symptoms include depressed mood (getting ready to start dialysis), excessive worry, insomnia, irritability and shortness of breath (sometimes). Patient reports no decreased concentration, nervous/anxious behavior, restlessness or suicidal ideas. Symptoms occur occasionally. The severity of symptoms is mild. The symptoms are aggravated by family issues (health issues). The quality of sleep is fair.     Risk factors include a major life event and recent illness. His past medical history is significant for depression. There is no history of suicide attempts. Past treatments include nothing.   Depression  Visit Type: initial  Onset of symptoms: at an unknown time  Progression since onset: gradually worsening  Patient presents with the following symptoms: depressed mood (getting ready to start dialysis), excessive worry, insomnia, irritability and shortness of breath (sometimes).  Patient is not experiencing: chest pain, decreased concentration, feelings of hopelessness, feelings of worthlessness, memory impairment, nervousness/anxiety, restlessness, suicidal ideas, suicidal planning and thoughts of death.  Frequency of symptoms: most days   Aggravated by: family issues (worsening health)  Sleep quality: fair      I have reviewed the patient's medical history in detail and updated the computerized patient record.      Current Outpatient Medications:   •  acetaminophen (TYLENOL) 325 MG tablet, Take 650 mg by mouth Every 6 (Six) Hours As Needed for Mild Pain ., Disp: , Rfl:   •  aspirin 81 MG tablet, Take 81 mg by mouth Every Night., Disp: , Rfl:   •  cetirizine (zyrTEC) 10 MG tablet, Take 10 mg by mouth Daily As Needed., Disp: , Rfl:   •  cholecalciferol (VITAMIN D3) 25 MCG (1000 UT) tablet, Take 1,000 Units by mouth Every Night.,  Disp: , Rfl:   •  hydrALAZINE (APRESOLINE) 50 MG tablet, TAKE 1 TABLET THREE TIMES DAILY (Patient taking differently: Take 100 mg by mouth Every Morning.), Disp: 270 tablet, Rfl: 3  •  metoprolol succinate XL (TOPROL-XL) 100 MG 24 hr tablet, Take 1 tablet by mouth Daily. (Patient taking differently: Take 100 mg by mouth Every Night.), Disp: 90 tablet, Rfl: 3  •  Misc. Devices (Bath/Shower Seat) misc, Use as needed for bathing and completion of ADL, Disp: 1 each, Rfl: 0  •  simethicone (MYLICON) 80 MG chewable tablet, Chew 80 mg Every 6 (Six) Hours As Needed., Disp: , Rfl:   •  simvastatin (ZOCOR) 40 MG tablet, TAKE 1 TABLET AT BEDTIME (Patient taking differently: Take 40 mg by mouth Every Night.), Disp: 90 tablet, Rfl: 3  •  sodium bicarbonate 650 MG tablet, Take 650 mg by mouth 3 (Three) Times a Day., Disp: , Rfl: 0  •  traZODone (DESYREL) 50 MG tablet, TAKE 1 TABLET AT NIGHT AS NEEDED FOR SLEEP (Patient taking differently: Take 50 mg by mouth Every Night.), Disp: 90 tablet, Rfl: 3  •  vitamin D3 125 MCG (5000 UT) capsule capsule, Take 1 tablet by mouth 2 (Two) Times a Week., Disp: , Rfl:   •  DULoxetine (CYMBALTA) 30 MG capsule, Take 1 capsule by mouth Daily., Disp: 30 capsule, Rfl: 5     Objective   Vital Signs:  /70 (BP Location: Right arm, Patient Position: Sitting, Cuff Size: Adult)   Pulse 72   Ht 177.8 cm (70\")   Wt 106 kg (234 lb 6.4 oz)   SpO2 97%   BMI 33.63 kg/m²   Estimated body mass index is 33.63 kg/m² as calculated from the following:    Height as of this encounter: 177.8 cm (70\").    Weight as of this encounter: 106 kg (234 lb 6.4 oz).          Physical Exam  Vitals reviewed.   Constitutional:       Appearance: Normal appearance. He is obese.   Cardiovascular:      Rate and Rhythm: Normal rate and regular rhythm.      Pulses: Normal pulses.      Heart sounds: Normal heart sounds.   Pulmonary:      Effort: Pulmonary effort is normal.      Breath sounds: Normal breath sounds.   Skin:      General: Skin is warm and dry.   Neurological:      Mental Status: He is alert and oriented to person, place, and time.   Psychiatric:         Attention and Perception: Attention normal.         Mood and Affect: Mood is anxious and depressed. Affect is flat.         Speech: Speech normal.         Behavior: Behavior normal. Behavior is cooperative.         Thought Content: Thought content normal.         Cognition and Memory: Cognition and memory normal.         Judgment: Judgment normal.        Result Review :                Assessment and Plan   Diagnoses and all orders for this visit:    1. Current moderate episode of major depressive disorder, unspecified whether recurrent ([facility]) (Primary)  -     DULoxetine (CYMBALTA) 30 MG capsule; Take 1 capsule by mouth Daily.  Dispense: 30 capsule; Refill: 5    Mr. Cai does not appear to be in any acute distress.  He does appear to be a little anxious and depressed about his worsening health and having to start dialysis.  I will start him on Duloxetine 30 mg daily.  I have asked him to return in 4 weeks to follow up on his depression since starting a new medication.          Follow Up   Return in about 4 weeks (around 2/3/2023) for f/u on starting medication for depression.  Patient was given instructions and counseling regarding his condition or for health maintenance advice. Please see specific information pulled into the AVS if appropriate.

## 2023-01-07 DIAGNOSIS — I10 ESSENTIAL HYPERTENSION: ICD-10-CM

## 2023-01-09 RX ORDER — TRAZODONE HYDROCHLORIDE 50 MG/1
TABLET ORAL
Qty: 90 TABLET | Refills: 3 | Status: ON HOLD | OUTPATIENT
Start: 2023-01-09

## 2023-01-09 RX ORDER — METOPROLOL SUCCINATE 100 MG/1
100 TABLET, EXTENDED RELEASE ORAL NIGHTLY
Qty: 90 TABLET | Refills: 1 | Status: ON HOLD | OUTPATIENT
Start: 2023-01-09

## 2023-01-09 RX ORDER — HYDRALAZINE HYDROCHLORIDE 50 MG/1
100 TABLET, FILM COATED ORAL
Qty: 60 TABLET | Refills: 5 | Status: ON HOLD | OUTPATIENT
Start: 2023-01-09

## 2023-02-03 ENCOUNTER — OFFICE VISIT (OUTPATIENT)
Dept: FAMILY MEDICINE CLINIC | Facility: CLINIC | Age: 73
End: 2023-02-03
Payer: MEDICARE

## 2023-02-03 VITALS
HEART RATE: 61 BPM | OXYGEN SATURATION: 97 % | BODY MASS INDEX: 32.01 KG/M2 | DIASTOLIC BLOOD PRESSURE: 71 MMHG | WEIGHT: 223.6 LBS | HEIGHT: 70 IN | SYSTOLIC BLOOD PRESSURE: 118 MMHG

## 2023-02-03 DIAGNOSIS — F32.1 CURRENT MODERATE EPISODE OF MAJOR DEPRESSIVE DISORDER, UNSPECIFIED WHETHER RECURRENT: Primary | ICD-10-CM

## 2023-02-03 DIAGNOSIS — F32.1 CURRENT MODERATE EPISODE OF MAJOR DEPRESSIVE DISORDER, UNSPECIFIED WHETHER RECURRENT: ICD-10-CM

## 2023-02-03 PROCEDURE — 99213 OFFICE O/P EST LOW 20 MIN: CPT | Performed by: NURSE PRACTITIONER

## 2023-02-03 RX ORDER — SIMVASTATIN 40 MG
40 TABLET ORAL NIGHTLY
Qty: 90 TABLET | Refills: 1 | Status: ON HOLD | OUTPATIENT
Start: 2023-02-03

## 2023-02-03 RX ORDER — DULOXETIN HYDROCHLORIDE 30 MG/1
30 CAPSULE, DELAYED RELEASE ORAL DAILY
Qty: 30 CAPSULE | Refills: 5 | Status: SHIPPED | OUTPATIENT
Start: 2023-02-03 | End: 2023-02-08 | Stop reason: SDUPTHER

## 2023-02-03 NOTE — PROGRESS NOTES
Chief Complaint  Depression (FOLLOW UP ON MEDICATION, DIZZY)    Subjective        Lefty Cai presents to [facility]  History of Present Illness  Mr. Cai presents to follow upon his depression after starting Duloxetine 30 mg at his last visit. He states he feels so much better on the medication.     I have reviewed the patient's medical history in detail and updated the computerized patient record.    Current Outpatient Medications:   •  acetaminophen (TYLENOL) 325 MG tablet, Take 650 mg by mouth Every 6 (Six) Hours As Needed for Mild Pain ., Disp: , Rfl:   •  aspirin 81 MG tablet, Take 81 mg by mouth Every Night., Disp: , Rfl:   •  cetirizine (zyrTEC) 10 MG tablet, Take 10 mg by mouth Daily As Needed., Disp: , Rfl:   •  cholecalciferol (VITAMIN D3) 25 MCG (1000 UT) tablet, Take 1,000 Units by mouth Every Night., Disp: , Rfl:   •  DULoxetine (CYMBALTA) 30 MG capsule, Take 1 capsule by mouth Daily., Disp: 30 capsule, Rfl: 5  •  hydrALAZINE (APRESOLINE) 50 MG tablet, Take 2 tablets by mouth Every Morning., Disp: 60 tablet, Rfl: 5  •  metoprolol succinate XL (TOPROL-XL) 100 MG 24 hr tablet, Take 1 tablet by mouth Every Night., Disp: 90 tablet, Rfl: 1  •  Misc. Devices (Bath/Shower Seat) misc, Use as needed for bathing and completion of ADL, Disp: 1 each, Rfl: 0  •  simethicone (MYLICON) 80 MG chewable tablet, Chew 80 mg Every 6 (Six) Hours As Needed., Disp: , Rfl:   •  simvastatin (ZOCOR) 40 MG tablet, Take 1 tablet by mouth Every Night., Disp: 90 tablet, Rfl: 1  •  sodium bicarbonate 650 MG tablet, Take 650 mg by mouth 3 (Three) Times a Day., Disp: , Rfl: 0  •  traZODone (DESYREL) 50 MG tablet, TAKE 1 TABLET AT NIGHT AS NEEDED FOR SLEEP, Disp: 90 tablet, Rfl: 3  •  vitamin D3 125 MCG (5000 UT) capsule capsule, Take 1 tablet by mouth 2 (Two) Times a Week., Disp: , Rfl:      Objective   Vital Signs:  /71 (BP Location: Right arm, Patient Position: Sitting, Cuff Size:  "Adult)   Pulse 61   Ht 177.8 cm (70\")   Wt 101 kg (223 lb 9.6 oz)   SpO2 97%   BMI 32.08 kg/m²   Estimated body mass index is 32.08 kg/m² as calculated from the following:    Height as of this encounter: 177.8 cm (70\").    Weight as of this encounter: 101 kg (223 lb 9.6 oz).             Physical Exam  Vitals reviewed.   Constitutional:       Appearance: Normal appearance.   Cardiovascular:      Rate and Rhythm: Normal rate and regular rhythm.      Pulses: Normal pulses.      Heart sounds: Normal heart sounds.   Pulmonary:      Effort: Pulmonary effort is normal.      Breath sounds: Normal breath sounds.   Skin:     General: Skin is warm and dry.   Neurological:      Mental Status: He is alert and oriented to person, place, and time.   Psychiatric:         Mood and Affect: Mood normal.         Behavior: Behavior normal.         Thought Content: Thought content normal.         Judgment: Judgment normal.        Result Review :                   Assessment and Plan   Diagnoses and all orders for this visit:    1. Current moderate episode of major depressive disorder, unspecified whether recurrent (HCC) (Primary)    Mr. Cai appears to be doing well today.  He reports the duloxetine is helping to control his depression.  He is to continue Duloxetine 30 mg daily as prescribed.          Follow Up   Return if symptoms worsen or fail to improve, for Next scheduled follow up.  Patient was given instructions and counseling regarding his condition or for health maintenance advice. Please see specific information pulled into the AVS if appropriate.       "

## 2023-02-08 DIAGNOSIS — F32.1 CURRENT MODERATE EPISODE OF MAJOR DEPRESSIVE DISORDER, UNSPECIFIED WHETHER RECURRENT: ICD-10-CM

## 2023-02-08 RX ORDER — DULOXETIN HYDROCHLORIDE 30 MG/1
30 CAPSULE, DELAYED RELEASE ORAL DAILY
Qty: 30 CAPSULE | Refills: 5 | Status: ON HOLD | OUTPATIENT
Start: 2023-02-08

## 2023-02-23 ENCOUNTER — TELEPHONE (OUTPATIENT)
Dept: SURGERY | Facility: CLINIC | Age: 73
End: 2023-02-23
Payer: MEDICARE

## 2023-02-23 PROBLEM — N18.5 CKD (CHRONIC KIDNEY DISEASE) STAGE 5, GFR LESS THAN 15 ML/MIN (HCC): Status: ACTIVE | Noted: 2023-02-23

## 2023-02-23 NOTE — TELEPHONE ENCOUNTER
Tried reaching patient again regarding scheduling his PD Cath placement. I did send him a my chart message as well asking him to call our office.

## 2023-02-23 NOTE — TELEPHONE ENCOUNTER
----- Message from Murali Ferreira MD sent at 2/22/2023  4:26 PM EST -----  Can you guys please get this patient scheduled for lap PD catheter as soon as possible, thank you

## 2023-02-24 ENCOUNTER — PRE-ADMISSION TESTING (OUTPATIENT)
Dept: PREADMISSION TESTING | Facility: HOSPITAL | Age: 73
End: 2023-02-24
Payer: MEDICARE

## 2023-02-24 VITALS
HEIGHT: 70 IN | BODY MASS INDEX: 31.95 KG/M2 | SYSTOLIC BLOOD PRESSURE: 131 MMHG | RESPIRATION RATE: 18 BRPM | WEIGHT: 223.2 LBS | DIASTOLIC BLOOD PRESSURE: 62 MMHG | OXYGEN SATURATION: 99 % | HEART RATE: 65 BPM | TEMPERATURE: 97.3 F

## 2023-02-24 LAB — QT INTERVAL: 447 MS

## 2023-02-24 PROCEDURE — 93010 ELECTROCARDIOGRAM REPORT: CPT | Performed by: INTERNAL MEDICINE

## 2023-02-24 PROCEDURE — 93005 ELECTROCARDIOGRAM TRACING: CPT

## 2023-02-24 RX ORDER — HYDRALAZINE HYDROCHLORIDE 50 MG/1
50 TABLET, FILM COATED ORAL NIGHTLY
Status: ON HOLD | COMMUNITY

## 2023-02-24 NOTE — DISCHARGE INSTRUCTIONS
Arrive to hospital on your day of surgery at  9AM    Take the following medications the morning of surgery:  HYDRALAZINE      If you are on prescription narcotic pain medication to control your pain you may also take that medication the morning of surgery.    General Instructions:  Do not eat solid food after midnight the night before surgery.  You may drink clear liquids day of surgery but must stop at least one hour before your hospital arrival time.  It is beneficial for you to have a clear drink that contains carbohydrates the day of surgery.  We suggest a 12 to 20 ounce bottle of Gatorade or Powerade for non-diabetic patients or a 12 to 20 ounce bottle of G2 or Powerade Zero for diabetic patients. (Pediatric patients, are not advised to drink a 12 to 20 ounce carbohydrate drink)    Clear liquids are liquids you can see through.  Nothing red in color.     Plain water                               Sports drinks  Sodas                                   Gelatin (Jell-O)  Fruit juices without pulp such as white grape juice and apple juice  Popsicles that contain no fruit or yogurt  Tea or coffee (no cream or milk added)  Gatorade / Powerade  G2 / Powerade Zero    Infants may have breast milk up to four hours before surgery.  Infants drinking formula may drink formula up to six hours before surgery.   Patients who avoid smoking, chewing tobacco and alcohol for 4 weeks prior to surgery have a reduced risk of post-operative complications.  Quit smoking as many days before surgery as you can.  Do not smoke, use chewing tobacco or drink alcohol the day of surgery.   If applicable bring your C-PAP/ BI-PAP machine.  Bring any papers given to you in the doctor’s office.  Wear clean comfortable clothes.  Do not wear contact lenses, false eyelashes or make-up.  Bring a case for your glasses.   Bring crutches or walker if applicable.  Remove all piercings.  Leave jewelry and any other valuables at home.  Hair extensions with  metal clips must be removed prior to surgery.  The Pre-Admission Testing nurse will instruct you to bring medications if unable to obtain an accurate list in Pre-Admission Testing.        If you were given a blood bank ID arm band remember to bring it with you the day of surgery.    Preventing a Surgical Site Infection:  For 2 to 3 days before surgery, avoid shaving with a razor because the razor can irritate skin and make it easier to develop an infection.    Any areas of open skin can increase the risk of a post-operative wound infection by allowing bacteria to enter and travel throughout the body.  Notify your surgeon if you have any skin wounds / rashes even if it is not near the expected surgical site.  The area will need assessed to determine if surgery should be delayed until it is healed.  The night prior to surgery shower using a fresh bar of anti-bacterial soap (such as Dial) and clean washcloth.  Sleep in a clean bed with clean clothing.  Do not allow pets to sleep with you.  Shower on the morning of surgery using a fresh bar of anti-bacterial soap (such as Dial) and clean washcloth.  Dry with a clean towel and dress in clean clothing.  Ask your surgeon if you will be receiving antibiotics prior to surgery.  Make sure you, your family, and all healthcare providers clean their hands with soap and water or an alcohol based hand  before caring for you or your wound.    Day of surgery:  Your arrival time is approximately two hours before your scheduled surgery time.  Upon arrival, a Pre-op nurse and Anesthesiologist will review your health history, obtain vital signs, and answer questions you may have.  The only belongings needed at this time will be a list of your home medications and if applicable your C-PAP/BI-PAP machine.  A Pre-op nurse will start an IV and you may receive medication in preparation for surgery, including something to help you relax.     Please be aware that surgery does come  with discomfort.  We want to make every effort to control your discomfort so please discuss any uncontrolled symptoms with your nurse.   Your doctor will most likely have prescribed pain medications.      If you are going home after surgery you will receive individualized written care instructions before being discharged.  A responsible adult must drive you to and from the hospital on the day of your surgery and stay with you for 24 hours.  Discharge prescriptions can be filled by the hospital pharmacy during regular pharmacy hours.  If you are having surgery late in the day/evening your prescription may be e-prescribed to your pharmacy.  Please verify your pharmacy hours or chose a 24 hour pharmacy to avoid not having access to your prescription because your pharmacy has closed for the day.    If you are staying overnight following surgery, you will be transported to your hospital room following the recovery period.  [facility] has all private rooms.    If you have any questions please call Pre-Admission Testing at [phone].  Deductibles and co-payments are collected on the day of service. Please be prepared to pay the required co-pay, deductible or deposit on the day of service as defined by your plan.    Call your surgeon immediately if you experience any of the following symptoms:  Sore Throat  Shortness of Breath or difficulty breathing  Cough  Chills  Body soreness or muscle pain  Headache  Fever  New loss of taste or smell  Do not arrive for your surgery ill.  Your procedure will need to be rescheduled to another time.  You will need to call your physician before the day of surgery to avoid any unnecessary exposure to hospital staff as well as other patients.         CHLORHEXIDINE CLOTH INSTRUCTIONS  The morning of surgery follow these instructions using the Chlorhexidine cloths you've been given.  These steps reduce bacteria on the body.  Do not use the cloths near your eyes, ears mouth,  genitalia or on open wounds.  Throw the cloths away after use but do not try to flush them down a toilet.      Open and remove one cloth at a time from the package.    Leave the cloth unfolded and begin the bathing.  Massage the skin with the cloths using gentle pressure to remove bacteria.  Do not scrub harshly.   Follow the steps below with one 2% CHG cloth per area (6 total cloths).  One cloth for neck, shoulders and chest.  One cloth for both arms, hands, fingers and underarms (do underarms last).  One cloth for the abdomen followed by groin.  One cloth for right leg and foot including between the toes.  One cloth for left leg and foot including between the toes.  The last cloth is to be used for the back of the neck, back and buttocks.    Allow the CHG to air dry 3 minutes on the skin which will give it time to work and decrease the chance of irritation.  The skin may feel sticky until it is dry.  Do not rinse with water or any other liquid or you will lose the beneficial effects of the CHG.  If mild skin irritation occurs, do rinse the skin to remove the CHG.  Report this to the nurse at time of admission.  Do not apply lotions, creams, ointments, deodorants or perfumes after using the clothes. Dress in clean clothes before coming to the hospital.

## 2023-02-27 ENCOUNTER — ANESTHESIA EVENT (OUTPATIENT)
Dept: PERIOP | Facility: HOSPITAL | Age: 73
End: 2023-02-27
Payer: MEDICARE

## 2023-02-27 ENCOUNTER — ANESTHESIA (OUTPATIENT)
Dept: PERIOP | Facility: HOSPITAL | Age: 73
End: 2023-02-27
Payer: MEDICARE

## 2023-02-27 ENCOUNTER — HOSPITAL ENCOUNTER (OUTPATIENT)
Facility: HOSPITAL | Age: 73
Setting detail: HOSPITAL OUTPATIENT SURGERY
Discharge: HOME | End: 2023-02-27
Attending: SURGERY | Admitting: SURGERY
Payer: MEDICARE

## 2023-02-27 VITALS
BODY MASS INDEX: 32.29 KG/M2 | OXYGEN SATURATION: 97 % | WEIGHT: 225.53 LBS | SYSTOLIC BLOOD PRESSURE: 126 MMHG | TEMPERATURE: 98 F | HEIGHT: 70 IN | DIASTOLIC BLOOD PRESSURE: 69 MMHG | HEART RATE: 70 BPM | RESPIRATION RATE: 16 BRPM

## 2023-02-27 DIAGNOSIS — N18.5 CKD (CHRONIC KIDNEY DISEASE) STAGE 5, GFR LESS THAN 15 ML/MIN: ICD-10-CM

## 2023-02-27 DIAGNOSIS — Z99.2 PERITONEAL DIALYSIS CATHETER IN PLACE: ICD-10-CM

## 2023-02-27 PROBLEM — K66.8 FREE INTRAPERITONEAL AIR: Status: RESOLVED | Noted: 2021-09-07 | Resolved: 2023-02-27

## 2023-02-27 LAB
GLUCOSE BLDC GLUCOMTR-MCNC: 102 MG/DL (ref 70–130)
GLUCOSE BLDC GLUCOMTR-MCNC: 117 MG/DL (ref 70–130)
POTASSIUM SERPL-SCNC: 5 MMOL/L (ref 3.5–5.2)

## 2023-02-27 PROCEDURE — 25010000002 MIDAZOLAM PER 1 MG: Performed by: ANESTHESIOLOGY

## 2023-02-27 PROCEDURE — 25010000002 CEFAZOLIN IN DEXTROSE 2-4 GM/100ML-% SOLUTION: Performed by: SURGERY

## 2023-02-27 PROCEDURE — 49320 DIAG LAPARO SEPARATE PROC: CPT | Performed by: PHYSICIAN ASSISTANT

## 2023-02-27 PROCEDURE — 25010000002 FENTANYL CITRATE (PF) 50 MCG/ML SOLUTION: Performed by: NURSE ANESTHETIST, CERTIFIED REGISTERED

## 2023-02-27 PROCEDURE — 25010000002 PROPOFOL 10 MG/ML EMULSION: Performed by: NURSE ANESTHETIST, CERTIFIED REGISTERED

## 2023-02-27 PROCEDURE — 84132 ASSAY OF SERUM POTASSIUM: CPT | Performed by: ANESTHESIOLOGY

## 2023-02-27 PROCEDURE — 49320 DIAG LAPARO SEPARATE PROC: CPT | Performed by: SURGERY

## 2023-02-27 PROCEDURE — S0260 H&P FOR SURGERY: HCPCS | Performed by: SURGERY

## 2023-02-27 PROCEDURE — 25010000002 ONDANSETRON PER 1 MG: Performed by: NURSE ANESTHETIST, CERTIFIED REGISTERED

## 2023-02-27 PROCEDURE — 82962 GLUCOSE BLOOD TEST: CPT

## 2023-02-27 RX ORDER — SODIUM CHLORIDE 9 MG/ML
40 INJECTION, SOLUTION INTRAVENOUS AS NEEDED
Status: DISCONTINUED | OUTPATIENT
Start: 2023-02-27 | End: 2023-02-27 | Stop reason: HOSPADM

## 2023-02-27 RX ORDER — LIDOCAINE HYDROCHLORIDE 20 MG/ML
INJECTION, SOLUTION INFILTRATION; PERINEURAL AS NEEDED
Status: DISCONTINUED | OUTPATIENT
Start: 2023-02-27 | End: 2023-02-27 | Stop reason: SURG

## 2023-02-27 RX ORDER — AMOXICILLIN 250 MG
1 CAPSULE ORAL NIGHTLY PRN
Qty: 60 TABLET | Refills: 0 | Status: ON HOLD | OUTPATIENT
Start: 2023-02-27

## 2023-02-27 RX ORDER — EPHEDRINE SULFATE 50 MG/ML
5 INJECTION, SOLUTION INTRAVENOUS ONCE AS NEEDED
Status: DISCONTINUED | OUTPATIENT
Start: 2023-02-27 | End: 2023-02-27 | Stop reason: HOSPADM

## 2023-02-27 RX ORDER — DIPHENHYDRAMINE HYDROCHLORIDE 50 MG/ML
12.5 INJECTION INTRAMUSCULAR; INTRAVENOUS
Status: DISCONTINUED | OUTPATIENT
Start: 2023-02-27 | End: 2023-02-27 | Stop reason: HOSPADM

## 2023-02-27 RX ORDER — SODIUM CHLORIDE 0.9 % (FLUSH) 0.9 %
10 SYRINGE (ML) INJECTION AS NEEDED
Status: DISCONTINUED | OUTPATIENT
Start: 2023-02-27 | End: 2023-02-27 | Stop reason: HOSPADM

## 2023-02-27 RX ORDER — CEFAZOLIN SODIUM 2 G/100ML
2 INJECTION, SOLUTION INTRAVENOUS ONCE
Status: COMPLETED | OUTPATIENT
Start: 2023-02-27 | End: 2023-02-27

## 2023-02-27 RX ORDER — ONDANSETRON 2 MG/ML
INJECTION INTRAMUSCULAR; INTRAVENOUS AS NEEDED
Status: DISCONTINUED | OUTPATIENT
Start: 2023-02-27 | End: 2023-02-27 | Stop reason: SURG

## 2023-02-27 RX ORDER — EPHEDRINE SULFATE 50 MG/ML
INJECTION INTRAVENOUS AS NEEDED
Status: DISCONTINUED | OUTPATIENT
Start: 2023-02-27 | End: 2023-02-27 | Stop reason: SURG

## 2023-02-27 RX ORDER — FLUMAZENIL 0.1 MG/ML
0.2 INJECTION INTRAVENOUS AS NEEDED
Status: DISCONTINUED | OUTPATIENT
Start: 2023-02-27 | End: 2023-02-27 | Stop reason: HOSPADM

## 2023-02-27 RX ORDER — CEFAZOLIN SODIUM IN 0.9 % NACL 3 G/100 ML
3 INTRAVENOUS SOLUTION, PIGGYBACK (ML) INTRAVENOUS ONCE
Status: DISCONTINUED | OUTPATIENT
Start: 2023-02-27 | End: 2023-02-27 | Stop reason: SDUPTHER

## 2023-02-27 RX ORDER — PROMETHAZINE HYDROCHLORIDE 25 MG/1
12.5 TABLET ORAL ONCE AS NEEDED
Status: DISCONTINUED | OUTPATIENT
Start: 2023-02-27 | End: 2023-02-27 | Stop reason: HOSPADM

## 2023-02-27 RX ORDER — FAMOTIDINE 10 MG/ML
20 INJECTION, SOLUTION INTRAVENOUS
Status: COMPLETED | OUTPATIENT
Start: 2023-02-27 | End: 2023-02-27

## 2023-02-27 RX ORDER — ROCURONIUM BROMIDE 10 MG/ML
INJECTION, SOLUTION INTRAVENOUS AS NEEDED
Status: DISCONTINUED | OUTPATIENT
Start: 2023-02-27 | End: 2023-02-27 | Stop reason: SURG

## 2023-02-27 RX ORDER — PROMETHAZINE HYDROCHLORIDE 25 MG/1
25 SUPPOSITORY RECTAL ONCE AS NEEDED
Status: DISCONTINUED | OUTPATIENT
Start: 2023-02-27 | End: 2023-02-27 | Stop reason: HOSPADM

## 2023-02-27 RX ORDER — MIDAZOLAM HYDROCHLORIDE 1 MG/ML
1 INJECTION INTRAMUSCULAR; INTRAVENOUS
Status: DISCONTINUED | OUTPATIENT
Start: 2023-02-27 | End: 2023-02-27 | Stop reason: HOSPADM

## 2023-02-27 RX ORDER — BUPIVACAINE HYDROCHLORIDE AND EPINEPHRINE 5; 5 MG/ML; UG/ML
INJECTION, SOLUTION EPIDURAL; INTRACAUDAL; PERINEURAL AS NEEDED
Status: DISCONTINUED | OUTPATIENT
Start: 2023-02-27 | End: 2023-02-27 | Stop reason: HOSPADM

## 2023-02-27 RX ORDER — HYDROMORPHONE HYDROCHLORIDE 1 MG/ML
0.25 INJECTION, SOLUTION INTRAMUSCULAR; INTRAVENOUS; SUBCUTANEOUS
Status: DISCONTINUED | OUTPATIENT
Start: 2023-02-27 | End: 2023-02-27 | Stop reason: HOSPADM

## 2023-02-27 RX ORDER — SENNA PLUS 8.6 MG/1
1 TABLET ORAL ONCE
Status: COMPLETED | OUTPATIENT
Start: 2023-02-27 | End: 2023-02-27

## 2023-02-27 RX ORDER — HYDROCODONE BITARTRATE AND ACETAMINOPHEN 5; 325 MG/1; MG/1
1 TABLET ORAL ONCE AS NEEDED
Status: COMPLETED | OUTPATIENT
Start: 2023-02-27 | End: 2023-02-27

## 2023-02-27 RX ORDER — DROPERIDOL 2.5 MG/ML
0.62 INJECTION, SOLUTION INTRAMUSCULAR; INTRAVENOUS
Status: DISCONTINUED | OUTPATIENT
Start: 2023-02-27 | End: 2023-02-27 | Stop reason: HOSPADM

## 2023-02-27 RX ORDER — SODIUM CHLORIDE 9 MG/ML
9 INJECTION, SOLUTION INTRAVENOUS CONTINUOUS PRN
Status: DISCONTINUED | OUTPATIENT
Start: 2023-02-27 | End: 2023-02-27 | Stop reason: HOSPADM

## 2023-02-27 RX ORDER — SODIUM CHLORIDE 0.9 % (FLUSH) 0.9 %
10 SYRINGE (ML) INJECTION EVERY 12 HOURS SCHEDULED
Status: DISCONTINUED | OUTPATIENT
Start: 2023-02-27 | End: 2023-02-27 | Stop reason: HOSPADM

## 2023-02-27 RX ORDER — HYDRALAZINE HYDROCHLORIDE 20 MG/ML
5 INJECTION INTRAMUSCULAR; INTRAVENOUS
Status: DISCONTINUED | OUTPATIENT
Start: 2023-02-27 | End: 2023-02-27 | Stop reason: HOSPADM

## 2023-02-27 RX ORDER — LABETALOL HYDROCHLORIDE 5 MG/ML
5 INJECTION, SOLUTION INTRAVENOUS
Status: DISCONTINUED | OUTPATIENT
Start: 2023-02-27 | End: 2023-02-27 | Stop reason: HOSPADM

## 2023-02-27 RX ORDER — ONDANSETRON 2 MG/ML
4 INJECTION INTRAMUSCULAR; INTRAVENOUS ONCE AS NEEDED
Status: DISCONTINUED | OUTPATIENT
Start: 2023-02-27 | End: 2023-02-27 | Stop reason: HOSPADM

## 2023-02-27 RX ORDER — PROPOFOL 10 MG/ML
VIAL (ML) INTRAVENOUS AS NEEDED
Status: DISCONTINUED | OUTPATIENT
Start: 2023-02-27 | End: 2023-02-27 | Stop reason: SURG

## 2023-02-27 RX ORDER — MAGNESIUM HYDROXIDE 1200 MG/15ML
LIQUID ORAL AS NEEDED
Status: DISCONTINUED | OUTPATIENT
Start: 2023-02-27 | End: 2023-02-27 | Stop reason: HOSPADM

## 2023-02-27 RX ORDER — FENTANYL CITRATE 50 UG/ML
INJECTION, SOLUTION INTRAMUSCULAR; INTRAVENOUS AS NEEDED
Status: DISCONTINUED | OUTPATIENT
Start: 2023-02-27 | End: 2023-02-27 | Stop reason: SURG

## 2023-02-27 RX ORDER — OXYCODONE HYDROCHLORIDE AND ACETAMINOPHEN 5; 325 MG/1; MG/1
1 TABLET ORAL ONCE AS NEEDED
Status: DISCONTINUED | OUTPATIENT
Start: 2023-02-27 | End: 2023-02-27 | Stop reason: HOSPADM

## 2023-02-27 RX ORDER — HYDROCODONE BITARTRATE AND ACETAMINOPHEN 7.5; 325 MG/1; MG/1
1 TABLET ORAL EVERY 4 HOURS PRN
Status: DISCONTINUED | OUTPATIENT
Start: 2023-02-27 | End: 2023-02-27 | Stop reason: HOSPADM

## 2023-02-27 RX ORDER — ACETAMINOPHEN 325 MG/1
650 TABLET ORAL ONCE
Status: COMPLETED | OUTPATIENT
Start: 2023-02-27 | End: 2023-02-27

## 2023-02-27 RX ORDER — FENTANYL CITRATE 50 UG/ML
25 INJECTION, SOLUTION INTRAMUSCULAR; INTRAVENOUS
Status: DISCONTINUED | OUTPATIENT
Start: 2023-02-27 | End: 2023-02-27 | Stop reason: HOSPADM

## 2023-02-27 RX ORDER — NALOXONE HCL 0.4 MG/ML
0.2 VIAL (ML) INJECTION AS NEEDED
Status: DISCONTINUED | OUTPATIENT
Start: 2023-02-27 | End: 2023-02-27 | Stop reason: HOSPADM

## 2023-02-27 RX ORDER — TRAMADOL HYDROCHLORIDE 50 MG/1
50 TABLET ORAL EVERY 8 HOURS PRN
Qty: 6 TABLET | Refills: 0 | Status: ON HOLD | OUTPATIENT
Start: 2023-02-27

## 2023-02-27 RX ORDER — PROMETHAZINE HYDROCHLORIDE 25 MG/1
25 TABLET ORAL ONCE AS NEEDED
Status: DISCONTINUED | OUTPATIENT
Start: 2023-02-27 | End: 2023-02-27 | Stop reason: HOSPADM

## 2023-02-27 RX ORDER — PHENYLEPHRINE HCL IN 0.9% NACL 1 MG/10 ML
SYRINGE (ML) INTRAVENOUS AS NEEDED
Status: DISCONTINUED | OUTPATIENT
Start: 2023-02-27 | End: 2023-02-27 | Stop reason: SURG

## 2023-02-27 RX ORDER — ONDANSETRON 4 MG/1
4 TABLET, FILM COATED ORAL EVERY 8 HOURS PRN
Qty: 30 TABLET | Refills: 1 | Status: ON HOLD | OUTPATIENT
Start: 2023-02-27 | End: 2024-02-27

## 2023-02-27 RX ADMIN — LIDOCAINE HYDROCHLORIDE 80 MG: 20 INJECTION, SOLUTION INFILTRATION; PERINEURAL at 11:30

## 2023-02-27 RX ADMIN — FAMOTIDINE 20 MG: 10 INJECTION INTRAVENOUS at 10:29

## 2023-02-27 RX ADMIN — PROPOFOL 120 MG: 10 INJECTION, EMULSION INTRAVENOUS at 11:30

## 2023-02-27 RX ADMIN — ONDANSETRON 4 MG: 2 INJECTION INTRAMUSCULAR; INTRAVENOUS at 11:52

## 2023-02-27 RX ADMIN — SUGAMMADEX 200 MG: 100 INJECTION, SOLUTION INTRAVENOUS at 11:56

## 2023-02-27 RX ADMIN — FENTANYL CITRATE 25 MCG: 50 INJECTION, SOLUTION INTRAMUSCULAR; INTRAVENOUS at 11:30

## 2023-02-27 RX ADMIN — Medication 150 MCG: at 11:44

## 2023-02-27 RX ADMIN — EPHEDRINE SULFATE 10 MG: 50 INJECTION INTRAVENOUS at 11:49

## 2023-02-27 RX ADMIN — FENTANYL CITRATE 25 MCG: 50 INJECTION, SOLUTION INTRAMUSCULAR; INTRAVENOUS at 11:56

## 2023-02-27 RX ADMIN — SODIUM CHLORIDE: 9 INJECTION, SOLUTION INTRAVENOUS at 11:30

## 2023-02-27 RX ADMIN — Medication 150 MCG: at 11:49

## 2023-02-27 RX ADMIN — ACETAMINOPHEN 650 MG: 325 TABLET, FILM COATED ORAL at 12:25

## 2023-02-27 RX ADMIN — CEFAZOLIN SODIUM 2 G: 2 INJECTION, SOLUTION INTRAVENOUS at 11:19

## 2023-02-27 RX ADMIN — HYDROCODONE BITARTRATE AND ACETAMINOPHEN 1 TABLET: 5; 325 TABLET ORAL at 12:25

## 2023-02-27 RX ADMIN — Medication 100 MCG: at 11:39

## 2023-02-27 RX ADMIN — MIDAZOLAM 1 MG: 1 INJECTION INTRAMUSCULAR; INTRAVENOUS at 10:29

## 2023-02-27 RX ADMIN — SENNOSIDES 1 TABLET: 8.6 TABLET, FILM COATED ORAL at 12:24

## 2023-02-27 RX ADMIN — ROCURONIUM BROMIDE 40 MG: 10 INJECTION, SOLUTION INTRAVENOUS at 11:30

## 2023-02-27 NOTE — ANESTHESIA PREPROCEDURE EVALUATION
Anesthesia Evaluation     Patient summary reviewed                Airway   Mallampati: I  Neck ROM: full  No difficulty expected  Dental      Pulmonary    Cardiovascular     ECG reviewed  Rhythm: regular    (+) hypertension,       Neuro/Psych  GI/Hepatic/Renal/Endo    (+) obesity,  GERD,  renal disease ESRD, diabetes mellitus,     Musculoskeletal     Abdominal    Substance History      OB/GYN          Other                        Anesthesia Plan    ASA 3     general       Anesthetic plan, risks, benefits, and alternatives have been provided, discussed and informed consent has been obtained with: patient.    Use of blood products discussed with patient .       CODE STATUS:

## 2023-02-27 NOTE — ANESTHESIA PROCEDURE NOTES
Airway  Urgency: elective    Date/Time: 2/27/2023 11:33 AM  Airway not difficult    General Information and Staff    Patient location during procedure: OR  Anesthesiologist: Angel Ochoa MD  CRNA/CAA: Nanette Guidry CRNA    Indications and Patient Condition  Indications for airway management: airway protection    Preoxygenated: yes  Mask difficulty assessment: 2 - vent by mask + OA or adjuvant +/- NMBA    Final Airway Details  Final airway type: endotracheal airway      Successful airway: ETT  Cuffed: yes   Successful intubation technique: direct laryngoscopy  Facilitating devices/methods: cricoid pressure  Endotracheal tube insertion site: oral  Blade: Georgina  Blade size: 4  ETT size (mm): 7.5  Cormack-Lehane Classification: grade I - full view of glottis  Placement verified by: chest auscultation and capnometry   Cuff volume (mL): 6  Measured from: teeth  ETT/EBT  to teeth (cm): 21  Number of attempts at approach: 1  Assessment: lips, teeth, and gum same as pre-op and atraumatic intubation

## 2023-02-27 NOTE — ANESTHESIA POSTPROCEDURE EVALUATION
Patient: Lefty Cai    Procedure Summary     Date: 02/27/23 Room / Location: [facility] OR 09 / [facility]    Anesthesia Start: 1125 Anesthesia Stop: 1206    Procedure: DIAGNOSTIC LAPAROSCOPY (Abdomen) Diagnosis:       CKD (chronic kidney disease) stage 5, GFR less than 15 ml/min (HCC)      (CKD (chronic kidney disease) stage 5, GFR less than 15 ml/min (HCC) [N18.5])    Surgeons: Murali Ferreira MD Provider: Angel Ochoa MD    Anesthesia Type: general ASA Status: 3          Anesthesia Type: general    Vitals  Vitals Value Taken Time   /73 02/27/23 1231   Temp 36.7 °C (98 °F) 02/27/23 1202   Pulse 70 02/27/23 1243   Resp 16 02/27/23 1202   SpO2 98 % 02/27/23 1244   Vitals shown include unvalidated device data.        Post Anesthesia Care and Evaluation    Patient location during evaluation: PACU  Patient participation: complete - patient participated  Level of consciousness: awake and alert  Pain management: adequate    Airway patency: patent  Anesthetic complications: No anesthetic complications    Cardiovascular status: acceptable  Respiratory status: acceptable  Hydration status: acceptable    Comments: --------------------            02/27/23               1248     --------------------   BP:       129/70     Pulse:      67       Resp:       16       Temp:                SpO2:      98%      --------------------

## 2023-04-07 ENCOUNTER — TELEPHONE (OUTPATIENT)
Dept: FAMILY MEDICINE CLINIC | Facility: CLINIC | Age: 73
End: 2023-04-07

## 2023-04-07 NOTE — TELEPHONE ENCOUNTER
Caller: TrellCaterina    Relationship: Emergency Contact    Best call back number: [phone]    What medication are you requesting: INHALER AND COUGH MEDICATION.    What are your current symptoms: LOT OF COUGHING AND SHORT OF AIR WHEN COUGHING.     How long have you been experiencing symptoms: ABOUT 4 DAYS.    Have you had these symptoms before:    [x] Yes  [] No    Have you been treated for these symptoms before:   [x] Yes  [] No    If a prescription is needed, what is your preferred pharmacy and phone number: [id number] [place], [place] - [address] [facility] [address] [facility] [phone] [facility] [phone] FX     Additional notes:    PATIENTS WIFE IS CALLING IN SHE STATES THAT PATIENT IS COUGHING BAD AND DOES GET SHORT OF BREATH WHEN COUGHING. HE HAS BEEN TO DIALYSIS AND THEY GAVE HIM OXYGEN YESTERDAY. HE IS VERY TIRED HAS NOT SLEPT AT ALL.     HE HAS TRIED SOME OTC MEDS NOTHING HELPING, STUFF COMING UP IS CLEAR BUT HE CANT GET MUCH UP AND THE COUGHING IS CONSTANT.

## 2023-04-07 NOTE — TELEPHONE ENCOUNTER
Called and spoke with patients wife and let her know he would need to be seen before she would prescribe something, she was in understand and he didn't want to get out today so she will call back Monday if he has not improved

## 2023-04-07 NOTE — TELEPHONE ENCOUNTER
He will need to be seen to be evaluated to see if the medications he wants are appropriate. Thank you

## 2023-04-08 ENCOUNTER — APPOINTMENT (OUTPATIENT)
Dept: GENERAL RADIOLOGY | Facility: HOSPITAL | Age: 73
DRG: 216 | End: 2023-04-08
Payer: MEDICARE

## 2023-04-08 ENCOUNTER — APPOINTMENT (OUTPATIENT)
Dept: CARDIOLOGY | Facility: HOSPITAL | Age: 73
DRG: 216 | End: 2023-04-08
Payer: MEDICARE

## 2023-04-08 ENCOUNTER — HOSPITAL ENCOUNTER (INPATIENT)
Facility: HOSPITAL | Age: 73
LOS: 24 days | Discharge: HOME HEALTH | DRG: 216 | End: 2023-05-02
Attending: EMERGENCY MEDICINE | Admitting: THORACIC SURGERY (CARDIOTHORACIC VASCULAR SURGERY)
Payer: MEDICARE

## 2023-04-08 DIAGNOSIS — N18.6 ESRD (END STAGE RENAL DISEASE): ICD-10-CM

## 2023-04-08 DIAGNOSIS — R93.1 ABNORMAL FINDINGS ON DIAGNOSTIC IMAGING OF HEART AND CORONARY CIRCULATION: ICD-10-CM

## 2023-04-08 DIAGNOSIS — B34.8 RHINOVIRUS INFECTION: ICD-10-CM

## 2023-04-08 DIAGNOSIS — Z95.1 S/P CABG (CORONARY ARTERY BYPASS GRAFT): ICD-10-CM

## 2023-04-08 DIAGNOSIS — R06.09 DYSPNEA ON EXERTION: Primary | ICD-10-CM

## 2023-04-08 DIAGNOSIS — Z99.2 PERITONEAL DIALYSIS CATHETER IN PLACE: ICD-10-CM

## 2023-04-08 DIAGNOSIS — I10 ELEVATED BLOOD PRESSURE READING IN OFFICE WITH DIAGNOSIS OF HYPERTENSION: ICD-10-CM

## 2023-04-08 DIAGNOSIS — I95.1 ORTHOSTASIS: ICD-10-CM

## 2023-04-08 DIAGNOSIS — Z87.891 FORMER SMOKER: ICD-10-CM

## 2023-04-08 DIAGNOSIS — I21.4 NSTEMI (NON-ST ELEVATED MYOCARDIAL INFARCTION): ICD-10-CM

## 2023-04-08 LAB
ALBUMIN SERPL-MCNC: 3.5 G/DL (ref 3.5–5.2)
ALBUMIN/GLOB SERPL: 1.3 G/DL
ALP SERPL-CCNC: 68 U/L (ref 39–117)
ALT SERPL W P-5'-P-CCNC: 11 U/L (ref 1–41)
ANION GAP SERPL CALCULATED.3IONS-SCNC: 14.9 MMOL/L (ref 5–15)
AORTIC DIMENSIONLESS INDEX: 0.6 (DI)
APTT PPP: 30.5 SECONDS (ref 22.7–35.4)
AST SERPL-CCNC: 26 U/L (ref 1–40)
B PARAPERT DNA SPEC QL NAA+PROBE: NOT DETECTED
B PERT DNA SPEC QL NAA+PROBE: NOT DETECTED
BASOPHILS # BLD AUTO: 0.05 10*3/MM3 (ref 0–0.2)
BASOPHILS NFR BLD AUTO: 0.3 % (ref 0–1.5)
BH CV ECHO MEAS - AO MAX PG: 12.5 MMHG
BH CV ECHO MEAS - AO MEAN PG: 7.4 MMHG
BH CV ECHO MEAS - AO ROOT DIAM: 2.7 CM
BH CV ECHO MEAS - AO V2 MAX: 176.5 CM/SEC
BH CV ECHO MEAS - AO V2 VTI: 34.6 CM
BH CV ECHO MEAS - AVA(I,D): 2.17 CM2
BH CV ECHO MEAS - EDV(CUBED): 129.7 ML
BH CV ECHO MEAS - EDV(MOD-SP2): 172 ML
BH CV ECHO MEAS - EDV(MOD-SP4): 165 ML
BH CV ECHO MEAS - EF(MOD-BP): 42.9 %
BH CV ECHO MEAS - EF(MOD-SP2): 43 %
BH CV ECHO MEAS - EF(MOD-SP4): 47.9 %
BH CV ECHO MEAS - ESV(CUBED): 52 ML
BH CV ECHO MEAS - ESV(MOD-SP2): 98 ML
BH CV ECHO MEAS - ESV(MOD-SP4): 86 ML
BH CV ECHO MEAS - FS: 26.3 %
BH CV ECHO MEAS - IVS/LVPW: 0.98 CM
BH CV ECHO MEAS - IVSD: 1.07 CM
BH CV ECHO MEAS - LAT PEAK E' VEL: 9.8 CM/SEC
BH CV ECHO MEAS - LV DIASTOLIC VOL/BSA (35-75): 308.2 CM2
BH CV ECHO MEAS - LV MASS(C)D: 206 GRAMS
BH CV ECHO MEAS - LV MAX PG: 3.8 MMHG
BH CV ECHO MEAS - LV MEAN PG: 1.89 MMHG
BH CV ECHO MEAS - LV SYSTOLIC VOL/BSA (12-30): 160.6 CM2
BH CV ECHO MEAS - LV V1 MAX: 97.6 CM/SEC
BH CV ECHO MEAS - LV V1 VTI: 19.3 CM
BH CV ECHO MEAS - LVIDD: 5.1 CM
BH CV ECHO MEAS - LVIDS: 3.7 CM
BH CV ECHO MEAS - LVOT AREA: 3.9 CM2
BH CV ECHO MEAS - LVOT DIAM: 2.22 CM
BH CV ECHO MEAS - LVPWD: 1.09 CM
BH CV ECHO MEAS - MED PEAK E' VEL: 5.1 CM/SEC
BH CV ECHO MEAS - MR MAX PG: 37.5 MMHG
BH CV ECHO MEAS - MR MAX VEL: 306 CM/SEC
BH CV ECHO MEAS - MV A DUR: 0.08 SEC
BH CV ECHO MEAS - MV A MAX VEL: 72.4 CM/SEC
BH CV ECHO MEAS - MV DEC SLOPE: 603.4 CM/SEC2
BH CV ECHO MEAS - MV DEC TIME: 0.2 MSEC
BH CV ECHO MEAS - MV E MAX VEL: 121 CM/SEC
BH CV ECHO MEAS - MV E/A: 1.67
BH CV ECHO MEAS - MV MAX PG: 5.8 MMHG
BH CV ECHO MEAS - MV MEAN PG: 2.05 MMHG
BH CV ECHO MEAS - MV P1/2T: 52.1 MSEC
BH CV ECHO MEAS - MV V2 VTI: 32.9 CM
BH CV ECHO MEAS - MVA(P1/2T): 4.2 CM2
BH CV ECHO MEAS - MVA(VTI): 2.28 CM2
BH CV ECHO MEAS - PA ACC TIME: 0.11 SEC
BH CV ECHO MEAS - PA PR(ACCEL): 27.9 MMHG
BH CV ECHO MEAS - PA V2 MAX: 107.2 CM/SEC
BH CV ECHO MEAS - RAP SYSTOLE: 3 MMHG
BH CV ECHO MEAS - RV MAX PG: 2.12 MMHG
BH CV ECHO MEAS - RV V1 MAX: 72.8 CM/SEC
BH CV ECHO MEAS - RV V1 VTI: 17.1 CM
BH CV ECHO MEAS - SI(MOD-SP2): 138.2 ML/M2
BH CV ECHO MEAS - SI(MOD-SP4): 147.6 ML/M2
BH CV ECHO MEAS - SV(LVOT): 75.1 ML
BH CV ECHO MEAS - SV(MOD-SP2): 74 ML
BH CV ECHO MEAS - SV(MOD-SP4): 79 ML
BH CV ECHO MEAS - TAPSE (>1.6): 2.4 CM
BH CV ECHO MEASUREMENTS AVERAGE E/E' RATIO: 16.24
BH CV XLRA - RV BASE: 3.3 CM
BH CV XLRA - TDI S': 10.4 CM/SEC
BILIRUB SERPL-MCNC: 0.4 MG/DL (ref 0–1.2)
BUN SERPL-MCNC: 27 MG/DL (ref 8–23)
BUN/CREAT SERPL: 5.8 (ref 7–25)
C PNEUM DNA NPH QL NAA+NON-PROBE: NOT DETECTED
CALCIUM SPEC-SCNC: 8.6 MG/DL (ref 8.6–10.5)
CHLORIDE SERPL-SCNC: 104 MMOL/L (ref 98–107)
CO2 SERPL-SCNC: 20.1 MMOL/L (ref 22–29)
CREAT SERPL-MCNC: 4.67 MG/DL (ref 0.76–1.27)
D-LACTATE SERPL-SCNC: 2.1 MMOL/L (ref 0.5–2)
DEPRECATED RDW RBC AUTO: 41 FL (ref 37–54)
EGFRCR SERPLBLD CKD-EPI 2021: 12.5 ML/MIN/1.73
EOSINOPHIL # BLD AUTO: 0.04 10*3/MM3 (ref 0–0.4)
EOSINOPHIL NFR BLD AUTO: 0.3 % (ref 0.3–6.2)
ERYTHROCYTE [DISTWIDTH] IN BLOOD BY AUTOMATED COUNT: 12.3 % (ref 12.3–15.4)
FLUAV SUBTYP SPEC NAA+PROBE: NOT DETECTED
FLUBV RNA ISLT QL NAA+PROBE: NOT DETECTED
GLOBULIN UR ELPH-MCNC: 2.8 GM/DL
GLUCOSE BLDC GLUCOMTR-MCNC: 146 MG/DL (ref 70–130)
GLUCOSE SERPL-MCNC: 123 MG/DL (ref 65–99)
HADV DNA SPEC NAA+PROBE: NOT DETECTED
HCOV 229E RNA SPEC QL NAA+PROBE: NOT DETECTED
HCOV HKU1 RNA SPEC QL NAA+PROBE: NOT DETECTED
HCOV NL63 RNA SPEC QL NAA+PROBE: NOT DETECTED
HCOV OC43 RNA SPEC QL NAA+PROBE: NOT DETECTED
HCT VFR BLD AUTO: 35.3 % (ref 37.5–51)
HGB BLD-MCNC: 12 G/DL (ref 13–17.7)
HMPV RNA NPH QL NAA+NON-PROBE: NOT DETECTED
HPIV1 RNA ISLT QL NAA+PROBE: NOT DETECTED
HPIV2 RNA SPEC QL NAA+PROBE: NOT DETECTED
HPIV3 RNA NPH QL NAA+PROBE: NOT DETECTED
HPIV4 P GENE NPH QL NAA+PROBE: NOT DETECTED
IMM GRANULOCYTES # BLD AUTO: 0.05 10*3/MM3 (ref 0–0.05)
IMM GRANULOCYTES NFR BLD AUTO: 0.3 % (ref 0–0.5)
INR PPP: 1.15 (ref 0.9–1.1)
LYMPHOCYTES # BLD AUTO: 0.82 10*3/MM3 (ref 0.7–3.1)
LYMPHOCYTES NFR BLD AUTO: 5.7 % (ref 19.6–45.3)
M PNEUMO IGG SER IA-ACNC: NOT DETECTED
MAXIMAL PREDICTED HEART RATE: 147 BPM
MCH RBC QN AUTO: 31.1 PG (ref 26.6–33)
MCHC RBC AUTO-ENTMCNC: 34 G/DL (ref 31.5–35.7)
MCV RBC AUTO: 91.5 FL (ref 79–97)
MONOCYTES # BLD AUTO: 1.11 10*3/MM3 (ref 0.1–0.9)
MONOCYTES NFR BLD AUTO: 7.7 % (ref 5–12)
NEUTROPHILS NFR BLD AUTO: 12.4 10*3/MM3 (ref 1.7–7)
NEUTROPHILS NFR BLD AUTO: 85.7 % (ref 42.7–76)
NRBC BLD AUTO-RTO: 0 /100 WBC (ref 0–0.2)
NT-PROBNP SERPL-MCNC: ABNORMAL PG/ML (ref 0–900)
PLATELET # BLD AUTO: 195 10*3/MM3 (ref 140–450)
PMV BLD AUTO: 10.1 FL (ref 6–12)
POTASSIUM SERPL-SCNC: 3.9 MMOL/L (ref 3.5–5.2)
PROCALCITONIN SERPL-MCNC: 0.19 NG/ML (ref 0–0.25)
PROT SERPL-MCNC: 6.3 G/DL (ref 6–8.5)
PROTHROMBIN TIME: 14.8 SECONDS (ref 11.7–14.2)
QT INTERVAL: 394 MS
RBC # BLD AUTO: 3.86 10*6/MM3 (ref 4.14–5.8)
RHINOVIRUS RNA SPEC NAA+PROBE: DETECTED
RSV RNA NPH QL NAA+NON-PROBE: NOT DETECTED
SARS-COV-2 RNA NPH QL NAA+NON-PROBE: NOT DETECTED
SODIUM SERPL-SCNC: 139 MMOL/L (ref 136–145)
STRESS TARGET HR: 125 BPM
TROPONIN T SERPL HS-MCNC: 811 NG/L
WBC NRBC COR # BLD: 14.47 10*3/MM3 (ref 3.4–10.8)

## 2023-04-08 PROCEDURE — 85025 COMPLETE CBC W/AUTO DIFF WBC: CPT | Performed by: EMERGENCY MEDICINE

## 2023-04-08 PROCEDURE — 83880 ASSAY OF NATRIURETIC PEPTIDE: CPT | Performed by: EMERGENCY MEDICINE

## 2023-04-08 PROCEDURE — 80053 COMPREHEN METABOLIC PANEL: CPT | Performed by: EMERGENCY MEDICINE

## 2023-04-08 PROCEDURE — 99222 1ST HOSP IP/OBS MODERATE 55: CPT | Performed by: INTERNAL MEDICINE

## 2023-04-08 PROCEDURE — 0202U NFCT DS 22 TRGT SARS-COV-2: CPT | Performed by: EMERGENCY MEDICINE

## 2023-04-08 PROCEDURE — 85730 THROMBOPLASTIN TIME PARTIAL: CPT | Performed by: INTERNAL MEDICINE

## 2023-04-08 PROCEDURE — 99285 EMERGENCY DEPT VISIT HI MDM: CPT

## 2023-04-08 PROCEDURE — 25510000001 PERFLUTREN (DEFINITY) 8.476 MG IN SODIUM CHLORIDE (PF) 0.9 % 10 ML INJECTION: Performed by: INTERNAL MEDICINE

## 2023-04-08 PROCEDURE — 85610 PROTHROMBIN TIME: CPT | Performed by: INTERNAL MEDICINE

## 2023-04-08 PROCEDURE — 93005 ELECTROCARDIOGRAM TRACING: CPT | Performed by: EMERGENCY MEDICINE

## 2023-04-08 PROCEDURE — 83605 ASSAY OF LACTIC ACID: CPT | Performed by: EMERGENCY MEDICINE

## 2023-04-08 PROCEDURE — 84484 ASSAY OF TROPONIN QUANT: CPT | Performed by: EMERGENCY MEDICINE

## 2023-04-08 PROCEDURE — 25010000002 ENOXAPARIN PER 10 MG: Performed by: EMERGENCY MEDICINE

## 2023-04-08 PROCEDURE — 84145 PROCALCITONIN (PCT): CPT | Performed by: EMERGENCY MEDICINE

## 2023-04-08 PROCEDURE — 25010000002 HEPARIN (PORCINE) 25000-0.45 UT/250ML-% SOLUTION: Performed by: INTERNAL MEDICINE

## 2023-04-08 PROCEDURE — 93010 ELECTROCARDIOGRAM REPORT: CPT | Performed by: INTERNAL MEDICINE

## 2023-04-08 PROCEDURE — 93306 TTE W/DOPPLER COMPLETE: CPT | Performed by: INTERNAL MEDICINE

## 2023-04-08 PROCEDURE — 71045 X-RAY EXAM CHEST 1 VIEW: CPT

## 2023-04-08 PROCEDURE — 93306 TTE W/DOPPLER COMPLETE: CPT

## 2023-04-08 PROCEDURE — 82962 GLUCOSE BLOOD TEST: CPT

## 2023-04-08 RX ORDER — AMOXICILLIN 250 MG
1 CAPSULE ORAL NIGHTLY PRN
Status: DISCONTINUED | OUTPATIENT
Start: 2023-04-08 | End: 2023-04-13

## 2023-04-08 RX ORDER — HEPARIN SODIUM 5000 [USP'U]/ML
30-60 INJECTION, SOLUTION INTRAVENOUS; SUBCUTANEOUS EVERY 6 HOURS PRN
Status: DISCONTINUED | OUTPATIENT
Start: 2023-04-08 | End: 2023-04-08

## 2023-04-08 RX ORDER — SIMETHICONE 80 MG
80 TABLET,CHEWABLE ORAL EVERY 6 HOURS PRN
Status: DISCONTINUED | OUTPATIENT
Start: 2023-04-08 | End: 2023-04-13

## 2023-04-08 RX ORDER — DULOXETIN HYDROCHLORIDE 30 MG/1
30 CAPSULE, DELAYED RELEASE ORAL NIGHTLY
Status: DISCONTINUED | OUTPATIENT
Start: 2023-04-08 | End: 2023-04-13

## 2023-04-08 RX ORDER — NICOTINE POLACRILEX 4 MG
15 LOZENGE BUCCAL
Status: DISCONTINUED | OUTPATIENT
Start: 2023-04-08 | End: 2023-04-13

## 2023-04-08 RX ORDER — INSULIN LISPRO 100 [IU]/ML
0-9 INJECTION, SOLUTION INTRAVENOUS; SUBCUTANEOUS
Status: DISCONTINUED | OUTPATIENT
Start: 2023-04-08 | End: 2023-04-13

## 2023-04-08 RX ORDER — UREA 10 %
3 LOTION (ML) TOPICAL NIGHTLY PRN
Status: DISCONTINUED | OUTPATIENT
Start: 2023-04-08 | End: 2023-04-13

## 2023-04-08 RX ORDER — ASPIRIN 81 MG/1
324 TABLET, CHEWABLE ORAL ONCE
Status: COMPLETED | OUTPATIENT
Start: 2023-04-08 | End: 2023-04-08

## 2023-04-08 RX ORDER — ASPIRIN 81 MG/1
81 TABLET ORAL DAILY
Status: DISCONTINUED | OUTPATIENT
Start: 2023-04-08 | End: 2023-04-13

## 2023-04-08 RX ORDER — SODIUM BICARBONATE 650 MG/1
650 TABLET ORAL 3 TIMES DAILY
Status: DISCONTINUED | OUTPATIENT
Start: 2023-04-08 | End: 2023-04-13

## 2023-04-08 RX ORDER — METOPROLOL SUCCINATE 100 MG/1
100 TABLET, EXTENDED RELEASE ORAL NIGHTLY
Status: DISCONTINUED | OUTPATIENT
Start: 2023-04-08 | End: 2023-04-13

## 2023-04-08 RX ORDER — HYDRALAZINE HYDROCHLORIDE 50 MG/1
50 TABLET, FILM COATED ORAL NIGHTLY
Status: DISCONTINUED | OUTPATIENT
Start: 2023-04-08 | End: 2023-04-13

## 2023-04-08 RX ORDER — TRAMADOL HYDROCHLORIDE 50 MG/1
50 TABLET ORAL EVERY 8 HOURS PRN
Status: DISCONTINUED | OUTPATIENT
Start: 2023-04-08 | End: 2023-04-09

## 2023-04-08 RX ORDER — ONDANSETRON 4 MG/1
4 TABLET, FILM COATED ORAL EVERY 6 HOURS PRN
Status: DISCONTINUED | OUTPATIENT
Start: 2023-04-08 | End: 2023-04-12 | Stop reason: SDUPTHER

## 2023-04-08 RX ORDER — HEPARIN SODIUM 10000 [USP'U]/100ML
10 INJECTION, SOLUTION INTRAVENOUS
Status: DISCONTINUED | OUTPATIENT
Start: 2023-04-08 | End: 2023-04-13

## 2023-04-08 RX ORDER — IBUPROFEN 600 MG/1
1 TABLET ORAL
Status: DISCONTINUED | OUTPATIENT
Start: 2023-04-08 | End: 2023-04-13

## 2023-04-08 RX ORDER — DEXTROSE MONOHYDRATE 25 G/50ML
25 INJECTION, SOLUTION INTRAVENOUS
Status: DISCONTINUED | OUTPATIENT
Start: 2023-04-08 | End: 2023-04-13

## 2023-04-08 RX ORDER — HYDRALAZINE HYDROCHLORIDE 50 MG/1
100 TABLET, FILM COATED ORAL
Status: DISCONTINUED | OUTPATIENT
Start: 2023-04-09 | End: 2023-04-13

## 2023-04-08 RX ORDER — ENOXAPARIN SODIUM 100 MG/ML
1 INJECTION SUBCUTANEOUS ONCE
Status: COMPLETED | OUTPATIENT
Start: 2023-04-08 | End: 2023-04-08

## 2023-04-08 RX ORDER — ONDANSETRON 2 MG/ML
4 INJECTION INTRAMUSCULAR; INTRAVENOUS EVERY 6 HOURS PRN
Status: DISCONTINUED | OUTPATIENT
Start: 2023-04-08 | End: 2023-04-12 | Stop reason: SDUPTHER

## 2023-04-08 RX ORDER — ALBUTEROL SULFATE 2.5 MG/3ML
2.5 SOLUTION RESPIRATORY (INHALATION) EVERY 6 HOURS PRN
Status: DISCONTINUED | OUTPATIENT
Start: 2023-04-08 | End: 2023-04-10

## 2023-04-08 RX ORDER — ACETAMINOPHEN 325 MG/1
650 TABLET ORAL EVERY 4 HOURS PRN
Status: DISCONTINUED | OUTPATIENT
Start: 2023-04-08 | End: 2023-04-09

## 2023-04-08 RX ORDER — SODIUM CHLORIDE 0.9 % (FLUSH) 0.9 %
10 SYRINGE (ML) INJECTION AS NEEDED
Status: DISCONTINUED | OUTPATIENT
Start: 2023-04-08 | End: 2023-04-13

## 2023-04-08 RX ORDER — TRAZODONE HYDROCHLORIDE 50 MG/1
50 TABLET ORAL NIGHTLY
Status: DISCONTINUED | OUTPATIENT
Start: 2023-04-08 | End: 2023-04-13

## 2023-04-08 RX ADMIN — SODIUM BICARBONATE 650 MG: 650 TABLET ORAL at 21:15

## 2023-04-08 RX ADMIN — METOPROLOL SUCCINATE 100 MG: 100 TABLET, EXTENDED RELEASE ORAL at 21:15

## 2023-04-08 RX ADMIN — ACETAMINOPHEN 650 MG: 325 TABLET, FILM COATED ORAL at 17:02

## 2023-04-08 RX ADMIN — TRAZODONE HYDROCHLORIDE 50 MG: 50 TABLET ORAL at 21:15

## 2023-04-08 RX ADMIN — HEPARIN SODIUM 12 UNITS/KG/HR: 10000 INJECTION, SOLUTION INTRAVENOUS at 23:02

## 2023-04-08 RX ADMIN — ASPIRIN 324 MG: 81 TABLET, CHEWABLE ORAL at 11:30

## 2023-04-08 RX ADMIN — DULOXETINE HYDROCHLORIDE 30 MG: 30 CAPSULE, DELAYED RELEASE ORAL at 21:15

## 2023-04-08 RX ADMIN — HYDRALAZINE HYDROCHLORIDE 50 MG: 50 TABLET, FILM COATED ORAL at 21:15

## 2023-04-08 RX ADMIN — PERFLUTREN 2 ML: 6.52 INJECTION, SUSPENSION INTRAVENOUS at 14:45

## 2023-04-08 RX ADMIN — ENOXAPARIN SODIUM 100 MG: 100 INJECTION SUBCUTANEOUS at 13:06

## 2023-04-08 NOTE — CONSULTS
Patient Name: Lefty Cai  :1950  72 y.o.    Date of Admission: 2023  Encounter Provider: Kanchan Duran MD  Date of Encounter Visit: 23  Place of Service: [facility] CARDIOLOGY  Referring Provider: Farida Gusman MD  Patient Care Team:  Daksha Ng APRN as PCP - General (Family Medicine)  Rudy Faith MD as Consulting Physician (Ophthalmology)  Jose Mccall MD as Consulting Physician (Nephrology)  Quang Reyes MD as Consulting Physician (Nephrology)      Chief complaint:  SEGURA    History of Present Illness:    This is a gentleman with end-stage renal disease on hemodialysis, diabetes, hypertension and hyperlipidemia.  He has had worsening shortness of breath even though he has been consistent with dialysis.  He comes in today and his EKG shows new T wave inversion compared to when he had in February and his troponin is elevated.    Past Medical History:   Diagnosis Date   • Anemia    • Anesthesia complication     HYPOTENSION   • Arthritis    • Cancer of kidney, left    • CKD (chronic kidney disease)     STAGE 5   • Diabetes mellitus, type 2    • Fatigue    • GERD (gastroesophageal reflux disease)    • H/O renal cell carcinoma 2007    partial nephrectomy   • History of colostomy reversal 2022   • [place] (hard of hearing)     NO DEVICE   • Hyperlipidemia    • Hypertension    • Primary insomnia 2016   • Proteinuria    • Risk factors for obstructive sleep apnea    • Sinus drainage    • Vitamin D deficiency 2017       Past Surgical History:   Procedure Laterality Date   • ARTERIOVENOUS FISTULA/SHUNT SURGERY Left 08/15/2019    Procedure: LEFT MID FOREARM RADIAL CEPHALIC ARTERIAL VENOUS FISTULA;  Surgeon: Louann Miles Jr., MD;  Location: [facility];  Service: Vascular   • COLONOSCOPY  2022   • COLONOSCOPY N/A 2022    Procedure: COLONOSCOPY TO CECUM WITH POLYPECTOMY  (HOT SNARE);  Surgeon: Charlie  MD Yelena;  Location: [facility] ENDOSCOPY;  Service: General;  Laterality: N/A;  PREOP/ HX OF DIVERTICULITIS, COLOSTOMY  POSTOP/ POLYPS, DIVERTICULOSIS    • COLOSTOMY  09/06/2021   • COLOSTOMY CLOSURE N/A 04/12/2022    Procedure: open Colostomy reversal;  Surgeon: Yelena Guerra MD;  Location: [facility] MAIN OR;  Service: General;  Laterality: N/A;   • DIAGNOSTIC LAPAROSCOPY N/A 2/27/2023    Procedure: DIAGNOSTIC LAPAROSCOPY;  Surgeon: Murali Ferreira MD;  Location: [facility] MAIN OR;  Service: General;  Laterality: N/A;   • EXPLORATORY LAPAROTOMY N/A 09/06/2021    Procedure: Open sigmoind colectomy, colostomy, and appendectomy;  Surgeon: Yelena Guerra MD;  Location: [facility] MAIN OR;  Service: General;  Laterality: N/A;   • EYE SURGERY      CATARACTS   • KNEE ARTHROSCOPY Right    • NEPHRECTOMY PARTIAL  2007    Dr. Victor         Prior to Admission medications    Medication Sig Start Date End Date Taking? Authorizing Provider   acetaminophen (TYLENOL) 325 MG tablet Take 650 mg by mouth Every 6 (Six) Hours As Needed for Mild Pain . 9/16/21   Rina Montanez MD   aspirin 81 MG tablet Take 81 mg by mouth Every Night. FOLLOW MD GUIDELINES ON WHEN/IF TO HOLD FOR DOS    Rina Montanez MD   cetirizine (zyrTEC) 10 MG tablet Take 10 mg by mouth Daily As Needed.    Rina Montanez MD   DULoxetine (CYMBALTA) 30 MG capsule Take 1 capsule by mouth Daily.  Patient taking differently: Take 30 mg by mouth Every Night. 2/8/23   Daksha Ng APRN   hydrALAZINE (APRESOLINE) 50 MG tablet Take 2 tablets by mouth Every Morning.  Patient taking differently: Take 100 mg by mouth Daily With Breakfast. 1/9/23   Daksha Ng APRN   hydrALAZINE (APRESOLINE) 50 MG tablet Take 50 mg by mouth Every Night.    Rina Montanez MD   metoprolol succinate XL (TOPROL-XL) 100 MG 24 hr tablet Take 1 tablet by mouth Every Night. 1/9/23   Daksha Ng APRN   ondansetron (Zofran) 4 MG tablet Take 1 tablet by  mouth Every 8 (Eight) Hours As Needed for Nausea or Vomiting. 23  Murali Ferreira MD   sennosides-docusate (senna-docusate sodium) 8.6-50 MG per tablet Take 1 tablet by mouth At Night As Needed for Constipation. 23   Murali Ferreira MD   simethicone (MYLICON) 80 MG chewable tablet Chew 80 mg Every 6 (Six) Hours As Needed. 21   Yelena Guerra MD   simvastatin (ZOCOR) 40 MG tablet Take 1 tablet by mouth Every Night. 2/3/23   Daksha Ng APRN   sodium bicarbonate 650 MG tablet Take 650 mg by mouth 3 (Three) Times a Day. 17   ProviderRina MD   traMADol (ULTRAM) 50 MG tablet Take 1 tablet by mouth Every 8 (Eight) Hours As Needed for Moderate Pain for up to 6 doses. 23   Murali Ferreira MD   traZODone (DESYREL) 50 MG tablet TAKE 1 TABLET AT NIGHT AS NEEDED FOR SLEEP  Patient taking differently: Take 50 mg by mouth Every Night. 23   Daksha Ng APRN   vitamin D3 125 MCG (5000 UT) capsule capsule Take 1 tablet by mouth 2 (Two) Times a Week. SAT AND WED    ProviderRina MD       Allergies   Allergen Reactions   • Contrast Dye (Echo Or Unknown Ct/Mr) Other (See Comments)     KIDNEY DISEASE   • Latex Other (See Comments)     Blisters       Social History     Socioeconomic History   • Marital status:    Tobacco Use   • Smoking status: Former     Packs/day: 2.00     Years: 25.00     Pack years: 50.00     Types: Cigarettes     Quit date:      Years since quittin.2   • Smokeless tobacco: Current     Types: Snuff   Vaping Use   • Vaping Use: Never used   Substance and Sexual Activity   • Alcohol use: No   • Drug use: No   • Sexual activity: Defer       Family History   Problem Relation Age of Onset   • Hypertension Mother    • Heart disease Father         ASCVD   • Macular degeneration Father    • Cancer Father         bladder   • Diabetes type I Sister    • Malig Hyperthermia Neg Hx        REVIEW OF SYSTEMS:   All  other systems reviewed and negative.        Objective:     Vitals:    04/08/23 1201 04/08/23 1301 04/08/23 1331 04/08/23 1342   BP: 143/86 140/89 132/82    Pulse:  73 67 69   Resp:       Temp:       TempSrc:       SpO2:  95% 96% 96%   Weight:       Height:         Body mass index is 32.28 kg/m².  No intake or output data in the 24 hours ending 04/08/23 1403    Constitutional: He is oriented to person, place, and time. He appears well-developed. He does not appear ill.   HENT:   Head: Normocephalic and atraumatic. Head is without contusion.   Right Ear: Hearing normal. No drainage.   Left Ear: Hearing normal. No drainage.   Nose: No nasal deformity. No epistaxis.   Eyes: Lids are normal. Right eye exhibits no exudate. Left eye exhibits no exudate.  Neck: No JVD present. Carotid bruit is not present. No tracheal deviation present. No thyroid mass and no thyromegaly present.   Cardiovascular: Normal rate, regular rhythm and normal heart sounds.    Pulses:       Posterior tibial pulses are 2+ on the right side, and 2+ on the left side.   Pulmonary/Chest: Effort normal and breath sounds normal.   Abdominal: Soft. Normal appearance and bowel sounds are normal. There is no tenderness.   Musculoskeletal: Normal range of motion.        Right shoulder: He exhibits no deformity.        Left shoulder: He exhibits no deformity.   Neurological: He is alert and oriented to person, place, and time. He has normal strength.   Skin: Skin is warm, dry and intact. No rash noted.   Psychiatric: He has a normal mood and affect. His behavior is normal. Thought content normal.   Vitals reviewed      Lab Review:     Results from last 7 days   Lab Units 04/08/23  1142   SODIUM mmol/L 139   POTASSIUM mmol/L 3.9   CHLORIDE mmol/L 104   CO2 mmol/L 20.1*   BUN mg/dL 27*   CREATININE mg/dL 4.67*   CALCIUM mg/dL 8.6   BILIRUBIN mg/dL 0.4   ALK PHOS U/L 68   ALT (SGPT) U/L 11   AST (SGOT) U/L 26   GLUCOSE mg/dL 123*     Results from last 7 days    Lab Units 04/08/23  1142   HSTROP T ng/L 811*     Results from last 7 days   Lab Units 04/08/23  1142   WBC 10*3/mm3 14.47*   HEMOGLOBIN g/dL 12.0*   HEMATOCRIT % 35.3*   PLATELETS 10*3/mm3 195                 I personally viewed and interpreted the patient's EKG/Telemetry data.        Assessment and Plan:       1.  Non-ST elevation myocardial infarction.  He is going to receive a dose of Lovenox in the emergency room.  I am checking an echocardiogram for wall motion abnormality.  N.p.o. after midnight.  Plan for heart catheterization in the morning.  Continue metoprolol.  2.  Diabetes  3.  End-stage renal disease on hemodialysis  4.  Hypertension  5.  Hyperlipidemia discontinue simvastatin for high-dose atorvastatin 80 mg a day.    -N.p.o. after midnight.  -Heart catheterization tomorrow.    Kanchan Duran MD  04/08/23  14:03 EDT

## 2023-04-08 NOTE — CONSULTS
[facility] Consult Note      Patient Name: Lefty Cai  : 1950  MRN: [id number]  Primary Care Physician:  Daksha Ng, DUSTY  Referring Physician: Farida Gusman MD  Date of admission: 2023    Subjective     Reason for Consult: End-stage renal    HPI:   Lefty Cai is a 72 y.o. male presented to the emergency department with increasing shortness of breath over the past 24 hours he has a history of end-stage renal disease notably started on dialysis every Tuesday, Thursday and Saturday he does not have any evidence of fluid excess.  Noted to have abnormal EKG.  Currently being evaluated by cardiology  The patient has end-stage renal disease second diabetic and hypertensive glomerulosclerosis, he has anemia of chronic kidney disease, he has diabetes mellitus type 2 with diabetic chronic kidney disease, he had prior left partial nephrectomy for renal cell cancer, he has hypertension associate with chronic kidney disease, history of diverticulitis with previous colon resection and colostomy and reversal of colostomy.  He has dyslipidemia, history of hyperuricemia, history of renal cell cancer, metabolic acidosis and vitamin D deficiency and he has [place] hyperparathyroidism of renal origin.    At present he is having difficulty breathing with minimal exertion and have some orthopnea, no chest pain, no nausea or vomiting, no abdominal pain, no dysuria or gross hematuria he has left arm AV fistula that has infiltration and currently using tunneled dialysis catheter.    Review of Systems:   14 point review of systems is otherwise negative except for mentioned above on HPI    Personal History     Past Medical History:   Diagnosis Date   • Anemia    • Anesthesia complication     HYPOTENSION   • Arthritis    • Cancer of kidney, left    • CKD (chronic kidney disease)     STAGE 5   • Diabetes mellitus, type 2    • Fatigue    • GERD (gastroesophageal reflux disease)     • H/O renal cell carcinoma 2007    partial nephrectomy   • History of colostomy reversal 04/2022   • [place] (hard of hearing)     NO DEVICE   • Hyperlipidemia    • Hypertension    • Primary insomnia 06/13/2016   • Proteinuria    • Risk factors for obstructive sleep apnea    • Sinus drainage    • Vitamin D deficiency 08/14/2017       Past Surgical History:   Procedure Laterality Date   • ARTERIOVENOUS FISTULA/SHUNT SURGERY Left 08/15/2019    Procedure: LEFT MID FOREARM RADIAL CEPHALIC ARTERIAL VENOUS FISTULA;  Surgeon: Louann Miles Jr., MD;  Location: [facility] OR;  Service: Vascular   • COLONOSCOPY  03/24/2022   • COLONOSCOPY N/A 03/24/2022    Procedure: COLONOSCOPY TO CECUM WITH POLYPECTOMY  (HOT SNARE);  Surgeon: Yelena Guerra MD;  Location: [facility] ENDOSCOPY;  Service: General;  Laterality: N/A;  PREOP/ HX OF DIVERTICULITIS, COLOSTOMY  POSTOP/ POLYPS, DIVERTICULOSIS    • COLOSTOMY  09/06/2021   • COLOSTOMY CLOSURE N/A 04/12/2022    Procedure: open Colostomy reversal;  Surgeon: Yelena Guerra MD;  Location: [facility] OR;  Service: General;  Laterality: N/A;   • DIAGNOSTIC LAPAROSCOPY N/A 2/27/2023    Procedure: DIAGNOSTIC LAPAROSCOPY;  Surgeon: Murali Ferreira MD;  Location: [facility] OR;  Service: General;  Laterality: N/A;   • EXPLORATORY LAPAROTOMY N/A 09/06/2021    Procedure: Open sigmoind colectomy, colostomy, and appendectomy;  Surgeon: Yelena Guerra MD;  Location: [facility] OR;  Service: General;  Laterality: N/A;   • EYE SURGERY      CATARACTS   • KNEE ARTHROSCOPY Right    • NEPHRECTOMY PARTIAL  2007    Dr. Victor       Family History: family history includes Cancer in his father; Diabetes type I in his sister; Heart disease in his father; Hypertension in his mother; Macular degeneration in his father.    Social History:  reports that he quit smoking about 24 years ago. His smoking use included cigarettes. He has a 50.00 pack-year smoking history. His smokeless  tobacco use includes snuff. He reports that he does not drink alcohol and does not use drugs.    Home Medications:  Prior to Admission medications    Medication Sig Start Date End Date Taking? Authorizing Provider   acetaminophen (TYLENOL) 325 MG tablet Take 650 mg by mouth Every 6 (Six) Hours As Needed for Mild Pain . 9/16/21   Rina Montanez MD   aspirin 81 MG tablet Take 81 mg by mouth Every Night. FOLLOW MD GUIDELINES ON WHEN/IF TO HOLD FOR DOS    Rina Montanez MD   cetirizine (zyrTEC) 10 MG tablet Take 10 mg by mouth Daily As Needed.    Rina Montanez MD   DULoxetine (CYMBALTA) 30 MG capsule Take 1 capsule by mouth Daily.  Patient taking differently: Take 30 mg by mouth Every Night. 2/8/23   Daksha Ng APRN   hydrALAZINE (APRESOLINE) 50 MG tablet Take 2 tablets by mouth Every Morning.  Patient taking differently: Take 100 mg by mouth Daily With Breakfast. 1/9/23   Daksha Ng APRN   hydrALAZINE (APRESOLINE) 50 MG tablet Take 50 mg by mouth Every Night.    Rina Montanez MD   metoprolol succinate XL (TOPROL-XL) 100 MG 24 hr tablet Take 1 tablet by mouth Every Night. 1/9/23   Daksha Ng APRN   ondansetron (Zofran) 4 MG tablet Take 1 tablet by mouth Every 8 (Eight) Hours As Needed for Nausea or Vomiting. 2/27/23 2/27/24  Murali Ferreira MD   sennosides-docusate (senna-docusate sodium) 8.6-50 MG per tablet Take 1 tablet by mouth At Night As Needed for Constipation. 2/27/23   Murali Ferreira MD   simethicone (MYLICON) 80 MG chewable tablet Chew 80 mg Every 6 (Six) Hours As Needed. 9/23/21   Yelena Guerra MD   simvastatin (ZOCOR) 40 MG tablet Take 1 tablet by mouth Every Night. 2/3/23   Daksha Ng APRN   sodium bicarbonate 650 MG tablet Take 650 mg by mouth 3 (Three) Times a Day. 8/12/17   Rina Montanez MD   traMADol (ULTRAM) 50 MG tablet Take 1 tablet by mouth Every 8 (Eight) Hours As Needed for Moderate Pain for up to 6 doses.  2/27/23   Murali Ferreira MD   traZODone (DESYREL) 50 MG tablet TAKE 1 TABLET AT NIGHT AS NEEDED FOR SLEEP  Patient taking differently: Take 50 mg by mouth Every Night. 1/9/23   Daksha Ng, APRN   vitamin D3 125 MCG (5000 UT) capsule capsule Take 1 tablet by mouth 2 (Two) Times a Week. SAT AND WED    ProviderRina MD       Allergies:  Allergies   Allergen Reactions   • Contrast Dye (Echo Or Unknown Ct/Mr) Other (See Comments)     KIDNEY DISEASE   • Latex Other (See Comments)     Blisters       Objective     Vitals:   Temp:  [97.8 °F (36.6 °C)] 97.8 °F (36.6 °C)  Heart Rate:  [67-73] 69  Resp:  [20] 20  BP: (132-143)/(82-89) 132/82  Flow (L/min):  [2] 2  No intake or output data in the 24 hours ending 04/08/23 1443    Physical Exam:   Constitutional: Awake, alert, no acute distress.,  Chronically ill  HEENT: Sclera anicteric, no conjunctival injection, oral mucosa is no  Neck: Supple, no thyromegaly, no lymphadenopathy, trachea at midline, no JVD, tunneled dialysis catheter in the right IJ with exit site below the right clavicle  Respiratory: Clear to auscultation bilaterally, nonlabored respiration  Cardiovascular: RRR, no murmurs, no rubs or gallops, no carotid bruit  Gastrointestinal: Positive bowel sounds, abdomen is soft, nontender and nondistended  : No palpable bladder  Musculoskeletal: No edema, no clubbing or cyanosis, functional AV fistula in the left forearm with good thrill and bruit  Psychiatric: Appropriate affect, cooperative  Neurologic: Oriented x3, moving all extremities, normal speech and mental status  Skin: Warm and dry       Scheduled Meds:        IV Meds:        Results Reviewed:   I have personally reviewed the results from the time of this admission to 4/8/2023 14:43 EDT     Lab Results   Component Value Date    GLUCOSE 123 (H) 04/08/2023    CALCIUM 8.6 04/08/2023     04/08/2023    K 3.9 04/08/2023    CO2 20.1 (L) 04/08/2023     04/08/2023    BUN 27 (H)  04/08/2023    CREATININE 4.67 (H) 04/08/2023    EGFRIFAFRI  09/12/2021      Comment:      <15 Indicative of kidney failure.    EGFRIFNONA 12 (L) 09/12/2021    BCR 5.8 (L) 04/08/2023    ANIONGAP 14.9 04/08/2023      Lab Results   Component Value Date    MG 2.1 09/11/2021    PHOS 4.4 09/11/2021    ALBUMIN 3.5 04/08/2023           Assessment / Plan     ASSESSMENT:  -End-stage renal disease and diabetic and hypertensive glomerulosclerosis on maintenance hemodialysis every Tuesday, Thursday and Saturday he did not have his dialysis today when he presented with shortness of breath  -Diabetes mellitus type 2 with renal complication  -Hypertension with chronic kidney disease  -Non-ST elevation MI with plan for heart catheterization tomorrow  -Secondary hyperparathyroidism of renal origin    PLAN:  -I agree with the present treatment  -Plan hemodialysis tomorrow after his heart cath  -Surveillance labs    Thank you for involving us in the care of Lefty Cai.  Please feel free to call with any questions.    Quang Reyes MD  04/08/23  14:43 EDT    [facility]  [phone]      Please note that portions of this note were completed with a voice recognition program.

## 2023-04-08 NOTE — Clinical Note
Hemostasis started on the right radial artery. R-Band was used in achieving hemostasis. Radial compression device applied to vessel. Hemostasis achieved successfully. Closure device additional comment: 12 feng of air inserted

## 2023-04-08 NOTE — ED TRIAGE NOTES
Pt reports soa x 3 days.  He was 95% RA on ems arrival.  He is congested and coughing.  He was given alb tx x 1 on ems

## 2023-04-08 NOTE — H&P
HISTORY AND PHYSICAL   [facility]        Date of Admission: 2023  Patient Identification:  Name: Lefty Cai  Age: 72 y.o.  Sex: male  :  1950  MRN: [id number]                     Primary Care Physician: Daksha Ng APRN    Chief Complaint:  72 year old gentleman presented to the emergency room with shortness of breath with cough, white sputum which started 3-4 days ago; he was worse with exertion; denies chest pain; reports some improvement with a minineb treatment given by ems en route    History of Present Illness:   As above    Past Medical History:  Past Medical History:   Diagnosis Date   • Anemia    • Anesthesia complication     HYPOTENSION   • Arthritis    • Cancer of kidney, left    • CKD (chronic kidney disease)     STAGE 5   • Diabetes mellitus, type 2    • Fatigue    • GERD (gastroesophageal reflux disease)    • H/O renal cell carcinoma 2007    partial nephrectomy   • History of colostomy reversal 2022   • [place] (hard of hearing)     NO DEVICE   • Hyperlipidemia    • Hypertension    • Primary insomnia 2016   • Proteinuria    • Risk factors for obstructive sleep apnea    • Sinus drainage    • Vitamin D deficiency 2017     Past Surgical History:  Past Surgical History:   Procedure Laterality Date   • ARTERIOVENOUS FISTULA/SHUNT SURGERY Left 08/15/2019    Procedure: LEFT MID FOREARM RADIAL CEPHALIC ARTERIAL VENOUS FISTULA;  Surgeon: Louann Miles Jr., MD;  Location: [facility] OR;  Service: Vascular   • COLONOSCOPY  2022   • COLONOSCOPY N/A 2022    Procedure: COLONOSCOPY TO CECUM WITH POLYPECTOMY  (HOT SNARE);  Surgeon: Yelena Guerra MD;  Location: [facility] ENDOSCOPY;  Service: General;  Laterality: N/A;  PREOP/ HX OF DIVERTICULITIS, COLOSTOMY  POSTOP/ POLYPS, DIVERTICULOSIS    • COLOSTOMY  2021   • COLOSTOMY CLOSURE N/A 2022    Procedure: open Colostomy reversal;  Surgeon: Yelena Guerra MD;  Location: [facility]  OR;  Service: General;  Laterality: N/A;   • DIAGNOSTIC LAPAROSCOPY N/A 2/27/2023    Procedure: DIAGNOSTIC LAPAROSCOPY;  Surgeon: Murali Ferreira MD;  Location: [facility] MAIN OR;  Service: General;  Laterality: N/A;   • EXPLORATORY LAPAROTOMY N/A 09/06/2021    Procedure: Open sigmoind colectomy, colostomy, and appendectomy;  Surgeon: Yelena Guerra MD;  Location: [facility] MA[facility] OR;  Service: General;  Laterality: N/A;   • EYE SURGERY      CATARACTS   • KNEE ARTHROSCOPY Right    • NEPHRECTOMY PARTIAL  2007    Dr. Victor      Home Meds:  Medications Prior to Admission   Medication Sig Dispense Refill Last Dose   • aspirin 81 MG tablet Take 1 tablet by mouth Every Night. FOLLOW MD GUIDELINES ON WHEN/IF TO HOLD FOR DOS   4/7/2023   • cetirizine (zyrTEC) 10 MG tablet Take 1 tablet by mouth Daily As Needed.   4/7/2023   • DULoxetine (CYMBALTA) 30 MG capsule Take 1 capsule by mouth Daily. (Patient taking differently: Take 1 capsule by mouth Every Night.) 30 capsule 5 4/7/2023   • hydrALAZINE (APRESOLINE) 50 MG tablet Take 2 tablets by mouth Every Morning. (Patient taking differently: Take 2 tablets by mouth Daily With Breakfast.) 60 tablet 5 4/7/2023   • hydrALAZINE (APRESOLINE) 50 MG tablet Take 1 tablet by mouth Every Night.   4/7/2023   • metoprolol succinate XL (TOPROL-XL) 100 MG 24 hr tablet Take 1 tablet by mouth Every Night. 90 tablet 1 4/7/2023   • simvastatin (ZOCOR) 40 MG tablet Take 1 tablet by mouth Every Night. 90 tablet 1 4/7/2023   • sodium bicarbonate 650 MG tablet Take 1 tablet by mouth 3 (Three) Times a Day.  0 4/7/2023   • traZODone (DESYREL) 50 MG tablet TAKE 1 TABLET AT NIGHT AS NEEDED FOR SLEEP (Patient taking differently: Take 1 tablet by mouth Every Night.) 90 tablet 3 4/7/2023   • acetaminophen (TYLENOL) 325 MG tablet Take 2 tablets by mouth Every 6 (Six) Hours As Needed for Mild Pain.   Unknown   • ondansetron (Zofran) 4 MG tablet Take 1 tablet by mouth Every 8 (Eight) Hours As Needed  for Nausea or Vomiting. 30 tablet 1 Unknown   • sennosides-docusate (senna-docusate sodium) 8.6-50 MG per tablet Take 1 tablet by mouth At Night As Needed for Constipation. 60 tablet 0 Unknown   • simethicone (MYLICON) 80 MG chewable tablet Chew 1 tablet Every 6 (Six) Hours As Needed.   Unknown   • traMADol (ULTRAM) 50 MG tablet Take 1 tablet by mouth Every 8 (Eight) Hours As Needed for Moderate Pain for up to 6 doses. 6 tablet 0    • vitamin D3 125 MCG (5000 UT) capsule capsule Take 1 tablet by mouth 2 (Two) Times a Week. SAT AND WED   2023       Allergies:  Allergies   Allergen Reactions   • Contrast Dye (Echo Or Unknown Ct/Mr) Other (See Comments)     KIDNEY DISEASE   • Latex Other (See Comments)     Blisters     Immunizations:  Immunization History   Administered Date(s) Administered   • COVID-19 (PFIZER) PURPLE CAP 2021, 2021, 2021   • FLUAD TRI 65YR+ 10/17/2019   • Flu Vaccine Split Quad 2015   • FluLaval/Fluzone >6mos 2021   • FluMist 2-49yrs 10/21/2015   • Fluad Quad 65+ 2020, 2022   • Fluzone High Dose =>65 Years ([employer] ONLY) 10/06/2016, 2017, 10/17/2018   • Influenza, Unspecified 2021     Social History:   Social History     Social History Narrative   • Not on file     Social History     Socioeconomic History   • Marital status:    Tobacco Use   • Smoking status: Former     Packs/day: 2.00     Years: 25.00     Pack years: 50.00     Types: Cigarettes     Quit date:      Years since quittin.2   • Smokeless tobacco: Current     Types: Snuff   Vaping Use   • Vaping Use: Never used   Substance and Sexual Activity   • Alcohol use: No   • Drug use: No   • Sexual activity: Defer       Family History:  Family History   Problem Relation Age of Onset   • Hypertension Mother    • Heart disease Father         ASCVD   • Macular degeneration Father    • Cancer Father         bladder   • Diabetes type I Sister    • [place] Hyperthermia Neg Hx      "    Review of Systems  See history of present illness and past medical history.  Patient denies headache, dizziness, syncope, falls, trauma, change in vision, change in hearing, change in taste, changes in weight, changes in appetite, focal weakness, numbness, or paresthesia.  Patient denies chest pain, palpitations, dyspnea, orthopnea, PND, cough, sinus pressure, rhinorrhea, epistaxis, hemoptysis, nausea, vomiting,hematemesis, diarrhea, constipation or hematchezia.  Denies cold or heat intolerance, polydipsia, polyuria, polyphagia. Denies hematuria, pyuria, dysuria, hesitancy, frequency or urgency. Denies consumption of raw and under cooked meats foods or change in water source.  Denies fever, chills, sweats, night sweats.  Denies missing any routine medications. Remainder of ROS is negative.    Objective:  T Max 24 hrs: Temp (24hrs), Av.7 °F (36.5 °C), Min:97.6 °F (36.4 °C), Max:97.8 °F (36.6 °C)    Vitals Ranges:   Temp:  [97.6 °F (36.4 °C)-97.8 °F (36.6 °C)] 97.6 °F (36.4 °C)  Heart Rate:  [67-73] 70  Resp:  [18-20] 18  BP: (128-143)/(76-89) 128/76      Exam:  /76 (BP Location: Right arm, Patient Position: Lying)   Pulse 70   Temp 97.6 °F (36.4 °C) (Oral)   Resp 18   Ht 177.8 cm (70\")   Wt 99.8 kg (220 lb)   SpO2 96%   BMI 31.57 kg/m²     General Appearance:    Alert, cooperative, no distress, appears stated age   Head:    Normocephalic, without obvious abnormality, atraumatic   Eyes:    PERRL, conjunctivae/corneas clear, EOM's intact, both eyes   Ears:    Normal external ear canals, both ears   Nose:   Nares normal, septum midline, mucosa normal, no drainage    or sinus tenderness   Throat:   Lips, mucosa, and tongue normal   Neck:   Supple, symmetrical, trachea midline, no adenopathy;     thyroid:  no enlargement/tenderness/nodules; no carotid    bruit or JVD   Back:     Symmetric, no curvature, ROM normal, no CVA tenderness   Lungs:     Decreased breath sounds bilaterally, respirations " unlabored   Chest Wall:    No tenderness or deformity    Heart:    Regular rate and rhythm, S1 and S2 normal, no murmur, rub   or gallop   Abdomen:     Soft, nontender, bowel sounds active all four quadrants,     no masses, no hepatomegaly, no splenomegaly   Extremities:   Extremities normal, atraumatic, no cyanosis or edema   Pulses:   2+ and symmetric all extremities   Skin:   Skin color, texture, turgor normal, no rashes or lesions               .    Data Review:  Labs in chart were reviewed.  WBC   Date Value Ref Range Status   04/08/2023 14.47 (H) 3.40 - 10.80 10*3/mm3 Final     Hemoglobin   Date Value Ref Range Status   04/08/2023 12.0 (L) 13.0 - 17.7 g/dL Final     Hematocrit   Date Value Ref Range Status   04/08/2023 35.3 (L) 37.5 - 51.0 % Final     Platelets   Date Value Ref Range Status   04/08/2023 195 140 - 450 10*3/mm3 Final     Sodium   Date Value Ref Range Status   04/08/2023 139 136 - 145 mmol/L Final     Potassium   Date Value Ref Range Status   04/08/2023 3.9 3.5 - 5.2 mmol/L Final     Comment:     Slight hemolysis detected by analyzer. Results may be affected.     Chloride   Date Value Ref Range Status   04/08/2023 104 98 - 107 mmol/L Final     CO2   Date Value Ref Range Status   04/08/2023 20.1 (L) 22.0 - 29.0 mmol/L Final     BUN   Date Value Ref Range Status   04/08/2023 27 (H) 8 - 23 mg/dL Final     Creatinine   Date Value Ref Range Status   04/08/2023 4.67 (H) 0.76 - 1.27 mg/dL Final     Glucose   Date Value Ref Range Status   04/08/2023 123 (H) 65 - 99 mg/dL Final     Calcium   Date Value Ref Range Status   04/08/2023 8.6 8.6 - 10.5 mg/dL Final     AST (SGOT)   Date Value Ref Range Status   04/08/2023 26 1 - 40 U/L Final     Comment:     Slight hemolysis detected by analyzer. Results may be affected.     ALT (SGPT)   Date Value Ref Range Status   04/08/2023 11 1 - 41 U/L Final     Alkaline Phosphatase   Date Value Ref Range Status   04/08/2023 68 39 - 117 U/L Final                Imaging  Results (All)     Procedure Component Value Units Date/Time    XR Chest 1 View [[id number]] Collected: 04/08/23 1112     Updated: 04/08/23 1117    Narrative:      XR CHEST 1 VW-     HISTORY: Male who is 72 years-old,  dyspnea     TECHNIQUE: Frontal view of the chest     COMPARISON: 9/6/2021     FINDINGS:  Right-sided central venous catheter extends to the superior vena cava.  The heart size is normal. Aorta is calcified. Mild infiltrate or  atelectasis is seen at the bases. No pleural effusion or pneumothorax.  No acute osseous process.       Impression:      Mild infiltrate or atelectasis at the bases, follow-up  suggested.     This report was finalized on 4/8/2023 11:14 AM by Dr. Jd Vargas M.D.               Assessment:  Active Hospital Problems    Diagnosis  POA   • **Dyspnea on exertion [R06.09]  Yes      Resolved Hospital Problems   No resolved problems to display.   esrd on hd  Diabetes  Hypertension  nstemi  Hyperlipidemia  Obesity  anemia  Plan:  Cardiac cath is planned for tomorrow  Nephrology is following  accu checks, insulin sliding scale  Monitor on telemetry  accu checks, insulin sliding scale  D.w patient, family and ED provider    Farida Gusman MD  4/8/2023  18:35 EDT

## 2023-04-08 NOTE — ED NOTES
"Nursing report ED to floor  Lefty Cai  72 y.o.  male    HPI :   Chief Complaint   Patient presents with    Shortness of Breath    Cough       Admitting doctor:   Farida Gusman MD    Admitting diagnosis:   The primary encounter diagnosis was Dyspnea on exertion. Diagnoses of NSTEMI (non-ST elevated myocardial infarction), ESRD (end stage renal disease), Former smoker, Elevated blood pressure reading in office with diagnosis of hypertension, and Rhinovirus infection were also pertinent to this visit.    Code status:   Current Code Status       Date Active Code Status Order ID Comments User Context       Prior            Allergies:   Contrast dye (echo or unknown ct/mr) and Latex    Isolation:   No active isolations    Intake and Output  No intake or output data in the 24 hours ending 04/08/23 1342    Weight:       04/08/23  1028   Weight: 102 kg (225 lb)       Most recent vitals:   Vitals:    04/08/23 1028 04/08/23 1141 04/08/23 1201 04/08/23 1301   BP:   143/86 140/89   Pulse:  69  73   Resp:       Temp:       TempSrc:       SpO2:  96%  95%   Weight: 102 kg (225 lb)      Height: 177.8 cm (70\")          Active LDAs/IV Access:   Lines, Drains & Airways       Active LDAs       None                    Labs (abnormal labs have a star):   Labs Reviewed   RESPIRATORY PANEL PCR W/ COVID-19 (SARS-COV-2) GLORIA/SATISH/MAREK/PAD/COR/MAD/IDRIS IN-HOUSE, NP SWAB IN UTM/VTP, 3-4 HR TAT - Abnormal; Notable for the following components:       Result Value    Human Rhinovirus/Enterovirus Detected (*)     All other components within normal limits    Narrative:     In the setting of a positive respiratory panel with a viral infection PLUS a negative procalcitonin without other underlying concern for bacterial infection, consider observing off antibiotics or discontinuation of antibiotics and continue supportive care. If the respiratory panel is positive for atypical bacterial infection (Bordetella pertussis, Chlamydophila " pneumoniae, or Mycoplasma pneumoniae), consider antibiotic de-escalation to target atypical bacterial infection.   COMPREHENSIVE METABOLIC PANEL - Abnormal; Notable for the following components:    Glucose 123 (*)     BUN 27 (*)     Creatinine 4.67 (*)     CO2 20.1 (*)     BUN/Creatinine Ratio 5.8 (*)     eGFR 12.5 (*)     All other components within normal limits    Narrative:     GFR Normal >60  Chronic Kidney Disease <60  Kidney Failure <15    The GFR formula is only valid for adults with stable renal function between ages 18 and 70.   BNP (IN-HOUSE) - Abnormal; Notable for the following components:    proBNP 38,790.0 (*)     All other components within normal limits    Narrative:     Among patients with dyspnea, NT-proBNP is highly sensitive for the detection of acute congestive heart failure. In addition NT-proBNP of <300 pg/ml effectively rules out acute congestive heart failure with 99% negative predictive value.    Results may be falsely decreased if patient taking Biotin.     TROPONIN - Abnormal; Notable for the following components:    HS Troponin T 811 (*)     All other components within normal limits    Narrative:     High Sensitive Troponin T Reference Range:  <10.0 ng/L- Negative Female for AMI  <15.0 ng/L- Negative Male for AMI  >=10 - Abnormal Female indicating possible myocardial injury.  >=15 - Abnormal Male indicating possible myocardial injury.   Clinicians would have to utilize clinical acumen, EKG, Troponin, and serial changes to determine if it is an Acute Myocardial Infarction or myocardial injury due to an underlying chronic condition.        LACTIC ACID, PLASMA - Abnormal; Notable for the following components:    Lactate 2.1 (*)     All other components within normal limits   CBC WITH AUTO DIFFERENTIAL - Abnormal; Notable for the following components:    WBC 14.47 (*)     RBC 3.86 (*)     Hemoglobin 12.0 (*)     Hematocrit 35.3 (*)     Neutrophil % 85.7 (*)     Lymphocyte % 5.7 (*)      "Neutrophils, Absolute 12.40 (*)     Monocytes, Absolute 1.11 (*)     All other components within normal limits   PROCALCITONIN - Normal    Narrative:     As a Marker for Sepsis (Non-Neonates):    1. <0.5 ng/mL represents a low risk of severe sepsis and/or septic shock.  2. >2 ng/mL represents a high risk of severe sepsis and/or septic shock.    As a Marker for Lower Respiratory Tract Infections that require antibiotic therapy:    PCT on Admission    Antibiotic Therapy       6-12 Hrs later    >0.5                Strongly Recommended  >0.25 - <0.5        Recommended   0.1 - 0.25          Discouraged              Remeasure/reassess PCT  <0.1                Strongly Discouraged     Remeasure/reassess PCT    As 28 day mortality risk marker: \"Change in Procalcitonin Result\" (>80% or <=80%) if Day 0 (or Day 1) and Day 4 values are available. Refer to http://www.[employer]-pct-calculator.com    Change in PCT <=80%  A decrease of PCT levels below or equal to 80% defines a positive change in PCT test result representing a higher risk for 28-day all-cause mortality of patients diagnosed with severe sepsis for septic shock.    Change in PCT >80%  A decrease of PCT levels of more than 80% defines a negative change in PCT result representing a lower risk for 28-day all-cause mortality of patients diagnosed with severe sepsis or septic shock.      HIGH SENSITIVITIY TROPONIN T 2HR   LACTIC ACID, REFLEX   CBC AND DIFFERENTIAL    Narrative:     The following orders were created for panel order CBC & Differential.  Procedure                               Abnormality         Status                     ---------                               -----------         ------                     CBC Auto Differential[[id number]]        Abnormal            Final result                 Please view results for these tests on the individual orders.       EKG:   ECG 12 Lead Dyspnea   Preliminary Result   HEART RATE= 72  bpm   RR Interval= 833  ms   [place] " Interval= 164  ms   P Horizontal Axis= -29  deg   P Front Axis= 59  deg   QRSD Interval= 88  ms   QT Interval= 394  ms   QRS Axis= 25  deg   T Wave Axis= 141  deg   - ABNORMAL ECG -   Sinus rhythm   Borderline low voltage, extremity leads   Repol abnrm suggests ischemia, anterolateral   Electronically Signed By:    Date and Time of Study: 2023 10:50:24          Meds given in ED:   Medications   sodium chloride 0.9 % flush 10 mL (has no administration in time range)   aspirin chewable tablet 324 mg (324 mg Oral Given 23 1130)   Enoxaparin Sodium (LOVENOX) syringe 100 mg (100 mg Subcutaneous Given 23 1306)       Imaging results:  XR Chest 1 View    Result Date: 2023  Mild infiltrate or atelectasis at the bases, follow-up suggested.  This report was finalized on 2023 11:14 AM by Dr. Jd Vargas M.D.       Ambulatory status:   - adlib      Social issues:   Social History     Socioeconomic History    Marital status:    Tobacco Use    Smoking status: Former     Packs/day: 2.00     Years: 25.00     Pack years: 50.00     Types: Cigarettes     Quit date:      Years since quittin.2    Smokeless tobacco: Current     Types: Snuff   Vaping Use    Vaping Use: Never used   Substance and Sexual Activity    Alcohol use: No    Drug use: No    Sexual activity: Defer       NIH Stroke Scale:         Yulia Martinez RN  23 13:42 EDT

## 2023-04-08 NOTE — ED PROVIDER NOTES
EMERGENCY DEPARTMENT ENCOUNTER    Room Number:  38/38  Date of encounter:  4/8/2023  PCP: Daksha Ng, DUSTY  Historian: Patient and EMS report to triage nurse    Patient was placed in face mask during triage process. Patient was wearing facemask when I entered the room and throughout our encounter. I wore full protective equipment throughout this patient encounter including a face mask, eye protection, and gloves. Hand hygiene was performed before donning protective equipment and again following doffing of PPE after leaving the room.    HPI:  Chief Complaint: Cough and dyspnea  A complete HPI/ROS/PMH/PSH/SH/FH are unobtainable due to: N/A   Context: Lefty Cai is a 72 y.o. male with ESRD who dialyzes Tuesday, Thursday, and Saturday who presents to the ED c/o mildly productive cough with white sputum over the last 3 to 4 days with increasing dyspnea on exertion.  Patient reports full dialysis runs on Tuesday and Thursday.  He denies any access weight gain or fluid retention.  No reported fevers or mops this.  Patient feels somewhat improved after neb treatment in route with EMS.      MEDICAL HISTORY REVIEW  EMR reviewed:    Review of EMR shows that the patient follows with nephrology and had an office visit 3/24/2023.    PAST MEDICAL HISTORY  Active Ambulatory Problems     Diagnosis Date Noted   • Type 2 diabetes mellitus with diabetic chronic kidney disease 06/13/2016   • Essential hypertension 06/13/2016   • Hyperlipidemia 06/13/2016   • Primary insomnia 06/13/2016   • CKD (chronic kidney disease) stage 4, GFR 15-29 ml/min ([facility]) 06/13/2016   • Vitamin D deficiency 08/14/2017   • Diverticulitis of large intestine with perforation and abscess without bleeding 09/07/2021   • Obesity (BMI 30-39.9) 09/07/2021   • Impaired mobility and ADLs 09/12/2021   • History of colon resection 01/11/2022   • Colon polyps 03/24/2022   • Diverticulosis 03/24/2022   • CKD (chronic kidney disease) stage 5, GFR less than 15  ml/min 02/23/2023     Resolved Ambulatory Problems     Diagnosis Date Noted   • Free intraperitoneal air 09/07/2021   • Colostomy in place 01/11/2022     Past Medical History:   Diagnosis Date   • Anemia    • Anesthesia complication    • Arthritis    • Cancer of kidney, left    • CKD (chronic kidney disease)    • Diabetes mellitus, type 2    • Fatigue    • GERD (gastroesophageal reflux disease)    • H/O renal cell carcinoma 2007   • History of colostomy reversal 04/2022   • [place] (hard of hearing)    • Hypertension    • Proteinuria    • Risk factors for obstructive sleep apnea    • Sinus drainage          PAST SURGICAL HISTORY  Past Surgical History:   Procedure Laterality Date   • ARTERIOVENOUS FISTULA/SHUNT SURGERY Left 08/15/2019    Procedure: LEFT MID FOREARM RADIAL CEPHALIC ARTERIAL VENOUS FISTULA;  Surgeon: Louann Miles Jr., MD;  Location: [facility] OR;  Service: Vascular   • COLONOSCOPY  03/24/2022   • COLONOSCOPY N/A 03/24/2022    Procedure: COLONOSCOPY TO CECUM WITH POLYPECTOMY  (HOT SNARE);  Surgeon: Yelena Guerra MD;  Location: [facility] ENDOSCOPY;  Service: General;  Laterality: N/A;  PREOP/ HX OF DIVERTICULITIS, COLOSTOMY  POSTOP/ POLYPS, DIVERTICULOSIS    • COLOSTOMY  09/06/2021   • COLOSTOMY CLOSURE N/A 04/12/2022    Procedure: open Colostomy reversal;  Surgeon: Yelena Guerra MD;  Location: [facility] MAIN OR;  Service: General;  Laterality: N/A;   • DIAGNOSTIC LAPAROSCOPY N/A 2/27/2023    Procedure: DIAGNOSTIC LAPAROSCOPY;  Surgeon: Murali Ferreira MD;  Location: [facility] OR;  Service: General;  Laterality: N/A;   • EXPLORATORY LAPAROTOMY N/A 09/06/2021    Procedure: Open sigmoind colectomy, colostomy, and appendectomy;  Surgeon: Yelena Guerra MD;  Location: [facility] MAIN OR;  Service: General;  Laterality: N/A;   • EYE SURGERY      CATARACTS   • KNEE ARTHROSCOPY Right    • NEPHRECTOMY PARTIAL  2007    Dr. Victor         FAMILY HISTORY  Family History   Problem  Relation Age of Onset   • Hypertension Mother    • Heart disease Father         ASCVD   • Macular degeneration Father    • Cancer Father         bladder   • Diabetes type I Sister    • Malig Hyperthermia Neg Hx          SOCIAL HISTORY  Social History     Socioeconomic History   • Marital status:    Tobacco Use   • Smoking status: Former     Packs/day: 2.00     Years: 25.00     Pack years: 50.00     Types: Cigarettes     Quit date:      Years since quittin.2   • Smokeless tobacco: Current     Types: Snuff   Vaping Use   • Vaping Use: Never used   Substance and Sexual Activity   • Alcohol use: No   • Drug use: No   • Sexual activity: Defer         ALLERGIES  Contrast dye (echo or unknown ct/mr) and Latex        REVIEW OF SYSTEMS  Review of Systems     All systems reviewed and negative except for those discussed in HPI.       PHYSICAL EXAM    I have reviewed the triage vital signs and nursing notes.    ED Triage Vitals [23 1027]   Temp Heart Rate Resp BP SpO2   97.8 °F (36.6 °C) 72 20 141/82 97 %      Temp src Heart Rate Source Patient Position BP Location FiO2 (%)   Oral Monitor -- -- --       Physical Exam    Physical Exam   Constitutional: No distress.  Speaking full sentences without distress.  Nontoxic.  HENT:  Head: Normocephalic and atraumatic.   Oropharynx: Mucous membranes are moist.   Eyes: No scleral icterus. No conjunctival pallor.  Neck: Painless range of motion noted. Neck supple.   Cardiovascular: Normal rate, regular rhythm and intact distal pulses.  Pulmonary/Chest: No tachypnea increased work of breathing.  Diminished bilateral bases.  Dialysis catheter right upper chest  Abdominal: Soft. There is no tenderness. There is no rebound and no guarding.   Musculoskeletal: Moves all extremities equally. There is no pedal edema or calf tenderness.  Pulsatile AV fistula left upper extremity  Neurological: Alert.  Baseline strength and sensation noted.   Skin: Skin is pink, warm, and dry.  No pallor.   Psychiatric: Mood and affect normal.   Nursing note and vitals reviewed.    LAB RESULTS  Recent Results (from the past 24 hour(s))   ECG 12 Lead Dyspnea    Collection Time: 04/08/23 10:50 AM   Result Value Ref Range    QT Interval 394 ms   Respiratory Panel PCR w/COVID-19(SARS-CoV-2) GLORIA/SATISH/MAREK/PAD/COR/MAD/IDRIS In-House, NP Swab in UTM/VTM, 3-4 HR TAT - Swab, Nasopharynx    Collection Time: 04/08/23 10:53 AM    Specimen: Nasopharynx; Swab   Result Value Ref Range    ADENOVIRUS, PCR Not Detected Not Detected    Coronavirus 229E Not Detected Not Detected    Coronavirus HKU1 Not Detected Not Detected    Coronavirus NL63 Not Detected Not Detected    Coronavirus OC43 Not Detected Not Detected    COVID19 Not Detected Not Detected - Ref. Range    Human Metapneumovirus Not Detected Not Detected    Human Rhinovirus/Enterovirus Detected (A) Not Detected    Influenza A PCR Not Detected Not Detected    Influenza B PCR Not Detected Not Detected    Parainfluenza Virus 1 Not Detected Not Detected    Parainfluenza Virus 2 Not Detected Not Detected    Parainfluenza Virus 3 Not Detected Not Detected    Parainfluenza Virus 4 Not Detected Not Detected    RSV, PCR Not Detected Not Detected    Bordetella pertussis pcr Not Detected Not Detected    Bordetella parapertussis PCR Not Detected Not Detected    Chlamydophila pneumoniae PCR Not Detected Not Detected    Mycoplasma pneumo by PCR Not Detected Not Detected   Comprehensive Metabolic Panel    Collection Time: 04/08/23 11:42 AM    Specimen: Blood   Result Value Ref Range    Glucose 123 (H) 65 - 99 mg/dL    BUN 27 (H) 8 - 23 mg/dL    Creatinine 4.67 (H) 0.76 - 1.27 mg/dL    Sodium 139 136 - 145 mmol/L    Potassium 3.9 3.5 - 5.2 mmol/L    Chloride 104 98 - 107 mmol/L    CO2 20.1 (L) 22.0 - 29.0 mmol/L    Calcium 8.6 8.6 - 10.5 mg/dL    Total Protein 6.3 6.0 - 8.5 g/dL    Albumin 3.5 3.5 - 5.2 g/dL    ALT (SGPT) 11 1 - 41 U/L    AST (SGOT) 26 1 - 40 U/L    Alkaline Phosphatase  68 39 - 117 U/L    Total Bilirubin 0.4 0.0 - 1.2 mg/dL    Globulin 2.8 gm/dL    A/G Ratio 1.3 g/dL    BUN/Creatinine Ratio 5.8 (L) 7.0 - 25.0    Anion Gap 14.9 5.0 - 15.0 mmol/L    eGFR 12.5 (L) >60.0 mL/min/1.73   BNP    Collection Time: 04/08/23 11:42 AM    Specimen: Blood   Result Value Ref Range    proBNP 38,790.0 (H) 0.0 - 900.0 pg/mL   High Sensitivity Troponin T    Collection Time: 04/08/23 11:42 AM    Specimen: Blood   Result Value Ref Range    HS Troponin T 811 (C) <15 ng/L   Lactic Acid, Plasma    Collection Time: 04/08/23 11:42 AM    Specimen: Blood   Result Value Ref Range    Lactate 2.1 (C) 0.5 - 2.0 mmol/L   Procalcitonin    Collection Time: 04/08/23 11:42 AM    Specimen: Blood   Result Value Ref Range    Procalcitonin 0.19 0.00 - 0.25 ng/mL   CBC Auto Differential    Collection Time: 04/08/23 11:42 AM    Specimen: Blood   Result Value Ref Range    WBC 14.47 (H) 3.40 - 10.80 10*3/mm3    RBC 3.86 (L) 4.14 - 5.80 10*6/mm3    Hemoglobin 12.0 (L) 13.0 - 17.7 g/dL    Hematocrit 35.3 (L) 37.5 - 51.0 %    MCV 91.5 79.0 - 97.0 fL    MCH 31.1 26.6 - 33.0 pg    MCHC 34.0 31.5 - 35.7 g/dL    RDW 12.3 12.3 - 15.4 %    RDW-SD 41.0 37.0 - 54.0 fl    MPV 10.1 6.0 - 12.0 fL    Platelets 195 140 - 450 10*3/mm3    Neutrophil % 85.7 (H) 42.7 - 76.0 %    Lymphocyte % 5.7 (L) 19.6 - 45.3 %    Monocyte % 7.7 5.0 - 12.0 %    Eosinophil % 0.3 0.3 - 6.2 %    Basophil % 0.3 0.0 - 1.5 %    Immature Grans % 0.3 0.0 - 0.5 %    Neutrophils, Absolute 12.40 (H) 1.70 - 7.00 10*3/mm3    Lymphocytes, Absolute 0.82 0.70 - 3.10 10*3/mm3    Monocytes, Absolute 1.11 (H) 0.10 - 0.90 10*3/mm3    Eosinophils, Absolute 0.04 0.00 - 0.40 10*3/mm3    Basophils, Absolute 0.05 0.00 - 0.20 10*3/mm3    Immature Grans, Absolute 0.05 0.00 - 0.05 10*3/mm3    nRBC 0.0 0.0 - 0.2 /100 WBC       Ordered the above labs and independently reviewed the results.        RADIOLOGY  XR Chest 1 View    Result Date: 4/8/2023  XR CHEST 1 VW-  HISTORY: Male who is 72  years-old,  dyspnea  TECHNIQUE: Frontal view of the chest  COMPARISON: 9/6/2021  FINDINGS: Right-sided central venous catheter extends to the superior vena cava. The heart size is normal. Aorta is calcified. Mild infiltrate or atelectasis is seen at the bases. No pleural effusion or pneumothorax. No acute osseous process.      Mild infiltrate or atelectasis at the bases, follow-up suggested.  This report was finalized on 4/8/2023 11:14 AM by Dr. Jd Vargas M.D.        I ordered the above noted radiological studies. Reviewed by me and discussed with radiologist.  See dictation for official radiology interpretation.      PROCEDURES    Procedures        MEDICATIONS GIVEN IN ER    Medications   sodium chloride 0.9 % flush 10 mL (has no administration in time range)   aspirin chewable tablet 324 mg (324 mg Oral Given 4/8/23 1130)   Enoxaparin Sodium (LOVENOX) syringe 100 mg (100 mg Subcutaneous Given 4/8/23 1306)         PROGRESS, DATA ANALYSIS, CONSULTS, AND MEDICAL DECISION MAKING    My differential diagnosis for dyspnea includes but is not limited to:  Asthma, COPD, pneumonia, pulmonary embolus, acute respiratory distress syndrome, pneumothorax, pleural effusion, pulmonary fibrosis, congestive heart failure, myocardial infarction, DKA, uremia, acidosis, sepsis, anemia, drug related, hyperventilation, CNS disease    HEART SCORE:    History #1  (Highly suspicious 2, Moderately suspicious 1, Slightly or non-suspicious 0)    ECG #1  (Significant ST depression 2,  Nonspecific repol disturbance 1, Normal 0)    Age #2  (> or = 65 2, 46-65 1,  < or = 45 0)    Risk factors #2  (hypercholesterolemia, HTN, DM, smoking, pos fam hx, obesity)  (> or = to 3 RF 2, 1 or 2 1, No risk factors 0)    Troponin #2  (> or = 3x normal limit 2, 1-3x normal limit 1, < or = Normal limit 0)    HEART Score Key:  Scores 0-3: 0.9-1.7% risk of adverse cardiac event. In the HEART Score study, these patients were discharged (0.99% in the  retrospective study, 1.7% in the prospective study)  Scores 4-6: 12-16.6% risk of adverse cardiac event. In the HEART Score study, these patients were admitted to the hospital. (11.6% retrospective, 16.6% prospective)  Scores ?7: 50-65% risk of adverse cardiac event. In the HEART Score study, these patients were candidates for early invasive measures. (65.2% retrospective, 50.1% prospective)      This patient's HEART score is 8    Total critical care time: Approximately 35 minutes    Due to a high probability of clinically significant, life threatening deterioration, the patient required my highest level of preparedness to intervene emergently and I personally spent this critical care time directly and personally managing the patient. This critical care time included obtaining a history; examining the patient; vital sign monitoring; ordering and review of studies; arranging urgent treatment with development of a management plan; evaluation of patient's response to treatment; frequent reassessment; and, discussions with other providers.    This critical care time was performed to assess and manage the high probability of imminent, life-threatening deterioration that could result in multi-organ failure. It was exclusive of separately billable procedures and treating other patients and teaching time.    Please see MDM section and the rest of the note for further information on patient assessment and treatment.      All labs have been independently reviewed by me.  All radiology studies have been reviewed by me and discussed with radiologist dictating the report.   EKG's independently viewed and interpreted by me.  Discussion below represents my analysis of pertinent findings related to patient's condition, differential diagnosis, treatment plan and final disposition.      ED Course as of 04/08/23 1334   Sat Apr 08, 2023   1054 EKG           EKG time: 1050  Rhythm/Rate: Sinus, 70  P waves and ND: BALTA within normal  limits  QRS, axis: IVCD  ST and T waves: No STEMI; lateral T wave inversions    Interpreted Contemporaneously by me, independently viewed  Comparison: 2/24/2023-new T wave changes noted today   [RS]   1156 WBC(!): 14.47 [RS]   1156 Hemoglobin(!): 12.0 [RS]   1156 Platelets: 195 [RS]   1156 Immature Grans, Absolute: 0.05 [RS]   1156 RADIOLOGY      Study: Single view portable chest  Findings: Hazy area bilateral lower lobes with right more suggestive of infiltrate.  I independently viewed and interpreted these images contemporaneously with treatment.    [RS]   1224 HS Troponin T(!!): 811  Somewhat potentially attributable to chronic renal failure but given EKG changes certainly concerning for ACS.  Cardiology consultation initiated. [RS]   1225 Lactate(!!): 2.1 [RS]   1229 Patient updated with findings and plan for cardiology consultation and likely admission.  He is agreeable.  At this time he has no chest discomfort or ongoing dyspnea.  Only occasional cough is noted. [RS]   1229 Procalcitonin: 0.19 [RS]   1239 CONSULT        Provider: Dr. Reyes -nephrology    Discussion: Reviewed patient history, ED presentation and evaluation.  Agrees with plan for evaluation of ACS and consultation with cardiology.  He is agreeable to consult.    Agreeable c treatment and planned disposition.         [RS]   1248 CONSULT        Provider: Dr. Duran-cardiology    Discussion: Reviewed patient history, ED presentation and evaluation.  Agreeable to consult.  She recommends dose of Lovenox here in the ED and stat echo to evaluate for wall motion abnormalities.  Agrees with plan for admission to medicine service.    Agreeable c treatment and planned disposition.         [RS]   1250 Human Rhinovirus/Enterovirus(!): Detected [RS]   1250 CONSULT        Provider: Dr. Gusman[facility]    Discussion: Reviewed patient history, ED presentation and evaluation as well as consultations with nephrology and cardiology.  Agreeable to accept patient for  admission.    Agreeable c treatment and planned disposition.         [RS]      ED Course User Index  [RS] Kimani Sharp MD       AS OF 13:34 EDT VITALS:    BP - 140/89  HR - 73  TEMP - 97.8 °F (36.6 °C) (Oral)  O2 SATS - 95%        DIAGNOSIS  Final diagnoses:   Dyspnea on exertion   NSTEMI (non-ST elevated myocardial infarction)   ESRD (end stage renal disease)   Former smoker   Elevated blood pressure reading in office with diagnosis of hypertension   Rhinovirus infection         DISPOSITION  ADMISSION    Discussed treatment plan and reason for admission with pt/family and admitting physician.  Pt/family voiced understanding of the plan for admission for further testing/treatment as needed.          Kimani Sharp MD  04/08/23 [id number]

## 2023-04-09 PROBLEM — I21.4 NSTEMI (NON-ST ELEVATED MYOCARDIAL INFARCTION): Status: ACTIVE | Noted: 2023-04-08

## 2023-04-09 LAB
ALBUMIN SERPL-MCNC: 3.3 G/DL (ref 3.5–5.2)
ALBUMIN SERPL-MCNC: 3.8 G/DL (ref 3.5–5.2)
ALBUMIN/GLOB SERPL: 1.4 G/DL
ALP SERPL-CCNC: 71 U/L (ref 39–117)
ALT SERPL W P-5'-P-CCNC: 17 U/L (ref 1–41)
ANION GAP SERPL CALCULATED.3IONS-SCNC: 12.3 MMOL/L (ref 5–15)
ANION GAP SERPL CALCULATED.3IONS-SCNC: 13 MMOL/L (ref 5–15)
APTT PPP: 144.2 SECONDS (ref 22.7–35.4)
APTT PPP: 34.6 SECONDS (ref 22.7–35.4)
APTT PPP: 48.6 SECONDS (ref 22.7–35.4)
AST SERPL-CCNC: 23 U/L (ref 1–40)
BASOPHILS # BLD AUTO: 0.01 10*3/MM3 (ref 0–0.2)
BASOPHILS # BLD AUTO: 0.04 10*3/MM3 (ref 0–0.2)
BASOPHILS NFR BLD AUTO: 0.1 % (ref 0–1.5)
BASOPHILS NFR BLD AUTO: 0.5 % (ref 0–1.5)
BILIRUB SERPL-MCNC: 0.3 MG/DL (ref 0–1.2)
BUN SERPL-MCNC: 27 MG/DL (ref 8–23)
BUN SERPL-MCNC: 39 MG/DL (ref 8–23)
BUN/CREAT SERPL: 6.2 (ref 7–25)
BUN/CREAT SERPL: 6.3 (ref 7–25)
CALCIUM SPEC-SCNC: 8.7 MG/DL (ref 8.6–10.5)
CALCIUM SPEC-SCNC: 9.2 MG/DL (ref 8.6–10.5)
CHLORIDE SERPL-SCNC: 100 MMOL/L (ref 98–107)
CHLORIDE SERPL-SCNC: 103 MMOL/L (ref 98–107)
CLOSE TME COLL+ADP + EPINEP PNL BLD: 93 % (ref 86–100)
CO2 SERPL-SCNC: 20.7 MMOL/L (ref 22–29)
CO2 SERPL-SCNC: 24 MMOL/L (ref 22–29)
CREAT SERPL-MCNC: 4.31 MG/DL (ref 0.76–1.27)
CREAT SERPL-MCNC: 6.31 MG/DL (ref 0.76–1.27)
D-LACTATE SERPL-SCNC: 1 MMOL/L (ref 0.5–2)
DEPRECATED RDW RBC AUTO: 42.4 FL (ref 37–54)
DEPRECATED RDW RBC AUTO: 43.7 FL (ref 37–54)
EGFRCR SERPLBLD CKD-EPI 2021: 13.8 ML/MIN/1.73
EGFRCR SERPLBLD CKD-EPI 2021: 8.7 ML/MIN/1.73
EOSINOPHIL # BLD AUTO: 0 10*3/MM3 (ref 0–0.4)
EOSINOPHIL # BLD AUTO: 0.11 10*3/MM3 (ref 0–0.4)
EOSINOPHIL NFR BLD AUTO: 0 % (ref 0.3–6.2)
EOSINOPHIL NFR BLD AUTO: 1.3 % (ref 0.3–6.2)
ERYTHROCYTE [DISTWIDTH] IN BLOOD BY AUTOMATED COUNT: 12.4 % (ref 12.3–15.4)
ERYTHROCYTE [DISTWIDTH] IN BLOOD BY AUTOMATED COUNT: 12.6 % (ref 12.3–15.4)
GEN 5 2HR TROPONIN T REFLEX: 1420 NG/L
GLOBULIN UR ELPH-MCNC: 2.7 GM/DL
GLUCOSE BLDC GLUCOMTR-MCNC: 121 MG/DL (ref 70–130)
GLUCOSE BLDC GLUCOMTR-MCNC: 153 MG/DL (ref 70–130)
GLUCOSE BLDC GLUCOMTR-MCNC: 158 MG/DL (ref 70–130)
GLUCOSE SERPL-MCNC: 122 MG/DL (ref 65–99)
GLUCOSE SERPL-MCNC: 163 MG/DL (ref 65–99)
HBA1C MFR BLD: 5 % (ref 4.8–5.6)
HBV SURFACE AG SERPL QL IA: NORMAL
HCT VFR BLD AUTO: 31.5 % (ref 37.5–51)
HCT VFR BLD AUTO: 33.3 % (ref 37.5–51)
HGB BLD-MCNC: 10.3 G/DL (ref 13–17.7)
HGB BLD-MCNC: 10.7 G/DL (ref 13–17.7)
IMM GRANULOCYTES # BLD AUTO: 0.03 10*3/MM3 (ref 0–0.05)
IMM GRANULOCYTES # BLD AUTO: 0.06 10*3/MM3 (ref 0–0.05)
IMM GRANULOCYTES NFR BLD AUTO: 0.3 % (ref 0–0.5)
IMM GRANULOCYTES NFR BLD AUTO: 0.5 % (ref 0–0.5)
LYMPHOCYTES # BLD AUTO: 0.58 10*3/MM3 (ref 0.7–3.1)
LYMPHOCYTES # BLD AUTO: 1.19 10*3/MM3 (ref 0.7–3.1)
LYMPHOCYTES NFR BLD AUTO: 13.9 % (ref 19.6–45.3)
LYMPHOCYTES NFR BLD AUTO: 5.2 % (ref 19.6–45.3)
MCH RBC QN AUTO: 29.8 PG (ref 26.6–33)
MCH RBC QN AUTO: 30.6 PG (ref 26.6–33)
MCHC RBC AUTO-ENTMCNC: 32.1 G/DL (ref 31.5–35.7)
MCHC RBC AUTO-ENTMCNC: 32.7 G/DL (ref 31.5–35.7)
MCV RBC AUTO: 92.8 FL (ref 79–97)
MCV RBC AUTO: 93.5 FL (ref 79–97)
MONOCYTES # BLD AUTO: 0.27 10*3/MM3 (ref 0.1–0.9)
MONOCYTES # BLD AUTO: 0.92 10*3/MM3 (ref 0.1–0.9)
MONOCYTES NFR BLD AUTO: 10.7 % (ref 5–12)
MONOCYTES NFR BLD AUTO: 2.4 % (ref 5–12)
NEUTROPHILS NFR BLD AUTO: 10.34 10*3/MM3 (ref 1.7–7)
NEUTROPHILS NFR BLD AUTO: 6.29 10*3/MM3 (ref 1.7–7)
NEUTROPHILS NFR BLD AUTO: 73.3 % (ref 42.7–76)
NEUTROPHILS NFR BLD AUTO: 91.8 % (ref 42.7–76)
NRBC BLD AUTO-RTO: 0 /100 WBC (ref 0–0.2)
NRBC BLD AUTO-RTO: 0 /100 WBC (ref 0–0.2)
PHOSPHATE SERPL-MCNC: 3.3 MG/DL (ref 2.5–4.5)
PHOSPHATE SERPL-MCNC: 4.9 MG/DL (ref 2.5–4.5)
PLATELET # BLD AUTO: 161 10*3/MM3 (ref 140–450)
PLATELET # BLD AUTO: 187 10*3/MM3 (ref 140–450)
PMV BLD AUTO: 10 FL (ref 6–12)
PMV BLD AUTO: 10.5 FL (ref 6–12)
POTASSIUM SERPL-SCNC: 4 MMOL/L (ref 3.5–5.2)
POTASSIUM SERPL-SCNC: 4.2 MMOL/L (ref 3.5–5.2)
PROT SERPL-MCNC: 6.5 G/DL (ref 6–8.5)
QT INTERVAL: 347 MS
RBC # BLD AUTO: 3.37 10*6/MM3 (ref 4.14–5.8)
RBC # BLD AUTO: 3.59 10*6/MM3 (ref 4.14–5.8)
SODIUM SERPL-SCNC: 133 MMOL/L (ref 136–145)
SODIUM SERPL-SCNC: 140 MMOL/L (ref 136–145)
TROPONIN T DELTA: 609 NG/L
TSH SERPL DL<=0.05 MIU/L-ACNC: 0.44 UIU/ML (ref 0.27–4.2)
WBC NRBC COR # BLD: 11.26 10*3/MM3 (ref 3.4–10.8)
WBC NRBC COR # BLD: 8.58 10*3/MM3 (ref 3.4–10.8)

## 2023-04-09 PROCEDURE — 84484 ASSAY OF TROPONIN QUANT: CPT | Performed by: EMERGENCY MEDICINE

## 2023-04-09 PROCEDURE — 5A1D70Z PERFORMANCE OF URINARY FILTRATION, INTERMITTENT, LESS THAN 6 HOURS PER DAY: ICD-10-PCS | Performed by: INTERNAL MEDICINE

## 2023-04-09 PROCEDURE — 85730 THROMBOPLASTIN TIME PARTIAL: CPT | Performed by: INTERNAL MEDICINE

## 2023-04-09 PROCEDURE — 83036 HEMOGLOBIN GLYCOSYLATED A1C: CPT | Performed by: INTERNAL MEDICINE

## 2023-04-09 PROCEDURE — 25010000002 MIDAZOLAM PER 1 MG: Performed by: INTERNAL MEDICINE

## 2023-04-09 PROCEDURE — 25510000001 IOPAMIDOL PER 1 ML: Performed by: INTERNAL MEDICINE

## 2023-04-09 PROCEDURE — 85025 COMPLETE CBC W/AUTO DIFF WBC: CPT | Performed by: INTERNAL MEDICINE

## 2023-04-09 PROCEDURE — 94640 AIRWAY INHALATION TREATMENT: CPT

## 2023-04-09 PROCEDURE — B2111ZZ FLUOROSCOPY OF MULTIPLE CORONARY ARTERIES USING LOW OSMOLAR CONTRAST: ICD-10-PCS | Performed by: INTERNAL MEDICINE

## 2023-04-09 PROCEDURE — 93005 ELECTROCARDIOGRAM TRACING: CPT | Performed by: INTERNAL MEDICINE

## 2023-04-09 PROCEDURE — 36415 COLL VENOUS BLD VENIPUNCTURE: CPT | Performed by: EMERGENCY MEDICINE

## 2023-04-09 PROCEDURE — 82962 GLUCOSE BLOOD TEST: CPT

## 2023-04-09 PROCEDURE — C1769 GUIDE WIRE: HCPCS | Performed by: INTERNAL MEDICINE

## 2023-04-09 PROCEDURE — 93458 L HRT ARTERY/VENTRICLE ANGIO: CPT | Performed by: INTERNAL MEDICINE

## 2023-04-09 PROCEDURE — 85576 BLOOD PLATELET AGGREGATION: CPT | Performed by: THORACIC SURGERY (CARDIOTHORACIC VASCULAR SURGERY)

## 2023-04-09 PROCEDURE — 4A023N7 MEASUREMENT OF CARDIAC SAMPLING AND PRESSURE, LEFT HEART, PERCUTANEOUS APPROACH: ICD-10-PCS | Performed by: INTERNAL MEDICINE

## 2023-04-09 PROCEDURE — 84100 ASSAY OF PHOSPHORUS: CPT | Performed by: INTERNAL MEDICINE

## 2023-04-09 PROCEDURE — 25010000002 HEPARIN (PORCINE) 25000-0.45 UT/250ML-% SOLUTION: Performed by: INTERNAL MEDICINE

## 2023-04-09 PROCEDURE — C1894 INTRO/SHEATH, NON-LASER: HCPCS | Performed by: INTERNAL MEDICINE

## 2023-04-09 PROCEDURE — 93010 ELECTROCARDIOGRAM REPORT: CPT | Performed by: INTERNAL MEDICINE

## 2023-04-09 PROCEDURE — 25010000002 FENTANYL CITRATE (PF) 50 MCG/ML SOLUTION: Performed by: INTERNAL MEDICINE

## 2023-04-09 PROCEDURE — 87340 HEPATITIS B SURFACE AG IA: CPT | Performed by: INTERNAL MEDICINE

## 2023-04-09 PROCEDURE — 83605 ASSAY OF LACTIC ACID: CPT | Performed by: EMERGENCY MEDICINE

## 2023-04-09 PROCEDURE — 84443 ASSAY THYROID STIM HORMONE: CPT | Performed by: THORACIC SURGERY (CARDIOTHORACIC VASCULAR SURGERY)

## 2023-04-09 PROCEDURE — 25010000002 METHYLPREDNISOLONE PER 125 MG: Performed by: INTERNAL MEDICINE

## 2023-04-09 PROCEDURE — 80053 COMPREHEN METABOLIC PANEL: CPT | Performed by: INTERNAL MEDICINE

## 2023-04-09 PROCEDURE — 25010000002 HEPARIN (PORCINE) PER 1000 UNITS: Performed by: INTERNAL MEDICINE

## 2023-04-09 PROCEDURE — 63710000001 INSULIN LISPRO (HUMAN) PER 5 UNITS: Performed by: INTERNAL MEDICINE

## 2023-04-09 PROCEDURE — 99232 SBSQ HOSP IP/OBS MODERATE 35: CPT | Performed by: THORACIC SURGERY (CARDIOTHORACIC VASCULAR SURGERY)

## 2023-04-09 PROCEDURE — B2151ZZ FLUOROSCOPY OF LEFT HEART USING LOW OSMOLAR CONTRAST: ICD-10-PCS | Performed by: INTERNAL MEDICINE

## 2023-04-09 RX ORDER — ONDANSETRON 2 MG/ML
4 INJECTION INTRAMUSCULAR; INTRAVENOUS EVERY 6 HOURS PRN
Status: DISCONTINUED | OUTPATIENT
Start: 2023-04-09 | End: 2023-04-13

## 2023-04-09 RX ORDER — HYDROCODONE BITARTRATE AND ACETAMINOPHEN 5; 325 MG/1; MG/1
1 TABLET ORAL EVERY 4 HOURS PRN
Status: DISCONTINUED | OUTPATIENT
Start: 2023-04-09 | End: 2023-04-13

## 2023-04-09 RX ORDER — SODIUM CHLORIDE 9 MG/ML
INJECTION, SOLUTION INTRAVENOUS
Status: COMPLETED | OUTPATIENT
Start: 2023-04-09 | End: 2023-04-09

## 2023-04-09 RX ORDER — VERAPAMIL HYDROCHLORIDE 2.5 MG/ML
INJECTION, SOLUTION INTRAVENOUS
Status: DISCONTINUED | OUTPATIENT
Start: 2023-04-09 | End: 2023-04-09 | Stop reason: HOSPADM

## 2023-04-09 RX ORDER — METHYLPREDNISOLONE SODIUM SUCCINATE 125 MG/2ML
INJECTION, POWDER, LYOPHILIZED, FOR SOLUTION INTRAMUSCULAR; INTRAVENOUS
Status: DISCONTINUED | OUTPATIENT
Start: 2023-04-09 | End: 2023-04-09 | Stop reason: HOSPADM

## 2023-04-09 RX ORDER — LIDOCAINE HYDROCHLORIDE 20 MG/ML
INJECTION, SOLUTION INFILTRATION; PERINEURAL
Status: DISCONTINUED | OUTPATIENT
Start: 2023-04-09 | End: 2023-04-09 | Stop reason: HOSPADM

## 2023-04-09 RX ORDER — HEPARIN SODIUM 1000 [USP'U]/ML
INJECTION, SOLUTION INTRAVENOUS; SUBCUTANEOUS
Status: DISCONTINUED | OUTPATIENT
Start: 2023-04-09 | End: 2023-04-09 | Stop reason: HOSPADM

## 2023-04-09 RX ORDER — FENTANYL CITRATE 50 UG/ML
INJECTION, SOLUTION INTRAMUSCULAR; INTRAVENOUS
Status: DISCONTINUED | OUTPATIENT
Start: 2023-04-09 | End: 2023-04-09 | Stop reason: HOSPADM

## 2023-04-09 RX ORDER — TRAMADOL HYDROCHLORIDE 50 MG/1
25 TABLET ORAL EVERY 8 HOURS PRN
Status: DISCONTINUED | OUTPATIENT
Start: 2023-04-09 | End: 2023-04-13

## 2023-04-09 RX ORDER — MANNITOL 250 MG/ML
12.5 INJECTION, SOLUTION INTRAVENOUS AS NEEDED
Status: ACTIVE | OUTPATIENT
Start: 2023-04-09 | End: 2023-04-09

## 2023-04-09 RX ORDER — ACETAMINOPHEN 500 MG
500 TABLET ORAL EVERY 4 HOURS PRN
Status: DISCONTINUED | OUTPATIENT
Start: 2023-04-09 | End: 2023-04-13

## 2023-04-09 RX ORDER — MIDAZOLAM HYDROCHLORIDE 1 MG/ML
INJECTION INTRAMUSCULAR; INTRAVENOUS
Status: DISCONTINUED | OUTPATIENT
Start: 2023-04-09 | End: 2023-04-09 | Stop reason: HOSPADM

## 2023-04-09 RX ORDER — ONDANSETRON 4 MG/1
4 TABLET, FILM COATED ORAL EVERY 6 HOURS PRN
Status: DISCONTINUED | OUTPATIENT
Start: 2023-04-09 | End: 2023-04-13

## 2023-04-09 RX ORDER — HEPARIN SODIUM 1000 [USP'U]/ML
4000 INJECTION, SOLUTION INTRAVENOUS; SUBCUTANEOUS AS NEEDED
Status: DISCONTINUED | OUTPATIENT
Start: 2023-04-09 | End: 2023-04-13

## 2023-04-09 RX ADMIN — TRAZODONE HYDROCHLORIDE 50 MG: 50 TABLET ORAL at 20:52

## 2023-04-09 RX ADMIN — DULOXETINE HYDROCHLORIDE 30 MG: 30 CAPSULE, DELAYED RELEASE ORAL at 21:06

## 2023-04-09 RX ADMIN — ALBUTEROL SULFATE 2.5 MG: 2.5 SOLUTION RESPIRATORY (INHALATION) at 17:39

## 2023-04-09 RX ADMIN — HEPARIN SODIUM 4000 UNITS: 1000 INJECTION INTRAVENOUS; SUBCUTANEOUS at 17:15

## 2023-04-09 RX ADMIN — SODIUM BICARBONATE 650 MG: 650 TABLET ORAL at 17:07

## 2023-04-09 RX ADMIN — METOPROLOL SUCCINATE 100 MG: 100 TABLET, EXTENDED RELEASE ORAL at 20:52

## 2023-04-09 RX ADMIN — ASPIRIN 81 MG: 81 TABLET, COATED ORAL at 12:13

## 2023-04-09 RX ADMIN — HYDRALAZINE HYDROCHLORIDE 50 MG: 50 TABLET, FILM COATED ORAL at 20:52

## 2023-04-09 RX ADMIN — INSULIN LISPRO 2 UNITS: 100 INJECTION, SOLUTION INTRAVENOUS; SUBCUTANEOUS at 17:07

## 2023-04-09 RX ADMIN — HEPARIN SODIUM 9 UNITS/KG/HR: 10000 INJECTION, SOLUTION INTRAVENOUS at 17:02

## 2023-04-09 RX ADMIN — SODIUM BICARBONATE 650 MG: 650 TABLET ORAL at 20:52

## 2023-04-09 RX ADMIN — Medication 10 ML: at 21:08

## 2023-04-09 RX ADMIN — DOCUSATE SODIUM 50MG AND SENNOSIDES 8.6MG 1 TABLET: 8.6; 5 TABLET, FILM COATED ORAL at 21:07

## 2023-04-09 NOTE — PROGRESS NOTES
Consult received for possible surgical vascularization.  Preoperative work-up initiated.      Full consult to follow.

## 2023-04-09 NOTE — PROGRESS NOTES
[facility] Progress Note      Patient Name: Lefty Cai  : 1950  MRN: [id number]  Primary Care Physician:  Daksha Ng APRN  Date of admission: 2023    Subjective     Interval History:   Follow-up end-stage renal disease    He underwent heart catheterization early this morning revealing multivessel disease.  Currently has no chest pain or shortness of air, no orthopnea or PND, no nausea or vomiting, no abdominal pain, no dysuria or gross hematuria.    Review of Systems:   As noted above    Objective     Vitals:   Temp:  [97.6 °F (36.4 °C)-98.6 °F (37 °C)] 97.6 °F (36.4 °C)  Heart Rate:  [65-80] 80  Resp:  [10-18] 15  BP: (113-197)/(66-93) 182/83  Flow (L/min):  [3] 3    Intake/Output Summary (Last 24 hours) at 2023 1212  Last data filed at 2023 0931  Gross per 24 hour   Intake 278 ml   Output --   Net 278 ml       Physical Exam:    General Appearance: Awake, alert, no acute distress, chronically ill  Skin: warm and dry  HEENT: oral mucosa normal, nonicteric sclera  Neck: supple, no JVD, tunneled dialysis catheter in the right IJ with exit site below the right clavicle  Lungs: CTA, breathing effort not labored  Heart: RRR, normal S1 and S2, no S3, no rub  Abdomen: soft, nontender, nondistended, normoactive bowel  : no palpable bladder  Extremities: no edema, cyanosis or clubbing, functional AV fistula in the left forearm with good thrill and bruit  Neuro: normal speech and mental status     Scheduled Meds:     aspirin, 81 mg, Oral, Daily  DULoxetine, 30 mg, Oral, Nightly  hydrALAZINE, 100 mg, Oral, Daily With Breakfast  hydrALAZINE, 50 mg, Oral, Nightly  insulin lispro, 0-9 Units, Subcutaneous, TID With Meals  metoprolol succinate XL, 100 mg, Oral, Nightly  sodium bicarbonate, 650 mg, Oral, TID  traZODone, 50 mg, Oral, Nightly      IV Meds:   heparin, 10 Units/kg/hr, Last Rate: Stopped (23 0905)        Results Reviewed:   I have personally reviewed  the results from the time of this admission to 4/9/2023 12:12 EDT     Results from last 7 days   Lab Units 04/09/23  0447 04/08/23  1142   SODIUM mmol/L 140 139   POTASSIUM mmol/L 4.0 3.9   CHLORIDE mmol/L 103 104   CO2 mmol/L 24.0 20.1*   BUN mg/dL 39* 27*   CREATININE mg/dL 6.31* 4.67*   CALCIUM mg/dL 8.7 8.6   BILIRUBIN mg/dL  --  0.4   ALK PHOS U/L  --  68   ALT (SGPT) U/L  --  11   AST (SGOT) U/L  --  26   GLUCOSE mg/dL 122* 123*       Estimated Creatinine Clearance: 12.3 mL/min (A) (by C-G formula based on SCr of 6.31 mg/dL (H)).    Results from last 7 days   Lab Units 04/09/23  0447   PHOSPHORUS mg/dL 4.9*             Results from last 7 days   Lab Units 04/09/23  0447 04/08/23  1142   WBC 10*3/mm3 8.58 14.47*   HEMOGLOBIN g/dL 10.3* 12.0*   PLATELETS 10*3/mm3 161 195       Results from last 7 days   Lab Units 04/08/23  1925   INR  1.15*       Assessment / Plan     ASSESSMENT:  -End-stage renal disease and diabetic and hypertensive glomerulosclerosis on maintenance hemodialysis every Tuesday, Thursday and Saturday, creatinine today 6.31, electrolyte within acceptable  -Diabetes mellitus type 2 with renal complication  -Hypertension with chronic kidney disease, not well controlled related to fluid excess related to heart catheterization  -Anemia of ESRD, hemoglobin 10.3 meeting goal  -Non-ST elevation MI, heart cath today revealed multivessel disease will need CABG  -Secondary hyperparathyroidism of renal origin    PLAN:  -Hemodialysis today  -Surveillance lab  -He is cleared to have a CABG anytime from the renal standpoint  I discussed the case with Dr. Garcia  I had long discussion with the patient and his family at the bedside and they voiced good understanding    Thank you for involving us in the care of Lefty Cai.  Please feel free to call with any questions.    Quang Reyes MD  04/09/23  12:12 EDT    [facility]  [phone]    Please note that portions of this  note were completed with a voice recognition program.

## 2023-04-09 NOTE — CONSULTS
BCS CONSULT    Reason for Consultation: Possible surgical vascularization.    History of Present Illness:     This is a pleasant 72-year-old  gentleman with worsening dyspnea over the last several days.  This continues to progress despite his adherence to his regular hemodialysis schedule.  He was noted to have some inferior lead T wave inversions on EKG and echocardiogram showed a greatly reduced left ventricular ejection fraction at 25 to 30%.  His troponins were also elevated on admission.  His working diagnosis is of a non-ST elevation myocardial infarction.  He has tested positive for rhinovirus.    Left heart catheterization shows severe three-vessel coronary disease with an occluded right coronary system.  A left ventricular branch, distal circumflex, OM branch, diagonal branch, and distal LAD all appear to be viable surgical targets.  Left ventriculogram also shows that ejection fraction is greatly reduced, possibly down to 20%.    The patient is currently in no distress.  He denies any fevers cough.  He denies any periods of disorientation.  He is currently sitting up in bed without oxygen supplementation satting in the high 90s.    Review of Systems   Constitutional: Positive for activity change and fatigue. Negative for appetite change, chills, diaphoresis, fever and unexpected weight change.   HENT: Negative for congestion, dental problem, facial swelling, hearing loss, mouth sores, nosebleeds, postnasal drip, rhinorrhea, sneezing, sore throat, tinnitus, trouble swallowing and voice change.    Eyes: Negative for photophobia, pain, discharge, redness, itching and visual disturbance.   Respiratory: Positive for chest tightness and shortness of breath. Negative for apnea, cough, choking, wheezing and stridor.    Cardiovascular: Negative for chest pain, palpitations and leg swelling.   Gastrointestinal: Negative for abdominal distention, abdominal pain, constipation, diarrhea, nausea and vomiting.    Endocrine: Negative for cold intolerance, heat intolerance, polydipsia, polyphagia and polyuria.   Genitourinary: Negative for decreased urine volume, difficulty urinating, dysuria, enuresis, flank pain, frequency, hematuria and urgency.   Musculoskeletal: Negative for arthralgias, back pain, gait problem, joint swelling, myalgias, neck pain and neck stiffness.   Skin: Negative for color change, pallor, rash and wound.   Allergic/Immunologic: Negative for environmental allergies, food allergies and immunocompromised state.   Neurological: Negative for dizziness, tremors, seizures, syncope, facial asymmetry, speech difficulty, weakness, light-headedness, numbness and headaches.   Hematological: Negative for adenopathy. Does not bruise/bleed easily.   Psychiatric/Behavioral: Positive for decreased concentration. Negative for agitation, behavioral problems, confusion, dysphoric mood, hallucinations, self-injury, sleep disturbance and suicidal ideas. The patient is not nervous/anxious and is not hyperactive.         Past Medical History:   Diagnosis Date   • Anemia    • Anesthesia complication     HYPOTENSION   • Arthritis    • Cancer of kidney, left    • CKD (chronic kidney disease)     STAGE 5   • Diabetes mellitus, type 2    • Fatigue    • GERD (gastroesophageal reflux disease)    • H/O renal cell carcinoma 2007    partial nephrectomy   • History of colostomy reversal 04/2022   • [place] (hard of hearing)     NO DEVICE   • Hyperlipidemia    • Hypertension    • Primary insomnia 06/13/2016   • Proteinuria    • Risk factors for obstructive sleep apnea    • Sinus drainage    • Vitamin D deficiency 08/14/2017     Past Surgical History:   Procedure Laterality Date   • ARTERIOVENOUS FISTULA/SHUNT SURGERY Left 08/15/2019    Procedure: LEFT MID FOREARM RADIAL CEPHALIC ARTERIAL VENOUS FISTULA;  Surgeon: Louann Miles Jr., MD;  Location: [facility];  Service: Vascular   • COLONOSCOPY  03/24/2022   • COLONOSCOPY N/A  2022    Procedure: COLONOSCOPY TO CECUM WITH POLYPECTOMY  (HOT SNARE);  Surgeon: Yelena Guerra MD;  [facility];  Service: General;  Laterality: N/A;  PREOP/ HX OF DIVERTICULITIS, COLOSTOMY  POSTOP/ POLYPS, DIVERTICULOSIS    • COLOSTOMY  2021   • COLOSTOMY CLOSURE N/A 2022    Procedure: open Colostomy reversal;  Surgeon: Yelena Guerra MD;  [facility];  Service: General;  Laterality: N/A;   • DIAGNOSTIC LAPAROSCOPY N/A 2023    Procedure: DIAGNOSTIC LAPAROSCOPY;  Surgeon: Murali Ferreira MD;  Location: [facility] MAIN OR;  Service: General;  Laterality: N/A;   • EXPLORATORY LAPAROTOMY N/A 2021    Procedure: Open sigmoind colectomy, colostomy, and appendectomy;  Surgeon: Yelena Guerra MD;  Location: [facility] MAIN OR;  Service: General;  Laterality: N/A;   • EYE SURGERY      CATARACTS   • KNEE ARTHROSCOPY Right    • NEPHRECTOMY PARTIAL      Dr. Victor     Family History   Problem Relation Age of Onset   • Hypertension Mother    • Heart disease Father         ASCVD   • Macular degeneration Father    • Cancer Father         bladder   • Diabetes type I Sister    • Malig Hyperthermia Neg Hx      Social History     Tobacco Use   • Smoking status: Former     Packs/day: 2.00     Years: 25.00     Pack years: 50.00     Types: Cigarettes     Quit date:      Years since quittin.2   • Smokeless tobacco: Current     Types: Snuff   Vaping Use   • Vaping Use: Never used   Substance Use Topics   • Alcohol use: No   • Drug use: No     Medications Prior to Admission   Medication Sig Dispense Refill Last Dose   • aspirin 81 MG tablet Take 1 tablet by mouth Every Night. FOLLOW MD GUIDELINES ON WHEN/IF TO HOLD FOR DOS   2023   • cetirizine (zyrTEC) 10 MG tablet Take 1 tablet by mouth Daily As Needed.   2023   • DULoxetine (CYMBALTA) 30 MG capsule Take 1 capsule by mouth Daily. (Patient taking differently: Take 1 capsule by mouth Every  Night.) 30 capsule 5 4/7/2023   • hydrALAZINE (APRESOLINE) 50 MG tablet Take 2 tablets by mouth Every Morning. (Patient taking differently: Take 2 tablets by mouth Daily With Breakfast.) 60 tablet 5 4/7/2023   • hydrALAZINE (APRESOLINE) 50 MG tablet Take 1 tablet by mouth Every Night.   4/7/2023   • metoprolol succinate XL (TOPROL-XL) 100 MG 24 hr tablet Take 1 tablet by mouth Every Night. 90 tablet 1 4/7/2023   • simvastatin (ZOCOR) 40 MG tablet Take 1 tablet by mouth Every Night. 90 tablet 1 4/7/2023   • sodium bicarbonate 650 MG tablet Take 1 tablet by mouth 3 (Three) Times a Day.  0 4/7/2023   • traZODone (DESYREL) 50 MG tablet TAKE 1 TABLET AT NIGHT AS NEEDED FOR SLEEP (Patient taking differently: Take 1 tablet by mouth Every Night.) 90 tablet 3 4/7/2023   • acetaminophen (TYLENOL) 325 MG tablet Take 2 tablets by mouth Every 6 (Six) Hours As Needed for Mild Pain.   Unknown   • ondansetron (Zofran) 4 MG tablet Take 1 tablet by mouth Every 8 (Eight) Hours As Needed for Nausea or Vomiting. 30 tablet 1 Unknown   • sennosides-docusate (senna-docusate sodium) 8.6-50 MG per tablet Take 1 tablet by mouth At Night As Needed for Constipation. 60 tablet 0 Unknown   • simethicone (MYLICON) 80 MG chewable tablet Chew 1 tablet Every 6 (Six) Hours As Needed.   Unknown   • traMADol (ULTRAM) 50 MG tablet Take 1 tablet by mouth Every 8 (Eight) Hours As Needed for Moderate Pain for up to 6 doses. 6 tablet 0    • vitamin D3 125 MCG (5000 UT) capsule capsule Take 1 tablet by mouth 2 (Two) Times a Week. SAT AND WED   4/5/2023       Current Facility-Administered Medications:   •  acetaminophen (TYLENOL) tablet 500 mg, 500 mg, Oral, Q4H PRN, Mirtha Zuluaga MD  •  albuterol (PROVENTIL) nebulizer solution 0.083% 2.5 mg/3mL, 2.5 mg, Nebulization, Q6H PRN, Lobito Garcia MD  •  aspirin EC tablet 81 mg, 81 mg, Oral, Daily, Lobito Garcia MD  •  dextrose (D50W) (25 g/50 mL) IV injection 25 g, 25 g, Intravenous, Q15 Min PRN, Jose,  MD Lobito  •  dextrose (GLUTOSE) oral gel 15 g, 15 g, Oral, Q15 Min PRN, Lobito Garcia MD  •  DULoxetine (CYMBALTA) DR capsule 30 mg, 30 mg, Oral, Nightly, Lobito Garcia MD, 30 mg at 04/08/23 2115  •  glucagon (GLUCAGEN) injection 1 mg, 1 mg, Intramuscular, Q15 Min PRN, Lobito Garcia MD  •  heparin (porcine) injection 4,000 Units, 4,000 Units, Intracatheter, PRN, Lobito Garcia MD  •  heparin 64845 units/250 mL (100 units/mL) in 0.45 % NaCl infusion, 10 Units/kg/hr, Intravenous, Titrated, Lobito Garcia MD, Stopped at 04/09/23 0905  •  hydrALAZINE (APRESOLINE) tablet 100 mg, 100 mg, Oral, Daily With Breakfast, Lobito Garcia MD  •  hydrALAZINE (APRESOLINE) tablet 50 mg, 50 mg, Oral, Nightly, Lobito Garcia MD, 50 mg at 04/08/23 2115  •  HYDROcodone-acetaminophen (NORCO) 5-325 MG per tablet 1 tablet, 1 tablet, Oral, Q4H PRN, Lobito Garcia MD  •  insulin lispro (ADMELOG) injection 0-9 Units, 0-9 Units, Subcutaneous, TID With Meals, Lobito Garcia MD  •  melatonin tablet 3 mg, 3 mg, Oral, Nightly PRN, Lobito Garcia MD  •  metoprolol succinate XL (TOPROL-XL) 24 hr tablet 100 mg, 100 mg, Oral, Nightly, Lobito Garcia MD, 100 mg at 04/08/23 2115  •  ondansetron (ZOFRAN) tablet 4 mg, 4 mg, Oral, Q6H PRN **OR** ondansetron (ZOFRAN) injection 4 mg, 4 mg, Intravenous, Q6H PRN, Lobito Garcia MD  •  ondansetron (ZOFRAN) tablet 4 mg, 4 mg, Oral, Q6H PRN **OR** ondansetron (ZOFRAN) injection 4 mg, 4 mg, Intravenous, Q6H PRN, Lobito Garcia MD  •  sennosides-docusate (PERICOLACE) 8.6-50 MG per tablet 1 tablet, 1 tablet, Oral, Nightly PRN, Lobito Garcia MD  •  simethicone (MYLICON) chewable tablet 80 mg, 80 mg, Oral, Q6H PRN, Lobito Garcia MD  •  sodium bicarbonate tablet 650 mg, 650 mg, Oral, TID, Lobito Garcia MD, 650 mg at 04/08/23 2115  •  [COMPLETED] Insert Peripheral IV, , , Once **AND** sodium chloride 0.9 % flush 10 mL, 10 mL, Intravenous, PRN, Lobito Garcia MD  •  traMADol  "(ULTRAM) tablet 25 mg, 25 mg, Oral, Q8H PRN, Mirtha Zuluaga MD  •  traZODone (DESYREL) tablet 50 mg, 50 mg, Oral, Nightly, Lobito Garcia MD, 50 mg at 04/08/23 2115  aspirin, 81 mg, Oral, Daily  DULoxetine, 30 mg, Oral, Nightly  hydrALAZINE, 100 mg, Oral, Daily With Breakfast  hydrALAZINE, 50 mg, Oral, Nightly  insulin lispro, 0-9 Units, Subcutaneous, TID With Meals  metoprolol succinate XL, 100 mg, Oral, Nightly  sodium bicarbonate, 650 mg, Oral, TID  traZODone, 50 mg, Oral, Nightly      Allergies:  Contrast dye (echo or unknown ct/mr) and Latex    Objective      Vital Signs  Temp:  [97.6 °F (36.4 °C)-98.6 °F (37 °C)] 97.6 °F (36.4 °C)  Heart Rate:  [65-73] 65  Resp:  [10-18] 15  BP: (113-190)/(66-90) 168/84    Flowsheet Rows    Flowsheet Row First Filed Value   Admission Height 177.8 cm (70\") Documented at 04/08/2023 1028   Admission Weight 102 kg (225 lb) Documented at 04/08/2023 1028        177.8 cm (70\")    Physical Exam  Vitals reviewed.   Constitutional:       General: He is not in acute distress.     Appearance: Normal appearance. He is obese. He is not ill-appearing, toxic-appearing or diaphoretic.      Interventions: He is not intubated.  HENT:      Head: Normocephalic and atraumatic. No right periorbital erythema or left periorbital erythema.      Jaw: There is normal jaw occlusion.      Right Ear: Hearing normal.      Left Ear: Hearing normal.      Nose: Nose normal. No nasal deformity, signs of injury, congestion or rhinorrhea.      Right Nostril: No foreign body or epistaxis.      Left Nostril: No foreign body or epistaxis.      Mouth/Throat:      Lips: Pink.      Mouth: Mucous membranes are moist.      Dentition: Does not have dentures. No dental tenderness, gingival swelling, dental caries, dental abscesses or gum lesions.      Tongue: No lesions.      Palate: No mass.      Pharynx: Oropharynx is clear. No oropharyngeal exudate or posterior oropharyngeal erythema.      Tonsils: No tonsillar " exudate or tonsillar abscesses.   Eyes:      General: Lids are normal. No allergic shiner, visual field deficit or scleral icterus.        Right eye: No foreign body, discharge or hordeolum.         Left eye: No foreign body, discharge or hordeolum.      Extraocular Movements: Extraocular movements intact.      Right eye: Normal extraocular motion and no nystagmus.      Left eye: Normal extraocular motion and no nystagmus.      Conjunctiva/sclera: Conjunctivae normal.      Right eye: Right conjunctiva is not injected. No chemosis, exudate or hemorrhage.     Left eye: Left conjunctiva is not injected. No chemosis, exudate or hemorrhage.     Pupils: Pupils are equal, round, and reactive to light.   Neck:      Thyroid: No thyroid mass, thyromegaly or thyroid tenderness.      Vascular: Normal carotid pulses. No carotid bruit, hepatojugular reflux or JVD.      Trachea: Trachea and phonation normal. No tracheal tenderness, tracheostomy, abnormal tracheal secretions or tracheal deviation.      Meningeal: Brudzinski's sign and Kernig's sign absent.   Cardiovascular:      Rate and Rhythm: Normal rate and regular rhythm.  No extrasystoles are present.     Chest Wall: PMI is not displaced. No thrill.      Pulses:           Radial pulses are 2+ on the right side and 2+ on the left side.        Femoral pulses are 2+ on the right side and 2+ on the left side.       Dorsalis pedis pulses are 1+ on the right side and 1+ on the left side.      Heart sounds: Heart sounds not distant. Murmur heard.    Systolic murmur is present with a grade of 2/6.   No diastolic murmur is present.    No friction rub. No gallop.      Comments: Forgot to check his left upper extremity and to evaluate his previously placed AV fistula.  Pulmonary:      Effort: Pulmonary effort is normal. No tachypnea, bradypnea, accessory muscle usage, prolonged expiration, respiratory distress or retractions. He is not intubated.      Breath sounds: No stridor,  decreased air movement or transmitted upper airway sounds. Examination of the right-middle field reveals rales. Examination of the left-middle field reveals rales. Examination of the right-lower field reveals rales. Examination of the left-lower field reveals rales. Rales present. No decreased breath sounds, wheezing or rhonchi.   Chest:      Chest wall: No mass, lacerations, deformity, swelling, tenderness, crepitus or edema. There is no dullness to percussion.      Comments: No sternal abnormality.  Abdominal:      General: Abdomen is flat. Bowel sounds are normal. There is no distension or abdominal bruit. There are no signs of injury.      Palpations: There is no shifting dullness, fluid wave, hepatomegaly, splenomegaly, mass or pulsatile mass.      Tenderness: There is no abdominal tenderness. There is no right CVA tenderness, left CVA tenderness or guarding. Negative signs include Parra's sign, Rovsing's sign and McBurney's sign.      Hernia: No hernia is present.      Comments: Midline celiotomy scar healed.  Left upper quadrant colostomy scar healed.   Musculoskeletal:         General: No swelling, tenderness, deformity or signs of injury. Normal range of motion.      Cervical back: Full passive range of motion without pain, normal range of motion and neck supple. No edema, erythema, signs of trauma, rigidity, torticollis, tenderness or crepitus. No pain with movement, spinous process tenderness or muscular tenderness. Normal range of motion.      Right lower leg: No edema.      Left lower leg: No edema.   Lymphadenopathy:      Cervical: No cervical adenopathy.      Right cervical: No superficial cervical adenopathy.     Left cervical: No superficial cervical adenopathy.      Upper Body:      Right upper body: No supraclavicular adenopathy.      Left upper body: No supraclavicular adenopathy.   Skin:     General: Skin is warm and dry.      Capillary Refill: Capillary refill takes 2 to 3 seconds.       Coloration: Skin is not jaundiced or pale.      Findings: No bruising, erythema or lesion.   Neurological:      General: No focal deficit present.      Mental Status: He is alert and oriented to person, place, and time. Mental status is at baseline.      GCS: GCS eye subscore is 4. GCS verbal subscore is 5. GCS motor subscore is 6.      Cranial Nerves: Cranial nerves 2-12 are intact. No cranial nerve deficit.      Sensory: Sensation is intact. No sensory deficit.      Motor: Motor function is intact. No weakness.      Coordination: Coordination is intact. Coordination normal.      Gait: Gait is intact. Gait normal.      Deep Tendon Reflexes: Reflexes normal.   Psychiatric:         Attention and Perception: Attention and perception normal.         Mood and Affect: Mood and affect normal.         Speech: Speech normal.         Behavior: Behavior normal. Behavior is cooperative.         Thought Content: Thought content normal. Thought content is not paranoid or delusional. Thought content does not include homicidal or suicidal ideation. Thought content does not include homicidal or suicidal plan.         Cognition and Memory: Cognition and memory normal.         Judgment: Judgment normal.       Results Review: All tests reviewed.      Assessment/Plan:     This is a pleasant 72-year-old  gentleman with severe three-vessel coronary disease and a non-ST elevation myocardial infarction.  He also has ischemic cardiomyopathy and a greatly reduced left ventricular ejection fraction.  He also has an active rhinovirus infection.  Work-up has been initiated for possible surgical vascularization.  The timing of the surgery, especially in relation to his rhinovirus infection, is yet to be determined.  I will confer with our infectious disease service.    Carotid duplex, vein mapping, and CT scan of the chest have been ordered.  I discussed this plan with Dr. Garcia; he is agreeable.    Thank you for this kind  consultation.    Mirtha Zuluaga MD  04/09/23  11:26 EDT

## 2023-04-09 NOTE — NURSING NOTE
Hd completed. HepBsag neg. 250cc net uf. erika well. Post vss 134/59 hr79. vasc dept via w/c post hd.

## 2023-04-09 NOTE — PLAN OF CARE
Goal Outcome Evaluation:  Plan of Care Reviewed With: patient, spouse        Progress: no change  Outcome Evaluation: Patient  resting  at this  time.  Denies  any  pain  so  far.  Started  on  Heparin  drip  for  ACS  per  cardiac protocol  at  2300hr. Due  PTT  at 0500am.   NPO  for  cardiac  cath  in  am. Dialysis  orders  recalled  to  katalina.   Remain  on  room  with  no  SOA  noted. SR on the  monitor. Nursing  will  continue to monitor.

## 2023-04-09 NOTE — NURSING NOTE
Pt has recd the first shot of hepatitis b vaccine in last couple of weeks per pt wife. Pending hepBSag lab results

## 2023-04-09 NOTE — PLAN OF CARE
Goal Outcome Evaluation:           Progress: no change  Outcome Evaluation: Pt up in recliner chair with wife at bsd, alert and oriented x4, able to answer questions, denies sob, on room air satting at 94%, ferquent congested cough, afebrile, sr on tele, room orientation provided, informed of current orders a this time, nad.

## 2023-04-09 NOTE — NURSING NOTE
TDC exit site with erythemia, no drng. drsg change performed per p/p, using aseptic access at all times

## 2023-04-09 NOTE — NURSING NOTE
Spoke  With  Dr Gusman regarding   Heparin order,  She  Stated  To  Use  The  Cardiac  Protocol   To start  The  Heparin.   Order  Verified  And  Confirmed  With  Pharmacy  To  Start   At 12units/kg/hour   At  2300hr.   io

## 2023-04-09 NOTE — PLAN OF CARE
Goal Outcome Evaluation:  Plan of Care Reviewed With: patient        Progress: no change  Outcome Evaluation: Cath today, TR band removed without difficulty. Right radial site with edwardo dressing, D/I, no bleeding or hematoma noted. Right extrem. elevated on pillow. CT surgery consulted. Unable to go to vascular d/t SOA today after ambulating to bathroom, SATs 97-99%, wheezing(RT notified for MN). SR with ST depression noted. EKG completed and Renny MONTANO notified and revierwed, no new orders. Heparin gtt infusing, next PTT at 2300.

## 2023-04-09 NOTE — PROGRESS NOTES
Came by this a.m. patient off the floor to cardiac cath so will see in a.m. as all of the medical issues are being addressed per cardiology renal as well as cardiothoracic    Notified by Dr. Garcia about positive cath and plans for CABG with  consulted and following    CABG placed on pause secondary to rhinovirus -chest x-ray shows questionable infiltrate right base will further evaluate that with a CT of the chest    Diabetes with circulatory disorder/CKD -A1c only 5 and all previous A1c's in the 6 range   -Current blood sugars therapeutic in the 120-140 range   -Assuming diet-controlled is no diabetic medications noted on MAR -SSI implemented    HTN not well controlled and influenced by kidney disease -renal addressing this through dialysis and fluid mgnt

## 2023-04-09 NOTE — PAYOR COMM NOTE
"Lefty Mireles (72 y.o. Male)     ATTN: NURSE REVIEWER  RE: INITIAL INPT AUTH CLINICALS  AUTH: [id number]  PLEASE REPLY TO RD [phone] OR FAX# [phone]    Date of Birth   1950    Social Security Number       Address   [facility] [place] [id number]    Home Phone   [phone]    MRN   [id number]       Cheondoism   Unknown    Marital Status                               Admission Date   4/8/23    Admission Type   Emergency    Admitting Provider   Farida Gusman MD    Attending Provider   Jose Muhammad MD    Department, Room/Bed   [address] CVI, 2213/1       Discharge Date       Discharge Disposition       Discharge Destination                               Attending Provider: Jose Muhammad MD    Allergies: Contrast Dye (Echo Or Unknown Ct/mr), Latex    Isolation: Droplet   Infection: COVID (rule out) (04/08/23), Rhinovirus  (04/08/23)   Code Status: CPR    Ht: 177.8 cm (70\")   Wt: 98.9 kg (218 lb 1.6 oz)    Admission Cmt: None   Principal Problem: NSTEMI (non-ST elevated myocardial infarction) [I21.4]                 Active Insurance as of 4/8/2023     Primary Coverage     Payor Plan Insurance Group Employer/Plan Group    HUMANA MEDICARE REPLACEMENT HUMANA MEDICARE REPLACEMENT [id number]     Payor Plan Address Payor Plan Phone Number Payor Plan Fax Number Effective Dates    [address] [address] [phone]  1/1/2020 - None Entered    Formerly [facility] [address]       Subscriber Name Subscriber Birth Date Member ID       LEFTY MIRELES 1950 [id number]                 Emergency Contacts      (Rel.) Home Phone Work Phone Mobile Phone    Caterina Mireles (Spouse) [phone] -- [phone]    BA BELL (Daughter) -- -- --               History & Physical      Farida Gusman MD at 04/08/23 1835          HISTORY AND PHYSICAL   [facility]        Date of Admission: 4/8/2023  Patient " Identification:  Name: Lefty Cai  Age: 72 y.o.  Sex: male  :  1950  MRN: [id number]                     Primary Care Physician: Daksha Ng APRN    Chief Complaint:  72 year old gentleman presented to the emergency room with shortness of breath with cough, white sputum which started 3-4 days ago; he was worse with exertion; denies chest pain; reports some improvement with a minineb treatment given by ems en route    History of Present Illness:   As above    Past Medical History:  Past Medical History:   Diagnosis Date   • Anemia    • Anesthesia complication     HYPOTENSION   • Arthritis    • Cancer of kidney, left    • CKD (chronic kidney disease)     STAGE 5   • Diabetes mellitus, type 2    • Fatigue    • GERD (gastroesophageal reflux disease)    • H/O renal cell carcinoma 2007    partial nephrectomy   • History of colostomy reversal 2022   • [place] (hard of hearing)     NO DEVICE   • Hyperlipidemia    • Hypertension    • Primary insomnia 2016   • Proteinuria    • Risk factors for obstructive sleep apnea    • Sinus drainage    • Vitamin D deficiency 2017     Past Surgical History:  Past Surgical History:   Procedure Laterality Date   • ARTERIOVENOUS FISTULA/SHUNT SURGERY Left 08/15/2019    Procedure: LEFT MID FOREARM RADIAL CEPHALIC ARTERIAL VENOUS FISTULA;  Surgeon: Louann Miles Jr., MD;  Location: [facility] OR;  Service: Vascular   • COLONOSCOPY  2022   • COLONOSCOPY N/A 2022    Procedure: COLONOSCOPY TO CECUM WITH POLYPECTOMY  (HOT SNARE);  Surgeon: Yelena Guerra MD;  Location: [facility] ENDOSCOPY;  Service: General;  Laterality: N/A;  PREOP/ HX OF DIVERTICULITIS, COLOSTOMY  POSTOP/ POLYPS, DIVERTICULOSIS    • COLOSTOMY  2021   • COLOSTOMY CLOSURE N/A 2022    Procedure: open Colostomy reversal;  Surgeon: Yelena Guerra MD;  Location: [facility] MAIN OR;  Service: General;  Laterality: N/A;   • DIAGNOSTIC LAPAROSCOPY N/A 2023     Procedure: DIAGNOSTIC LAPAROSCOPY;  Surgeon: Murali Ferreira MD;  Location[facility] MAIN OR;  Service: General;  Laterality: N/A;   • EXPLORATORY LAPAROTOMY N/A 09/06/2021    Procedure: Open sigmoind colectomy, colostomy, and appendectomy;  Surgeon: Yelena Guerra MD;  Location: [facility] MAIN OR;  Service: General;  Laterality: N/A;   • EYE SURGERY      CATARACTS   • KNEE ARTHROSCOPY Right    • NEPHRECTOMY PARTIAL  2007    Dr. Victor      Home Meds:  Medications Prior to Admission   Medication Sig Dispense Refill Last Dose   • aspirin 81 MG tablet Take 1 tablet by mouth Every Night. FOLLOW MD GUIDELINES ON WHEN/IF TO HOLD FOR DOS   4/7/2023   • cetirizine (zyrTEC) 10 MG tablet Take 1 tablet by mouth Daily As Needed.   4/7/2023   • DULoxetine (CYMBALTA) 30 MG capsule Take 1 capsule by mouth Daily. (Patient taking differently: Take 1 capsule by mouth Every Night.) 30 capsule 5 4/7/2023   • hydrALAZINE (APRESOLINE) 50 MG tablet Take 2 tablets by mouth Every Morning. (Patient taking differently: Take 2 tablets by mouth Daily With Breakfast.) 60 tablet 5 4/7/2023   • hydrALAZINE (APRESOLINE) 50 MG tablet Take 1 tablet by mouth Every Night.   4/7/2023   • metoprolol succinate XL (TOPROL-XL) 100 MG 24 hr tablet Take 1 tablet by mouth Every Night. 90 tablet 1 4/7/2023   • simvastatin (ZOCOR) 40 MG tablet Take 1 tablet by mouth Every Night. 90 tablet 1 4/7/2023   • sodium bicarbonate 650 MG tablet Take 1 tablet by mouth 3 (Three) Times a Day.  0 4/7/2023   • traZODone (DESYREL) 50 MG tablet TAKE 1 TABLET AT NIGHT AS NEEDED FOR SLEEP (Patient taking differently: Take 1 tablet by mouth Every Night.) 90 tablet 3 4/7/2023   • acetaminophen (TYLENOL) 325 MG tablet Take 2 tablets by mouth Every 6 (Six) Hours As Needed for Mild Pain.   Unknown   • ondansetron (Zofran) 4 MG tablet Take 1 tablet by mouth Every 8 (Eight) Hours As Needed for Nausea or Vomiting. 30 tablet 1 Unknown   • sennosides-docusate (senna-docusate  sodium) 8.6-50 MG per tablet Take 1 tablet by mouth At Night As Needed for Constipation. 60 tablet 0 Unknown   • simethicone (MYLICON) 80 MG chewable tablet Chew 1 tablet Every 6 (Six) Hours As Needed.   Unknown   • traMADol (ULTRAM) 50 MG tablet Take 1 tablet by mouth Every 8 (Eight) Hours As Needed for Moderate Pain for up to 6 doses. 6 tablet 0    • vitamin D3 125 MCG (5000 UT) capsule capsule Take 1 tablet by mouth 2 (Two) Times a Week. SAT AND WED   2023       Allergies:  Allergies   Allergen Reactions   • Contrast Dye (Echo Or Unknown Ct/Mr) Other (See Comments)     KIDNEY DISEASE   • Latex Other (See Comments)     Blisters     Immunizations:  Immunization History   Administered Date(s) Administered   • COVID-19 (PFIZER) PURPLE CAP 2021, 2021, 2021   • FLUAD TRI 65YR+ 10/17/2019   • Flu Vaccine Split Quad 2015   • FluLaval/Fluzone >6mos 2021   • FluMist 2-49yrs 10/21/2015   • Fluad Quad 65+ 2020, 2022   • Fluzone High Dose =>65 Years ([employer] ONLY) 10/06/2016, 2017, 10/17/2018   • Influenza, Unspecified 2021     Social History:   Social History     Social History Narrative   • Not on file     Social History     Socioeconomic History   • Marital status:    Tobacco Use   • Smoking status: Former     Packs/day: 2.00     Years: 25.00     Pack years: 50.00     Types: Cigarettes     Quit date:      Years since quittin.2   • Smokeless tobacco: Current     Types: Snuff   Vaping Use   • Vaping Use: Never used   Substance and Sexual Activity   • Alcohol use: No   • Drug use: No   • Sexual activity: Defer       Family History:  Family History   Problem Relation Age of Onset   • Hypertension Mother    • Heart disease Father         ASCVD   • Macular degeneration Father    • Cancer Father         bladder   • Diabetes type I Sister    • Malig Hyperthermia Neg Hx         Review of Systems  See history of present illness and past medical history.   "Patient denies headache, dizziness, syncope, falls, trauma, change in vision, change in hearing, change in taste, changes in weight, changes in appetite, focal weakness, numbness, or paresthesia.  Patient denies chest pain, palpitations, dyspnea, orthopnea, PND, cough, sinus pressure, rhinorrhea, epistaxis, hemoptysis, nausea, vomiting,hematemesis, diarrhea, constipation or hematchezia.  Denies cold or heat intolerance, polydipsia, polyuria, polyphagia. Denies hematuria, pyuria, dysuria, hesitancy, frequency or urgency. Denies consumption of raw and under cooked meats foods or change in water source.  Denies fever, chills, sweats, night sweats.  Denies missing any routine medications. Remainder of ROS is negative.    Objective:  T Max 24 hrs: Temp (24hrs), Av.7 °F (36.5 °C), Min:97.6 °F (36.4 °C), Max:97.8 °F (36.6 °C)    Vitals Ranges:   Temp:  [97.6 °F (36.4 °C)-97.8 °F (36.6 °C)] 97.6 °F (36.4 °C)  Heart Rate:  [67-73] 70  Resp:  [18-20] 18  BP: (128-143)/(76-89) 128/76      Exam:  /76 (BP Location: Right arm, Patient Position: Lying)   Pulse 70   Temp 97.6 °F (36.4 °C) (Oral)   Resp 18   Ht 177.8 cm (70\")   Wt 99.8 kg (220 lb)   SpO2 96%   BMI 31.57 kg/m²     General Appearance:    Alert, cooperative, no distress, appears stated age   Head:    Normocephalic, without obvious abnormality, atraumatic   Eyes:    PERRL, conjunctivae/corneas clear, EOM's intact, both eyes   Ears:    Normal external ear canals, both ears   Nose:   Nares normal, septum midline, mucosa normal, no drainage    or sinus tenderness   Throat:   Lips, mucosa, and tongue normal   Neck:   Supple, symmetrical, trachea midline, no adenopathy;     thyroid:  no enlargement/tenderness/nodules; no carotid    bruit or JVD   Back:     Symmetric, no curvature, ROM normal, no CVA tenderness   Lungs:     Decreased breath sounds bilaterally, respirations unlabored   Chest Wall:    No tenderness or deformity    Heart:    Regular rate and " rhythm, S1 and S2 normal, no murmur, rub   or gallop   Abdomen:     Soft, nontender, bowel sounds active all four quadrants,     no masses, no hepatomegaly, no splenomegaly   Extremities:   Extremities normal, atraumatic, no cyanosis or edema   Pulses:   2+ and symmetric all extremities   Skin:   Skin color, texture, turgor normal, no rashes or lesions               .    Data Review:  Labs in chart were reviewed.  WBC   Date Value Ref Range Status   04/08/2023 14.47 (H) 3.40 - 10.80 10*3/mm3 Final     Hemoglobin   Date Value Ref Range Status   04/08/2023 12.0 (L) 13.0 - 17.7 g/dL Final     Hematocrit   Date Value Ref Range Status   04/08/2023 35.3 (L) 37.5 - 51.0 % Final     Platelets   Date Value Ref Range Status   04/08/2023 195 140 - 450 10*3/mm3 Final     Sodium   Date Value Ref Range Status   04/08/2023 139 136 - 145 mmol/L Final     Potassium   Date Value Ref Range Status   04/08/2023 3.9 3.5 - 5.2 mmol/L Final     Comment:     Slight hemolysis detected by analyzer. Results may be affected.     Chloride   Date Value Ref Range Status   04/08/2023 104 98 - 107 mmol/L Final     CO2   Date Value Ref Range Status   04/08/2023 20.1 (L) 22.0 - 29.0 mmol/L Final     BUN   Date Value Ref Range Status   04/08/2023 27 (H) 8 - 23 mg/dL Final     Creatinine   Date Value Ref Range Status   04/08/2023 4.67 (H) 0.76 - 1.27 mg/dL Final     Glucose   Date Value Ref Range Status   04/08/2023 123 (H) 65 - 99 mg/dL Final     Calcium   Date Value Ref Range Status   04/08/2023 8.6 8.6 - 10.5 mg/dL Final     AST (SGOT)   Date Value Ref Range Status   04/08/2023 26 1 - 40 U/L Final     Comment:     Slight hemolysis detected by analyzer. Results may be affected.     ALT (SGPT)   Date Value Ref Range Status   04/08/2023 11 1 - 41 U/L Final     Alkaline Phosphatase   Date Value Ref Range Status   04/08/2023 68 39 - 117 U/L Final                Imaging Results (All)     Procedure Component Value Units Date/Time    XR Chest 1 View  [[id number]] Collected: 04/08/23 1112     Updated: 04/08/23 1117    Narrative:      XR CHEST 1 VW-     HISTORY: Male who is 72 years-old,  dyspnea     TECHNIQUE: Frontal view of the chest     COMPARISON: 9/6/2021     FINDINGS:  Right-sided central venous catheter extends to the superior vena cava.  The heart size is normal. Aorta is calcified. Mild infiltrate or  atelectasis is seen at the bases. No pleural effusion or pneumothorax.  No acute osseous process.       Impression:      Mild infiltrate or atelectasis at the bases, follow-up  suggested.     This report was finalized on 4/8/2023 11:14 AM by Dr. Jd Vargas M.D.               Assessment:  Active Hospital Problems    Diagnosis  POA   • **Dyspnea on exertion [R06.09]  Yes      Resolved Hospital Problems   No resolved problems to display.   esrd on hd  Diabetes  Hypertension  nstemi  Hyperlipidemia  Obesity  anemia  Plan:  Cardiac cath is planned for tomorrow  Nephrology is following  accu checks, insulin sliding scale  Monitor on telemetry  accu checks, insulin sliding scale  D.w patient, family and ED provider    Farida Gusman MD  4/8/2023  18:35 EDT      Electronically signed by Farida Gusman MD at 04/08/23 1839         Facility-Administered Medications as of 4/9/2023   Medication Dose Route Frequency Provider Last Rate Last Admin   • acetaminophen (TYLENOL) tablet 500 mg  500 mg Oral Q4H PRN Mirtha Zuluaga MD       • albuterol (PROVENTIL) nebulizer solution 0.083% 2.5 mg/3mL  2.5 mg Nebulization Q6H PRN Lobito Garcia MD       • [COMPLETED] aspirin chewable tablet 324 mg  324 mg Oral Once Kimani Sharp MD   324 mg at 04/08/23 1130   • aspirin EC tablet 81 mg  81 mg Oral Daily Lobito Garcia MD   81 mg at 04/09/23 1213   • dextrose (D50W) (25 g/50 mL) IV injection 25 g  25 g Intravenous Q15 Min PRN Lobito Garcia MD       • dextrose (GLUTOSE) oral gel 15 g  15 g Oral Q15 Min PRN Lobito Garcia MD       • DULoxetine  (CYMBALTA) DR capsule 30 mg  30 mg Oral Nightly Lobito Garcia MD   30 mg at 04/08/23 2115   • [COMPLETED] Enoxaparin Sodium (LOVENOX) syringe 100 mg  1 mg/kg Subcutaneous Once Kimani Sharp MD   100 mg at 04/08/23 1306   • glucagon (GLUCAGEN) injection 1 mg  1 mg Intramuscular Q15 Min PRN Lobito Garcia MD       • heparin (porcine) injection 4,000 Units  4,000 Units Intracatheter PRN Lobito Garcia MD       • heparin 68953 units/250 mL (100 units/mL) in 0.45 % NaCl infusion  10 Units/kg/hr Intravenous Titrated Lobito Garcia MD   Stopped at 04/09/23 0905   • hydrALAZINE (APRESOLINE) tablet 100 mg  100 mg Oral Daily With Breakfast Lobito Garcia MD       • hydrALAZINE (APRESOLINE) tablet 50 mg  50 mg Oral Nightly Lobito Garcia MD   50 mg at 04/08/23 2115   • HYDROcodone-acetaminophen (NORCO) 5-325 MG per tablet 1 tablet  1 tablet Oral Q4H PRN Lobito Garcia MD       • insulin lispro (ADMELOG) injection 0-9 Units  0-9 Units Subcutaneous TID With Meals Lobito Garcia MD       • mannitol 25% (RENAL) injection  12.5 g Intravenous PRN Quang Reyes MD       • melatonin tablet 3 mg  3 mg Oral Nightly PRN Lobito Garcia MD       • metoprolol succinate XL (TOPROL-XL) 24 hr tablet 100 mg  100 mg Oral Nightly Lobito Garcia MD   100 mg at 04/08/23 2115   • ondansetron (ZOFRAN) tablet 4 mg  4 mg Oral Q6H PRN Lobito Garcia MD        Or   • ondansetron (ZOFRAN) injection 4 mg  4 mg Intravenous Q6H PRN Lobito Garcia MD       • ondansetron (ZOFRAN) tablet 4 mg  4 mg Oral Q6H PRN Lobito Garcia MD        Or   • ondansetron (ZOFRAN) injection 4 mg  4 mg Intravenous Q6H PRN Lobito Garcia MD       • [COMPLETED] perflutren (DEFINITY) 8.476 mg in Sodium Chloride (PF) 0.9 % 10 mL injection  2 mL Intravenous Once in imaging Kanchan Duran MD   2 mL at 04/08/23 1445   • sennosides-docusate (PERICOLACE) 8.6-50 MG per tablet 1 tablet  1 tablet Oral Nightly PRN Lobito Garcia MD       •  simethicone (MYLICON) chewable tablet 80 mg  80 mg Oral Q6H PRN Lobito Garcia MD       • sodium bicarbonate tablet 650 mg  650 mg Oral TID Lobito Garcia MD   650 mg at 04/08/23 2115   • sodium chloride 0.9 % bolus 1,000 mL  1,000 mL Intravenous PRN Quang Reyes MD       • sodium chloride 0.9 % flush 10 mL  10 mL Intravenous PRN Lobito Garcia MD       • [COMPLETED] sodium chloride 0.9 % infusion    Code / Trauma / Sedation Continuous Med Lobito Garcia MD   Stopped at 04/09/23 0953   • traMADol (ULTRAM) tablet 25 mg  25 mg Oral Q8H PRN Mirtha Zuluaga MD       • traZODone (DESYREL) tablet 50 mg  50 mg Oral Nightly Lobito Garcia MD   50 mg at 04/08/23 2115       Lab Results (last 24 hours)     Procedure Component Value Units Date/Time    Hepatitis B Surface Antigen [[id number]]  (Normal) Collected: 04/09/23 1435    Specimen: Blood Updated: 04/09/23 1511     Hepatitis B Surface Ag Non-Reactive    POC Glucose Once [[id number]]  (Normal) Collected: 04/09/23 0552    Specimen: Blood Updated: 04/09/23 0554     Glucose 121 mg/dL      Comment: Meter: [id number] : [id number] Abdirahman SIM       aPTT [[id number]]  (Abnormal) Collected: 04/09/23 0447    Specimen: Blood from Arm, Right Updated: 04/09/23 0549     .2 seconds     High Sensitivity Troponin T 2Hr [[id number]]  (Abnormal) Collected: 04/09/23 0447    Specimen: Blood Updated: 04/09/23 0543     HS Troponin T 1,420 ng/L      Troponin T Delta 609 ng/L     Narrative:      High Sensitive Troponin T Reference Range:  <10.0 ng/L- Negative Female for AMI  <15.0 ng/L- Negative Male for AMI  >=10 - Abnormal Female indicating possible myocardial injury.  >=15 - Abnormal Male indicating possible myocardial injury.   Clinicians would have to utilize clinical acumen, EKG, Troponin, and serial changes to determine if it is an Acute Myocardial Infarction or myocardial injury due to an underlying chronic condition.         Renal Function Panel  [[id number]]  (Abnormal) Collected: 04/09/23 0447    Specimen: Blood Updated: 04/09/23 0540     Glucose 122 mg/dL      BUN 39 mg/dL      Creatinine 6.31 mg/dL      Sodium 140 mmol/L      Potassium 4.0 mmol/L      Chloride 103 mmol/L      CO2 24.0 mmol/L      Calcium 8.7 mg/dL      Albumin 3.3 g/dL      Phosphorus 4.9 mg/dL      Anion Gap 13.0 mmol/L      BUN/Creatinine Ratio 6.2     eGFR 8.7 mL/min/1.73      Comment: <15 Indicative of kidney failure       Narrative:      GFR Normal >60  Chronic Kidney Disease <60  Kidney Failure <15    The GFR formula is only valid for adults with stable renal function between ages 18 and 70.    STAT Lactic Acid, Reflex [[id number]]  (Normal) Collected: 04/09/23 0447    Specimen: Blood Updated: 04/09/23 0539     Lactate 1.0 mmol/L     Hemoglobin A1c [[id number]]  (Normal) Collected: 04/09/23 0447    Specimen: Blood Updated: 04/09/23 0533     Hemoglobin A1C 5.00 %     Narrative:      Hemoglobin A1C Ranges:    Increased Risk for Diabetes  5.7% to 6.4%  Diabetes                     >= 6.5%  Diabetic Goal                < 7.0%    CBC & Differential [[id number]]  (Abnormal) Collected: 04/09/23 0447    Specimen: Blood Updated: 04/09/23 0522    Narrative:      The following orders were created for panel order CBC & Differential.  Procedure                               Abnormality         Status                     ---------                               -----------         ------                     CBC Auto Differential[[id number]]        Abnormal            Final result                 Please view results for these tests on the individual orders.    CBC Auto Differential [[id number]]  (Abnormal) Collected: 04/09/23 0447    Specimen: Blood Updated: 04/09/23 0522     WBC 8.58 10*3/mm3      RBC 3.37 10*6/mm3      Hemoglobin 10.3 g/dL      Hematocrit 31.5 %      MCV 93.5 fL      MCH 30.6 pg      MCHC 32.7 g/dL      RDW 12.6 %      RDW-SD 43.7 fl      MPV 10.0 fL      Platelets 161 10*3/mm3       Neutrophil % 73.3 %      Lymphocyte % 13.9 %      Monocyte % 10.7 %      Eosinophil % 1.3 %      Basophil % 0.5 %      Immature Grans % 0.3 %      Neutrophils, Absolute 6.29 10*3/mm3      Lymphocytes, Absolute 1.19 10*3/mm3      Monocytes, Absolute 0.92 10*3/mm3      Eosinophils, Absolute 0.11 10*3/mm3      Basophils, Absolute 0.04 10*3/mm3      Immature Grans, Absolute 0.03 10*3/mm3      nRBC 0.0 /100 WBC     POC Glucose Once [[id number]]  (Abnormal) Collected: 04/08/23 2050    Specimen: Blood Updated: 04/08/23 2052     Glucose 146 mg/dL      Comment: Meter: [id number] : [id number] Abdirahman Jordenesme SIM       aPTT [[id number]]  (Normal) Collected: 04/08/23 1925    Specimen: Blood Updated: 04/08/23 1947     PTT 30.5 seconds     Protime-INR [[id number]]  (Abnormal) Collected: 04/08/23 1925    Specimen: Blood Updated: 04/08/23 1947     Protime 14.8 Seconds      INR 1.15        Imaging Results (Last 24 Hours)     ** No results found for the last 24 hours. **        ECG/EMG Results (last 24 hours)     Procedure Component Value Units Date/Time    ECG 12 Lead Dyspnea [[id number]] Collected: 04/08/23 1050     Updated: 04/08/23 2141     QT Interval 394 ms     Narrative:      HEART RATE= 72  bpm  RR Interval= 833  ms  MO Interval= 164  ms  P Horizontal Axis= -29  deg  P Front Axis= 59  deg  QRSD Interval= 88  ms  QT Interval= 394  ms  QRS Axis= 25  deg  T Wave Axis= 141  deg  - ABNORMAL ECG -  Sinus rhythm  Borderline low voltage, extremity leads  Repol abnrm suggests ischemia, anterolateral  new st t changes  Electronically Signed By: Lobito GarciaESTEPHANIA) ([facility]) 08-Apr-2023 21:41:33  Date and Time of Study: 2023-04-08 10:50:24          Orders (last 24 hrs)      Start     Ordered    04/10/23 0600  CBC (No Diff)  Morning Draw         04/09/23 0939    04/10/23 0600  Basic Metabolic Panel  Morning Draw         04/09/23 0939    04/10/23 0600  Comprehensive Metabolic Panel  Morning Draw         04/09/23 1146    04/10/23 0600  TSH   Morning Draw         04/09/23 1146    04/10/23 0600  Platelet Function ADP  Morning Draw         04/09/23 1146    04/10/23 0430  Renal Function Panel  Morning Draw         04/09/23 1215    04/10/23 0430  CBC & Differential  Morning Draw         04/09/23 1215    04/09/23 1455  aPTT  STAT         04/09/23 1454    04/09/23 1418  Discontinue Patient Isolation  Once        Comments: Covid isolation    04/09/23 1417    04/09/23 1403  Hepatitis B Surface Antigen  STAT         04/09/23 1403    04/09/23 1206  mannitol 25% (RENAL) injection  As Needed         04/09/23 1206    04/09/23 1206  sodium chloride 0.9 % bolus 1,000 mL  As Needed         04/09/23 1206    04/09/23 1200  Strict intake and output  Every 4 Hours       04/09/23 0939    04/09/23 1030  Restart heparin gtt in 4 hours  Nursing Communication  Once        Comments: Restart heparin gtt in 4 hours    04/09/23 1030    04/09/23 1023  Diet: Cardiac Diets, Diabetic Diets; Healthy Heart (2-3 Na+); Consistent Carbohydrate; Texture: Regular Texture (IDDSI 7); Fluid Consistency: Thin (IDDSI 0)  Diet Effective Now         04/09/23 1022    04/09/23 0957  traMADol (ULTRAM) tablet 25 mg  Every 8 Hours PRN         04/09/23 0957    04/09/23 0957  Urinalysis With Microscopic If Indicated (No Culture) - Urine, Clean Catch  Once         04/09/23 0956    04/09/23 0956  acetaminophen (TYLENOL) tablet 500 mg  Every 4 Hours PRN         04/09/23 0957    04/09/23 0956  Duplex Carotid Ultrasound CAR  Once         04/09/23 0955    04/09/23 0956  Duplex Vein Mapping Lower Extremity - Bilateral CAR  Once         04/09/23 0955    04/09/23 0956  CT Chest Without Contrast Diagnostic  1 Time Imaging         04/09/23 0955    04/09/23 0939  Inpatient Cardiothoracic Surgery Consult  Once        Specialty:  Cardiothoracic Surgery  Provider:  Mirtha Zuluaga MD    04/09/23 0939    04/09/23 0938  Obtain STAT EKG during chest pain. Notify MD of any change in rhythm, ST segments or complaints of  chest pain.  Until Discontinued         04/09/23 0939 04/09/23 0938  Encourage fluids  Until Discontinued         04/09/23 0939 04/09/23 0938  Oxygen Therapy- Nasal Cannula; Titrate for SPO2: equal to or greater than, 92%, per policy  Continuous         04/09/23 0939 04/09/23 0938  Continuous Pulse Oximetry  Continuous         04/09/23 0939 04/09/23 0938  Advance Diet As Tolerated -  Until Discontinued         04/09/23 0939 04/09/23 0938  Notify MD of hypotension (SBP less than 95), bleeding, or dysrythmia and follow Sheath Removal Policy if needed.  Until Discontinued         04/09/23 0939 04/09/23 0938  Hold metFORMIN (GLUCOPHAGE) for 48 Hours  Continuous         04/09/23 0939 04/09/23 0938  Code Status and Medical Interventions:  Continuous         04/09/23 0939 04/09/23 0938  Discontinue Radial TR Band Per Removal Protocol  Per Order Details        Comments: If Bleeding Occurs Re-Inflate 2 mL & Then Continue With 2 mL Every 15 Minute Deflations. Gently Roll Band Off Insertion Site After Completely Deflated.    04/09/23 0939 04/09/23 0938  Vital Signs & Reverse Conrad's Test (While Radial Compression Device in Place)  Per Order Details        Comments: Every 15 Minutes x4, Every 30 Minutes x4, & Every 1 Hour x2    04/09/23 0939 04/09/23 0938  Keep Affected Arm Straight & Elevated  Continuous         04/09/23 0939 04/09/23 0938  Activity As Tolerated  Until Discontinued         04/09/23 0939 04/09/23 0937  HYDROcodone-acetaminophen (NORCO) 5-325 MG per tablet 1 tablet  Every 4 Hours PRN         04/09/23 0939 04/09/23 0937  ondansetron (ZOFRAN) tablet 4 mg  Every 6 Hours PRN        See Hyperspace for full Linked Orders Report.    04/09/23 0939 04/09/23 0937  ondansetron (ZOFRAN) injection 4 mg  Every 6 Hours PRN        See Hyperspace for full Linked Orders Report.    04/09/23 0939 04/09/23 0927  iopamidol (ISOVUE-370) 76 % injection  Code / Trauma / Sedation Medication,    Status:  Discontinued         04/09/23 0928    04/09/23 0919  heparin (porcine) injection  Code / Trauma / Sedation Medication,   Status:  Discontinued         04/09/23 0919    04/09/23 0919  verapamil (ISOPTIN) injection  Code / Trauma / Sedation Medication,   Status:  Discontinued         04/09/23 0919 04/09/23 0919  nitroGLYCERIN 200 mcg/mL 30 mL syringe  Code / Trauma / Sedation Medication,   Status:  Discontinued         04/09/23 0919    04/09/23 0917  lidocaine (XYLOCAINE) 2% injection  Code / Trauma / Sedation Medication,   Status:  Discontinued         04/09/23 0917 04/09/23 0913  fentaNYL citrate (PF) (SUBLIMAZE) injection  Code / Trauma / Sedation Medication,   Status:  Discontinued         04/09/23 0913 04/09/23 0913  midazolam (VERSED) injection  Code / Trauma / Sedation Medication,   Status:  Discontinued         04/09/23 0913 04/09/23 0909  sodium chloride 0.9 % infusion  Code / Trauma / Sedation Continuous Med         04/09/23 0910    04/09/23 0906  methylPREDNISolone sodium succinate (SOLU-Medrol) injection  Code / Trauma / Sedation Medication,   Status:  Discontinued         04/09/23 0907    04/09/23 0810  Cardiac Catheterization/Vascular Study  Once         04/09/23 0809    04/09/23 0800  hydrALAZINE (APRESOLINE) tablet 100 mg  Daily With Breakfast         04/08/23 1843    04/09/23 0748  heparin (porcine) injection 4,000 Units  As Needed         04/09/23 0749    04/09/23 0700  POC Glucose TID AC  3 Times Daily Before Meals       04/08/23 1839    04/09/23 0600  Basic Metabolic Panel  Morning Draw,   Status:  Canceled         04/08/23 1652    04/09/23 0600  CBC (No Diff)  Morning Draw,   Status:  Canceled         04/08/23 1652    04/09/23 0600  Hemoglobin A1c  Morning Draw         04/08/23 1839    04/09/23 0555  POC Glucose Once  PROCEDURE ONCE         04/09/23 0552    04/09/23 0500  aPTT  Timed        Comments: Call Nurse Prior to draw. Need  to Stop Heparin first      04/08/23 2908     04/09/23 0430  Renal Function Panel  Morning Draw         04/08/23 1453    04/09/23 0430  CBC & Differential  Morning Draw         04/08/23 1453    04/09/23 0430  aPTT  Daily      Comments: Cancel If Patient Has Infusion Changes      04/08/23 1841 04/09/23 0430  CBC Auto Differential  PROCEDURE ONCE         04/08/23 2030    04/09/23 0001  NPO Diet NPO Type: Sips with Meds  Diet Effective Midnight,   Status:  Canceled         04/08/23 1444    04/09/23 0000  Hemodialysis Inpatient  Once        Comments: Heparin 2000 units IV with dialysis  Please do the dialysis after the heart catheterization    04/08/23 1453    04/08/23 2100  DULoxetine (CYMBALTA) DR capsule 30 mg  Nightly         04/08/23 1843    04/08/23 2100  hydrALAZINE (APRESOLINE) tablet 50 mg  Nightly         04/08/23 1843    04/08/23 2100  metoprolol succinate XL (TOPROL-XL) 24 hr tablet 100 mg  Nightly         04/08/23 1843    04/08/23 2100  sodium bicarbonate tablet 650 mg  3 Times Daily         04/08/23 1843    04/08/23 2100  traZODone (DESYREL) tablet 50 mg  Nightly         04/08/23 1843    04/08/23 2053  POC Glucose Once  PROCEDURE ONCE         04/08/23 2050 04/08/23 2000  Vital Signs  Every 4 Hours      Comments: Per per hospital policy    04/08/23 1652 04/08/23 2000  heparin 88684 units/250 mL (100 units/mL) in 0.45 % NaCl infusion  Titrated         04/08/23 1912 04/08/23 1930  insulin lispro (ADMELOG) injection 0-9 Units  3 Times Daily With Meals         04/08/23 1839 04/08/23 1930  aspirin EC tablet 81 mg  Daily         04/08/23 1843    04/08/23 1913  Protime-INR  STAT        Comments: Prior to Initial Heparin Bolus      04/08/23 1912 04/08/23 1912  aPTT  STAT        Comments: Prior to Initial Heparin Bolus      04/08/23 1912 04/08/23 1911  Adjust Heparin Rate Based on aPTT Using Nomogram  Continuous         04/08/23 1912 04/08/23 1911  Verify All Anticoagulant Orders Are Discontinued Upon Initiation of Heparin Protocol (eg  Enoxaparin, Fondaparinux, Apixaban, Dabigatran, Edoxaban, or Rivaroxaban)  Once         04/08/23 1912    04/08/23 1911  Initiate & Follow Heparin Protocol  Continuous         04/08/23 1912    04/08/23 1843  traMADol (ULTRAM) tablet 50 mg  Every 8 Hours PRN,   Status:  Discontinued         04/08/23 1843    04/08/23 1842  simethicone (MYLICON) chewable tablet 80 mg  Every 6 Hours PRN         04/08/23 1843    04/08/23 1842  sennosides-docusate (PERICOLACE) 8.6-50 MG per tablet 1 tablet  Nightly PRN         04/08/23 1843    04/08/23 1841  Initiate & Follow Heparin Protocol  Continuous         04/08/23 1841 04/08/23 1841  Adjust Heparin Rate Based on aPTT Using Nomogram  Continuous         04/08/23 1841 04/08/23 1841  Verify All Anticoagulant Orders Are Discontinued Upon Initiation of Heparin Protocol (eg Enoxaparin, Fondaparinux, Apixaban, Dabigatran, Edoxaban, or Rivaroxaban)  Once         04/08/23 1841 04/08/23 1841  Protime-INR  STAT,   Status:  Canceled        Comments: Prior to Initial Heparin Bolus      04/08/23 1841 04/08/23 1841  aPTT  STAT,   Status:  Canceled        Comments: Prior to Initial Heparin Bolus      04/08/23 1841 04/08/23 1840  heparin (porcine) 5000 UNIT/ML injection 3,000-6,000 Units  Every 6 Hours PRN,   Status:  Discontinued         04/08/23 1841 04/08/23 1840  Do NOT Hold Basal or Correction Scale Insulin When Patient is NPO, Hold Scheduled Mealtime (Bolus) Insulin if NPO  Continuous         04/08/23 1839 04/08/23 1839  dextrose (GLUTOSE) oral gel 15 g  Every 15 Minutes PRN         04/08/23 1839 04/08/23 1839  dextrose (D50W) (25 g/50 mL) IV injection 25 g  Every 15 Minutes PRN         04/08/23 1839 04/08/23 1839  glucagon (GLUCAGEN) injection 1 mg  Every 15 Minutes PRN         04/08/23 1839 04/08/23 1835  albuterol (PROVENTIL) nebulizer solution 0.083% 2.5 mg/3mL  Every 6 Hours PRN         04/08/23 1835    04/08/23 1800  Oral Care  2 Times Daily       04/08/23  1652    04/08/23 1653  Code Status and Medical Interventions:  Continuous,   Status:  Canceled         04/08/23 1652    04/08/23 1653  Cardiac Monitoring  Continuous         04/08/23 1652    04/08/23 1653  Diet: Cardiac Diets; Healthy Heart (2-3 Na+); Texture: Regular Texture (IDDSI 7); Fluid Consistency: Thin (IDDSI 0)  Diet Effective Now,   Status:  Canceled         04/08/23 1652    04/08/23 1653  Daily Weights  Daily       04/08/23 1652    04/08/23 1653  Strict Intake & Output  Every Shift       04/08/23 1652    04/08/23 1653  Place Sequential Compression Device  Once         04/08/23 1652    04/08/23 1653  Maintain Sequential Compression Device  Continuous         04/08/23 1652    04/08/23 1652  acetaminophen (TYLENOL) tablet 650 mg  Every 4 Hours PRN,   Status:  Discontinued         04/08/23 1652    04/08/23 1652  ondansetron (ZOFRAN) tablet 4 mg  Every 6 Hours PRN        See Hyperspace for full Linked Orders Report.    04/08/23 1652    04/08/23 1652  ondansetron (ZOFRAN) injection 4 mg  Every 6 Hours PRN        See Hyperspace for full Linked Orders Report.    04/08/23 1652    04/08/23 1652  melatonin tablet 3 mg  Nightly PRN         04/08/23 1652    04/08/23 1050  sodium chloride 0.9 % flush 10 mL  As Needed        See Hyperspace for full Linked Orders Report.    04/08/23 1050    Unscheduled  Up with assistance  As Needed       04/08/23 1652    Unscheduled  Follow Hypoglycemia Standing Orders For Blood Glucose <70 & Notify Provider of Treatment  As Needed      Comments: Follow Hypoglycemia Orders As Outlined in Process Instructions (Open Order Report to View Full Instructions)  Notify Provider Any Time Hypoglycemia Treatment is Administered    04/08/23 1839    Unscheduled  aPTT  As Needed       04/08/23 1841    Unscheduled  RN To Release aPTT Order 6 Hours After Heparin Bolus & 6 Hours After Any Heparin Rate Change  As Needed       04/08/23 1841    Unscheduled  aPTT  As Needed       04/08/23 1912     Unscheduled  RN To Release aPTT Order 6 Hours After Heparin Bolus & 6 Hours After Any Heparin Rate Change  As Needed       23 1912    Unscheduled  Vital Signs  As Needed       23 0939    Unscheduled  Change site dressing  As Needed       23 0939    Pending  aPTT  Once         Pending                Operative/Procedure Notes (last 24 hours)  Notes from 23 1524 through 23 1524   No notes of this type exist for this encounter.            Physician Progress Notes (last 24 hours)      Jose Muhammad MD at 23 1234        Came by this a.m. patient off the floor to cardiac cath so will see in a.m. as all of the medical issues are being addressed per cardiology renal as well as cardiothoracic    Notified by Dr. Garcia about positive cath and plans for CABG with  consulted and following    CABG placed on pause secondary to rhinovirus -chest x-ray shows questionable infiltrate right base will further evaluate that with a CT of the chest    Diabetes with circulatory disorder/CKD -A1c only 5 and all previous A1c's in the 6 range   -Current blood sugars therapeutic in the 120-140 range   -Assuming diet-controlled is no diabetic medications noted on MAR -SSI implemented    HTN not well controlled and influenced by kidney disease -renal addressing this through dialysis and fluid mgnt            Electronically signed by Jose Muhammad MD at 23 1243     Quang Reyes MD at 23 1212              [facility] Progress Note      Patient Name: Lefty Cai  : 1950  MRN: [id number]  Primary Care Physician:  Daksha Ng, DUSTY  Date of admission: 2023    Subjective     Interval History:   Follow-up end-stage renal disease    He underwent heart catheterization early this morning revealing multivessel disease.  Currently has no chest pain or shortness of air, no orthopnea or PND, no nausea or vomiting, no abdominal pain, no  dysuria or gross hematuria.    Review of Systems:   As noted above    Objective     Vitals:   Temp:  [97.6 °F (36.4 °C)-98.6 °F (37 °C)] 97.6 °F (36.4 °C)  Heart Rate:  [65-80] 80  Resp:  [10-18] 15  BP: (113-197)/(66-93) 182/83  Flow (L/min):  [3] 3    Intake/Output Summary (Last 24 hours) at 4/9/2023 1212  Last data filed at 4/9/2023 0931  Gross per 24 hour   Intake 278 ml   Output --   Net 278 ml       Physical Exam:    General Appearance: Awake, alert, no acute distress, chronically ill  Skin: warm and dry  HEENT: oral mucosa normal, nonicteric sclera  Neck: supple, no JVD, tunneled dialysis catheter in the right IJ with exit site below the right clavicle  Lungs: CTA, breathing effort not labored  Heart: RRR, normal S1 and S2, no S3, no rub  Abdomen: soft, nontender, nondistended, normoactive bowel  : no palpable bladder  Extremities: no edema, cyanosis or clubbing, functional AV fistula in the left forearm with good thrill and bruit  Neuro: normal speech and mental status     Scheduled Meds:     aspirin, 81 mg, Oral, Daily  DULoxetine, 30 mg, Oral, Nightly  hydrALAZINE, 100 mg, Oral, Daily With Breakfast  hydrALAZINE, 50 mg, Oral, Nightly  insulin lispro, 0-9 Units, Subcutaneous, TID With Meals  metoprolol succinate XL, 100 mg, Oral, Nightly  sodium bicarbonate, 650 mg, Oral, TID  traZODone, 50 mg, Oral, Nightly      IV Meds:   heparin, 10 Units/kg/hr, Last Rate: Stopped (04/09/23 0905)        Results Reviewed:   I have personally reviewed the results from the time of this admission to 4/9/2023 12:12 EDT     Results from last 7 days   Lab Units 04/09/23  0447 04/08/23  1142   SODIUM mmol/L 140 139   POTASSIUM mmol/L 4.0 3.9   CHLORIDE mmol/L 103 104   CO2 mmol/L 24.0 20.1*   BUN mg/dL 39* 27*   CREATININE mg/dL 6.31* 4.67*   CALCIUM mg/dL 8.7 8.6   BILIRUBIN mg/dL  --  0.4   ALK PHOS U/L  --  68   ALT (SGPT) U/L  --  11   AST (SGOT) U/L  --  26   GLUCOSE mg/dL 122* 123*       Estimated Creatinine Clearance:  12.3 mL/min (A) (by C-G formula based on SCr of 6.31 mg/dL (H)).    Results from last 7 days   Lab Units 04/09/23  0447   PHOSPHORUS mg/dL 4.9*             Results from last 7 days   Lab Units 04/09/23  0447 04/08/23  1142   WBC 10*3/mm3 8.58 14.47*   HEMOGLOBIN g/dL 10.3* 12.0*   PLATELETS 10*3/mm3 161 195       Results from last 7 days   Lab Units 04/08/23  1925   INR  1.15*       Assessment / Plan     ASSESSMENT:  -End-stage renal disease and diabetic and hypertensive glomerulosclerosis on maintenance hemodialysis every Tuesday, Thursday and Saturday, creatinine today 6.31, electrolyte within acceptable  -Diabetes mellitus type 2 with renal complication  -Hypertension with chronic kidney disease, not well controlled related to fluid excess related to heart catheterization  -Anemia of ESRD, hemoglobin 10.3 meeting goal  -Non-ST elevation MI, heart cath today revealed multivessel disease will need CABG  -Secondary hyperparathyroidism of renal origin    PLAN:  -Hemodialysis today  -Surveillance lab  -He is cleared to have a CABG anytime from the renal standpoint  I discussed the case with Dr. Garcia  I had long discussion with the patient and his family at the bedside and they voiced good understanding    Thank you for involving us in the care of Lefty Cai.  Please feel free to call with any questions.    Quang Reyes MD  04/09/23  12:12 EDT    [facility]  [phone]    Please note that portions of this note were completed with a voice recognition program.    Electronically signed by Quang Reyes MD at 04/09/23 1215     Mirtha Zuluaga MD at 04/09/23 0971        Consult received for possible surgical vascularization.  Preoperative work-up initiated.      Full consult to follow.    Electronically signed by Mirtha Zuluaga MD at 04/09/23 0988          Consult Notes (last 24 hours)      Mirtha Zuluaga MD at 04/09/23 1125          BCS CONSULT    Reason for  Consultation: Possible surgical vascularization.    History of Present Illness:     This is a pleasant 72-year-old  gentleman with worsening dyspnea over the last several days.  This continues to progress despite his adherence to his regular hemodialysis schedule.  He was noted to have some inferior lead T wave inversions on EKG and echocardiogram showed a greatly reduced left ventricular ejection fraction at 25 to 30%.  His troponins were also elevated on admission.  His working diagnosis is of a non-ST elevation myocardial infarction.  He has tested positive for rhinovirus.    Left heart catheterization shows severe three-vessel coronary disease with an occluded right coronary system.  A left ventricular branch, distal circumflex, OM branch, diagonal branch, and distal LAD all appear to be viable surgical targets.  Left ventriculogram also shows that ejection fraction is greatly reduced, possibly down to 20%.    The patient is currently in no distress.  He denies any fevers cough.  He denies any periods of disorientation.  He is currently sitting up in bed without oxygen supplementation satting in the high 90s.    Review of Systems   Constitutional: Positive for activity change and fatigue. Negative for appetite change, chills, diaphoresis, fever and unexpected weight change.   HENT: Negative for congestion, dental problem, facial swelling, hearing loss, mouth sores, nosebleeds, postnasal drip, rhinorrhea, sneezing, sore throat, tinnitus, trouble swallowing and voice change.    Eyes: Negative for photophobia, pain, discharge, redness, itching and visual disturbance.   Respiratory: Positive for chest tightness and shortness of breath. Negative for apnea, cough, choking, wheezing and stridor.    Cardiovascular: Negative for chest pain, palpitations and leg swelling.   Gastrointestinal: Negative for abdominal distention, abdominal pain, constipation, diarrhea, nausea and vomiting.   Endocrine: Negative for  cold intolerance, heat intolerance, polydipsia, polyphagia and polyuria.   Genitourinary: Negative for decreased urine volume, difficulty urinating, dysuria, enuresis, flank pain, frequency, hematuria and urgency.   Musculoskeletal: Negative for arthralgias, back pain, gait problem, joint swelling, myalgias, neck pain and neck stiffness.   Skin: Negative for color change, pallor, rash and wound.   Allergic/Immunologic: Negative for environmental allergies, food allergies and immunocompromised state.   Neurological: Negative for dizziness, tremors, seizures, syncope, facial asymmetry, speech difficulty, weakness, light-headedness, numbness and headaches.   Hematological: Negative for adenopathy. Does not bruise/bleed easily.   Psychiatric/Behavioral: Positive for decreased concentration. Negative for agitation, behavioral problems, confusion, dysphoric mood, hallucinations, self-injury, sleep disturbance and suicidal ideas. The patient is not nervous/anxious and is not hyperactive.         Past Medical History:   Diagnosis Date   • Anemia    • Anesthesia complication     HYPOTENSION   • Arthritis    • Cancer of kidney, left    • CKD (chronic kidney disease)     STAGE 5   • Diabetes mellitus, type 2    • Fatigue    • GERD (gastroesophageal reflux disease)    • H/O renal cell carcinoma 2007    partial nephrectomy   • History of colostomy reversal 04/2022   • [place] (hard of hearing)     NO DEVICE   • Hyperlipidemia    • Hypertension    • Primary insomnia 06/13/2016   • Proteinuria    • Risk factors for obstructive sleep apnea    • Sinus drainage    • Vitamin D deficiency 08/14/2017     Past Surgical History:   Procedure Laterality Date   • ARTERIOVENOUS FISTULA/SHUNT SURGERY Left 08/15/2019    Procedure: LEFT MID FOREARM RADIAL CEPHALIC ARTERIAL VENOUS FISTULA;  Surgeon: Louann Miles Jr., MD;  Location: [facility];  Service: Vascular   • COLONOSCOPY  03/24/2022   • COLONOSCOPY N/A 03/24/2022    Procedure:  COLONOSCOPY TO CECUM WITH POLYPECTOMY  (HOT SNARE);  Surgeon: Yelena Guerra MD;  [facility];  Service: General;  Laterality: N/A;  PREOP/ HX OF DIVERTICULITIS, COLOSTOMY  POSTOP/ POLYPS, DIVERTICULOSIS    • COLOSTOMY  2021   • COLOSTOMY CLOSURE N/A 2022    Procedure: open Colostomy reversal;  Surgeon: Yelena Guerra MD;  Location: [facility] MAIN OR;  Service: General;  Laterality: N/A;   • DIAGNOSTIC LAPAROSCOPY N/A 2023    Procedure: DIAGNOSTIC LAPAROSCOPY;  Surgeon: Murali Ferreira MD;  Location: [facility] MAIN OR;  Service: General;  Laterality: N/A;   • EXPLORATORY LAPAROTOMY N/A 2021    Procedure: Open sigmoind colectomy, colostomy, and appendectomy;  Surgeon: Yelena Guerra MD;  Location: [facility] MAIN OR;  Service: General;  Laterality: N/A;   • EYE SURGERY      CATARACTS   • KNEE ARTHROSCOPY Right    • NEPHRECTOMY PARTIAL      Dr. Victor     Family History   Problem Relation Age of Onset   • Hypertension Mother    • Heart disease Father         ASCVD   • Macular degeneration Father    • Cancer Father         bladder   • Diabetes type I Sister    • Malig Hyperthermia Neg Hx      Social History     Tobacco Use   • Smoking status: Former     Packs/day: 2.00     Years: 25.00     Pack years: 50.00     Types: Cigarettes     Quit date:      Years since quittin.2   • Smokeless tobacco: Current     Types: Snuff   Vaping Use   • Vaping Use: Never used   Substance Use Topics   • Alcohol use: No   • Drug use: No     Medications Prior to Admission   Medication Sig Dispense Refill Last Dose   • aspirin 81 MG tablet Take 1 tablet by mouth Every Night. FOLLOW MD GUIDELINES ON WHEN/IF TO HOLD FOR DOS   2023   • cetirizine (zyrTEC) 10 MG tablet Take 1 tablet by mouth Daily As Needed.   2023   • DULoxetine (CYMBALTA) 30 MG capsule Take 1 capsule by mouth Daily. (Patient taking differently: Take 1 capsule by mouth Every Night.) 30 capsule 5 2023    • hydrALAZINE (APRESOLINE) 50 MG tablet Take 2 tablets by mouth Every Morning. (Patient taking differently: Take 2 tablets by mouth Daily With Breakfast.) 60 tablet 5 4/7/2023   • hydrALAZINE (APRESOLINE) 50 MG tablet Take 1 tablet by mouth Every Night.   4/7/2023   • metoprolol succinate XL (TOPROL-XL) 100 MG 24 hr tablet Take 1 tablet by mouth Every Night. 90 tablet 1 4/7/2023   • simvastatin (ZOCOR) 40 MG tablet Take 1 tablet by mouth Every Night. 90 tablet 1 4/7/2023   • sodium bicarbonate 650 MG tablet Take 1 tablet by mouth 3 (Three) Times a Day.  0 4/7/2023   • traZODone (DESYREL) 50 MG tablet TAKE 1 TABLET AT NIGHT AS NEEDED FOR SLEEP (Patient taking differently: Take 1 tablet by mouth Every Night.) 90 tablet 3 4/7/2023   • acetaminophen (TYLENOL) 325 MG tablet Take 2 tablets by mouth Every 6 (Six) Hours As Needed for Mild Pain.   Unknown   • ondansetron (Zofran) 4 MG tablet Take 1 tablet by mouth Every 8 (Eight) Hours As Needed for Nausea or Vomiting. 30 tablet 1 Unknown   • sennosides-docusate (senna-docusate sodium) 8.6-50 MG per tablet Take 1 tablet by mouth At Night As Needed for Constipation. 60 tablet 0 Unknown   • simethicone (MYLICON) 80 MG chewable tablet Chew 1 tablet Every 6 (Six) Hours As Needed.   Unknown   • traMADol (ULTRAM) 50 MG tablet Take 1 tablet by mouth Every 8 (Eight) Hours As Needed for Moderate Pain for up to 6 doses. 6 tablet 0    • vitamin D3 125 MCG (5000 UT) capsule capsule Take 1 tablet by mouth 2 (Two) Times a Week. SAT AND WED 4/5/2023       Current Facility-Administered Medications:   •  acetaminophen (TYLENOL) tablet 500 mg, 500 mg, Oral, Q4H PRN, Mirtha Zuluaga MD  •  albuterol (PROVENTIL) nebulizer solution 0.083% 2.5 mg/3mL, 2.5 mg, Nebulization, Q6H PRN, Lobito Garcia MD  •  aspirin EC tablet 81 mg, 81 mg, Oral, Daily, Lobito Garcia MD  •  dextrose (D50W) (25 g/50 mL) IV injection 25 g, 25 g, Intravenous, Q15 Min PRN, Lobito Garcia MD  •  dextrose  (GLUTOSE) oral gel 15 g, 15 g, Oral, Q15 Min PRN, Lobito Garcia MD  •  DULoxetine (CYMBALTA) DR capsule 30 mg, 30 mg, Oral, Nightly, Lobito Garcia MD, 30 mg at 04/08/23 2115  •  glucagon (GLUCAGEN) injection 1 mg, 1 mg, Intramuscular, Q15 Min PRN, Lobito Garcia MD  •  heparin (porcine) injection 4,000 Units, 4,000 Units, Intracatheter, PRN, Lobito Garcia MD  •  heparin 54217 units/250 mL (100 units/mL) in 0.45 % NaCl infusion, 10 Units/kg/hr, Intravenous, Titrated, Lobito Garcia MD, Stopped at 04/09/23 0905  •  hydrALAZINE (APRESOLINE) tablet 100 mg, 100 mg, Oral, Daily With Breakfast, Lobito Garcia MD  •  hydrALAZINE (APRESOLINE) tablet 50 mg, 50 mg, Oral, Nightly, Lobito Garcia MD, 50 mg at 04/08/23 2115  •  HYDROcodone-acetaminophen (NORCO) 5-325 MG per tablet 1 tablet, 1 tablet, Oral, Q4H PRN, Lobito Garcia MD  •  insulin lispro (ADMELOG) injection 0-9 Units, 0-9 Units, Subcutaneous, TID With Meals, Lobito Garcia MD  •  melatonin tablet 3 mg, 3 mg, Oral, Nightly PRN, Lobito Garcia MD  •  metoprolol succinate XL (TOPROL-XL) 24 hr tablet 100 mg, 100 mg, Oral, Nightly, Lobito Garcia MD, 100 mg at 04/08/23 2115  •  ondansetron (ZOFRAN) tablet 4 mg, 4 mg, Oral, Q6H PRN **OR** ondansetron (ZOFRAN) injection 4 mg, 4 mg, Intravenous, Q6H PRN, Lobito Garcia MD  •  ondansetron (ZOFRAN) tablet 4 mg, 4 mg, Oral, Q6H PRN **OR** ondansetron (ZOFRAN) injection 4 mg, 4 mg, Intravenous, Q6H PRN, Lobito Garcia MD  •  sennosides-docusate (PERICOLACE) 8.6-50 MG per tablet 1 tablet, 1 tablet, Oral, Nightly PRN, Lobito Garcia MD  •  simethicone (MYLICON) chewable tablet 80 mg, 80 mg, Oral, Q6H PRN, Lobito Garcia MD  •  sodium bicarbonate tablet 650 mg, 650 mg, Oral, TID, Lobito Garcia MD, 650 mg at 04/08/23 2115  •  [COMPLETED] Insert Peripheral IV, , , Once **AND** sodium chloride 0.9 % flush 10 mL, 10 mL, Intravenous, PRN, Lobito Garcia MD  •  traMADol (ULTRAM) tablet 25 mg, 25 mg,  "Oral, Q8H PRN, Mirtha Zuluaga MD  •  traZODone (DESYREL) tablet 50 mg, 50 mg, Oral, Nightly, Lobito Garcia MD, 50 mg at 04/08/23 2115  aspirin, 81 mg, Oral, Daily  DULoxetine, 30 mg, Oral, Nightly  hydrALAZINE, 100 mg, Oral, Daily With Breakfast  hydrALAZINE, 50 mg, Oral, Nightly  insulin lispro, 0-9 Units, Subcutaneous, TID With Meals  metoprolol succinate XL, 100 mg, Oral, Nightly  sodium bicarbonate, 650 mg, Oral, TID  traZODone, 50 mg, Oral, Nightly      Allergies:  Contrast dye (echo or unknown ct/mr) and Latex    Objective      Vital Signs  Temp:  [97.6 °F (36.4 °C)-98.6 °F (37 °C)] 97.6 °F (36.4 °C)  Heart Rate:  [65-73] 65  Resp:  [10-18] 15  BP: (113-190)/(66-90) 168/84    Flowsheet Rows    Flowsheet Row First Filed Value   Admission Height 177.8 cm (70\") Documented at 04/08/2023 1028   Admission Weight 102 kg (225 lb) Documented at 04/08/2023 1028        177.8 cm (70\")    Physical Exam  Vitals reviewed.   Constitutional:       General: He is not in acute distress.     Appearance: Normal appearance. He is obese. He is not ill-appearing, toxic-appearing or diaphoretic.      Interventions: He is not intubated.  HENT:      Head: Normocephalic and atraumatic. No right periorbital erythema or left periorbital erythema.      Jaw: There is normal jaw occlusion.      Right Ear: Hearing normal.      Left Ear: Hearing normal.      Nose: Nose normal. No nasal deformity, signs of injury, congestion or rhinorrhea.      Right Nostril: No foreign body or epistaxis.      Left Nostril: No foreign body or epistaxis.      Mouth/Throat:      Lips: Pink.      Mouth: Mucous membranes are moist.      Dentition: Does not have dentures. No dental tenderness, gingival swelling, dental caries, dental abscesses or gum lesions.      Tongue: No lesions.      Palate: No mass.      Pharynx: Oropharynx is clear. No oropharyngeal exudate or posterior oropharyngeal erythema.      Tonsils: No tonsillar exudate or tonsillar abscesses. "   Eyes:      General: Lids are normal. No allergic shiner, visual field deficit or scleral icterus.        Right eye: No foreign body, discharge or hordeolum.         Left eye: No foreign body, discharge or hordeolum.      Extraocular Movements: Extraocular movements intact.      Right eye: Normal extraocular motion and no nystagmus.      Left eye: Normal extraocular motion and no nystagmus.      Conjunctiva/sclera: Conjunctivae normal.      Right eye: Right conjunctiva is not injected. No chemosis, exudate or hemorrhage.     Left eye: Left conjunctiva is not injected. No chemosis, exudate or hemorrhage.     Pupils: Pupils are equal, round, and reactive to light.   Neck:      Thyroid: No thyroid mass, thyromegaly or thyroid tenderness.      Vascular: Normal carotid pulses. No carotid bruit, hepatojugular reflux or JVD.      Trachea: Trachea and phonation normal. No tracheal tenderness, tracheostomy, abnormal tracheal secretions or tracheal deviation.      Meningeal: Brudzinski's sign and Kernig's sign absent.   Cardiovascular:      Rate and Rhythm: Normal rate and regular rhythm.  No extrasystoles are present.     Chest Wall: PMI is not displaced. No thrill.      Pulses:           Radial pulses are 2+ on the right side and 2+ on the left side.        Femoral pulses are 2+ on the right side and 2+ on the left side.       Dorsalis pedis pulses are 1+ on the right side and 1+ on the left side.      Heart sounds: Heart sounds not distant. Murmur heard.    Systolic murmur is present with a grade of 2/6.   No diastolic murmur is present.    No friction rub. No gallop.      Comments: Forgot to check his left upper extremity and to evaluate his previously placed AV fistula.  Pulmonary:      Effort: Pulmonary effort is normal. No tachypnea, bradypnea, accessory muscle usage, prolonged expiration, respiratory distress or retractions. He is not intubated.      Breath sounds: No stridor, decreased air movement or transmitted  upper airway sounds. Examination of the right-middle field reveals rales. Examination of the left-middle field reveals rales. Examination of the right-lower field reveals rales. Examination of the left-lower field reveals rales. Rales present. No decreased breath sounds, wheezing or rhonchi.   Chest:      Chest wall: No mass, lacerations, deformity, swelling, tenderness, crepitus or edema. There is no dullness to percussion.      Comments: No sternal abnormality.  Abdominal:      General: Abdomen is flat. Bowel sounds are normal. There is no distension or abdominal bruit. There are no signs of injury.      Palpations: There is no shifting dullness, fluid wave, hepatomegaly, splenomegaly, mass or pulsatile mass.      Tenderness: There is no abdominal tenderness. There is no right CVA tenderness, left CVA tenderness or guarding. Negative signs include Parra's sign, Rovsing's sign and McBurney's sign.      Hernia: No hernia is present.      Comments: Midline celiotomy scar healed.  Left upper quadrant colostomy scar healed.   Musculoskeletal:         General: No swelling, tenderness, deformity or signs of injury. Normal range of motion.      Cervical back: Full passive range of motion without pain, normal range of motion and neck supple. No edema, erythema, signs of trauma, rigidity, torticollis, tenderness or crepitus. No pain with movement, spinous process tenderness or muscular tenderness. Normal range of motion.      Right lower leg: No edema.      Left lower leg: No edema.   Lymphadenopathy:      Cervical: No cervical adenopathy.      Right cervical: No superficial cervical adenopathy.     Left cervical: No superficial cervical adenopathy.      Upper Body:      Right upper body: No supraclavicular adenopathy.      Left upper body: No supraclavicular adenopathy.   Skin:     General: Skin is warm and dry.      Capillary Refill: Capillary refill takes 2 to 3 seconds.      Coloration: Skin is not jaundiced or pale.       Findings: No bruising, erythema or lesion.   Neurological:      General: No focal deficit present.      Mental Status: He is alert and oriented to person, place, and time. Mental status is at baseline.      GCS: GCS eye subscore is 4. GCS verbal subscore is 5. GCS motor subscore is 6.      Cranial Nerves: Cranial nerves 2-12 are intact. No cranial nerve deficit.      Sensory: Sensation is intact. No sensory deficit.      Motor: Motor function is intact. No weakness.      Coordination: Coordination is intact. Coordination normal.      Gait: Gait is intact. Gait normal.      Deep Tendon Reflexes: Reflexes normal.   Psychiatric:         Attention and Perception: Attention and perception normal.         Mood and Affect: Mood and affect normal.         Speech: Speech normal.         Behavior: Behavior normal. Behavior is cooperative.         Thought Content: Thought content normal. Thought content is not paranoid or delusional. Thought content does not include homicidal or suicidal ideation. Thought content does not include homicidal or suicidal plan.         Cognition and Memory: Cognition and memory normal.         Judgment: Judgment normal.       Results Review: All tests reviewed.      Assessment/Plan:     This is a pleasant 72-year-old  gentleman with severe three-vessel coronary disease and a non-ST elevation myocardial infarction.  He also has ischemic cardiomyopathy and a greatly reduced left ventricular ejection fraction.  He also has an active rhinovirus infection.  Work-up has been initiated for possible surgical vascularization.  The timing of the surgery, especially in relation to his rhinovirus infection, is yet to be determined.  I will confer with our infectious disease service.    Carotid duplex, vein mapping, and CT scan of the chest have been ordered.  I discussed this plan with Dr. Garcia; he is agreeable.    Thank you for this kind consultation.    Mirtha Zuluaga MD  04/09/23  11:26  EDT      Electronically signed by Mirtha Zuluaga MD at 04/09/23 8951

## 2023-04-10 ENCOUNTER — APPOINTMENT (OUTPATIENT)
Dept: CARDIOLOGY | Facility: HOSPITAL | Age: 73
DRG: 216 | End: 2023-04-10
Payer: MEDICARE

## 2023-04-10 PROBLEM — B34.8 RHINOVIRUS: Status: ACTIVE | Noted: 2023-04-10

## 2023-04-10 LAB
APTT PPP: 30.6 SECONDS (ref 22.7–35.4)
APTT PPP: 93.3 SECONDS (ref 22.7–35.4)
BACTERIA UR QL AUTO: NORMAL /HPF
BH CV XLRA MEAS - DIST GSV CALF DIST LEFT: 0.19 CM
BH CV XLRA MEAS - DIST GSV CALF DIST RIGHT: 0.23 CM
BH CV XLRA MEAS - DIST GSV THIGH DIST LEFT: 0.3 CM
BH CV XLRA MEAS - DIST GSV THIGH DIST RIGHT: 0.24 CM
BH CV XLRA MEAS - DIST LSV CALF DIST LEFT: 0.18 CM
BH CV XLRA MEAS - DIST LSV CALF DIST RIGHT: 0.19 CM
BH CV XLRA MEAS - GSV ANKLE DIST LEFT: 0.23 CM
BH CV XLRA MEAS - GSV ANKLE DIST RIGHT: 0.29 CM
BH CV XLRA MEAS - GSV KNEE DIST LEFT: 0.25 CM
BH CV XLRA MEAS - GSV KNEE DIST RIGHT: 0.28 CM
BH CV XLRA MEAS - GSV ORIGIN DIST LEFT: 0.29 CM
BH CV XLRA MEAS - GSV ORIGIN DIST RIGHT: 0.45 CM
BH CV XLRA MEAS - MID GSV CALF LEFT: 0.2 CM
BH CV XLRA MEAS - MID GSV CALF RIGHT: 0.24 CM
BH CV XLRA MEAS - MID GSV THIGH  LEFT: 0.34 CM
BH CV XLRA MEAS - MID GSV THIGH  RIGHT: 0.26 CM
BH CV XLRA MEAS - MID LSV CALF DIST LEFT: 0.24 CM
BH CV XLRA MEAS - MID LSV CALF DIST RIGHT: 0.11 CM
BH CV XLRA MEAS - PROX GSV CALF DIST LEFT: 0.22 CM
BH CV XLRA MEAS - PROX GSV CALF DIST RIGHT: 0.25 CM
BH CV XLRA MEAS - PROX GSV THIGH  LEFT: 0.33 CM
BH CV XLRA MEAS - PROX GSV THIGH  RIGHT: 0.32 CM
BH CV XLRA MEAS - PROX LSV CALF DIST LEFT: 0.49 CM
BH CV XLRA MEAS - PROX LSV CALF DIST RIGHT: 0.33 CM
BH CV XLRA MEAS LEFT DIST CCA EDV: -11 CM/SEC
BH CV XLRA MEAS LEFT DIST CCA PSV: -62.9 CM/SEC
BH CV XLRA MEAS LEFT DIST ICA EDV: -16.1 CM/SEC
BH CV XLRA MEAS LEFT DIST ICA PSV: -47.5 CM/SEC
BH CV XLRA MEAS LEFT ICA/CCA RATIO: 0.8
BH CV XLRA MEAS LEFT MID ICA EDV: -17.4 CM/SEC
BH CV XLRA MEAS LEFT MID ICA PSV: -50.1 CM/SEC
BH CV XLRA MEAS LEFT PROX CCA EDV: 14.7 CM/SEC
BH CV XLRA MEAS LEFT PROX CCA PSV: 83.8 CM/SEC
BH CV XLRA MEAS LEFT PROX ECA EDV: -5.9 CM/SEC
BH CV XLRA MEAS LEFT PROX ECA PSV: -95.6 CM/SEC
BH CV XLRA MEAS LEFT PROX ICA EDV: -14.5 CM/SEC
BH CV XLRA MEAS LEFT PROX ICA PSV: -38.5 CM/SEC
BH CV XLRA MEAS LEFT PROX SCLA PSV: 150 CM/SEC
BH CV XLRA MEAS LEFT VERTEBRAL A EDV: -3.6 CM/SEC
BH CV XLRA MEAS LEFT VERTEBRAL A PSV: -23.8 CM/SEC
BH CV XLRA MEAS RIGHT DIST CCA EDV: 14.9 CM/SEC
BH CV XLRA MEAS RIGHT DIST CCA PSV: 57.4 CM/SEC
BH CV XLRA MEAS RIGHT DIST ICA EDV: -24.8 CM/SEC
BH CV XLRA MEAS RIGHT DIST ICA PSV: -67.2 CM/SEC
BH CV XLRA MEAS RIGHT ICA/CCA RATIO: 1.27
BH CV XLRA MEAS RIGHT MID ICA EDV: -25.1 CM/SEC
BH CV XLRA MEAS RIGHT MID ICA PSV: -73.1 CM/SEC
BH CV XLRA MEAS RIGHT PROX CCA EDV: 13 CM/SEC
BH CV XLRA MEAS RIGHT PROX CCA PSV: 64 CM/SEC
BH CV XLRA MEAS RIGHT PROX ECA EDV: -8.8 CM/SEC
BH CV XLRA MEAS RIGHT PROX ECA PSV: -114 CM/SEC
BH CV XLRA MEAS RIGHT PROX ICA EDV: -18.8 CM/SEC
BH CV XLRA MEAS RIGHT PROX ICA PSV: -68 CM/SEC
BH CV XLRA MEAS RIGHT PROX SCLA PSV: 79.2 CM/SEC
BH CV XLRA MEAS RIGHT VERTEBRAL A EDV: 14.9 CM/SEC
BH CV XLRA MEAS RIGHT VERTEBRAL A PSV: 51.9 CM/SEC
BILIRUB UR QL STRIP: NEGATIVE
CLARITY UR: CLEAR
COLOR UR: YELLOW
GLUCOSE BLDC GLUCOMTR-MCNC: 128 MG/DL (ref 70–130)
GLUCOSE BLDC GLUCOMTR-MCNC: 131 MG/DL (ref 70–130)
GLUCOSE BLDC GLUCOMTR-MCNC: 177 MG/DL (ref 70–130)
GLUCOSE BLDC GLUCOMTR-MCNC: 187 MG/DL (ref 70–130)
GLUCOSE UR STRIP-MCNC: ABNORMAL MG/DL
HGB UR QL STRIP.AUTO: ABNORMAL
HYALINE CASTS UR QL AUTO: NORMAL /LPF
KETONES UR QL STRIP: NEGATIVE
LEUKOCYTE ESTERASE UR QL STRIP.AUTO: NEGATIVE
MAXIMAL PREDICTED HEART RATE: 147 BPM
MAXIMAL PREDICTED HEART RATE: 147 BPM
NITRITE UR QL STRIP: NEGATIVE
PH UR STRIP.AUTO: 7.5 [PH] (ref 5–8)
PROT UR QL STRIP: ABNORMAL
RBC # UR STRIP: NORMAL /HPF
REF LAB TEST METHOD: NORMAL
RIGHT ARM BP: NORMAL MMHG
SP GR UR STRIP: 1.01 (ref 1–1.03)
SQUAMOUS #/AREA URNS HPF: NORMAL /HPF
STRESS TARGET HR: 125 BPM
STRESS TARGET HR: 125 BPM
UROBILINOGEN UR QL STRIP: ABNORMAL
WBC # UR STRIP: NORMAL /HPF

## 2023-04-10 PROCEDURE — 25010000002 HEPARIN (PORCINE) 25000-0.45 UT/250ML-% SOLUTION: Performed by: INTERNAL MEDICINE

## 2023-04-10 PROCEDURE — 82962 GLUCOSE BLOOD TEST: CPT

## 2023-04-10 PROCEDURE — 81001 URINALYSIS AUTO W/SCOPE: CPT | Performed by: THORACIC SURGERY (CARDIOTHORACIC VASCULAR SURGERY)

## 2023-04-10 PROCEDURE — 63710000001 INSULIN LISPRO (HUMAN) PER 5 UNITS: Performed by: INTERNAL MEDICINE

## 2023-04-10 PROCEDURE — 99231 SBSQ HOSP IP/OBS SF/LOW 25: CPT | Performed by: NURSE PRACTITIONER

## 2023-04-10 PROCEDURE — 93970 EXTREMITY STUDY: CPT

## 2023-04-10 PROCEDURE — 85730 THROMBOPLASTIN TIME PARTIAL: CPT | Performed by: INTERNAL MEDICINE

## 2023-04-10 PROCEDURE — 99232 SBSQ HOSP IP/OBS MODERATE 35: CPT | Performed by: INTERNAL MEDICINE

## 2023-04-10 PROCEDURE — 93880 EXTRACRANIAL BILAT STUDY: CPT

## 2023-04-10 PROCEDURE — 99222 1ST HOSP IP/OBS MODERATE 55: CPT | Performed by: INTERNAL MEDICINE

## 2023-04-10 RX ORDER — FLUTICASONE PROPIONATE 50 MCG
2 SPRAY, SUSPENSION (ML) NASAL DAILY
Status: DISCONTINUED | OUTPATIENT
Start: 2023-04-10 | End: 2023-04-13

## 2023-04-10 RX ORDER — GUAIFENESIN 600 MG/1
600 TABLET, EXTENDED RELEASE ORAL EVERY 12 HOURS SCHEDULED
Status: DISCONTINUED | OUTPATIENT
Start: 2023-04-10 | End: 2023-04-13

## 2023-04-10 RX ORDER — ALBUTEROL SULFATE 2.5 MG/3ML
2.5 SOLUTION RESPIRATORY (INHALATION) EVERY 6 HOURS PRN
Status: DISCONTINUED | OUTPATIENT
Start: 2023-04-10 | End: 2023-04-13

## 2023-04-10 RX ORDER — POLYETHYLENE GLYCOL 3350 17 G/17G
17 POWDER, FOR SOLUTION ORAL DAILY
Status: DISCONTINUED | OUTPATIENT
Start: 2023-04-10 | End: 2023-04-13

## 2023-04-10 RX ORDER — BISACODYL 10 MG
10 SUPPOSITORY, RECTAL RECTAL DAILY PRN
Status: DISCONTINUED | OUTPATIENT
Start: 2023-04-10 | End: 2023-04-13

## 2023-04-10 RX ADMIN — HYDRALAZINE HYDROCHLORIDE 50 MG: 50 TABLET, FILM COATED ORAL at 21:22

## 2023-04-10 RX ADMIN — GUAIFENESIN 600 MG: 600 TABLET, EXTENDED RELEASE ORAL at 17:10

## 2023-04-10 RX ADMIN — SODIUM BICARBONATE 650 MG: 650 TABLET ORAL at 21:20

## 2023-04-10 RX ADMIN — SIMETHICONE 80 MG: 80 TABLET, CHEWABLE ORAL at 09:35

## 2023-04-10 RX ADMIN — SODIUM BICARBONATE 650 MG: 650 TABLET ORAL at 17:10

## 2023-04-10 RX ADMIN — DULOXETINE HYDROCHLORIDE 30 MG: 30 CAPSULE, DELAYED RELEASE ORAL at 21:22

## 2023-04-10 RX ADMIN — HYDRALAZINE HYDROCHLORIDE 100 MG: 50 TABLET, FILM COATED ORAL at 08:14

## 2023-04-10 RX ADMIN — TRAZODONE HYDROCHLORIDE 50 MG: 50 TABLET ORAL at 23:20

## 2023-04-10 RX ADMIN — ASPIRIN 81 MG: 81 TABLET, COATED ORAL at 08:14

## 2023-04-10 RX ADMIN — INSULIN LISPRO 2 UNITS: 100 INJECTION, SOLUTION INTRAVENOUS; SUBCUTANEOUS at 12:12

## 2023-04-10 RX ADMIN — POLYETHYLENE GLYCOL 3350 17 G: 17 POWDER, FOR SOLUTION ORAL at 12:12

## 2023-04-10 RX ADMIN — FLUTICASONE PROPIONATE 2 SPRAY: 50 SPRAY, METERED NASAL at 17:10

## 2023-04-10 RX ADMIN — SODIUM BICARBONATE 650 MG: 650 TABLET ORAL at 08:14

## 2023-04-10 RX ADMIN — METOPROLOL SUCCINATE 100 MG: 100 TABLET, EXTENDED RELEASE ORAL at 21:22

## 2023-04-10 RX ADMIN — HEPARIN SODIUM 10 UNITS/KG/HR: 10000 INJECTION, SOLUTION INTRAVENOUS at 15:32

## 2023-04-10 NOTE — PLAN OF CARE
Goal Outcome Evaluation:  Plan of Care Reviewed With: patient        Progress: improving       Pt being treated for a NSTEMI with MVD. Currently on a hep gtt. Remains in NSR with all other VSS. Pt in droplet isolation for the rhinovirus. Proper PPE was worn by all staff and visitors. Family remains at bedside for pt support. Plans for pt to have a CABG within the week. No c/o SOA or pain throughout the shift. WCTM

## 2023-04-10 NOTE — PROGRESS NOTES
Name: Lefty Cai ADMIT: 2023   : 1950  PCP: Daksha Ng, DUSTY    MRN: [id number] LOS: 2 days   AGE/SEX: 72 y.o. male  ROOM:      Subjective   Subjective   Patient appears comfortable & in no apparent distress.  Wife at bedside.    Review of Systems   Constitutional: Negative for chills and fever.   Respiratory: Positive for shortness of breath. Negative for cough.    Cardiovascular: Negative for chest pain and leg swelling.   Gastrointestinal: Negative for abdominal pain, nausea and vomiting.   Genitourinary: Negative for difficulty urinating and dysuria.   Musculoskeletal: Negative for gait problem and myalgias.        Objective   Objective   Vital Signs  Temp:  [97.3 °F (36.3 °C)-98.5 °F (36.9 °C)] 98.5 °F (36.9 °C)  Heart Rate:  [58-86] 75  Resp:  [16-20] 20  BP: (112-153)/(55-99) 115/59  SpO2:  [97 %] 97 %  on   ;   Device (Oxygen Therapy): room air  Body mass index is 31.31 kg/m².     Physical Exam  Constitutional:       General: He is not in acute distress.     Appearance: He is obese. He is not toxic-appearing.   Cardiovascular:      Rate and Rhythm: Normal rate.      Heart sounds: Normal heart sounds.   Pulmonary:      Effort: Pulmonary effort is normal.      Breath sounds: Wheezing (expiratory) present.   Abdominal:      General: Bowel sounds are normal.      Palpations: Abdomen is soft.   Musculoskeletal:      Right lower leg: No edema.      Left lower leg: No edema.   Skin:     General: Skin is warm and dry.   Neurological:      Mental Status: He is alert.       Results Review     I reviewed the patient's new clinical results.  Results from last 7 days   Lab Units 23  2312 237 23  1142   WBC 10*3/mm3 11.26* 8.58 14.47*   HEMOGLOBIN g/dL 10.7* 10.3* 12.0*   PLATELETS 10*3/mm3 187 161 195     Results from last 7 days   Lab Units 23  2312 23  0447 23  1142   SODIUM mmol/L 133* 140 139   POTASSIUM mmol/L 4.2 4.0 3.9   CHLORIDE mmol/L  100 103 104   CO2 mmol/L 20.7* 24.0 20.1*   BUN mg/dL 27* 39* 27*   CREATININE mg/dL 4.31* 6.31* 4.67*   GLUCOSE mg/dL 163* 122* 123*   EGFR mL/min/1.73 13.8* 8.7* 12.5*     Results from last 7 days   Lab Units 04/09/23 2312 04/09/23 0447 04/08/23  1142   ALBUMIN g/dL 3.8 3.3* 3.5   BILIRUBIN mg/dL 0.3  --  0.4   ALK PHOS U/L 71  --  68   AST (SGOT) U/L 23  --  26   ALT (SGPT) U/L 17  --  11     Results from last 7 days   Lab Units 04/09/23 2312 04/09/23 0447 04/08/23  1142   CALCIUM mg/dL 9.2 8.7 8.6   ALBUMIN g/dL 3.8 3.3* 3.5   PHOSPHORUS mg/dL 3.3 4.9*  --      Results from last 7 days   Lab Units 04/09/23 0447 04/08/23  1142   PROCALCITONIN ng/mL  --  0.19   LACTATE mmol/L 1.0 2.1*     Hemoglobin A1C   Date/Time Value Ref Range Status   04/09/2023 0447 5.00 4.80 - 5.60 % Final     Glucose   Date/Time Value Ref Range Status   04/10/2023 1046 177 (H) 70 - 130 mg/dL Final     Comment:     Meter: [id number] : [id number] Milind Diaz NA   04/10/2023 0633 128 70 - 130 mg/dL Final     Comment:     Meter: [id number] : [id number] Eugenio Whitmoreine NA   04/09/2023 2040 158 (H) 70 - 130 mg/dL Final     Comment:     Meter: [id number] : [id number] Macies Lien NA   04/09/2023 1549 153 (H) 70 - 130 mg/dL Final     Comment:     Meter: [id number] : [id number] Jeniffer Willis NA   04/09/2023 0552 121 70 - 130 mg/dL Final     Comment:     Meter: [id number] : [id number] Abdirahman SIM   04/08/2023 2050 146 (H) 70 - 130 mg/dL Final     Comment:     Meter: [id number] : [id number] Abdirahman Church CH[place]QUIS       No radiology results for the last day  I have personally reviewed all medications:  Scheduled Medications  aspirin, 81 mg, Oral, Daily  DULoxetine, 30 mg, Oral, Nightly  fluticasone, 2 spray, Each Nare, Daily  guaiFENesin, 600 mg, Oral, Q12H  hydrALAZINE, 100 mg, Oral, Daily With Breakfast  hydrALAZINE, 50 mg, Oral, Nightly  insulin lispro, 0-9 Units, Subcutaneous, TID With  Meals  metoprolol succinate XL, 100 mg, Oral, Nightly  polyethylene glycol, 17 g, Oral, Daily  sodium bicarbonate, 650 mg, Oral, TID  traZODone, 50 mg, Oral, Nightly    Infusions  heparin, 10 Units/kg/hr, Last Rate: 10 Units/kg/hr (04/10/23 1532)    Diet  Diet: Cardiac Diets, Diabetic Diets; Healthy Heart (2-3 Na+); Consistent Carbohydrate; Texture: Regular Texture (IDDSI 7); Fluid Consistency: Thin (IDDSI 0)    I have personally reviewed:  [x]  Laboratory   [x]  Microbiology   [x]  Radiology   [x]  EKG/Telemetry  []  Cardiology/Vascular   []  Pathology    []  Records       Assessment/Plan     Active Hospital Problems    Diagnosis  POA   • **NSTEMI (non-ST elevated myocardial infarction) [I21.4]  Unknown   • Rhinovirus [B34.8]  Yes   • Dyspnea on exertion [R06.09]  Yes   • Type 2 diabetes mellitus with diabetic chronic kidney disease [E11.22]  Yes   • Essential hypertension [I10]  Yes   • Hyperlipidemia [E78.5]  Yes   • CKD (chronic kidney disease) stage 4, GFR 15-29 ml/min ([facility]) [N18.4]  Yes      Resolved Hospital Problems   No resolved problems to display.       72 y.o. male admitted with NSTEMI (non-ST elevated myocardial infarction).    Cardiothoracic surgery following eft heart catheterization shows severe three-vessel coronary disease with an occluded right coronary system with tentative surgical revascularization pending resolution of rhinovirus infection--stable appearance given O2 saturation 97% on room air (nebulizers available as needed, Flonase, incentive spirometry encouraged, and mucolytic's ordered twice daily).  ID does not recommend empiric antibiotic therapy given no evidence for secondary bacterial infection.  Heparin drip infusing for above.    Nephrology managing ESRD with plans for HD on 4/11/2023.  Nephrology cleared patient from renal standpoint for CABG.    HTN: BP acceptable acutely.  Continue hydralazine, metoprolol same.    Glucose trends acceptable.  Patient tolerating diet.  Moderate  dose correctional sliding scale continued for now.      · Heparin gtt infusing adequate for DVT prophylaxis.  · CPR  · Discussed with patient & RN.  · Anticipate discharge pending clinical course & tentative cardiovascular surgery in the setting of active rhinovirus infection whereby ID states no evidence of secondary bacterial infection at this time & no indication for antibiotics--ultimately decision for timing of surgery based on surgeon & anesthesiologist    DUSTY Ford  [facility]  04/10/23  15:38 EDT

## 2023-04-10 NOTE — CONSULTS
Referring Provider: Farida Gusman MD  [id number]  [address]  [place] [address]  Reason for Consultation: Acute rhinovirus/enterovirus infection    Subjective   History of present illness: This is a 72-year-old male with a history of kidney cancer status post partial nephrectomy, end-stage renal disease on hemodialysis, type 2 diabetes, hypertension and hyperlipidemia who was admitted on April 8 with increasing shortness of breath.  Patient states he was having increasing shortness of breath 24 hours prior to admission.  He was found to have a non-ST elevation MI.  Underwent cardiac catheterization and was found to have severe coronary artery disease.  Plan is for the patient to undergo CABG.  Respiratory viral panel was obtained and is positive for rhinovirus/enterovirus.  Patient states that last week he developed chest congestion associated with rhinorrhea.  States his wife had a similar respiratory illness prior to that.  Initially had a hard time coughing anything up but then with Mucinex last week he started coughing up brown/green-colored sputum.  Currently the patient states he is much improved and his sputum is clear.  He denies any fevers or chills.  He denies any shortness of breath or sore throat    Past Medical History:   Diagnosis Date   • Anemia    • Arthritis    • CKD (chronic kidney disease)     STAGE 5   • Diabetes mellitus, type 2    • GERD (gastroesophageal reflux disease)    • H/O renal cell carcinoma 2007    partial nephrectomy   • History of colostomy reversal 04/2022   • Hyperlipidemia    • Hypertension    • Primary insomnia 06/13/2016   • Vitamin D deficiency 08/14/2017       Past Surgical History:   Procedure Laterality Date   • ARTERIOVENOUS FISTULA/SHUNT SURGERY Left 08/15/2019   • COLOSTOMY  09/06/2021   • COLOSTOMY CLOSURE N/A 04/12/2022   • DIAGNOSTIC LAPAROSCOPY N/A 2/27/2023   • EXPLORATORY LAPAROTOMY N/A 09/06/2021   • EYE SURGERY      CATARACTS   • KNEE ARTHROSCOPY  Right    • NEPHRECTOMY PARTIAL  2007    Dr. Victor        reports that he quit smoking about 24 years ago. His smoking use included cigarettes. He has a 50.00 pack-year smoking history. His smokeless tobacco use includes snuff. He reports that he does not drink alcohol and does not use drugs.    family history includes Cancer in his father; Diabetes type I in his sister; Heart disease in his father; Hypertension in his mother; Macular degeneration in his father.    Allergies   Allergen Reactions   • Contrast Dye (Echo Or Unknown Ct/Mr) Other (See Comments)     KIDNEY DISEASE   • Latex Other (See Comments)     Blisters       Medication:  Antibiotics:  None    Please refer to the medical record for a full medication list    Review of Systems  Pertinent items are noted in HPI, all other systems reviewed and negative    Objective   Vital Signs   Temp:  [97.3 °F (36.3 °C)-97.9 °F (36.6 °C)] 97.9 °F (36.6 °C)  Heart Rate:  [58-86] 70  Resp:  [16-19] 19  BP: (112-153)/(55-99) 153/99    Physical Exam:   General: In no acute distress  HEENT: Normocephalic, atraumatic, no scleral icterus.   Neck: Supple, trachea is midline  Cardiovascular: Normal rate, regular rhythm, normal S1 and S2, no murmurs, rubs, or gallops   Respiratory: Bibasilar crackles  GI: Abdomen is soft, nontender, nondistended, positive bowel sounds bilaterally  Musculoskeletal: no edema, tenderness or deformity  Skin: No rashes   Extremities: No E/C/C  Neurological: Alert and oriented, moving all 4 extremities  Psychiatric: Normal mood and affect     Results Review:   I reviewed the patient's new clinical results.  I reviewed the patient's new imaging results and agree with the interpretation.    Lab Results   Component Value Date    WBC 11.26 (H) 04/09/2023    HGB 10.7 (L) 04/09/2023    HCT 33.3 (L) 04/09/2023    MCV 92.8 04/09/2023     04/09/2023       Lab Results   Component Value Date    GLUCOSE 163 (H) 04/09/2023    BUN 27 (H) 04/09/2023     CREATININE 4.31 (H) 04/09/2023    EGFRIFNONA 12 (L) 09/12/2021    EGFRIFAFRI  09/12/2021      Comment:      <15 Indicative of kidney failure.    BCR 6.3 (L) 04/09/2023    CO2 20.7 (L) 04/09/2023    CALCIUM 9.2 04/09/2023    PROTENTOTREF 6.6 06/23/2021    ALBUMIN 3.8 04/09/2023    LABIL2 1.8 06/23/2021    AST 23 04/09/2023    ALT 17 04/09/2023     Procalcitonin 0.19  Lactate 2.1 -> 1  Hepatitis B surface antigen nonreactive  Urinalysis no white blood cells no red blood cells seen no bacteria positive protein    Microbiology:  4/8 RVP + human rhinovirus/enterovirus    Radiology:  4/8 chest x-ray personally viewed by me shows bibasilar atelectasis no infiltrate no pleural effusion    Assessment & Plan   Rhinovirus/enterovirus infection  Severe coronary artery disease requiring CABG  Non-ST elevation MI  Ischemic cardiomyopathy  End-stage renal disease on hemodialysis  Type 2 diabetes    It sounds like the patient developed viral respiratory illness last week and is currently getting better with resolved congestion and clearing of his sputum.  There is no indication for secondary bacterial pneumonia and no antibiotics are indicated at this time.  There is no guidelines as to the timing of surgery with an acute respiratory viral illness.  That decision is ultimately up to the surgeon and anesthesiologist.  At this time, it certainly sounds that the patient is recovering from the viral illness    Continue supportive care    Thank you for this consult ID will sign off.  Please do not hesitate to call us with any further questions or concerns    I discussed the patient's findings and my recommendations with patient and nursing staff

## 2023-04-10 NOTE — PROGRESS NOTES
[facility] Progress Note      Patient Name: Lefty Cai  : 1950  MRN: [id number]  Primary Care Physician:  Daksha Ng APRN  Date of admission: 2023    Subjective     Interval History:   Follow-up end-stage renal disease    Patient had dialysis yesterday without any difficulties, he is a bit anxious about his upcoming heart surgery.  He denies any chest pain or shortness of air, no orthopnea or PND, no nausea or vomiting, no dysuria or gross hematuria.    Review of Systems:   As noted above    Objective     Vitals:   Temp:  [97.3 °F (36.3 °C)-97.5 °F (36.4 °C)] 97.5 °F (36.4 °C)  Heart Rate:  [58-86] 70  Resp:  [10-18] 18  BP: (112-197)/(55-99) 153/99  Flow (L/min):  [3] 3    Intake/Output Summary (Last 24 hours) at 4/10/2023 0905  Last data filed at 4/10/2023 0659  Gross per 24 hour   Intake 308 ml   Output 750 ml   Net -442 ml       Physical Exam:    General Appearance: Awake, alert, no acute distress, chronically ill  Skin: warm and dry  HEENT: oral mucosa normal, nonicteric sclera  Neck: supple, no JVD, tunneled dialysis catheter in the right IJ with exit site below the right clavicle  Lungs: CTA, breathing effort not labored  Heart: RRR, normal S1 and S2, no S3, no rub  Abdomen: soft, nontender, nondistended, normoactive bowel  : no palpable bladder  Extremities: no edema, cyanosis or clubbing, functional AV fistula in the left forearm with good thrill and bruit  Neuro: normal speech and mental status     Scheduled Meds:     aspirin, 81 mg, Oral, Daily  DULoxetine, 30 mg, Oral, Nightly  hydrALAZINE, 100 mg, Oral, Daily With Breakfast  hydrALAZINE, 50 mg, Oral, Nightly  insulin lispro, 0-9 Units, Subcutaneous, TID With Meals  metoprolol succinate XL, 100 mg, Oral, Nightly  sodium bicarbonate, 650 mg, Oral, TID  traZODone, 50 mg, Oral, Nightly      IV Meds:   heparin, 10 Units/kg/hr, Last Rate: 10 Units/kg/hr (04/10/23 0842)        Results Reviewed:   I have  personally reviewed the results from the time of this admission to 4/10/2023 09:05 EDT     Results from last 7 days   Lab Units 04/09/23 2312 04/09/23 0447 04/08/23  1142   SODIUM mmol/L 133* 140 139   POTASSIUM mmol/L 4.2 4.0 3.9   CHLORIDE mmol/L 100 103 104   CO2 mmol/L 20.7* 24.0 20.1*   BUN mg/dL 27* 39* 27*   CREATININE mg/dL 4.31* 6.31* 4.67*   CALCIUM mg/dL 9.2 8.7 8.6   BILIRUBIN mg/dL 0.3  --  0.4   ALK PHOS U/L 71  --  68   ALT (SGPT) U/L 17  --  11   AST (SGOT) U/L 23  --  26   GLUCOSE mg/dL 163* 122* 123*       Estimated Creatinine Clearance: 18 mL/min (A) (by C-G formula based on SCr of 4.31 mg/dL (H)).    Results from last 7 days   Lab Units 04/09/23 2312 04/09/23 0447   PHOSPHORUS mg/dL 3.3 4.9*             Results from last 7 days   Lab Units 04/09/23 2312 04/09/23 0447 04/08/23  1142   WBC 10*3/mm3 11.26* 8.58 14.47*   HEMOGLOBIN g/dL 10.7* 10.3* 12.0*   PLATELETS 10*3/mm3 187 161 195       Results from last 7 days   Lab Units 04/08/23  1925   INR  1.15*       Assessment / Plan     ASSESSMENT:  -End-stage renal disease and diabetic and hypertensive glomerulosclerosis on maintenance hemodialysis every Tuesday, Thursday and Saturday, he had dialysis yesterday without any difficulties today's labs still pending  -Diabetes mellitus type 2 with renal complication  -Hypertension with chronic kidney disease, not well controlled related to fluid excess related to heart catheterization  -Anemia of ESRD, hemoglobin yesterday was 10.7  -Non-ST elevation MI, heart cath today revealed multivessel disease will need CABG  -Secondary hyperparathyroidism of renal origin    PLAN:  -Hemodialysis tomorrow  -Surveillance labs  -CABG when overall disciplines are in agreement he is cleared from the renal standpoint  I discussed the case with the patient and his wife at the bedside    Thank you for involving us in the care of Lefty Cai.  Please feel free to call with any questions.    Quang Reyes,  MD  04/10/23  09:05 EDT    [facility]  [phone]    Please note that portions of this note were completed with a voice recognition program.

## 2023-04-10 NOTE — CASE MANAGEMENT/SOCIAL WORK
Discharge Planning Assessment  [facility]     Patient Name: Lefty Cai  MRN: [id number]  Today's Date: 4/10/2023    Admit Date: 4/8/2023    Plan: Pending surgical intervention   Discharge Needs Assessment     Row Name 04/10/23 1627       Living Environment    People in Home spouse    Name(s) of People in Home Caterina Cai wife [phone]    Current Living Arrangements home    Potentially Unsafe Housing Conditions none    Primary Care Provided by self    Provides Primary Care For no one    Family Caregiver if Needed spouse    Family Caregiver Names Caterina Cai wife [phone]    Quality of Family Relationships supportive    Able to Return to Prior Arrangements yes       Resource/Environmental Concerns    Resource/Environmental Concerns none    Transportation Concerns none       Transition Planning    Patient/Family Anticipates Transition to home with family    Patient/Family Anticipated Services at Transition none    Transportation Anticipated family or friend will provide       Discharge Needs Assessment    Readmission Within the Last 30 Days no previous admission in last 30 days    Equipment Currently Used at Home glucometer    Concerns to be Addressed discharge planning    Anticipated Changes Related to Illness inability to care for self    Provided Post Acute Provider List? N/A    N/A Provider List Comment Pending surgical outcome    Provided Post Acute Provider Quality & Resource List? N/A    Current Discharge Risk chronically ill               Discharge Plan     Row Name 04/10/23 1637       Plan    Plan Pending surgical intervention    Patient/Family in Agreement with Plan yes    Plan Comments IMM 4/8/2023. Met with patient and Caterina Cai wife [phone] at bedside. Face sheet verified. Prior to admission patient was able to perform all aspects of his ADL’s. Patient goes to dialysis Tu/Th/Sat [facility].  Patient uses a glucometer, denies using any other adaptive equipment. Patient  uses [facility] [employer], denies any issues affording or remembering to take his medications. Patient does not have living will on file. Discharge plans pending outcome of surgical intervention.  Family should be able to transport. Will continue to monitor for new or changing discharge needs. Venessa OVIEDO RN CCP              Continued Care and Services - Admitted Since 4/8/2023    Coordination has not been started for this encounter.       Expected Discharge Date and Time     Expected Discharge Date Expected Discharge Time    Apr 13, 2023          Demographic Summary     Row Name 04/10/23 1626       General Information    Admission Type inpatient    Arrived From emergency department    Required Notices Provided Important Message from Medicare    Referral Source admission list    Reason for Consult discharge planning    Preferred Language English       Contact Information    Permission Granted to Share Info With family/designee  Caterina Cai wife [phone]               Functional Status     Row Name 04/10/23 1626       Functional Status    Usual Activity Tolerance good    Current Activity Tolerance moderate       Functional Status, IADL    Medications independent    Meal Preparation assistive person    Housekeeping assistive person    Laundry assistive person    Shopping assistive person       Mental Status Summary    Recent Changes in Mental Status/Cognitive Functioning no changes       Employment/    Employment Status retired               Psychosocial    No documentation.                Abuse/Neglect    No documentation.                Legal    No documentation.                Substance Abuse    No documentation.                Patient Forms    No documentation.                   Venessa Massey RN

## 2023-04-10 NOTE — PROGRESS NOTES
LOS: 2 days   Patient Care Team:  Daksha Ng APRN as PCP - General (Family Medicine)  Rudy Faith MD as Consulting Physician (Ophthalmology)  Jose Mccall MD as Consulting Physician (Nephrology)  Quang Reyes MD as Consulting Physician (Nephrology)    Chief Complaint:     F/u CAD    Interval History:     He is short winded with activity.  He is having no chest pain.  He does not feel his heart racing.  He does have some wheezing and is coughing up a lot of mucus.    Objective   Vital Signs  Temp:  [97.3 °F (36.3 °C)-97.5 °F (36.4 °C)] 97.5 °F (36.4 °C)  Heart Rate:  [58-86] 70  Resp:  [10-18] 18  BP: (112-197)/(55-99) 153/99    Intake/Output Summary (Last 24 hours) at 4/10/2023 0844  Last data filed at 4/10/2023 0659  Gross per 24 hour   Intake 308 ml   Output 750 ml   Net -442 ml       Comfortable NAD  Neck supple, no JVD or thyromegaly appreciated  S1/S2 RRR, no m/r/g  Lungs expiratory wheezing, normal effort  Abdomen S/NT/ND (+) BS, no HSM appreciated  Extremities warm, no clubbing, cyanosis, or edema  No visible or palpable skin lesions  A/Ox4, mood and affect appropriate    Results Review:      Results from last 7 days   Lab Units 04/09/23 2312 04/09/23 0447 04/08/23  1142   SODIUM mmol/L 133* 140 139   POTASSIUM mmol/L 4.2 4.0 3.9   CHLORIDE mmol/L 100 103 104   CO2 mmol/L 20.7* 24.0 20.1*   BUN mg/dL 27* 39* 27*   CREATININE mg/dL 4.31* 6.31* 4.67*   GLUCOSE mg/dL 163* 122* 123*   CALCIUM mg/dL 9.2 8.7 8.6     Results from last 7 days   Lab Units 04/09/23 0447 04/08/23  1142   HSTROP T ng/L 1,420* 811*     Results from last 7 days   Lab Units 04/09/23 2312 04/09/23 0447 04/08/23  1142   WBC 10*3/mm3 11.26* 8.58 14.47*   HEMOGLOBIN g/dL 10.7* 10.3* 12.0*   HEMATOCRIT % 33.3* 31.5* 35.3*   PLATELETS 10*3/mm3 187 161 195     Results from last 7 days   Lab Units 04/10/23  0628 04/09/23  2312 04/09/23  1550 04/09/23  0447 04/08/23  1925   INR   --   --   --   --  1.15*   APTT  seconds 93.3* 48.6* 34.6   < > 30.5    < > = values in this interval not displayed.                   I reviewed the patient's new clinical results.  I personally viewed and interpreted the patient's EKG/Telemetry data        Medication Review:   aspirin, 81 mg, Oral, Daily  DULoxetine, 30 mg, Oral, Nightly  hydrALAZINE, 100 mg, Oral, Daily With Breakfast  hydrALAZINE, 50 mg, Oral, Nightly  insulin lispro, 0-9 Units, Subcutaneous, TID With Meals  metoprolol succinate XL, 100 mg, Oral, Nightly  sodium bicarbonate, 650 mg, Oral, TID  traZODone, 50 mg, Oral, Nightly        heparin, 10 Units/kg/hr, Last Rate: 10 Units/kg/hr (04/10/23 0842)        Assessment & Plan       NSTEMI (non-ST elevated myocardial infarction)    Type 2 diabetes mellitus with diabetic chronic kidney disease    Essential hypertension    Hyperlipidemia    CKD (chronic kidney disease) stage 4, GFR 15-29 ml/min (HCC)    Dyspnea on exertion    1. NSTEMI with 3v cad on cath 4/9/23.   2. DM  3. ESRD on HD-nephrology following, has line in right neck  4. Hypertension  5. Hyperlipidemia.   6. LVEF 31-35%  7. Rhinovirus infection.   8. < 50% bilateral internal carotid artery stenosis.      Awaiting clearing rhinovirus for surgery, CT surgery has evaluated.  Await ID to determine timing of surgery.  For now advised him to continue to try to clear his lungs and be up in a chair or walking is much as possible.    We will follow    Deisy Sanchez MD  04/10/23  08:44 EDT

## 2023-04-10 NOTE — PROGRESS NOTES
" LOS: 2 days   Patient Care Team:  Daksha Ng APRN as PCP - General (Family Medicine)  Rudy Faith MD as Consulting Physician (Ophthalmology)  Jose Mccall MD as Consulting Physician (Nephrology)  Quang Reyes MD as Consulting Physician (Nephrology)    Chief Complaint: CAD    Subjective:  Symptoms:  He reports shortness of breath (with exertion).  No cough or chest pain.    Diet:  Adequate intake.  No nausea or vomiting.    Activity level: Returning to normal.    Pain:  He reports no pain.      C/o constipation/gas pain    Vital Signs  Temp:  [97.3 °F (36.3 °C)-97.9 °F (36.6 °C)] 97.9 °F (36.6 °C)  Heart Rate:  [58-86] 70  Resp:  [16-19] 19  BP: (112-197)/(55-99) 153/99  Body mass index is 31.31 kg/m².    Intake/Output Summary (Last 24 hours) at 4/10/2023 1019  Last data filed at 4/10/2023 0659  Gross per 24 hour   Intake 240 ml   Output 750 ml   Net -510 ml     No intake/output data recorded.          04/08/23  1459 04/09/23  0554 04/10/23  0539   Weight: 99.8 kg (220 lb) 98.9 kg (218 lb 1.6 oz) 99 kg (218 lb 3.2 oz)         Objective:  General Appearance:  Comfortable and in no acute distress.    Vital signs: (most recent): Blood pressure 153/99, pulse 70, temperature 97.9 °F (36.6 °C), temperature source Oral, resp. rate 19, height 177.8 cm (70\"), weight 99 kg (218 lb 3.2 oz), SpO2 97 %.  Vital signs are normal.  No fever.    Output: No stool output.    Lungs:  Normal effort and normal respiratory rate.  There are decreased breath sounds.    Heart: Normal rate.  Regular rhythm.    Abdomen: Abdomen is soft.  Bowel sounds are normal.     Extremities: There is no dependent edema.    Pulses: Distal pulses are intact.    Neurological: Patient is alert and oriented to person, place and time.    Skin:  Warm and dry.          Results Review:        WBC WBC   Date Value Ref Range Status   04/09/2023 11.26 (H) 3.40 - 10.80 10*3/mm3 Final   04/09/2023 8.58 3.40 - 10.80 10*3/mm3 Final "   04/08/2023 14.47 (H) 3.40 - 10.80 10*3/mm3 Final      HGB Hemoglobin   Date Value Ref Range Status   04/09/2023 10.7 (L) 13.0 - 17.7 g/dL Final   04/09/2023 10.3 (L) 13.0 - 17.7 g/dL Final   04/08/2023 12.0 (L) 13.0 - 17.7 g/dL Final      HCT Hematocrit   Date Value Ref Range Status   04/09/2023 33.3 (L) 37.5 - 51.0 % Final   04/09/2023 31.5 (L) 37.5 - 51.0 % Final   04/08/2023 35.3 (L) 37.5 - 51.0 % Final      Platelets Platelets   Date Value Ref Range Status   04/09/2023 187 140 - 450 10*3/mm3 Final   04/09/2023 161 140 - 450 10*3/mm3 Final   04/08/2023 195 140 - 450 10*3/mm3 Final        PT/INR:    Protime   Date Value Ref Range Status   04/08/2023 14.8 (H) 11.7 - 14.2 Seconds Final   /  INR   Date Value Ref Range Status   04/08/2023 1.15 (H) 0.90 - 1.10 Final       Sodium Sodium   Date Value Ref Range Status   04/09/2023 133 (L) 136 - 145 mmol/L Final   04/09/2023 140 136 - 145 mmol/L Final   04/08/2023 139 136 - 145 mmol/L Final      Potassium Potassium   Date Value Ref Range Status   04/09/2023 4.2 3.5 - 5.2 mmol/L Final   04/09/2023 4.0 3.5 - 5.2 mmol/L Final   04/08/2023 3.9 3.5 - 5.2 mmol/L Final     Comment:     Slight hemolysis detected by analyzer. Results may be affected.      Chloride Chloride   Date Value Ref Range Status   04/09/2023 100 98 - 107 mmol/L Final   04/09/2023 103 98 - 107 mmol/L Final   04/08/2023 104 98 - 107 mmol/L Final      Bicarbonate CO2   Date Value Ref Range Status   04/09/2023 20.7 (L) 22.0 - 29.0 mmol/L Final   04/09/2023 24.0 22.0 - 29.0 mmol/L Final   04/08/2023 20.1 (L) 22.0 - 29.0 mmol/L Final      BUN BUN   Date Value Ref Range Status   04/09/2023 27 (H) 8 - 23 mg/dL Final   04/09/2023 39 (H) 8 - 23 mg/dL Final   04/08/2023 27 (H) 8 - 23 mg/dL Final      Creatinine Creatinine   Date Value Ref Range Status   04/09/2023 4.31 (H) 0.76 - 1.27 mg/dL Final   04/09/2023 6.31 (H) 0.76 - 1.27 mg/dL Final   04/08/2023 4.67 (H) 0.76 - 1.27 mg/dL Final      Calcium Calcium   Date  Value Ref Range Status   04/09/2023 9.2 8.6 - 10.5 mg/dL Final   04/09/2023 8.7 8.6 - 10.5 mg/dL Final   04/08/2023 8.6 8.6 - 10.5 mg/dL Final      Magnesium No results found for: MG       aspirin, 81 mg, Oral, Daily  DULoxetine, 30 mg, Oral, Nightly  hydrALAZINE, 100 mg, Oral, Daily With Breakfast  hydrALAZINE, 50 mg, Oral, Nightly  insulin lispro, 0-9 Units, Subcutaneous, TID With Meals  metoprolol succinate XL, 100 mg, Oral, Nightly  polyethylene glycol, 17 g, Oral, Daily  sodium bicarbonate, 650 mg, Oral, TID  traZODone, 50 mg, Oral, Nightly      heparin, 10 Units/kg/hr, Last Rate: 10 Units/kg/hr (04/10/23 0842)            Patient Active Problem List   Diagnosis Code   • 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.22   • Essential hypertension I10   • Hyperlipidemia E78.5   • Primary insomnia F51.01   • CKD (chronic kidney disease) stage 4, GFR 15-29 ml/min ([facility]) N18.4   • Vitamin D deficiency E55.9   • Diverticulitis of large intestine with perforation and abscess without bleeding K57.20   • Obesity (BMI 30-39.9) E66.9   • Impaired mobility and ADLs Z74.09, Z78.9   • History of colon resection Z90.49   • Colon polyps K63.5   • Diverticulosis K57.90   • CKD (chronic kidney disease) stage 5, GFR less than 15 ml/min N18.5   • Dyspnea on exertion R06.09   • NSTEMI (non-ST elevated myocardial infarction) I21.4       Assessment & Plan   - NSTEMI/multi-vessel CAD  - HFrEF--30-35% per echo 4/8  - rhinovirus infection  - ESRD on HD  - hypertension  - hyperlipidemia  - DM II- A1c 5.0    CV stable. Patient denies chest pain overnight  Preoperative work up underway  Increase bowel regimen  Continue to optimize medically. Plan for HD tomorrow  Discussed with Dr. Zuluaga, will have ID evaluate and give recommendations on timing of surgery given rhinovirus infection    DUSTY Bryant  04/10/23  10:19 EDT

## 2023-04-11 ENCOUNTER — APPOINTMENT (OUTPATIENT)
Dept: CT IMAGING | Facility: HOSPITAL | Age: 73
DRG: 216 | End: 2023-04-11
Payer: MEDICARE

## 2023-04-11 PROBLEM — R93.1 ABNORMAL FINDINGS ON DIAGNOSTIC IMAGING OF HEART AND CORONARY CIRCULATION: Status: ACTIVE | Noted: 2023-04-08

## 2023-04-11 LAB
ALBUMIN SERPL-MCNC: 3.4 G/DL (ref 3.5–5.2)
ANION GAP SERPL CALCULATED.3IONS-SCNC: 15.2 MMOL/L (ref 5–15)
APTT PPP: 124.8 SECONDS (ref 22.7–35.4)
APTT PPP: 59.2 SECONDS (ref 22.7–35.4)
APTT PPP: 60.6 SECONDS (ref 22.7–35.4)
APTT PPP: 64.6 SECONDS (ref 22.7–35.4)
BASOPHILS # BLD AUTO: 0.04 10*3/MM3 (ref 0–0.2)
BASOPHILS NFR BLD AUTO: 0.3 % (ref 0–1.5)
BUN SERPL-MCNC: 51 MG/DL (ref 8–23)
BUN/CREAT SERPL: 8.6 (ref 7–25)
CALCIUM SPEC-SCNC: 8.7 MG/DL (ref 8.6–10.5)
CHLORIDE SERPL-SCNC: 98 MMOL/L (ref 98–107)
CO2 SERPL-SCNC: 19.8 MMOL/L (ref 22–29)
CREAT SERPL-MCNC: 5.92 MG/DL (ref 0.76–1.27)
DEPRECATED RDW RBC AUTO: 40.9 FL (ref 37–54)
EGFRCR SERPLBLD CKD-EPI 2021: 9.4 ML/MIN/1.73
EOSINOPHIL # BLD AUTO: 0.01 10*3/MM3 (ref 0–0.4)
EOSINOPHIL NFR BLD AUTO: 0.1 % (ref 0.3–6.2)
ERYTHROCYTE [DISTWIDTH] IN BLOOD BY AUTOMATED COUNT: 12.3 % (ref 12.3–15.4)
GLUCOSE BLDC GLUCOMTR-MCNC: 106 MG/DL (ref 70–130)
GLUCOSE BLDC GLUCOMTR-MCNC: 111 MG/DL (ref 70–130)
GLUCOSE BLDC GLUCOMTR-MCNC: 115 MG/DL (ref 70–130)
GLUCOSE SERPL-MCNC: 107 MG/DL (ref 65–99)
HCT VFR BLD AUTO: 30.3 % (ref 37.5–51)
HGB BLD-MCNC: 10.1 G/DL (ref 13–17.7)
IMM GRANULOCYTES # BLD AUTO: 0.1 10*3/MM3 (ref 0–0.05)
IMM GRANULOCYTES NFR BLD AUTO: 0.6 % (ref 0–0.5)
LYMPHOCYTES # BLD AUTO: 2.16 10*3/MM3 (ref 0.7–3.1)
LYMPHOCYTES NFR BLD AUTO: 13.8 % (ref 19.6–45.3)
MCH RBC QN AUTO: 30.6 PG (ref 26.6–33)
MCHC RBC AUTO-ENTMCNC: 33.3 G/DL (ref 31.5–35.7)
MCV RBC AUTO: 91.8 FL (ref 79–97)
MONOCYTES # BLD AUTO: 1.18 10*3/MM3 (ref 0.1–0.9)
MONOCYTES NFR BLD AUTO: 7.6 % (ref 5–12)
NEUTROPHILS NFR BLD AUTO: 12.13 10*3/MM3 (ref 1.7–7)
NEUTROPHILS NFR BLD AUTO: 77.6 % (ref 42.7–76)
NRBC BLD AUTO-RTO: 0 /100 WBC (ref 0–0.2)
PHOSPHATE SERPL-MCNC: 4.6 MG/DL (ref 2.5–4.5)
PLATELET # BLD AUTO: 202 10*3/MM3 (ref 140–450)
PMV BLD AUTO: 10.3 FL (ref 6–12)
POTASSIUM SERPL-SCNC: 4.1 MMOL/L (ref 3.5–5.2)
RBC # BLD AUTO: 3.3 10*6/MM3 (ref 4.14–5.8)
SODIUM SERPL-SCNC: 133 MMOL/L (ref 136–145)
WBC NRBC COR # BLD: 15.62 10*3/MM3 (ref 3.4–10.8)

## 2023-04-11 PROCEDURE — 85025 COMPLETE CBC W/AUTO DIFF WBC: CPT | Performed by: INTERNAL MEDICINE

## 2023-04-11 PROCEDURE — 25010000002 HEPARIN (PORCINE) 25000-0.45 UT/250ML-% SOLUTION: Performed by: INTERNAL MEDICINE

## 2023-04-11 PROCEDURE — 99232 SBSQ HOSP IP/OBS MODERATE 35: CPT | Performed by: INTERNAL MEDICINE

## 2023-04-11 PROCEDURE — 25010000002 HEPARIN (PORCINE) PER 1000 UNITS: Performed by: INTERNAL MEDICINE

## 2023-04-11 PROCEDURE — 99231 SBSQ HOSP IP/OBS SF/LOW 25: CPT | Performed by: NURSE PRACTITIONER

## 2023-04-11 PROCEDURE — 85730 THROMBOPLASTIN TIME PARTIAL: CPT | Performed by: INTERNAL MEDICINE

## 2023-04-11 PROCEDURE — 82962 GLUCOSE BLOOD TEST: CPT

## 2023-04-11 PROCEDURE — 85730 THROMBOPLASTIN TIME PARTIAL: CPT | Performed by: THORACIC SURGERY (CARDIOTHORACIC VASCULAR SURGERY)

## 2023-04-11 PROCEDURE — 71250 CT THORAX DX C-: CPT

## 2023-04-11 PROCEDURE — 80069 RENAL FUNCTION PANEL: CPT | Performed by: INTERNAL MEDICINE

## 2023-04-11 PROCEDURE — 85730 THROMBOPLASTIN TIME PARTIAL: CPT | Performed by: HOSPITALIST

## 2023-04-11 RX ORDER — DOCUSATE SODIUM 100 MG/1
100 CAPSULE, LIQUID FILLED ORAL 2 TIMES DAILY
Status: DISCONTINUED | OUTPATIENT
Start: 2023-04-11 | End: 2023-04-13

## 2023-04-11 RX ORDER — HEPARIN SODIUM 1000 [USP'U]/ML
2000 INJECTION, SOLUTION INTRAVENOUS; SUBCUTANEOUS ONCE
Status: COMPLETED | OUTPATIENT
Start: 2023-04-11 | End: 2023-04-11

## 2023-04-11 RX ORDER — BISACODYL 10 MG
10 SUPPOSITORY, RECTAL RECTAL ONCE
Status: DISCONTINUED | OUTPATIENT
Start: 2023-04-11 | End: 2023-04-12

## 2023-04-11 RX ORDER — ROSUVASTATIN CALCIUM 20 MG/1
20 TABLET, COATED ORAL NIGHTLY
Status: DISCONTINUED | OUTPATIENT
Start: 2023-04-11 | End: 2023-04-13

## 2023-04-11 RX ADMIN — SODIUM BICARBONATE 650 MG: 650 TABLET ORAL at 16:50

## 2023-04-11 RX ADMIN — HEPARIN SODIUM 12 UNITS/KG/HR: 10000 INJECTION, SOLUTION INTRAVENOUS at 20:34

## 2023-04-11 RX ADMIN — DOCUSATE SODIUM 100 MG: 100 CAPSULE, LIQUID FILLED ORAL at 16:50

## 2023-04-11 RX ADMIN — HEPARIN SODIUM 4000 UNITS: 1000 INJECTION INTRAVENOUS; SUBCUTANEOUS at 14:51

## 2023-04-11 RX ADMIN — METOPROLOL SUCCINATE 100 MG: 100 TABLET, EXTENDED RELEASE ORAL at 21:57

## 2023-04-11 RX ADMIN — HYDRALAZINE HYDROCHLORIDE 100 MG: 50 TABLET, FILM COATED ORAL at 09:19

## 2023-04-11 RX ADMIN — HYDRALAZINE HYDROCHLORIDE 50 MG: 50 TABLET, FILM COATED ORAL at 21:56

## 2023-04-11 RX ADMIN — HEPARIN SODIUM 15 UNITS/KG/HR: 10000 INJECTION, SOLUTION INTRAVENOUS at 12:35

## 2023-04-11 RX ADMIN — Medication 10 ML: at 09:20

## 2023-04-11 RX ADMIN — ROSUVASTATIN CALCIUM 20 MG: 20 TABLET, FILM COATED ORAL at 21:56

## 2023-04-11 RX ADMIN — SODIUM BICARBONATE 650 MG: 650 TABLET ORAL at 09:22

## 2023-04-11 RX ADMIN — DULOXETINE HYDROCHLORIDE 30 MG: 30 CAPSULE, DELAYED RELEASE ORAL at 21:57

## 2023-04-11 RX ADMIN — GUAIFENESIN 600 MG: 600 TABLET, EXTENDED RELEASE ORAL at 21:57

## 2023-04-11 RX ADMIN — FLUTICASONE PROPIONATE 2 SPRAY: 50 SPRAY, METERED NASAL at 09:21

## 2023-04-11 RX ADMIN — POLYETHYLENE GLYCOL 3350 17 G: 17 POWDER, FOR SOLUTION ORAL at 09:20

## 2023-04-11 RX ADMIN — HEPARIN SODIUM 4000 UNITS: 1000 INJECTION INTRAVENOUS; SUBCUTANEOUS at 16:11

## 2023-04-11 RX ADMIN — TRAZODONE HYDROCHLORIDE 50 MG: 50 TABLET ORAL at 21:58

## 2023-04-11 RX ADMIN — GUAIFENESIN 600 MG: 600 TABLET, EXTENDED RELEASE ORAL at 09:19

## 2023-04-11 RX ADMIN — ASPIRIN 81 MG: 81 TABLET, COATED ORAL at 09:20

## 2023-04-11 RX ADMIN — HEPARIN SODIUM 2000 UNITS: 1000 INJECTION, SOLUTION INTRAVENOUS; SUBCUTANEOUS at 14:24

## 2023-04-11 RX ADMIN — SODIUM BICARBONATE 650 MG: 650 TABLET ORAL at 21:56

## 2023-04-11 NOTE — PLAN OF CARE
Goal Outcome Evaluation:  Plan of Care Reviewed With: patient        Progress: no change   71 yo male Aox4  post cardiac cath pt denies any chest pain or soa. VSS. Received hemodialysis today removing 1 liter. Tolerated well.plan for bypass surgery on 4/13. Will continue to monitor

## 2023-04-11 NOTE — PROGRESS NOTES
" LOS: 3 days   Patient Care Team:  Daksha Ng APRN as PCP - General (Family Medicine)  Rudy Faith MD as Consulting Physician (Ophthalmology)  Jose Mccall MD as Consulting Physician (Nephrology)  Quang Reyes MD as Consulting Physician (Nephrology)    Chief Complaint: CAD    Subjective:  Symptoms:  He reports shortness of breath (with exertion).  No cough or chest pain.    Diet:  Adequate intake.  No nausea or vomiting.    Activity level: Returning to normal.    Pain:  He reports no pain.          Vital Signs  Temp:  [96.7 °F (35.9 °C)-98.5 °F (36.9 °C)] 97.3 °F (36.3 °C)  Heart Rate:  [65-77] 65  Resp:  [18-20] 18  BP: (111-156)/(59-86) 111/77  Body mass index is 31.65 kg/m².    Intake/Output Summary (Last 24 hours) at 4/11/2023 0925  Last data filed at 4/11/2023 0430  Gross per 24 hour   Intake 240 ml   Output 350 ml   Net -110 ml     No intake/output data recorded.          04/09/23  0554 04/10/23  0539 04/11/23  0600   Weight: 98.9 kg (218 lb 1.6 oz) 99 kg (218 lb 3.2 oz) 100 kg (220 lb 9.6 oz)         Objective:  General Appearance:  Comfortable and in no acute distress.    Vital signs: (most recent): Blood pressure 111/77, pulse 65, temperature 97.3 °F (36.3 °C), temperature source Oral, resp. rate 18, height 177.8 cm (70\"), weight 100 kg (220 lb 9.6 oz), SpO2 97 %.  Vital signs are normal.  No fever.    Output: No stool output.    Lungs:  Normal effort and normal respiratory rate.  There are decreased breath sounds.    Heart: Normal rate.  Regular rhythm.    Abdomen: Abdomen is soft.  Bowel sounds are normal.     Extremities: There is no dependent edema.    Pulses: Distal pulses are intact.    Neurological: Patient is alert and oriented to person, place and time.    Skin:  Warm and dry.          Results Review:        WBC WBC   Date Value Ref Range Status   04/11/2023 15.62 (H) 3.40 - 10.80 10*3/mm3 Final   04/09/2023 11.26 (H) 3.40 - 10.80 10*3/mm3 Final   04/09/2023 8.58 3.40 - " 10.80 10*3/mm3 Final   04/08/2023 14.47 (H) 3.40 - 10.80 10*3/mm3 Final      HGB Hemoglobin   Date Value Ref Range Status   04/11/2023 10.1 (L) 13.0 - 17.7 g/dL Final   04/09/2023 10.7 (L) 13.0 - 17.7 g/dL Final   04/09/2023 10.3 (L) 13.0 - 17.7 g/dL Final   04/08/2023 12.0 (L) 13.0 - 17.7 g/dL Final      HCT Hematocrit   Date Value Ref Range Status   04/11/2023 30.3 (L) 37.5 - 51.0 % Final   04/09/2023 33.3 (L) 37.5 - 51.0 % Final   04/09/2023 31.5 (L) 37.5 - 51.0 % Final   04/08/2023 35.3 (L) 37.5 - 51.0 % Final      Platelets Platelets   Date Value Ref Range Status   04/11/2023 202 140 - 450 10*3/mm3 Final   04/09/2023 187 140 - 450 10*3/mm3 Final   04/09/2023 161 140 - 450 10*3/mm3 Final   04/08/2023 195 140 - 450 10*3/mm3 Final        PT/INR:    Protime   Date Value Ref Range Status   04/08/2023 14.8 (H) 11.7 - 14.2 Seconds Final   /  INR   Date Value Ref Range Status   04/08/2023 1.15 (H) 0.90 - 1.10 Final       Sodium Sodium   Date Value Ref Range Status   04/11/2023 133 (L) 136 - 145 mmol/L Final   04/09/2023 133 (L) 136 - 145 mmol/L Final   04/09/2023 140 136 - 145 mmol/L Final   04/08/2023 139 136 - 145 mmol/L Final      Potassium Potassium   Date Value Ref Range Status   04/11/2023 4.1 3.5 - 5.2 mmol/L Final     Comment:     Slight hemolysis detected by analyzer. Results may be affected.   04/09/2023 4.2 3.5 - 5.2 mmol/L Final   04/09/2023 4.0 3.5 - 5.2 mmol/L Final   04/08/2023 3.9 3.5 - 5.2 mmol/L Final     Comment:     Slight hemolysis detected by analyzer. Results may be affected.      Chloride Chloride   Date Value Ref Range Status   04/11/2023 98 98 - 107 mmol/L Final   04/09/2023 100 98 - 107 mmol/L Final   04/09/2023 103 98 - 107 mmol/L Final   04/08/2023 104 98 - 107 mmol/L Final      Bicarbonate CO2   Date Value Ref Range Status   04/11/2023 19.8 (L) 22.0 - 29.0 mmol/L Final   04/09/2023 20.7 (L) 22.0 - 29.0 mmol/L Final   04/09/2023 24.0 22.0 - 29.0 mmol/L Final   04/08/2023 20.1 (L) 22.0 -  29.0 mmol/L Final      BUN BUN   Date Value Ref Range Status   04/11/2023 51 (H) 8 - 23 mg/dL Final   04/09/2023 27 (H) 8 - 23 mg/dL Final   04/09/2023 39 (H) 8 - 23 mg/dL Final   04/08/2023 27 (H) 8 - 23 mg/dL Final      Creatinine Creatinine   Date Value Ref Range Status   04/11/2023 5.92 (H) 0.76 - 1.27 mg/dL Final   04/09/2023 4.31 (H) 0.76 - 1.27 mg/dL Final   04/09/2023 6.31 (H) 0.76 - 1.27 mg/dL Final   04/08/2023 4.67 (H) 0.76 - 1.27 mg/dL Final      Calcium Calcium   Date Value Ref Range Status   04/11/2023 8.7 8.6 - 10.5 mg/dL Final   04/09/2023 9.2 8.6 - 10.5 mg/dL Final   04/09/2023 8.7 8.6 - 10.5 mg/dL Final   04/08/2023 8.6 8.6 - 10.5 mg/dL Final      Magnesium No results found for: MG       aspirin, 81 mg, Oral, Daily  DULoxetine, 30 mg, Oral, Nightly  fluticasone, 2 spray, Each Nare, Daily  guaiFENesin, 600 mg, Oral, Q12H  hydrALAZINE, 100 mg, Oral, Daily With Breakfast  hydrALAZINE, 50 mg, Oral, Nightly  insulin lispro, 0-9 Units, Subcutaneous, TID With Meals  metoprolol succinate XL, 100 mg, Oral, Nightly  polyethylene glycol, 17 g, Oral, Daily  rosuvastatin, 20 mg, Oral, Nightly  sodium bicarbonate, 650 mg, Oral, TID  traZODone, 50 mg, Oral, Nightly      heparin, 10 Units/kg/hr, Last Rate: 14 Units/kg/hr (04/11/23 0430)            Patient Active Problem List   Diagnosis Code   • 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.22   • Essential hypertension I10   • Hyperlipidemia E78.5   • Primary insomnia F51.01   • CKD (chronic kidney disease) stage 4, GFR 15-29 ml/min ([facility]) N18.4   • Vitamin D deficiency E55.9   • Diverticulitis of large intestine with perforation and abscess without bleeding K57.20   • Obesity (BMI 30-39.9) E66.9   • Impaired mobility and ADLs Z74.09, Z78.9   • History of colon resection Z90.49   • Colon polyps K63.5   • Diverticulosis K57.90   • CKD (chronic kidney disease) stage 5, GFR less than 15 ml/min N18.5   • Dyspnea on exertion R06.09   • NSTEMI  (non-ST elevated myocardial infarction) I21.4   • Rhinovirus B34.8       Assessment & Plan   - NSTEMI/multi-vessel CAD  - HFrEF--30-35% per echo 4/8  - rhinovirus infection  - ESRD on HD  - hypertension  - hyperlipidemia  - DM II- A1c 5.0    CV stable. Patient denies chest pain overnight  Preoperative work up underway  Increase bowel regimen-- will give a suppository today  Continue to optimize medically. Plan for HD tomorrow  ID evaluated no guidelines for timing after viral infection-- improving symptoms  Will discuss timing with Dr. Zuluaga-- he still has a cough but it seems to be improving    Laya Ackerman, DUSTY  04/11/23  09:25 EDT

## 2023-04-11 NOTE — PROGRESS NOTES
[facility] Progress Note      Patient Name: Lefty Cai  : 1950  MRN: [id number]  Primary Care Physician:  Daksha Ng APRN  Date of admission: 2023    Subjective     Interval History:   Follow-up end-stage renal disease    He is feeling the same, he is a bit anxious about his upcoming heart surgery.  He denies any chest pain or shortness of air, no orthopnea or PND, no nausea or vomiting, no dysuria or gross hematuria.    Review of Systems:   As noted above    Objective     Vitals:   Temp:  [96.7 °F (35.9 °C)-98.5 °F (36.9 °C)] 97.3 °F (36.3 °C)  Heart Rate:  [65-77] 65  Resp:  [18-20] 18  BP: (111-156)/(59-86) 111/77    Intake/Output Summary (Last 24 hours) at 2023 1007  Last data filed at 2023 0430  Gross per 24 hour   Intake 240 ml   Output 350 ml   Net -110 ml       Physical Exam:    General Appearance: Awake, alert, no acute distress, chronically ill  Skin: warm and dry  HEENT: oral mucosa normal, nonicteric sclera  Neck: supple, no JVD, tunneled dialysis catheter in the right IJ with exit site below the right clavicle  Lungs: CTA, breathing effort not labored  Heart: RRR, normal S1 and S2, no S3, no rub  Abdomen: soft, nontender, nondistended, normoactive bowel  : no palpable bladder  Extremities: no edema, cyanosis or clubbing, functional AV fistula in the left forearm with good thrill and bruit  Neuro: normal speech and mental status     Scheduled Meds:     aspirin, 81 mg, Oral, Daily  bisacodyl, 10 mg, Rectal, Once  docusate sodium, 100 mg, Oral, BID  DULoxetine, 30 mg, Oral, Nightly  fluticasone, 2 spray, Each Nare, Daily  guaiFENesin, 600 mg, Oral, Q12H  hydrALAZINE, 100 mg, Oral, Daily With Breakfast  hydrALAZINE, 50 mg, Oral, Nightly  insulin lispro, 0-9 Units, Subcutaneous, TID With Meals  metoprolol succinate XL, 100 mg, Oral, Nightly  polyethylene glycol, 17 g, Oral, Daily  rosuvastatin, 20 mg, Oral, Nightly  sodium bicarbonate, 650  mg, Oral, TID  traZODone, 50 mg, Oral, Nightly      IV Meds:   heparin, 10 Units/kg/hr, Last Rate: 14 Units/kg/hr (04/11/23 0430)        Results Reviewed:   I have personally reviewed the results from the time of this admission to 4/11/2023 10:07 EDT     Results from last 7 days   Lab Units 04/11/23 0321 04/09/23 2312 04/09/23 0447 04/08/23  1142   SODIUM mmol/L 133* 133* 140 139   POTASSIUM mmol/L 4.1 4.2 4.0 3.9   CHLORIDE mmol/L 98 100 103 104   CO2 mmol/L 19.8* 20.7* 24.0 20.1*   BUN mg/dL 51* 27* 39* 27*   CREATININE mg/dL 5.92* 4.31* 6.31* 4.67*   CALCIUM mg/dL 8.7 9.2 8.7 8.6   BILIRUBIN mg/dL  --  0.3  --  0.4   ALK PHOS U/L  --  71  --  68   ALT (SGPT) U/L  --  17  --  11   AST (SGOT) U/L  --  23  --  26   GLUCOSE mg/dL 107* 163* 122* 123*       Estimated Creatinine Clearance: 13.2 mL/min (A) (by C-G formula based on SCr of 5.92 mg/dL (H)).    Results from last 7 days   Lab Units 04/11/23 0321 04/09/23 2312 04/09/23 0447   PHOSPHORUS mg/dL 4.6* 3.3 4.9*             Results from last 7 days   Lab Units 04/11/23 0321 04/09/23 2312 04/09/23 0447 04/08/23  1142   WBC 10*3/mm3 15.62* 11.26* 8.58 14.47*   HEMOGLOBIN g/dL 10.1* 10.7* 10.3* 12.0*   PLATELETS 10*3/mm3 202 187 161 195       Results from last 7 days   Lab Units 04/08/23  1925   INR  1.15*       Assessment / Plan     ASSESSMENT:  -End-stage renal disease and diabetic and hypertensive glomerulosclerosis on maintenance hemodialysis every Tuesday, Thursday and Saturday, sodium 133, potassium 4.1, total CO2 19.8.  Plan for dialysis today  -Diabetes mellitus type 2 with renal complication  -Hypertension with chronic kidney disease, not well controlled related to fluid excess related to heart catheterization  -Anemia of ESRD, hemoglobin today is 10.1  -Non-ST elevation MI, heart cath today revealed multivessel disease will need CABG  -Secondary hyperparathyroidism of renal origin    PLAN:  -Hemodialysis today  -Surveillance labs  -CABG when all the  other disciplines are in agreement he is cleared from the renal standpoint  I discussed the case with the patient and his wife at the bedside    Thank you for involving us in the care of Leftytima Cai.  Please feel free to call with any questions.    Quang Reyes MD  04/11/23  10:07 EDT    [facility]  [phone]    Please note that portions of this note were completed with a voice recognition program.

## 2023-04-11 NOTE — PROGRESS NOTES
Name: Lefty Cai ADMIT: 2023   : 1950  PCP: Daksha Ng APRN    MRN: [id number] LOS: 3 days   AGE/SEX: 72 y.o. male  ROOM: /     Subjective   Subjective   Today patient appears comfortable & in no apparent distress.  Tolerating diet.  Family, HD RN, & RN at bedside.    Review of Systems   Constitutional: Negative for chills and fever.   Respiratory: Negative for cough and shortness of breath.    Cardiovascular: Negative for chest pain and leg swelling.   Gastrointestinal: Negative for abdominal pain, nausea and vomiting.   Genitourinary: Negative for difficulty urinating and dysuria.   Musculoskeletal: Negative for gait problem and myalgias.        Objective   Objective   Vital Signs  Temp:  [96.7 °F (35.9 °C)-97.9 °F (36.6 °C)] 97.9 °F (36.6 °C)  Heart Rate:  [65-77] 70  Resp:  [16-18] 16  BP: (111-156)/(72-86) 120/72  SpO2:  [96 %-98 %] 97 %  on   ;   Device (Oxygen Therapy): room air  Body mass index is 31.65 kg/m².     Physical Exam  Constitutional:       General: He is not in acute distress.     Appearance: He is obese. He is not toxic-appearing.   Cardiovascular:      Rate and Rhythm: Normal rate.      Heart sounds: Normal heart sounds.   Pulmonary:      Effort: Pulmonary effort is normal.      Breath sounds: Normal breath sounds.   Abdominal:      General: Bowel sounds are normal.      Palpations: Abdomen is soft.   Musculoskeletal:      Right lower leg: No edema.      Left lower leg: No edema.   Skin:     General: Skin is warm and dry.   Neurological:      Mental Status: He is alert.     *Physical exam performed on 2023 as per above    Results Review     I reviewed the patient's new clinical results.  Results from last 7 days   Lab Units 23  0321 23  2312 23  0447 23  1142   WBC 10*3/mm3 15.62* 11.26* 8.58 14.47*   HEMOGLOBIN g/dL 10.1* 10.7* 10.3* 12.0*   PLATELETS 10*3/mm3 202 187 161 195     Results from last 7 days   Lab Units 231  04/09/23 2312 04/09/23 0447 04/08/23  1142   SODIUM mmol/L 133* 133* 140 139   POTASSIUM mmol/L 4.1 4.2 4.0 3.9   CHLORIDE mmol/L 98 100 103 104   CO2 mmol/L 19.8* 20.7* 24.0 20.1*   BUN mg/dL 51* 27* 39* 27*   CREATININE mg/dL 5.92* 4.31* 6.31* 4.67*   GLUCOSE mg/dL 107* 163* 122* 123*   EGFR mL/min/1.73 9.4* 13.8* 8.7* 12.5*     Results from last 7 days   Lab Units 04/11/23 0321 04/09/23 2312 04/09/23 0447 04/08/23  1142   ALBUMIN g/dL 3.4* 3.8 3.3* 3.5   BILIRUBIN mg/dL  --  0.3  --  0.4   ALK PHOS U/L  --  71  --  68   AST (SGOT) U/L  --  23  --  26   ALT (SGPT) U/L  --  17  --  11     Results from last 7 days   Lab Units 04/11/23 0321 04/09/23 2312 04/09/23 0447 04/08/23  1142   CALCIUM mg/dL 8.7 9.2 8.7 8.6   ALBUMIN g/dL 3.4* 3.8 3.3* 3.5   PHOSPHORUS mg/dL 4.6* 3.3 4.9*  --      Results from last 7 days   Lab Units 04/09/23 0447 04/08/23  1142   PROCALCITONIN ng/mL  --  0.19   LACTATE mmol/L 1.0 2.1*     Hemoglobin A1C   Date/Time Value Ref Range Status   04/09/2023 0447 5.00 4.80 - 5.60 % Final     Glucose   Date/Time Value Ref Range Status   04/11/2023 1112 111 70 - 130 mg/dL Final     Comment:     Meter: [id number] : [id number] Jeniffer Willis NA   04/11/2023 0609 106 70 - 130 mg/dL Final     Comment:     Meter: [id number] : [id number] Puga Flavio NA   04/10/2023 2029 187 (H) 70 - 130 mg/dL Final     Comment:     Meter: [id number] : [id number] Edd Isabel NA   04/10/2023 1621 131 (H) 70 - 130 mg/dL Final     Comment:     Meter: [id number] : [id number] Milind Emily NA   04/10/2023 1046 177 (H) 70 - 130 mg/dL Final     Comment:     Meter: [id number] : [id number] Hymarge Emily NA   04/10/2023 0633 128 70 - 130 mg/dL Final     Comment:     Meter: [id number] : [id number] Eugenio ROSALES   04/09/2023 2040 158 (H) 70 - 130 mg/dL Final     Comment:     Meter: [id number] : [id number] Eugenio Emmanuel NA       CT Chest Without Contrast  Diagnostic    Result Date: 4/11/2023  Impression:  1.  Multifocal groundglass opacities suggesting atypical pneumonitis with small bilateral pleural effusions and bibasilar atelectasis. Recommend attention on short-term followup to resolution after treatment. 2.  Mild cardiomegaly with atherosclerosis including extensive coronary artery calcifications and enlargement of the main pulmonary artery. 3.  Please see above for additional findings.        I have personally reviewed all medications:  Scheduled Medications  aspirin, 81 mg, Oral, Daily  bisacodyl, 10 mg, Rectal, Once  docusate sodium, 100 mg, Oral, BID  DULoxetine, 30 mg, Oral, Nightly  fluticasone, 2 spray, Each Nare, Daily  guaiFENesin, 600 mg, Oral, Q12H  heparin (porcine), 2,000 Units, Intravenous, Once  hydrALAZINE, 100 mg, Oral, Daily With Breakfast  hydrALAZINE, 50 mg, Oral, Nightly  insulin lispro, 0-9 Units, Subcutaneous, TID With Meals  metoprolol succinate XL, 100 mg, Oral, Nightly  polyethylene glycol, 17 g, Oral, Daily  rosuvastatin, 20 mg, Oral, Nightly  sodium bicarbonate, 650 mg, Oral, TID  traZODone, 50 mg, Oral, Nightly    Infusions  heparin, 10 Units/kg/hr, Last Rate: 13 Units/kg/hr (04/11/23 1239)    Diet  Diet: Cardiac Diets, Diabetic Diets; Healthy Heart (2-3 Na+); Consistent Carbohydrate; Texture: Regular Texture (IDDSI 7); Fluid Consistency: Thin (IDDSI 0)    I have personally reviewed:  [x]  Laboratory   []  Microbiology   [x]  Radiology   [x]  EKG/Telemetry  []  Cardiology/Vascular   []  Pathology    []  Records       Assessment/Plan     Active Hospital Problems    Diagnosis  POA   • **NSTEMI (non-ST elevated myocardial infarction) [I21.4]  Unknown   • Rhinovirus [B34.8]  Yes   • Dyspnea on exertion [R06.09]  Yes   • Abnormal findings on diagnostic imaging of heart and coronary circulation [R93.1]  Unknown   • Type 2 diabetes mellitus with diabetic chronic kidney disease [E11.22]  Yes   • Essential hypertension [I10]  Yes   •  Hyperlipidemia [E78.5]  Yes   • CKD (chronic kidney disease) stage 4, GFR 15-29 ml/min ([facility]) [N18.4]  Yes      Resolved Hospital Problems   No resolved problems to display.       72 y.o. male admitted with NSTEMI (non-ST elevated myocardial infarction).    Cardiothoracic surgery following eft heart catheterization shows severe three-vessel coronary disease with an occluded right coronary system with tentative surgical revascularization pending resolution of rhinovirus infection--stable appearance given O2 saturation 97% on room air (nebulizers available as needed, Flonase, incentive spirometry encouraged, and mucolytic's ordered twice daily).  ID does not recommend empiric antibiotic therapy given no evidence for secondary bacterial infection.  Heparin drip infusing for above.  CT chest findings suggest atypical pneumonitis.  Unremarkable procalcitonin.  Continue plan outlined above.    Nephrology managing ESRD current HD on 4/11/2023 in progress.  Nephrology cleared patient from renal standpoint for CABG.    HTN: BP acceptable acutely.  Continue hydralazine, metoprolol same.    Glucose trends acceptable.  Patient tolerating diet.  Moderate dose correctional sliding scale continued for now.    · Heparin gtt infusing adequate for DVT prophylaxis.  · CPR  · Discussed with patient & RN.  · Anticipate discharge pending clinical course & tentative cardiovascular surgery in the setting of active rhinovirus infection whereby ID states no evidence of secondary bacterial infection at this time & no indication for antibiotics--ultimately decision for timing of surgery based on surgeon & anesthesiologist    DUSTY Ford  [facility]  04/11/23  14:05 EDT

## 2023-04-11 NOTE — PROGRESS NOTES
LOS: 3 days   Patient Care Team:  Daksha Ng APRN as PCP - General (Family Medicine)  Rudy Faith MD as Consulting Physician (Ophthalmology)  Jose Mccall MD as Consulting Physician (Nephrology)  Quang Reyes MD as Consulting Physician (Nephrology)    Chief Complaint:     Follow-up CAD    Interval History:     He is tired as he got no sleep last night.  He has no chest pain.  His breathing is better but he still does have a slight cough.  He says he does not feel like he is wheezing.  He walked in the nurses station yesterday and felt well.    He wants to be able to get sleep-they keep waking him up at night.    Objective   Vital Signs  Temp:  [96.7 °F (35.9 °C)-98.5 °F (36.9 °C)] 97.3 °F (36.3 °C)  Heart Rate:  [65-77] 65  Resp:  [18-20] 18  BP: (111-156)/(59-99) 111/77    Intake/Output Summary (Last 24 hours) at 4/11/2023 0742  Last data filed at 4/11/2023 0430  Gross per 24 hour   Intake 240 ml   Output 350 ml   Net -110 ml       Comfortable NAD  Neck supple, no JVD or thyromegaly appreciated  S1/S2 RRR, no m/r/g  Lungs CTA B, normal effort  Abdomen S/NT/ND (+) BS, no HSM appreciated  Extremities warm, no clubbing, cyanosis, or edema  No visible or palpable skin lesions  A/Ox4, mood and affect appropriate    Results Review:      Results from last 7 days   Lab Units 04/11/23  0321 04/09/23  2312 04/09/23 0447   SODIUM mmol/L 133* 133* 140   POTASSIUM mmol/L 4.1 4.2 4.0   CHLORIDE mmol/L 98 100 103   CO2 mmol/L 19.8* 20.7* 24.0   BUN mg/dL 51* 27* 39*   CREATININE mg/dL 5.92* 4.31* 6.31*   GLUCOSE mg/dL 107* 163* 122*   CALCIUM mg/dL 8.7 9.2 8.7     Results from last 7 days   Lab Units 04/09/23  0447 04/08/23  1142   HSTROP T ng/L 1,420* 811*     Results from last 7 days   Lab Units 04/11/23  0321 04/09/23  2312 04/09/23  0447   WBC 10*3/mm3 15.62* 11.26* 8.58   HEMOGLOBIN g/dL 10.1* 10.7* 10.3*   HEMATOCRIT % 30.3* 33.3* 31.5*   PLATELETS 10*3/mm3 202 187 161     Results from last  7 days   Lab Units 04/11/23  0321 04/11/23  0003 04/10/23  1609 04/09/23  0447 04/08/23  1925   INR   --   --   --   --  1.15*   APTT seconds 60.6* 64.6* 30.6   < > 30.5    < > = values in this interval not displayed.                   I reviewed the patient's new clinical results.  I personally viewed and interpreted the patient's EKG/Telemetry data        Medication Review:   aspirin, 81 mg, Oral, Daily  DULoxetine, 30 mg, Oral, Nightly  fluticasone, 2 spray, Each Nare, Daily  guaiFENesin, 600 mg, Oral, Q12H  hydrALAZINE, 100 mg, Oral, Daily With Breakfast  hydrALAZINE, 50 mg, Oral, Nightly  insulin lispro, 0-9 Units, Subcutaneous, TID With Meals  metoprolol succinate XL, 100 mg, Oral, Nightly  polyethylene glycol, 17 g, Oral, Daily  sodium bicarbonate, 650 mg, Oral, TID  traZODone, 50 mg, Oral, Nightly        heparin, 10 Units/kg/hr, Last Rate: 14 Units/kg/hr (04/11/23 0430)        Assessment & Plan       NSTEMI (non-ST elevated myocardial infarction)    Type 2 diabetes mellitus with diabetic chronic kidney disease    Essential hypertension    Hyperlipidemia    CKD (chronic kidney disease) stage 4, GFR 15-29 ml/min ([facility])    Dyspnea on exertion    Rhinovirus    1. NSTEMI with 3v cad on cath 4/9/23.   2. DM  3. ESRD on HD-nephrology following, has line in right neck  4. Hypertension, blood pressure better  5. Hyperlipidemia.  Start rosuvastatin  6. LVEF 31-35%, no acute decompensated heart failure  7. Rhinovirus infection.   ID saw patient  8. < 50% bilateral internal carotid artery stenosis.  9. History of kidney cancer status post partial nephrectomy    Right now he is stable, advised nursing to let him sleep between 11 PM and 6 AM.  I think he is getting dialysis today.  His vitals are otherwise stable.  Infectious disease felt that he could have surgery whenever-ultimately up to surgery and anesthesia.  They felt no other treatments were necessary.    Blood pressure is better, start rosuvastatin for  hyperlipidemia.    We will follow.    Deisy Sanchez MD  04/11/23  07:42 EDT

## 2023-04-11 NOTE — NURSING NOTE
Hemodialysis completed.Patient tolerated tx,vss,UF-1L as ordered.Post vs-BP-113/69 mmhg,HR-65,R-18.Report given to staff.

## 2023-04-12 ENCOUNTER — APPOINTMENT (OUTPATIENT)
Dept: GENERAL RADIOLOGY | Facility: HOSPITAL | Age: 73
DRG: 216 | End: 2023-04-12
Payer: MEDICARE

## 2023-04-12 LAB
ABO GROUP BLD: NORMAL
ALBUMIN SERPL-MCNC: 3.3 G/DL (ref 3.5–5.2)
ANION GAP SERPL CALCULATED.3IONS-SCNC: 11 MMOL/L (ref 5–15)
APTT PPP: 75.3 SECONDS (ref 22.7–35.4)
ARTERIAL PATENCY WRIST A: ABNORMAL
ATMOSPHERIC PRESS: 749 MMHG
BASE EXCESS BLDA CALC-SCNC: -3.2 MMOL/L (ref 0–2)
BASOPHILS # BLD AUTO: 0.05 10*3/MM3 (ref 0–0.2)
BASOPHILS NFR BLD AUTO: 0.5 % (ref 0–1.5)
BDY SITE: ABNORMAL
BLD GP AB SCN SERPL QL: NEGATIVE
BUN SERPL-MCNC: 31 MG/DL (ref 8–23)
BUN/CREAT SERPL: 8.8 (ref 7–25)
CALCIUM SPEC-SCNC: 8.2 MG/DL (ref 8.6–10.5)
CHLORIDE SERPL-SCNC: 106 MMOL/L (ref 98–107)
CLOSE TME COLL+ADP + EPINEP PNL BLD: 95 % (ref 86–100)
CO2 SERPL-SCNC: 21 MMOL/L (ref 22–29)
CREAT SERPL-MCNC: 3.53 MG/DL (ref 0.76–1.27)
DEPRECATED RDW RBC AUTO: 44.6 FL (ref 37–54)
EGFRCR SERPLBLD CKD-EPI 2021: 17.5 ML/MIN/1.73
EOSINOPHIL # BLD AUTO: 0.13 10*3/MM3 (ref 0–0.4)
EOSINOPHIL NFR BLD AUTO: 1.3 % (ref 0.3–6.2)
ERYTHROCYTE [DISTWIDTH] IN BLOOD BY AUTOMATED COUNT: 12.8 % (ref 12.3–15.4)
GLUCOSE BLDC GLUCOMTR-MCNC: 102 MG/DL (ref 70–130)
GLUCOSE BLDC GLUCOMTR-MCNC: 108 MG/DL (ref 70–130)
GLUCOSE BLDC GLUCOMTR-MCNC: 98 MG/DL (ref 70–130)
GLUCOSE SERPL-MCNC: 95 MG/DL (ref 65–99)
HCO3 BLDA-SCNC: 21.6 MMOL/L (ref 22–28)
HCT VFR BLD AUTO: 32.2 % (ref 37.5–51)
HGB BLD-MCNC: 11 G/DL (ref 13–17.7)
IMM GRANULOCYTES # BLD AUTO: 0.07 10*3/MM3 (ref 0–0.05)
IMM GRANULOCYTES NFR BLD AUTO: 0.7 % (ref 0–0.5)
LYMPHOCYTES # BLD AUTO: 1.89 10*3/MM3 (ref 0.7–3.1)
LYMPHOCYTES NFR BLD AUTO: 19.6 % (ref 19.6–45.3)
MAGNESIUM SERPL-MCNC: 2.1 MG/DL (ref 1.6–2.4)
MCH RBC QN AUTO: 32.2 PG (ref 26.6–33)
MCHC RBC AUTO-ENTMCNC: 34.2 G/DL (ref 31.5–35.7)
MCV RBC AUTO: 94.2 FL (ref 79–97)
MODALITY: ABNORMAL
MONOCYTES # BLD AUTO: 0.96 10*3/MM3 (ref 0.1–0.9)
MONOCYTES NFR BLD AUTO: 9.9 % (ref 5–12)
NEUTROPHILS NFR BLD AUTO: 6.56 10*3/MM3 (ref 1.7–7)
NEUTROPHILS NFR BLD AUTO: 68 % (ref 42.7–76)
NRBC BLD AUTO-RTO: 0.1 /100 WBC (ref 0–0.2)
PCO2 BLDA: 36.9 MM HG (ref 35–45)
PH BLDA: 7.38 PH UNITS (ref 7.35–7.45)
PHOSPHATE SERPL-MCNC: 3.7 MG/DL (ref 2.5–4.5)
PLATELET # BLD AUTO: 232 10*3/MM3 (ref 140–450)
PMV BLD AUTO: 10.2 FL (ref 6–12)
PO2 BLDA: 73.3 MM HG (ref 80–100)
POTASSIUM SERPL-SCNC: 4.3 MMOL/L (ref 3.5–5.2)
RBC # BLD AUTO: 3.42 10*6/MM3 (ref 4.14–5.8)
RH BLD: POSITIVE
SAO2 % BLDCOA: 94.3 % (ref 92–99)
SARS-COV-2 ORF1AB RESP QL NAA+PROBE: NOT DETECTED
SODIUM SERPL-SCNC: 138 MMOL/L (ref 136–145)
T&S EXPIRATION DATE: NORMAL
TOTAL RATE: 22 BREATHS/MINUTE
WBC NRBC COR # BLD: 9.66 10*3/MM3 (ref 3.4–10.8)

## 2023-04-12 PROCEDURE — 36600 WITHDRAWAL OF ARTERIAL BLOOD: CPT

## 2023-04-12 PROCEDURE — 71046 X-RAY EXAM CHEST 2 VIEWS: CPT

## 2023-04-12 PROCEDURE — 86923 COMPATIBILITY TEST ELECTRIC: CPT

## 2023-04-12 PROCEDURE — 82803 BLOOD GASES ANY COMBINATION: CPT

## 2023-04-12 PROCEDURE — 99024 POSTOP FOLLOW-UP VISIT: CPT | Performed by: NURSE PRACTITIONER

## 2023-04-12 PROCEDURE — 86901 BLOOD TYPING SEROLOGIC RH(D): CPT

## 2023-04-12 PROCEDURE — 83735 ASSAY OF MAGNESIUM: CPT | Performed by: INTERNAL MEDICINE

## 2023-04-12 PROCEDURE — 99232 SBSQ HOSP IP/OBS MODERATE 35: CPT | Performed by: INTERNAL MEDICINE

## 2023-04-12 PROCEDURE — 85576 BLOOD PLATELET AGGREGATION: CPT | Performed by: NURSE PRACTITIONER

## 2023-04-12 PROCEDURE — 86850 RBC ANTIBODY SCREEN: CPT | Performed by: NURSE PRACTITIONER

## 2023-04-12 PROCEDURE — 82962 GLUCOSE BLOOD TEST: CPT

## 2023-04-12 PROCEDURE — 86900 BLOOD TYPING SEROLOGIC ABO: CPT | Performed by: NURSE PRACTITIONER

## 2023-04-12 PROCEDURE — 86901 BLOOD TYPING SEROLOGIC RH(D): CPT | Performed by: NURSE PRACTITIONER

## 2023-04-12 PROCEDURE — 86900 BLOOD TYPING SEROLOGIC ABO: CPT

## 2023-04-12 PROCEDURE — 80069 RENAL FUNCTION PANEL: CPT | Performed by: INTERNAL MEDICINE

## 2023-04-12 PROCEDURE — 25010000002 HEPARIN (PORCINE) 25000-0.45 UT/250ML-% SOLUTION: Performed by: INTERNAL MEDICINE

## 2023-04-12 PROCEDURE — U0004 COV-19 TEST NON-CDC HGH THRU: HCPCS | Performed by: THORACIC SURGERY (CARDIOTHORACIC VASCULAR SURGERY)

## 2023-04-12 PROCEDURE — 85730 THROMBOPLASTIN TIME PARTIAL: CPT | Performed by: HOSPITALIST

## 2023-04-12 PROCEDURE — 85025 COMPLETE CBC W/AUTO DIFF WBC: CPT | Performed by: INTERNAL MEDICINE

## 2023-04-12 RX ORDER — CHLORHEXIDINE GLUCONATE 0.12 MG/ML
15 RINSE ORAL EVERY 12 HOURS SCHEDULED
Status: COMPLETED | OUTPATIENT
Start: 2023-04-12 | End: 2023-04-13

## 2023-04-12 RX ORDER — TEMAZEPAM 15 MG/1
15 CAPSULE ORAL NIGHTLY PRN
Status: DISCONTINUED | OUTPATIENT
Start: 2023-04-12 | End: 2023-04-13

## 2023-04-12 RX ORDER — CEFAZOLIN SODIUM 2 G/100ML
2 INJECTION, SOLUTION INTRAVENOUS ONCE
Status: DISCONTINUED | OUTPATIENT
Start: 2023-04-13 | End: 2023-04-13 | Stop reason: HOSPADM

## 2023-04-12 RX ORDER — CHLORHEXIDINE GLUCONATE 500 MG/1
1 CLOTH TOPICAL EVERY 12 HOURS
Status: COMPLETED | OUTPATIENT
Start: 2023-04-12 | End: 2023-04-13

## 2023-04-12 RX ORDER — ALPRAZOLAM 0.25 MG/1
0.25 TABLET ORAL EVERY 8 HOURS PRN
Status: DISCONTINUED | OUTPATIENT
Start: 2023-04-12 | End: 2023-04-13

## 2023-04-12 RX ORDER — CEFAZOLIN SODIUM 2 G/100ML
2 INJECTION, SOLUTION INTRAVENOUS ONCE
Status: DISCONTINUED | OUTPATIENT
Start: 2023-04-13 | End: 2023-04-12

## 2023-04-12 RX ADMIN — ROSUVASTATIN CALCIUM 20 MG: 20 TABLET, FILM COATED ORAL at 20:44

## 2023-04-12 RX ADMIN — CHLORHEXIDINE GLUCONATE 1 APPLICATION: 500 CLOTH TOPICAL at 21:40

## 2023-04-12 RX ADMIN — TRAZODONE HYDROCHLORIDE 50 MG: 50 TABLET ORAL at 20:44

## 2023-04-12 RX ADMIN — GUAIFENESIN 600 MG: 600 TABLET, EXTENDED RELEASE ORAL at 08:58

## 2023-04-12 RX ADMIN — SODIUM BICARBONATE 650 MG: 650 TABLET ORAL at 08:58

## 2023-04-12 RX ADMIN — GUAIFENESIN 600 MG: 600 TABLET, EXTENDED RELEASE ORAL at 20:45

## 2023-04-12 RX ADMIN — SODIUM BICARBONATE 650 MG: 650 TABLET ORAL at 17:18

## 2023-04-12 RX ADMIN — MUPIROCIN 1 APPLICATION: 20 OINTMENT TOPICAL at 20:45

## 2023-04-12 RX ADMIN — ASPIRIN 81 MG: 81 TABLET, COATED ORAL at 08:58

## 2023-04-12 RX ADMIN — DULOXETINE HYDROCHLORIDE 30 MG: 30 CAPSULE, DELAYED RELEASE ORAL at 20:45

## 2023-04-12 RX ADMIN — HYDRALAZINE HYDROCHLORIDE 100 MG: 50 TABLET, FILM COATED ORAL at 08:58

## 2023-04-12 RX ADMIN — SODIUM BICARBONATE 650 MG: 650 TABLET ORAL at 20:44

## 2023-04-12 RX ADMIN — METOPROLOL SUCCINATE 100 MG: 100 TABLET, EXTENDED RELEASE ORAL at 20:45

## 2023-04-12 RX ADMIN — DOCUSATE SODIUM 100 MG: 100 CAPSULE, LIQUID FILLED ORAL at 20:45

## 2023-04-12 RX ADMIN — ALPRAZOLAM 0.25 MG: 0.25 TABLET ORAL at 20:46

## 2023-04-12 RX ADMIN — HEPARIN SODIUM 12 UNITS/KG/HR: 10000 INJECTION, SOLUTION INTRAVENOUS at 13:36

## 2023-04-12 RX ADMIN — CHLORHEXIDINE GLUCONATE 0.12% ORAL RINSE 15 ML: 1.2 LIQUID ORAL at 20:45

## 2023-04-12 RX ADMIN — FLUTICASONE PROPIONATE 2 SPRAY: 50 SPRAY, METERED NASAL at 08:59

## 2023-04-12 RX ADMIN — HYDRALAZINE HYDROCHLORIDE 50 MG: 50 TABLET, FILM COATED ORAL at 20:45

## 2023-04-12 RX ADMIN — DOCUSATE SODIUM 100 MG: 100 CAPSULE, LIQUID FILLED ORAL at 08:58

## 2023-04-12 NOTE — PROGRESS NOTES
[facility] Progress Note      Patient Name: Lefty Cai  : 1950  MRN: [id number]  Primary Care Physician:  Daksha Ng APRN  Date of admission: 2023    Subjective     Interval History:   Follow-up end-stage renal disease    He is feeling the same, he is a bit anxious about his upcoming heart surgery.  He denies any chest pain or shortness of air, no orthopnea or PND, no nausea or vomiting, no dysuria or gross hematuria.  He had dialysis yesterday without any difficulty    Review of Systems:   As noted above    Objective     Vitals:   Temp:  [97.6 °F (36.4 °C)-97.9 °F (36.6 °C)] 97.9 °F (36.6 °C)  Heart Rate:  [62-70] 70  Resp:  [16] 16  BP: (113-126)/(68-73) 125/72    Intake/Output Summary (Last 24 hours) at 2023 0746  Last data filed at 2023 0230  Gross per 24 hour   Intake 240 ml   Output 1225 ml   Net -985 ml       Physical Exam:    General Appearance: Awake, alert, no acute distress, chronically ill  Skin: warm and dry  HEENT: oral mucosa normal, nonicteric sclera  Neck: supple, no JVD, tunneled dialysis catheter in the right IJ with exit site below the right clavicle  Lungs: CTA, breathing effort not labored  Heart: RRR, normal S1 and S2, no S3, no rub  Abdomen: soft, nontender, nondistended, normoactive bowel  : no palpable bladder  Extremities: no edema, cyanosis or clubbing, functional AV fistula in the left forearm with good thrill and bruit  Neuro: normal speech and mental status     Scheduled Meds:     aspirin, 81 mg, Oral, Daily  bisacodyl, 10 mg, Rectal, Once  docusate sodium, 100 mg, Oral, BID  DULoxetine, 30 mg, Oral, Nightly  fluticasone, 2 spray, Each Nare, Daily  guaiFENesin, 600 mg, Oral, Q12H  hydrALAZINE, 100 mg, Oral, Daily With Breakfast  hydrALAZINE, 50 mg, Oral, Nightly  insulin lispro, 0-9 Units, Subcutaneous, TID With Meals  metoprolol succinate XL, 100 mg, Oral, Nightly  polyethylene glycol, 17 g, Oral, Daily  rosuvastatin,  20 mg, Oral, Nightly  sodium bicarbonate, 650 mg, Oral, TID  traZODone, 50 mg, Oral, Nightly      IV Meds:   heparin, 10 Units/kg/hr, Last Rate: 12 Units/kg/hr (04/11/23 2034)        Results Reviewed:   I have personally reviewed the results from the time of this admission to 4/12/2023 07:46 EDT     Results from last 7 days   Lab Units 04/12/23 0129 04/11/23 0321 04/09/23 2312 04/09/23 0447 04/08/23  1142   SODIUM mmol/L 138 133* 133*   < > 139   POTASSIUM mmol/L 4.3 4.1 4.2   < > 3.9   CHLORIDE mmol/L 106 98 100   < > 104   CO2 mmol/L 21.0* 19.8* 20.7*   < > 20.1*   BUN mg/dL 31* 51* 27*   < > 27*   CREATININE mg/dL 3.53* 5.92* 4.31*   < > 4.67*   CALCIUM mg/dL 8.2* 8.7 9.2   < > 8.6   BILIRUBIN mg/dL  --   --  0.3  --  0.4   ALK PHOS U/L  --   --  71  --  68   ALT (SGPT) U/L  --   --  17  --  11   AST (SGOT) U/L  --   --  23  --  26   GLUCOSE mg/dL 95 107* 163*   < > 123*    < > = values in this interval not displayed.       Estimated Creatinine Clearance: 22.1 mL/min (A) (by C-G formula based on SCr of 3.53 mg/dL (H)).    Results from last 7 days   Lab Units 04/12/23 0129 04/11/23 0321 04/09/23 2312   MAGNESIUM mg/dL 2.1  --   --    PHOSPHORUS mg/dL 3.7 4.6* 3.3             Results from last 7 days   Lab Units 04/12/23 0129 04/11/23 0321 04/09/23 2312 04/09/23 0447 04/08/23  1142   WBC 10*3/mm3 9.66 15.62* 11.26* 8.58 14.47*   HEMOGLOBIN g/dL 11.0* 10.1* 10.7* 10.3* 12.0*   PLATELETS 10*3/mm3 232 202 187 161 195       Results from last 7 days   Lab Units 04/08/23  1925   INR  1.15*       Assessment / Plan     ASSESSMENT:  -End-stage renal disease and diabetic and hypertensive glomerulosclerosis on maintenance hemodialysis every Tuesday, Thursday and Saturday, his electrolytes and volume status within acceptable range, had dialysis yesterday without any difficult-Diabetes mellitus type 2 with renal complication  -Hypertension with chronic kidney disease, not well controlled related to fluid excess  related to heart catheterization  -Anemia of ESRD, hemoglobin today is 11  -Non-ST elevation MI, heart cath today revealed multivessel disease will need CABG  -Secondary hyperparathyroidism of renal origin    PLAN:  -Continue the same treatment  -Surveillance labs  -He is cleared to have his CABG from the renal standpoint hopefully it will be done tomorrow  -We will consider dialysis after surgery if needed otherwise we can wait hopefully till Friday  I discussed the case with the patient and his wife at the bedside    Thank you for involving us in the care of Lefty DOLL Trell.  Please feel free to call with any questions.    Quang Reyes MD  04/12/23  07:46 EDT    [facility]  [phone]    Please note that portions of this note were completed with a voice recognition program.

## 2023-04-12 NOTE — PROGRESS NOTES
LOS: 4 days   Patient Care Team:  Daksha Ng APRN as PCP - General (Family Medicine)  Rudy Faith MD as Consulting Physician (Ophthalmology)  Jose Mccall MD as Consulting Physician (Nephrology)  Quang Reyes MD as Consulting Physician (Nephrology)    Chief Complaint:     Follow-up CAD    Interval History:     His breathing is getting better every day and his cough is much better.  He did get some sleep last night.  He denies chest pain.  He does not feels heart racing or skipping and he has no dizziness.  He has been up and walking and feels well.    Objective   Vital Signs  Temp:  [97.6 °F (36.4 °C)-97.9 °F (36.6 °C)] 97.9 °F (36.6 °C)  Heart Rate:  [62-70] 70  Resp:  [16] 16  BP: (113-126)/(68-73) 125/72    Intake/Output Summary (Last 24 hours) at 4/12/2023 0807  Last data filed at 4/12/2023 0230  Gross per 24 hour   Intake 240 ml   Output 1225 ml   Net -985 ml       Comfortable NAD  Neck supple, no JVD or thyromegaly appreciated  S1/S2 RRR, no m/r/g  Lungs CTA B, normal effort  Abdomen S/NT/ND (+) BS, no HSM appreciated  Extremities warm, no clubbing, cyanosis, or edema  No visible or palpable skin lesions  A/Ox4, mood and affect appropriate    Results Review:      Results from last 7 days   Lab Units 04/12/23  0129 04/11/23  0321 04/09/23  2312   SODIUM mmol/L 138 133* 133*   POTASSIUM mmol/L 4.3 4.1 4.2   CHLORIDE mmol/L 106 98 100   CO2 mmol/L 21.0* 19.8* 20.7*   BUN mg/dL 31* 51* 27*   CREATININE mg/dL 3.53* 5.92* 4.31*   GLUCOSE mg/dL 95 107* 163*   CALCIUM mg/dL 8.2* 8.7 9.2     Results from last 7 days   Lab Units 04/09/23  0447 04/08/23  1142   HSTROP T ng/L 1,420* 811*     Results from last 7 days   Lab Units 04/12/23  0129 04/11/23  0321 04/09/23  2312   WBC 10*3/mm3 9.66 15.62* 11.26*   HEMOGLOBIN g/dL 11.0* 10.1* 10.7*   HEMATOCRIT % 32.2* 30.3* 33.3*   PLATELETS 10*3/mm3 232 202 187     Results from last 7 days   Lab Units 04/12/23  0129 04/11/23  1835 04/11/23  1054  04/09/23  0447 04/08/23 1925   INR   --   --   --   --  1.15*   APTT seconds 75.3* 124.8* 59.2*   < > 30.5    < > = values in this interval not displayed.         Results from last 7 days   Lab Units 04/12/23  0129   MAGNESIUM mg/dL 2.1           I reviewed the patient's new clinical results.  I personally viewed and interpreted the patient's EKG/Telemetry data        Medication Review:   aspirin, 81 mg, Oral, Daily  bisacodyl, 10 mg, Rectal, Once  [START ON 4/13/2023] ceFAZolin, 2 g, Intravenous, Once  chlorhexidine, 15 mL, Mouth/Throat, Q12H  Chlorhexidine Gluconate Cloth, 1 application, Topical, Q12H  docusate sodium, 100 mg, Oral, BID  DULoxetine, 30 mg, Oral, Nightly  fluticasone, 2 spray, Each Nare, Daily  guaiFENesin, 600 mg, Oral, Q12H  hydrALAZINE, 100 mg, Oral, Daily With Breakfast  hydrALAZINE, 50 mg, Oral, Nightly  insulin lispro, 0-9 Units, Subcutaneous, TID With Meals  metoprolol succinate XL, 100 mg, Oral, Nightly  [START ON 4/13/2023] metoprolol tartrate, 12.5 mg, Oral, On Call to OR  mupirocin, 1 application, Each Nare, Q12H  polyethylene glycol, 17 g, Oral, Daily  rosuvastatin, 20 mg, Oral, Nightly  sodium bicarbonate, 650 mg, Oral, TID  traZODone, 50 mg, Oral, Nightly        heparin, 10 Units/kg/hr, Last Rate: 12 Units/kg/hr (04/11/23 2034)        Assessment & Plan       NSTEMI (non-ST elevated myocardial infarction)    Type 2 diabetes mellitus with diabetic chronic kidney disease    Essential hypertension    Hyperlipidemia    CKD (chronic kidney disease) stage 4, GFR 15-29 ml/min (HCC)    Dyspnea on exertion    Rhinovirus    Abnormal findings on diagnostic imaging of heart and coronary circulation    1. NSTEMI with 3v cad on cath 4/9/23.   2. DM  3. ESRD on HD-nephrology following, has line in right neck  4. Hypertension, blood pressure better  5. Hyperlipidemia.  Start rosuvastatin  6. LVEF 31-35%, no acute decompensated heart failure  7. Rhinovirus infection.   ID saw patient  8. < 50%  bilateral internal carotid artery stenosis.  9. History of kidney cancer status post partial nephrectomy    Await CABG-for tomorrow.  We will follow.  No changes currently, overall doing better with rhinovirus.    Deisy Sanchez MD  04/12/23  08:07 EDT

## 2023-04-12 NOTE — PROGRESS NOTES
" LOS: 4 days   Patient Care Team:  Daksha Ng APRN as PCP - General (Family Medicine)  Rudy Faith MD as Consulting Physician (Ophthalmology)  Jose Mccall MD as Consulting Physician (Nephrology)  Quang Reyes MD as Consulting Physician (Nephrology)    Chief Complaint: CAD    Subjective:  Symptoms:  He reports shortness of breath (with exertion).  No cough or chest pain.    Diet:  Adequate intake.  No nausea or vomiting.    Activity level: Returning to normal.    Pain:  He reports no pain.          Vital Signs  Temp:  [97.6 °F (36.4 °C)-97.9 °F (36.6 °C)] 97.8 °F (36.6 °C)  Heart Rate:  [62-70] 66  Resp:  [16-18] 18  BP: (113-133)/(68-90) 133/90  Body mass index is 31.7 kg/m².    Intake/Output Summary (Last 24 hours) at 4/12/2023 0953  Last data filed at 4/12/2023 0931  Gross per 24 hour   Intake 480 ml   Output 1225 ml   Net -745 ml     I/O this shift:  In: 240 [P.O.:240]  Out: -           04/10/23  0539 04/11/23  0600 04/12/23  0700   Weight: 99 kg (218 lb 3.2 oz) 100 kg (220 lb 9.6 oz) 100 kg (220 lb 14.4 oz)         Objective:  General Appearance:  Comfortable and in no acute distress.    Vital signs: (most recent): Blood pressure 133/90, pulse 66, temperature 97.8 °F (36.6 °C), temperature source Oral, resp. rate 18, height 177.8 cm (70\"), weight 100 kg (220 lb 14.4 oz), SpO2 93 %.  Vital signs are normal.  No fever.    Output: No stool output.    Lungs:  Normal effort and normal respiratory rate.  There are decreased breath sounds.    Heart: Normal rate.  Regular rhythm.    Abdomen: Abdomen is soft.  Bowel sounds are normal.     Extremities: There is no dependent edema.    Pulses: Distal pulses are intact.    Neurological: Patient is alert and oriented to person, place and time.    Skin:  Warm and dry.          Results Review:        WBC WBC   Date Value Ref Range Status   04/12/2023 9.66 3.40 - 10.80 10*3/mm3 Final   04/11/2023 15.62 (H) 3.40 - 10.80 10*3/mm3 Final   04/09/2023 " 11.26 (H) 3.40 - 10.80 10*3/mm3 Final      HGB Hemoglobin   Date Value Ref Range Status   04/12/2023 11.0 (L) 13.0 - 17.7 g/dL Final   04/11/2023 10.1 (L) 13.0 - 17.7 g/dL Final   04/09/2023 10.7 (L) 13.0 - 17.7 g/dL Final      HCT Hematocrit   Date Value Ref Range Status   04/12/2023 32.2 (L) 37.5 - 51.0 % Final   04/11/2023 30.3 (L) 37.5 - 51.0 % Final   04/09/2023 33.3 (L) 37.5 - 51.0 % Final      Platelets Platelets   Date Value Ref Range Status   04/12/2023 232 140 - 450 10*3/mm3 Final   04/11/2023 202 140 - 450 10*3/mm3 Final   04/09/2023 187 140 - 450 10*3/mm3 Final        PT/INR:    No results found for: PROTIME/  No results found for: INR    Sodium Sodium   Date Value Ref Range Status   04/12/2023 138 136 - 145 mmol/L Final   04/11/2023 133 (L) 136 - 145 mmol/L Final   04/09/2023 133 (L) 136 - 145 mmol/L Final      Potassium Potassium   Date Value Ref Range Status   04/12/2023 4.3 3.5 - 5.2 mmol/L Final   04/11/2023 4.1 3.5 - 5.2 mmol/L Final     Comment:     Slight hemolysis detected by analyzer. Results may be affected.   04/09/2023 4.2 3.5 - 5.2 mmol/L Final      Chloride Chloride   Date Value Ref Range Status   04/12/2023 106 98 - 107 mmol/L Final   04/11/2023 98 98 - 107 mmol/L Final   04/09/2023 100 98 - 107 mmol/L Final      Bicarbonate CO2   Date Value Ref Range Status   04/12/2023 21.0 (L) 22.0 - 29.0 mmol/L Final   04/11/2023 19.8 (L) 22.0 - 29.0 mmol/L Final   04/09/2023 20.7 (L) 22.0 - 29.0 mmol/L Final      BUN BUN   Date Value Ref Range Status   04/12/2023 31 (H) 8 - 23 mg/dL Final   04/11/2023 51 (H) 8 - 23 mg/dL Final   04/09/2023 27 (H) 8 - 23 mg/dL Final      Creatinine Creatinine   Date Value Ref Range Status   04/12/2023 3.53 (H) 0.76 - 1.27 mg/dL Final   04/11/2023 5.92 (H) 0.76 - 1.27 mg/dL Final   04/09/2023 4.31 (H) 0.76 - 1.27 mg/dL Final      Calcium Calcium   Date Value Ref Range Status   04/12/2023 8.2 (L) 8.6 - 10.5 mg/dL Final   04/11/2023 8.7 8.6 - 10.5 mg/dL Final    04/09/2023 9.2 8.6 - 10.5 mg/dL Final      Magnesium Magnesium   Date Value Ref Range Status   04/12/2023 2.1 1.6 - 2.4 mg/dL Final          aspirin, 81 mg, Oral, Daily  bisacodyl, 10 mg, Rectal, Once  [START ON 4/13/2023] ceFAZolin, 2 g, Intravenous, Once  chlorhexidine, 15 mL, Mouth/Throat, Q12H  Chlorhexidine Gluconate Cloth, 1 application, Topical, Q12H  docusate sodium, 100 mg, Oral, BID  DULoxetine, 30 mg, Oral, Nightly  fluticasone, 2 spray, Each Nare, Daily  guaiFENesin, 600 mg, Oral, Q12H  hydrALAZINE, 100 mg, Oral, Daily With Breakfast  hydrALAZINE, 50 mg, Oral, Nightly  insulin lispro, 0-9 Units, Subcutaneous, TID With Meals  metoprolol succinate XL, 100 mg, Oral, Nightly  [START ON 4/13/2023] metoprolol tartrate, 12.5 mg, Oral, On Call to OR  mupirocin, 1 application, Each Nare, Q12H  polyethylene glycol, 17 g, Oral, Daily  rosuvastatin, 20 mg, Oral, Nightly  sodium bicarbonate, 650 mg, Oral, TID  traZODone, 50 mg, Oral, Nightly      heparin, 10 Units/kg/hr, Last Rate: 12 Units/kg/hr (04/11/23 2034)            Patient Active Problem List   Diagnosis Code   • 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.22   • Essential hypertension I10   • Hyperlipidemia E78.5   • Primary insomnia F51.01   • CKD (chronic kidney disease) stage 4, GFR 15-29 ml/min ([facility]) N18.4   • Vitamin D deficiency E55.9   • Diverticulitis of large intestine with perforation and abscess without bleeding K57.20   • Obesity (BMI 30-39.9) E66.9   • Impaired mobility and ADLs Z74.09, Z78.9   • History of colon resection Z90.49   • Colon polyps K63.5   • Diverticulosis K57.90   • CKD (chronic kidney disease) stage 5, GFR less than 15 ml/min N18.5   • Dyspnea on exertion R06.09   • NSTEMI (non-ST elevated myocardial infarction) I21.4   • Rhinovirus B34.8   • Abnormal findings on diagnostic imaging of heart and coronary circulation R93.1       Assessment & Plan   - NSTEMI/multi-vessel CAD  - HFrEF--30-35% per echo 4/8  -  rhinovirus infection  - ESRD on HD  - hypertension  - hyperlipidemia  - DM II- A1c 5.0    CV stable. Patient denies chest pain overnight  Plans for OR tomorrow  Increase bowel regimen   Continue to optimize medically.   Dialysis yesterday  Discussed with the patient and family      DUSTY Spring  04/12/23  09:53 EDT

## 2023-04-12 NOTE — CASE MANAGEMENT/SOCIAL WORK
Continued Stay Note  [facility]     Patient Name: Lefty Cai  MRN: [id number]  Today's Date: 4/12/2023    Admit Date: 4/8/2023    Plan: Home w/ HH; F/U after surgery.   Discharge Plan     Row Name 04/12/23 0969       Plan    Plan Home w/ HH; F/U after surgery.    Plan Comments CABG scheduled for Thursday, 4/13.  Pt is up ad cherrie in room today.  Pt is current with outpatient HD at [facility].  CCP will f/u on Pt d/c plan s/p OHS.......Irene SUH/PRATIK CM               Discharge Codes    No documentation.               Expected Discharge Date and Time     Expected Discharge Date Expected Discharge Time    Apr 19, 2023             Irene Lawrence RN

## 2023-04-12 NOTE — PROGRESS NOTES
[facility] Progress Note      Patient Name: Lefty Cai  : 1950  MRN: [id number]  Primary Care Physician:  Daksha Ng APRN  Date of admission: 2023    Subjective     Interval History:   Follow-up end-stage renal disease    He is feeling the same, he is a bit anxious about his upcoming heart surgery.  He denies any chest pain or shortness of air, no orthopnea or PND, no nausea or vomiting, no dysuria or gross hematuria.  He had dialysis yesterday without any difficulty    Review of Systems:   As noted above    Objective     Vitals:   Temp:  [97.6 °F (36.4 °C)-97.9 °F (36.6 °C)] 97.9 °F (36.6 °C)  Heart Rate:  [62-70] 70  Resp:  [16] 16  BP: (113-126)/(68-73) 125/72    Intake/Output Summary (Last 24 hours) at 2023 0748  Last data filed at 2023 0230  Gross per 24 hour   Intake 240 ml   Output 1225 ml   Net -985 ml       Physical Exam:    General Appearance: Awake, alert, no acute distress, chronically ill  Skin: warm and dry  HEENT: oral mucosa normal, nonicteric sclera  Neck: supple, no JVD, tunneled dialysis catheter in the right IJ with exit site below the right clavicle  Lungs: CTA, breathing effort not labored  Heart: RRR, normal S1 and S2, no S3, no rub  Abdomen: soft, nontender, nondistended, normoactive bowel  : no palpable bladder  Extremities: no edema, cyanosis or clubbing, functional AV fistula in the left forearm with good thrill and bruit  Neuro: normal speech and mental status     Scheduled Meds:     aspirin, 81 mg, Oral, Daily  bisacodyl, 10 mg, Rectal, Once  docusate sodium, 100 mg, Oral, BID  DULoxetine, 30 mg, Oral, Nightly  fluticasone, 2 spray, Each Nare, Daily  guaiFENesin, 600 mg, Oral, Q12H  hydrALAZINE, 100 mg, Oral, Daily With Breakfast  hydrALAZINE, 50 mg, Oral, Nightly  insulin lispro, 0-9 Units, Subcutaneous, TID With Meals  metoprolol succinate XL, 100 mg, Oral, Nightly  polyethylene glycol, 17 g, Oral, Daily  rosuvastatin,  20 mg, Oral, Nightly  sodium bicarbonate, 650 mg, Oral, TID  traZODone, 50 mg, Oral, Nightly      IV Meds:   heparin, 10 Units/kg/hr, Last Rate: 12 Units/kg/hr (04/11/23 2034)        Results Reviewed:   I have personally reviewed the results from the time of this admission to 4/12/2023 07:48 EDT     Results from last 7 days   Lab Units 04/12/23 0129 04/11/23 0321 04/09/23 2312 04/09/23 0447 04/08/23  1142   SODIUM mmol/L 138 133* 133*   < > 139   POTASSIUM mmol/L 4.3 4.1 4.2   < > 3.9   CHLORIDE mmol/L 106 98 100   < > 104   CO2 mmol/L 21.0* 19.8* 20.7*   < > 20.1*   BUN mg/dL 31* 51* 27*   < > 27*   CREATININE mg/dL 3.53* 5.92* 4.31*   < > 4.67*   CALCIUM mg/dL 8.2* 8.7 9.2   < > 8.6   BILIRUBIN mg/dL  --   --  0.3  --  0.4   ALK PHOS U/L  --   --  71  --  68   ALT (SGPT) U/L  --   --  17  --  11   AST (SGOT) U/L  --   --  23  --  26   GLUCOSE mg/dL 95 107* 163*   < > 123*    < > = values in this interval not displayed.       Estimated Creatinine Clearance: 22.1 mL/min (A) (by C-G formula based on SCr of 3.53 mg/dL (H)).    Results from last 7 days   Lab Units 04/12/23 0129 04/11/23 0321 04/09/23 2312   MAGNESIUM mg/dL 2.1  --   --    PHOSPHORUS mg/dL 3.7 4.6* 3.3             Results from last 7 days   Lab Units 04/12/23 0129 04/11/23 0321 04/09/23 2312 04/09/23 0447 04/08/23  1142   WBC 10*3/mm3 9.66 15.62* 11.26* 8.58 14.47*   HEMOGLOBIN g/dL 11.0* 10.1* 10.7* 10.3* 12.0*   PLATELETS 10*3/mm3 232 202 187 161 195       Results from last 7 days   Lab Units 04/08/23  1925   INR  1.15*       Assessment / Plan     ASSESSMENT:  -End-stage renal disease and diabetic and hypertensive glomerulosclerosis on maintenance hemodialysis every Tuesday, Thursday and Saturday, his electrolytes and volume status within acceptable range, had dialysis yesterday without any difficult-Diabetes mellitus type 2 with renal complication  -Hypertension with chronic kidney disease, well controlled today  -Anemia of ESRD,  hemoglobin today is 11  -Non-ST elevation MI, heart cath today revealed multivessel disease will need CABG  -Secondary hyperparathyroidism of renal origin    PLAN:  -Continue the same treatment  -Surveillance labs  -He is cleared to have his CABG from the renal standpoint hopefully it will be done tomorrow  -We will consider dialysis after surgery if needed otherwise we can wait hopefully till Friday  I discussed the case with the patient and his wife at the bedside    Thank you for involving us in the care of Lefty DOLL Trell.  Please feel free to call with any questions.    Quang Reyes MD  04/12/23  07:48 EDT    [facility]  [phone]    Please note that portions of this note were completed with a voice recognition program.

## 2023-04-12 NOTE — PROGRESS NOTES
Name: Lefty Cai ADMIT: 2023   : 1950  PCP: Daksha Ng, DUSTY    MRN: [id number] LOS: 4 days   AGE/SEX: 72 y.o. male  ROOM:      Subjective   Subjective   Today patient appears comfortable & in no apparent distress.  Tolerating diet.  Wife & RN at bedside.    Review of Systems   Constitutional: Negative for chills and fever.   Respiratory: Negative for cough and shortness of breath.    Cardiovascular: Negative for chest pain and leg swelling.   Gastrointestinal: Negative for abdominal pain, nausea and vomiting.   Genitourinary: Negative for difficulty urinating and dysuria.   Musculoskeletal: Negative for gait problem and myalgias.        Objective   Objective   Vital Signs  Temp:  [97.6 °F (36.4 °C)-98.2 °F (36.8 °C)] 98.2 °F (36.8 °C)  Heart Rate:  [62-87] 87  Resp:  [16-18] 18  BP: (113-133)/(68-90) 114/75  SpO2:  [93 %-100 %] 93 %  on   ;   Device (Oxygen Therapy): room air  Body mass index is 31.7 kg/m².     Physical Exam  Constitutional:       General: He is not in acute distress.     Appearance: He is obese. He is not toxic-appearing.   Cardiovascular:      Rate and Rhythm: Normal rate.      Heart sounds: Normal heart sounds.   Pulmonary:      Effort: Pulmonary effort is normal.      Breath sounds: Normal breath sounds.   Abdominal:      General: Bowel sounds are normal.      Palpations: Abdomen is soft.   Musculoskeletal:      Right lower leg: No edema.      Left lower leg: No edema.   Skin:     General: Skin is warm and dry.   Neurological:      Mental Status: He is alert.     *Physical exam performed on 2023 as per above    Results Review     I reviewed the patient's new clinical results.  Results from last 7 days   Lab Units 23  0129 23  0321 23  2312 23  0447   WBC 10*3/mm3 9.66 15.62* 11.26* 8.58   HEMOGLOBIN g/dL 11.0* 10.1* 10.7* 10.3*   PLATELETS 10*3/mm3 232 202 187 161     Results from last 7 days   Lab Units 23  0129  04/11/23 0321 04/09/23 2312 04/09/23 0447   SODIUM mmol/L 138 133* 133* 140   POTASSIUM mmol/L 4.3 4.1 4.2 4.0   CHLORIDE mmol/L 106 98 100 103   CO2 mmol/L 21.0* 19.8* 20.7* 24.0   BUN mg/dL 31* 51* 27* 39*   CREATININE mg/dL 3.53* 5.92* 4.31* 6.31*   GLUCOSE mg/dL 95 107* 163* 122*   EGFR mL/min/1.73 17.5* 9.4* 13.8* 8.7*     Results from last 7 days   Lab Units 04/12/23  0129 04/11/23 0321 04/09/23 2312 04/09/23 0447 04/08/23  1142   ALBUMIN g/dL 3.3* 3.4* 3.8 3.3* 3.5   BILIRUBIN mg/dL  --   --  0.3  --  0.4   ALK PHOS U/L  --   --  71  --  68   AST (SGOT) U/L  --   --  23  --  26   ALT (SGPT) U/L  --   --  17  --  11     Results from last 7 days   Lab Units 04/12/23  0129 04/11/23 0321 04/09/23 2312 04/09/23 0447   CALCIUM mg/dL 8.2* 8.7 9.2 8.7   ALBUMIN g/dL 3.3* 3.4* 3.8 3.3*   MAGNESIUM mg/dL 2.1  --   --   --    PHOSPHORUS mg/dL 3.7 4.6* 3.3 4.9*     Results from last 7 days   Lab Units 04/09/23 0447 04/08/23  1142   PROCALCITONIN ng/mL  --  0.19   LACTATE mmol/L 1.0 2.1*     Glucose   Date/Time Value Ref Range Status   04/12/2023 1050 108 70 - 130 mg/dL Final     Comment:     Meter: [id number] : [id number] Kareem Hatfield NA   04/11/2023 1604 115 70 - 130 mg/dL Final     Comment:     Meter: [id number] : [id number] Jeniffer Willis NA   04/11/2023 1112 111 70 - 130 mg/dL Final     Comment:     Meter: [id number] : [id number] Jeniffer Willis NA   04/11/2023 0609 106 70 - 130 mg/dL Final     Comment:     Meter: [id number] : [id number] Edd Isabel NA   04/10/2023 2029 187 (H) 70 - 130 mg/dL Final     Comment:     Meter: [id number] : [id number] Edd ROSALES   04/10/2023 1621 131 (H) 70 - 130 mg/dL Final     Comment:     Meter: [id number] : [id number] Milind ROSALES   04/10/2023 1046 177 (H) 70 - 130 mg/dL Final     Comment:     Meter: [id number] : [id number] Milind ROSALES       CT Chest Without Contrast Diagnostic    Result Date: 4/11/2023  Impression:   1.  Multifocal groundglass opacities suggesting atypical pneumonitis with small bilateral pleural effusions and bibasilar atelectasis. Recommend attention on short-term follow-up in 2-3 months. 2.  Mild cardiomegaly with atherosclerosis including extensive coronary artery calcifications and enlargement of the main pulmonary artery. 3.  Please see above for additional findings.  This report was finalized on 4/11/2023 4:10 PM by Dr. Debora Shannon M.D.      XR Chest PA & Lateral    Result Date: 4/12/2023  Small residual pleural effusions and mild residual pulmonary edema. Pulmonary vascular volume appears normal today, however.  This report was finalized on 4/12/2023 9:31 AM by Dr. Matti Campa M.D.      I have personally reviewed all medications:  Scheduled Medications  aspirin, 81 mg, Oral, Daily  bisacodyl, 10 mg, Rectal, Once  [START ON 4/13/2023] ceFAZolin, 2 g, Intravenous, Once  chlorhexidine, 15 mL, Mouth/Throat, Q12H  Chlorhexidine Gluconate Cloth, 1 application, Topical, Q12H  docusate sodium, 100 mg, Oral, BID  DULoxetine, 30 mg, Oral, Nightly  fluticasone, 2 spray, Each Nare, Daily  guaiFENesin, 600 mg, Oral, Q12H  hydrALAZINE, 100 mg, Oral, Daily With Breakfast  hydrALAZINE, 50 mg, Oral, Nightly  insulin lispro, 0-9 Units, Subcutaneous, TID With Meals  metoprolol succinate XL, 100 mg, Oral, Nightly  [START ON 4/13/2023] metoprolol tartrate, 12.5 mg, Oral, On Call to OR  mupirocin, 1 application, Each Nare, Q12H  polyethylene glycol, 17 g, Oral, Daily  rosuvastatin, 20 mg, Oral, Nightly  sodium bicarbonate, 650 mg, Oral, TID  traZODone, 50 mg, Oral, Nightly    Infusions  heparin, 10 Units/kg/hr, Last Rate: 12 Units/kg/hr (04/11/23 2034)    Diet  Diet: Cardiac Diets, Diabetic Diets; Healthy Heart (2-3 Na+); Consistent Carbohydrate; Texture: Regular Texture (IDDSI 7); Fluid Consistency: Thin (IDDSI 0)  NPO Diet NPO Type: Sips with Meds    I have personally reviewed:  [x]  Laboratory   []  Microbiology   [x]   Radiology   [x]  EKG/Telemetry  []  Cardiology/Vascular   []  Pathology    []  Records       Assessment/Plan     Active Hospital Problems    Diagnosis  POA   • **NSTEMI (non-ST elevated myocardial infarction) [I21.4]  Unknown   • Rhinovirus [B34.8]  Yes   • Dyspnea on exertion [R06.09]  Yes   • Abnormal findings on diagnostic imaging of heart and coronary circulation [R93.1]  Unknown   • Type 2 diabetes mellitus with diabetic chronic kidney disease [E11.22]  Yes   • Essential hypertension [I10]  Yes   • Hyperlipidemia [E78.5]  Yes   • CKD (chronic kidney disease) stage 4, GFR 15-29 ml/min ([facility]) [N18.4]  Yes      Resolved Hospital Problems   No resolved problems to display.       72 y.o. male admitted with NSTEMI (non-ST elevated myocardial infarction).    Cardiothoracic surgery following eft heart catheterization shows severe three-vessel coronary disease with an occluded right coronary system with tentative surgical revascularization hopeful on 4/13/2023--stable appearance given O2 saturation 93% on room air (nebulizers available as needed, Flonase, incentive spirometry encouraged, and mucolytic's ordered twice daily).  ID does not recommend empiric antibiotic therapy given no evidence for secondary bacterial infection.  Heparin drip infusing for above.  CT chest findings suggest atypical pneumonitis (symptom management--continue to monitor off antibiotic).  Unremarkable procalcitonin.  Repeat CXR small bilateral pleural effusions improved from prior following HD.  Continue plan outlined above.    Nephrology managing ESRD current HD on 4/11/2023 in progress.  Nephrology cleared patient from renal standpoint for CABG.    HTN: BP acceptable acutely.  Continue hydralazine, metoprolol same.    Glucose trends acceptable.  Patient tolerating diet.  Moderate dose correctional sliding scale continued for now.    · Heparin gtt infusing adequate for DVT prophylaxis.  · CPR  · Discussed with patient & RN.  · Anticipate  discharge pending clinical course & tentative cardiovascular surgery in the setting of active rhinovirus infection whereby ID states no evidence of secondary bacterial infection at this time & no indication for antibiotics--ultimately decision for timing of surgery based on surgeon & anesthesiologist tentative plan tomorrow on 4/13/2023    DUSTY Ford  [facility]  04/12/23  12:21 EDT

## 2023-04-12 NOTE — SIGNIFICANT NOTE
04/12/23 1146   OTHER   Discipline physical therapist   Rehab Time/Intention   Session Not Performed other (see comments)  (pt up ad cherrie, no mobility concerns. Plan is for CABG tomorrow, will f/u as ordered post op. discussed with PRATIK Salinas)

## 2023-04-13 ENCOUNTER — APPOINTMENT (OUTPATIENT)
Dept: GENERAL RADIOLOGY | Facility: HOSPITAL | Age: 73
DRG: 216 | End: 2023-04-13
Payer: MEDICARE

## 2023-04-13 ENCOUNTER — ANESTHESIA EVENT (OUTPATIENT)
Dept: PERIOP | Facility: HOSPITAL | Age: 73
DRG: 216 | End: 2023-04-13
Payer: MEDICARE

## 2023-04-13 ENCOUNTER — ANCILLARY PROCEDURE (OUTPATIENT)
Dept: PERIOP | Facility: HOSPITAL | Age: 73
DRG: 216 | End: 2023-04-13
Payer: MEDICARE

## 2023-04-13 ENCOUNTER — ANESTHESIA (OUTPATIENT)
Dept: PERIOP | Facility: HOSPITAL | Age: 73
DRG: 216 | End: 2023-04-13
Payer: MEDICARE

## 2023-04-13 LAB
ACT BLD: 125 SECONDS (ref 82–152)
ACT BLD: 197 SECONDS (ref 82–152)
ACT BLD: 203 SECONDS (ref 82–152)
ACT BLD: 209 SECONDS (ref 82–152)
ACT BLD: 390 SECONDS (ref 82–152)
ACT BLD: 432 SECONDS (ref 82–152)
ACT BLD: 456 SECONDS (ref 82–152)
ACT BLD: 468 SECONDS (ref 82–152)
ACT BLD: 510 SECONDS (ref 82–152)
ACT BLD: 516 SECONDS (ref 82–152)
ACT BLD: 522 SECONDS (ref 82–152)
ALBUMIN SERPL-MCNC: 3 G/DL (ref 3.5–5.2)
ALBUMIN SERPL-MCNC: 3.5 G/DL (ref 3.5–5.2)
ANION GAP SERPL CALCULATED.3IONS-SCNC: 11 MMOL/L (ref 5–15)
ANION GAP SERPL CALCULATED.3IONS-SCNC: 19 MMOL/L (ref 5–15)
APTT PPP: 81.4 SECONDS (ref 22.7–35.4)
APTT PPP: >200 SECONDS (ref 22.7–35.4)
ARTERIAL PATENCY WRIST A: ABNORMAL
ARTERIAL PATENCY WRIST A: ABNORMAL
ATMOSPHERIC PRESS: 741.9 MMHG
ATMOSPHERIC PRESS: 743.3 MMHG
BASE EXCESS BLDA CALC-SCNC: -2 MMOL/L (ref -5–5)
BASE EXCESS BLDA CALC-SCNC: -2 MMOL/L (ref -5–5)
BASE EXCESS BLDA CALC-SCNC: -3 MMOL/L (ref -5–5)
BASE EXCESS BLDA CALC-SCNC: -3 MMOL/L (ref -5–5)
BASE EXCESS BLDA CALC-SCNC: -4 MMOL/L (ref -5–5)
BASE EXCESS BLDA CALC-SCNC: -4 MMOL/L (ref -5–5)
BASE EXCESS BLDA CALC-SCNC: -6 MMOL/L (ref -5–5)
BASE EXCESS BLDA CALC-SCNC: -6 MMOL/L (ref -5–5)
BASE EXCESS BLDA CALC-SCNC: -6.3 MMOL/L (ref 0–2)
BASE EXCESS BLDA CALC-SCNC: -8 MMOL/L (ref -5–5)
BASE EXCESS BLDA CALC-SCNC: -9.3 MMOL/L (ref 0–2)
BASE EXCESS BLDA CALC-SCNC: 0 MMOL/L (ref -5–5)
BASOPHILS # BLD AUTO: 0.05 10*3/MM3 (ref 0–0.2)
BASOPHILS # BLD AUTO: 0.07 10*3/MM3 (ref 0–0.2)
BASOPHILS NFR BLD AUTO: 0.2 % (ref 0–1.5)
BASOPHILS NFR BLD AUTO: 0.6 % (ref 0–1.5)
BDY SITE: ABNORMAL
BDY SITE: ABNORMAL
BUN SERPL-MCNC: 42 MG/DL (ref 8–23)
BUN SERPL-MCNC: 44 MG/DL (ref 8–23)
BUN/CREAT SERPL: 8.8 (ref 7–25)
BUN/CREAT SERPL: 9.1 (ref 7–25)
CA-I BLD-MCNC: 4.6 MG/DL (ref 4.6–5.4)
CA-I BLDA-SCNC: ABNORMAL MMOL/L
CA-I SERPL ISE-MCNC: 1.16 MMOL/L (ref 1.15–1.35)
CALCIUM SPEC-SCNC: 8.3 MG/DL (ref 8.6–10.5)
CALCIUM SPEC-SCNC: 8.6 MG/DL (ref 8.6–10.5)
CHLORIDE SERPL-SCNC: 104 MMOL/L (ref 98–107)
CHLORIDE SERPL-SCNC: 108 MMOL/L (ref 98–107)
CO2 BLDA-SCNC: 19 MMOL/L (ref 24–29)
CO2 BLDA-SCNC: 21 MMOL/L (ref 24–29)
CO2 BLDA-SCNC: 22 MMOL/L (ref 24–29)
CO2 BLDA-SCNC: 23 MMOL/L (ref 24–29)
CO2 BLDA-SCNC: 23 MMOL/L (ref 24–29)
CO2 BLDA-SCNC: 24 MMOL/L (ref 24–29)
CO2 BLDA-SCNC: 24 MMOL/L (ref 24–29)
CO2 BLDA-SCNC: 25 MMOL/L (ref 24–29)
CO2 SERPL-SCNC: 17 MMOL/L (ref 22–29)
CO2 SERPL-SCNC: 21 MMOL/L (ref 22–29)
CREAT SERPL-MCNC: 4.61 MG/DL (ref 0.76–1.27)
CREAT SERPL-MCNC: 5.01 MG/DL (ref 0.76–1.27)
DEPRECATED RDW RBC AUTO: 39.4 FL (ref 37–54)
DEPRECATED RDW RBC AUTO: 45.7 FL (ref 37–54)
EGFRCR SERPLBLD CKD-EPI 2021: 11.5 ML/MIN/1.73
EGFRCR SERPLBLD CKD-EPI 2021: 12.7 ML/MIN/1.73
EOSINOPHIL # BLD AUTO: 0.06 10*3/MM3 (ref 0–0.4)
EOSINOPHIL # BLD AUTO: 0.36 10*3/MM3 (ref 0–0.4)
EOSINOPHIL NFR BLD AUTO: 0.3 % (ref 0.3–6.2)
EOSINOPHIL NFR BLD AUTO: 3.1 % (ref 0.3–6.2)
ERYTHROCYTE [DISTWIDTH] IN BLOOD BY AUTOMATED COUNT: 12.1 % (ref 12.3–15.4)
ERYTHROCYTE [DISTWIDTH] IN BLOOD BY AUTOMATED COUNT: 13.7 % (ref 12.3–15.4)
FIBRINOGEN PPP-MCNC: 250 MG/DL (ref 219–464)
GLUCOSE BLDC GLUCOMTR-MCNC: 101 MG/DL (ref 70–130)
GLUCOSE BLDC GLUCOMTR-MCNC: 104 MG/DL (ref 70–130)
GLUCOSE BLDC GLUCOMTR-MCNC: 106 MG/DL (ref 70–130)
GLUCOSE BLDC GLUCOMTR-MCNC: 108 MG/DL (ref 70–130)
GLUCOSE BLDC GLUCOMTR-MCNC: 109 MG/DL (ref 70–130)
GLUCOSE BLDC GLUCOMTR-MCNC: 116 MG/DL (ref 70–130)
GLUCOSE BLDC GLUCOMTR-MCNC: 117 MG/DL (ref 70–130)
GLUCOSE BLDC GLUCOMTR-MCNC: 121 MG/DL (ref 70–130)
GLUCOSE BLDC GLUCOMTR-MCNC: 122 MG/DL (ref 70–130)
GLUCOSE BLDC GLUCOMTR-MCNC: 135 MG/DL (ref 70–130)
GLUCOSE BLDC GLUCOMTR-MCNC: 163 MG/DL (ref 70–130)
GLUCOSE BLDC GLUCOMTR-MCNC: 163 MG/DL (ref 70–130)
GLUCOSE BLDC GLUCOMTR-MCNC: 166 MG/DL (ref 70–130)
GLUCOSE BLDC GLUCOMTR-MCNC: 169 MG/DL (ref 70–130)
GLUCOSE BLDC GLUCOMTR-MCNC: 177 MG/DL (ref 70–130)
GLUCOSE BLDC GLUCOMTR-MCNC: 177 MG/DL (ref 70–130)
GLUCOSE BLDC GLUCOMTR-MCNC: 99 MG/DL (ref 70–130)
GLUCOSE SERPL-MCNC: 165 MG/DL (ref 65–99)
GLUCOSE SERPL-MCNC: 98 MG/DL (ref 65–99)
HCO3 BLDA-SCNC: 17.2 MMOL/L (ref 22–28)
HCO3 BLDA-SCNC: 17.9 MMOL/L (ref 22–26)
HCO3 BLDA-SCNC: 19.7 MMOL/L (ref 22–26)
HCO3 BLDA-SCNC: 19.8 MMOL/L (ref 22–28)
HCO3 BLDA-SCNC: 20.7 MMOL/L (ref 22–26)
HCO3 BLDA-SCNC: 22.1 MMOL/L (ref 22–26)
HCO3 BLDA-SCNC: 22.1 MMOL/L (ref 22–26)
HCO3 BLDA-SCNC: 22.6 MMOL/L (ref 22–26)
HCO3 BLDA-SCNC: 23 MMOL/L (ref 22–26)
HCO3 BLDA-SCNC: 23.5 MMOL/L (ref 22–26)
HCO3 BLDA-SCNC: 23.5 MMOL/L (ref 22–26)
HCO3 BLDA-SCNC: 24.3 MMOL/L (ref 22–26)
HCT VFR BLD AUTO: 29.2 % (ref 37.5–51)
HCT VFR BLD AUTO: 31.9 % (ref 37.5–51)
HCT VFR BLDA CALC: 16 % (ref 38–51)
HCT VFR BLDA CALC: 16 % (ref 38–51)
HCT VFR BLDA CALC: 20 % (ref 38–51)
HCT VFR BLDA CALC: 21 % (ref 38–51)
HCT VFR BLDA CALC: 22 % (ref 38–51)
HCT VFR BLDA CALC: 23 % (ref 38–51)
HCT VFR BLDA CALC: 23 % (ref 38–51)
HCT VFR BLDA CALC: 34 % (ref 38–51)
HGB BLD-MCNC: 10.8 G/DL (ref 13–17.7)
HGB BLD-MCNC: 9.6 G/DL (ref 13–17.7)
HGB BLDA-MCNC: 11.6 G/DL (ref 12–17)
HGB BLDA-MCNC: 5.4 G/DL (ref 12–17)
HGB BLDA-MCNC: 5.4 G/DL (ref 12–17)
HGB BLDA-MCNC: 6.8 G/DL (ref 12–17)
HGB BLDA-MCNC: 7.1 G/DL (ref 12–17)
HGB BLDA-MCNC: 7.5 G/DL (ref 12–17)
HGB BLDA-MCNC: 7.8 G/DL (ref 12–17)
HGB BLDA-MCNC: 7.8 G/DL (ref 12–17)
IMM GRANULOCYTES # BLD AUTO: 0.09 10*3/MM3 (ref 0–0.05)
IMM GRANULOCYTES # BLD AUTO: 0.25 10*3/MM3 (ref 0–0.05)
IMM GRANULOCYTES NFR BLD AUTO: 0.8 % (ref 0–0.5)
IMM GRANULOCYTES NFR BLD AUTO: 1.1 % (ref 0–0.5)
INHALED O2 CONCENTRATION: 100 %
INHALED O2 CONCENTRATION: 90 %
INR PPP: 1.58 (ref 0.9–1.1)
LYMPHOCYTES # BLD AUTO: 1.69 10*3/MM3 (ref 0.7–3.1)
LYMPHOCYTES # BLD AUTO: 2.76 10*3/MM3 (ref 0.7–3.1)
LYMPHOCYTES NFR BLD AUTO: 23.9 % (ref 19.6–45.3)
LYMPHOCYTES NFR BLD AUTO: 7.5 % (ref 19.6–45.3)
MAGNESIUM SERPL-MCNC: 2.3 MG/DL (ref 1.6–2.4)
MAGNESIUM SERPL-MCNC: 2.8 MG/DL (ref 1.6–2.4)
MCH RBC QN AUTO: 30.3 PG (ref 26.6–33)
MCH RBC QN AUTO: 30.5 PG (ref 26.6–33)
MCHC RBC AUTO-ENTMCNC: 32.9 G/DL (ref 31.5–35.7)
MCHC RBC AUTO-ENTMCNC: 33.9 G/DL (ref 31.5–35.7)
MCV RBC AUTO: 89.6 FL (ref 79–97)
MCV RBC AUTO: 92.7 FL (ref 79–97)
MODALITY: ABNORMAL
MODALITY: ABNORMAL
MONOCYTES # BLD AUTO: 0.98 10*3/MM3 (ref 0.1–0.9)
MONOCYTES # BLD AUTO: 1.57 10*3/MM3 (ref 0.1–0.9)
MONOCYTES NFR BLD AUTO: 7 % (ref 5–12)
MONOCYTES NFR BLD AUTO: 8.5 % (ref 5–12)
NEUTROPHILS NFR BLD AUTO: 18.77 10*3/MM3 (ref 1.7–7)
NEUTROPHILS NFR BLD AUTO: 63.1 % (ref 42.7–76)
NEUTROPHILS NFR BLD AUTO: 7.28 10*3/MM3 (ref 1.7–7)
NEUTROPHILS NFR BLD AUTO: 83.9 % (ref 42.7–76)
NRBC BLD AUTO-RTO: 0.1 /100 WBC (ref 0–0.2)
NRBC BLD AUTO-RTO: 0.1 /100 WBC (ref 0–0.2)
O2 A-A PPRESDIFF RESPIRATORY: 0.2 MMHG
O2 A-A PPRESDIFF RESPIRATORY: 0.2 MMHG
PCO2 BLDA: 35 MM HG (ref 35–45)
PCO2 BLDA: 36.1 MM HG (ref 35–45)
PCO2 BLDA: 36.4 MM HG (ref 35–45)
PCO2 BLDA: 36.8 MM HG (ref 35–45)
PCO2 BLDA: 37.5 MM HG (ref 35–45)
PCO2 BLDA: 39.1 MM HG (ref 35–45)
PCO2 BLDA: 41.4 MM HG (ref 35–45)
PCO2 BLDA: 41.5 MM HG (ref 35–45)
PCO2 BLDA: 42.3 MM HG (ref 35–45)
PCO2 BLDA: 44 MM HG (ref 35–45)
PCO2 BLDA: 44.8 MM HG (ref 35–45)
PCO2 BLDA: 45.2 MM HG (ref 35–45)
PEEP RESPIRATORY: 8 CM[H2O]
PEEP RESPIRATORY: 8 CM[H2O]
PH BLDA: 7.25 PH UNITS (ref 7.35–7.45)
PH BLDA: 7.29 PH UNITS (ref 7.35–7.45)
PH BLDA: 7.3 PH UNITS (ref 7.35–7.6)
PH BLDA: 7.31 PH UNITS (ref 7.35–7.6)
PH BLDA: 7.32 PH UNITS (ref 7.35–7.6)
PH BLDA: 7.33 PH UNITS (ref 7.35–7.6)
PH BLDA: 7.33 PH UNITS (ref 7.35–7.6)
PH BLDA: 7.34 PH UNITS (ref 7.35–7.6)
PH BLDA: 7.36 PH UNITS (ref 7.35–7.6)
PH BLDA: 7.38 PH UNITS (ref 7.35–7.6)
PH BLDA: 7.43 PH UNITS (ref 7.35–7.6)
PH BLDA: 7.43 PH UNITS (ref 7.35–7.6)
PHOSPHATE SERPL-MCNC: 5 MG/DL (ref 2.5–4.5)
PHOSPHATE SERPL-MCNC: 6 MG/DL (ref 2.5–4.5)
PLATELET # BLD AUTO: 145 10*3/MM3 (ref 140–450)
PLATELET # BLD AUTO: 194 10*3/MM3 (ref 140–450)
PMV BLD AUTO: 10.1 FL (ref 6–12)
PMV BLD AUTO: 10.1 FL (ref 6–12)
PO2 BLDA: 115 MMHG (ref 80–105)
PO2 BLDA: 128.5 MM HG (ref 80–100)
PO2 BLDA: 144.2 MM HG (ref 80–100)
PO2 BLDA: 200 MMHG (ref 80–105)
PO2 BLDA: 371 MMHG (ref 80–105)
PO2 BLDA: 40 MMHG (ref 80–105)
PO2 BLDA: 402 MMHG (ref 80–105)
PO2 BLDA: 409 MMHG (ref 80–105)
PO2 BLDA: 412 MMHG (ref 80–105)
PO2 BLDA: 419 MMHG (ref 80–105)
PO2 BLDA: 421 MMHG (ref 80–105)
PO2 BLDA: 94 MMHG (ref 80–105)
POTASSIUM BLDA-SCNC: 4.3 MMOL/L (ref 3.5–4.9)
POTASSIUM BLDA-SCNC: 4.6 MMOL/L (ref 3.5–4.9)
POTASSIUM BLDA-SCNC: 5 MMOL/L (ref 3.5–4.9)
POTASSIUM BLDA-SCNC: 5.1 MMOL/L (ref 3.5–4.9)
POTASSIUM BLDA-SCNC: 5.2 MMOL/L (ref 3.5–4.9)
POTASSIUM BLDA-SCNC: 5.3 MMOL/L (ref 3.5–4.9)
POTASSIUM BLDA-SCNC: 5.3 MMOL/L (ref 3.5–4.9)
POTASSIUM BLDA-SCNC: 5.5 MMOL/L (ref 3.5–4.9)
POTASSIUM BLDA-SCNC: 5.6 MMOL/L (ref 3.5–4.9)
POTASSIUM BLDA-SCNC: 6.3 MMOL/L (ref 3.5–4.9)
POTASSIUM SERPL-SCNC: 4.7 MMOL/L (ref 3.5–5.2)
POTASSIUM SERPL-SCNC: 4.8 MMOL/L (ref 3.5–5.2)
PROTHROMBIN TIME: 19.1 SECONDS (ref 11.7–14.2)
RBC # BLD AUTO: 3.15 10*6/MM3 (ref 4.14–5.8)
RBC # BLD AUTO: 3.56 10*6/MM3 (ref 4.14–5.8)
SAO2 % BLDA: 100 % (ref 95–98)
SAO2 % BLDA: 70 % (ref 95–98)
SAO2 % BLDA: 97 % (ref 95–98)
SAO2 % BLDA: 98 % (ref 95–98)
SAO2 % BLDCOA: 98.4 % (ref 92–99)
SAO2 % BLDCOA: 98.9 % (ref 92–99)
SET MECH RESP RATE: 16
SET MECH RESP RATE: 18
SODIUM SERPL-SCNC: 136 MMOL/L (ref 136–145)
SODIUM SERPL-SCNC: 144 MMOL/L (ref 136–145)
TOTAL RATE: 16 BREATHS/MINUTE
TOTAL RATE: 18 BREATHS/MINUTE
VENTILATOR MODE: AC
VENTILATOR MODE: AC
VT ON VENT VENT: 550 ML
VT ON VENT VENT: 550 ML
WBC NRBC COR # BLD: 11.54 10*3/MM3 (ref 3.4–10.8)
WBC NRBC COR # BLD: 22.39 10*3/MM3 (ref 3.4–10.8)

## 2023-04-13 PROCEDURE — 02100Z9 BYPASS CORONARY ARTERY, ONE ARTERY FROM LEFT INTERNAL MAMMARY, OPEN APPROACH: ICD-10-PCS | Performed by: THORACIC SURGERY (CARDIOTHORACIC VASCULAR SURGERY)

## 2023-04-13 PROCEDURE — 93005 ELECTROCARDIOGRAM TRACING: CPT | Performed by: NURSE PRACTITIONER

## 2023-04-13 PROCEDURE — 93318 ECHO TRANSESOPHAGEAL INTRAOP: CPT

## 2023-04-13 PROCEDURE — P9016 RBC LEUKOCYTES REDUCED: HCPCS

## 2023-04-13 PROCEDURE — 33508 ENDOSCOPIC VEIN HARVEST: CPT | Performed by: PHYSICIAN ASSISTANT

## 2023-04-13 PROCEDURE — 25010000002 CEFAZOLIN IN DEXTROSE 2-4 GM/100ML-% SOLUTION: Performed by: ANESTHESIOLOGY

## 2023-04-13 PROCEDURE — 85018 HEMOGLOBIN: CPT

## 2023-04-13 PROCEDURE — 25010000002 EPINEPHRINE 1 MG/ML SOLUTION: Performed by: STUDENT IN AN ORGANIZED HEALTH CARE EDUCATION/TRAINING PROGRAM

## 2023-04-13 PROCEDURE — 25010000002 ALBUMIN HUMAN 5% PER 50 ML: Performed by: NURSE PRACTITIONER

## 2023-04-13 PROCEDURE — 33426 REPAIR OF MITRAL VALVE: CPT | Performed by: PHYSICIAN ASSISTANT

## 2023-04-13 PROCEDURE — 5A1221Z PERFORMANCE OF CARDIAC OUTPUT, CONTINUOUS: ICD-10-PCS | Performed by: THORACIC SURGERY (CARDIOTHORACIC VASCULAR SURGERY)

## 2023-04-13 PROCEDURE — 82962 GLUCOSE BLOOD TEST: CPT

## 2023-04-13 PROCEDURE — 25010000002 PAPAVERINE PER 60 MG: Performed by: THORACIC SURGERY (CARDIOTHORACIC VASCULAR SURGERY)

## 2023-04-13 PROCEDURE — 82330 ASSAY OF CALCIUM: CPT | Performed by: NURSE PRACTITIONER

## 2023-04-13 PROCEDURE — 33533 CABG ARTERIAL SINGLE: CPT | Performed by: THORACIC SURGERY (CARDIOTHORACIC VASCULAR SURGERY)

## 2023-04-13 PROCEDURE — C1713 ANCHOR/SCREW BN/BN,TIS/BN: HCPCS | Performed by: THORACIC SURGERY (CARDIOTHORACIC VASCULAR SURGERY)

## 2023-04-13 PROCEDURE — C1889 IMPLANT/INSERT DEVICE, NOC: HCPCS | Performed by: THORACIC SURGERY (CARDIOTHORACIC VASCULAR SURGERY)

## 2023-04-13 PROCEDURE — 85730 THROMBOPLASTIN TIME PARTIAL: CPT | Performed by: INTERNAL MEDICINE

## 2023-04-13 PROCEDURE — C1751 CATH, INF, PER/CENT/MIDLINE: HCPCS | Performed by: ANESTHESIOLOGY

## 2023-04-13 PROCEDURE — 25010000002 FENTANYL CITRATE (PF) 50 MCG/ML SOLUTION: Performed by: ANESTHESIOLOGY

## 2023-04-13 PROCEDURE — 85025 COMPLETE CBC W/AUTO DIFF WBC: CPT | Performed by: NURSE PRACTITIONER

## 2023-04-13 PROCEDURE — 71045 X-RAY EXAM CHEST 1 VIEW: CPT

## 2023-04-13 PROCEDURE — 85384 FIBRINOGEN ACTIVITY: CPT | Performed by: NURSE PRACTITIONER

## 2023-04-13 PROCEDURE — 93010 ELECTROCARDIOGRAM REPORT: CPT | Performed by: INTERNAL MEDICINE

## 2023-04-13 PROCEDURE — 82803 BLOOD GASES ANY COMBINATION: CPT

## 2023-04-13 PROCEDURE — 0 MILRINONE LACTATE 10 MG/10ML SOLUTION 10 ML VIAL: Performed by: STUDENT IN AN ORGANIZED HEALTH CARE EDUCATION/TRAINING PROGRAM

## 2023-04-13 PROCEDURE — P9041 ALBUMIN (HUMAN),5%, 50ML: HCPCS | Performed by: NURSE PRACTITIONER

## 2023-04-13 PROCEDURE — 85014 HEMATOCRIT: CPT

## 2023-04-13 PROCEDURE — 25010000002 PROTAMINE SULFATE PER 10 MG: Performed by: STUDENT IN AN ORGANIZED HEALTH CARE EDUCATION/TRAINING PROGRAM

## 2023-04-13 PROCEDURE — 25010000002 MAGNESIUM SULFATE PER 500 MG OF MAGNESIUM: Performed by: STUDENT IN AN ORGANIZED HEALTH CARE EDUCATION/TRAINING PROGRAM

## 2023-04-13 PROCEDURE — 25010000002 PHENYLEPHRINE 10 MG/ML SOLUTION: Performed by: ANESTHESIOLOGY

## 2023-04-13 PROCEDURE — 25010000002 PHENYLEPHRINE 10 MG/ML SOLUTION 5 ML VIAL: Performed by: ANESTHESIOLOGY

## 2023-04-13 PROCEDURE — 85730 THROMBOPLASTIN TIME PARTIAL: CPT | Performed by: NURSE PRACTITIONER

## 2023-04-13 PROCEDURE — 33533 CABG ARTERIAL SINGLE: CPT | Performed by: PHYSICIAN ASSISTANT

## 2023-04-13 PROCEDURE — 83735 ASSAY OF MAGNESIUM: CPT | Performed by: INTERNAL MEDICINE

## 2023-04-13 PROCEDURE — 25010000002 METOCLOPRAMIDE PER 10 MG: Performed by: NURSE PRACTITIONER

## 2023-04-13 PROCEDURE — 25010000002 HEPARIN (PORCINE) PER 1000 UNITS: Performed by: THORACIC SURGERY (CARDIOTHORACIC VASCULAR SURGERY)

## 2023-04-13 PROCEDURE — 33519 CABG ARTERY-VEIN THREE: CPT | Performed by: THORACIC SURGERY (CARDIOTHORACIC VASCULAR SURGERY)

## 2023-04-13 PROCEDURE — 85610 PROTHROMBIN TIME: CPT | Performed by: NURSE PRACTITIONER

## 2023-04-13 PROCEDURE — 80069 RENAL FUNCTION PANEL: CPT | Performed by: INTERNAL MEDICINE

## 2023-04-13 PROCEDURE — C1729 CATH, DRAINAGE: HCPCS | Performed by: THORACIC SURGERY (CARDIOTHORACIC VASCULAR SURGERY)

## 2023-04-13 PROCEDURE — 80069 RENAL FUNCTION PANEL: CPT | Performed by: NURSE PRACTITIONER

## 2023-04-13 PROCEDURE — B245ZZ4 ULTRASONOGRAPHY OF LEFT HEART, TRANSESOPHAGEAL: ICD-10-PCS | Performed by: THORACIC SURGERY (CARDIOTHORACIC VASCULAR SURGERY)

## 2023-04-13 PROCEDURE — 25010000002 FENTANYL CITRATE (PF) 250 MCG/5ML SOLUTION: Performed by: ANESTHESIOLOGY

## 2023-04-13 PROCEDURE — 33426 REPAIR OF MITRAL VALVE: CPT | Performed by: THORACIC SURGERY (CARDIOTHORACIC VASCULAR SURGERY)

## 2023-04-13 PROCEDURE — 33508 ENDOSCOPIC VEIN HARVEST: CPT | Performed by: THORACIC SURGERY (CARDIOTHORACIC VASCULAR SURGERY)

## 2023-04-13 PROCEDURE — 06BP4ZZ EXCISION OF RIGHT SAPHENOUS VEIN, PERCUTANEOUS ENDOSCOPIC APPROACH: ICD-10-PCS | Performed by: THORACIC SURGERY (CARDIOTHORACIC VASCULAR SURGERY)

## 2023-04-13 PROCEDURE — 25010000002 PROPOFOL 10 MG/ML EMULSION: Performed by: ANESTHESIOLOGY

## 2023-04-13 PROCEDURE — A4648 IMPLANTABLE TISSUE MARKER: HCPCS | Performed by: THORACIC SURGERY (CARDIOTHORACIC VASCULAR SURGERY)

## 2023-04-13 PROCEDURE — 85025 COMPLETE CBC W/AUTO DIFF WBC: CPT | Performed by: INTERNAL MEDICINE

## 2023-04-13 PROCEDURE — 94799 UNLISTED PULMONARY SVC/PX: CPT

## 2023-04-13 PROCEDURE — 82947 ASSAY GLUCOSE BLOOD QUANT: CPT

## 2023-04-13 PROCEDURE — 25010000002 PROPOFOL 10 MG/ML EMULSION: Performed by: NURSE PRACTITIONER

## 2023-04-13 PROCEDURE — 021209W BYPASS CORONARY ARTERY, THREE ARTERIES FROM AORTA WITH AUTOLOGOUS VENOUS TISSUE, OPEN APPROACH: ICD-10-PCS | Performed by: THORACIC SURGERY (CARDIOTHORACIC VASCULAR SURGERY)

## 2023-04-13 PROCEDURE — 63710000001 INSULIN REGULAR HUMAN PER 5 UNITS: Performed by: ANESTHESIOLOGY

## 2023-04-13 PROCEDURE — 25010000002 HEPARIN (PORCINE) PER 1000 UNITS: Performed by: ANESTHESIOLOGY

## 2023-04-13 PROCEDURE — 86900 BLOOD TYPING SEROLOGIC ABO: CPT

## 2023-04-13 PROCEDURE — 25010000002 MIDAZOLAM PER 1 MG: Performed by: ANESTHESIOLOGY

## 2023-04-13 PROCEDURE — 83735 ASSAY OF MAGNESIUM: CPT | Performed by: NURSE PRACTITIONER

## 2023-04-13 PROCEDURE — 02UG0JZ SUPPLEMENT MITRAL VALVE WITH SYNTHETIC SUBSTITUTE, OPEN APPROACH: ICD-10-PCS | Performed by: THORACIC SURGERY (CARDIOTHORACIC VASCULAR SURGERY)

## 2023-04-13 PROCEDURE — 25010000002 ONDANSETRON PER 1 MG: Performed by: STUDENT IN AN ORGANIZED HEALTH CARE EDUCATION/TRAINING PROGRAM

## 2023-04-13 PROCEDURE — 33519 CABG ARTERY-VEIN THREE: CPT | Performed by: PHYSICIAN ASSISTANT

## 2023-04-13 PROCEDURE — 36430 TRANSFUSION BLD/BLD COMPNT: CPT

## 2023-04-13 PROCEDURE — 85347 COAGULATION TIME ACTIVATED: CPT

## 2023-04-13 DEVICE — CARPENTIER-EDWARDS PHYSIO II ANNULOPLASTY RING
Type: IMPLANTABLE DEVICE | Site: HEART | Status: FUNCTIONAL
Brand: CARPENTIER-EDWARDS PHYSIO II ANNULOPLASTY RING

## 2023-04-13 DEVICE — SEALANT HEMO SURG COSEAL PREMIX 8ML: Type: IMPLANTABLE DEVICE | Site: HEART | Status: FUNCTIONAL

## 2023-04-13 DEVICE — COR-KNOT MINI® COMBO KITBASE PACKAGE TYPE - KITEACH STERILE PACKAGE KIT CONTAINS (2) SINGLE PATIENT USE COR-KNOT MINI® DEVICES AND (12) COR-KNOT® QUICK LOADS®.
Type: IMPLANTABLE DEVICE | Site: HEART | Status: FUNCTIONAL
Brand: COR-KNOT MINI®

## 2023-04-13 DEVICE — COR-KNOT® QUICK LOAD® SINGLES
Type: IMPLANTABLE DEVICE | Site: HEART | Status: FUNCTIONAL
Brand: COR-KNOT® QUICK LOAD®

## 2023-04-13 DEVICE — SS SUTURE, 4 PER SLEEVE
Type: IMPLANTABLE DEVICE | Site: STERNUM | Status: FUNCTIONAL
Brand: MYO/WIRE II

## 2023-04-13 RX ORDER — NITROGLYCERIN 20 MG/100ML
5-200 INJECTION INTRAVENOUS
Status: DISCONTINUED | OUTPATIENT
Start: 2023-04-13 | End: 2023-04-15

## 2023-04-13 RX ORDER — BISACODYL 10 MG
10 SUPPOSITORY, RECTAL RECTAL DAILY PRN
Status: DISCONTINUED | OUTPATIENT
Start: 2023-04-14 | End: 2023-05-02 | Stop reason: HOSPADM

## 2023-04-13 RX ORDER — MORPHINE SULFATE 2 MG/ML
1 INJECTION, SOLUTION INTRAMUSCULAR; INTRAVENOUS EVERY 4 HOURS PRN
Status: DISCONTINUED | OUTPATIENT
Start: 2023-04-13 | End: 2023-04-18

## 2023-04-13 RX ORDER — NICOTINE POLACRILEX 4 MG
15 LOZENGE BUCCAL
Status: DISCONTINUED | OUTPATIENT
Start: 2023-04-13 | End: 2023-04-15

## 2023-04-13 RX ORDER — SODIUM CHLORIDE 9 MG/ML
INJECTION, SOLUTION INTRAVENOUS CONTINUOUS PRN
Status: DISCONTINUED | OUTPATIENT
Start: 2023-04-13 | End: 2023-04-13 | Stop reason: SURG

## 2023-04-13 RX ORDER — MILRINONE LACTATE 0.2 MG/ML
.25-.375 INJECTION, SOLUTION INTRAVENOUS CONTINUOUS PRN
Status: DISCONTINUED | OUTPATIENT
Start: 2023-04-13 | End: 2023-04-15

## 2023-04-13 RX ORDER — POLYETHYLENE GLYCOL 3350 17 G/17G
17 POWDER, FOR SOLUTION ORAL DAILY PRN
Status: DISCONTINUED | OUTPATIENT
Start: 2023-04-13 | End: 2023-05-02 | Stop reason: HOSPADM

## 2023-04-13 RX ORDER — FENTANYL CITRATE 50 UG/ML
50 INJECTION, SOLUTION INTRAMUSCULAR; INTRAVENOUS
Status: DISCONTINUED | OUTPATIENT
Start: 2023-04-13 | End: 2023-04-13 | Stop reason: HOSPADM

## 2023-04-13 RX ORDER — ACETAMINOPHEN 650 MG/1
650 SUPPOSITORY RECTAL EVERY 4 HOURS
Status: DISPENSED | OUTPATIENT
Start: 2023-04-13 | End: 2023-04-14

## 2023-04-13 RX ORDER — NITROGLYCERIN 5 MG/ML
INJECTION, SOLUTION INTRAVENOUS AS NEEDED
Status: DISCONTINUED | OUTPATIENT
Start: 2023-04-13 | End: 2023-04-13 | Stop reason: SURG

## 2023-04-13 RX ORDER — SODIUM CHLORIDE 0.9 % (FLUSH) 0.9 %
3 SYRINGE (ML) INJECTION EVERY 12 HOURS SCHEDULED
Status: DISCONTINUED | OUTPATIENT
Start: 2023-04-13 | End: 2023-04-13 | Stop reason: HOSPADM

## 2023-04-13 RX ORDER — SODIUM CHLORIDE 9 MG/ML
9 INJECTION, SOLUTION INTRAVENOUS CONTINUOUS
Status: DISCONTINUED | OUTPATIENT
Start: 2023-04-13 | End: 2023-04-22

## 2023-04-13 RX ORDER — ONDANSETRON 2 MG/ML
4 INJECTION INTRAMUSCULAR; INTRAVENOUS EVERY 6 HOURS PRN
Status: DISCONTINUED | OUTPATIENT
Start: 2023-04-13 | End: 2023-05-02 | Stop reason: HOSPADM

## 2023-04-13 RX ORDER — MIDAZOLAM HYDROCHLORIDE 1 MG/ML
2 INJECTION INTRAMUSCULAR; INTRAVENOUS
Status: DISCONTINUED | OUTPATIENT
Start: 2023-04-13 | End: 2023-04-14

## 2023-04-13 RX ORDER — MIDAZOLAM HYDROCHLORIDE 1 MG/ML
0.5 INJECTION INTRAMUSCULAR; INTRAVENOUS
Status: DISCONTINUED | OUTPATIENT
Start: 2023-04-13 | End: 2023-04-13 | Stop reason: HOSPADM

## 2023-04-13 RX ORDER — MIDAZOLAM HYDROCHLORIDE 1 MG/ML
INJECTION INTRAMUSCULAR; INTRAVENOUS AS NEEDED
Status: DISCONTINUED | OUTPATIENT
Start: 2023-04-13 | End: 2023-04-13 | Stop reason: SURG

## 2023-04-13 RX ORDER — PROTAMINE SULFATE 10 MG/ML
INJECTION, SOLUTION INTRAVENOUS AS NEEDED
Status: DISCONTINUED | OUTPATIENT
Start: 2023-04-13 | End: 2023-04-13 | Stop reason: SURG

## 2023-04-13 RX ORDER — ACETAMINOPHEN 10 MG/ML
1000 INJECTION, SOLUTION INTRAVENOUS EVERY 8 HOURS
Status: COMPLETED | OUTPATIENT
Start: 2023-04-13 | End: 2023-04-14

## 2023-04-13 RX ORDER — PHENYLEPHRINE HYDROCHLORIDE 10 MG/ML
INJECTION INTRAVENOUS AS NEEDED
Status: DISCONTINUED | OUTPATIENT
Start: 2023-04-13 | End: 2023-04-13 | Stop reason: SURG

## 2023-04-13 RX ORDER — DEXMEDETOMIDINE HYDROCHLORIDE 4 UG/ML
.2-1.5 INJECTION, SOLUTION INTRAVENOUS
Status: DISCONTINUED | OUTPATIENT
Start: 2023-04-13 | End: 2023-04-15

## 2023-04-13 RX ORDER — LIDOCAINE HYDROCHLORIDE 10 MG/ML
0.5 INJECTION, SOLUTION EPIDURAL; INFILTRATION; INTRACAUDAL; PERINEURAL ONCE AS NEEDED
Status: DISCONTINUED | OUTPATIENT
Start: 2023-04-13 | End: 2023-04-13 | Stop reason: HOSPADM

## 2023-04-13 RX ORDER — SODIUM CHLORIDE, SODIUM LACTATE, POTASSIUM CHLORIDE, CALCIUM CHLORIDE 600; 310; 30; 20 MG/100ML; MG/100ML; MG/100ML; MG/100ML
9 INJECTION, SOLUTION INTRAVENOUS CONTINUOUS
Status: DISCONTINUED | OUTPATIENT
Start: 2023-04-13 | End: 2023-04-13

## 2023-04-13 RX ORDER — METOCLOPRAMIDE HYDROCHLORIDE 5 MG/ML
10 INJECTION INTRAMUSCULAR; INTRAVENOUS EVERY 6 HOURS
Status: DISCONTINUED | OUTPATIENT
Start: 2023-04-13 | End: 2023-04-14

## 2023-04-13 RX ORDER — SODIUM CHLORIDE 0.9 % (FLUSH) 0.9 %
3-10 SYRINGE (ML) INJECTION AS NEEDED
Status: DISCONTINUED | OUTPATIENT
Start: 2023-04-13 | End: 2023-04-13 | Stop reason: HOSPADM

## 2023-04-13 RX ORDER — FAMOTIDINE 10 MG/ML
20 INJECTION, SOLUTION INTRAVENOUS ONCE
Status: COMPLETED | OUTPATIENT
Start: 2023-04-13 | End: 2023-04-13

## 2023-04-13 RX ORDER — ALBUMIN, HUMAN INJ 5% 5 %
1500 SOLUTION INTRAVENOUS AS NEEDED
Status: DISPENSED | OUTPATIENT
Start: 2023-04-13 | End: 2023-04-14

## 2023-04-13 RX ORDER — ENOXAPARIN SODIUM 100 MG/ML
40 INJECTION SUBCUTANEOUS DAILY
Status: DISCONTINUED | OUTPATIENT
Start: 2023-04-14 | End: 2023-04-14

## 2023-04-13 RX ORDER — SODIUM BICARBONATE IN D5W 150/1000ML
150 PLASTIC BAG, INJECTION (ML) INTRAVENOUS CONTINUOUS
Status: DISCONTINUED | OUTPATIENT
Start: 2023-04-14 | End: 2023-04-14

## 2023-04-13 RX ORDER — MAGNESIUM SULFATE HEPTAHYDRATE 500 MG/ML
INJECTION, SOLUTION INTRAMUSCULAR; INTRAVENOUS AS NEEDED
Status: DISCONTINUED | OUTPATIENT
Start: 2023-04-13 | End: 2023-04-13 | Stop reason: SURG

## 2023-04-13 RX ORDER — ATORVASTATIN CALCIUM 20 MG/1
40 TABLET, FILM COATED ORAL NIGHTLY
Status: DISCONTINUED | OUTPATIENT
Start: 2023-04-13 | End: 2023-05-02 | Stop reason: HOSPADM

## 2023-04-13 RX ORDER — ACETAMINOPHEN 325 MG/1
650 TABLET ORAL EVERY 4 HOURS PRN
Status: DISCONTINUED | OUTPATIENT
Start: 2023-04-14 | End: 2023-04-19

## 2023-04-13 RX ORDER — HYDROCODONE BITARTRATE AND ACETAMINOPHEN 5; 325 MG/1; MG/1
2 TABLET ORAL EVERY 4 HOURS PRN
Status: DISCONTINUED | OUTPATIENT
Start: 2023-04-13 | End: 2023-04-14

## 2023-04-13 RX ORDER — CEFAZOLIN SODIUM 2 G/100ML
INJECTION, SOLUTION INTRAVENOUS AS NEEDED
Status: DISCONTINUED | OUTPATIENT
Start: 2023-04-13 | End: 2023-04-13 | Stop reason: SURG

## 2023-04-13 RX ORDER — NOREPINEPHRINE BITARTRATE 0.03 MG/ML
.02-.2 INJECTION, SOLUTION INTRAVENOUS CONTINUOUS PRN
Status: DISCONTINUED | OUTPATIENT
Start: 2023-04-13 | End: 2023-04-15

## 2023-04-13 RX ORDER — FENTANYL CITRATE 50 UG/ML
INJECTION, SOLUTION INTRAMUSCULAR; INTRAVENOUS AS NEEDED
Status: DISCONTINUED | OUTPATIENT
Start: 2023-04-13 | End: 2023-04-13 | Stop reason: SURG

## 2023-04-13 RX ORDER — LIDOCAINE HYDROCHLORIDE 40 MG/ML
SOLUTION TOPICAL AS NEEDED
Status: DISCONTINUED | OUTPATIENT
Start: 2023-04-13 | End: 2023-04-13 | Stop reason: SURG

## 2023-04-13 RX ORDER — MAGNESIUM HYDROXIDE 1200 MG/15ML
LIQUID ORAL AS NEEDED
Status: DISCONTINUED | OUTPATIENT
Start: 2023-04-13 | End: 2023-04-13 | Stop reason: HOSPADM

## 2023-04-13 RX ORDER — ONDANSETRON 2 MG/ML
INJECTION INTRAMUSCULAR; INTRAVENOUS AS NEEDED
Status: DISCONTINUED | OUTPATIENT
Start: 2023-04-13 | End: 2023-04-13 | Stop reason: SURG

## 2023-04-13 RX ORDER — AMOXICILLIN 250 MG
2 CAPSULE ORAL NIGHTLY
Status: DISCONTINUED | OUTPATIENT
Start: 2023-04-14 | End: 2023-04-18

## 2023-04-13 RX ORDER — LIDOCAINE HYDROCHLORIDE 20 MG/ML
INJECTION, SOLUTION INFILTRATION; PERINEURAL AS NEEDED
Status: DISCONTINUED | OUTPATIENT
Start: 2023-04-13 | End: 2023-04-13 | Stop reason: SURG

## 2023-04-13 RX ORDER — EPHEDRINE SULFATE 50 MG/ML
INJECTION, SOLUTION INTRAVENOUS AS NEEDED
Status: DISCONTINUED | OUTPATIENT
Start: 2023-04-13 | End: 2023-04-13 | Stop reason: SURG

## 2023-04-13 RX ORDER — FENTANYL CITRATE 50 UG/ML
INJECTION, SOLUTION INTRAMUSCULAR; INTRAVENOUS
Status: COMPLETED | OUTPATIENT
Start: 2023-04-13 | End: 2023-04-13

## 2023-04-13 RX ORDER — SODIUM CHLORIDE 0.9 % (FLUSH) 0.9 %
10 SYRINGE (ML) INJECTION EVERY 12 HOURS SCHEDULED
Status: DISCONTINUED | OUTPATIENT
Start: 2023-04-13 | End: 2023-04-13 | Stop reason: HOSPADM

## 2023-04-13 RX ORDER — ACETAMINOPHEN 325 MG/1
650 TABLET ORAL EVERY 4 HOURS
Status: DISPENSED | OUTPATIENT
Start: 2023-04-13 | End: 2023-04-14

## 2023-04-13 RX ORDER — PROPOFOL 10 MG/ML
VIAL (ML) INTRAVENOUS AS NEEDED
Status: DISCONTINUED | OUTPATIENT
Start: 2023-04-13 | End: 2023-04-13 | Stop reason: SURG

## 2023-04-13 RX ORDER — DEXMEDETOMIDINE HYDROCHLORIDE 4 UG/ML
INJECTION, SOLUTION INTRAVENOUS CONTINUOUS PRN
Status: DISCONTINUED | OUTPATIENT
Start: 2023-04-13 | End: 2023-04-13 | Stop reason: SURG

## 2023-04-13 RX ORDER — ACETAMINOPHEN 160 MG/5ML
650 SOLUTION ORAL EVERY 4 HOURS
Status: DISPENSED | OUTPATIENT
Start: 2023-04-13 | End: 2023-04-14

## 2023-04-13 RX ORDER — PANTOPRAZOLE SODIUM 40 MG/10ML
40 INJECTION, POWDER, LYOPHILIZED, FOR SOLUTION INTRAVENOUS ONCE
Status: COMPLETED | OUTPATIENT
Start: 2023-04-13 | End: 2023-04-13

## 2023-04-13 RX ORDER — ACETAMINOPHEN 650 MG/1
650 SUPPOSITORY RECTAL EVERY 4 HOURS PRN
Status: DISCONTINUED | OUTPATIENT
Start: 2023-04-14 | End: 2023-04-19

## 2023-04-13 RX ORDER — SODIUM CHLORIDE 9 MG/ML
40 INJECTION, SOLUTION INTRAVENOUS AS NEEDED
Status: DISCONTINUED | OUTPATIENT
Start: 2023-04-13 | End: 2023-04-13 | Stop reason: HOSPADM

## 2023-04-13 RX ORDER — MIDAZOLAM HYDROCHLORIDE 1 MG/ML
INJECTION INTRAMUSCULAR; INTRAVENOUS
Status: COMPLETED | OUTPATIENT
Start: 2023-04-13 | End: 2023-04-13

## 2023-04-13 RX ORDER — MORPHINE SULFATE 2 MG/ML
4 INJECTION, SOLUTION INTRAMUSCULAR; INTRAVENOUS
Status: DISCONTINUED | OUTPATIENT
Start: 2023-04-13 | End: 2023-04-15

## 2023-04-13 RX ORDER — HEPARIN SODIUM 1000 [USP'U]/ML
INJECTION, SOLUTION INTRAVENOUS; SUBCUTANEOUS AS NEEDED
Status: DISCONTINUED | OUTPATIENT
Start: 2023-04-13 | End: 2023-04-13 | Stop reason: SURG

## 2023-04-13 RX ORDER — AMINOCAPROIC ACID 250 MG/ML
INJECTION, SOLUTION INTRAVENOUS AS NEEDED
Status: DISCONTINUED | OUTPATIENT
Start: 2023-04-13 | End: 2023-04-13 | Stop reason: SURG

## 2023-04-13 RX ORDER — BISACODYL 5 MG/1
10 TABLET, DELAYED RELEASE ORAL DAILY PRN
Status: DISCONTINUED | OUTPATIENT
Start: 2023-04-13 | End: 2023-05-02 | Stop reason: HOSPADM

## 2023-04-13 RX ORDER — ACETAMINOPHEN 160 MG/5ML
650 SOLUTION ORAL EVERY 4 HOURS PRN
Status: DISCONTINUED | OUTPATIENT
Start: 2023-04-14 | End: 2023-04-18

## 2023-04-13 RX ORDER — MEPERIDINE HYDROCHLORIDE 25 MG/ML
25 INJECTION INTRAMUSCULAR; INTRAVENOUS; SUBCUTANEOUS EVERY 4 HOURS PRN
Status: ACTIVE | OUTPATIENT
Start: 2023-04-13 | End: 2023-04-14

## 2023-04-13 RX ORDER — OXYCODONE HYDROCHLORIDE 5 MG/1
10 TABLET ORAL EVERY 4 HOURS PRN
Status: DISCONTINUED | OUTPATIENT
Start: 2023-04-13 | End: 2023-04-14

## 2023-04-13 RX ORDER — CALCIUM CHLORIDE 100 MG/ML
INJECTION INTRAVENOUS; INTRAVENTRICULAR AS NEEDED
Status: DISCONTINUED | OUTPATIENT
Start: 2023-04-13 | End: 2023-04-13 | Stop reason: SURG

## 2023-04-13 RX ORDER — PAPAVERINE HYDROCHLORIDE 30 MG/ML
INJECTION INTRAMUSCULAR; INTRAVENOUS AS NEEDED
Status: DISCONTINUED | OUTPATIENT
Start: 2023-04-13 | End: 2023-04-13 | Stop reason: HOSPADM

## 2023-04-13 RX ORDER — DEXTROSE MONOHYDRATE 25 G/50ML
10-50 INJECTION, SOLUTION INTRAVENOUS
Status: DISCONTINUED | OUTPATIENT
Start: 2023-04-13 | End: 2023-04-15

## 2023-04-13 RX ORDER — NITROGLYCERIN 20 MG/100ML
INJECTION INTRAVENOUS CONTINUOUS PRN
Status: DISCONTINUED | OUTPATIENT
Start: 2023-04-13 | End: 2023-04-13 | Stop reason: SURG

## 2023-04-13 RX ORDER — PANTOPRAZOLE SODIUM 40 MG/1
40 TABLET, DELAYED RELEASE ORAL EVERY MORNING
Status: DISCONTINUED | OUTPATIENT
Start: 2023-04-14 | End: 2023-05-02 | Stop reason: HOSPADM

## 2023-04-13 RX ORDER — DOPAMINE HYDROCHLORIDE 160 MG/100ML
2-20 INJECTION, SOLUTION INTRAVENOUS CONTINUOUS PRN
Status: DISCONTINUED | OUTPATIENT
Start: 2023-04-13 | End: 2023-04-15

## 2023-04-13 RX ORDER — NALOXONE HCL 0.4 MG/ML
0.4 VIAL (ML) INJECTION
Status: DISCONTINUED | OUTPATIENT
Start: 2023-04-13 | End: 2023-04-18

## 2023-04-13 RX ORDER — CYCLOBENZAPRINE HCL 10 MG
10 TABLET ORAL EVERY 8 HOURS PRN
Status: DISCONTINUED | OUTPATIENT
Start: 2023-04-14 | End: 2023-04-14

## 2023-04-13 RX ORDER — HEPARIN SODIUM 5000 [USP'U]/ML
INJECTION, SOLUTION INTRAVENOUS; SUBCUTANEOUS AS NEEDED
Status: DISCONTINUED | OUTPATIENT
Start: 2023-04-13 | End: 2023-04-13 | Stop reason: HOSPADM

## 2023-04-13 RX ORDER — VECURONIUM BROMIDE 1 MG/ML
INJECTION, POWDER, LYOPHILIZED, FOR SOLUTION INTRAVENOUS AS NEEDED
Status: DISCONTINUED | OUTPATIENT
Start: 2023-04-13 | End: 2023-04-13 | Stop reason: SURG

## 2023-04-13 RX ORDER — ALPRAZOLAM 0.25 MG/1
0.25 TABLET ORAL EVERY 8 HOURS PRN
Status: DISCONTINUED | OUTPATIENT
Start: 2023-04-13 | End: 2023-04-18

## 2023-04-13 RX ORDER — SODIUM CHLORIDE 0.9 % (FLUSH) 0.9 %
10 SYRINGE (ML) INJECTION AS NEEDED
Status: DISCONTINUED | OUTPATIENT
Start: 2023-04-13 | End: 2023-04-13 | Stop reason: HOSPADM

## 2023-04-13 RX ORDER — NICARDIPINE HYDROCHLORIDE 2.5 MG/ML
INJECTION INTRAVENOUS AS NEEDED
Status: DISCONTINUED | OUTPATIENT
Start: 2023-04-13 | End: 2023-04-13 | Stop reason: SURG

## 2023-04-13 RX ORDER — ASPIRIN 81 MG/1
81 TABLET ORAL DAILY
Status: DISCONTINUED | OUTPATIENT
Start: 2023-04-14 | End: 2023-05-02 | Stop reason: HOSPADM

## 2023-04-13 RX ORDER — IBUPROFEN 600 MG/1
1 TABLET ORAL
Status: DISCONTINUED | OUTPATIENT
Start: 2023-04-13 | End: 2023-04-15

## 2023-04-13 RX ORDER — CHLORHEXIDINE GLUCONATE 0.12 MG/ML
15 RINSE ORAL EVERY 12 HOURS
Status: DISCONTINUED | OUTPATIENT
Start: 2023-04-13 | End: 2023-04-15

## 2023-04-13 RX ADMIN — CALCIUM CHLORIDE 0.5 G: 100 INJECTION, SOLUTION INTRAVENOUS at 18:38

## 2023-04-13 RX ADMIN — VECURONIUM BROMIDE 10 MG: 10 INJECTION, POWDER, LYOPHILIZED, FOR SOLUTION INTRAVENOUS at 13:12

## 2023-04-13 RX ADMIN — EPHEDRINE SULFATE 10 MG: 50 INJECTION INTRAVENOUS at 13:15

## 2023-04-13 RX ADMIN — NICARDIPINE HYDROCHLORIDE 5 MG/HR: 2.5 INJECTION, SOLUTION INTRAVENOUS at 14:23

## 2023-04-13 RX ADMIN — SODIUM CHLORIDE: 9 INJECTION, SOLUTION INTRAVENOUS at 12:57

## 2023-04-13 RX ADMIN — MUPIROCIN 1 APPLICATION: 20 OINTMENT TOPICAL at 08:23

## 2023-04-13 RX ADMIN — PHENYLEPHRINE HYDROCHLORIDE 200 MCG: 10 INJECTION, SOLUTION INTRAVENOUS at 15:11

## 2023-04-13 RX ADMIN — NITROGLYCERIN 100 MCG: 5 INJECTION, SOLUTION INTRAVENOUS at 14:23

## 2023-04-13 RX ADMIN — FENTANYL CITRATE 250 MCG: 50 INJECTION, SOLUTION INTRAMUSCULAR; INTRAVENOUS at 17:47

## 2023-04-13 RX ADMIN — ACETAMINOPHEN 1000 MG: 10 INJECTION, SOLUTION INTRAVENOUS at 21:51

## 2023-04-13 RX ADMIN — NICARDIPINE HYDROCHLORIDE 0.2 MG: 2.5 INJECTION, SOLUTION INTRAVENOUS at 14:23

## 2023-04-13 RX ADMIN — SODIUM BICARBONATE 25 MEQ: 84 INJECTION, SOLUTION INTRAVENOUS at 18:38

## 2023-04-13 RX ADMIN — PHENYLEPHRINE HYDROCHLORIDE 200 MCG: 10 INJECTION, SOLUTION INTRAVENOUS at 15:23

## 2023-04-13 RX ADMIN — EPHEDRINE SULFATE 10 MG: 50 INJECTION INTRAVENOUS at 14:35

## 2023-04-13 RX ADMIN — SODIUM BICARBONATE 100 MEQ: 84 INJECTION INTRAVENOUS at 23:37

## 2023-04-13 RX ADMIN — PHENYLEPHRINE HYDROCHLORIDE 200 MCG: 10 INJECTION, SOLUTION INTRAVENOUS at 13:28

## 2023-04-13 RX ADMIN — EPHEDRINE SULFATE 10 MG: 50 INJECTION INTRAVENOUS at 14:49

## 2023-04-13 RX ADMIN — PHENYLEPHRINE HYDROCHLORIDE 200 MCG: 10 INJECTION, SOLUTION INTRAVENOUS at 13:38

## 2023-04-13 RX ADMIN — PHENYLEPHRINE HYDROCHLORIDE 200 MCG: 10 INJECTION, SOLUTION INTRAVENOUS at 13:22

## 2023-04-13 RX ADMIN — FENTANYL CITRATE 150 MCG: 50 INJECTION, SOLUTION INTRAMUSCULAR; INTRAVENOUS at 14:20

## 2023-04-13 RX ADMIN — METOCLOPRAMIDE 10 MG: 5 INJECTION, SOLUTION INTRAMUSCULAR; INTRAVENOUS at 21:03

## 2023-04-13 RX ADMIN — FENTANYL CITRATE 25 MCG: 50 INJECTION, SOLUTION INTRAMUSCULAR; INTRAVENOUS at 10:16

## 2023-04-13 RX ADMIN — METOPROLOL TARTRATE 12.5 MG: 25 TABLET, FILM COATED ORAL at 08:23

## 2023-04-13 RX ADMIN — PROPOFOL 100 MG: 10 INJECTION, EMULSION INTRAVENOUS at 13:12

## 2023-04-13 RX ADMIN — CALCIUM CHLORIDE 0.5 G: 100 INJECTION, SOLUTION INTRAVENOUS at 18:25

## 2023-04-13 RX ADMIN — DEXMEDETOMIDINE HYDROCHLORIDE 0.5 MCG/KG/HR: 4 INJECTION, SOLUTION INTRAVENOUS at 20:48

## 2023-04-13 RX ADMIN — EPHEDRINE SULFATE 10 MG: 50 INJECTION INTRAVENOUS at 14:02

## 2023-04-13 RX ADMIN — EPHEDRINE SULFATE 10 MG: 50 INJECTION INTRAVENOUS at 14:40

## 2023-04-13 RX ADMIN — PHENYLEPHRINE HYDROCHLORIDE 200 MCG: 10 INJECTION, SOLUTION INTRAVENOUS at 13:17

## 2023-04-13 RX ADMIN — NITROGLYCERIN 0.25 MCG/KG/MIN: 20 INJECTION INTRAVENOUS at 14:23

## 2023-04-13 RX ADMIN — ALBUMIN (HUMAN) 250 ML: 12.5 INJECTION, SOLUTION INTRAVENOUS at 20:20

## 2023-04-13 RX ADMIN — EPHEDRINE SULFATE 20 MG: 50 INJECTION INTRAVENOUS at 13:22

## 2023-04-13 RX ADMIN — Medication 0.03 MCG/KG/MIN: at 17:58

## 2023-04-13 RX ADMIN — MIDAZOLAM 5 MG: 1 INJECTION INTRAMUSCULAR; INTRAVENOUS at 13:12

## 2023-04-13 RX ADMIN — EPHEDRINE SULFATE 20 MG: 50 INJECTION INTRAVENOUS at 13:17

## 2023-04-13 RX ADMIN — EPHEDRINE SULFATE 20 MG: 50 INJECTION INTRAVENOUS at 15:23

## 2023-04-13 RX ADMIN — SODIUM CHLORIDE 0.12 MCG/KG/MIN: 0.9 INJECTION, SOLUTION INTRAVENOUS at 18:40

## 2023-04-13 RX ADMIN — ALBUMIN (HUMAN) 250 ML: 12.5 INJECTION, SOLUTION INTRAVENOUS at 21:30

## 2023-04-13 RX ADMIN — SODIUM BICARBONATE 100 MEQ: 84 INJECTION INTRAVENOUS at 21:50

## 2023-04-13 RX ADMIN — PHENYLEPHRINE HYDROCHLORIDE 100 MCG: 10 INJECTION, SOLUTION INTRAVENOUS at 15:09

## 2023-04-13 RX ADMIN — CHLORHEXIDINE GLUCONATE 0.12% ORAL RINSE 15 ML: 1.2 LIQUID ORAL at 08:22

## 2023-04-13 RX ADMIN — PHENYLEPHRINE HYDROCHLORIDE 100 MCG: 10 INJECTION, SOLUTION INTRAVENOUS at 13:15

## 2023-04-13 RX ADMIN — AMINOCAPROIC ACID 10 G: 250 INJECTION, SOLUTION INTRAVENOUS at 14:09

## 2023-04-13 RX ADMIN — MIDAZOLAM 1 MG: 1 INJECTION INTRAMUSCULAR; INTRAVENOUS at 10:17

## 2023-04-13 RX ADMIN — PROTAMINE SULFATE 50 MG: 10 INJECTION, SOLUTION INTRAVENOUS at 18:31

## 2023-04-13 RX ADMIN — PHENYLEPHRINE HYDROCHLORIDE 100 MCG: 10 INJECTION, SOLUTION INTRAVENOUS at 14:40

## 2023-04-13 RX ADMIN — LIDOCAINE HYDROCHLORIDE 100 MG: 20 INJECTION, SOLUTION INFILTRATION; PERINEURAL at 13:12

## 2023-04-13 RX ADMIN — CEFAZOLIN SODIUM 2 G: 2 INJECTION, SOLUTION INTRAVENOUS at 13:37

## 2023-04-13 RX ADMIN — EPHEDRINE SULFATE 20 MG: 50 INJECTION INTRAVENOUS at 13:28

## 2023-04-13 RX ADMIN — CEFAZOLIN SODIUM 2 G: 2 INJECTION, SOLUTION INTRAVENOUS at 19:00

## 2023-04-13 RX ADMIN — VASOPRESSIN 0.03 UNITS/MIN: 20 INJECTION INTRAVENOUS at 22:00

## 2023-04-13 RX ADMIN — ONDANSETRON 4 MG: 2 INJECTION INTRAMUSCULAR; INTRAVENOUS at 19:00

## 2023-04-13 RX ADMIN — PHENYLEPHRINE HYDROCHLORIDE 200 MCG: 10 INJECTION, SOLUTION INTRAVENOUS at 15:18

## 2023-04-13 RX ADMIN — MUPIROCIN 1 APPLICATION: 20 OINTMENT TOPICAL at 21:52

## 2023-04-13 RX ADMIN — EPHEDRINE SULFATE 10 MG: 50 INJECTION INTRAVENOUS at 14:36

## 2023-04-13 RX ADMIN — LIDOCAINE HYDROCHLORIDE 1 EACH: 40 SOLUTION TOPICAL at 13:15

## 2023-04-13 RX ADMIN — PHENYLEPHRINE HYDROCHLORIDE 100 MCG: 10 INJECTION, SOLUTION INTRAVENOUS at 14:46

## 2023-04-13 RX ADMIN — FAMOTIDINE 20 MG: 10 INJECTION INTRAVENOUS at 10:00

## 2023-04-13 RX ADMIN — PHENYLEPHRINE HYDROCHLORIDE 100 MCG: 10 INJECTION, SOLUTION INTRAVENOUS at 14:02

## 2023-04-13 RX ADMIN — PROPOFOL 50 MCG/KG/MIN: 10 INJECTION, EMULSION INTRAVENOUS at 14:50

## 2023-04-13 RX ADMIN — PHENYLEPHRINE HYDROCHLORIDE 100 MCG: 10 INJECTION, SOLUTION INTRAVENOUS at 14:49

## 2023-04-13 RX ADMIN — NOREPINEPHRINE BITARTRATE 0.02 MCG/KG/MIN: 1 INJECTION, SOLUTION, CONCENTRATE INTRAVENOUS at 13:40

## 2023-04-13 RX ADMIN — PHENYLEPHRINE HYDROCHLORIDE 1 MCG/MIN: 10 INJECTION INTRAVENOUS at 13:35

## 2023-04-13 RX ADMIN — EPHEDRINE SULFATE 20 MG: 50 INJECTION INTRAVENOUS at 13:38

## 2023-04-13 RX ADMIN — CHLORHEXIDINE GLUCONATE 1 APPLICATION: 500 CLOTH TOPICAL at 06:41

## 2023-04-13 RX ADMIN — SODIUM CHLORIDE 30 ML/HR: 9 INJECTION, SOLUTION INTRAVENOUS at 20:10

## 2023-04-13 RX ADMIN — MAGNESIUM SULFATE HEPTAHYDRATE 2 G: 500 INJECTION, SOLUTION INTRAMUSCULAR; INTRAVENOUS at 17:59

## 2023-04-13 RX ADMIN — CHLORHEXIDINE GLUCONATE 15 ML: 1.2 SOLUTION ORAL at 21:51

## 2023-04-13 RX ADMIN — INSULIN HUMAN 2.3 UNITS/HR: 1 INJECTION, SOLUTION INTRAVENOUS at 21:15

## 2023-04-13 RX ADMIN — DEXMEDETOMIDINE HYDROCHLORIDE 0.5 MCG/KG/HR: 4 INJECTION, SOLUTION INTRAVENOUS at 13:57

## 2023-04-13 RX ADMIN — INSULIN HUMAN 10 UNITS: 100 INJECTION, SOLUTION PARENTERAL at 16:17

## 2023-04-13 RX ADMIN — VECURONIUM BROMIDE 4 MG: 10 INJECTION, POWDER, LYOPHILIZED, FOR SOLUTION INTRAVENOUS at 14:54

## 2023-04-13 RX ADMIN — FENTANYL CITRATE 100 MCG: 50 INJECTION, SOLUTION INTRAMUSCULAR; INTRAVENOUS at 13:12

## 2023-04-13 RX ADMIN — HEPARIN SODIUM 30000 UNITS: 1000 INJECTION, SOLUTION INTRAVENOUS; SUBCUTANEOUS at 14:51

## 2023-04-13 RX ADMIN — PROPOFOL INJECTABLE EMULSION 30 MCG/KG/MIN: 10 INJECTION, EMULSION INTRAVENOUS at 22:24

## 2023-04-13 RX ADMIN — AMINOCAPROIC ACID 10 G: 250 INJECTION, SOLUTION INTRAVENOUS at 19:53

## 2023-04-13 RX ADMIN — EPHEDRINE SULFATE 10 MG: 50 INJECTION INTRAVENOUS at 14:46

## 2023-04-13 RX ADMIN — VECURONIUM BROMIDE 2 MG: 10 INJECTION, POWDER, LYOPHILIZED, FOR SOLUTION INTRAVENOUS at 14:36

## 2023-04-13 RX ADMIN — ALBUMIN (HUMAN) 250 ML: 12.5 INJECTION, SOLUTION INTRAVENOUS at 22:30

## 2023-04-13 RX ADMIN — PANTOPRAZOLE SODIUM 40 MG: 40 INJECTION, POWDER, FOR SOLUTION INTRAVENOUS at 22:22

## 2023-04-13 RX ADMIN — PHENYLEPHRINE HYDROCHLORIDE 100 MCG: 10 INJECTION, SOLUTION INTRAVENOUS at 14:37

## 2023-04-13 RX ADMIN — EPHEDRINE SULFATE 10 MG: 50 INJECTION INTRAVENOUS at 15:18

## 2023-04-13 NOTE — ANESTHESIA PROCEDURE NOTES
Airway  Urgency: elective    Date/Time: 4/13/2023 1:16 PM  Airway not difficult    General Information and Staff    Patient location during procedure: OR  Anesthesiologist: Shun Landers MD    Indications and Patient Condition  Indications for airway management: airway protection    Preoxygenated: yes  MILS maintained throughout  Mask difficulty assessment: 1 - vent by mask    Final Airway Details  Final airway type: endotracheal airway      Successful airway: ETT  Cuffed: yes   Successful intubation technique: direct laryngoscopy  Endotracheal tube insertion site: oral  Blade: Georgina  Blade size: 3  ETT size (mm): 8.5  Cormack-Lehane Classification: grade I - full view of glottis  Placement verified by: chest auscultation and capnometry   Measured from: lips  ETT/EBT  to lips (cm): 22  Number of attempts at approach: 1  Assessment: lips, teeth, and gum same as pre-op and atraumatic intubation

## 2023-04-13 NOTE — ANESTHESIA PROCEDURE NOTES
Central Line      Patient reassessed immediately prior to procedure    Patient location during procedure: OR  Start time: 4/13/2023 1:30 PM  Stop Time:4/13/2023 1:40 PM  Indications: vascular access and central pressure monitoring  Staff  Anesthesiologist: Shun Landers MD  Preanesthetic Checklist  Completed: patient identified, IV checked, site marked, risks and benefits discussed, surgical consent, monitors and equipment checked, pre-op evaluation and timeout performed  Central Line Prep  Sterile Tech:cap, gloves, gown, mask and sterile barriers  Prep: chloraprep  Patient monitoring: blood pressure monitoring, continuous pulse oximetry and EKG  Central Line Procedure  Laterality:left  Location:internal jugular  Catheter Type:[place]vin  Catheter Size:8.5 Fr  Guidance:landmark technique  Assessment  Post procedure:biopatch applied, line sutured and occlusive dressing applied  Assessement:blood return through all ports and free fluid flow  Complications:no  Patient Tolerance:patient tolerated the procedure well with no apparent complications  Additional Notes  Latex-free [place], no VIP so drips into white port of 2-lumen cordis

## 2023-04-13 NOTE — ANESTHESIA PROCEDURE NOTES
Arterial Line      Patient reassessed immediately prior to procedure    Patient location during procedure: pre-op  Start time: 4/13/2023 10:18 AM  Stop Time:4/13/2023 10:24 AM       Line placed for hemodynamic monitoring and ABGs/Labs/ISTAT.  Performed By   Anesthesiologist: Shun Landers MD   Preanesthetic Checklist  Completed: patient identified, IV checked, site marked, risks and benefits discussed, surgical consent, monitors and equipment checked, pre-op evaluation and timeout performed  Arterial Line Prep    Sterile Tech: cap, gloves and mask  Prep: ChloraPrep  Patient monitoring: blood pressure monitoring, continuous pulse oximetry and EKG  Arterial Line Procedure   Laterality:right  Location:  radial artery  Catheter size: 20 G   Guidance: ultrasound guided  PROCEDURE NOTE/ULTRASOUND INTERPRETATION.  Using ultrasound guidance the potential vascular sites for insertion of the catheter were visualized to determine the patency of the vessel to be used for vascular access.  After selecting the appropriate site for insertion, the needle was visualized under ultrasound being inserted into the radial artery, followed by ultrasound confirmation of wire and catheter placement. There were no abnormalities seen on ultrasound; an image was taken; and the patient tolerated the procedure with no complications.   Number of attempts: 2  Successful placement: yes Images: still images not obtained  Post Assessment   Dressing Type: biopatch applied, occlusive dressing applied, secured with tape and wrist guard applied.   Complications no  Circ/Move/Sens Assessment: normal and unchanged.   Patient Tolerance: patient tolerated the procedure well with no apparent complications

## 2023-04-13 NOTE — PROGRESS NOTES
Patient off floor in [facility]-op setting for CABG on 4/13/2023.  Labs & vital signs reviewed.  Defer to nephrology for electrolyte management.  [facility] will follow post-op course.  DUSTY Novoa

## 2023-04-13 NOTE — ANESTHESIA PROCEDURE NOTES
Central Line      Patient reassessed immediately prior to procedure    Patient location during procedure: OR  Start time: 4/13/2023 1:25 PM  Stop Time:4/13/2023 1:30 PM  Indications: vascular access and central pressure monitoring  Staff  Anesthesiologist: Shun Landers MD  Preanesthetic Checklist  Completed: patient identified, IV checked, site marked, risks and benefits discussed, surgical consent, monitors and equipment checked, pre-op evaluation and timeout performed  Central Line Prep  Sterile Tech:cap, gloves, gown, mask and sterile barriers  Prep: chloraprep  Patient monitoring: blood pressure monitoring, continuous pulse oximetry and EKG  Central Line Procedure  Laterality:left  Location:internal jugular  Catheter Type:Cordis and double lumen  Catheter Size:9 Fr  Guidance:landmark technique  Assessment  Post procedure:biopatch applied, line sutured and occlusive dressing applied  Assessement:blood return through all ports, free fluid flow and Russel Test  Complications:no  Patient Tolerance:patient tolerated the procedure well with no apparent complications

## 2023-04-13 NOTE — ANESTHESIA PROCEDURE NOTES
Diagnostic IntraOp Jabari    Procedure Performed: Diagnostic IntraOp Jabari       Start Time:  4/13/2023 2:27 PM       End Time:   4/13/2023 2:27 PM    Preanesthesia Checklist:  Patient identified, IV assessed, risks and benefits discussed, monitors and equipment assessed, procedure being performed at surgeon's request and anesthesia consent obtained.    General Procedure Information  Diagnostic Indications for Echo:  defect repair evaluation and hemodynamic monitoring  Physician Requesting Echo: Mirtha Zuluaga MD  ICD Code for Medical Necessity:  Non Rheumatic MR  Location performed:  OR  Intubated  Bite block placed  Heart visualized  Probe Insertion:  Easy        Anesthesia Information  Performed Personally  Anesthesiologist:  Nanette Wise MD      Echocardiogram Comments:       I did not complete a full echocardiagram. Limited to assessment of mitral valve.  Probe placed by Dr. Landers  Moderate, probable ischemic, MR.  Restriction of posterior leaflet with posteriorly directed, at times eccentric jet.   VC 0.65, 0.7 cm PISA radius. Regurgitant volume 31, however I think this was underestimated to under measurement of VTI, peak velocity secondary to posterior nature of jet.   Significantly elevated pulmonary artery pressures also noted during this exam.     S/p CABG/mitral repair annuloplasty  EF 25%  No MR, mean gradient 5  MVA 2.45  LATANYA 2.49, mean gradient 11, trace AI  Descending aorta no dissection

## 2023-04-13 NOTE — PLAN OF CARE
Goal Outcome Evaluation:   Pt alert, Ox4, VSS, pt NPO MN awaiting surgery, hep gtt maintained and therapeutic.

## 2023-04-13 NOTE — ANESTHESIA PREPROCEDURE EVALUATION
Anesthesia Evaluation     Patient summary reviewed and Nursing notes reviewed   NPO Solid Status: > 8 hours  NPO Liquid Status: > 2 hours           Airway   Mallampati: II  TM distance: >3 FB  Neck ROM: full  No difficulty expected  Comment: Grade I view with MAC 4 in past  Dental    (+) upper dentures    Pulmonary - normal exam    breath sounds clear to auscultation  (+) a smoker Former, shortness of breath,   Cardiovascular - normal exam    ECG reviewed  Rhythm: regular  Rate: normal    (+) hypertension 2 medications or greater, past MI  1-7 days, hyperlipidemia,     ROS comment: Recent NSTEMI/severe 3-vessel CAD with 90% left main dz by recent cath on 4/9/23/EF 35%, severely decreased LV fxn and mild MR by  recent ECHO on 4/8/23    Neuro/Psych  GI/Hepatic/Renal/Endo    (+) obesity,  GERD,  renal disease CRI and dialysis, diabetes mellitus type 2,     ROS Comment: Hx ruptured diverticulitis, s/p sigmoid colectomy with colostomy 9/21 and reversal 4/22    Musculoskeletal     Abdominal   (+) obese,    Substance History      OB/GYN          Other   arthritis,    history of cancer      Other Comment: Renal CA      Phys Exam Other: AV fistula left arm, not matured yet/tunnel dialysis catheter right chest/20G IV right hand is only IV access              Anesthesia Plan    ASA 4     general     (Art line right radial preop/CVC left subclavian and latex-free SGC in OR after asleep/MAT/post-op vent)  intravenous induction   Postoperative Plan: Expected vent after surgery  Anesthetic plan, risks, benefits, and alternatives have been provided, discussed and informed consent has been obtained with: patient.        CODE STATUS:    Level Of Support Discussed With: Patient  Code Status (Patient has no pulse and is not breathing): CPR (Attempt to Resuscitate)  Medical Interventions (Patient has pulse or is breathing): Full Support  Release to patient: Routine Release

## 2023-04-13 NOTE — PROGRESS NOTES
[facility] Progress Note      Patient Name: Lefty Cai  : 1950  MRN: [id number]  Primary Care Physician:  Daksha Ng APRN  Date of admission: 2023    Subjective     Interval History:   Follow-up end-stage renal disease    The patient is anxious this morning since he will be having his CABG done.  Denies any chest pain or shortness of air, no orthopnea or PND, no nausea or vomiting, no abdominal pain, no dysuria or gross hematuria    Review of Systems:   As noted above    Objective     Vitals:   Temp:  [97.2 °F (36.2 °C)-98.3 °F (36.8 °C)] 97.7 °F (36.5 °C)  Heart Rate:  [66-87] 66  Resp:  [18] 18  BP: (114-133)/() 117/75    Intake/Output Summary (Last 24 hours) at 2023 0712  Last data filed at 2023 0640  Gross per 24 hour   Intake 720 ml   Output --   Net 720 ml       Physical Exam:    General Appearance: Awake, alert, no acute distress, chronically ill  Skin: warm and dry  HEENT: oral mucosa normal, nonicteric sclera  Neck: supple, no JVD, tunneled dialysis catheter in the right IJ with exit site below the right clavicle  Lungs: CTA, breathing effort not labored  Heart: RRR, normal S1 and S2, no S3, no rub  Abdomen: soft, nontender, nondistended, normoactive bowel  : no palpable bladder  Extremities: no edema, cyanosis or clubbing, functional AV fistula in the left forearm with good thrill and bruit  Neuro: normal speech and mental status     Scheduled Meds:     aspirin, 81 mg, Oral, Daily  ceFAZolin, 2 g, Intravenous, Once  chlorhexidine, 15 mL, Mouth/Throat, Q12H  docusate sodium, 100 mg, Oral, BID  DULoxetine, 30 mg, Oral, Nightly  fluticasone, 2 spray, Each Nare, Daily  guaiFENesin, 600 mg, Oral, Q12H  hydrALAZINE, 100 mg, Oral, Daily With Breakfast  hydrALAZINE, 50 mg, Oral, Nightly  insulin lispro, 0-9 Units, Subcutaneous, TID With Meals  metoprolol succinate XL, 100 mg, Oral, Nightly  metoprolol tartrate, 12.5 mg, Oral, On Call to  OR  mupirocin, 1 application, Each Nare, Q12H  polyethylene glycol, 17 g, Oral, Daily  rosuvastatin, 20 mg, Oral, Nightly  sodium bicarbonate, 650 mg, Oral, TID  traZODone, 50 mg, Oral, Nightly      IV Meds:   heparin, 10 Units/kg/hr, Last Rate: 12 Units/kg/hr (04/12/23 1336)        Results Reviewed:   I have personally reviewed the results from the time of this admission to 4/13/2023 07:12 EDT     Results from last 7 days   Lab Units 04/13/23  0239 04/12/23  0129 04/11/23  0321 04/09/23  2312 04/09/23  0447 04/08/23  1142   SODIUM mmol/L 136 138 133* 133*   < > 139   POTASSIUM mmol/L 4.8 4.3 4.1 4.2   < > 3.9   CHLORIDE mmol/L 104 106 98 100   < > 104   CO2 mmol/L 21.0* 21.0* 19.8* 20.7*   < > 20.1*   BUN mg/dL 44* 31* 51* 27*   < > 27*   CREATININE mg/dL 5.01* 3.53* 5.92* 4.31*   < > 4.67*   CALCIUM mg/dL 8.6 8.2* 8.7 9.2   < > 8.6   BILIRUBIN mg/dL  --   --   --  0.3  --  0.4   ALK PHOS U/L  --   --   --  71  --  68   ALT (SGPT) U/L  --   --   --  17  --  11   AST (SGOT) U/L  --   --   --  23  --  26   GLUCOSE mg/dL 98 95 107* 163*   < > 123*    < > = values in this interval not displayed.       Estimated Creatinine Clearance: 15.6 mL/min (A) (by C-G formula based on SCr of 5.01 mg/dL (H)).    Results from last 7 days   Lab Units 04/13/23 0239 04/12/23  0129 04/11/23  0321   MAGNESIUM mg/dL 2.3 2.1  --    PHOSPHORUS mg/dL 5.0* 3.7 4.6*             Results from last 7 days   Lab Units 04/13/23 0239 04/12/23  0129 04/11/23  0321 04/09/23  2312 04/09/23  0447   WBC 10*3/mm3 11.54* 9.66 15.62* 11.26* 8.58   HEMOGLOBIN g/dL 10.8* 11.0* 10.1* 10.7* 10.3*   PLATELETS 10*3/mm3 194 232 202 187 161       Results from last 7 days   Lab Units 04/08/23  1925   INR  1.15*       Assessment / Plan     ASSESSMENT:  -End-stage renal disease and diabetic and hypertensive glomerulosclerosis on maintenance hemodialysis every Tuesday, Thursday and Saturday, his volume status and electrolyte within acceptable range difficult-Diabetes  mellitus type 2 with renal complication  -Hypertension with chronic kidney disease, well controlled today  -Anemia of ESRD, hemoglobin today is 10.8  -Non-ST elevation MI, heart cath today revealed multivessel disease will need CABG  -Secondary hyperparathyroidism of renal origin    PLAN:  -Continue the same treatment  -Surveillance labs  -Plan for dialysis tomorrow, unless he needs today  postoperative  -Patient is having CABG today    I discussed the case with the patient and his wife at the bedside    Thank you for involving us in the care of Lefty Cai.  Please feel free to call with any questions.    Quang Reyes MD  04/13/23  07:12 EDT    [facility]  [phone]    Please note that portions of this note were completed with a voice recognition program.

## 2023-04-14 ENCOUNTER — APPOINTMENT (OUTPATIENT)
Dept: CARDIOLOGY | Facility: HOSPITAL | Age: 73
DRG: 216 | End: 2023-04-14
Payer: MEDICARE

## 2023-04-14 ENCOUNTER — APPOINTMENT (OUTPATIENT)
Dept: GENERAL RADIOLOGY | Facility: HOSPITAL | Age: 73
DRG: 216 | End: 2023-04-14
Payer: MEDICARE

## 2023-04-14 PROBLEM — E87.0 HYPERNATREMIA: Status: ACTIVE | Noted: 2023-04-14

## 2023-04-14 LAB
ALBUMIN SERPL-MCNC: 3.4 G/DL (ref 3.5–5.2)
ALBUMIN SERPL-MCNC: 3.5 G/DL (ref 3.5–5.2)
ALBUMIN SERPL-MCNC: 3.6 G/DL (ref 3.5–5.2)
ALBUMIN SERPL-MCNC: 3.7 G/DL (ref 3.5–5.2)
ALBUMIN/GLOB SERPL: 2.6 G/DL
ALBUMIN/GLOB SERPL: 3.5 G/DL
ALP SERPL-CCNC: 28 U/L (ref 39–117)
ALP SERPL-CCNC: 32 U/L (ref 39–117)
ALT SERPL W P-5'-P-CCNC: 8 U/L (ref 1–41)
ALT SERPL W P-5'-P-CCNC: 8 U/L (ref 1–41)
ANION GAP SERPL CALCULATED.3IONS-SCNC: 13 MMOL/L (ref 5–15)
ANION GAP SERPL CALCULATED.3IONS-SCNC: 14.6 MMOL/L (ref 5–15)
ANION GAP SERPL CALCULATED.3IONS-SCNC: 16.3 MMOL/L (ref 5–15)
ANION GAP SERPL CALCULATED.3IONS-SCNC: 19 MMOL/L (ref 5–15)
APTT PPP: 37.4 SECONDS (ref 22.7–35.4)
ARTERIAL PATENCY WRIST A: ABNORMAL
AST SERPL-CCNC: 41 U/L (ref 1–40)
AST SERPL-CCNC: 44 U/L (ref 1–40)
ATMOSPHERIC PRESS: 742.1 MMHG
ATMOSPHERIC PRESS: 742.2 MMHG
ATMOSPHERIC PRESS: 743.5 MMHG
ATMOSPHERIC PRESS: 744.6 MMHG
BASE EXCESS BLDA CALC-SCNC: -1.9 MMOL/L (ref 0–2)
BASE EXCESS BLDA CALC-SCNC: -3.3 MMOL/L (ref 0–2)
BASE EXCESS BLDA CALC-SCNC: -4.2 MMOL/L (ref 0–2)
BASE EXCESS BLDA CALC-SCNC: 0.9 MMOL/L (ref 0–2)
BDY SITE: ABNORMAL
BH BB BLOOD EXPIRATION DATE: NORMAL
BH BB BLOOD EXPIRATION DATE: NORMAL
BH BB BLOOD TYPE BARCODE: 6200
BH BB BLOOD TYPE BARCODE: 6200
BH BB DISPENSE STATUS: NORMAL
BH BB DISPENSE STATUS: NORMAL
BH BB PRODUCT CODE: NORMAL
BH BB PRODUCT CODE: NORMAL
BH BB UNIT NUMBER: NORMAL
BH BB UNIT NUMBER: NORMAL
BILIRUB SERPL-MCNC: 0.4 MG/DL (ref 0–1.2)
BILIRUB SERPL-MCNC: 0.4 MG/DL (ref 0–1.2)
BUN SERPL-MCNC: 43 MG/DL (ref 8–23)
BUN SERPL-MCNC: 45 MG/DL (ref 8–23)
BUN SERPL-MCNC: 45 MG/DL (ref 8–23)
BUN SERPL-MCNC: 48 MG/DL (ref 8–23)
BUN/CREAT SERPL: 7.8 (ref 7–25)
BUN/CREAT SERPL: 8.3 (ref 7–25)
BUN/CREAT SERPL: 8.7 (ref 7–25)
BUN/CREAT SERPL: 9.7 (ref 7–25)
CA-I BLD-MCNC: 4.5 MG/DL (ref 4.6–5.4)
CA-I BLD-MCNC: 4.8 MG/DL (ref 4.6–5.4)
CA-I SERPL ISE-MCNC: 1.13 MMOL/L (ref 1.15–1.35)
CA-I SERPL ISE-MCNC: 1.19 MMOL/L (ref 1.15–1.35)
CALCIUM SPEC-SCNC: 8.1 MG/DL (ref 8.6–10.5)
CALCIUM SPEC-SCNC: 8.6 MG/DL (ref 8.6–10.5)
CALCIUM SPEC-SCNC: 8.6 MG/DL (ref 8.6–10.5)
CALCIUM SPEC-SCNC: 9.1 MG/DL (ref 8.6–10.5)
CHLORIDE SERPL-SCNC: 107 MMOL/L (ref 98–107)
CHLORIDE SERPL-SCNC: 109 MMOL/L (ref 98–107)
CHLORIDE SERPL-SCNC: 110 MMOL/L (ref 98–107)
CHLORIDE SERPL-SCNC: 111 MMOL/L (ref 98–107)
CO2 SERPL-SCNC: 21 MMOL/L (ref 22–29)
CO2 SERPL-SCNC: 21.7 MMOL/L (ref 22–29)
CO2 SERPL-SCNC: 25.4 MMOL/L (ref 22–29)
CO2 SERPL-SCNC: 26 MMOL/L (ref 22–29)
CREAT SERPL-MCNC: 4.95 MG/DL (ref 0.76–1.27)
CREAT SERPL-MCNC: 4.96 MG/DL (ref 0.76–1.27)
CREAT SERPL-MCNC: 5.45 MG/DL (ref 0.76–1.27)
CREAT SERPL-MCNC: 5.76 MG/DL (ref 0.76–1.27)
CROSSMATCH INTERPRETATION: NORMAL
CROSSMATCH INTERPRETATION: NORMAL
DEPRECATED RDW RBC AUTO: 45.4 FL (ref 37–54)
DEPRECATED RDW RBC AUTO: 51.2 FL (ref 37–54)
DEPRECATED RDW RBC AUTO: 52.1 FL (ref 37–54)
EGFRCR SERPLBLD CKD-EPI 2021: 10.4 ML/MIN/1.73
EGFRCR SERPLBLD CKD-EPI 2021: 11.6 ML/MIN/1.73
EGFRCR SERPLBLD CKD-EPI 2021: 11.7 ML/MIN/1.73
EGFRCR SERPLBLD CKD-EPI 2021: 9.7 ML/MIN/1.73
ERYTHROCYTE [DISTWIDTH] IN BLOOD BY AUTOMATED COUNT: 13.5 % (ref 12.3–15.4)
ERYTHROCYTE [DISTWIDTH] IN BLOOD BY AUTOMATED COUNT: 16.2 % (ref 12.3–15.4)
ERYTHROCYTE [DISTWIDTH] IN BLOOD BY AUTOMATED COUNT: 16.2 % (ref 12.3–15.4)
GLOBULIN UR ELPH-MCNC: 1 GM/DL
GLOBULIN UR ELPH-MCNC: 1.3 GM/DL
GLUCOSE BLDC GLUCOMTR-MCNC: 116 MG/DL (ref 70–130)
GLUCOSE BLDC GLUCOMTR-MCNC: 118 MG/DL (ref 70–130)
GLUCOSE BLDC GLUCOMTR-MCNC: 120 MG/DL (ref 70–130)
GLUCOSE BLDC GLUCOMTR-MCNC: 123 MG/DL (ref 70–130)
GLUCOSE BLDC GLUCOMTR-MCNC: 131 MG/DL (ref 70–130)
GLUCOSE BLDC GLUCOMTR-MCNC: 133 MG/DL (ref 70–130)
GLUCOSE BLDC GLUCOMTR-MCNC: 134 MG/DL (ref 70–130)
GLUCOSE BLDC GLUCOMTR-MCNC: 152 MG/DL (ref 70–130)
GLUCOSE BLDC GLUCOMTR-MCNC: 167 MG/DL (ref 70–130)
GLUCOSE BLDC GLUCOMTR-MCNC: 171 MG/DL (ref 70–130)
GLUCOSE BLDC GLUCOMTR-MCNC: 179 MG/DL (ref 70–130)
GLUCOSE BLDC GLUCOMTR-MCNC: 228 MG/DL (ref 70–130)
GLUCOSE BLDC GLUCOMTR-MCNC: 229 MG/DL (ref 70–130)
GLUCOSE BLDC GLUCOMTR-MCNC: 230 MG/DL (ref 70–130)
GLUCOSE BLDC GLUCOMTR-MCNC: 240 MG/DL (ref 70–130)
GLUCOSE BLDC GLUCOMTR-MCNC: 251 MG/DL (ref 70–130)
GLUCOSE SERPL-MCNC: 119 MG/DL (ref 65–99)
GLUCOSE SERPL-MCNC: 149 MG/DL (ref 65–99)
GLUCOSE SERPL-MCNC: 156 MG/DL (ref 65–99)
GLUCOSE SERPL-MCNC: 222 MG/DL (ref 65–99)
HCO3 BLDA-SCNC: 20.5 MMOL/L (ref 22–28)
HCO3 BLDA-SCNC: 21 MMOL/L (ref 22–28)
HCO3 BLDA-SCNC: 23.5 MMOL/L (ref 22–28)
HCO3 BLDA-SCNC: 27.3 MMOL/L (ref 22–28)
HCT VFR BLD AUTO: 22.3 % (ref 37.5–51)
HCT VFR BLD AUTO: 25 % (ref 37.5–51)
HCT VFR BLD AUTO: 25.7 % (ref 37.5–51)
HGB BLD-MCNC: 7.5 G/DL (ref 13–17.7)
HGB BLD-MCNC: 8.4 G/DL (ref 13–17.7)
HGB BLD-MCNC: 8.5 G/DL (ref 13–17.7)
INHALED O2 CONCENTRATION: 40 %
INHALED O2 CONCENTRATION: 60 %
INHALED O2 CONCENTRATION: 60 %
INR PPP: 1.4 (ref 0.9–1.1)
INR PPP: 1.58 (ref 0.9–1.1)
MAGNESIUM SERPL-MCNC: 2.5 MG/DL (ref 1.6–2.4)
MAGNESIUM SERPL-MCNC: 2.5 MG/DL (ref 1.6–2.4)
MAGNESIUM SERPL-MCNC: 2.6 MG/DL (ref 1.6–2.4)
MAXIMAL PREDICTED HEART RATE: 147 BPM
MCH RBC QN AUTO: 28.9 PG (ref 26.6–33)
MCH RBC QN AUTO: 29.3 PG (ref 26.6–33)
MCH RBC QN AUTO: 30.9 PG (ref 26.6–33)
MCHC RBC AUTO-ENTMCNC: 32.7 G/DL (ref 31.5–35.7)
MCHC RBC AUTO-ENTMCNC: 33.6 G/DL (ref 31.5–35.7)
MCHC RBC AUTO-ENTMCNC: 34 G/DL (ref 31.5–35.7)
MCV RBC AUTO: 86.2 FL (ref 79–97)
MCV RBC AUTO: 88.3 FL (ref 79–97)
MCV RBC AUTO: 91.8 FL (ref 79–97)
MODALITY: ABNORMAL
O2 A-A PPRESDIFF RESPIRATORY: 0.3 MMHG
O2 A-A PPRESDIFF RESPIRATORY: 0.4 MMHG
O2 A-A PPRESDIFF RESPIRATORY: 0.5 MMHG
PCO2 BLDA: 33.4 MM HG (ref 35–45)
PCO2 BLDA: 35 MM HG (ref 35–45)
PCO2 BLDA: 42 MM HG (ref 35–45)
PCO2 BLDA: 52.6 MM HG (ref 35–45)
PEEP RESPIRATORY: 7.5 CM[H2O]
PEEP RESPIRATORY: 8 CM[H2O]
PEEP RESPIRATORY: 8 CM[H2O]
PH BLDA: 7.32 PH UNITS (ref 7.35–7.45)
PH BLDA: 7.36 PH UNITS (ref 7.35–7.45)
PH BLDA: 7.38 PH UNITS (ref 7.35–7.45)
PH BLDA: 7.41 PH UNITS (ref 7.35–7.45)
PHOSPHATE SERPL-MCNC: 3.3 MG/DL (ref 2.5–4.5)
PHOSPHATE SERPL-MCNC: 4.6 MG/DL (ref 2.5–4.5)
PLATELET # BLD AUTO: 118 10*3/MM3 (ref 140–450)
PLATELET # BLD AUTO: 64 10*3/MM3 (ref 140–450)
PLATELET # BLD AUTO: 78 10*3/MM3 (ref 140–450)
PMV BLD AUTO: 10.3 FL (ref 6–12)
PMV BLD AUTO: 10.5 FL (ref 6–12)
PMV BLD AUTO: 10.5 FL (ref 6–12)
PO2 BLDA: 107.5 MM HG (ref 80–100)
PO2 BLDA: 111.2 MM HG (ref 80–100)
PO2 BLDA: 115 MM HG (ref 80–100)
PO2 BLDA: 154.5 MM HG (ref 80–100)
POTASSIUM SERPL-SCNC: 4.2 MMOL/L (ref 3.5–5.2)
POTASSIUM SERPL-SCNC: 4.6 MMOL/L (ref 3.5–5.2)
POTASSIUM SERPL-SCNC: 4.6 MMOL/L (ref 3.5–5.2)
POTASSIUM SERPL-SCNC: 5.1 MMOL/L (ref 3.5–5.2)
PROT SERPL-MCNC: 4.5 G/DL (ref 6–8.5)
PROT SERPL-MCNC: 4.7 G/DL (ref 6–8.5)
PROTHROMBIN TIME: 17.4 SECONDS (ref 11.7–14.2)
PROTHROMBIN TIME: 19.1 SECONDS (ref 11.7–14.2)
PSV: 8 CMH2O
QT INTERVAL: 439 MS
QT INTERVAL: 446 MS
RBC # BLD AUTO: 2.43 10*6/MM3 (ref 4.14–5.8)
RBC # BLD AUTO: 2.9 10*6/MM3 (ref 4.14–5.8)
RBC # BLD AUTO: 2.91 10*6/MM3 (ref 4.14–5.8)
SAO2 % BLDCOA: 97.6 % (ref 92–99)
SAO2 % BLDCOA: 98.1 % (ref 92–99)
SAO2 % BLDCOA: 98.4 % (ref 92–99)
SAO2 % BLDCOA: 99.4 % (ref 92–99)
SET MECH RESP RATE: 16
SET MECH RESP RATE: 18
SET MECH RESP RATE: 20
SET MECH RESP RATE: 20
SODIUM SERPL-SCNC: 147 MMOL/L (ref 136–145)
SODIUM SERPL-SCNC: 148 MMOL/L (ref 136–145)
SODIUM SERPL-SCNC: 148 MMOL/L (ref 136–145)
SODIUM SERPL-SCNC: 151 MMOL/L (ref 136–145)
STRESS TARGET HR: 125 BPM
TOTAL RATE: 20 BREATHS/MINUTE
TOTAL RATE: 20 BREATHS/MINUTE
UNIT  ABO: NORMAL
UNIT  ABO: NORMAL
UNIT  RH: NORMAL
UNIT  RH: NORMAL
VENTILATOR MODE: ABNORMAL
VENTILATOR MODE: ABNORMAL
VENTILATOR MODE: AC
VENTILATOR MODE: AC
VT ON VENT VENT: 550 ML
VT ON VENT VENT: 550 ML
VT ON VENT VENT: 650 ML
WBC NRBC COR # BLD: 15.84 10*3/MM3 (ref 3.4–10.8)
WBC NRBC COR # BLD: 18.01 10*3/MM3 (ref 3.4–10.8)
WBC NRBC COR # BLD: 21.71 10*3/MM3 (ref 3.4–10.8)

## 2023-04-14 PROCEDURE — 86900 BLOOD TYPING SEROLOGIC ABO: CPT

## 2023-04-14 PROCEDURE — 0 MILRINONE LACTATE IN DEXTROSE 20-5 MG/100ML-% SOLUTION: Performed by: NURSE PRACTITIONER

## 2023-04-14 PROCEDURE — 94799 UNLISTED PULMONARY SVC/PX: CPT

## 2023-04-14 PROCEDURE — 25010000002 EPINEPHRINE 1 MG/ML SOLUTION 30 ML VIAL: Performed by: NURSE PRACTITIONER

## 2023-04-14 PROCEDURE — 85027 COMPLETE CBC AUTOMATED: CPT | Performed by: THORACIC SURGERY (CARDIOTHORACIC VASCULAR SURGERY)

## 2023-04-14 PROCEDURE — 99024 POSTOP FOLLOW-UP VISIT: CPT | Performed by: NURSE PRACTITIONER

## 2023-04-14 PROCEDURE — 82330 ASSAY OF CALCIUM: CPT | Performed by: THORACIC SURGERY (CARDIOTHORACIC VASCULAR SURGERY)

## 2023-04-14 PROCEDURE — 99232 SBSQ HOSP IP/OBS MODERATE 35: CPT | Performed by: INTERNAL MEDICINE

## 2023-04-14 PROCEDURE — 83735 ASSAY OF MAGNESIUM: CPT | Performed by: NURSE PRACTITIONER

## 2023-04-14 PROCEDURE — P9041 ALBUMIN (HUMAN),5%, 50ML: HCPCS | Performed by: NURSE PRACTITIONER

## 2023-04-14 PROCEDURE — 71045 X-RAY EXAM CHEST 1 VIEW: CPT

## 2023-04-14 PROCEDURE — 85610 PROTHROMBIN TIME: CPT | Performed by: NURSE PRACTITIONER

## 2023-04-14 PROCEDURE — 93308 TTE F-UP OR LMTD: CPT

## 2023-04-14 PROCEDURE — 93308 TTE F-UP OR LMTD: CPT | Performed by: INTERNAL MEDICINE

## 2023-04-14 PROCEDURE — 25010000002 METOCLOPRAMIDE PER 10 MG: Performed by: NURSE PRACTITIONER

## 2023-04-14 PROCEDURE — P9016 RBC LEUKOCYTES REDUCED: HCPCS

## 2023-04-14 PROCEDURE — 80053 COMPREHEN METABOLIC PANEL: CPT | Performed by: NURSE PRACTITIONER

## 2023-04-14 PROCEDURE — 93005 ELECTROCARDIOGRAM TRACING: CPT | Performed by: THORACIC SURGERY (CARDIOTHORACIC VASCULAR SURGERY)

## 2023-04-14 PROCEDURE — 93010 ELECTROCARDIOGRAM REPORT: CPT | Performed by: INTERNAL MEDICINE

## 2023-04-14 PROCEDURE — 82330 ASSAY OF CALCIUM: CPT | Performed by: NURSE PRACTITIONER

## 2023-04-14 PROCEDURE — 86022 PLATELET ANTIBODIES: CPT | Performed by: NURSE PRACTITIONER

## 2023-04-14 PROCEDURE — 94003 VENT MGMT INPAT SUBQ DAY: CPT

## 2023-04-14 PROCEDURE — 25010000002 PROPOFOL 10 MG/ML EMULSION: Performed by: NURSE PRACTITIONER

## 2023-04-14 PROCEDURE — 82803 BLOOD GASES ANY COMBINATION: CPT

## 2023-04-14 PROCEDURE — 85730 THROMBOPLASTIN TIME PARTIAL: CPT | Performed by: NURSE PRACTITIONER

## 2023-04-14 PROCEDURE — 25010000002 ALBUMIN HUMAN 5% PER 50 ML: Performed by: NURSE PRACTITIONER

## 2023-04-14 PROCEDURE — 84100 ASSAY OF PHOSPHORUS: CPT | Performed by: NURSE PRACTITIONER

## 2023-04-14 PROCEDURE — 85027 COMPLETE CBC AUTOMATED: CPT | Performed by: NURSE PRACTITIONER

## 2023-04-14 PROCEDURE — 25010000002 CALCIUM GLUCONATE-NACL 1-0.675 GM/50ML-% SOLUTION: Performed by: NURSE PRACTITIONER

## 2023-04-14 PROCEDURE — 25010000002 CEFAZOLIN PER 500 MG: Performed by: NURSE PRACTITIONER

## 2023-04-14 PROCEDURE — 85610 PROTHROMBIN TIME: CPT | Performed by: THORACIC SURGERY (CARDIOTHORACIC VASCULAR SURGERY)

## 2023-04-14 PROCEDURE — 83735 ASSAY OF MAGNESIUM: CPT | Performed by: THORACIC SURGERY (CARDIOTHORACIC VASCULAR SURGERY)

## 2023-04-14 PROCEDURE — 82962 GLUCOSE BLOOD TEST: CPT

## 2023-04-14 PROCEDURE — 36430 TRANSFUSION BLD/BLD COMPNT: CPT

## 2023-04-14 RX ORDER — CALCIUM GLUCONATE 20 MG/ML
2 INJECTION, SOLUTION INTRAVENOUS ONCE
Status: COMPLETED | OUTPATIENT
Start: 2023-04-14 | End: 2023-04-14

## 2023-04-14 RX ORDER — CEFAZOLIN SODIUM 1 G/50ML
1 INJECTION, SOLUTION INTRAVENOUS EVERY 24 HOURS
Status: DISPENSED | OUTPATIENT
Start: 2023-04-14 | End: 2023-04-16

## 2023-04-14 RX ORDER — SODIUM BICARBONATE IN D5W 150/1000ML
150 PLASTIC BAG, INJECTION (ML) INTRAVENOUS CONTINUOUS
Status: DISCONTINUED | OUTPATIENT
Start: 2023-04-14 | End: 2023-04-15

## 2023-04-14 RX ORDER — DULOXETIN HYDROCHLORIDE 30 MG/1
30 CAPSULE, DELAYED RELEASE ORAL NIGHTLY
Status: DISCONTINUED | OUTPATIENT
Start: 2023-04-14 | End: 2023-04-26

## 2023-04-14 RX ORDER — HYDROCODONE BITARTRATE AND ACETAMINOPHEN 5; 325 MG/1; MG/1
1 TABLET ORAL EVERY 4 HOURS PRN
Status: DISCONTINUED | OUTPATIENT
Start: 2023-04-14 | End: 2023-04-16

## 2023-04-14 RX ORDER — CYCLOBENZAPRINE HCL 10 MG
5 TABLET ORAL EVERY 8 HOURS PRN
Status: DISCONTINUED | OUTPATIENT
Start: 2023-04-14 | End: 2023-04-16

## 2023-04-14 RX ADMIN — METOCLOPRAMIDE 10 MG: 5 INJECTION, SOLUTION INTRAMUSCULAR; INTRAVENOUS at 08:44

## 2023-04-14 RX ADMIN — ACETAMINOPHEN 650 MG: 325 TABLET ORAL at 08:56

## 2023-04-14 RX ADMIN — MUPIROCIN 1 APPLICATION: 20 OINTMENT TOPICAL at 08:44

## 2023-04-14 RX ADMIN — DEXMEDETOMIDINE HYDROCHLORIDE 0.5 MCG/KG/HR: 4 INJECTION, SOLUTION INTRAVENOUS at 04:06

## 2023-04-14 RX ADMIN — ACETAMINOPHEN 1000 MG: 10 INJECTION, SOLUTION INTRAVENOUS at 05:02

## 2023-04-14 RX ADMIN — EPINEPHRINE 0.1 MCG/KG/MIN: 1 INJECTION INTRAMUSCULAR; INTRAVENOUS; SUBCUTANEOUS at 03:33

## 2023-04-14 RX ADMIN — Medication 150 MEQ: at 00:46

## 2023-04-14 RX ADMIN — PROPOFOL INJECTABLE EMULSION 30 MCG/KG/MIN: 10 INJECTION, EMULSION INTRAVENOUS at 03:34

## 2023-04-14 RX ADMIN — CALCIUM GLUCONATE 2 G: 20 INJECTION, SOLUTION INTRAVENOUS at 09:34

## 2023-04-14 RX ADMIN — VASOPRESSIN 0.03 UNITS/MIN: 20 INJECTION INTRAVENOUS at 06:53

## 2023-04-14 RX ADMIN — CYCLOBENZAPRINE 10 MG: 10 TABLET, FILM COATED ORAL at 08:56

## 2023-04-14 RX ADMIN — OXYCODONE HYDROCHLORIDE 10 MG: 5 TABLET ORAL at 08:56

## 2023-04-14 RX ADMIN — DULOXETINE HYDROCHLORIDE 30 MG: 30 CAPSULE, DELAYED RELEASE ORAL at 22:11

## 2023-04-14 RX ADMIN — ALBUMIN (HUMAN) 250 ML: 12.5 INJECTION, SOLUTION INTRAVENOUS at 02:09

## 2023-04-14 RX ADMIN — HYDROCODONE BITARTRATE AND ACETAMINOPHEN 1 TABLET: 5; 325 TABLET ORAL at 22:10

## 2023-04-14 RX ADMIN — ATORVASTATIN CALCIUM 40 MG: 20 TABLET, FILM COATED ORAL at 22:09

## 2023-04-14 RX ADMIN — CHLORHEXIDINE GLUCONATE 15 ML: 1.2 SOLUTION ORAL at 22:09

## 2023-04-14 RX ADMIN — MILRINONE LACTATE 0.12 MCG/KG/MIN: 0.2 INJECTION, SOLUTION INTRAVENOUS at 16:39

## 2023-04-14 RX ADMIN — CEFAZOLIN SODIUM 1 G: 1 INJECTION, POWDER, FOR SOLUTION INTRAMUSCULAR; INTRAVENOUS at 20:20

## 2023-04-14 RX ADMIN — CYCLOBENZAPRINE 5 MG: 10 TABLET, FILM COATED ORAL at 22:10

## 2023-04-14 RX ADMIN — ASPIRIN 81 MG: 81 TABLET, COATED ORAL at 08:56

## 2023-04-14 RX ADMIN — DOCUSATE SODIUM 50MG AND SENNOSIDES 8.6MG 2 TABLET: 8.6; 5 TABLET, FILM COATED ORAL at 22:10

## 2023-04-14 RX ADMIN — MUPIROCIN 1 APPLICATION: 20 OINTMENT TOPICAL at 22:09

## 2023-04-14 RX ADMIN — CHLORHEXIDINE GLUCONATE 15 ML: 1.2 SOLUTION ORAL at 08:56

## 2023-04-14 RX ADMIN — ACETAMINOPHEN 1000 MG: 10 INJECTION, SOLUTION INTRAVENOUS at 14:47

## 2023-04-14 RX ADMIN — VASOPRESSIN 0.05 UNITS/MIN: 20 INJECTION INTRAVENOUS at 17:40

## 2023-04-14 RX ADMIN — NOREPINEPHRINE BITARTRATE 0.08 MCG/KG/MIN: 0.03 INJECTION, SOLUTION INTRAVENOUS at 16:35

## 2023-04-14 NOTE — PROGRESS NOTES
LOS: 6 days   Patient Care Team:  Daksha Ng APRN as PCP - General (Family Medicine)  Rudy Faith MD as Consulting Physician (Ophthalmology)  Jose Mccall MD as Consulting Physician (Nephrology)  Quang Reyes MD as Consulting Physician (Nephrology)    Chief Complaint:     Follow-up CAD, CABG    Interval History:     He is confused and does not know where he is.    Vital stable.  Expected anemia.  On vasopressin, Levophed, epinephrine, milrinone.  Blood pressure has been soft.  He remains in sinus rhythm, paced in the 90s, underlying sinus is 80s.  No atrial fibrillation.    Objective   Vital Signs  Temp:  [95.7 °F (35.4 °C)-99.5 °F (37.5 °C)] 99.3 °F (37.4 °C)  Heart Rate:  [67-97] 87  Resp:  [18-23] 18  BP: ()/(46-91) 113/61  FiO2 (%):  [39 %-98 %] 39 %    Intake/Output Summary (Last 24 hours) at 4/14/2023 0832  Last data filed at 4/14/2023 0800  Gross per 24 hour   Intake 4958.12 ml   Output 1320 ml   Net 3638.12 ml       Comfortable NAD  Neck supple, no JVD or thyromegaly appreciated  S1/S2 RRR, no m/r/g-pericardial rub  Lungs CTA B, normal effort  Abdomen S/NT/ND (+) BS, no HSM appreciated  Extremities warm, no clubbing, cyanosis, or edema  No visible or palpable skin lesions  A/Ox4, mood and affect appropriate    Results Review:      Results from last 7 days   Lab Units 04/14/23  0635 04/14/23  0002 04/13/23  2041   SODIUM mmol/L 148* 147* 144   POTASSIUM mmol/L 4.2 4.6 4.7   CHLORIDE mmol/L 110* 107 108*   CO2 mmol/L 21.7* 21.0* 17.0*   BUN mg/dL 48* 43* 42*   CREATININE mg/dL 4.95* 4.96* 4.61*   GLUCOSE mg/dL 222* 156* 165*   CALCIUM mg/dL 8.6 8.1* 8.3*     Results from last 7 days   Lab Units 04/09/23  0447 04/08/23  1142   HSTROP T ng/L 1,420* 811*     Results from last 7 days   Lab Units 04/14/23  0635 04/14/23  0005 04/13/23  2041   WBC 10*3/mm3 18.01* 21.71* 22.39*   HEMOGLOBIN g/dL 8.4* 7.5* 9.6*   HEMATOCRIT % 25.7* 22.3* 29.2*   PLATELETS 10*3/mm3 78* 118* 145      Results from last 7 days   Lab Units 04/14/23  0635 04/13/23  2041 04/13/23  0238 04/12/23  0129 04/09/23  0447 04/08/23  1925   INR  1.58* 1.58*  --   --   --  1.15*   APTT seconds  --  >200.0* 81.4* 75.3*   < > 30.5    < > = values in this interval not displayed.         Results from last 7 days   Lab Units 04/14/23  0635   MAGNESIUM mg/dL 2.5*           I reviewed the patient's new clinical results.  I personally viewed and interpreted the patient's EKG/Telemetry data        Medication Review:   acetaminophen, 1,000 mg, Intravenous, Q8H  acetaminophen, 650 mg, Oral, Q4H   Or  acetaminophen, 650 mg, Oral, Q4H   Or  acetaminophen, 650 mg, Rectal, Q4H  aspirin, 81 mg, Oral, Daily  atorvastatin, 40 mg, Oral, Nightly  ceFAZolin, 1 g, Intravenous, Q24H  chlorhexidine, 15 mL, Mouth/Throat, Q12H  DULoxetine, 30 mg, Oral, Nightly  metoclopramide, 10 mg, Intravenous, Q6H  metoprolol tartrate, 12.5 mg, Oral, Q12H  mupirocin, , Each Nare, BID  pantoprazole, 40 mg, Oral, QAM  senna-docusate sodium, 2 tablet, Oral, Nightly        clevidipine, 2-32 mg/hr  dexmedetomidine, 0.2-1.5 mcg/kg/hr, Last Rate: 0.2 mcg/kg/hr (04/14/23 0753)  DOPamine, 2-20 mcg/kg/min  EPINEPHrine, 0.02-0.1 mcg/kg/min, Last Rate: 0.08 mcg/kg/min (04/14/23 0400)  insulin, 0-100 Units/hr, Last Rate: 10.4 Units/hr (04/14/23 0639)  milrinone, 0.25-0.375 mcg/kg/min, Last Rate: 0.125 mcg/kg/min (04/13/23 2010)  niCARdipine, 5-15 mg/hr  nitroglycerin, 5-200 mcg/min, Last Rate: Stopped (04/13/23 2010)  norepinephrine, 0.02-0.2 mcg/kg/min, Last Rate: 0.02 mcg/kg/min (04/14/23 0534)  propofol, 5-50 mcg/kg/min, Last Rate: Stopped (04/14/23 0411)  sodium bicarbonate, 150 mEq, Last Rate: 150 mEq (04/14/23 0046)  sodium chloride, 30 mL/hr, Last Rate: 30 mL/hr (04/13/23 2010)  vasopressin, 0.03 Units/min, Last Rate: 0.03 Units/min (04/14/23 0653)        Assessment & Plan       NSTEMI (non-ST elevated myocardial infarction)    Type 2 diabetes mellitus with diabetic  chronic kidney disease    Essential hypertension    Hyperlipidemia    CKD (chronic kidney disease) stage 4, GFR 15-29 ml/min (HCC)    Dyspnea on exertion    Rhinovirus    Abnormal findings on diagnostic imaging of heart and coronary circulation    1. NSTEMI with 3v cad on cath 4/9/23.  Status post CABG times 44/13/23 receiving LIMA to distal LAD, SVG to diagonal, SVG to OM, SVG to LV branch, mitral valve repair with a 28 mm physio angioplasty ring.  2. DM  3. ESRD on HD-nephrology following, has line in right neck  4. Hypertension, blood pressure better  5. Hyperlipidemia.  Start rosuvastatin  6. LVEF 31-35%, no acute decompensated heart failure  7. Rhinovirus infection.   ID saw patient  8. < 50% bilateral internal carotid artery stenosis.  9. History of kidney cancer status post partial nephrectomy  10. Anemia-transfused last night.    Hopefully will be able to maintain sinus rhythm.  Is supposed to get hemodialysis today without any volume removal.      His blood pressure is low and cardiac output low-on multiple pressors.  Would recommend trying to decrease epinephrine and increase Levophed.  Check limited echo for pericardial effusion-loud pericardial friction rub- rule out any type of tamponade given his low output and pressure.  Spoke with nursing and CTA.    We will follow.    Deisy Sanchez MD  04/14/23  08:32 EDT

## 2023-04-14 NOTE — PROGRESS NOTES
Name: Lefty Cai ADMIT: 2023   : 1950  PCP: Daksha Ng APRN    MRN: [id number] LOS: 6 days   AGE/SEX: 72 y.o. male  ROOM:      Subjective   Subjective   Today patient appears lethargic, mildly sedated, & in no apparent distress.  Tolerating diet.  Discussed with RN at bedside.    Review of Systems   Constitutional: Negative for chills and fever.   Respiratory: Negative for cough and shortness of breath.    Cardiovascular: Negative for chest pain and leg swelling.   Gastrointestinal: Negative for abdominal pain, nausea and vomiting.   Genitourinary: Negative for difficulty urinating and dysuria.   Musculoskeletal: Negative for gait problem and myalgias.        Objective   Objective   Vital Signs  Temp:  [95.7 °F (35.4 °C)-99.5 °F (37.5 °C)] 98.6 °F (37 °C)  Heart Rate:  [60-97] 89  Resp:  [18-20] 18  BP: ()/(46-91) 108/61  FiO2 (%):  [39 %-98 %] 39 %  SpO2:  [96 %-100 %] 98 %  on  Flow (L/min):  [4] 4;   Device (Oxygen Therapy): nasal cannula  Body mass index is 32.96 kg/m².     Physical Exam  Constitutional:       General: He is not in acute distress.     Appearance: He is obese. He is not toxic-appearing.      Comments: Lethargic   Cardiovascular:      Rate and Rhythm: Normal rate.      Heart sounds: Normal heart sounds.      Comments: Pacer wires  Pulmonary:      Effort: Pulmonary effort is normal.      Breath sounds: Normal breath sounds.      Comments: Chest tubes  Abdominal:      Palpations: Abdomen is soft.      Comments: hypoactive   Musculoskeletal:      Right lower leg: No edema.      Left lower leg: No edema.   Skin:     General: Skin is warm and dry.     *Physical exam performed on 2023 as per above    Results Review     I reviewed the patient's new clinical results.  Results from last 7 days   Lab Units 23  1246 23  0635 23  0005 23  2041   WBC 10*3/mm3 15.84* 18.01* 21.71* 22.39*   HEMOGLOBIN g/dL 8.5* 8.4* 7.5* 9.6*   PLATELETS  10*3/mm3 64* 78* 118* 145     Results from last 7 days   Lab Units 04/14/23 1246 04/14/23 0635 04/14/23 0002 04/13/23 2041   SODIUM mmol/L 151* 148* 147* 144   POTASSIUM mmol/L 4.6 4.2 4.6 4.7   CHLORIDE mmol/L 111* 110* 107 108*   CO2 mmol/L 25.4 21.7* 21.0* 17.0*   BUN mg/dL 45* 48* 43* 42*   CREATININE mg/dL 5.76* 4.95* 4.96* 4.61*   GLUCOSE mg/dL 119* 222* 156* 165*   EGFR mL/min/1.73 9.7* 11.7* 11.6* 12.7*     Results from last 7 days   Lab Units 04/14/23 1246 04/14/23 0635 04/14/23 0002 04/13/23 2041 04/11/23 0321 04/09/23 2312 04/09/23 0447 04/08/23  1142   ALBUMIN g/dL 3.5 3.7 3.6 3.0*   < > 3.8   < > 3.5   BILIRUBIN mg/dL 0.4  --   --   --   --  0.3  --  0.4   ALK PHOS U/L 28*  --   --   --   --  71  --  68   AST (SGOT) U/L 41*  --   --   --   --  23  --  26   ALT (SGPT) U/L 8  --   --   --   --  17  --  11    < > = values in this interval not displayed.     Results from last 7 days   Lab Units 04/14/23 1246 04/14/23 0635 04/14/23 0002 04/13/23 2041 04/13/23  0239   CALCIUM mg/dL 9.1 8.6 8.1* 8.3* 8.6   ALBUMIN g/dL 3.5 3.7 3.6 3.0* 3.5   MAGNESIUM mg/dL 2.6* 2.5* 2.5* 2.8* 2.3   PHOSPHORUS mg/dL  --  3.3 4.6* 6.0* 5.0*     Results from last 7 days   Lab Units 04/09/23  0447 04/08/23  1142   PROCALCITONIN ng/mL  --  0.19   LACTATE mmol/L 1.0 2.1*     Glucose   Date/Time Value Ref Range Status   04/14/2023 1251 116 70 - 130 mg/dL Final     Comment:     Meter: [id number] : [id number] Imer Torres RN   04/14/2023 1152 118 70 - 130 mg/dL Final     Comment:     Meter: [id number] : [id number] Jaswant Vo RN   04/14/2023 1039 131 (H) 70 - 130 mg/dL Final     Comment:     Meter: [id number] : [id number] Imer Torres RN   04/14/2023 0840 179 (H) 70 - 130 mg/dL Final     Comment:     Meter: [id number] : [id number] Imer Torres RN   04/14/2023 0633 230 (H) 70 - 130 mg/dL Final     Comment:     Meter: [id number] : [id number] Carlos Hoffman RN   04/14/2023 0603 228 (H)  70 - 130 mg/dL Final     Comment:     Meter: [id number] : [id number] Carlos Hoffman RN   04/14/2023 0504 251 (H) 70 - 130 mg/dL Final     Comment:     Meter: [id number] : [id number] Carlos Hoffman RN       XR Chest 1 View    Result Date: 4/14/2023  Electronically signed by Mae Swain MD on 04-14-23 at 0816    XR Chest 1 View    Result Date: 4/13/2023  FINDINGS AND IMPRESSION: Endotracheal tube terminates between the clavicles. There is a left internal jugular pulmonary catheter with tip terminating over the mediastinum. A right internal jugular central venous catheter tip overlies the expected location of the right atrium. There are median sternotomy wires. 2 thoracostomy tubes overlie the mediastinum and left hemithorax. There are presumed epicardial pacing wires. Median sternotomy wires and prosthetic heart valve are present.  Hazy opacification is present throughout the right lung. There is also left basilar pulmonary opacification. While findings may be related to postsurgical changes and/or atelectasis, developing pulmonary edema and/or pneumonia cannot be excluded in the appropriate clinical context and correlation with patient history is recommended as well as continued attention on follow-up to ensure resolution. No pneumothorax is seen. Presumed right pleural effusion is present. Cardiac silhouette is within normal limits for size.  This report was finalized on 4/13/2023 9:31 PM by Dr. Yves Canas M.D.      I have personally reviewed all medications:  Scheduled Medications  acetaminophen, 1,000 mg, Intravenous, Q8H  aspirin, 81 mg, Oral, Daily  atorvastatin, 40 mg, Oral, Nightly  ceFAZolin, 1 g, Intravenous, Q24H  chlorhexidine, 15 mL, Mouth/Throat, Q12H  DULoxetine, 30 mg, Oral, Nightly  mupirocin, , Each Nare, BID  pantoprazole, 40 mg, Oral, QAM  senna-docusate sodium, 2 tablet, Oral, Nightly    Infusions  clevidipine, 2-32 mg/hr  dexmedetomidine, 0.2-1.5 mcg/kg/hr, Last Rate: Stopped  (04/14/23 0908)  DOPamine, 2-20 mcg/kg/min  EPINEPHrine, 0.02-0.1 mcg/kg/min, Last Rate: 0.03 mcg/kg/min (04/14/23 1211)  insulin, 0-100 Units/hr, Last Rate: 2.3 Units/hr (04/14/23 1153)  milrinone, 0.25-0.375 mcg/kg/min, Last Rate: 0.125 mcg/kg/min (04/13/23 2010)  niCARdipine, 5-15 mg/hr  nitroglycerin, 5-200 mcg/min, Last Rate: Stopped (04/13/23 2010)  norepinephrine, 0.02-0.2 mcg/kg/min, Last Rate: 0.08 mcg/kg/min (04/14/23 1153)  sodium bicarbonate, 150 mEq, Last Rate: 150 mEq (04/14/23 0046)  sodium chloride, 30 mL/hr, Last Rate: 30 mL/hr (04/13/23 2010)  vasopressin, 0.03 Units/min, Last Rate: 0.03 Units/min (04/14/23 0653)    Diet  NPO Diet NPO Type: Sips with Meds    I have personally reviewed:  [x]  Laboratory   []  Microbiology   []  Radiology   [x]  EKG/Telemetry  []  Cardiology/Vascular   []  Pathology    []  Records       Assessment/Plan     Active Hospital Problems    Diagnosis  POA   • **NSTEMI (non-ST elevated myocardial infarction) [I21.4]  Unknown   • Hypernatremia [E87.0]  Unknown   • Rhinovirus [B34.8]  Yes   • Dyspnea on exertion [R06.09]  Yes   • Abnormal findings on diagnostic imaging of heart and coronary circulation [R93.1]  Unknown   • Type 2 diabetes mellitus with diabetic chronic kidney disease [E11.22]  Yes   • Essential hypertension [I10]  Yes   • Hyperlipidemia [E78.5]  Yes   • CKD (chronic kidney disease) stage 4, GFR 15-29 ml/min ([facility]) [N18.4]  Yes      Resolved Hospital Problems   No resolved problems to display.       72 y.o. male admitted with NSTEMI (non-ST elevated myocardial infarction).    Cardiothoracic surgery following left heart catheterization shows severe three-vessel coronary disease with an occluded right coronary system s/p CABG x4 on 4/13/2023.  Extubated on 4/14/2023.  BP soft currently on multiple pressors--Levophed, vasopressin, epinephrine, milrinone.    Oxygen saturation 98 percent on 4 L nasal cannula in the setting of recent rhinovirus infection.  No evidence  of increased respiratory work effort or respiratory distress following extubation of mechanical ventilation.  Nebulizers available as needed, Flonase, incentive spirometry encouraged, and mucolytic's twice daily.  CT chest findings suggest atypical pneumonitis.  Unremarkable procalcitonin.  ID did not recommend empiric antibiotic therapy given no evidence for secondary bacterial infection.      Cardiology following plan limited echo evaluate for pericardial effusion rule out tamponade.    Nephrology managing ESRD plan HD on 4/14/2023.    HTN: BP soft.  See above.    Glucose trends acceptable.  Diet ordered.  Glucommander in post-op setting.    · Heparin gtt infusing adequate for DVT prophylaxis.  · CPR  · Discussed with patient & RN.  · Anticipate discharge pending clinical course    DUSTY Ford  [facility]  04/14/23  13:55 EDT

## 2023-04-14 NOTE — NURSING NOTE
At bedside to initiated dialysis but informed by nursing to confirm orders with nephrology. Per Dr. Reyes, dialysis treatment for today 4/14/23 cancelled

## 2023-04-14 NOTE — PROGRESS NOTES
LOS: 6 days   Patient Care Team:  Daksha Ng APRN as PCP - General (Family Medicine)  Rudy Faith MD as Consulting Physician (Ophthalmology)  Jose Mccall MD as Consulting Physician (Nephrology)  Quang Reyes MD as Consulting Physician (Nephrology)    Chief Complaint:post op    Subjective:  Activity level: Impaired due to weakness.      Confused     Vital Signs  Temp:  [95.7 °F (35.4 °C)-99.5 °F (37.5 °C)] 99.5 °F (37.5 °C)  Heart Rate:  [67-97] 86  Resp:  [16-23] 18  BP: ()/(46-91) 107/56  FiO2 (%):  [39 %-98 %] 39 %  Body mass index is 34.99 kg/m².    Intake/Output Summary (Last 24 hours) at 4/14/2023 0729  Last data filed at 4/14/2023 0700  Gross per 24 hour   Intake 4958.12 ml   Output 1255 ml   Net 3703.12 ml     No intake/output data recorded.    Chest tube drainage last 8 hours 90        04/12/23  0700 04/13/23  0604 04/14/23  0600   Weight: 100 kg (220 lb 14.4 oz) 100 kg (220 lb 12.8 oz) 111 kg (243 lb 13.3 oz)         Objective    Results Review:        WBC WBC   Date Value Ref Range Status   04/14/2023 18.01 (H) 3.40 - 10.80 10*3/mm3 Final   04/14/2023 21.71 (H) 3.40 - 10.80 10*3/mm3 Final   04/13/2023 22.39 (H) 3.40 - 10.80 10*3/mm3 Final   04/13/2023 11.54 (H) 3.40 - 10.80 10*3/mm3 Final   04/12/2023 9.66 3.40 - 10.80 10*3/mm3 Final      HGB Hemoglobin   Date Value Ref Range Status   04/14/2023 8.4 (L) 13.0 - 17.7 g/dL Final   04/14/2023 7.5 (L) 13.0 - 17.7 g/dL Final   04/13/2023 9.6 (L) 13.0 - 17.7 g/dL Final   04/13/2023 7.5 (C) 12.0 - 17.0 g/dL Final   04/13/2023 7.5 (C) 12.0 - 17.0 g/dL Final   04/13/2023 7.1 (C) 12.0 - 17.0 g/dL Final   04/13/2023 7.8 (C) 12.0 - 17.0 g/dL Final   04/13/2023 7.5 (C) 12.0 - 17.0 g/dL Final   04/13/2023 7.8 (C) 12.0 - 17.0 g/dL Final   04/13/2023 6.8 (C) 12.0 - 17.0 g/dL Final   04/13/2023 5.4 (C) 12.0 - 17.0 g/dL Final   04/13/2023 5.4 (C) 12.0 - 17.0 g/dL Final   04/13/2023 11.6 (L) 12.0 - 17.0 g/dL Final   04/13/2023 10.8 (L)  13.0 - 17.7 g/dL Final   04/12/2023 11.0 (L) 13.0 - 17.7 g/dL Final      HCT Hematocrit   Date Value Ref Range Status   04/14/2023 25.7 (L) 37.5 - 51.0 % Final   04/14/2023 22.3 (L) 37.5 - 51.0 % Final   04/13/2023 29.2 (L) 37.5 - 51.0 % Final   04/13/2023 22 (L) 38 - 51 % Final   04/13/2023 22 (L) 38 - 51 % Final   04/13/2023 21 (L) 38 - 51 % Final   04/13/2023 23 (L) 38 - 51 % Final   04/13/2023 22 (L) 38 - 51 % Final   04/13/2023 23 (L) 38 - 51 % Final   04/13/2023 20 (L) 38 - 51 % Final   04/13/2023 16 (L) 38 - 51 % Final   04/13/2023 16 (L) 38 - 51 % Final   04/13/2023 34 (L) 38 - 51 % Final   04/13/2023 31.9 (L) 37.5 - 51.0 % Final   04/12/2023 32.2 (L) 37.5 - 51.0 % Final      Platelets Platelets   Date Value Ref Range Status   04/14/2023 78 (L) 140 - 450 10*3/mm3 Final   04/14/2023 118 (L) 140 - 450 10*3/mm3 Final   04/13/2023 145 140 - 450 10*3/mm3 Final   04/13/2023 194 140 - 450 10*3/mm3 Final   04/12/2023 232 140 - 450 10*3/mm3 Final        PT/INR:    Protime   Date Value Ref Range Status   04/14/2023 19.1 (H) 11.7 - 14.2 Seconds Final   04/13/2023 19.1 (H) 11.7 - 14.2 Seconds Final   /  INR   Date Value Ref Range Status   04/14/2023 1.58 (H) 0.90 - 1.10 Final   04/13/2023 1.58 (H) 0.90 - 1.10 Final       Sodium Sodium   Date Value Ref Range Status   04/14/2023 148 (H) 136 - 145 mmol/L Final   04/14/2023 147 (H) 136 - 145 mmol/L Final   04/13/2023 144 136 - 145 mmol/L Final   04/13/2023 136 136 - 145 mmol/L Final   04/12/2023 138 136 - 145 mmol/L Final      Potassium Potassium   Date Value Ref Range Status   04/14/2023 4.2 3.5 - 5.2 mmol/L Final   04/14/2023 4.6 3.5 - 5.2 mmol/L Final   04/13/2023 4.7 3.5 - 5.2 mmol/L Final     Comment:     Slight hemolysis detected by analyzer. Results may be affected.   04/13/2023 4.8 3.5 - 5.2 mmol/L Final     Comment:     Specimen hemolyzed.  Results may be affected.   04/12/2023 4.3 3.5 - 5.2 mmol/L Final      Chloride Chloride   Date Value Ref Range Status    04/14/2023 110 (H) 98 - 107 mmol/L Final   04/14/2023 107 98 - 107 mmol/L Final   04/13/2023 108 (H) 98 - 107 mmol/L Final   04/13/2023 104 98 - 107 mmol/L Final   04/12/2023 106 98 - 107 mmol/L Final      Bicarbonate CO2   Date Value Ref Range Status   04/14/2023 21.7 (L) 22.0 - 29.0 mmol/L Final   04/14/2023 21.0 (L) 22.0 - 29.0 mmol/L Final   04/13/2023 17.0 (L) 22.0 - 29.0 mmol/L Final   04/13/2023 21.0 (L) 22.0 - 29.0 mmol/L Final   04/12/2023 21.0 (L) 22.0 - 29.0 mmol/L Final      BUN BUN   Date Value Ref Range Status   04/14/2023 48 (H) 8 - 23 mg/dL Final   04/14/2023 43 (H) 8 - 23 mg/dL Final   04/13/2023 42 (H) 8 - 23 mg/dL Final   04/13/2023 44 (H) 8 - 23 mg/dL Final   04/12/2023 31 (H) 8 - 23 mg/dL Final      Creatinine Creatinine   Date Value Ref Range Status   04/14/2023 4.95 (H) 0.76 - 1.27 mg/dL Final   04/14/2023 4.96 (H) 0.76 - 1.27 mg/dL Final   04/13/2023 4.61 (H) 0.76 - 1.27 mg/dL Final   04/13/2023 5.01 (H) 0.76 - 1.27 mg/dL Final   04/12/2023 3.53 (H) 0.76 - 1.27 mg/dL Final      Calcium Calcium   Date Value Ref Range Status   04/14/2023 8.6 8.6 - 10.5 mg/dL Final   04/14/2023 8.1 (L) 8.6 - 10.5 mg/dL Final   04/13/2023 8.3 (L) 8.6 - 10.5 mg/dL Final   04/13/2023 8.6 8.6 - 10.5 mg/dL Final   04/12/2023 8.2 (L) 8.6 - 10.5 mg/dL Final      Magnesium Magnesium   Date Value Ref Range Status   04/14/2023 2.5 (H) 1.6 - 2.4 mg/dL Final   04/14/2023 2.5 (H) 1.6 - 2.4 mg/dL Final   04/13/2023 2.8 (H) 1.6 - 2.4 mg/dL Final   04/13/2023 2.3 1.6 - 2.4 mg/dL Final   04/12/2023 2.1 1.6 - 2.4 mg/dL Final          acetaminophen, 1,000 mg, Intravenous, Q8H  acetaminophen, 650 mg, Oral, Q4H   Or  acetaminophen, 650 mg, Oral, Q4H   Or  acetaminophen, 650 mg, Rectal, Q4H  aspirin, 81 mg, Oral, Daily  atorvastatin, 40 mg, Oral, Nightly  ceFAZolin, 1 g, Intravenous, Q24H  chlorhexidine, 15 mL, Mouth/Throat, Q12H  metoclopramide, 10 mg, Intravenous, Q6H  metoprolol tartrate, 12.5 mg, Oral, Q12H  mupirocin, , Each  Nare, BID  pantoprazole, 40 mg, Oral, QAM  senna-docusate sodium, 2 tablet, Oral, Nightly      clevidipine, 2-32 mg/hr  dexmedetomidine, 0.2-1.5 mcg/kg/hr, Last Rate: 0.4 mcg/kg/hr (04/14/23 0615)  DOPamine, 2-20 mcg/kg/min  EPINEPHrine, 0.02-0.1 mcg/kg/min, Last Rate: 0.08 mcg/kg/min (04/14/23 0400)  insulin, 0-100 Units/hr, Last Rate: 10.4 Units/hr (04/14/23 0639)  milrinone, 0.25-0.375 mcg/kg/min, Last Rate: 0.125 mcg/kg/min (04/13/23 2010)  niCARdipine, 5-15 mg/hr  nitroglycerin, 5-200 mcg/min, Last Rate: Stopped (04/13/23 2010)  norepinephrine, 0.02-0.2 mcg/kg/min, Last Rate: 0.02 mcg/kg/min (04/14/23 0534)  propofol, 5-50 mcg/kg/min, Last Rate: Stopped (04/14/23 0411)  sodium bicarbonate, 150 mEq, Last Rate: 150 mEq (04/14/23 0046)  sodium chloride, 30 mL/hr, Last Rate: 30 mL/hr (04/13/23 2010)  vasopressin, 0.03 Units/min, Last Rate: 0.03 Units/min (04/14/23 0653)            Patient Active Problem List   Diagnosis Code   • 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.22   • Essential hypertension I10   • Hyperlipidemia E78.5   • Primary insomnia F51.01   • CKD (chronic kidney disease) stage 4, GFR 15-29 ml/min ([facility]) N18.4   • Vitamin D deficiency E55.9   • Diverticulitis of large intestine with perforation and abscess without bleeding K57.20   • Obesity (BMI 30-39.9) E66.9   • Impaired mobility and ADLs Z74.09, Z78.9   • History of colon resection Z90.49   • Colon polyps K63.5   • Diverticulosis K57.90   • CKD (chronic kidney disease) stage 5, GFR less than 15 ml/min N18.5   • Dyspnea on exertion R06.09   • NSTEMI (non-ST elevated myocardial infarction) I21.4   • Rhinovirus B34.8   • Abnormal findings on diagnostic imaging of heart and coronary circulation R93.1       Assessment & Plan    - NSTEMI/multi-vessel CAD- s/p urgent CABGx4 LIMA/RSVG, MV repair-([place]) POD#1  - HFrEF--30-35% per echo 4/8  - rhinovirus infection  - ESRD on HD  - hypertension  - hyperlipidemia  - DM II- A1c  5.0  -post op anemia- expected acute blood loss  -Leukocytosis- probable reactive   -TCP- plt count 78-- hold lovenox    Extubated this morning  Drowsy this morning/confused-- probably from encephalopathy   Requiring a lot of pressors-- vaso 0.03, levo at 0.02 and epi at 0.08, milrinone 0.125  Cardiac index 2.0, SVR 1900  Will decrease epinephrine and go up on the levophed  Will repeat a CBC may benefit from some more blood  Plans for dialysis today for toxin removal  Encourage pulmonary toilet   Continue supportive care       DUSTY Spring  04/14/23  07:29 EDT

## 2023-04-14 NOTE — ANESTHESIA POSTPROCEDURE EVALUATION
"Patient: Lefty Cai    Procedure Summary     Date: 04/13/23 Room / Location: [place] [address] / [facility] CARDIOVASCULAR OPERATING ROOM    Anesthesia Start: 1257 Anesthesia Stop: 2014    Procedure: MAT STERNOTOMY CORONARY ARTERY BYPASS GRAFT TIMES 4 USING LEFT INTERNAL MAMMARY ARTERY AND RIGHT GREATER SAPHENOUS VEIN  PER ENDOSCOPIC VEIN HARVESTING, MITRAL VALVE REPAIR  AND PRP (Chest) Diagnosis:       NSTEMI (non-ST elevated myocardial infarction)      Abnormal findings on diagnostic imaging of heart and coronary circulation      (NSTEMI (non-ST elevated myocardial infarction) [I21.4])      (Abnormal findings on diagnostic imaging of heart and coronary circulation [R93.1])    Surgeons: Mirtha Zuluaga MD Provider: Shun Landers MD    Anesthesia Type: general ASA Status: 4          Anesthesia Type: general    Vitals  Vitals Value Taken Time   /57 04/14/23 1501   Temp 37 °C (98.6 °F) 04/14/23 1536   Pulse 89 04/14/23 1536   Resp 18 04/14/23 0800   SpO2 98 % 04/14/23 1536   Vitals shown include unvalidated device data.        Post Anesthesia Care and Evaluation    Pain management: adequate    Airway patency: patent  Anesthetic complications: No anesthetic complications    Cardiovascular status: acceptable  Respiratory status: acceptable  Hydration status: acceptable    Comments: /58   Pulse 89   Temp 37 °C (98.6 °F)   Resp 18   Ht 182.9 cm (72\")   Wt 110 kg (243 lb)   SpO2 98%   BMI 32.96 kg/m²         "

## 2023-04-14 NOTE — SIGNIFICANT NOTE
04/14/23 1045   OTHER   Discipline physical therapist   Rehab Time/Intention   Session Not Performed other (see comments)  (pt just extubated this morning per RN, not ready for PT eval. will check back tomorrow)   Recommendation   PT - Next Appointment 04/15/23

## 2023-04-14 NOTE — OP NOTE
BCS OPERATIVE NOTE    Date of procedure:4/13/2023    Pre-op Diagnosis: Non-STEMI, ischemic cardiomyopathy and systolic/diastolic congestive heart failure, left main and three-vessel coronary disease, severe mitral valve regurgitation, chronic kidney disease stage IV on hemodialysis, diabetes mellitus, hyperlipidemia, obesity with BMI 31.68 kg per metered square.    Post-op Diagnosis: Same.    Procedure: Urgent coronary artery bypass x4 with LIMA to distal LAD, saphenous vein to diagonal, saphenous vein to OM, saphenous vein to LV branch, mitral valve repair using 28 mm physio 2 annuloplasty ring ([employer]).  Right lower extremity endoscopic vein harvest.  PRP application to LIMA bed and sternum.  Intraoperative transesophageal echocardiogram.    Surgeon: Mirtha Zuluaga MD    Assistant: TIMA Macdonald/TIMA Corona (Assistants performed saphenous vein harvest and provided specialized assistance including suctioning, cutting of sutures, and aiding in exposure; this was necessary for the success of this surgical procedure).    Cardiologist: Lobito Garcia MD    Anesthesia: GET    Findings: Transesophageal echocardiogram showed left ventricular ejection fraction of 20% with severe mitral valve regurgitation.  Left internal mammary artery and saphenous vein adequate.  Left ventricular branch 1.75 mm, OM branch 1.75 mm, diagonal branch 1.75 mm, distal LAD 2 mm.  Following mitral valve repair and cardiopulmonary bypass session, transesophageal echocardiogram showed no mitral valve regurgitation.  Because of the patient's poor baseline ventricular function he required high dose inotropic support, including epinephrine, norepinephrine, and milrinone.    Aortic cross-clamp time: 141 min    Cardiopulmonary bypass time: 178 min    Cellsaver/EBL: 300 ml    Antegrade and retrograde cardioplegia.    Arterial cannula: 20 Citizen [place] to aorta    Venous cannula: 29/37 [place] to right atrium    Specimen:  None.    History of present illness: There is a pleasant 72-year-old  gentleman with worsening dyspnea over several days prior to admission.  On admission he was also noted to have inferior T wave inversions.  Left ventricular ejection fraction on echocardiogram was only 25 to 30% at best.  He was ruled in for non-ST elevation myocardial infarction with a working diagnosis of ischemic cardiomyopathy.  There was also suspicion that he might have significant mitral valve regurgitation giving a significant murmur on exam.  His STS morbidity and mortality risks (considerable) were calculated and discussed prior to surgery.  The possibility of an internal balloon pump or Impella device following his procedure was also discussed.  The dangers of tobacco abuse and illicit drug use were discussed in detail with the patient; this included the benefits of tobacco and illicit drug use cessation.    Details: The patient was identified in the prep and holding area.  He was taken to the OR.  Following induction he was intubated.  He received the PA catheter, radial arterial line, and Reddy catheter.  He was prepped and draped.    Transesophageal echocardiogram showed left ventricular ejection fraction approximately 20% (at best) and severe mitral valve regurgitation.    A midline sternotomy was performed.  The left internal mammary artery was harvested as a pedicle.  The patient was heparinized and the pedicle was transected.  The left pleural chest was placed.  During LIMA harvest the patient underwent endoscopic exploration of his right lower extremity.  This revealed adequate saphenous vein conduit which was harvested and prepared.  The sternum was spread.  The pericardium was opened and a well was created.  Aorta was palpated and felt to be free of any worrisome atherosclerotic plaque.  ACT was confirmed.  The ascending aorta and right atrium were cannulated.  Antegrade and retrograde cardioplegia lines were  inserted.  Cardiopulmonary bypass ensued and the patient was cooled to 34 °C.  Sondergaard's groove was developed electrocautery.  An aortic cross-clamp was applied.  The patient received antegrade and retrograde cardioplegia, resulting in diastolic arrest.  At regular intervals additional retrograde cardioplegia doses were given.  CO2 was also used to insufflate the field.    A left atriotomy was created.  This was retracted and the mitral valve was carefully examined.  The subvalvular apparatus was relatively spared.  The primary mode of regurgitation appeared to be dilatation of the posterior annulus.  2 Ethibond sutures were applied to the mitral valve annulus.  Using the anterior leaflet as a guide a 28 mm physio 2 annuloplasty ring was chosen.  The sutures were placed through the sewing ring of the prosthesis.  The procedure was then lowered onto the mitral valve annulus.  The sutures were secured with a core knot device.  The mitral valve was tested and noted to have no significant regurgitation.  The left atrium was then closed with a doubled over 4 Prolene suture.    The inferior wall was exposed.  The LV branch was identified.  An arteriotomy was created.  A saphenous vein segment was anastomosed to this.  The proximal end was anastomosed to an aortotomy and marked with a metallic ring.    The lateral wall was exposed and the OM branch was identified.  An arteriotomy was created.  A saphenous vein segment was anastomosed to this.  The proximal end was anastomosed to a second aortotomy and marked with a second metallic ring.    The diagonal branch was identified.  An arteriotomy was created.  A saphenous vein segment was anastomosed to this.  The proximal end was anastomosed to a third aortotomy and marked with a third metallic ring.    A pericardial window was used to deliver the LIMA pedicle into the well.  A distal LAD arteriotomy was created and then anastomosed to the end of the LIMA pedicle.  The  pedicle was anchored to the epicardium.    After a hotshot antegrade cardioplegia dose the aortic cross-clamp was removed and the bulldog clamp was removed from the LIMA.  The patient recovered into a slow sinus rhythm.  Eventually 1 defibrillation shock was required to keep the patient in regular rhythm.  The saphenous veins were de-aired and their bulldog clamps were removed.  The proximal and distal anastomoses were hemostatic.  The left atriotomy was hemostatic.  The retrograde cardioplegia line was removed.  After sufficient de-airing the antegrade cardioplegia line/vent was removed.  The patient was weaned off cardiopulmonary bypass.    The venous line was removed.  After protamine dose the arterial line was removed.  Both cannulation sites were reinforced.  The pericardium was loosely approximated.  A substernal chest was placed.  Steel wires were used to close the sternum.  Sponge counts, instrument counts, needle counts were correct.  The fascia layer, subdermal layer, and subcuticular were closed.  The leg incision were closed in similar fashion.  Bandages were applied.  The patient was transferred to the CVR.    Mirtha Zuluaga MD  4/13/2023  20:08 EDT

## 2023-04-14 NOTE — PROGRESS NOTES
[facility] Progress Note      Patient Name: Lefty Cai  : 1950  MRN: [id number]  Primary Care Physician:  Daksha Ng APRN  Date of admission: 2023    Subjective     Interval History:   Follow-up end-stage renal disease    The patient underwent four-vessel CABG and mitral valve repair, on 2023.  He was extubated this morning but he is very confused    Review of Systems:   Not obtainable at this    Objective     Vitals:   Temp:  [95.7 °F (35.4 °C)-99.5 °F (37.5 °C)] 99 °F (37.2 °C)  Heart Rate:  [85-97] 89  Resp:  [18-20] 18  BP: ()/(46-91) 102/55  Flow (L/min):  [4] 4  FiO2 (%):  [39 %-98 %] 39 %    Intake/Output Summary (Last 24 hours) at 2023 1028  Last data filed at 2023 1000  Gross per 24 hour   Intake 4958.12 ml   Output 1590 ml   Net 3368.12 ml       Physical Exam:    General Appearance: Patient is very confused, no acute distress and chronically ill  Skin: warm and dry  HEENT: oral mucosa normal, nonicteric sclera  Neck: supple, no JVD, tunneled dialysis catheter in the right IJ with exit site below the right clavicle  Lungs: Bilateral rhonchi, breathing effort not labored  Heart: RRR, normal S1 and S2, no S3, no rub  Abdomen: soft, nontender, nondistended, normoactive bowel  : no palpable bladder, Reddy catheter anchored in place  Extremities: no edema, cyanosis or clubbing, functional AV fistula in the left forearm with good thrill and bruit  Neuro: Unable to assess    Scheduled Meds:     acetaminophen, 1,000 mg, Intravenous, Q8H  acetaminophen, 650 mg, Oral, Q4H   Or  acetaminophen, 650 mg, Oral, Q4H   Or  acetaminophen, 650 mg, Rectal, Q4H  aspirin, 81 mg, Oral, Daily  atorvastatin, 40 mg, Oral, Nightly  ceFAZolin, 1 g, Intravenous, Q24H  chlorhexidine, 15 mL, Mouth/Throat, Q12H  DULoxetine, 30 mg, Oral, Nightly  mupirocin, , Each Nare, BID  pantoprazole, 40 mg, Oral, QAM  senna-docusate sodium, 2 tablet, Oral, Nightly      IV  Meds:   clevidipine, 2-32 mg/hr  dexmedetomidine, 0.2-1.5 mcg/kg/hr, Last Rate: Stopped (04/14/23 0908)  DOPamine, 2-20 mcg/kg/min  EPINEPHrine, 0.02-0.1 mcg/kg/min, Last Rate: 0.07 mcg/kg/min (04/14/23 0908)  insulin, 0-100 Units/hr, Last Rate: 7.3 Units/hr (04/14/23 0845)  milrinone, 0.25-0.375 mcg/kg/min, Last Rate: 0.125 mcg/kg/min (04/13/23 2010)  niCARdipine, 5-15 mg/hr  nitroglycerin, 5-200 mcg/min, Last Rate: Stopped (04/13/23 2010)  norepinephrine, 0.02-0.2 mcg/kg/min, Last Rate: 0.02 mcg/kg/min (04/14/23 0937)  sodium bicarbonate, 150 mEq, Last Rate: 150 mEq (04/14/23 0046)  sodium chloride, 30 mL/hr, Last Rate: 30 mL/hr (04/13/23 2010)  vasopressin, 0.03 Units/min, Last Rate: 0.03 Units/min (04/14/23 0653)        Results Reviewed:   I have personally reviewed the results from the time of this admission to 4/14/2023 10:28 EDT     Results from last 7 days   Lab Units 04/14/23  0635 04/14/23  0002 04/13/23  2041 04/11/23  0321 04/09/23  2312 04/09/23  0447 04/08/23  1142   SODIUM mmol/L 148* 147* 144   < > 133*   < > 139   POTASSIUM mmol/L 4.2 4.6 4.7   < > 4.2   < > 3.9   CHLORIDE mmol/L 110* 107 108*   < > 100   < > 104   CO2 mmol/L 21.7* 21.0* 17.0*   < > 20.7*   < > 20.1*   BUN mg/dL 48* 43* 42*   < > 27*   < > 27*   CREATININE mg/dL 4.95* 4.96* 4.61*   < > 4.31*   < > 4.67*   CALCIUM mg/dL 8.6 8.1* 8.3*   < > 9.2   < > 8.6   BILIRUBIN mg/dL  --   --   --   --  0.3  --  0.4   ALK PHOS U/L  --   --   --   --  71  --  68   ALT (SGPT) U/L  --   --   --   --  17  --  11   AST (SGOT) U/L  --   --   --   --  23  --  26   GLUCOSE mg/dL 222* 156* 165*   < > 163*   < > 123*    < > = values in this interval not displayed.       Estimated Creatinine Clearance: 16.6 mL/min (A) (by C-G formula based on SCr of 4.95 mg/dL (H)).    Results from last 7 days   Lab Units 04/14/23  0635 04/14/23  0002 04/13/23  2041   MAGNESIUM mg/dL 2.5* 2.5* 2.8*   PHOSPHORUS mg/dL 3.3 4.6* 6.0*             Results from last 7 days   Lab  Units 04/14/23  0635 04/14/23  0005 04/13/23 2041 04/13/23  1932 04/13/23  1901 04/13/23  1318 04/13/23  0239 04/12/23  0129   WBC 10*3/mm3 18.01* 21.71* 22.39*  --   --   --  11.54* 9.66   HEMOGLOBIN g/dL 8.4* 7.5* 9.6*  --   --   --  10.8* 11.0*   HEMOGLOBIN, POC g/dL  --   --   --  7.5* 7.5*   < >  --   --    PLATELETS 10*3/mm3 78* 118* 145  --   --   --  194 232    < > = values in this interval not displayed.       Results from last 7 days   Lab Units 04/14/23  0635 04/13/23 2041 04/08/23  1925   INR  1.58* 1.58* 1.15*       Assessment / Plan     ASSESSMENT:  -End-stage renal disease and diabetic and hypertensive glomerulosclerosis on maintenance hemodialysis every Tuesday, Thursday and Saturday, his volume status and electrolyte within acceptable range, plan for dialysis today, his creatinine today 4.95 potassium four-point  -Diabetes mellitus type 2 with renal complication  -Hypertension with chronic kidney disease, well controlled today  -Anemia of ESRD, hemoglobin today is down to 8.4 postoperatively  -Thrombocytopenia platelet down to 78,000  -Very artery disease underwent four-vessel CABG mitral valve repair on 4/13/2023  -Secondary hyperparathyroidism of renal origin  -Altered mental status postoperatively patient has encephalopathy.  -Cardiogenic shock currently on multiple vasopressors and milrinone    PLAN:  -Hemodialysis today  -Surveillance labs  Discussed the case with the patient's nurse  I discussed the case with cardiothoracic service    Thank you for involving us in the care of Lefty Cai.  Please feel free to call with any questions.    Quang Reyes MD  04/14/23  10:28 EDT    [facility]  [phone]    Please note that portions of this note were completed with a voice recognition program.

## 2023-04-15 ENCOUNTER — APPOINTMENT (OUTPATIENT)
Dept: GENERAL RADIOLOGY | Facility: HOSPITAL | Age: 73
DRG: 216 | End: 2023-04-15
Payer: MEDICARE

## 2023-04-15 LAB
ALBUMIN SERPL-MCNC: 3.2 G/DL (ref 3.5–5.2)
ALBUMIN SERPL-MCNC: 3.3 G/DL (ref 3.5–5.2)
ALBUMIN/GLOB SERPL: 2.3 G/DL
ALP SERPL-CCNC: 38 U/L (ref 39–117)
ALT SERPL W P-5'-P-CCNC: <5 U/L (ref 1–41)
ANION GAP SERPL CALCULATED.3IONS-SCNC: 12 MMOL/L (ref 5–15)
ANION GAP SERPL CALCULATED.3IONS-SCNC: 16 MMOL/L (ref 5–15)
ARTERIAL PATENCY WRIST A: ABNORMAL
AST SERPL-CCNC: 29 U/L (ref 1–40)
ATMOSPHERIC PRESS: 744.6 MMHG
BASE EXCESS BLDA CALC-SCNC: -0.4 MMOL/L (ref 0–2)
BASOPHILS # BLD AUTO: 0.03 10*3/MM3 (ref 0–0.2)
BASOPHILS NFR BLD AUTO: 0.2 % (ref 0–1.5)
BDY SITE: ABNORMAL
BILIRUB SERPL-MCNC: 0.4 MG/DL (ref 0–1.2)
BUN SERPL-MCNC: 25 MG/DL (ref 8–23)
BUN SERPL-MCNC: 30 MG/DL (ref 8–23)
BUN/CREAT SERPL: 7 (ref 7–25)
BUN/CREAT SERPL: 7.3 (ref 7–25)
CALCIUM SPEC-SCNC: 8.2 MG/DL (ref 8.6–10.5)
CALCIUM SPEC-SCNC: 8.6 MG/DL (ref 8.6–10.5)
CHLORIDE SERPL-SCNC: 103 MMOL/L (ref 98–107)
CHLORIDE SERPL-SCNC: 105 MMOL/L (ref 98–107)
CO2 SERPL-SCNC: 23 MMOL/L (ref 22–29)
CO2 SERPL-SCNC: 26 MMOL/L (ref 22–29)
CREAT SERPL-MCNC: 3.57 MG/DL (ref 0.76–1.27)
CREAT SERPL-MCNC: 4.11 MG/DL (ref 0.76–1.27)
DEPRECATED RDW RBC AUTO: 48.4 FL (ref 37–54)
DEPRECATED RDW RBC AUTO: 49.4 FL (ref 37–54)
DEPRECATED RDW RBC AUTO: 50.2 FL (ref 37–54)
EGFRCR SERPLBLD CKD-EPI 2021: 14.6 ML/MIN/1.73
EGFRCR SERPLBLD CKD-EPI 2021: 17.3 ML/MIN/1.73
EOSINOPHIL # BLD AUTO: 0.02 10*3/MM3 (ref 0–0.4)
EOSINOPHIL NFR BLD AUTO: 0.2 % (ref 0.3–6.2)
ERYTHROCYTE [DISTWIDTH] IN BLOOD BY AUTOMATED COUNT: 15.5 % (ref 12.3–15.4)
ERYTHROCYTE [DISTWIDTH] IN BLOOD BY AUTOMATED COUNT: 15.6 % (ref 12.3–15.4)
ERYTHROCYTE [DISTWIDTH] IN BLOOD BY AUTOMATED COUNT: 15.9 % (ref 12.3–15.4)
GAS FLOW AIRWAY: 3 LPM
GLOBULIN UR ELPH-MCNC: 1.4 GM/DL
GLUCOSE BLDC GLUCOMTR-MCNC: 119 MG/DL (ref 70–130)
GLUCOSE BLDC GLUCOMTR-MCNC: 145 MG/DL (ref 70–130)
GLUCOSE BLDC GLUCOMTR-MCNC: 157 MG/DL (ref 70–130)
GLUCOSE BLDC GLUCOMTR-MCNC: 161 MG/DL (ref 70–130)
GLUCOSE BLDC GLUCOMTR-MCNC: 163 MG/DL (ref 70–130)
GLUCOSE BLDC GLUCOMTR-MCNC: 165 MG/DL (ref 70–130)
GLUCOSE SERPL-MCNC: 159 MG/DL (ref 65–99)
GLUCOSE SERPL-MCNC: 162 MG/DL (ref 65–99)
HCO3 BLDA-SCNC: 25.8 MMOL/L (ref 22–28)
HCT VFR BLD AUTO: 23.2 % (ref 37.5–51)
HCT VFR BLD AUTO: 26.1 % (ref 37.5–51)
HCT VFR BLD AUTO: 27 % (ref 37.5–51)
HGB BLD-MCNC: 7.8 G/DL (ref 13–17.7)
HGB BLD-MCNC: 8.9 G/DL (ref 13–17.7)
HGB BLD-MCNC: 8.9 G/DL (ref 13–17.7)
IMM GRANULOCYTES # BLD AUTO: 0.07 10*3/MM3 (ref 0–0.05)
IMM GRANULOCYTES NFR BLD AUTO: 0.5 % (ref 0–0.5)
IRON 24H UR-MRATE: 28 MCG/DL (ref 59–158)
IRON SATN MFR SERPL: 23 % (ref 20–50)
LYMPHOCYTES # BLD AUTO: 1.04 10*3/MM3 (ref 0.7–3.1)
LYMPHOCYTES NFR BLD AUTO: 7.9 % (ref 19.6–45.3)
MAGNESIUM SERPL-MCNC: 2.1 MG/DL (ref 1.6–2.4)
MCH RBC QN AUTO: 28.7 PG (ref 26.6–33)
MCH RBC QN AUTO: 29.2 PG (ref 26.6–33)
MCH RBC QN AUTO: 29.6 PG (ref 26.6–33)
MCHC RBC AUTO-ENTMCNC: 33 G/DL (ref 31.5–35.7)
MCHC RBC AUTO-ENTMCNC: 33.6 G/DL (ref 31.5–35.7)
MCHC RBC AUTO-ENTMCNC: 34.1 G/DL (ref 31.5–35.7)
MCV RBC AUTO: 85.3 FL (ref 79–97)
MCV RBC AUTO: 86.7 FL (ref 79–97)
MCV RBC AUTO: 88.5 FL (ref 79–97)
MODALITY: ABNORMAL
MONOCYTES # BLD AUTO: 0.97 10*3/MM3 (ref 0.1–0.9)
MONOCYTES NFR BLD AUTO: 7.4 % (ref 5–12)
NEUTROPHILS NFR BLD AUTO: 11.06 10*3/MM3 (ref 1.7–7)
NEUTROPHILS NFR BLD AUTO: 83.8 % (ref 42.7–76)
NRBC BLD AUTO-RTO: 0.2 /100 WBC (ref 0–0.2)
PCO2 BLDA: 49 MM HG (ref 35–45)
PH BLDA: 7.33 PH UNITS (ref 7.35–7.45)
PHOSPHATE SERPL-MCNC: 4.6 MG/DL (ref 2.5–4.5)
PLATELET # BLD AUTO: 47 10*3/MM3 (ref 140–450)
PLATELET # BLD AUTO: 49 10*3/MM3 (ref 140–450)
PLATELET # BLD AUTO: 55 10*3/MM3 (ref 140–450)
PMV BLD AUTO: 10.7 FL (ref 6–12)
PMV BLD AUTO: 10.7 FL (ref 6–12)
PMV BLD AUTO: 11.1 FL (ref 6–12)
PO2 BLDA: 89.9 MM HG (ref 80–100)
POTASSIUM SERPL-SCNC: 4.4 MMOL/L (ref 3.5–5.2)
POTASSIUM SERPL-SCNC: 4.5 MMOL/L (ref 3.5–5.2)
PROT SERPL-MCNC: 4.6 G/DL (ref 6–8.5)
QT INTERVAL: 370 MS
RBC # BLD AUTO: 2.72 10*6/MM3 (ref 4.14–5.8)
RBC # BLD AUTO: 3.01 10*6/MM3 (ref 4.14–5.8)
RBC # BLD AUTO: 3.05 10*6/MM3 (ref 4.14–5.8)
SAO2 % BLDCOA: 96.1 % (ref 92–99)
SODIUM SERPL-SCNC: 142 MMOL/L (ref 136–145)
SODIUM SERPL-SCNC: 143 MMOL/L (ref 136–145)
TIBC SERPL-MCNC: 122 MCG/DL (ref 298–536)
TRANSFERRIN SERPL-MCNC: 82 MG/DL (ref 200–360)
WBC NRBC COR # BLD: 12.77 10*3/MM3 (ref 3.4–10.8)
WBC NRBC COR # BLD: 12.97 10*3/MM3 (ref 3.4–10.8)
WBC NRBC COR # BLD: 13.19 10*3/MM3 (ref 3.4–10.8)

## 2023-04-15 PROCEDURE — 84466 ASSAY OF TRANSFERRIN: CPT | Performed by: NURSE PRACTITIONER

## 2023-04-15 PROCEDURE — 25010000002 CALCIUM GLUCONATE-NACL 1-0.675 GM/50ML-% SOLUTION: Performed by: NURSE PRACTITIONER

## 2023-04-15 PROCEDURE — 85025 COMPLETE CBC W/AUTO DIFF WBC: CPT | Performed by: INTERNAL MEDICINE

## 2023-04-15 PROCEDURE — 83735 ASSAY OF MAGNESIUM: CPT | Performed by: INTERNAL MEDICINE

## 2023-04-15 PROCEDURE — 25010000002 AMIODARONE IN DEXTROSE 5% 150-4.21 MG/100ML-% SOLUTION: Performed by: THORACIC SURGERY (CARDIOTHORACIC VASCULAR SURGERY)

## 2023-04-15 PROCEDURE — 83540 ASSAY OF IRON: CPT | Performed by: NURSE PRACTITIONER

## 2023-04-15 PROCEDURE — 84100 ASSAY OF PHOSPHORUS: CPT | Performed by: INTERNAL MEDICINE

## 2023-04-15 PROCEDURE — 93005 ELECTROCARDIOGRAM TRACING: CPT | Performed by: THORACIC SURGERY (CARDIOTHORACIC VASCULAR SURGERY)

## 2023-04-15 PROCEDURE — 25010000002 CEFAZOLIN 1-4 GM/50ML-% SOLUTION: Performed by: NURSE PRACTITIONER

## 2023-04-15 PROCEDURE — 85027 COMPLETE CBC AUTOMATED: CPT | Performed by: NURSE PRACTITIONER

## 2023-04-15 PROCEDURE — 99024 POSTOP FOLLOW-UP VISIT: CPT | Performed by: THORACIC SURGERY (CARDIOTHORACIC VASCULAR SURGERY)

## 2023-04-15 PROCEDURE — 71045 X-RAY EXAM CHEST 1 VIEW: CPT

## 2023-04-15 PROCEDURE — 99232 SBSQ HOSP IP/OBS MODERATE 35: CPT | Performed by: INTERNAL MEDICINE

## 2023-04-15 PROCEDURE — 85027 COMPLETE CBC AUTOMATED: CPT | Performed by: THORACIC SURGERY (CARDIOTHORACIC VASCULAR SURGERY)

## 2023-04-15 PROCEDURE — 82803 BLOOD GASES ANY COMBINATION: CPT

## 2023-04-15 PROCEDURE — 36430 TRANSFUSION BLD/BLD COMPNT: CPT

## 2023-04-15 PROCEDURE — 86900 BLOOD TYPING SEROLOGIC ABO: CPT

## 2023-04-15 PROCEDURE — P9016 RBC LEUKOCYTES REDUCED: HCPCS

## 2023-04-15 PROCEDURE — 25010000002 AMIODARONE IN DEXTROSE 5% 360-4.14 MG/200ML-% SOLUTION: Performed by: NURSE PRACTITIONER

## 2023-04-15 PROCEDURE — 93010 ELECTROCARDIOGRAM REPORT: CPT | Performed by: INTERNAL MEDICINE

## 2023-04-15 PROCEDURE — 25010000002 AMIODARONE IN DEXTROSE 5% 150-4.21 MG/100ML-% SOLUTION: Performed by: NURSE PRACTITIONER

## 2023-04-15 PROCEDURE — 80053 COMPREHEN METABOLIC PANEL: CPT | Performed by: INTERNAL MEDICINE

## 2023-04-15 PROCEDURE — 82962 GLUCOSE BLOOD TEST: CPT

## 2023-04-15 RX ORDER — IPRATROPIUM BROMIDE AND ALBUTEROL SULFATE 2.5; .5 MG/3ML; MG/3ML
3 SOLUTION RESPIRATORY (INHALATION) EVERY 4 HOURS PRN
Status: DISCONTINUED | OUTPATIENT
Start: 2023-04-15 | End: 2023-05-02 | Stop reason: HOSPADM

## 2023-04-15 RX ORDER — INSULIN LISPRO 100 [IU]/ML
0-9 INJECTION, SOLUTION INTRAVENOUS; SUBCUTANEOUS
Status: DISCONTINUED | OUTPATIENT
Start: 2023-04-15 | End: 2023-05-02 | Stop reason: HOSPADM

## 2023-04-15 RX ORDER — CALCIUM GLUCONATE 20 MG/ML
2 INJECTION, SOLUTION INTRAVENOUS ONCE
Status: COMPLETED | OUTPATIENT
Start: 2023-04-15 | End: 2023-04-15

## 2023-04-15 RX ORDER — SODIUM CHLORIDE FOR INHALATION 7 %
4 VIAL, NEBULIZER (ML) INHALATION
Status: DISCONTINUED | OUTPATIENT
Start: 2023-04-16 | End: 2023-04-16

## 2023-04-15 RX ORDER — DEXTROSE MONOHYDRATE 25 G/50ML
25 INJECTION, SOLUTION INTRAVENOUS
Status: DISCONTINUED | OUTPATIENT
Start: 2023-04-15 | End: 2023-05-02 | Stop reason: HOSPADM

## 2023-04-15 RX ORDER — CHLORHEXIDINE GLUCONATE 0.12 MG/ML
15 RINSE ORAL EVERY 12 HOURS
Status: COMPLETED | OUTPATIENT
Start: 2023-04-15 | End: 2023-04-16

## 2023-04-15 RX ORDER — IRON POLYSACCHARIDE COMPLEX 150 MG
150 CAPSULE ORAL DAILY
Status: DISCONTINUED | OUTPATIENT
Start: 2023-04-15 | End: 2023-05-02 | Stop reason: HOSPADM

## 2023-04-15 RX ORDER — NICOTINE POLACRILEX 4 MG
15 LOZENGE BUCCAL
Status: DISCONTINUED | OUTPATIENT
Start: 2023-04-15 | End: 2023-05-02 | Stop reason: HOSPADM

## 2023-04-15 RX ORDER — IBUPROFEN 600 MG/1
1 TABLET ORAL
Status: DISCONTINUED | OUTPATIENT
Start: 2023-04-15 | End: 2023-05-02 | Stop reason: HOSPADM

## 2023-04-15 RX ADMIN — DOCUSATE SODIUM 50MG AND SENNOSIDES 8.6MG 2 TABLET: 8.6; 5 TABLET, FILM COATED ORAL at 20:02

## 2023-04-15 RX ADMIN — MUPIROCIN 1 APPLICATION: 20 OINTMENT TOPICAL at 08:47

## 2023-04-15 RX ADMIN — CHLORHEXIDINE GLUCONATE 15 ML: 1.2 SOLUTION ORAL at 08:49

## 2023-04-15 RX ADMIN — CALCIUM GLUCONATE 2 G: 20 INJECTION, SOLUTION INTRAVENOUS at 13:20

## 2023-04-15 RX ADMIN — CALCIUM GLUCONATE 2 G: 20 INJECTION, SOLUTION INTRAVENOUS at 07:35

## 2023-04-15 RX ADMIN — PANTOPRAZOLE SODIUM 40 MG: 40 TABLET, DELAYED RELEASE ORAL at 06:32

## 2023-04-15 RX ADMIN — DULOXETINE HYDROCHLORIDE 30 MG: 30 CAPSULE, DELAYED RELEASE ORAL at 21:12

## 2023-04-15 RX ADMIN — ATORVASTATIN CALCIUM 40 MG: 20 TABLET, FILM COATED ORAL at 20:03

## 2023-04-15 RX ADMIN — ASPIRIN 81 MG: 81 TABLET, COATED ORAL at 08:50

## 2023-04-15 RX ADMIN — CHLORHEXIDINE GLUCONATE 15 ML: 1.2 SOLUTION ORAL at 20:02

## 2023-04-15 RX ADMIN — AMIODARONE HYDROCHLORIDE 150 MG: 1.5 INJECTION, SOLUTION INTRAVENOUS at 10:35

## 2023-04-15 RX ADMIN — Medication 150 MEQ: at 01:11

## 2023-04-15 RX ADMIN — AMIODARONE HYDROCHLORIDE 150 MG: 1.5 INJECTION, SOLUTION INTRAVENOUS at 11:54

## 2023-04-15 RX ADMIN — MUPIROCIN 1 APPLICATION: 20 OINTMENT TOPICAL at 20:02

## 2023-04-15 RX ADMIN — VASOPRESSIN 0.05 UNITS/MIN: 20 INJECTION INTRAVENOUS at 01:09

## 2023-04-15 RX ADMIN — Medication 150 MG: at 10:52

## 2023-04-15 RX ADMIN — HYDROCODONE BITARTRATE AND ACETAMINOPHEN 1 TABLET: 5; 325 TABLET ORAL at 05:11

## 2023-04-15 RX ADMIN — CEFAZOLIN SODIUM 1 G: 1 INJECTION, SOLUTION INTRAVENOUS at 20:02

## 2023-04-15 RX ADMIN — HYDROCODONE BITARTRATE AND ACETAMINOPHEN 1 TABLET: 5; 325 TABLET ORAL at 09:12

## 2023-04-15 RX ADMIN — ACETAMINOPHEN 650 MG: 325 TABLET, FILM COATED ORAL at 16:41

## 2023-04-15 RX ADMIN — AMIODARONE HYDROCHLORIDE 1 MG/MIN: 1.8 INJECTION, SOLUTION INTRAVENOUS at 10:52

## 2023-04-15 NOTE — PROGRESS NOTES
" LOS: 7 days   Patient Care Team:  Daksha Ng APRN as PCP - General (Family Medicine)  Rudy Faith MD as Consulting Physician (Ophthalmology)  Jose Mccall MD as Consulting Physician (Nephrology)  Quang Reyes MD as Consulting Physician (Nephrology)    Chief Complaint:post op    Subjective:  Activity level: Impaired due to weakness.      Confused     Vital Signs  Temp:  [97.3 °F (36.3 °C)-99.3 °F (37.4 °C)] 97.9 °F (36.6 °C)  Heart Rate:  [60-89] 89  Resp:  [12-18] 18  BP: ()/(40-74) 99/47  Body mass index is 32.2 kg/m².    Intake/Output Summary (Last 24 hours) at 4/15/2023 0755  Last data filed at 4/15/2023 0600  Gross per 24 hour   Intake 795 ml   Output 2330 ml   Net -1535 ml     No intake/output data recorded.    Chest tube drainage last 8 hours 90        04/14/23  0600 04/14/23  1132 04/15/23  0600   Weight: 111 kg (243 lb 13.3 oz) 110 kg (243 lb) 108 kg (237 lb 7 oz)         Objective:  Vital signs: (most recent): Blood pressure 99/47, pulse 89, temperature 97.9 °F (36.6 °C), temperature source Core, resp. rate 18, height 182.9 cm (72\"), weight 108 kg (237 lb 7 oz), SpO2 98 %.              Results Review:        WBC WBC   Date Value Ref Range Status   04/15/2023 13.19 (H) 3.40 - 10.80 10*3/mm3 Final   04/14/2023 15.84 (H) 3.40 - 10.80 10*3/mm3 Final   04/14/2023 18.01 (H) 3.40 - 10.80 10*3/mm3 Final   04/14/2023 21.71 (H) 3.40 - 10.80 10*3/mm3 Final   04/13/2023 22.39 (H) 3.40 - 10.80 10*3/mm3 Final   04/13/2023 11.54 (H) 3.40 - 10.80 10*3/mm3 Final      HGB Hemoglobin   Date Value Ref Range Status   04/15/2023 7.8 (L) 13.0 - 17.7 g/dL Final   04/14/2023 8.5 (L) 13.0 - 17.7 g/dL Final   04/14/2023 8.4 (L) 13.0 - 17.7 g/dL Final   04/14/2023 7.5 (L) 13.0 - 17.7 g/dL Final   04/13/2023 9.6 (L) 13.0 - 17.7 g/dL Final   04/13/2023 7.5 (C) 12.0 - 17.0 g/dL Final   04/13/2023 7.5 (C) 12.0 - 17.0 g/dL Final   04/13/2023 7.1 (C) 12.0 - 17.0 g/dL Final   04/13/2023 7.8 (C) 12.0 - 17.0 " g/dL Final   04/13/2023 7.5 (C) 12.0 - 17.0 g/dL Final   04/13/2023 7.8 (C) 12.0 - 17.0 g/dL Final   04/13/2023 6.8 (C) 12.0 - 17.0 g/dL Final   04/13/2023 5.4 (C) 12.0 - 17.0 g/dL Final   04/13/2023 5.4 (C) 12.0 - 17.0 g/dL Final   04/13/2023 11.6 (L) 12.0 - 17.0 g/dL Final   04/13/2023 10.8 (L) 13.0 - 17.7 g/dL Final      HCT Hematocrit   Date Value Ref Range Status   04/15/2023 23.2 (L) 37.5 - 51.0 % Final   04/14/2023 25.0 (L) 37.5 - 51.0 % Final   04/14/2023 25.7 (L) 37.5 - 51.0 % Final   04/14/2023 22.3 (L) 37.5 - 51.0 % Final   04/13/2023 29.2 (L) 37.5 - 51.0 % Final   04/13/2023 22 (L) 38 - 51 % Final   04/13/2023 22 (L) 38 - 51 % Final   04/13/2023 21 (L) 38 - 51 % Final   04/13/2023 23 (L) 38 - 51 % Final   04/13/2023 22 (L) 38 - 51 % Final   04/13/2023 23 (L) 38 - 51 % Final   04/13/2023 20 (L) 38 - 51 % Final   04/13/2023 16 (L) 38 - 51 % Final   04/13/2023 16 (L) 38 - 51 % Final   04/13/2023 34 (L) 38 - 51 % Final   04/13/2023 31.9 (L) 37.5 - 51.0 % Final      Platelets Platelets   Date Value Ref Range Status   04/15/2023 55 (L) 140 - 450 10*3/mm3 Final   04/14/2023 64 (L) 140 - 450 10*3/mm3 Final   04/14/2023 78 (L) 140 - 450 10*3/mm3 Final   04/14/2023 118 (L) 140 - 450 10*3/mm3 Final   04/13/2023 145 140 - 450 10*3/mm3 Final   04/13/2023 194 140 - 450 10*3/mm3 Final        PT/INR:    Protime   Date Value Ref Range Status   04/14/2023 17.4 (H) 11.7 - 14.2 Seconds Final   04/14/2023 19.1 (H) 11.7 - 14.2 Seconds Final   04/13/2023 19.1 (H) 11.7 - 14.2 Seconds Final   /  INR   Date Value Ref Range Status   04/14/2023 1.40 (H) 0.90 - 1.10 Final   04/14/2023 1.58 (H) 0.90 - 1.10 Final   04/13/2023 1.58 (H) 0.90 - 1.10 Final       Sodium Sodium   Date Value Ref Range Status   04/15/2023 143 136 - 145 mmol/L Final   04/14/2023 148 (H) 136 - 145 mmol/L Final   04/14/2023 151 (H) 136 - 145 mmol/L Final   04/14/2023 148 (H) 136 - 145 mmol/L Final   04/14/2023 147 (H) 136 - 145 mmol/L Final   04/13/2023 144 136  - 145 mmol/L Final   04/13/2023 136 136 - 145 mmol/L Final      Potassium Potassium   Date Value Ref Range Status   04/15/2023 4.4 3.5 - 5.2 mmol/L Final   04/14/2023 5.1 3.5 - 5.2 mmol/L Final   04/14/2023 4.6 3.5 - 5.2 mmol/L Final   04/14/2023 4.2 3.5 - 5.2 mmol/L Final   04/14/2023 4.6 3.5 - 5.2 mmol/L Final   04/13/2023 4.7 3.5 - 5.2 mmol/L Final     Comment:     Slight hemolysis detected by analyzer. Results may be affected.   04/13/2023 4.8 3.5 - 5.2 mmol/L Final     Comment:     Specimen hemolyzed.  Results may be affected.      Chloride Chloride   Date Value Ref Range Status   04/15/2023 105 98 - 107 mmol/L Final   04/14/2023 109 (H) 98 - 107 mmol/L Final   04/14/2023 111 (H) 98 - 107 mmol/L Final   04/14/2023 110 (H) 98 - 107 mmol/L Final   04/14/2023 107 98 - 107 mmol/L Final   04/13/2023 108 (H) 98 - 107 mmol/L Final   04/13/2023 104 98 - 107 mmol/L Final      Bicarbonate CO2   Date Value Ref Range Status   04/15/2023 26.0 22.0 - 29.0 mmol/L Final   04/14/2023 26.0 22.0 - 29.0 mmol/L Final   04/14/2023 25.4 22.0 - 29.0 mmol/L Final   04/14/2023 21.7 (L) 22.0 - 29.0 mmol/L Final   04/14/2023 21.0 (L) 22.0 - 29.0 mmol/L Final   04/13/2023 17.0 (L) 22.0 - 29.0 mmol/L Final   04/13/2023 21.0 (L) 22.0 - 29.0 mmol/L Final      BUN BUN   Date Value Ref Range Status   04/15/2023 25 (H) 8 - 23 mg/dL Final   04/14/2023 45 (H) 8 - 23 mg/dL Final   04/14/2023 45 (H) 8 - 23 mg/dL Final   04/14/2023 48 (H) 8 - 23 mg/dL Final   04/14/2023 43 (H) 8 - 23 mg/dL Final   04/13/2023 42 (H) 8 - 23 mg/dL Final   04/13/2023 44 (H) 8 - 23 mg/dL Final      Creatinine Creatinine   Date Value Ref Range Status   04/15/2023 3.57 (H) 0.76 - 1.27 mg/dL Final   04/14/2023 5.45 (H) 0.76 - 1.27 mg/dL Final   04/14/2023 5.76 (H) 0.76 - 1.27 mg/dL Final   04/14/2023 4.95 (H) 0.76 - 1.27 mg/dL Final   04/14/2023 4.96 (H) 0.76 - 1.27 mg/dL Final   04/13/2023 4.61 (H) 0.76 - 1.27 mg/dL Final   04/13/2023 5.01 (H) 0.76 - 1.27 mg/dL Final       Calcium Calcium   Date Value Ref Range Status   04/15/2023 8.2 (L) 8.6 - 10.5 mg/dL Final   04/14/2023 8.6 8.6 - 10.5 mg/dL Final   04/14/2023 9.1 8.6 - 10.5 mg/dL Final   04/14/2023 8.6 8.6 - 10.5 mg/dL Final   04/14/2023 8.1 (L) 8.6 - 10.5 mg/dL Final   04/13/2023 8.3 (L) 8.6 - 10.5 mg/dL Final   04/13/2023 8.6 8.6 - 10.5 mg/dL Final      Magnesium Magnesium   Date Value Ref Range Status   04/15/2023 2.1 1.6 - 2.4 mg/dL Final   04/14/2023 2.6 (H) 1.6 - 2.4 mg/dL Final   04/14/2023 2.5 (H) 1.6 - 2.4 mg/dL Final   04/14/2023 2.5 (H) 1.6 - 2.4 mg/dL Final   04/13/2023 2.8 (H) 1.6 - 2.4 mg/dL Final   04/13/2023 2.3 1.6 - 2.4 mg/dL Final          aspirin, 81 mg, Oral, Daily  atorvastatin, 40 mg, Oral, Nightly  ceFAZolin, 1 g, Intravenous, Q24H  chlorhexidine, 15 mL, Mouth/Throat, Q12H  DULoxetine, 30 mg, Oral, Nightly  mupirocin, , Each Nare, BID  pantoprazole, 40 mg, Oral, QAM  senna-docusate sodium, 2 tablet, Oral, Nightly      clevidipine, 2-32 mg/hr  dexmedetomidine, 0.2-1.5 mcg/kg/hr, Last Rate: Stopped (04/14/23 0908)  DOPamine, 2-20 mcg/kg/min  EPINEPHrine, 0.02-0.1 mcg/kg/min, Last Rate: 0.01 mcg/kg/min (04/14/23 2010)  insulin, 0-100 Units/hr, Last Rate: 1.2 Units/hr (04/15/23 0242)  milrinone, 0.25-0.375 mcg/kg/min, Last Rate: 0.125 mcg/kg/min (04/14/23 1639)  niCARdipine, 5-15 mg/hr  nitroglycerin, 5-200 mcg/min, Last Rate: Stopped (04/13/23 2010)  norepinephrine, 0.02-0.2 mcg/kg/min, Last Rate: Stopped (04/14/23 0170)  sodium bicarbonate, 150 mEq, Last Rate: 150 mEq (04/15/23 0111)  sodium chloride, 30 mL/hr, Last Rate: 30 mL/hr (04/13/23 2010)  vasopressin, 0.03 Units/min, Last Rate: Stopped (04/15/23 0781)            Patient Active Problem List   Diagnosis Code   • 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.22   • Essential hypertension I10   • Hyperlipidemia E78.5   • Primary insomnia F51.01   • CKD (chronic kidney disease) stage 4, GFR 15-29 ml/min ([facility]) N18.4   • Vitamin D  deficiency E55.9   • Diverticulitis of large intestine with perforation and abscess without bleeding K57.20   • Obesity (BMI 30-39.9) E66.9   • Impaired mobility and ADLs Z74.09, Z78.9   • History of colon resection Z90.49   • Colon polyps K63.5   • Diverticulosis K57.90   • CKD (chronic kidney disease) stage 5, GFR less than 15 ml/min N18.5   • Dyspnea on exertion R06.09   • NSTEMI (non-ST elevated myocardial infarction) I21.4   • Rhinovirus B34.8   • Abnormal findings on diagnostic imaging of heart and coronary circulation R93.1   • Hypernatremia E87.0       Assessment & Plan    - NSTEMI/multi-vessel CAD- s/p urgent CABGx4 LIMA/RSVG, MV repair-(Zuluaga) POD#2  - HFrEF--30-35% per echo 4/8  - rhinovirus infection  - ESRD on HD  - hypertension  - hyperlipidemia  - DM II- A1c 5.0  -post op anemia- expected acute blood loss  -Leukocytosis- probable reactive   -TCP- plt count 55-- hold lovenox    Looks better this morning   Up in the chair  3L NC--wean as able  Off pressor support just on a little milrinone 0.125-- okay to wean this   Dialysis yesterday with some fluid removal-- mentation is much better after toxin clean up, as I suspected yesterday his mentation was poor due to metabolic encephalopathy   hgb 7.8 this morning- transfuse 1unit PRBCs  plt count 55--continuing to drop- HIT panel sent yesterday  Encourage pulmonary toilet  Continue supportive care       Laya Ackerman, DUSTY  04/15/23  07:55 EDT

## 2023-04-15 NOTE — PROGRESS NOTES
"    Name: Lefty Cai ADMIT: 2023   : 1950  PCP: BereniceDaksha, DUSTY    MRN: [id number] LOS: 7 days   AGE/SEX: 72 y.o. male  ROOM:      Subjective   Subjective    Seen in cardiac recovery. Discussed with nursing staff. Had some encephalopathy improved. Had A-fib was put on amiodarone. He is a little lethargic now but denies any complaints.     Objective   Objective   Vital Signs  Temp:  [97.3 °F (36.3 °C)-98.8 °F (37.1 °C)] 98.1 °F (36.7 °C)  Heart Rate:  [] 89  Resp:  [12-18] 15  BP: ()/(40-74) 107/54  SpO2:  [89 %-99 %] 89 %  on  Flow (L/min):  [3-4] 3;   Device (Oxygen Therapy): nasal cannula  Body mass index is 32.2 kg/m².     Physical Exam  Constitutional:       General: He is not in acute distress.     Appearance: He is ill-appearing. He is not toxic-appearing.   Cardiovascular:      Rate and Rhythm: Normal rate and regular rhythm.   Pulmonary:      Effort: No respiratory distress.   Abdominal:      Palpations: Abdomen is soft.   Neurological:      Mental Status: He is lethargic.      Comments: Oriented to self, hospital, year \"\", situation       Results Review     I reviewed the patient's new clinical results.  Results from last 7 days   Lab Units 04/15/23  1125 04/15/23  0300 23  1246 23  0635   WBC 10*3/mm3 12.77* 13.19* 15.84* 18.01*   HEMOGLOBIN g/dL 8.9* 7.8* 8.5* 8.4*   PLATELETS 10*3/mm3 47* 55* 64* 78*     Results from last 7 days   Lab Units 04/15/23  1125 04/15/23  0300 23  1639 23  1246   SODIUM mmol/L 142 143 148* 151*   POTASSIUM mmol/L 4.5 4.4 5.1 4.6   CHLORIDE mmol/L 103 105 109* 111*   CO2 mmol/L 23.0 26.0 26.0 25.4   BUN mg/dL 30* 25* 45* 45*   CREATININE mg/dL 4.11* 3.57* 5.45* 5.76*   GLUCOSE mg/dL 159* 162* 149* 119*   EGFR mL/min/1.73 14.6* 17.3* 10.4* 9.7*     Results from last 7 days   Lab Units 04/15/23  1125 04/15/23  0300 23  1639 23  1246 23  0321 23  2312   ALBUMIN g/dL 3.2* 3.3* 3.4* 3.5   < " > 3.8   BILIRUBIN mg/dL 0.4  --  0.4 0.4  --  0.3   ALK PHOS U/L 38*  --  32* 28*  --  71   AST (SGOT) U/L 29  --  44* 41*  --  23   ALT (SGPT) U/L <5  --  8 8  --  17    < > = values in this interval not displayed.     Results from last 7 days   Lab Units 04/15/23  1125 04/15/23  0300 04/14/23  1639 04/14/23  1246 04/14/23  0635 04/14/23  0002 04/13/23  2041   CALCIUM mg/dL 8.6 8.2* 8.6 9.1 8.6 8.1* 8.3*   ALBUMIN g/dL 3.2* 3.3* 3.4* 3.5 3.7 3.6 3.0*   MAGNESIUM mg/dL  --  2.1  --  2.6* 2.5* 2.5* 2.8*   PHOSPHORUS mg/dL  --  4.6*  --   --  3.3 4.6* 6.0*     Results from last 7 days   Lab Units 04/09/23  0447   LACTATE mmol/L 1.0     Glucose   Date/Time Value Ref Range Status   04/15/2023 1124 165 (H) 70 - 130 mg/dL Final     Comment:     Meter: [id number] : [id number] Imer Torres RN   04/15/2023 0416 157 (H) 70 - 130 mg/dL Final     Comment:     Meter: [id number] : [id number] Carlos Yasmin RN   04/15/2023 0222 163 (H) 70 - 130 mg/dL Final     Comment:     Meter: [id number] : [id number] Carlos Yasmin RN   04/15/2023 0011 161 (H) 70 - 130 mg/dL Final     Comment:     Meter: [id number] : [id number] Carlos Yasmin RN   04/14/2023 2237 134 (H) 70 - 130 mg/dL Final     Comment:     Meter: [id number] : [id number] Carlos Yasmin RN   04/14/2023 1931 123 70 - 130 mg/dL Final     Comment:     Meter: [id number] : [id number] Imer Torres RN   04/14/2023 1700 152 (H) 70 - 130 mg/dL Final     Comment:     Meter: [id number] : [id number] Imer Torres RN       XR Chest 1 View    Result Date: 4/15/2023  Persistent vascular congestion and bibasilar atelectasis.  This report was finalized on 4/15/2023 4:29 AM by Dr. Mini Segura M.D.      XR Chest 1 View    Result Date: 4/14/2023  Electronically signed by Mae Swain MD on 04-14-23 at 0816    XR Chest 1 View    Result Date: 4/13/2023  FINDINGS AND IMPRESSION: Endotracheal tube terminates between the clavicles. There is a left  internal jugular pulmonary catheter with tip terminating over the mediastinum. A right internal jugular central venous catheter tip overlies the expected location of the right atrium. There are median sternotomy wires. 2 thoracostomy tubes overlie the mediastinum and left hemithorax. There are presumed epicardial pacing wires. Median sternotomy wires and prosthetic heart valve are present.  Hazy opacification is present throughout the right lung. There is also left basilar pulmonary opacification. While findings may be related to postsurgical changes and/or atelectasis, developing pulmonary edema and/or pneumonia cannot be excluded in the appropriate clinical context and correlation with patient history is recommended as well as continued attention on follow-up to ensure resolution. No pneumothorax is seen. Presumed right pleural effusion is present. Cardiac silhouette is within normal limits for size.  This report was finalized on 4/13/2023 9:31 PM by Dr. Yves Canas M.D.      I have personally reviewed all medications:  Scheduled Medications  aspirin, 81 mg, Oral, Daily  atorvastatin, 40 mg, Oral, Nightly  ceFAZolin, 1 g, Intravenous, Q24H  chlorhexidine, 15 mL, Mouth/Throat, Q12H  DULoxetine, 30 mg, Oral, Nightly  insulin lispro, 0-9 Units, Subcutaneous, 4x Daily With Meals & Nightly  iron polysaccharides, 150 mg, Oral, Daily  mupirocin, , Each Nare, BID  pantoprazole, 40 mg, Oral, QAM  senna-docusate sodium, 2 tablet, Oral, Nightly  [START ON 4/16/2023] sodium chloride, 4 mL, Nebulization, BID - RT    Infusions  amiodarone, 1 mg/min, Last Rate: 1 mg/min (04/15/23 1052)  clevidipine, 2-32 mg/hr  dexmedetomidine, 0.2-1.5 mcg/kg/hr, Last Rate: Stopped (04/14/23 0908)  DOPamine, 2-20 mcg/kg/min  EPINEPHrine, 0.02-0.1 mcg/kg/min, Last Rate: 0.01 mcg/kg/min (04/14/23 0730)  milrinone, 0.25-0.375 mcg/kg/min, Last Rate: Stopped (04/15/23 6523)  niCARdipine, 5-15 mg/hr  nitroglycerin, 5-200 mcg/min, Last Rate: Stopped  (04/13/23 2010)  norepinephrine, 0.02-0.2 mcg/kg/min, Last Rate: Stopped (04/14/23 2320)  sodium chloride, 30 mL/hr, Last Rate: 30 mL/hr (04/13/23 2010)  vasopressin, 0.03 Units/min, Last Rate: Stopped (04/15/23 1250)    Diet  NPO Diet NPO Type: Sips with Meds    I have personally reviewed:  [x]  Laboratory   []  Microbiology   []  Radiology   [x]  EKG/Telemetry  []  Cardiology/Vascular   []  Pathology    []  Records       Assessment/Plan     Active Hospital Problems    Diagnosis  POA   • **NSTEMI (non-ST elevated myocardial infarction) [I21.4]  Unknown   • Hypernatremia [E87.0]  Unknown   • Rhinovirus [B34.8]  Yes   • Dyspnea on exertion [R06.09]  Yes   • Abnormal findings on diagnostic imaging of heart and coronary circulation [R93.1]  Unknown   • Type 2 diabetes mellitus with diabetic chronic kidney disease [E11.22]  Yes   • Essential hypertension [I10]  Yes   • Hyperlipidemia [E78.5]  Yes   • CKD (chronic kidney disease) stage 4, GFR 15-29 ml/min ([facility]) [N18.4]  Yes      Resolved Hospital Problems   No resolved problems to display.       72 y.o. male admitted with NSTEMI (non-ST elevated myocardial infarction).    Severe three-vessel coronary disease with an occluded right coronary system s/p CABG x4 on 4/13/2023.  Extubated on 4/14/2023. Off pressors. Had atrial fibrillation started on amiodarone.    Thrombocytopenia: Heparin antibody pending    Nephrology managing ESRD plan HD tomorrow    Diabetes: controlled    Discussed with patient and nursing staff     Cade Schreiber MD  [facility]  04/15/23  15:13 EDT

## 2023-04-15 NOTE — PROGRESS NOTES
LOS: 7 days   Patient Care Team:  Daksha Ng APRN as PCP - General (Family Medicine)  Rudy Faith MD as Consulting Physician (Ophthalmology)  Jose Mccall MD as Consulting Physician (Nephrology)  Quang Reyes MD as Consulting Physician (Nephrology)    Chief Complaint:     Follow-up CAD, status post CABG and MV repair    Interval History:     Better yesterday after HD, mentation improved    Today he is up in a chair and he was able to converse but still seems slightly confused and very tired.    Objective   Vital Signs  Temp:  [97.3 °F (36.3 °C)-99.1 °F (37.3 °C)] 97.9 °F (36.6 °C)  Heart Rate:  [60-89] 89  Resp:  [12-18] 14  BP: ()/(40-74) 107/45    Intake/Output Summary (Last 24 hours) at 4/15/2023 0848  Last data filed at 4/15/2023 0600  Gross per 24 hour   Intake 795 ml   Output 2265 ml   Net -1470 ml       Comfortable NAD  Neck supple, no JVD or thyromegaly appreciated  S1/S2 irregular and tachycardic no m/r/g  Lungs expiratory rhonchi, normal effort  Abdomen S/NT/ND (+) BS, no HSM appreciated  Extremities warm, no clubbing, cyanosis, or edema  No visible or palpable skin lesions  A/Ox4, mood and affect appropriate    Results Review:      Results from last 7 days   Lab Units 04/15/23  0300 04/14/23  1639 04/14/23  1246   SODIUM mmol/L 143 148* 151*   POTASSIUM mmol/L 4.4 5.1 4.6   CHLORIDE mmol/L 105 109* 111*   CO2 mmol/L 26.0 26.0 25.4   BUN mg/dL 25* 45* 45*   CREATININE mg/dL 3.57* 5.45* 5.76*   GLUCOSE mg/dL 162* 149* 119*   CALCIUM mg/dL 8.2* 8.6 9.1     Results from last 7 days   Lab Units 04/09/23  0447 04/08/23  1142   HSTROP T ng/L 1,420* 811*     Results from last 7 days   Lab Units 04/15/23  0300 04/14/23  1246 04/14/23  0635   WBC 10*3/mm3 13.19* 15.84* 18.01*   HEMOGLOBIN g/dL 7.8* 8.5* 8.4*   HEMATOCRIT % 23.2* 25.0* 25.7*   PLATELETS 10*3/mm3 55* 64* 78*     Results from last 7 days   Lab Units 04/14/23  1246 04/14/23  0635 04/13/23  2041 04/13/23  0238   INR   1.40* 1.58* 1.58*  --    APTT seconds 37.4*  --  >200.0* 81.4*         Results from last 7 days   Lab Units 04/15/23  0300   MAGNESIUM mg/dL 2.1           I reviewed the patient's new clinical results.  I personally viewed and interpreted the patient's EKG/Telemetry data        Medication Review:   aspirin, 81 mg, Oral, Daily  atorvastatin, 40 mg, Oral, Nightly  ceFAZolin, 1 g, Intravenous, Q24H  chlorhexidine, 15 mL, Mouth/Throat, Q12H  DULoxetine, 30 mg, Oral, Nightly  iron polysaccharides, 150 mg, Oral, Daily  mupirocin, , Each Nare, BID  pantoprazole, 40 mg, Oral, QAM  senna-docusate sodium, 2 tablet, Oral, Nightly        clevidipine, 2-32 mg/hr  dexmedetomidine, 0.2-1.5 mcg/kg/hr, Last Rate: Stopped (04/14/23 0908)  DOPamine, 2-20 mcg/kg/min  EPINEPHrine, 0.02-0.1 mcg/kg/min, Last Rate: 0.01 mcg/kg/min (04/14/23 7840)  insulin, 0-100 Units/hr, Last Rate: 1.2 Units/hr (04/15/23 9526)  milrinone, 0.25-0.375 mcg/kg/min, Last Rate: 0.125 mcg/kg/min (04/14/23 4479)  niCARdipine, 5-15 mg/hr  nitroglycerin, 5-200 mcg/min, Last Rate: Stopped (04/13/23 2010)  norepinephrine, 0.02-0.2 mcg/kg/min, Last Rate: Stopped (04/14/23 1060)  sodium chloride, 30 mL/hr, Last Rate: 30 mL/hr (04/13/23 2010)  vasopressin, 0.03 Units/min, Last Rate: Stopped (04/15/23 9664)        Assessment & Plan       NSTEMI (non-ST elevated myocardial infarction)    Type 2 diabetes mellitus with diabetic chronic kidney disease    Essential hypertension    Hyperlipidemia    CKD (chronic kidney disease) stage 4, GFR 15-29 ml/min ([facility])    Dyspnea on exertion    Rhinovirus    Abnormal findings on diagnostic imaging of heart and coronary circulation    Hypernatremia    1. NSTEMI with 3v cad on cath 4/9/23.  Status post CABG times 44/13/23 receiving LIMA to distal LAD, SVG to diagonal, SVG to OM, SVG to LV branch, mitral valve repair with a 28 mm physio angioplasty ring.  2. DM  3. ESRD on HD-nephrology following, has line in right neck  4. Hypertension,  blood pressure better  5. Hyperlipidemia.  on rosuvastatin  6. LVEF 31-35%, no acute decompensated heart failure  7. History of rhinovirus infection-stable  8. < 50% bilateral internal carotid artery stenosis.  9. History of kidney cancer status post partial nephrectomy  10. Anemia-transfused last night.    Decreased hemoglobin, transfuse today.  No plan for dialysis today.    Slightly volume overload possibly but not terribly so.  He did flip and atrial fibrillation when I was in the room and he is rapid.  We will give him a dose of IV amiodarone and he probably needs an amiodarone drip.  He deftly has a cough as he has a lot of rhonchi.  He had 500 out of his chest tube over the the night but not a lot this morning.    He is off all pressors.    We will follow.       Deisy Sanchez MD  04/15/23  08:48 EDT

## 2023-04-15 NOTE — PROGRESS NOTES
[facility] Progress Note      Patient Name: Lefty Cai  : 1950  MRN: [id number]  Primary Care Physician:  Daksha Ng APRN  Date of admission: 2023    Subjective     Interval History:   Follow-up end-stage renal disease  Mentation seems to be improving.  Underwent dialysis today able to do only 500 cc of fluid due to hypotension.  Currently is off pressors.  Net negative balance since 24 hours.  Chest x-ray reviewed.  Urine output documented at 600 cc per 24 hours.      Review of Systems:   Not obtainable at this    Objective     Vitals:   Temp:  [97.3 °F (36.3 °C)-99.1 °F (37.3 °C)] 97.9 °F (36.6 °C)  Heart Rate:  [60-89] 89  Resp:  [12-18] 18  BP: ()/(40-74) 93/46  Flow (L/min):  [3-4] 3    Intake/Output Summary (Last 24 hours) at 4/15/2023 0822  Last data filed at 4/15/2023 0600  Gross per 24 hour   Intake 795 ml   Output 2265 ml   Net -1470 ml       Physical Exam:    General Appearance: Patient is confused, no acute distress and chronically ill  Skin: warm and dry  HEENT: oral mucosa normal, nonicteric sclera  Neck: supple, no JVD, tunneled dialysis catheter in the right IJ with exit site below the right clavicle  Lungs: Bilateral rhonchi, breathing effort not labored  Heart: RRR, normal S1 and S2, no S3, no rub  Abdomen: soft, nontender, nondistended, normoactive bowel  : no palpable bladder, Reddy catheter anchored in place  Extremities: no edema, cyanosis or clubbing, functional AV fistula in the left forearm with good thrill and bruit  Neuro: Unable to assess    Scheduled Meds:     aspirin, 81 mg, Oral, Daily  atorvastatin, 40 mg, Oral, Nightly  ceFAZolin, 1 g, Intravenous, Q24H  chlorhexidine, 15 mL, Mouth/Throat, Q12H  DULoxetine, 30 mg, Oral, Nightly  mupirocin, , Each Nare, BID  pantoprazole, 40 mg, Oral, QAM  senna-docusate sodium, 2 tablet, Oral, Nightly      IV Meds:   clevidipine, 2-32 mg/hr  dexmedetomidine, 0.2-1.5 mcg/kg/hr, Last Rate:  Stopped (04/14/23 0908)  DOPamine, 2-20 mcg/kg/min  EPINEPHrine, 0.02-0.1 mcg/kg/min, Last Rate: 0.01 mcg/kg/min (04/14/23 2250)  insulin, 0-100 Units/hr, Last Rate: 1.2 Units/hr (04/15/23 0436)  milrinone, 0.25-0.375 mcg/kg/min, Last Rate: 0.125 mcg/kg/min (04/14/23 1639)  niCARdipine, 5-15 mg/hr  nitroglycerin, 5-200 mcg/min, Last Rate: Stopped (04/13/23 2010)  norepinephrine, 0.02-0.2 mcg/kg/min, Last Rate: Stopped (04/14/23 2320)  sodium chloride, 30 mL/hr, Last Rate: 30 mL/hr (04/13/23 2010)  vasopressin, 0.03 Units/min, Last Rate: Stopped (04/15/23 0738)        Results Reviewed:   I have personally reviewed the results from the time of this admission to 4/15/2023 08:22 EDT     Results from last 7 days   Lab Units 04/15/23  0300 04/14/23  1639 04/14/23  1246 04/11/23  0321 04/09/23  2312   SODIUM mmol/L 143 148* 151*   < > 133*   POTASSIUM mmol/L 4.4 5.1 4.6   < > 4.2   CHLORIDE mmol/L 105 109* 111*   < > 100   CO2 mmol/L 26.0 26.0 25.4   < > 20.7*   BUN mg/dL 25* 45* 45*   < > 27*   CREATININE mg/dL 3.57* 5.45* 5.76*   < > 4.31*   CALCIUM mg/dL 8.2* 8.6 9.1   < > 9.2   BILIRUBIN mg/dL  --  0.4 0.4  --  0.3   ALK PHOS U/L  --  32* 28*  --  71   ALT (SGPT) U/L  --  8 8  --  17   AST (SGOT) U/L  --  44* 41*  --  23   GLUCOSE mg/dL 162* 149* 119*   < > 163*    < > = values in this interval not displayed.       Estimated Creatinine Clearance: 23.4 mL/min (A) (by C-G formula based on SCr of 3.57 mg/dL (H)).    Results from last 7 days   Lab Units 04/15/23  0300 04/14/23  1246 04/14/23  0635 04/14/23  0002   MAGNESIUM mg/dL 2.1 2.6* 2.5* 2.5*   PHOSPHORUS mg/dL 4.6*  --  3.3 4.6*             Results from last 7 days   Lab Units 04/15/23  0300 04/14/23  1246 04/14/23  0635 04/14/23  0005 04/13/23  2041   WBC 10*3/mm3 13.19* 15.84* 18.01* 21.71* 22.39*   HEMOGLOBIN g/dL 7.8* 8.5* 8.4* 7.5* 9.6*   PLATELETS 10*3/mm3 55* 64* 78* 118* 145       Results from last 7 days   Lab Units 04/14/23 1246 04/14/23 0635  04/13/23 2041 04/08/23 1925   INR  1.40* 1.58* 1.58* 1.15*       Assessment / Plan     ASSESSMENT:  -End-stage renal disease and diabetic and hypertensive glomerulosclerosis on maintenance hemodialysis every Tuesday, Thursday and Saturday, his volume status and electrolyte within acceptable range  -Diabetes mellitus type 2 with renal complication  -Hypertension with chronic kidney disease, well controlled today  -Anemia of ESRD, hemoglobin today is down to  7.4 postoperatively  -Thrombocytopenia platelet: HIT antibody screen is underway  -Very artery disease underwent four-vessel CABG mitral valve repair on 4/13/2023  -Secondary hyperparathyroidism of renal origin  -Altered mental status postoperatively patient has encephalopathy.  -Cardiogenic shock: Improved  PLAN:  -No acute indication for dialysis today.  We will hold on dialysis today  -We will stop IV bicarbonate drip  -We will likely require dialysis tomorrow  -Surveillance labs  -Transfusion per primary team  -Awaiting HIT antibody screen      Thank you for involving us in the care of Lefty Cai.  Please feel free to call with any questions.    Page Santana MD  04/15/23  08:22 EDT    [facility]  [phone]    Please note that portions of this note were completed with a voice recognition program.

## 2023-04-16 ENCOUNTER — APPOINTMENT (OUTPATIENT)
Dept: GENERAL RADIOLOGY | Facility: HOSPITAL | Age: 73
DRG: 216 | End: 2023-04-16
Payer: MEDICARE

## 2023-04-16 LAB
ANION GAP SERPL CALCULATED.3IONS-SCNC: 17 MMOL/L (ref 5–15)
ATMOSPHERIC PRESS: 742.9 MMHG
BASE EXCESS BLDV CALC-SCNC: -10.3 MMOL/L (ref -2–2)
BDY SITE: ABNORMAL
BH BB BLOOD EXPIRATION DATE: NORMAL
BH BB BLOOD EXPIRATION DATE: NORMAL
BH BB BLOOD TYPE BARCODE: 6200
BH BB BLOOD TYPE BARCODE: 6200
BH BB DISPENSE STATUS: NORMAL
BH BB DISPENSE STATUS: NORMAL
BH BB PRODUCT CODE: NORMAL
BH BB PRODUCT CODE: NORMAL
BH BB UNIT NUMBER: NORMAL
BH BB UNIT NUMBER: NORMAL
BUN SERPL-MCNC: 38 MG/DL (ref 8–23)
BUN/CREAT SERPL: 7.3 (ref 7–25)
CALCIUM SPEC-SCNC: 8.7 MG/DL (ref 8.6–10.5)
CHLORIDE SERPL-SCNC: 103 MMOL/L (ref 98–107)
CO2 SERPL-SCNC: 22 MMOL/L (ref 22–29)
CREAT SERPL-MCNC: 5.19 MG/DL (ref 0.76–1.27)
CROSSMATCH INTERPRETATION: NORMAL
CROSSMATCH INTERPRETATION: NORMAL
DEPRECATED RDW RBC AUTO: 47.7 FL (ref 37–54)
EGFRCR SERPLBLD CKD-EPI 2021: 11 ML/MIN/1.73
ERYTHROCYTE [DISTWIDTH] IN BLOOD BY AUTOMATED COUNT: 15 % (ref 12.3–15.4)
GLUCOSE BLDC GLUCOMTR-MCNC: 103 MG/DL (ref 70–130)
GLUCOSE BLDC GLUCOMTR-MCNC: 107 MG/DL (ref 70–130)
GLUCOSE BLDC GLUCOMTR-MCNC: 109 MG/DL (ref 70–130)
GLUCOSE BLDC GLUCOMTR-MCNC: 126 MG/DL (ref 70–130)
GLUCOSE BLDC GLUCOMTR-MCNC: 138 MG/DL (ref 70–130)
GLUCOSE SERPL-MCNC: 103 MG/DL (ref 65–99)
HCO3 BLDV-SCNC: 14.8 MMOL/L (ref 22–28)
HCT VFR BLD AUTO: 27.7 % (ref 37.5–51)
HGB BLD-MCNC: 9.2 G/DL (ref 13–17.7)
INHALED O2 CONCENTRATION: 21 %
MAGNESIUM SERPL-MCNC: 2.5 MG/DL (ref 1.6–2.4)
MCH RBC QN AUTO: 28.8 PG (ref 26.6–33)
MCHC RBC AUTO-ENTMCNC: 33.2 G/DL (ref 31.5–35.7)
MCV RBC AUTO: 86.6 FL (ref 79–97)
MODALITY: ABNORMAL
PCO2 BLDV: 28.4 MM HG (ref 41–51)
PH BLDV: 7.32 PH UNITS (ref 7.31–7.41)
PLATELET # BLD AUTO: 56 10*3/MM3 (ref 140–450)
PMV BLD AUTO: 11.1 FL (ref 6–12)
PO2 BLDV: 34.8 MM HG (ref 35–45)
POTASSIUM SERPL-SCNC: 4.5 MMOL/L (ref 3.5–5.2)
QT INTERVAL: 331 MS
QT INTERVAL: 515 MS
RBC # BLD AUTO: 3.2 10*6/MM3 (ref 4.14–5.8)
SAO2 % BLDCOA: 63.5 % (ref 92–99)
SODIUM SERPL-SCNC: 142 MMOL/L (ref 136–145)
TOTAL RATE: 15 BREATHS/MINUTE
UNIT  ABO: NORMAL
UNIT  ABO: NORMAL
UNIT  RH: NORMAL
UNIT  RH: NORMAL
WBC NRBC COR # BLD: 12.94 10*3/MM3 (ref 3.4–10.8)

## 2023-04-16 PROCEDURE — 25010000002 AMIODARONE IN DEXTROSE 5% 360-4.14 MG/200ML-% SOLUTION: Performed by: NURSE PRACTITIONER

## 2023-04-16 PROCEDURE — 82803 BLOOD GASES ANY COMBINATION: CPT

## 2023-04-16 PROCEDURE — 99232 SBSQ HOSP IP/OBS MODERATE 35: CPT | Performed by: INTERNAL MEDICINE

## 2023-04-16 PROCEDURE — 71046 X-RAY EXAM CHEST 2 VIEWS: CPT

## 2023-04-16 PROCEDURE — 80048 BASIC METABOLIC PNL TOTAL CA: CPT | Performed by: NURSE PRACTITIONER

## 2023-04-16 PROCEDURE — 25010000002 PHENYLEPHRINE 10 MG/ML SOLUTION: Performed by: THORACIC SURGERY (CARDIOTHORACIC VASCULAR SURGERY)

## 2023-04-16 PROCEDURE — 25010000002 CALCIUM GLUCONATE-NACL 1-0.675 GM/50ML-% SOLUTION: Performed by: NURSE PRACTITIONER

## 2023-04-16 PROCEDURE — 94761 N-INVAS EAR/PLS OXIMETRY MLT: CPT

## 2023-04-16 PROCEDURE — 83735 ASSAY OF MAGNESIUM: CPT | Performed by: NURSE PRACTITIONER

## 2023-04-16 PROCEDURE — 94760 N-INVAS EAR/PLS OXIMETRY 1: CPT

## 2023-04-16 PROCEDURE — 25010000002 MORPHINE PER 10 MG: Performed by: NURSE PRACTITIONER

## 2023-04-16 PROCEDURE — 93005 ELECTROCARDIOGRAM TRACING: CPT | Performed by: INTERNAL MEDICINE

## 2023-04-16 PROCEDURE — 85027 COMPLETE CBC AUTOMATED: CPT | Performed by: NURSE PRACTITIONER

## 2023-04-16 PROCEDURE — 94799 UNLISTED PULMONARY SVC/PX: CPT

## 2023-04-16 PROCEDURE — 99024 POSTOP FOLLOW-UP VISIT: CPT | Performed by: THORACIC SURGERY (CARDIOTHORACIC VASCULAR SURGERY)

## 2023-04-16 PROCEDURE — 97110 THERAPEUTIC EXERCISES: CPT

## 2023-04-16 PROCEDURE — 93005 ELECTROCARDIOGRAM TRACING: CPT | Performed by: NURSE PRACTITIONER

## 2023-04-16 PROCEDURE — 97162 PT EVAL MOD COMPLEX 30 MIN: CPT

## 2023-04-16 PROCEDURE — 93010 ELECTROCARDIOGRAM REPORT: CPT | Performed by: INTERNAL MEDICINE

## 2023-04-16 PROCEDURE — 25010000002 AMIODARONE IN DEXTROSE 5% 150-4.21 MG/100ML-% SOLUTION: Performed by: NURSE PRACTITIONER

## 2023-04-16 PROCEDURE — 82962 GLUCOSE BLOOD TEST: CPT

## 2023-04-16 RX ORDER — ACETYLCYSTEINE 200 MG/ML
3 SOLUTION ORAL; RESPIRATORY (INHALATION)
Status: DISPENSED | OUTPATIENT
Start: 2023-04-16 | End: 2023-04-23

## 2023-04-16 RX ORDER — IPRATROPIUM BROMIDE AND ALBUTEROL SULFATE 2.5; .5 MG/3ML; MG/3ML
3 SOLUTION RESPIRATORY (INHALATION)
Status: DISCONTINUED | OUTPATIENT
Start: 2023-04-16 | End: 2023-05-02 | Stop reason: HOSPADM

## 2023-04-16 RX ORDER — CALCIUM GLUCONATE 20 MG/ML
2 INJECTION, SOLUTION INTRAVENOUS ONCE
Status: COMPLETED | OUTPATIENT
Start: 2023-04-16 | End: 2023-04-16

## 2023-04-16 RX ORDER — PHENYLEPHRINE HCL IN 0.9% NACL 0.5 MG/5ML
.5-3 SYRINGE (ML) INTRAVENOUS
Status: DISCONTINUED | OUTPATIENT
Start: 2023-04-16 | End: 2023-04-18

## 2023-04-16 RX ADMIN — IPRATROPIUM BROMIDE AND ALBUTEROL SULFATE 3 ML: 2.5; .5 SOLUTION RESPIRATORY (INHALATION) at 20:08

## 2023-04-16 RX ADMIN — DOCUSATE SODIUM 50MG AND SENNOSIDES 8.6MG 2 TABLET: 8.6; 5 TABLET, FILM COATED ORAL at 20:44

## 2023-04-16 RX ADMIN — PHENYLEPHRINE HYDROCHLORIDE 0.5 MCG/KG/MIN: 50 INJECTION INTRAVENOUS at 08:53

## 2023-04-16 RX ADMIN — SODIUM CHLORIDE 250 ML: 9 INJECTION, SOLUTION INTRAVENOUS at 05:11

## 2023-04-16 RX ADMIN — ACETYLCYSTEINE 3 ML: 200 SOLUTION ORAL; RESPIRATORY (INHALATION) at 16:06

## 2023-04-16 RX ADMIN — ACETYLCYSTEINE 3 ML: 200 SOLUTION ORAL; RESPIRATORY (INHALATION) at 11:21

## 2023-04-16 RX ADMIN — ASPIRIN 81 MG: 81 TABLET, COATED ORAL at 08:53

## 2023-04-16 RX ADMIN — AMIODARONE HYDROCHLORIDE 150 MG: 1.5 INJECTION, SOLUTION INTRAVENOUS at 04:30

## 2023-04-16 RX ADMIN — AMIODARONE HYDROCHLORIDE 0.5 MG/MIN: 1.8 INJECTION, SOLUTION INTRAVENOUS at 16:45

## 2023-04-16 RX ADMIN — MORPHINE SULFATE 1 MG: 2 INJECTION, SOLUTION INTRAMUSCULAR; INTRAVENOUS at 06:44

## 2023-04-16 RX ADMIN — AMIODARONE HYDROCHLORIDE 1 MG/MIN: 1.8 INJECTION, SOLUTION INTRAVENOUS at 09:52

## 2023-04-16 RX ADMIN — PANTOPRAZOLE SODIUM 40 MG: 40 TABLET, DELAYED RELEASE ORAL at 06:49

## 2023-04-16 RX ADMIN — CALCIUM GLUCONATE 2 G: 20 INJECTION, SOLUTION INTRAVENOUS at 09:53

## 2023-04-16 RX ADMIN — IPRATROPIUM BROMIDE AND ALBUTEROL SULFATE 3 ML: .5; 3 SOLUTION RESPIRATORY (INHALATION) at 06:48

## 2023-04-16 RX ADMIN — IPRATROPIUM BROMIDE AND ALBUTEROL SULFATE 3 ML: .5; 3 SOLUTION RESPIRATORY (INHALATION) at 16:01

## 2023-04-16 RX ADMIN — METOPROLOL TARTRATE 25 MG: 25 TABLET, FILM COATED ORAL at 22:33

## 2023-04-16 RX ADMIN — IPRATROPIUM BROMIDE AND ALBUTEROL SULFATE 3 ML: 2.5; .5 SOLUTION RESPIRATORY (INHALATION) at 11:21

## 2023-04-16 RX ADMIN — DULOXETINE HYDROCHLORIDE 30 MG: 30 CAPSULE, DELAYED RELEASE ORAL at 22:33

## 2023-04-16 RX ADMIN — ACETAMINOPHEN 650 MG: 325 TABLET, FILM COATED ORAL at 17:22

## 2023-04-16 RX ADMIN — ATORVASTATIN CALCIUM 40 MG: 20 TABLET, FILM COATED ORAL at 20:44

## 2023-04-16 RX ADMIN — AMIODARONE HYDROCHLORIDE 1 MG/MIN: 1.8 INJECTION, SOLUTION INTRAVENOUS at 04:40

## 2023-04-16 RX ADMIN — MUPIROCIN 1 APPLICATION: 20 OINTMENT TOPICAL at 20:45

## 2023-04-16 RX ADMIN — CHLORHEXIDINE GLUCONATE 15 ML: 1.2 SOLUTION ORAL at 08:53

## 2023-04-16 RX ADMIN — MUPIROCIN 1 APPLICATION: 20 OINTMENT TOPICAL at 08:53

## 2023-04-16 RX ADMIN — Medication 4 ML: at 06:51

## 2023-04-16 RX ADMIN — ACETYLCYSTEINE 3 ML: 200 SOLUTION ORAL; RESPIRATORY (INHALATION) at 20:08

## 2023-04-16 NOTE — PROGRESS NOTES
Name: Lefty Cai ADMIT: 2023   : 1950  PCP: Daksha Ng, DUSTY    MRN: [id number] LOS: 8 days   AGE/SEX: 72 y.o. male  ROOM: [facility]/     Subjective   Subjective    Family at bedside they feel his mental status is improved compared to yesterday. Still a little lethargic.     Objective   Objective   Vital Signs  Temp:  [97.8 °F (36.6 °C)-98.4 °F (36.9 °C)] 98 °F (36.7 °C)  Heart Rate:  [] 134  Resp:  [15-16] 16  BP: ()/() 100/68  SpO2:  [88 %-100 %] 91 %  on  Flow (L/min):  [2-3] 2;   Device (Oxygen Therapy): room air  Body mass index is 31.57 kg/m².     Physical Exam  Constitutional:       General: He is not in acute distress.     Appearance: He is ill-appearing. He is not toxic-appearing.   Cardiovascular:      Rate and Rhythm: Normal rate. Rhythm irregular.   Pulmonary:      Effort: No respiratory distress.      Breath sounds: Rhonchi (with cough) present.   Abdominal:      Palpations: Abdomen is soft.   Neurological:      Mental Status: He is lethargic.       Results Review     I reviewed the patient's new clinical results.  Results from last 7 days   Lab Units 23  0410 04/15/23  1807 04/15/23  1125 04/15/23  0300   WBC 10*3/mm3 12.94* 12.97* 12.77* 13.19*   HEMOGLOBIN g/dL 9.2* 8.9* 8.9* 7.8*   PLATELETS 10*3/mm3 56* 49* 47* 55*     Results from last 7 days   Lab Units 23  0410 04/15/23  1125 04/15/23  0300 23  1639   SODIUM mmol/L 142 142 143 148*   POTASSIUM mmol/L 4.5 4.5 4.4 5.1   CHLORIDE mmol/L 103 103 105 109*   CO2 mmol/L 22.0 23.0 26.0 26.0   BUN mg/dL 38* 30* 25* 45*   CREATININE mg/dL 5.19* 4.11* 3.57* 5.45*   GLUCOSE mg/dL 103* 159* 162* 149*   EGFR mL/min/1.73 11.0* 14.6* 17.3* 10.4*     Results from last 7 days   Lab Units 04/15/23  1125 04/15/23  0300 23  1639 23  1246 23  0321 23  2312   ALBUMIN g/dL 3.2* 3.3* 3.4* 3.5   < > 3.8   BILIRUBIN mg/dL 0.4  --  0.4 0.4  --  0.3   ALK PHOS U/L 38*  --  32* 28*  --  71    AST (SGOT) U/L 29  --  44* 41*  --  23   ALT (SGPT) U/L <5  --  8 8  --  17    < > = values in this interval not displayed.     Results from last 7 days   Lab Units 04/16/23  0410 04/15/23  1125 04/15/23  0300 04/14/23  1639 04/14/23  1246 04/14/23  0635 04/14/23  0002 04/13/23  2041   CALCIUM mg/dL 8.7 8.6 8.2* 8.6 9.1 8.6 8.1* 8.3*   ALBUMIN g/dL  --  3.2* 3.3* 3.4* 3.5 3.7 3.6 3.0*   MAGNESIUM mg/dL 2.5*  --  2.1  --  2.6* 2.5* 2.5* 2.8*   PHOSPHORUS mg/dL  --   --  4.6*  --   --  3.3 4.6* 6.0*         Glucose   Date/Time Value Ref Range Status   04/16/2023 0720 103 70 - 130 mg/dL Final     Comment:     Meter: [id number] : [id number] Carissa Malone RN   04/16/2023 0638 107 70 - 130 mg/dL Final     Comment:     Meter: [id number] : [id number] Caren Hemphill RN   04/15/2023 2101 119 70 - 130 mg/dL Final     Comment:     Meter: [id number] : [id number] Ivett Blaire LUANNE   04/15/2023 1709 145 (H) 70 - 130 mg/dL Final     Comment:     Meter: [id number] : [id number] Christa Hemphill RN   04/15/2023 1124 165 (H) 70 - 130 mg/dL Final     Comment:     Meter: [id number] : [id number] Imer Torres RN   04/15/2023 0416 157 (H) 70 - 130 mg/dL Final     Comment:     Meter: [id number] : [id number] Carlos Hoffman RN   04/15/2023 0222 163 (H) 70 - 130 mg/dL Final     Comment:     Meter: [id number] : [id number] Carlos Hoffman RN       XR Chest 1 View    Result Date: 4/15/2023  Persistent vascular congestion and bibasilar atelectasis.  This report was finalized on 4/15/2023 4:29 AM by Dr. Mini Segura M.D.      XR Chest PA & Lateral    Result Date: 4/16/2023  Small left apical pneumothorax. Small-to-moderate bilateral pleural effusions with basilar atelectasis.  This report was finalized on 4/16/2023 8:31 AM by Dr. Neil Singleton M.D.      I have personally reviewed all medications:  Scheduled Medications  acetylcysteine, 3 mL, Nebulization, TID - RT  aspirin, 81 mg, Oral,  Daily  atorvastatin, 40 mg, Oral, Nightly  calcium gluconate, 2 g, Intravenous, Once  ceFAZolin, 1 g, Intravenous, Q24H  DULoxetine, 30 mg, Oral, Nightly  insulin lispro, 0-9 Units, Subcutaneous, 4x Daily With Meals & Nightly  ipratropium-albuterol, 3 mL, Nebulization, Q6H While Awake - RT  iron polysaccharides, 150 mg, Oral, Daily  mupirocin, , Each Nare, BID  pantoprazole, 40 mg, Oral, QAM  senna-docusate sodium, 2 tablet, Oral, Nightly    Infusions  amiodarone, 1 mg/min   Followed by  amiodarone, 0.5 mg/min  phenylephrine, 0.5-3 mcg/kg/min, Last Rate: 0.5 mcg/kg/min (04/16/23 0853)  sodium chloride, 9 mL/hr, Last Rate: 9 mL/hr (04/15/23 1624)  vasopressin, 0.03 Units/min, Last Rate: Stopped (04/15/23 1250)    Diet  Diet: Liquid Diets; Clear Liquid; Texture: Regular Texture (IDDSI 7); Fluid Consistency: Thin (IDDSI 0)    I have personally reviewed:  [x]  Laboratory   []  Microbiology   []  Radiology   [x]  EKG/Telemetry afib on tele  []  Cardiology/Vascular   []  Pathology    []  Records       Assessment/Plan     Active Hospital Problems    Diagnosis  POA   • **NSTEMI (non-ST elevated myocardial infarction) [I21.4]  Unknown   • Hypernatremia [E87.0]  Unknown   • Rhinovirus [B34.8]  Yes   • Dyspnea on exertion [R06.09]  Yes   • Abnormal findings on diagnostic imaging of heart and coronary circulation [R93.1]  Unknown   • Type 2 diabetes mellitus with diabetic chronic kidney disease [E11.22]  Yes   • Essential hypertension [I10]  Yes   • Hyperlipidemia [E78.5]  Yes   • CKD (chronic kidney disease) stage 4, GFR 15-29 ml/min ([facility]) [N18.4]  Yes      Resolved Hospital Problems   No resolved problems to display.       72 y.o. male admitted with NSTEMI (non-ST elevated myocardial infarction).    Severe three-vessel coronary disease with an occluded right coronary system s/p CABG x4 on 4/13/2023.  Extubated on 4/14/2023. Had atrial fibrillation started on amiodarone. [facility] started today    Metabolic encephalopathy  improved.    Thrombocytopenia: Heparin antibody pending    Nephrology managing ESRD + HD    Diabetes: controlled    Discussed with patient and nursing staff     Cade Schreiber MD  [facility]  04/16/23  09:32 EDT

## 2023-04-16 NOTE — PLAN OF CARE
Goal Outcome Evaluation:  Plan of Care Reviewed With: patient, spouse, family           Outcome Evaluation: Patient on IV amiodarone drip for a fib, now NSR, off neosynephrine once heart rate controlled. CT with 900 cc serosang fluid out this shift. drowsy but arouses and oriented except to time. Encouraged intake of nephro protein shake with meds. Up with PT to side of bed. Tylenol x1 for pain.

## 2023-04-16 NOTE — CONSULTS
Referring Provider: Dr. Schreiber  Reason for Consultation: Critical care management    Patient Care Team:  Daksha Ng APRN as PCP - General (Family Medicine)  Rudy Faith MD as Consulting Physician (Ophthalmology)  Jose Mccall MD as Consulting Physician (Nephrology)  Quang Reyes MD as Consulting Physician (Nephrology)    Chief complaint:   Shortness of breath    History of present illness:    Subjective   This is a 72-year-old male patient was admitted on 4/8 for shortness of breath of several days duration.  He had inferior non-STEMI.  LHC showed severe triple-vessel disease and occluded RCA.  Echo showed EF 25-30% and LV gram showed EF of 20%.  He underwent CABG x4 on 4/13.  This morning he went into A-fib with RVR and associated hypotension.  Erickson BP was 64/49.    Patient is slightly confused but he appears to be better today compared to the last few days according to his family.  Most of the history was obtained from the chart.    Patient denies shortness of breath.  His on RA and his SPO2 is 90-91%.  Family stated that he coughs but does not spit out and he swallows it.  He denies chest pain.    He has chest tube currently on -20 suction.  No significant drainage.  Left Cordis catheter and right subclavian dialysis catheter.    Review of Systems  Limited due to confusion and mild somnolence    History  Past Medical History:   Diagnosis Date   • Anemia    • Anesthesia complication     HYPOTENSION   • Arthritis    • Cancer of kidney, left    • CKD (chronic kidney disease)     STAGE 5   • Diabetes mellitus, type 2    • Fatigue    • GERD (gastroesophageal reflux disease)    • H/O renal cell carcinoma 2007    partial nephrectomy   • History of colostomy reversal 04/2022   • [place] (hard of hearing)     NO DEVICE   • Hyperlipidemia    • Hypertension    • Primary insomnia 06/13/2016   • Proteinuria    • Risk factors for obstructive sleep apnea    • Sinus drainage    • Vitamin  D deficiency 08/14/2017   ,   Past Surgical History:   Procedure Laterality Date   • ARTERIOVENOUS FISTULA/SHUNT SURGERY Left 08/15/2019    Procedure: LEFT MID FOREARM RADIAL CEPHALIC ARTERIAL VENOUS FISTULA;  Surgeon: Louann Miles Jr., MD;  Location: [facility] MAIN OR;  Service: Vascular   • CARDIAC CATHETERIZATION N/A 4/9/2023    Procedure: Left Heart Cath;  Surgeon: Lobito Garcia MD;  Location: [facility] CATH INVASIVE LOCATION;  Service: Cardiovascular;  Laterality: N/A;   • CARDIAC CATHETERIZATION N/A 4/9/2023    Procedure: Left ventriculography;  Surgeon: Lobito Garcia MD;  Location: [facility] CATH INVASIVE LOCATION;  Service: Cardiovascular;  Laterality: N/A;   • CARDIAC CATHETERIZATION N/A 4/9/2023    Procedure: Coronary angiography;  Surgeon: Lobito Garcia MD;  Location: [facility] CATH INVASIVE LOCATION;  Service: Cardiovascular;  Laterality: N/A;   • COLONOSCOPY  03/24/2022   • COLONOSCOPY N/A 03/24/2022    Procedure: COLONOSCOPY TO CECUM WITH POLYPECTOMY  (HOT SNARE);  Surgeon: Yelena Guerra MD;  Location: [facility] ENDOSCOPY;  Service: General;  Laterality: N/A;  PREOP/ HX OF DIVERTICULITIS, COLOSTOMY  POSTOP/ POLYPS, DIVERTICULOSIS    • COLOSTOMY  09/06/2021   • COLOSTOMY CLOSURE N/A 04/12/2022    Procedure: open Colostomy reversal;  Surgeon: Yelena Guerra MD;  Location: [facility] MAIN OR;  Service: General;  Laterality: N/A;   • CORONARY ARTERY BYPASS GRAFT N/A 4/13/2023    Procedure: MAT STERNOTOMY CORONARY ARTERY BYPASS GRAFT TIMES 4 USING LEFT INTERNAL MAMMARY ARTERY AND RIGHT GREATER SAPHENOUS VEIN  PER ENDOSCOPIC VEIN HARVESTING, MITRAL VALVE REPAIR  AND PRP;  Surgeon: Mirtha Zuluaga MD;  Location: [facility] CVOR;  Service: Cardiothoracic;  Laterality: N/A;   • DIAGNOSTIC LAPAROSCOPY N/A 2/27/2023    Procedure: DIAGNOSTIC LAPAROSCOPY;  Surgeon: Murali Ferreira MD;  Location: [facility] MAIN OR;  Service: General;  Laterality: N/A;   • EXPLORATORY LAPAROTOMY N/A 09/06/2021     Procedure: Open sigmoind colectomy, colostomy, and appendectomy;  Surgeon: Yelena Guerra MD;  Location: [facility] MAIN OR;  Service: General;  Laterality: N/A;   • EYE SURGERY      CATARACTS   • KNEE ARTHROSCOPY Right    • NEPHRECTOMY PARTIAL      Dr. Victor   ,   Family History   Problem Relation Age of Onset   • Hypertension Mother    • Heart disease Father         ASCVD   • Macular degeneration Father    • Cancer Father         bladder   • Diabetes type I Sister    • Malig Hyperthermia Neg Hx    ,   Social History     Socioeconomic History   • Marital status:    Tobacco Use   • Smoking status: Former     Packs/day: 2.00     Years: 25.00     Pack years: 50.00     Types: Cigarettes     Quit date:      Years since quittin.3   • Smokeless tobacco: Current     Types: Snuff   Vaping Use   • Vaping Use: Never used   Substance and Sexual Activity   • Alcohol use: No   • Drug use: No   • Sexual activity: Defer     E-cigarette/Vaping   • E-cigarette/Vaping Use Never User    • Passive Exposure No    • Counseling Given No      E-cigarette/Vaping Substances   • Nicotine No    • THC No    • CBD No    • Flavoring No      E-cigarette/Vaping Devices   • Disposable No    • Pre-filled or Refillable Cartridge No    • Refillable Tank No    • Pre-filled Pod No        ,   Medications Prior to Admission   Medication Sig Dispense Refill Last Dose   • aspirin 81 MG tablet Take 1 tablet by mouth Every Night. FOLLOW MD GUIDELINES ON WHEN/IF TO HOLD FOR DOS   2023   • cetirizine (zyrTEC) 10 MG tablet Take 1 tablet by mouth Daily As Needed.   2023   • DULoxetine (CYMBALTA) 30 MG capsule Take 1 capsule by mouth Daily. (Patient taking differently: Take 1 capsule by mouth Every Night.) 30 capsule 5 2023   • hydrALAZINE (APRESOLINE) 50 MG tablet Take 2 tablets by mouth Every Morning. (Patient taking differently: Take 2 tablets by mouth Daily With Breakfast.) 60 tablet 5 2023   • hydrALAZINE (APRESOLINE) 50  MG tablet Take 1 tablet by mouth Every Night.   4/7/2023   • metoprolol succinate XL (TOPROL-XL) 100 MG 24 hr tablet Take 1 tablet by mouth Every Night. 90 tablet 1 4/7/2023   • simvastatin (ZOCOR) 40 MG tablet Take 1 tablet by mouth Every Night. 90 tablet 1 4/7/2023   • sodium bicarbonate 650 MG tablet Take 1 tablet by mouth 3 (Three) Times a Day.  0 4/7/2023   • traZODone (DESYREL) 50 MG tablet TAKE 1 TABLET AT NIGHT AS NEEDED FOR SLEEP (Patient taking differently: Take 1 tablet by mouth Every Night.) 90 tablet 3 4/7/2023   • acetaminophen (TYLENOL) 325 MG tablet Take 2 tablets by mouth Every 6 (Six) Hours As Needed for Mild Pain.   Unknown   • ondansetron (Zofran) 4 MG tablet Take 1 tablet by mouth Every 8 (Eight) Hours As Needed for Nausea or Vomiting. 30 tablet 1 Unknown   • sennosides-docusate (senna-docusate sodium) 8.6-50 MG per tablet Take 1 tablet by mouth At Night As Needed for Constipation. 60 tablet 0 Unknown   • simethicone (MYLICON) 80 MG chewable tablet Chew 1 tablet Every 6 (Six) Hours As Needed.   Unknown   • traMADol (ULTRAM) 50 MG tablet Take 1 tablet by mouth Every 8 (Eight) Hours As Needed for Moderate Pain for up to 6 doses. 6 tablet 0    • vitamin D3 125 MCG (5000 UT) capsule capsule Take 1 tablet by mouth 2 (Two) Times a Week. SAT AND WED 4/5/2023   , Scheduled Meds:  acetylcysteine, 3 mL, Nebulization, TID - RT  aspirin, 81 mg, Oral, Daily  atorvastatin, 40 mg, Oral, Nightly  ceFAZolin, 1 g, Intravenous, Q24H  DULoxetine, 30 mg, Oral, Nightly  insulin lispro, 0-9 Units, Subcutaneous, 4x Daily With Meals & Nightly  ipratropium-albuterol, 3 mL, Nebulization, Q6H While Awake - RT  iron polysaccharides, 150 mg, Oral, Daily  mupirocin, , Each Nare, BID  pantoprazole, 40 mg, Oral, QAM  senna-docusate sodium, 2 tablet, Oral, Nightly    , Continuous Infusions:  amiodarone, 1 mg/min, Last Rate: 1 mg/min (04/16/23 0952)   Followed by  amiodarone, 0.5 mg/min  phenylephrine, 0.5-3 mcg/kg/min, Last  Rate: 0.5 mcg/kg/min (04/16/23 0853)  sodium chloride, 9 mL/hr, Last Rate: 9 mL/hr (04/15/23 1624)  vasopressin, 0.03 Units/min, Last Rate: Stopped (04/15/23 1250)     and Allergies:  Contrast dye (echo or unknown ct/mr) and Latex    Objective     Vital Signs   Temp:  [97.8 °F (36.6 °C)-98.4 °F (36.9 °C)] 98 °F (36.7 °C)  Heart Rate:  [] 76  Resp:  [15-16] 16  BP: ()/() 113/83    PPE used per hospital policy    Physical Exam:  Constitutional: Not in acute distress.  Eyes: Injected conjunctivae, EOMI. pupils equal reactive to light.  ENMT: Ho 3. No oral thrush.   Neck:  Trachea midline. No thyromegaly  Heart: RRR, no murmur  Lungs: Diffuse bilateral rhonchi.  No wheezing.  No crackles.  No use of accessory muscles  Abdomen: Obese. Soft. No tenderness or dullness. No HSM.  Extremities: No cyanosis, clubbing but pitting edema in thighs and arms.  Slight cold hands and feet.  Neuro: Conscious, alert.  Moves all extremities with symmetrical strength.  Psych: Appropriate mood and affect.    Integumentary: No rash.  Normal skin turgor  Lymphatic: No palpable cervical or supraclavicular lymph nodes.      Diagnostic imaging:  I personally and independently reviewed the following images:   CXR 4/16/2023: Bilateral pleural effusion more prominent on the right.    Laboratory workup:    Results from last 7 days   Lab Units 04/16/23  0410 04/15/23  1125 04/15/23  0300   SODIUM mmol/L 142 142 143   POTASSIUM mmol/L 4.5 4.5 4.4   CHLORIDE mmol/L 103 103 105   CO2 mmol/L 22.0 23.0 26.0   BUN mg/dL 38* 30* 25*   CREATININE mg/dL 5.19* 4.11* 3.57*   GLUCOSE mg/dL 103* 159* 162*   CALCIUM mg/dL 8.7 8.6 8.2*         Results from last 7 days   Lab Units 04/16/23  0410 04/15/23  1807 04/15/23  1125   WBC 10*3/mm3 12.94* 12.97* 12.77*   HEMOGLOBIN g/dL 9.2* 8.9* 8.9*   HEMATOCRIT % 27.7* 27.0* 26.1*   PLATELETS 10*3/mm3 56* 49* 47*     Results from last 7 days   Lab Units 04/14/23  1246 04/14/23  0635 04/13/23  2047  04/13/23  0238   INR  1.40* 1.58* 1.58*  --    APTT seconds 37.4*  --  >200.0* 81.4*           Assessment     1. Non-STEMI s/p CABG x4/13  2. A-fib with RVR  3. Hypotension  4. Rhinovirus bronchitis  5. Bilateral pleural effusion  6. Bilateral lower lobe atelectasis  7. Mild hypercapnia  8. ESRD on HD  9. Acute thrombocytopenia, started 4/14  10. Metabolic encephalopathy  11. Postop anemia, stable    Recommendations:    · Amiodarone drip for A-fib  · Stanford-Synephrine to keep MAP >65  · DuoNeb 4 times a day.  Incentive spirometry and flutter to improve mucus clearance  · HD as needed. Holding off today due to hypotension. Discussed with Dr. Santana  · Check VBG due to somnolence and mild hypercapnia on his ABGs over the last 2 days  · Follow-up HIT panel.  Continue holding anticoagulation  · SCD for DVT prophylaxis      Frieda Coronel MD  04/16/23  10:28 EDT    Time: Critical care 35 min

## 2023-04-16 NOTE — NURSING NOTE
Pt had a minute of afib, this RN had pt take a deep breath and cough. Pt converted back to NSR A-paced, asymptomatic.

## 2023-04-16 NOTE — NURSING NOTE
"Pt sustained afib at 130-150s, flushed, stating \"I don't feel good\". RN paged cardiology, spoke to DUSTY Jones, new orders for amio bolus and start drip. Pt ekg showed QTC at 515 before amio, BP systolic was 130's. Pt BP systolic during amio bolus was low 100's. Pt ekg after amio bolus showed QTC at 606, BP systolic was high 80's. Pt was placed in Trendelenburg, BP systolic was mid 90's.   RN paged cardiology, spoke to DUSTY Jones, was told to page cardiothoracic for orders to transfer pt. RN paged cardiothoracic, Dr. Wakefield, new orders for 250cc NS bolus, stop amio, and transfer pt to CVICU.   RN called pt wife, Caterina Cai, updating her on pts transfer to room 3006. RN gave report to SRIKANTH HemphillU RN.   "

## 2023-04-16 NOTE — PLAN OF CARE
Goal Outcome Evaluation:  Plan of Care Reviewed With: patient           Outcome Evaluation: Pt admitted with NSTEMI, s/p CABG and MVR, HD TTHS normally.  Spouse reports normally independent, no use of AD, no falls.  Pt presents flat in bed, RN reprots BP has been stable this afternoon. Pt disoriented, lethargic but better than yesterday.   Orthostatic with HOB elevated, improved when head lowered.  Attempted chair position, SBP elvated in 150s. Required supine>sit with mod-max a x2, able to stand w/ min-mod A x2.  Increased lethargy, BP 80s/60s once supine, improved after few minutes.  RN aware.  May benefit from acute rehab pending progress while inpatient.

## 2023-04-16 NOTE — PROGRESS NOTES
LOS: 8 days   Patient Care Team:  Daksha Ng APRN as PCP - General (Family Medicine)  Rudy Faith MD as Consulting Physician (Ophthalmology)  Jose Mccall MD as Consulting Physician (Nephrology)  Quang Reyes MD as Consulting Physician (Nephrology)    Chief Complaint: post op    Subjective      Vital Signs  Temp:  [97.8 °F (36.6 °C)-98.4 °F (36.9 °C)] 98.1 °F (36.7 °C)  Heart Rate:  [] 73  Resp:  [14-16] 16  BP: ()/() 99/64  Body mass index is 31.57 kg/m².    Intake/Output Summary (Last 24 hours) at 4/16/2023 0714  Last data filed at 4/16/2023 0416  Gross per 24 hour   Intake 1000 ml   Output 565 ml   Net 435 ml     No intake/output data recorded.    Chest tube drainage last 8 hours         04/14/23  1132 04/15/23  0600 04/16/23  0600   Weight: 110 kg (243 lb) 108 kg (237 lb 7 oz) 106 kg (232 lb 12.9 oz)         Objective    Results Review:        WBC WBC   Date Value Ref Range Status   04/16/2023 12.94 (H) 3.40 - 10.80 10*3/mm3 Final   04/15/2023 12.97 (H) 3.40 - 10.80 10*3/mm3 Final   04/15/2023 12.77 (H) 3.40 - 10.80 10*3/mm3 Final   04/15/2023 13.19 (H) 3.40 - 10.80 10*3/mm3 Final   04/14/2023 15.84 (H) 3.40 - 10.80 10*3/mm3 Final   04/14/2023 18.01 (H) 3.40 - 10.80 10*3/mm3 Final   04/14/2023 21.71 (H) 3.40 - 10.80 10*3/mm3 Final   04/13/2023 22.39 (H) 3.40 - 10.80 10*3/mm3 Final      HGB Hemoglobin   Date Value Ref Range Status   04/16/2023 9.2 (L) 13.0 - 17.7 g/dL Final   04/15/2023 8.9 (L) 13.0 - 17.7 g/dL Final   04/15/2023 8.9 (L) 13.0 - 17.7 g/dL Final   04/15/2023 7.8 (L) 13.0 - 17.7 g/dL Final   04/14/2023 8.5 (L) 13.0 - 17.7 g/dL Final   04/14/2023 8.4 (L) 13.0 - 17.7 g/dL Final   04/14/2023 7.5 (L) 13.0 - 17.7 g/dL Final   04/13/2023 9.6 (L) 13.0 - 17.7 g/dL Final   04/13/2023 7.5 (C) 12.0 - 17.0 g/dL Final   04/13/2023 7.5 (C) 12.0 - 17.0 g/dL Final   04/13/2023 7.1 (C) 12.0 - 17.0 g/dL Final   04/13/2023 7.8 (C) 12.0 - 17.0 g/dL Final   04/13/2023 7.5  (C) 12.0 - 17.0 g/dL Final   04/13/2023 7.8 (C) 12.0 - 17.0 g/dL Final   04/13/2023 6.8 (C) 12.0 - 17.0 g/dL Final   04/13/2023 5.4 (C) 12.0 - 17.0 g/dL Final   04/13/2023 5.4 (C) 12.0 - 17.0 g/dL Final   04/13/2023 11.6 (L) 12.0 - 17.0 g/dL Final      HCT Hematocrit   Date Value Ref Range Status   04/16/2023 27.7 (L) 37.5 - 51.0 % Final   04/15/2023 27.0 (L) 37.5 - 51.0 % Final   04/15/2023 26.1 (L) 37.5 - 51.0 % Final   04/15/2023 23.2 (L) 37.5 - 51.0 % Final   04/14/2023 25.0 (L) 37.5 - 51.0 % Final   04/14/2023 25.7 (L) 37.5 - 51.0 % Final   04/14/2023 22.3 (L) 37.5 - 51.0 % Final   04/13/2023 29.2 (L) 37.5 - 51.0 % Final   04/13/2023 22 (L) 38 - 51 % Final   04/13/2023 22 (L) 38 - 51 % Final   04/13/2023 21 (L) 38 - 51 % Final   04/13/2023 23 (L) 38 - 51 % Final   04/13/2023 22 (L) 38 - 51 % Final   04/13/2023 23 (L) 38 - 51 % Final   04/13/2023 20 (L) 38 - 51 % Final   04/13/2023 16 (L) 38 - 51 % Final   04/13/2023 16 (L) 38 - 51 % Final   04/13/2023 34 (L) 38 - 51 % Final      Platelets Platelets   Date Value Ref Range Status   04/16/2023 56 (L) 140 - 450 10*3/mm3 Final   04/15/2023 49 (C) 140 - 450 10*3/mm3 Final   04/15/2023 47 (C) 140 - 450 10*3/mm3 Final   04/15/2023 55 (L) 140 - 450 10*3/mm3 Final   04/14/2023 64 (L) 140 - 450 10*3/mm3 Final   04/14/2023 78 (L) 140 - 450 10*3/mm3 Final   04/14/2023 118 (L) 140 - 450 10*3/mm3 Final   04/13/2023 145 140 - 450 10*3/mm3 Final        PT/INR:    Protime   Date Value Ref Range Status   04/14/2023 17.4 (H) 11.7 - 14.2 Seconds Final   04/14/2023 19.1 (H) 11.7 - 14.2 Seconds Final   04/13/2023 19.1 (H) 11.7 - 14.2 Seconds Final   /  INR   Date Value Ref Range Status   04/14/2023 1.40 (H) 0.90 - 1.10 Final   04/14/2023 1.58 (H) 0.90 - 1.10 Final   04/13/2023 1.58 (H) 0.90 - 1.10 Final       Sodium Sodium   Date Value Ref Range Status   04/16/2023 142 136 - 145 mmol/L Final   04/15/2023 142 136 - 145 mmol/L Final   04/15/2023 143 136 - 145 mmol/L Final   04/14/2023  148 (H) 136 - 145 mmol/L Final   04/14/2023 151 (H) 136 - 145 mmol/L Final   04/14/2023 148 (H) 136 - 145 mmol/L Final   04/14/2023 147 (H) 136 - 145 mmol/L Final   04/13/2023 144 136 - 145 mmol/L Final      Potassium Potassium   Date Value Ref Range Status   04/16/2023 4.5 3.5 - 5.2 mmol/L Final   04/15/2023 4.5 3.5 - 5.2 mmol/L Final   04/15/2023 4.4 3.5 - 5.2 mmol/L Final   04/14/2023 5.1 3.5 - 5.2 mmol/L Final   04/14/2023 4.6 3.5 - 5.2 mmol/L Final   04/14/2023 4.2 3.5 - 5.2 mmol/L Final   04/14/2023 4.6 3.5 - 5.2 mmol/L Final   04/13/2023 4.7 3.5 - 5.2 mmol/L Final     Comment:     Slight hemolysis detected by analyzer. Results may be affected.      Chloride Chloride   Date Value Ref Range Status   04/16/2023 103 98 - 107 mmol/L Final   04/15/2023 103 98 - 107 mmol/L Final   04/15/2023 105 98 - 107 mmol/L Final   04/14/2023 109 (H) 98 - 107 mmol/L Final   04/14/2023 111 (H) 98 - 107 mmol/L Final   04/14/2023 110 (H) 98 - 107 mmol/L Final   04/14/2023 107 98 - 107 mmol/L Final   04/13/2023 108 (H) 98 - 107 mmol/L Final      Bicarbonate CO2   Date Value Ref Range Status   04/16/2023 22.0 22.0 - 29.0 mmol/L Final   04/15/2023 23.0 22.0 - 29.0 mmol/L Final   04/15/2023 26.0 22.0 - 29.0 mmol/L Final   04/14/2023 26.0 22.0 - 29.0 mmol/L Final   04/14/2023 25.4 22.0 - 29.0 mmol/L Final   04/14/2023 21.7 (L) 22.0 - 29.0 mmol/L Final   04/14/2023 21.0 (L) 22.0 - 29.0 mmol/L Final   04/13/2023 17.0 (L) 22.0 - 29.0 mmol/L Final      BUN BUN   Date Value Ref Range Status   04/16/2023 38 (H) 8 - 23 mg/dL Final   04/15/2023 30 (H) 8 - 23 mg/dL Final   04/15/2023 25 (H) 8 - 23 mg/dL Final   04/14/2023 45 (H) 8 - 23 mg/dL Final   04/14/2023 45 (H) 8 - 23 mg/dL Final   04/14/2023 48 (H) 8 - 23 mg/dL Final   04/14/2023 43 (H) 8 - 23 mg/dL Final   04/13/2023 42 (H) 8 - 23 mg/dL Final      Creatinine Creatinine   Date Value Ref Range Status   04/16/2023 5.19 (H) 0.76 - 1.27 mg/dL Final   04/15/2023 4.11 (H) 0.76 - 1.27 mg/dL  Final   04/15/2023 3.57 (H) 0.76 - 1.27 mg/dL Final   04/14/2023 5.45 (H) 0.76 - 1.27 mg/dL Final   04/14/2023 5.76 (H) 0.76 - 1.27 mg/dL Final   04/14/2023 4.95 (H) 0.76 - 1.27 mg/dL Final   04/14/2023 4.96 (H) 0.76 - 1.27 mg/dL Final   04/13/2023 4.61 (H) 0.76 - 1.27 mg/dL Final      Calcium Calcium   Date Value Ref Range Status   04/16/2023 8.7 8.6 - 10.5 mg/dL Final   04/15/2023 8.6 8.6 - 10.5 mg/dL Final   04/15/2023 8.2 (L) 8.6 - 10.5 mg/dL Final   04/14/2023 8.6 8.6 - 10.5 mg/dL Final   04/14/2023 9.1 8.6 - 10.5 mg/dL Final   04/14/2023 8.6 8.6 - 10.5 mg/dL Final   04/14/2023 8.1 (L) 8.6 - 10.5 mg/dL Final   04/13/2023 8.3 (L) 8.6 - 10.5 mg/dL Final      Magnesium Magnesium   Date Value Ref Range Status   04/15/2023 2.1 1.6 - 2.4 mg/dL Final   04/14/2023 2.6 (H) 1.6 - 2.4 mg/dL Final   04/14/2023 2.5 (H) 1.6 - 2.4 mg/dL Final   04/14/2023 2.5 (H) 1.6 - 2.4 mg/dL Final   04/13/2023 2.8 (H) 1.6 - 2.4 mg/dL Final          aspirin, 81 mg, Oral, Daily  atorvastatin, 40 mg, Oral, Nightly  ceFAZolin, 1 g, Intravenous, Q24H  chlorhexidine, 15 mL, Mouth/Throat, Q12H  DULoxetine, 30 mg, Oral, Nightly  insulin lispro, 0-9 Units, Subcutaneous, 4x Daily With Meals & Nightly  iron polysaccharides, 150 mg, Oral, Daily  mupirocin, , Each Nare, BID  pantoprazole, 40 mg, Oral, QAM  senna-docusate sodium, 2 tablet, Oral, Nightly  sodium chloride, 4 mL, Nebulization, BID - RT      niCARdipine, 5-15 mg/hr  sodium chloride, 9 mL/hr, Last Rate: 9 mL/hr (04/15/23 1624)  vasopressin, 0.03 Units/min, Last Rate: Stopped (04/15/23 1250)            Patient Active Problem List   Diagnosis Code   • 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.22   • Essential hypertension I10   • Hyperlipidemia E78.5   • Primary insomnia F51.01   • CKD (chronic kidney disease) stage 4, GFR 15-29 ml/min ([facility]) N18.4   • Vitamin D deficiency E55.9   • Diverticulitis of large intestine with perforation and abscess without bleeding K57.20    • Obesity (BMI 30-39.9) E66.9   • Impaired mobility and ADLs Z74.09, Z78.9   • History of colon resection Z90.49   • Colon polyps K63.5   • Diverticulosis K57.90   • CKD (chronic kidney disease) stage 5, GFR less than 15 ml/min N18.5   • Dyspnea on exertion R06.09   • NSTEMI (non-ST elevated myocardial infarction) I21.4   • Rhinovirus B34.8   • Abnormal findings on diagnostic imaging of heart and coronary circulation R93.1   • Hypernatremia E87.0       Assessment & Plan    - NSTEMI/multi-vessel CAD- s/p urgent CABGx4 LIMA/RSVG, MV repair-(Zuluaga) POD#3  - HFrEF--30-35% per echo 4/8  - rhinovirus infection  - ESRD on HD  - hypertension  - hyperlipidemia  - DM II- A1c 5.0  -post op anemia- expected acute blood loss  -Leukocytosis- probable reactive   -TCP- plt count 55-- hold lovenox     Early this morning he was transferred to CICU for hypotension after he went in to afib with RVR-- he was put on neosynephrine and amiodarone was discontinued for prolonged QTc of 474-- he is still going in and out of rapid atrial fibrillation-- he does not tolerate this rapid rate at all I think we can monitor his QTc and resume amiodarone without bolus-- will reach out to discuss with Dr. Sanchez as well to get her thoughts  Leave pacemaker in VVI backup  2L NC--wean as able  He is alert and oriented--but some times slow to respond-- I think if he gets HD today this may improve-- encourage sleep hygiene as well   plt count 56 stable- HIT panel pending  Aggressive pulmonary toilet-- will add some mucomyst nebs and schedule duo nebs  Increase activity   CXR with small to moderate bilateral effusions-- leave chest tubes one more day  Continue supportive care          Laya Ackerman, APRN  04/16/23  07:14 EDT

## 2023-04-16 NOTE — PROGRESS NOTES
[facility] Progress Note      Patient Name: Lefty Cai  : 1950  MRN: [id number]  Primary Care Physician:  Daksha Ng APRN  Date of admission: 2023    Subjective     Interval History:   Follow-up end-stage renal disease  Transferred to the CCU yesterday due to A-fib with RVR.  Hypotensive overnight and was given IV fluids   Currently sleepy but wakes up to commands.  Continues to be slightly confused  He is back on pressors      Review of Systems:   Not obtainable at this    Objective     Vitals:   Temp:  [97.8 °F (36.6 °C)-98.4 °F (36.9 °C)] 98 °F (36.7 °C)  Heart Rate:  [] 122  Resp:  [15-16] 16  BP: ()/() 113/83  Flow (L/min):  [2-3] 2    Intake/Output Summary (Last 24 hours) at 2023 1125  Last data filed at 2023 0416  Gross per 24 hour   Intake 940 ml   Output 500 ml   Net 440 ml       Physical Exam:    General Appearance: Patient is confused, no acute distress and chronically ill  Skin: warm and dry  HEENT: oral mucosa normal, nonicteric sclera  Neck: supple, no JVD, tunneled dialysis catheter in the right IJ with exit site below the right clavicle  Lungs: Bilateral rhonchi, breathing effort not labored  Heart: RRR, normal S1 and S2, no S3, no rub  Abdomen: soft, nontender, nondistended, normoactive bowel  : no palpable bladder, Reddy catheter anchored in place  Extremities: no edema, cyanosis or clubbing, functional AV fistula in the left forearm with good thrill and bruit  Neuro: Unable to assess    Scheduled Meds:     acetylcysteine, 3 mL, Nebulization, TID - RT  aspirin, 81 mg, Oral, Daily  atorvastatin, 40 mg, Oral, Nightly  ceFAZolin, 1 g, Intravenous, Q24H  DULoxetine, 30 mg, Oral, Nightly  insulin lispro, 0-9 Units, Subcutaneous, 4x Daily With Meals & Nightly  ipratropium-albuterol, 3 mL, Nebulization, Q6H While Awake - RT  iron polysaccharides, 150 mg, Oral, Daily  mupirocin, , Each Nare, BID  pantoprazole, 40 mg, Oral,  QAM  senna-docusate sodium, 2 tablet, Oral, Nightly      IV Meds:   amiodarone, 1 mg/min, Last Rate: 1 mg/min (04/16/23 0952)   Followed by  amiodarone, 0.5 mg/min  phenylephrine, 0.5-3 mcg/kg/min, Last Rate: 0.5 mcg/kg/min (04/16/23 0853)  sodium chloride, 9 mL/hr, Last Rate: 9 mL/hr (04/15/23 1624)  vasopressin, 0.03 Units/min, Last Rate: Stopped (04/15/23 1250)        Results Reviewed:   I have personally reviewed the results from the time of this admission to 4/16/2023 11:25 EDT     Results from last 7 days   Lab Units 04/16/23  0410 04/15/23  1125 04/15/23  0300 04/14/23  1639 04/14/23  1246   SODIUM mmol/L 142 142 143 148* 151*   POTASSIUM mmol/L 4.5 4.5 4.4 5.1 4.6   CHLORIDE mmol/L 103 103 105 109* 111*   CO2 mmol/L 22.0 23.0 26.0 26.0 25.4   BUN mg/dL 38* 30* 25* 45* 45*   CREATININE mg/dL 5.19* 4.11* 3.57* 5.45* 5.76*   CALCIUM mg/dL 8.7 8.6 8.2* 8.6 9.1   BILIRUBIN mg/dL  --  0.4  --  0.4 0.4   ALK PHOS U/L  --  38*  --  32* 28*   ALT (SGPT) U/L  --  <5  --  8 8   AST (SGOT) U/L  --  29  --  44* 41*   GLUCOSE mg/dL 103* 159* 162* 149* 119*       Estimated Creatinine Clearance: 16 mL/min (A) (by C-G formula based on SCr of 5.19 mg/dL (H)).    Results from last 7 days   Lab Units 04/16/23  0410 04/15/23  0300 04/14/23  1246 04/14/23  0635 04/14/23  0002   MAGNESIUM mg/dL 2.5* 2.1 2.6* 2.5* 2.5*   PHOSPHORUS mg/dL  --  4.6*  --  3.3 4.6*             Results from last 7 days   Lab Units 04/16/23  0410 04/15/23  1807 04/15/23  1125 04/15/23  0300 04/14/23  1246   WBC 10*3/mm3 12.94* 12.97* 12.77* 13.19* 15.84*   HEMOGLOBIN g/dL 9.2* 8.9* 8.9* 7.8* 8.5*   PLATELETS 10*3/mm3 56* 49* 47* 55* 64*       Results from last 7 days   Lab Units 04/14/23  1246 04/14/23  0635 04/13/23  2041   INR  1.40* 1.58* 1.58*       Assessment / Plan     ASSESSMENT:  -End-stage renal disease and diabetic and hypertensive glomerulosclerosis on maintenance hemodialysis every Tuesday, Thursday and Saturday, his volume status and  electrolyte within acceptable range  -Diabetes mellitus type 2 with renal complication  -Hypertension with chronic kidney disease, well controlled today  -Anemia of ESRD and acute blood loss anemia secondary to recent surgery., hemoglobin  Is better after blood transfusion  -Thrombocytopenia platelet: HIT antibody screen is underway  -Coronary artery disease underwent four-vessel CABG mitral valve repair on 4/13/2023  -Secondary hyperparathyroidism of renal origin  -Altered mental status postoperatively slightly improving  -Cardiogenic shock:on pressors  PLAN:  -No acute indication for dialysis today.  We will hold on dialysis today given A-fib with RVR, hypotension needing pressors.  Will reassess tomorrow if he continues to be hypotensive would likely need CRRT for volume removal  -Surveillance labs  -Transfusion per primary team  -Awaiting HIT antibody screen      Thank you for involving us in the care of Lefty Cai.  Please feel free to call with any questions.    Page aSntana MD  04/16/23  11:25 EDT    [facility]  [phone]    Please note that portions of this note were completed with a voice recognition program.

## 2023-04-16 NOTE — PLAN OF CARE
Goal Outcome Evaluation:               Pt s/p Stemi ,  Cabg x3 with LIMA , MV repair 2 days post op, was transferred from [facility]I to [facility]u this am for Asymptomatic AFib , and prolonged QTC with Amiodarone,received 250 NS bolus given prior to arriving to [facility]u.

## 2023-04-16 NOTE — THERAPY EVALUATION
Patient Name: Lefty Cai  : 1950    MRN: [id number]                              Today's Date: 2023       Admit Date: 2023    Visit Dx:     ICD-10-CM ICD-9-CM   1. Dyspnea on exertion  R06.09 786.09   2. NSTEMI (non-ST elevated myocardial infarction)  I21.4 410.70   3. ESRD (end stage renal disease)  N18.6 585.6   4. Former smoker  Z87.891 V15.82   5. Elevated blood pressure reading in office with diagnosis of hypertension  I10 401.9   6. Rhinovirus infection  B34.8 079.3   7. Abnormal findings on diagnostic imaging of heart and coronary circulation  R93.1 794.39   8. S/P CABG (coronary artery bypass graft)  Z95.1 V45.81     Patient Active Problem List   Diagnosis   • Type 2 diabetes mellitus with diabetic chronic kidney disease   • Essential hypertension   • Hyperlipidemia   • Primary insomnia   • CKD (chronic kidney disease) stage 4, GFR 15-29 ml/min ([facility])   • Vitamin D deficiency   • Diverticulitis of large intestine with perforation and abscess without bleeding   • Obesity (BMI 30-39.9)   • Impaired mobility and ADLs   • History of colon resection   • Colon polyps   • Diverticulosis   • CKD (chronic kidney disease) stage 5, GFR less than 15 ml/min   • Dyspnea on exertion   • NSTEMI (non-ST elevated myocardial infarction)   • Rhinovirus   • Abnormal findings on diagnostic imaging of heart and coronary circulation   • Hypernatremia     Past Medical History:   Diagnosis Date   • Anemia    • Anesthesia complication     HYPOTENSION   • Arthritis    • Cancer of kidney, left    • CKD (chronic kidney disease)     STAGE 5   • Diabetes mellitus, type 2    • Fatigue    • GERD (gastroesophageal reflux disease)    • H/O renal cell carcinoma 2007    partial nephrectomy   • History of colostomy reversal 2022   • [place] (hard of hearing)     NO DEVICE   • Hyperlipidemia    • Hypertension    • Primary insomnia 2016   • Proteinuria    • Risk factors for obstructive sleep apnea    • Sinus drainage     • Vitamin D deficiency 08/14/2017     Past Surgical History:   Procedure Laterality Date   • ARTERIOVENOUS FISTULA/SHUNT SURGERY Left 08/15/2019    Procedure: LEFT MID FOREARM RADIAL CEPHALIC ARTERIAL VENOUS FISTULA;  Surgeon: Louann Miles Jr., MD;  Location: [facility] MAIN OR;  Service: Vascular   • CARDIAC CATHETERIZATION N/A 4/9/2023    Procedure: Left Heart Cath;  Surgeon: Lobito Garcia MD;  Location: [facility] CATH INVASIVE LOCATION;  Service: Cardiovascular;  Laterality: N/A;   • CARDIAC CATHETERIZATION N/A 4/9/2023    Procedure: Left ventriculography;  Surgeon: Lobito Garcia MD;  Location: [facility] CATH INVASIVE LOCATION;  Service: Cardiovascular;  Laterality: N/A;   • CARDIAC CATHETERIZATION N/A 4/9/2023    Procedure: Coronary angiography;  Surgeon: Lobito Garcia MD;  Location: [facility] CATH INVASIVE LOCATION;  Service: Cardiovascular;  Laterality: N/A;   • COLONOSCOPY  03/24/2022   • COLONOSCOPY N/A 03/24/2022    Procedure: COLONOSCOPY TO CECUM WITH POLYPECTOMY  (HOT SNARE);  Surgeon: Yelena Guerra MD;  Location: [facility] ENDOSCOPY;  Service: General;  Laterality: N/A;  PREOP/ HX OF DIVERTICULITIS, COLOSTOMY  POSTOP/ POLYPS, DIVERTICULOSIS    • COLOSTOMY  09/06/2021   • COLOSTOMY CLOSURE N/A 04/12/2022    Procedure: open Colostomy reversal;  Surgeon: Yelena Guerra MD;  Location: [facility] MAIN OR;  Service: General;  Laterality: N/A;   • CORONARY ARTERY BYPASS GRAFT N/A 4/13/2023    Procedure: MAT STERNOTOMY CORONARY ARTERY BYPASS GRAFT TIMES 4 USING LEFT INTERNAL MAMMARY ARTERY AND RIGHT GREATER SAPHENOUS VEIN  PER ENDOSCOPIC VEIN HARVESTING, MITRAL VALVE REPAIR  AND PRP;  Surgeon: Mirtha Zuluaga MD;  Location: [facility] CVOR;  Service: Cardiothoracic;  Laterality: N/A;   • DIAGNOSTIC LAPAROSCOPY N/A 2/27/2023    Procedure: DIAGNOSTIC LAPAROSCOPY;  Surgeon: Murali Ferreira MD;  Location: [facility] MAIN OR;  Service: General;  Laterality: N/A;   • EXPLORATORY LAPAROTOMY N/A  09/06/2021    Procedure: Open sigmoind colectomy, colostomy, and appendectomy;  Surgeon: Yelena Guerra MD;  Location: [facility] MAIN OR;  Service: General;  Laterality: N/A;   • EYE SURGERY      CATARACTS   • KNEE ARTHROSCOPY Right    • NEPHRECTOMY PARTIAL  2007    Dr. Victor      General Information     Row Name 04/16/23 1624          Physical Therapy Time and Intention    Document Type evaluation  -AR     Mode of Treatment physical therapy  -AR     Row Name 04/16/23 1624          General Information    Patient Profile Reviewed yes  -AR     Prior Level of Function independent:  no use of AD, no falls, normally HD TTHS  -AR     Existing Precautions/Restrictions fall;sternal  -AR     Barriers to Rehab none identified  -AR     Row Name 04/16/23 1624          Living Environment    People in Home spouse  -AR     Row Name 04/16/23 1624          Home Main Entrance    Number of Stairs, Main Entrance two  -AR     Stair Railings, Main Entrance railings safe and in good condition  -AR     Row Name 04/16/23 1624          Cognition    Orientation Status (Cognition) oriented to;person;place  -AR     Row Name 04/16/23 1624          Safety Issues, Functional Mobility    Impairments Affecting Function (Mobility) balance;cognition;endurance/activity tolerance;strength;shortness of breath;pain;range of motion (ROM)  -AR           User Key  (r) = Recorded By, (t) = Taken By, (c) = Cosigned By    Initials Name Provider Type    AR Mihaela Shields PT Physical Therapist               Mobility     Row Name 04/16/23 1620          Bed Mobility    Bed Mobility supine-sit;sit-supine  -AR     Supine-Sit [place] (Bed Mobility) moderate assist (50% patient effort);maximum assist (25% patient effort);2 person assist  -AR     Sit-Supine [place] (Bed Mobility) moderate assist (50% patient effort);maximum assist (25% patient effort);2 person assist  -AR     Assistive Device (Bed Mobility) bed rails;draw sheet;head of bed elevated  -AR      Row Name 04/16/23 1625          Sit-Stand Transfer    Sit-Stand [place] (Transfers) minimum assist (75% patient effort);2 person assist;moderate assist (50% patient effort)  -AR     Comment, (Sit-Stand Transfer) B knees blocked HHAx2, stood once, light headed, returned to supine  -AR           User Key  (r) = Recorded By, (t) = Taken By, (c) = Cosigned By    Initials Name Provider Type    AR Mihaela Shields, PT Physical Therapist               Obj/Interventions     Row Name 04/16/23 1626          Range of Motion Comprehensive    Comment, General Range of Motion B LE WFL  -AR     Row Name 04/16/23 1626          Strength Comprehensive (MMT)    Comment, General Manual Muscle Testing (MMT) Assessment B LE +3/5  -AR     Row Name 04/16/23 1626          Motor Skills    Therapeutic Exercise --  B Ap10x3; LAQ 10x, heel slides, quad sets 10x  -AR     Row Name 04/16/23 1626          Balance    Balance Assessment sitting static balance  -AR     Static Sitting Balance contact guard;minimal assist  -AR           User Key  (r) = Recorded By, (t) = Taken By, (c) = Cosigned By    Initials Name Provider Type    AR Mihaela Shields, PT Physical Therapist               Goals/Plan     Row Name 04/16/23 1632          Problem Specific Goal 1 (PT)    Problem Specific Goal 1 (PT) cardiac level IV  -AR     Time Frame (Problem Specific Goal 1, PT) 1 week  -AR     Row Name 04/16/23 1632          Therapy Assessment/Plan (PT)    Planned Therapy Interventions (PT) balance training;bed mobility training;gait training;home exercise program;patient/family education;transfer training;ROM (range of motion);stair training;strengthening  -AR           User Key  (r) = Recorded By, (t) = Taken By, (c) = Cosigned By    Initials Name Provider Type    AR Mihaela Shields, PT Physical Therapist               Clinical Impression     Row Name 04/16/23 1626          Pain    Pretreatment Pain Rating 4/10  -AR     Posttreatment Pain Rating 5/10  -AR      Pain Location incisional  -AR     Pain Location - chest  -AR     Pain Intervention(s) Medication (See MAR);Repositioned  -AR     Row Name 04/16/23 1626          Plan of Care Review    Plan of Care Reviewed With patient  -AR     Outcome Evaluation Pt admitted with NSTEMI, s/p CABG and MVR, HD TTHS normally.  Spouse reports normally independent, no use of AD, no falls.  Pt presents flat in bed, RN reprots BP has been stable this afternoon. Pt disoriented, lethargic but better than yesterday.   Orthostatic with HOB elevated, improved when head lowered.  Attempted chair position, SBP elvated in 150s. Required supine>sit with mod-max a x2, able to stand w/ min-mod A x2.  Increased lethargy, BP 80s/60s once supine, improved after few minutes.  RN aware.  May benefit from acute rehab pending progress while inpatient.  -AR     Row Name 04/16/23 1626          Therapy Assessment/Plan (PT)    Rehab Potential (PT) good, to achieve stated therapy goals  -AR     Criteria for Skilled Interventions Met (PT) yes  -AR     Therapy Frequency (PT) 6 times/wk  -AR     Row Name 04/16/23 1626          Vital Signs    O2 Delivery Pre Treatment supplemental O2  -AR     Recovery Time Took BP at least 7 times during PT in variety of position, RN aware of pt performance  -AR     Row Name 04/16/23 1626          Positioning and Restraints    Pre-Treatment Position in bed  -AR     Post Treatment Position bed  -AR     In Bed notified nsg;supine;call light within reach;encouraged to call for assist;exit alarm on  -AR           User Key  (r) = Recorded By, (t) = Taken By, (c) = Cosigned By    Initials Name Provider Type    Mihaela Ling, PT Physical Therapist               Outcome Measures     Row Name 04/16/23 1630          How much help from another person do you currently need...    Turning from your back to your side while in flat bed without using bedrails? 2  -AR     Moving from lying on back to sitting on the side of a flat bed without  bedrails? 2  -AR     Moving to and from a bed to a chair (including a wheelchair)? 1  -AR     Standing up from a chair using your arms (e.g., wheelchair, bedside chair)? 2  -AR     Climbing 3-5 steps with a railing? 1  -AR     To walk in hospital room? 1  -AR     AM-PAC 6 Clicks Score (PT) 9  -AR     Highest level of mobility 3 --> Sat at edge of bed  -AR     Row Name 04/16/23 1632          Functional Assessment    Outcome Measure Options AM-PAC 6 Clicks Basic Mobility (PT)  -AR           User Key  (r) = Recorded By, (t) = Taken By, (c) = Cosigned By    Initials Name Provider Type    AR Mihaela Shields, PT Physical Therapist                             Physical Therapy Education     Title: PT OT SLP Therapies (In Progress)     Topic: Physical Therapy (In Progress)     Point: Mobility training (In Progress)     Learning Progress Summary           Patient Acceptance, E, NR by AR at 4/16/2023 1633    Acceptance, TB,E, VU by  at 4/11/2023 0900   Family Acceptance, E, NR by AR at 4/16/2023 1633                   Point: Home exercise program (In Progress)     Learning Progress Summary           Patient Acceptance, E, NR by AR at 4/16/2023 1633    Acceptance, TB,E, VU by  at 4/11/2023 0900   Family Acceptance, E, NR by AR at 4/16/2023 1633                   Point: Body mechanics (In Progress)     Learning Progress Summary           Patient Acceptance, E, NR by AR at 4/16/2023 1633    Acceptance, TB,E, VU by  at 4/11/2023 0900   Family Acceptance, E, NR by AR at 4/16/2023 1633                   Point: Precautions (In Progress)     Learning Progress Summary           Patient Acceptance, E, NR by AR at 4/16/2023 1633    Acceptance, TB,E, VU by  at 4/11/2023 0900   Family Acceptance, E, NR by AR at 4/16/2023 1633                               User Key     Initials Effective Dates Name Provider Type Discipline    AR 06/16/21 -  Mihaela Shields, PT Physical Therapist PT     06/16/21 -  Kacy Deluca RN  Registered Nurse Nurse              PT Recommendation and Plan  Planned Therapy Interventions (PT): balance training, bed mobility training, gait training, home exercise program, patient/family education, transfer training, ROM (range of motion), stair training, strengthening  Plan of Care Reviewed With: patient  Outcome Evaluation: Pt admitted with NSTEMI, s/p CABG and MVR, HD TTHS normally.  Spouse reports normally independent, no use of AD, no falls.  Pt presents flat in bed, RN reprots BP has been stable this afternoon. Pt disoriented, lethargic but better than yesterday.   Orthostatic with HOB elevated, improved when head lowered.  Attempted chair position, SBP elvated in 150s. Required supine>sit with mod-max a x2, able to stand w/ min-mod A x2.  Increased lethargy, BP 80s/60s once supine, improved after few minutes.  RN aware.  May benefit from acute rehab pending progress while inpatient.     Time Calculation:    PT Charges     Row Name 04/16/23 1623             Time Calculation    Start Time 1425  -AR      Stop Time 1508  -AR      Time Calculation (min) 43 min  -AR      PT Received On 04/16/23  -AR      PT - Next Appointment 04/17/23  -AR      PT Goal Re-Cert Due Date 04/23/23  -AR            User Key  (r) = Recorded By, (t) = Taken By, (c) = Cosigned By    Initials Name Provider Type    Mihaela Ling PT Physical Therapist              Therapy Charges for Today     Code Description Service Date Service Provider Modifiers Qty    01571217416 HC PT EVAL MOD COMPLEXITY 4 4/16/2023 Mihaela Shields, PT GP 1    90993199512 HC PT THER PROC EA 15 MIN 4/16/2023 Mihaela Shields, PT GP 1    49514987015 HC PT THER SUPP EA 15 MIN 4/16/2023 Mihaela Shields, PT GP 3          PT G-Codes  Outcome Measure Options: AM-PAC 6 Clicks Basic Mobility (PT)  AM-PAC 6 Clicks Score (PT): 9  PT Discharge Summary  Anticipated Discharge Disposition (PT): inpatient rehabilitation facility    Mihaela Shields PT  4/16/2023

## 2023-04-16 NOTE — PROGRESS NOTES
LOS: 8 days   Patient Care Team:  Daksha Ng APRN as PCP - General (Family Medicine)  Rudy Faith MD as Consulting Physician (Ophthalmology)  Jose Mccall MD as Consulting Physician (Nephrology)  Quang Reyes MD as Consulting Physician (Nephrology)    Chief Complaint:     Follow-up, status post CABG    Interval History:     He is somewhat confused today but is able to open his eyes, tells me he is at [facility], stable-if he looks outside he says that it is cloudy.  He denies chest pain but does a lot of moaning and groaning, says his chest is sore.  He has no difficulty breathing.  He is back in normal sinus rhythm on amiodarone IV-this morning he was in atrial fibrillation with RVR and became hypotensive and had to be placed back on Stanford-Synephrine.  Currently the Stanford-Synephrine is off.  Family is at bedside.    Objective   Vital Signs  Temp:  [97.6 °F (36.4 °C)-98.4 °F (36.9 °C)] 97.6 °F (36.4 °C)  Heart Rate:  [] 122  Resp:  [15-16] 16  BP: ()/() 113/83    Intake/Output Summary (Last 24 hours) at 4/16/2023 1141  Last data filed at 4/16/2023 0416  Gross per 24 hour   Intake 940 ml   Output 500 ml   Net 440 ml       Comfortable NAD, mildly confused  Neck supple, no JVD or thyromegaly appreciated  S1/S2 RRR, no m/r/g  Lungs CTA B, normal effort  Abdomen S/NT/ND (+) BS, no HSM appreciated  Extremities warm, no clubbing, cyanosis, or edema  No visible or palpable skin lesions  A/Ox4, mood and affect appropriate    Results Review:      Results from last 7 days   Lab Units 04/16/23  0410 04/15/23  1125 04/15/23  0300   SODIUM mmol/L 142 142 143   POTASSIUM mmol/L 4.5 4.5 4.4   CHLORIDE mmol/L 103 103 105   CO2 mmol/L 22.0 23.0 26.0   BUN mg/dL 38* 30* 25*   CREATININE mg/dL 5.19* 4.11* 3.57*   GLUCOSE mg/dL 103* 159* 162*   CALCIUM mg/dL 8.7 8.6 8.2*         Results from last 7 days   Lab Units 04/16/23  0410 04/15/23  1807 04/15/23  1125   WBC 10*3/mm3 12.94* 12.97* 12.77*    HEMOGLOBIN g/dL 9.2* 8.9* 8.9*   HEMATOCRIT % 27.7* 27.0* 26.1*   PLATELETS 10*3/mm3 56* 49* 47*     Results from last 7 days   Lab Units 04/14/23  1246 04/14/23  0635 04/13/23  2041 04/13/23  0238   INR  1.40* 1.58* 1.58*  --    APTT seconds 37.4*  --  >200.0* 81.4*         Results from last 7 days   Lab Units 04/16/23  0410   MAGNESIUM mg/dL 2.5*           I reviewed the patient's new clinical results.  I personally viewed and interpreted the patient's EKG/Telemetry data        Medication Review:   acetylcysteine, 3 mL, Nebulization, TID - RT  aspirin, 81 mg, Oral, Daily  atorvastatin, 40 mg, Oral, Nightly  ceFAZolin, 1 g, Intravenous, Q24H  DULoxetine, 30 mg, Oral, Nightly  insulin lispro, 0-9 Units, Subcutaneous, 4x Daily With Meals & Nightly  ipratropium-albuterol, 3 mL, Nebulization, Q6H While Awake - RT  iron polysaccharides, 150 mg, Oral, Daily  mupirocin, , Each Nare, BID  pantoprazole, 40 mg, Oral, QAM  senna-docusate sodium, 2 tablet, Oral, Nightly        amiodarone, 1 mg/min, Last Rate: 1 mg/min (04/16/23 0952)   Followed by  amiodarone, 0.5 mg/min  phenylephrine, 0.5-3 mcg/kg/min, Last Rate: 0.5 mcg/kg/min (04/16/23 0853)  sodium chloride, 9 mL/hr, Last Rate: 9 mL/hr (04/15/23 1624)  vasopressin, 0.03 Units/min, Last Rate: Stopped (04/15/23 1250)        Assessment & Plan       NSTEMI (non-ST elevated myocardial infarction)    Type 2 diabetes mellitus with diabetic chronic kidney disease    Essential hypertension    Hyperlipidemia    CKD (chronic kidney disease) stage 4, GFR 15-29 ml/min ([facility])    Dyspnea on exertion    Rhinovirus    Abnormal findings on diagnostic imaging of heart and coronary circulation    Hypernatremia       1. NSTEMI with 3v cad on cath 4/9/23.  Status post CABG times 44/13/23 receiving LIMA to distal LAD, SVG to diagonal, SVG to OM, SVG to LV branch, mitral valve repair with a 28 mm physio angioplasty ring.  2. DM  3. ESRD on HD-nephrology following, has line in right neck-to get  HD tomorrow  4. Hypertension, blood pressure better  5. Hyperlipidemia.  on rosuvastatin  6. LVEF 31-35%, no acute decompensated heart failure  7. History of rhinovirus infection-stable  8. < 50% bilateral internal carotid artery stenosis.  9. History of kidney cancer status post partial nephrectomy  10. Anemia-transfused last night.  11. Atrial fibrillation-when he has this he gets rapid he does not tolerate well.  He becomes hypotensive.  He is now in normal sinus rhythm on amiodarone drip, would maintain this for now until he can take oral amiodarone.  Stanford-Synephrine is off.      We will follow.      Deisy Sanchez MD  04/16/23  11:41 EDT

## 2023-04-17 ENCOUNTER — APPOINTMENT (OUTPATIENT)
Dept: GENERAL RADIOLOGY | Facility: HOSPITAL | Age: 73
DRG: 216 | End: 2023-04-17
Payer: MEDICARE

## 2023-04-17 LAB
ALBUMIN SERPL-MCNC: 3.1 G/DL (ref 3.5–5.2)
ALBUMIN/GLOB SERPL: 1.6 G/DL
ALP SERPL-CCNC: 107 U/L (ref 39–117)
ALT SERPL W P-5'-P-CCNC: 41 U/L (ref 1–41)
ANION GAP SERPL CALCULATED.3IONS-SCNC: 16.1 MMOL/L (ref 5–15)
ANION GAP SERPL CALCULATED.3IONS-SCNC: 17.1 MMOL/L (ref 5–15)
ARTERIAL PATENCY WRIST A: ABNORMAL
AST SERPL-CCNC: 132 U/L (ref 1–40)
ATMOSPHERIC PRESS: 743.1 MMHG
BASE EXCESS BLDA CALC-SCNC: -2.8 MMOL/L (ref 0–2)
BDY SITE: ABNORMAL
BILIRUB SERPL-MCNC: 0.5 MG/DL (ref 0–1.2)
BUN SERPL-MCNC: 22 MG/DL (ref 8–23)
BUN SERPL-MCNC: 52 MG/DL (ref 8–23)
BUN/CREAT SERPL: 8.2 (ref 7–25)
BUN/CREAT SERPL: 8.2 (ref 7–25)
CALCIUM SPEC-SCNC: 8.5 MG/DL (ref 8.6–10.5)
CALCIUM SPEC-SCNC: 8.8 MG/DL (ref 8.6–10.5)
CHLORIDE SERPL-SCNC: 101 MMOL/L (ref 98–107)
CHLORIDE SERPL-SCNC: 104 MMOL/L (ref 98–107)
CO2 SERPL-SCNC: 20.9 MMOL/L (ref 22–29)
CO2 SERPL-SCNC: 21.9 MMOL/L (ref 22–29)
CORTIS SERPL-MCNC: 15.5 MCG/DL
CREAT SERPL-MCNC: 2.68 MG/DL (ref 0.76–1.27)
CREAT SERPL-MCNC: 6.32 MG/DL (ref 0.76–1.27)
DEPRECATED RDW RBC AUTO: 48.5 FL (ref 37–54)
DEPRECATED RDW RBC AUTO: 49.4 FL (ref 37–54)
EGFRCR SERPLBLD CKD-EPI 2021: 24.3 ML/MIN/1.73
EGFRCR SERPLBLD CKD-EPI 2021: 8.7 ML/MIN/1.73
ERYTHROCYTE [DISTWIDTH] IN BLOOD BY AUTOMATED COUNT: 15.4 % (ref 12.3–15.4)
ERYTHROCYTE [DISTWIDTH] IN BLOOD BY AUTOMATED COUNT: 15.5 % (ref 12.3–15.4)
GLOBULIN UR ELPH-MCNC: 2 GM/DL
GLUCOSE BLDC GLUCOMTR-MCNC: 102 MG/DL (ref 70–130)
GLUCOSE BLDC GLUCOMTR-MCNC: 103 MG/DL (ref 70–130)
GLUCOSE BLDC GLUCOMTR-MCNC: 131 MG/DL (ref 70–130)
GLUCOSE BLDC GLUCOMTR-MCNC: 93 MG/DL (ref 70–130)
GLUCOSE SERPL-MCNC: 114 MG/DL (ref 65–99)
GLUCOSE SERPL-MCNC: 211 MG/DL (ref 65–99)
HCO3 BLDA-SCNC: 21.7 MMOL/L (ref 22–28)
HCT VFR BLD AUTO: 28.8 % (ref 37.5–51)
HCT VFR BLD AUTO: 30.9 % (ref 37.5–51)
HGB BLD-MCNC: 10.2 G/DL (ref 13–17.7)
HGB BLD-MCNC: 9.3 G/DL (ref 13–17.7)
MCH RBC QN AUTO: 28.8 PG (ref 26.6–33)
MCH RBC QN AUTO: 29.1 PG (ref 26.6–33)
MCHC RBC AUTO-ENTMCNC: 32.3 G/DL (ref 31.5–35.7)
MCHC RBC AUTO-ENTMCNC: 33 G/DL (ref 31.5–35.7)
MCV RBC AUTO: 88 FL (ref 79–97)
MCV RBC AUTO: 89.2 FL (ref 79–97)
MODALITY: ABNORMAL
PCO2 BLDA: 35.7 MM HG (ref 35–45)
PH BLDA: 7.39 PH UNITS (ref 7.35–7.45)
PLATELET # BLD AUTO: 69 10*3/MM3 (ref 140–450)
PLATELET # BLD AUTO: 82 10*3/MM3 (ref 140–450)
PMV BLD AUTO: 11 FL (ref 6–12)
PMV BLD AUTO: 11.6 FL (ref 6–12)
PO2 BLDA: 92.6 MM HG (ref 80–100)
POTASSIUM SERPL-SCNC: 3.9 MMOL/L (ref 3.5–5.2)
POTASSIUM SERPL-SCNC: 4 MMOL/L (ref 3.5–5.2)
PROT SERPL-MCNC: 5.1 G/DL (ref 6–8.5)
RBC # BLD AUTO: 3.23 10*6/MM3 (ref 4.14–5.8)
RBC # BLD AUTO: 3.51 10*6/MM3 (ref 4.14–5.8)
REF LAB TEST METHOD: NORMAL
SAO2 % BLDCOA: 97.2 % (ref 92–99)
SET MECH RESP RATE: 16
SODIUM SERPL-SCNC: 139 MMOL/L (ref 136–145)
SODIUM SERPL-SCNC: 142 MMOL/L (ref 136–145)
WBC NRBC COR # BLD: 11.06 10*3/MM3 (ref 3.4–10.8)
WBC NRBC COR # BLD: 12 10*3/MM3 (ref 3.4–10.8)

## 2023-04-17 PROCEDURE — 99232 SBSQ HOSP IP/OBS MODERATE 35: CPT | Performed by: INTERNAL MEDICINE

## 2023-04-17 PROCEDURE — 85027 COMPLETE CBC AUTOMATED: CPT | Performed by: NURSE PRACTITIONER

## 2023-04-17 PROCEDURE — 25010000002 DIGOXIN PER 500 MCG: Performed by: INTERNAL MEDICINE

## 2023-04-17 PROCEDURE — 36600 WITHDRAWAL OF ARTERIAL BLOOD: CPT

## 2023-04-17 PROCEDURE — 82962 GLUCOSE BLOOD TEST: CPT

## 2023-04-17 PROCEDURE — 71045 X-RAY EXAM CHEST 1 VIEW: CPT

## 2023-04-17 PROCEDURE — 82533 TOTAL CORTISOL: CPT | Performed by: INTERNAL MEDICINE

## 2023-04-17 PROCEDURE — 94761 N-INVAS EAR/PLS OXIMETRY MLT: CPT

## 2023-04-17 PROCEDURE — 25010000002 AMIODARONE IN DEXTROSE 5% 360-4.14 MG/200ML-% SOLUTION: Performed by: NURSE PRACTITIONER

## 2023-04-17 PROCEDURE — 94799 UNLISTED PULMONARY SVC/PX: CPT

## 2023-04-17 PROCEDURE — 94664 DEMO&/EVAL PT USE INHALER: CPT

## 2023-04-17 PROCEDURE — 99024 POSTOP FOLLOW-UP VISIT: CPT | Performed by: THORACIC SURGERY (CARDIOTHORACIC VASCULAR SURGERY)

## 2023-04-17 PROCEDURE — 82803 BLOOD GASES ANY COMBINATION: CPT

## 2023-04-17 PROCEDURE — 80053 COMPREHEN METABOLIC PANEL: CPT | Performed by: NURSE PRACTITIONER

## 2023-04-17 RX ORDER — MIDODRINE HYDROCHLORIDE 2.5 MG/1
2.5 TABLET ORAL
Status: DISCONTINUED | OUTPATIENT
Start: 2023-04-17 | End: 2023-04-19

## 2023-04-17 RX ORDER — AMIODARONE HYDROCHLORIDE 200 MG/1
200 TABLET ORAL EVERY 12 HOURS SCHEDULED
Status: DISCONTINUED | OUTPATIENT
Start: 2023-04-17 | End: 2023-04-17

## 2023-04-17 RX ORDER — MANNITOL 250 MG/ML
12.5 INJECTION, SOLUTION INTRAVENOUS AS NEEDED
Status: DISPENSED | OUTPATIENT
Start: 2023-04-17 | End: 2023-04-18

## 2023-04-17 RX ORDER — DIGOXIN 0.25 MG/ML
500 INJECTION INTRAMUSCULAR; INTRAVENOUS ONCE
Status: COMPLETED | OUTPATIENT
Start: 2023-04-17 | End: 2023-04-17

## 2023-04-17 RX ADMIN — PANTOPRAZOLE SODIUM 40 MG: 40 TABLET, DELAYED RELEASE ORAL at 06:40

## 2023-04-17 RX ADMIN — VASOPRESSIN 0.05 UNITS/MIN: 20 INJECTION INTRAVENOUS at 21:49

## 2023-04-17 RX ADMIN — ACETYLCYSTEINE 3 ML: 200 SOLUTION ORAL; RESPIRATORY (INHALATION) at 07:20

## 2023-04-17 RX ADMIN — AMIODARONE HYDROCHLORIDE 1 MG/MIN: 1.8 INJECTION, SOLUTION INTRAVENOUS at 16:49

## 2023-04-17 RX ADMIN — MIDODRINE HYDROCHLORIDE 2.5 MG: 2.5 TABLET ORAL at 09:24

## 2023-04-17 RX ADMIN — IPRATROPIUM BROMIDE AND ALBUTEROL SULFATE 3 ML: 2.5; .5 SOLUTION RESPIRATORY (INHALATION) at 07:19

## 2023-04-17 RX ADMIN — ASPIRIN 81 MG: 81 TABLET, COATED ORAL at 09:24

## 2023-04-17 RX ADMIN — Medication 150 MG: at 10:39

## 2023-04-17 RX ADMIN — MIDODRINE HYDROCHLORIDE 2.5 MG: 2.5 TABLET ORAL at 16:49

## 2023-04-17 RX ADMIN — ATORVASTATIN CALCIUM 40 MG: 20 TABLET, FILM COATED ORAL at 21:13

## 2023-04-17 RX ADMIN — MUPIROCIN 1 APPLICATION: 20 OINTMENT TOPICAL at 09:24

## 2023-04-17 RX ADMIN — MUPIROCIN 1 APPLICATION: 20 OINTMENT TOPICAL at 21:13

## 2023-04-17 RX ADMIN — IPRATROPIUM BROMIDE AND ALBUTEROL SULFATE 3 ML: .5; 3 SOLUTION RESPIRATORY (INHALATION) at 14:56

## 2023-04-17 RX ADMIN — AMIODARONE HYDROCHLORIDE 1 MG/MIN: 1.8 INJECTION, SOLUTION INTRAVENOUS at 16:08

## 2023-04-17 RX ADMIN — ACETYLCYSTEINE 3 ML: 200 SOLUTION ORAL; RESPIRATORY (INHALATION) at 19:18

## 2023-04-17 RX ADMIN — AMIODARONE HYDROCHLORIDE 200 MG: 200 TABLET ORAL at 09:24

## 2023-04-17 RX ADMIN — VASOPRESSIN 0.03 UNITS/MIN: 20 INJECTION INTRAVENOUS at 14:47

## 2023-04-17 RX ADMIN — BISACODYL 10 MG: 5 TABLET ORAL at 16:49

## 2023-04-17 RX ADMIN — AMIODARONE HYDROCHLORIDE 1 MG/MIN: 1.8 INJECTION, SOLUTION INTRAVENOUS at 23:49

## 2023-04-17 RX ADMIN — DULOXETINE HYDROCHLORIDE 30 MG: 30 CAPSULE, DELAYED RELEASE ORAL at 21:13

## 2023-04-17 RX ADMIN — DOCUSATE SODIUM 50MG AND SENNOSIDES 8.6MG 2 TABLET: 8.6; 5 TABLET, FILM COATED ORAL at 21:13

## 2023-04-17 RX ADMIN — IPRATROPIUM BROMIDE AND ALBUTEROL SULFATE 3 ML: 2.5; .5 SOLUTION RESPIRATORY (INHALATION) at 19:16

## 2023-04-17 RX ADMIN — ACETYLCYSTEINE 3 ML: 200 SOLUTION ORAL; RESPIRATORY (INHALATION) at 14:57

## 2023-04-17 RX ADMIN — IPRATROPIUM BROMIDE AND ALBUTEROL SULFATE 3 ML: 2.5; .5 SOLUTION RESPIRATORY (INHALATION) at 11:00

## 2023-04-17 RX ADMIN — ACETAMINOPHEN 650 MG: 325 TABLET, FILM COATED ORAL at 03:30

## 2023-04-17 RX ADMIN — DIGOXIN 500 MCG: 0.25 INJECTION INTRAMUSCULAR; INTRAVENOUS at 16:48

## 2023-04-17 NOTE — SIGNIFICANT NOTE
04/17/23 1408   OTHER   Discipline occupational therapist   Rehab Time/Intention   Session Not Performed patient unavailable for treatment  (pt in dialysis, unable to see for OT eval. Will follow up 4/18)   Recommendation   OT - Next Appointment 04/18/23

## 2023-04-17 NOTE — PROGRESS NOTES
" LOS: 9 days   Patient Care Team:  Daksha Ng APRN as PCP - General (Family Medicine)  Rudy Faith MD as Consulting Physician (Ophthalmology)  Jose Mccall MD as Consulting Physician (Nephrology)  Quang Reyes MD as Consulting Physician (Nephrology)    Chief Complaint: Post op    Subjective:  Symptoms:  No shortness of breath, cough or chest pain.    Diet:  Poor intake.  No nausea or vomiting.    Activity level: Impaired due to weakness.    Pain:  He complains of pain that is mild.  Pain is well controlled.      A little drowsy, but oriented and following all commands    Vital Signs  Temp:  [97.6 °F (36.4 °C)-98.1 °F (36.7 °C)] 97.8 °F (36.6 °C)  Heart Rate:  [] 70  Resp:  [14-16] 16  BP: ()/() 99/59  Body mass index is 33.67 kg/m².    Intake/Output Summary (Last 24 hours) at 4/17/2023 0711  Last data filed at 4/17/2023 0500  Gross per 24 hour   Intake 758.16 ml   Output 950 ml   Net -191.84 ml     No intake/output data recorded.    Chest tube drainage last 8 hours: 50        04/15/23  0600 04/16/23  0600 04/17/23  0315   Weight: 108 kg (237 lb 7 oz) 106 kg (232 lb 12.9 oz) 113 kg (248 lb 3.8 oz)         Objective:  General Appearance:  Comfortable and in no acute distress.    Vital signs: (most recent): Blood pressure 104/61, pulse 71, temperature 97.8 °F (36.6 °C), temperature source Oral, resp. rate 16, height 182.9 cm (72\"), weight 113 kg (248 lb 3.8 oz), SpO2 95 %.  Vital signs are normal.  No fever.    Output: Minimal urine output and no stool output.    Lungs:  Normal effort and normal respiratory rate.  There are decreased breath sounds.    Heart: Normal rate.  Regular rhythm.  (100% AV paced at 70; SR 60s underlying)  Abdomen: Abdomen is soft.  Bowel sounds are normal.     Extremities: There is dependent edema.    Pulses: Distal pulses are intact.    Neurological: Patient is alert and oriented to person, place and time.    Skin:  Warm and dry.  (Sternal " dressing clean, dry, and intact)        Results Review:        WBC WBC   Date Value Ref Range Status   04/17/2023 11.06 (H) 3.40 - 10.80 10*3/mm3 Final   04/16/2023 12.94 (H) 3.40 - 10.80 10*3/mm3 Final   04/15/2023 12.97 (H) 3.40 - 10.80 10*3/mm3 Final   04/15/2023 12.77 (H) 3.40 - 10.80 10*3/mm3 Final   04/15/2023 13.19 (H) 3.40 - 10.80 10*3/mm3 Final   04/14/2023 15.84 (H) 3.40 - 10.80 10*3/mm3 Final      HGB Hemoglobin   Date Value Ref Range Status   04/17/2023 9.3 (L) 13.0 - 17.7 g/dL Final   04/16/2023 9.2 (L) 13.0 - 17.7 g/dL Final   04/15/2023 8.9 (L) 13.0 - 17.7 g/dL Final   04/15/2023 8.9 (L) 13.0 - 17.7 g/dL Final   04/15/2023 7.8 (L) 13.0 - 17.7 g/dL Final   04/14/2023 8.5 (L) 13.0 - 17.7 g/dL Final      HCT Hematocrit   Date Value Ref Range Status   04/17/2023 28.8 (L) 37.5 - 51.0 % Final   04/16/2023 27.7 (L) 37.5 - 51.0 % Final   04/15/2023 27.0 (L) 37.5 - 51.0 % Final   04/15/2023 26.1 (L) 37.5 - 51.0 % Final   04/15/2023 23.2 (L) 37.5 - 51.0 % Final   04/14/2023 25.0 (L) 37.5 - 51.0 % Final      Platelets Platelets   Date Value Ref Range Status   04/17/2023 69 (L) 140 - 450 10*3/mm3 Final   04/16/2023 56 (L) 140 - 450 10*3/mm3 Final   04/15/2023 49 (C) 140 - 450 10*3/mm3 Final   04/15/2023 47 (C) 140 - 450 10*3/mm3 Final   04/15/2023 55 (L) 140 - 450 10*3/mm3 Final   04/14/2023 64 (L) 140 - 450 10*3/mm3 Final        PT/INR:    Protime   Date Value Ref Range Status   04/14/2023 17.4 (H) 11.7 - 14.2 Seconds Final   /  INR   Date Value Ref Range Status   04/14/2023 1.40 (H) 0.90 - 1.10 Final       Sodium Sodium   Date Value Ref Range Status   04/17/2023 139 136 - 145 mmol/L Final   04/16/2023 142 136 - 145 mmol/L Final   04/15/2023 142 136 - 145 mmol/L Final   04/15/2023 143 136 - 145 mmol/L Final   04/14/2023 148 (H) 136 - 145 mmol/L Final   04/14/2023 151 (H) 136 - 145 mmol/L Final      Potassium Potassium   Date Value Ref Range Status   04/17/2023 4.0 3.5 - 5.2 mmol/L Final   04/16/2023 4.5 3.5 -  5.2 mmol/L Final   04/15/2023 4.5 3.5 - 5.2 mmol/L Final   04/15/2023 4.4 3.5 - 5.2 mmol/L Final   04/14/2023 5.1 3.5 - 5.2 mmol/L Final   04/14/2023 4.6 3.5 - 5.2 mmol/L Final      Chloride Chloride   Date Value Ref Range Status   04/17/2023 101 98 - 107 mmol/L Final   04/16/2023 103 98 - 107 mmol/L Final   04/15/2023 103 98 - 107 mmol/L Final   04/15/2023 105 98 - 107 mmol/L Final   04/14/2023 109 (H) 98 - 107 mmol/L Final   04/14/2023 111 (H) 98 - 107 mmol/L Final      Bicarbonate CO2   Date Value Ref Range Status   04/17/2023 20.9 (L) 22.0 - 29.0 mmol/L Final   04/16/2023 22.0 22.0 - 29.0 mmol/L Final   04/15/2023 23.0 22.0 - 29.0 mmol/L Final   04/15/2023 26.0 22.0 - 29.0 mmol/L Final   04/14/2023 26.0 22.0 - 29.0 mmol/L Final   04/14/2023 25.4 22.0 - 29.0 mmol/L Final      BUN BUN   Date Value Ref Range Status   04/17/2023 52 (H) 8 - 23 mg/dL Final   04/16/2023 38 (H) 8 - 23 mg/dL Final   04/15/2023 30 (H) 8 - 23 mg/dL Final   04/15/2023 25 (H) 8 - 23 mg/dL Final   04/14/2023 45 (H) 8 - 23 mg/dL Final   04/14/2023 45 (H) 8 - 23 mg/dL Final      Creatinine Creatinine   Date Value Ref Range Status   04/17/2023 6.32 (H) 0.76 - 1.27 mg/dL Final   04/16/2023 5.19 (H) 0.76 - 1.27 mg/dL Final   04/15/2023 4.11 (H) 0.76 - 1.27 mg/dL Final   04/15/2023 3.57 (H) 0.76 - 1.27 mg/dL Final   04/14/2023 5.45 (H) 0.76 - 1.27 mg/dL Final   04/14/2023 5.76 (H) 0.76 - 1.27 mg/dL Final      Calcium Calcium   Date Value Ref Range Status   04/17/2023 8.8 8.6 - 10.5 mg/dL Final   04/16/2023 8.7 8.6 - 10.5 mg/dL Final   04/15/2023 8.6 8.6 - 10.5 mg/dL Final   04/15/2023 8.2 (L) 8.6 - 10.5 mg/dL Final   04/14/2023 8.6 8.6 - 10.5 mg/dL Final   04/14/2023 9.1 8.6 - 10.5 mg/dL Final      Magnesium Magnesium   Date Value Ref Range Status   04/16/2023 2.5 (H) 1.6 - 2.4 mg/dL Final   04/15/2023 2.1 1.6 - 2.4 mg/dL Final   04/14/2023 2.6 (H) 1.6 - 2.4 mg/dL Final          acetylcysteine, 3 mL, Nebulization, TID - RT  aspirin, 81 mg, Oral,  Daily  atorvastatin, 40 mg, Oral, Nightly  DULoxetine, 30 mg, Oral, Nightly  insulin lispro, 0-9 Units, Subcutaneous, 4x Daily With Meals & Nightly  ipratropium-albuterol, 3 mL, Nebulization, Q6H While Awake - RT  iron polysaccharides, 150 mg, Oral, Daily  metoprolol tartrate, 25 mg, Oral, Q12H  mupirocin, , Each Nare, BID  pantoprazole, 40 mg, Oral, QAM  senna-docusate sodium, 2 tablet, Oral, Nightly      amiodarone, 0.5 mg/min, Last Rate: Stopped (04/17/23 0245)  phenylephrine, 0.5-3 mcg/kg/min, Last Rate: 0.3 mcg/kg/min (04/17/23 0500)  sodium chloride, 9 mL/hr, Last Rate: 9 mL/hr (04/15/23 1624)  vasopressin, 0.03 Units/min, Last Rate: Stopped (04/15/23 1250)            Patient Active Problem List   Diagnosis Code   • 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.22   • Essential hypertension I10   • Hyperlipidemia E78.5   • Primary insomnia F51.01   • CKD (chronic kidney disease) stage 4, GFR 15-29 ml/min ([facility]) N18.4   • Vitamin D deficiency E55.9   • Diverticulitis of large intestine with perforation and abscess without bleeding K57.20   • Obesity (BMI 30-39.9) E66.9   • Impaired mobility and ADLs Z74.09, Z78.9   • History of colon resection Z90.49   • Colon polyps K63.5   • Diverticulosis K57.90   • CKD (chronic kidney disease) stage 5, GFR less than 15 ml/min N18.5   • Dyspnea on exertion R06.09   • NSTEMI (non-ST elevated myocardial infarction) I21.4   • Rhinovirus B34.8   • Abnormal findings on diagnostic imaging of heart and coronary circulation R93.1   • Hypernatremia E87.0       Assessment & Plan   - NSTEMI/multi-vessel CAD- s/p urgent CABGx4 LIMA/RSVG, MV repair-(Zuluaga) POD#4  - HFrEF--30-35% per echo 4/8  - rhinovirus infection  - ESRD on HD  - hypertension  - hyperlipidemia  - DM II- A1c 5.0  -post op anemia- expected acute blood loss  -Leukocytosis- probable reactive   -TCP- plt count 69-- hold lovenox; HIT pending       He did have recurrent atrial fibrillation overnight with  rates in the 120s. Converted after oral beta blocker, however he became hypotensive/bradycardic after the beta blocker. IV amiodarone was stopped at that time and he was placed on ankush which has now been weaned off. He is now back in SR with rates in the 60s. He is AV paced at 70 for BP support. BP is a little soft this am. Discontinue beta blocker for now. Will add midodrine and switch him to oral amiodarone    He had significant chest tube output yesterday. Only 50 out overnight, but he has not gotten up this morning. Will see how much he has out once they get him up. CXR with small left apical PTX. No air leak noted     His CO2 was low on his AM chemistry. Will check and ABG  Creatinine 6.3 today, with plans for HD  Weaned to RA and tolerating  Mobilize/aggressive pulmonary toilet--continued nebs/flutter and OT/PT  Decreased appetite--will have nutrition see him  Discussed with Dr. Zuluaga, who would like to watch him in the ICU one more day    Yu El, DUSTY  04/17/23  07:11 EDT

## 2023-04-17 NOTE — NURSING NOTE
Pt's BP dropping as dialysis continues, pt on vasopressin per Yu MONTANO, had to add Stanford to keep MAP >65. Also went back into a fib RVR, pacer rate turned down to 60, still not sensing appropriatley. Notified Yu MONTANO, orders received after she saw pt at bedside. Dr. Reyes notified, he ordered dialysis to continue but stop pulling fluid.

## 2023-04-17 NOTE — SIGNIFICANT NOTE
04/17/23 1347   OTHER   Discipline physical therapist   Rehab Time/Intention   Session Not Performed patient unavailable for treatment  (t receiving dialysis in room this PM. PT will check back tomorrow.)   Recommendation   PT - Next Appointment 04/18/23

## 2023-04-17 NOTE — PROGRESS NOTES
Name: Lefty Cai ADMIT: 2023   : 1950  PCP: Daksha gN, DUSTY    MRN: [id number] LOS: 9 days   AGE/SEX: 72 y.o. male  ROOM: [facility]/1     Subjective   Subjective   Patient appears lethargic, generally weak, relatively comfortable, no apparent distress.  Wife and family at bedside.  Patient undergoing hemodialysis at present.  RN reports decreased intake.    Review of Systems   Constitutional: Negative for chills and fever.   Respiratory: Negative for cough and shortness of breath.    Cardiovascular: Negative for chest pain and leg swelling.        Objective   Objective   Vital Signs  Temp:  [97.5 °F (36.4 °C)-98.1 °F (36.7 °C)] 97.8 °F (36.6 °C)  Heart Rate:  [] 126  Resp:  [14-18] 18  BP: ()/() 111/76  SpO2:  [93 %-100 %] 100 %  on  Flow (L/min):  [1-2] 1;   Device (Oxygen Therapy): room air  Body mass index is 33.67 kg/m².     Physical Exam  Constitutional:       Appearance: He is obese.   Cardiovascular:      Rate and Rhythm: Tachycardia present.      Heart sounds: Normal heart sounds.   Pulmonary:      Effort: Pulmonary effort is normal.      Breath sounds: Normal breath sounds.   Abdominal:      General: Bowel sounds are normal.      Palpations: Abdomen is soft.   Musculoskeletal:      Right lower leg: No edema.      Left lower leg: No edema.   Skin:     General: Skin is warm and dry.   Neurological:      Mental Status: He is alert.       Results Review     I reviewed the patient's new clinical results.  Results from last 7 days   Lab Units 23  0308 23  0410 04/15/23  1807 04/15/23  1125   WBC 10*3/mm3 11.06* 12.94* 12.97* 12.77*   HEMOGLOBIN g/dL 9.3* 9.2* 8.9* 8.9*   PLATELETS 10*3/mm3 69* 56* 49* 47*     Results from last 7 days   Lab Units 23  0308 23  0410 04/15/23  1125 04/15/23  0300   SODIUM mmol/L 139 142 142 143   POTASSIUM mmol/L 4.0 4.5 4.5 4.4   CHLORIDE mmol/L 101 103 103 105   CO2 mmol/L 20.9* 22.0 23.0 26.0   BUN mg/dL 52* 38* 30*  25*   CREATININE mg/dL 6.32* 5.19* 4.11* 3.57*   GLUCOSE mg/dL 211* 103* 159* 162*   EGFR mL/min/1.73 8.7* 11.0* 14.6* 17.3*     Results from last 7 days   Lab Units 04/15/23  1125 04/15/23  0300 04/14/23  1639 04/14/23  1246   ALBUMIN g/dL 3.2* 3.3* 3.4* 3.5   BILIRUBIN mg/dL 0.4  --  0.4 0.4   ALK PHOS U/L 38*  --  32* 28*   AST (SGOT) U/L 29  --  44* 41*   ALT (SGPT) U/L <5  --  8 8     Results from last 7 days   Lab Units 04/17/23  0308 04/16/23  0410 04/15/23  1125 04/15/23  0300 04/14/23  1639 04/14/23  1246 04/14/23  0635 04/14/23  0002 04/13/23  2041   CALCIUM mg/dL 8.8 8.7 8.6 8.2* 8.6 9.1 8.6 8.1* 8.3*   ALBUMIN g/dL  --   --  3.2* 3.3* 3.4* 3.5 3.7 3.6 3.0*   MAGNESIUM mg/dL  --  2.5*  --  2.1  --  2.6* 2.5* 2.5* 2.8*   PHOSPHORUS mg/dL  --   --   --  4.6*  --   --  3.3 4.6* 6.0*       Glucose   Date/Time Value Ref Range Status   04/17/2023 1201 93 70 - 130 mg/dL Final     Comment:     Meter: [id number] : [id number] Rutherfordena Claudio CONNIE   04/17/2023 0733 102 70 - 130 mg/dL Final     Comment:     Meter: [id number] : [id number] Caren Hemphill RN   04/16/2023 1936 138 (H) 70 - 130 mg/dL Final     Comment:     Meter: [id number] : [id number] Franco ROSALES   04/16/2023 1526 126 70 - 130 mg/dL Final     Comment:     Meter: [id number] : [id number] Jhony Liu CONNIE   04/16/2023 1138 109 70 - 130 mg/dL Final     Comment:     Meter: [id number] : [id number] Jhony Liu NA   04/16/2023 0720 103 70 - 130 mg/dL Final     Comment:     Meter: [id number] : [id number] Carissa Malone RN   04/16/2023 0638 107 70 - 130 mg/dL Final     Comment:     Meter: [id number] : [id number] Caren Hemphill RN       XR Chest 1 View    Result Date: 4/17/2023   1. Stable left apical pneumothorax. 2. Persistent bibasilar atelectasis.  This report was finalized on 4/17/2023 5:40 AM by Dr. Mini Segura M.D.      XR Chest PA & Lateral    Result Date: 4/16/2023  Small left apical pneumothorax.  Small-to-moderate bilateral pleural effusions with basilar atelectasis.  This report was finalized on 4/16/2023 8:31 AM by Dr. Neil Singleton M.D.      I have personally reviewed all medications:  Scheduled Medications  acetylcysteine, 3 mL, Nebulization, TID - RT  aspirin, 81 mg, Oral, Daily  atorvastatin, 40 mg, Oral, Nightly  DULoxetine, 30 mg, Oral, Nightly  insulin lispro, 0-9 Units, Subcutaneous, 4x Daily With Meals & Nightly  ipratropium-albuterol, 3 mL, Nebulization, Q6H While Awake - RT  iron polysaccharides, 150 mg, Oral, Daily  midodrine, 2.5 mg, Oral, BID AC  mupirocin, , Each Nare, BID  pantoprazole, 40 mg, Oral, QAM  senna-docusate sodium, 2 tablet, Oral, Nightly    Infusions  amiodarone, 1 mg/min, Last Rate: 1 mg/min (04/17/23 1608)  phenylephrine, 0.5-3 mcg/kg/min, Last Rate: Stopped (04/17/23 1608)  sodium chloride, 9 mL/hr, Last Rate: 9 mL/hr (04/15/23 1624)  vasopressin, 0.05 Units/min, Last Rate: 0.05 Units/min (04/17/23 1608)    Diet  Diet: Cardiac Diets, Diabetic Diets; Healthy Heart (2-3 Na+); Consistent Carbohydrate; Texture: Regular Texture (IDDSI 7); Fluid Consistency: Thin (IDDSI 0)    I have personally reviewed:  [x]  Laboratory   []  Microbiology   []  Radiology   [x]  EKG/Telemetry  []  Cardiology/Vascular   []  Pathology    []  Records       Assessment/Plan     Active Hospital Problems    Diagnosis  POA   • **NSTEMI (non-ST elevated myocardial infarction) [I21.4]  Unknown   • Hypernatremia [E87.0]  Unknown   • Rhinovirus [B34.8]  Yes   • Dyspnea on exertion [R06.09]  Yes   • Abnormal findings on diagnostic imaging of heart and coronary circulation [R93.1]  Unknown   • Type 2 diabetes mellitus with diabetic chronic kidney disease [E11.22]  Yes   • Essential hypertension [I10]  Yes   • Hyperlipidemia [E78.5]  Yes   • CKD (chronic kidney disease) stage 4, GFR 15-29 ml/min ([facility]) [N18.4]  Yes      Resolved Hospital Problems   No resolved problems to display.       72 y.o. male admitted  with NSTEMI (non-ST elevated myocardial infarction).    72 y.o. male admitted with NSTEMI (non-ST elevated myocardial infarction).     Severe three-vessel coronary disease with an occluded right coronary system s/p CABG x4 on 4/13/2023.  Extubated on 4/14/2023. Atrial fibrillation & started on amiodarone. Stanford during HD on 4/17/2023 & CV surgery starting midodrine & amiodarone.     Metabolic encephalopathy improved.  Disoriented to year & baseline AOx4.     Thrombocytopenia: Heparin antibody pending (pulm doubts HIT).  AC held for now & PLT count improving.     Nephrology managing ESRD + HD     Diabetes: controlled      · SCDs for DVT prophylaxis.  · Full code.  · Discussed with patient, RN, & family.  · Anticipate discharge pending clinical course      DUSTY Ford  [facility]  04/17/23  16:25 EDT

## 2023-04-17 NOTE — PROGRESS NOTES
[facility]  [phone]  Dr. Jim Anaya    Subjective:  LOS: 9    Chief Complaint: Hypotension    Patient is status post CABG.  Continued issues with A-fib, RVR, bradycardia, hypotension.  Denies any pain and feels okay.  Awakens and answers my questions appropriately.    Objective   Vital Signs past 24hrs  Temp range: Temp (24hrs), Av.8 °F (36.6 °C), Min:97.5 °F (36.4 °C), Max:98.1 °F (36.7 °C)    BP range: BP: ()/() 111/59  Pulse range: Heart Rate:  [] 70  Resp rate range: Resp:  [14-16] 16  Device (Oxygen Therapy): room airFlow (L/min):  [1-2] 1  Oxygen range:SpO2:  [93 %-100 %] 96 %   Mechanical Ventilator:Mode: PS (23 [phone])    Physical Exam  Constitutional:       Appearance: He is obese.   Eyes:      Pupils: Pupils are equal, round, and reactive to light.   Cardiovascular:      Rate and Rhythm: Normal rate and regular rhythm.      Heart sounds: Normal heart sounds.      Comments: Paced   Pulmonary:      Effort: Pulmonary effort is normal.      Breath sounds: Normal breath sounds.      Comments: Mediastinal chest tube  Abdominal:      General: Bowel sounds are normal.      Palpations: Abdomen is soft. There is no mass.      Tenderness: There is no abdominal tenderness.   Musculoskeletal:         General: No swelling.   Neurological:      Mental Status: He is alert.       Results Review:    I have reviewed the laboratory and imaging data since the last note by [facility] physician.  My annotations are noted in assessment and plan.      Result Review:  I have personally reviewed the results from last note by [facility] physician to 2023 09:00 EDT and agree with these findings:  [x]  Laboratory list / accordion  [x]  Microbiology  [x]  Radiology  [x]  EKG/Telemetry    [x]  Cardiology/Vascular   []  Pathology  []  Old records  []  Other:    Medication Review:  I have reviewed the current MAR.  My annotations are noted in assessment and plan.    acetylcysteine, 3 mL,  Nebulization, TID - RT  amiodarone, 200 mg, Oral, Q12H  aspirin, 81 mg, Oral, Daily  atorvastatin, 40 mg, Oral, Nightly  DULoxetine, 30 mg, Oral, Nightly  insulin lispro, 0-9 Units, Subcutaneous, 4x Daily With Meals & Nightly  ipratropium-albuterol, 3 mL, Nebulization, Q6H While Awake - RT  iron polysaccharides, 150 mg, Oral, Daily  midodrine, 2.5 mg, Oral, BID AC  mupirocin, , Each Nare, BID  pantoprazole, 40 mg, Oral, QAM  senna-docusate sodium, 2 tablet, Oral, Nightly        phenylephrine, 0.5-3 mcg/kg/min, Last Rate: 0.3 mcg/kg/min (04/17/23 0500)  sodium chloride, 9 mL/hr, Last Rate: 9 mL/hr (04/15/23 1624)  vasopressin, 0.03 Units/min, Last Rate: Stopped (04/15/23 1250)      Lines, Drains & Airways     Active LDAs     Name Placement date Placement time Site Days    CVC Double Lumen 04/13/23 Left Internal jugular 04/13/23  1325  created via procedure documentation  Internal jugular  3    Peripheral IV 04/08/23 1700 Anterior;Distal;Right Forearm 04/08/23  1700  Forearm  8    Y Chest Tube 1 and 2 1 Mediastinal 28 Fr. 2 Left Pleural 28 Fr. 04/13/23 1929  -- 3    Pacer Wires 04/13/23  1930  Atrial and Ventricular  3    Hemodialysis Cath Double 03/24/23  --  Subclavian  24              Droplet  Diet Orders (active) (From admission, onward)     Start     Ordered    04/15/23 2320  Diet: Liquid Diets; Clear Liquid; Texture: Regular Texture (IDDSI 7); Fluid Consistency: Thin (IDDSI 0)  Diet Effective Now         04/15/23 2319                PCCM Problems  Cardiogenic shock requiring pressors  A-fib with RVR  Cardiomyopathy with EF 30%  Acute rhinovirus infection  Anemia  Thrombocytopenia  Relevant Medical Diagnoses  Status post CABG and mitral valve repair on 4/13/2023  Pleural effusions  ESRD on hemodialysis  Diabetes mellitus type 2     THESE ARE NEW MEDICAL PROBLEMS TO ME.    Plan of Treatment    Currently off Stanford drip.  Now started on oral midodrine.  Check cortisol level also    A-fib with RVR but now bradycardic  and requiring pacing.  Got beta-blockers last night.  Underlying rhythm is sinus at the present time.  Now on oral amiodarone and beta-blockers stopped.    Cardiomyopathy with EF of 30%.  Currently appears compensated though does have bilateral pleural effusions.  Has underlying ESRD and will get hemodialysis today.    Acute rhinovirus infection and symptomatic treatment for same.  On nebs as well as acetylcysteine to clear secretions.    Anemia expected postop and also from underlying kidney disease.  No evidence of active bleeding.    Low platelets of uncertain etiology.  HIT antibodies pending.  Doubt this is HIT.    Diabetes mellitus on sliding scale and with control blood sugars.    Continue to monitor in ICU per cardiothoracic team.    Jim Anaya MD  04/17/23  09:00 EDT      Part of this note may be an electronic transcription/translation of spoken language to printed text using the Dragon Dictation System.

## 2023-04-17 NOTE — PROGRESS NOTES
[facility] Progress Note      Patient Name: Lefty Cai  : 1950  MRN: [id number]  Primary Care Physician:  Daksha Ng APRN  Date of admission: 2023    Subjective     Interval History:   Follow-up end-stage renal disease  The patient is more awake and alert he was transferred to CICU because of A-fib with rapid ventricular response, that has stabilized, patient had significant fluid gain since Friday, his mental status improved significantly he is sleepy but arousable and appropriate responding to command, denies nausea or vomiting or chest discomfort related to surgery      Review of Systems:   As noted above    Objective     Vitals:   Temp:  [97.6 °F (36.4 °C)-98.1 °F (36.7 °C)] 97.8 °F (36.6 °C)  Heart Rate:  [] 70  Resp:  [14-16] 16  BP: ()/() 111/59  Flow (L/min):  [1-2] 1    Intake/Output Summary (Last 24 hours) at 2023 0824  Last data filed at 2023 0500  Gross per 24 hour   Intake 758.16 ml   Output 950 ml   Net -191.84 ml       Physical Exam:    General Appearance: Patient is more awake, no acute distress and chronically ill  Skin: warm and dry  HEENT: oral mucosa normal, nonicteric sclera  Neck: supple, no JVD, tunneled dialysis catheter in the right IJ with exit site below the right clavicle  Lungs: Bilateral rhonchi, breathing effort not labored  Heart: RRR, normal S1 and S2, no S3, no rub  Abdomen: soft, nontender, nondistended, normoactive bowel  : no palpable bladder.  Extremities: no edema, cyanosis or clubbing, functional AV fistula in the left forearm with good thrill and bruit  Neuro: Moving all extremities    Scheduled Meds:     acetylcysteine, 3 mL, Nebulization, TID - RT  amiodarone, 200 mg, Oral, Q12H  aspirin, 81 mg, Oral, Daily  atorvastatin, 40 mg, Oral, Nightly  DULoxetine, 30 mg, Oral, Nightly  insulin lispro, 0-9 Units, Subcutaneous, 4x Daily With Meals & Nightly  ipratropium-albuterol, 3 mL, Nebulization,  Q6H While Awake - RT  iron polysaccharides, 150 mg, Oral, Daily  midodrine, 2.5 mg, Oral, BID AC  mupirocin, , Each Nare, BID  pantoprazole, 40 mg, Oral, QAM  senna-docusate sodium, 2 tablet, Oral, Nightly      IV Meds:   phenylephrine, 0.5-3 mcg/kg/min, Last Rate: 0.3 mcg/kg/min (04/17/23 0500)  sodium chloride, 9 mL/hr, Last Rate: 9 mL/hr (04/15/23 1624)  vasopressin, 0.03 Units/min, Last Rate: Stopped (04/15/23 1250)        Results Reviewed:   I have personally reviewed the results from the time of this admission to 4/17/2023 08:24 EDT     Results from last 7 days   Lab Units 04/17/23  0308 04/16/23  0410 04/15/23  1125 04/15/23  0300 04/14/23  1639 04/14/23  1246   SODIUM mmol/L 139 142 142   < > 148* 151*   POTASSIUM mmol/L 4.0 4.5 4.5   < > 5.1 4.6   CHLORIDE mmol/L 101 103 103   < > 109* 111*   CO2 mmol/L 20.9* 22.0 23.0   < > 26.0 25.4   BUN mg/dL 52* 38* 30*   < > 45* 45*   CREATININE mg/dL 6.32* 5.19* 4.11*   < > 5.45* 5.76*   CALCIUM mg/dL 8.8 8.7 8.6   < > 8.6 9.1   BILIRUBIN mg/dL  --   --  0.4  --  0.4 0.4   ALK PHOS U/L  --   --  38*  --  32* 28*   ALT (SGPT) U/L  --   --  <5  --  8 8   AST (SGOT) U/L  --   --  29  --  44* 41*   GLUCOSE mg/dL 211* 103* 159*   < > 149* 119*    < > = values in this interval not displayed.       Estimated Creatinine Clearance: 13.5 mL/min (A) (by C-G formula based on SCr of 6.32 mg/dL (H)).    Results from last 7 days   Lab Units 04/16/23  0410 04/15/23  0300 04/14/23  1246 04/14/23  0635 04/14/23  0002   MAGNESIUM mg/dL 2.5* 2.1 2.6* 2.5* 2.5*   PHOSPHORUS mg/dL  --  4.6*  --  3.3 4.6*             Results from last 7 days   Lab Units 04/17/23  0308 04/16/23  0410 04/15/23  1807 04/15/23  1125 04/15/23  0300   WBC 10*3/mm3 11.06* 12.94* 12.97* 12.77* 13.19*   HEMOGLOBIN g/dL 9.3* 9.2* 8.9* 8.9* 7.8*   PLATELETS 10*3/mm3 69* 56* 49* 47* 55*       Results from last 7 days   Lab Units 04/14/23  1246 04/14/23  0635 04/13/23  2041   INR  1.40* 1.58* 1.58*       Assessment /  Plan     ASSESSMENT:  -End-stage renal disease and diabetic and hypertensive glomerulosclerosis on maintenance hemodialysis every Tuesday, Thursday and Saturday, his electrolyte within acceptable range but the patient is hypervolemic-Diabetes mellitus type 2 with renal complication  -Hypertension with chronic kidney disease, well controlled today  -Anemia of ESRD and acute blood loss anemia secondary to recent surgery., hemoglobin  Is better after blood transfusion  -Thrombocytopenia platelet: HIT antibody screen is underway  -Coronary artery disease underwent four-vessel CABG mitral valve repair on 4/13/2023  -Secondary hyperparathyroidism of renal origin  -Altered mental status postoperatively slightly improving  -Cardiogenic shock:on pressors, blood pressure improved  -Paroxysmal atrial fibrillation currently normal sinus rhythm    PLAN:  -Hemodialysis today  -Surveillance labs        Thank you for involving us in the care of Lefty Cai.  Please feel free to call with any questions.    Quang Reyes MD  04/17/23  08:24 EDT    [facility]  [phone]    Please note that portions of this note were completed with a voice recognition program.

## 2023-04-17 NOTE — PROGRESS NOTES
"Lefty Cai  1950 72 y.o.  [id number]      Patient Care Team:  Daksha Ng APRN as PCP - General (Family Medicine)  Rudy Faith MD as Consulting Physician (Ophthalmology)  Jose Mccall MD as Consulting Physician (Nephrology)  Quang Reyes MD as Consulting Physician (Nephrology)    CC: Non-STEMI,, end-stage renal failure, EF 30 to 35%, severe three-vessel coronary disease, severe MR, underwent three-vessel CABG and mitral valve repair, postop A-fib    Interval History: Is doing a little bit better today still has a chest tube in place      Objective   Vital Signs  Temp:  [97.5 °F (36.4 °C)-98.1 °F (36.7 °C)] 97.6 °F (36.4 °C)  Heart Rate:  [] 129  Resp:  [14-18] 18  BP: ()/() 96/71    Intake/Output Summary (Last 24 hours) at 4/17/2023 1533  Last data filed at 4/17/2023 0500  Gross per 24 hour   Intake 398.16 ml   Output 950 ml   Net -551.84 ml     Flowsheet Rows    Flowsheet Row First Filed Value   Admission Height 177.8 cm (70\") Documented at 04/08/2023 1028   Admission Weight 102 kg (225 lb) Documented at 04/08/2023 1028          Physical Exam:   General Appearance:    Alert,oriented, in no acute distress   Lungs:     Clear to auscultation,BS are equal    Heart:    Normal S1 and S2, RRR without murmur, gallop or rub   HEENT:    Sclerae are clear, no JVD or adenopathy   Abdomen:     Normal bowel sounds, soft nontender, nondistended, no HSM   Extremities:   Moves all extremities well, no edema, no cyanosis, no             Redness, no rash     Medication Review:      acetylcysteine, 3 mL, Nebulization, TID - RT  amiodarone, 200 mg, Oral, Q12H  aspirin, 81 mg, Oral, Daily  atorvastatin, 40 mg, Oral, Nightly  DULoxetine, 30 mg, Oral, Nightly  insulin lispro, 0-9 Units, Subcutaneous, 4x Daily With Meals & Nightly  ipratropium-albuterol, 3 mL, Nebulization, Q6H While Awake - RT  iron polysaccharides, 150 mg, Oral, Daily  midodrine, 2.5 mg, Oral, BID AC  mupirocin, , " Each Nare, BID  pantoprazole, 40 mg, Oral, QAM  senna-docusate sodium, 2 tablet, Oral, Nightly      phenylephrine, 0.5-3 mcg/kg/min, Last Rate: 0.4 mcg/kg/min (04/17/23 1449)  sodium chloride, 9 mL/hr, Last Rate: 9 mL/hr (04/15/23 1624)  vasopressin, 0.03 Units/min, Last Rate: 0.03 Units/min (04/17/23 1447)          I reviewed the patient's new clinical results.  I personally viewed and interpreted the patient's EKG/Telemetry data    Assessment/Plan  Active Hospital Problems    Diagnosis  POA   • **NSTEMI (non-ST elevated myocardial infarction) [I21.4]  Unknown   • Hypernatremia [E87.0]  Unknown   • Rhinovirus [B34.8]  Yes   • Dyspnea on exertion [R06.09]  Yes   • Abnormal findings on diagnostic imaging of heart and coronary circulation [R93.1]  Unknown   • Type 2 diabetes mellitus with diabetic chronic kidney disease [E11.22]  Yes   • Essential hypertension [I10]  Yes   • Hyperlipidemia [E78.5]  Yes   • CKD (chronic kidney disease) stage 4, GFR 15-29 ml/min ([facility]) [N18.4]  Yes      Resolved Hospital Problems   No resolved problems to display.       Kind of as expected recovery from surgery and a lot of comorbidities heading into this he is in sinus rhythm today platelets are 69 was in not a great candidate for anticoagulation continue to watch his QT interval    Lobito Garcia MD  04/17/23  15:33 EDT

## 2023-04-17 NOTE — PLAN OF CARE
Goal Outcome Evaluation:              Outcome Evaluation: Patient received dialysis today. Blood pressures were soft so he had to be put on a vasopressor during the dialysis. Patient went back into A-fib, was given a 1x dose of Digoxin. He is also on Amio. Patient has been drowsy all day but able to wake up with stimulation. He is oriented minus time. Patient was switched from liquid to a healthy heart diet. He ate 50% of his dinner.

## 2023-04-17 NOTE — PROGRESS NOTES
"Nutrition Services    Patient Name:  Lefty Cai  YOB: 1950  MRN: [id number]  Admit Date:  4/8/2023    Assessment Date:  04/17/23    Comment:     Pt is confused but oriented per MD. Pt endorses he \"don't feel good\" and has had several experiences of a-fib per RD documentation. Pt is on dialysis every Tuesday/Thursday/Saturday. Visited pt and family at bedside. Pt was asleep at time of visit but spoke with family in pt room. Clear liquids x 3 days. Advanced to a DM/HH diet 4/17 per MD. 5 lb (2.2%) wt loss x 2 mo. PO intake included 75% of supplements and 50% of snack per EMR. Last BM 4/11. Bowel regimen recommended per MD.     Recommendations:  1. Novasource Renal BID B/D    Will continue to monitor per protocol.    CLINICAL NUTRITION ASSESSMENT      Reason for Assessment Length of Stay, NPO/Clear Liquid Status     Diagnosis/Problem   NSTEMI MI, T2DM with DM CKD, HTN, HLD, ESRD, dyspnea on exertion, rhinovirus, hypernatremia, abnormal findings on diagnostic imaging of heart and coronary circulation, former smoker   Medical/Surgical History Past Medical History:   Diagnosis Date   • Anemia    • Anesthesia complication     HYPOTENSION   • Arthritis    • Cancer of kidney, left    • CKD (chronic kidney disease)     STAGE 5   • Diabetes mellitus, type 2    • Fatigue    • GERD (gastroesophageal reflux disease)    • H/O renal cell carcinoma 2007    partial nephrectomy   • History of colostomy reversal 04/2022   • [place] (hard of hearing)     NO DEVICE   • Hyperlipidemia    • Hypertension    • Primary insomnia 06/13/2016   • Proteinuria    • Risk factors for obstructive sleep apnea    • Sinus drainage    • Vitamin D deficiency 08/14/2017       Past Surgical History:   Procedure Laterality Date   • ARTERIOVENOUS FISTULA/SHUNT SURGERY Left 08/15/2019    Procedure: LEFT MID FOREARM RADIAL CEPHALIC ARTERIAL VENOUS FISTULA;  Surgeon: Louann Miles Jr., MD;  Location: [facility];  Service: Vascular "   • CARDIAC CATHETERIZATION N/A 4/9/2023    Procedure: Left Heart Cath;  Surgeon: Lobito Garcia MD;  Location: [facility] CATH INVASIVE LOCATION;  Service: Cardiovascular;  Laterality: N/A;   • CARDIAC CATHETERIZATION N/A 4/9/2023    Procedure: Left ventriculography;  Surgeon: Lobito Garcia MD;  Location: [facility] CATH INVASIVE LOCATION;  Service: Cardiovascular;  Laterality: N/A;   • CARDIAC CATHETERIZATION N/A 4/9/2023    Procedure: Coronary angiography;  Surgeon: Lobito Garcia MD;  Location: [facility] CATH INVASIVE LOCATION;  Service: Cardiovascular;  Laterality: N/A;   • COLONOSCOPY  03/24/2022   • COLONOSCOPY N/A 03/24/2022    Procedure: COLONOSCOPY TO CECUM WITH POLYPECTOMY  (HOT SNARE);  Surgeon: Yelena Guerra MD;  Location: [facility] ENDOSCOPY;  Service: General;  Laterality: N/A;  PREOP/ HX OF DIVERTICULITIS, COLOSTOMY  POSTOP/ POLYPS, DIVERTICULOSIS    • COLOSTOMY  09/06/2021   • COLOSTOMY CLOSURE N/A 04/12/2022    Procedure: open Colostomy reversal;  Surgeon: Yelena Guerra MD;  Location: [facility] MAIN OR;  Service: General;  Laterality: N/A;   • CORONARY ARTERY BYPASS GRAFT N/A 4/13/2023    Procedure: MAT STERNOTOMY CORONARY ARTERY BYPASS GRAFT TIMES 4 USING LEFT INTERNAL MAMMARY ARTERY AND RIGHT GREATER SAPHENOUS VEIN  PER ENDOSCOPIC VEIN HARVESTING, MITRAL VALVE REPAIR  AND PRP;  Surgeon: Mirtha Zuluaga MD;  Location: [facility] CVOR;  Service: Cardiothoracic;  Laterality: N/A;   • DIAGNOSTIC LAPAROSCOPY N/A 2/27/2023    Procedure: DIAGNOSTIC LAPAROSCOPY;  Surgeon: Murali Ferreira MD;  Location: [facility] MAIN OR;  Service: General;  Laterality: N/A;   • EXPLORATORY LAPAROTOMY N/A 09/06/2021    Procedure: Open sigmoind colectomy, colostomy, and appendectomy;  Surgeon: Yelena Guerra MD;  Location: [facility] MAIN OR;  Service: General;  Laterality: N/A;   • EYE SURGERY      CATARACTS   • KNEE ARTHROSCOPY Right    • NEPHRECTOMY PARTIAL  2007    Dr. Victor        Encounter Information       "  Nutrition History:  Clear liquid diet    Food Preferences:    Supplements:    Factors Affecting Intake: altered mental status, fatigue, weakness     Anthropometrics        Current Height  Current Weight  BMI kg/m2 Height: 182.9 cm (72\")  Weight: 113 kg (248 lb 3.8 oz) (04/17/23 0315)  Body mass index is 33.67 kg/m².   Adjusted BMI (if applicable)        Admission Weight 99.791 kg (220 lb)       Ideal Body Weight (IBW) 77.6 kg (171 lb 1.2 oz)   Adjusted IBW (if applicable)        Usual Body Weight (UBW)    Weight Change/Trend Loss, Amount/Timeframe: 5 lb (2.2%) wt loss x 2 mo        Weight History Wt Readings from Last 30 Encounters:   04/17/23 0315 113 kg (248 lb 3.8 oz)   04/16/23 0600 106 kg (232 lb 12.9 oz)   04/15/23 0600 108 kg (237 lb 7 oz)   04/14/23 1132 110 kg (243 lb)   04/14/23 0600 111 kg (243 lb 13.3 oz)   04/13/23 0604 100 kg (220 lb 12.8 oz)   04/12/23 0700 100 kg (220 lb 14.4 oz)   04/11/23 0600 100 kg (220 lb 9.6 oz)   04/10/23 0539 99 kg (218 lb 3.2 oz)   04/09/23 0554 98.9 kg (218 lb 1.6 oz)   04/08/23 1459 99.8 kg (220 lb)   04/08/23 1445 102 kg (225 lb)   04/08/23 1028 102 kg (225 lb)   02/27/23 0930 102 kg (225 lb 8.5 oz)   02/24/23 1419 101 kg (223 lb 3.2 oz)   02/03/23 1115 101 kg (223 lb 9.6 oz)   01/06/23 1101 106 kg (234 lb 6.4 oz)   01/03/23 1519 105 kg (231 lb 6.4 oz)   07/29/22 0847 104 kg (230 lb)   07/05/22 1139 106 kg (233 lb)   05/31/22 1015 104 kg (230 lb 3.2 oz)   04/12/22 1024 106 kg (232 lb 11.2 oz)   04/04/22 1340 106 kg (234 lb)   03/24/22 0727 106 kg (234 lb)   01/11/22 1144 107 kg (235 lb)   11/11/21 1555 102 kg (225 lb)   11/08/21 1721 103 kg (226 lb)   11/01/21 1640 103 kg (227 lb)   10/25/21 1622 102 kg (224 lb)   10/21/21 1452 (!) 223 kg (491 lb 10 oz)   10/14/21 1733 104 kg (229 lb)   10/07/21 1534 (!) 231 kg (509 lb 4.2 oz)   10/05/21 1322 105 kg (232 lb)   09/30/21 1611 106 kg (234 lb)   09/27/21 1541 105 kg (232 lb)   09/23/21 1427 108 kg (238 lb)   09/21/21 0838 " 109 kg (241 lb)   09/17/21 1322 111 kg (245 lb)   09/16/21 0846 109 kg (241 lb)   09/07/21 0812 109 kg (240 lb)   06/30/21 1140 111 kg (244 lb)   06/23/21 0946 111 kg (245 lb)           --  Tests/Procedures        Tests/Procedures Other:CABG on 4/13     Labs       Pertinent Labs    Results from last 7 days   Lab Units 04/17/23  0308 04/16/23  0410 04/15/23  1125 04/15/23  0300 04/14/23  1639 04/14/23  1246   SODIUM mmol/L 139 142 142   < > 148* 151*   POTASSIUM mmol/L 4.0 4.5 4.5   < > 5.1 4.6   CHLORIDE mmol/L 101 103 103   < > 109* 111*   CO2 mmol/L 20.9* 22.0 23.0   < > 26.0 25.4   BUN mg/dL 52* 38* 30*   < > 45* 45*   CREATININE mg/dL 6.32* 5.19* 4.11*   < > 5.45* 5.76*   CALCIUM mg/dL 8.8 8.7 8.6   < > 8.6 9.1   BILIRUBIN mg/dL  --   --  0.4  --  0.4 0.4   ALK PHOS U/L  --   --  38*  --  32* 28*   ALT (SGPT) U/L  --   --  <5  --  8 8   AST (SGOT) U/L  --   --  29  --  44* 41*   GLUCOSE mg/dL 211* 103* 159*   < > 149* 119*    < > = values in this interval not displayed.     Results from last 7 days   Lab Units 04/17/23  0308 04/16/23  0410 04/15/23  1807 04/15/23  1125 04/15/23  0300 04/14/23  1639 04/14/23  1246   MAGNESIUM mg/dL  --  2.5*  --   --  2.1  --  2.6*   PHOSPHORUS mg/dL  --   --   --   --  4.6*  --   --    HEMOGLOBIN g/dL 9.3* 9.2*   < > 8.9* 7.8*  --  8.5*   HEMATOCRIT % 28.8* 27.7*   < > 26.1* 23.2*  --  25.0*   WBC 10*3/mm3 11.06* 12.94*   < > 12.77* 13.19*  --  15.84*   ALBUMIN g/dL  --   --   --  3.2* 3.3*   < > 3.5    < > = values in this interval not displayed.     Results from last 7 days   Lab Units 04/17/23  0308 04/16/23  0410 04/15/23  1807 04/15/23  1125 04/15/23  0300 04/14/23  1246 04/14/23  0635 04/14/23  0005 04/13/23  2041 04/13/23  0239 04/13/23  0238 04/12/23  0129 04/11/23  1835   INR   --   --   --   --   --  1.40* 1.58*  --  1.58*  --   --   --   --    APTT seconds  --   --   --   --   --  37.4*  --   --  >200.0*  --  81.4* 75.3* 124.8*   PLATELETS 10*3/mm3 69* 56* 49* 47* 55*  64* 78*   < > 145   < >  --  232  --     < > = values in this interval not displayed.     COVID19   Date Value Ref Range Status   04/12/2023 Not Detected Not Detected - Ref. Range Final     Lab Results   Component Value Date    HGBA1C 5.00 04/09/2023          Medications           Scheduled Medications acetylcysteine, 3 mL, Nebulization, TID - RT  amiodarone, 200 mg, Oral, Q12H  aspirin, 81 mg, Oral, Daily  atorvastatin, 40 mg, Oral, Nightly  DULoxetine, 30 mg, Oral, Nightly  insulin lispro, 0-9 Units, Subcutaneous, 4x Daily With Meals & Nightly  ipratropium-albuterol, 3 mL, Nebulization, Q6H While Awake - RT  iron polysaccharides, 150 mg, Oral, Daily  midodrine, 2.5 mg, Oral, BID AC  mupirocin, , Each Nare, BID  pantoprazole, 40 mg, Oral, QAM  senna-docusate sodium, 2 tablet, Oral, Nightly       Infusions phenylephrine, 0.5-3 mcg/kg/min, Last Rate: 0.3 mcg/kg/min (04/17/23 0500)  sodium chloride, 9 mL/hr, Last Rate: 9 mL/hr (04/15/23 1624)  vasopressin, 0.03 Units/min, Last Rate: Stopped (04/15/23 1250)       PRN Medications •  acetaminophen **OR** acetaminophen **OR** acetaminophen  •  ALPRAZolam  •  bisacodyl  •  bisacodyl  •  dextrose  •  dextrose  •  glucagon (human recombinant)  •  ipratropium-albuterol  •  magnesium hydroxide  •  Morphine **AND** naloxone  •  ondansetron  •  polyethylene glycol     Physical Findings          Physical Appearance alert, confused, obese, room air   Oral/Mouth Cavity teeth missing   Edema  no edema   Gastrointestinal normoactive, last bowel movement:not documented   Skin  surgical incision abdomen, right proximal leg    Tubes/Drains other:hemodialysis AV access, CVC double lumen left internal jugular, hemodialysis cath double   NFPE Not applicable at this time   --  Current Nutrition Orders & Evaluation of Intake       Oral Nutrition     Food Allergies NKFA   Current PO Diet Diet: Liquid Diets; Clear Liquid; Texture: Regular Texture (IDDSI 7); Fluid Consistency: Thin (IDDSI 0)    Supplement n/a   PO Evaluation     % PO Intake 75%    # of Days Evaluated 2   --  PES STATEMENT / NUTRITION DIAGNOSIS      Nutrition Dx Problem  Problem: Overweight/Obesity and Predicted Suboptimal Intake  Etiology: Medical Diagnosis and Factors Affecting Nutrition altered mental status, fatigue, weakness  Signs/Symptoms: Clear Liquid Diet   --  NUTRITION INTERVENTION / PLAN OF CARE      Intervention Goal(s) Maintain nutrition status, Reduce/improve symptoms, Disease management/therapy, Increase intake, Continue positive trend, Advance diet and Maintain weight         RD Intervention/Action Supplement provided, Encourage intake, Follow Tx Progress, Care plan reviewed and Recommend/ordered: Novasource Renal chocolate BID B/D         Prescription/Orders:       PO Diet RN advanced from clear liquids to DM/HH diet       Supplements Novasource Renal chocolate BID B/D      Snacks       Enteral Nutrition       Parenteral Nutrition    New Prescription Ordered? Yes   --      Monitor/Evaluation Per protocol, I&O, PO intake, Supplement intake, Pertinent labs, Weight, Symptoms, Hemodynamic stability   Discharge Plan/Needs Pending clinical course   Education Will instruct as appropriate   --    RD to follow per protocol.      Electronically signed by:  Louann Martell, Dietetic Intern   04/17/23 10:15 EDT

## 2023-04-17 NOTE — SIGNIFICANT NOTE
Call placed for HTN  sBP 160-120, afib 120-130, pt was a transfer from [facility] with symptomatic  Afib , Hypotension

## 2023-04-18 ENCOUNTER — APPOINTMENT (OUTPATIENT)
Dept: GENERAL RADIOLOGY | Facility: HOSPITAL | Age: 73
DRG: 216 | End: 2023-04-18
Payer: MEDICARE

## 2023-04-18 LAB
ALBUMIN SERPL-MCNC: 3 G/DL (ref 3.5–5.2)
ANION GAP SERPL CALCULATED.3IONS-SCNC: 15 MMOL/L (ref 5–15)
BASOPHILS # BLD AUTO: 0.02 10*3/MM3 (ref 0–0.2)
BASOPHILS NFR BLD AUTO: 0.2 % (ref 0–1.5)
BUN SERPL-MCNC: 33 MG/DL (ref 8–23)
BUN/CREAT SERPL: 7.5 (ref 7–25)
CALCIUM SPEC-SCNC: 8.3 MG/DL (ref 8.6–10.5)
CHLORIDE SERPL-SCNC: 101 MMOL/L (ref 98–107)
CO2 SERPL-SCNC: 21 MMOL/L (ref 22–29)
CREAT SERPL-MCNC: 4.38 MG/DL (ref 0.76–1.27)
DEPRECATED RDW RBC AUTO: 50.1 FL (ref 37–54)
EGFRCR SERPLBLD CKD-EPI 2021: 13.5 ML/MIN/1.73
EOSINOPHIL # BLD AUTO: 0.17 10*3/MM3 (ref 0–0.4)
EOSINOPHIL NFR BLD AUTO: 1.6 % (ref 0.3–6.2)
ERYTHROCYTE [DISTWIDTH] IN BLOOD BY AUTOMATED COUNT: 15.6 % (ref 12.3–15.4)
GLUCOSE BLDC GLUCOMTR-MCNC: 101 MG/DL (ref 70–130)
GLUCOSE BLDC GLUCOMTR-MCNC: 129 MG/DL (ref 70–130)
GLUCOSE BLDC GLUCOMTR-MCNC: 203 MG/DL (ref 70–130)
GLUCOSE BLDC GLUCOMTR-MCNC: 93 MG/DL (ref 70–130)
GLUCOSE SERPL-MCNC: 184 MG/DL (ref 65–99)
HCT VFR BLD AUTO: 30 % (ref 37.5–51)
HGB BLD-MCNC: 9.8 G/DL (ref 13–17.7)
LYMPHOCYTES # BLD AUTO: 1.1 10*3/MM3 (ref 0.7–3.1)
LYMPHOCYTES NFR BLD AUTO: 10.1 % (ref 19.6–45.3)
MAGNESIUM SERPL-MCNC: 2.3 MG/DL (ref 1.6–2.4)
MCH RBC QN AUTO: 28.9 PG (ref 26.6–33)
MCHC RBC AUTO-ENTMCNC: 32.7 G/DL (ref 31.5–35.7)
MCV RBC AUTO: 88.5 FL (ref 79–97)
MONOCYTES # BLD AUTO: 1.18 10*3/MM3 (ref 0.1–0.9)
MONOCYTES NFR BLD AUTO: 10.9 % (ref 5–12)
NEUTROPHILS NFR BLD AUTO: 76.4 % (ref 42.7–76)
NEUTROPHILS NFR BLD AUTO: 8.28 10*3/MM3 (ref 1.7–7)
PHOSPHATE SERPL-MCNC: 3.9 MG/DL (ref 2.5–4.5)
PLATELET # BLD AUTO: 77 10*3/MM3 (ref 140–450)
PMV BLD AUTO: 11.2 FL (ref 6–12)
POTASSIUM SERPL-SCNC: 4.5 MMOL/L (ref 3.5–5.2)
RBC # BLD AUTO: 3.39 10*6/MM3 (ref 4.14–5.8)
SODIUM SERPL-SCNC: 137 MMOL/L (ref 136–145)
WBC NRBC COR # BLD: 10.84 10*3/MM3 (ref 3.4–10.8)

## 2023-04-18 PROCEDURE — 25010000002 AMIODARONE IN DEXTROSE 5% 360-4.14 MG/200ML-% SOLUTION: Performed by: NURSE PRACTITIONER

## 2023-04-18 PROCEDURE — 94799 UNLISTED PULMONARY SVC/PX: CPT

## 2023-04-18 PROCEDURE — 71045 X-RAY EXAM CHEST 1 VIEW: CPT

## 2023-04-18 PROCEDURE — 99024 POSTOP FOLLOW-UP VISIT: CPT | Performed by: THORACIC SURGERY (CARDIOTHORACIC VASCULAR SURGERY)

## 2023-04-18 PROCEDURE — 94664 DEMO&/EVAL PT USE INHALER: CPT

## 2023-04-18 PROCEDURE — 63710000001 INSULIN LISPRO (HUMAN) PER 5 UNITS: Performed by: NURSE PRACTITIONER

## 2023-04-18 PROCEDURE — 82962 GLUCOSE BLOOD TEST: CPT

## 2023-04-18 PROCEDURE — 25010000002 CALCIUM GLUCONATE-NACL 1-0.675 GM/50ML-% SOLUTION: Performed by: NURSE PRACTITIONER

## 2023-04-18 PROCEDURE — 25010000002 ENOXAPARIN PER 10 MG: Performed by: NURSE PRACTITIONER

## 2023-04-18 PROCEDURE — 25010000002 MORPHINE PER 10 MG: Performed by: NURSE PRACTITIONER

## 2023-04-18 PROCEDURE — 83735 ASSAY OF MAGNESIUM: CPT | Performed by: INTERNAL MEDICINE

## 2023-04-18 PROCEDURE — 0692T THERAPEUTIC ULTRAFILTRATION: CPT

## 2023-04-18 PROCEDURE — 94760 N-INVAS EAR/PLS OXIMETRY 1: CPT

## 2023-04-18 PROCEDURE — 85025 COMPLETE CBC W/AUTO DIFF WBC: CPT | Performed by: INTERNAL MEDICINE

## 2023-04-18 PROCEDURE — 94761 N-INVAS EAR/PLS OXIMETRY MLT: CPT

## 2023-04-18 PROCEDURE — 80069 RENAL FUNCTION PANEL: CPT | Performed by: INTERNAL MEDICINE

## 2023-04-18 PROCEDURE — 99232 SBSQ HOSP IP/OBS MODERATE 35: CPT | Performed by: INTERNAL MEDICINE

## 2023-04-18 RX ORDER — AMOXICILLIN 250 MG
2 CAPSULE ORAL 2 TIMES DAILY
Status: DISCONTINUED | OUTPATIENT
Start: 2023-04-18 | End: 2023-05-02 | Stop reason: HOSPADM

## 2023-04-18 RX ORDER — AMIODARONE HYDROCHLORIDE 200 MG/1
200 TABLET ORAL EVERY 12 HOURS SCHEDULED
Status: DISCONTINUED | OUTPATIENT
Start: 2023-04-18 | End: 2023-04-20

## 2023-04-18 RX ORDER — CALCIUM GLUCONATE 20 MG/ML
2 INJECTION, SOLUTION INTRAVENOUS ONCE
Status: COMPLETED | OUTPATIENT
Start: 2023-04-18 | End: 2023-04-18

## 2023-04-18 RX ORDER — ENOXAPARIN SODIUM 100 MG/ML
30 INJECTION SUBCUTANEOUS EVERY 24 HOURS
Status: DISCONTINUED | OUTPATIENT
Start: 2023-04-18 | End: 2023-05-02 | Stop reason: HOSPADM

## 2023-04-18 RX ADMIN — MUPIROCIN 1 APPLICATION: 20 OINTMENT TOPICAL at 21:27

## 2023-04-18 RX ADMIN — METOPROLOL TARTRATE 12.5 MG: 25 TABLET, FILM COATED ORAL at 14:26

## 2023-04-18 RX ADMIN — IPRATROPIUM BROMIDE AND ALBUTEROL SULFATE 3 ML: 2.5; .5 SOLUTION RESPIRATORY (INHALATION) at 19:45

## 2023-04-18 RX ADMIN — ACETAMINOPHEN 650 MG: 325 TABLET, FILM COATED ORAL at 01:15

## 2023-04-18 RX ADMIN — ACETYLCYSTEINE 3 ML: 200 SOLUTION ORAL; RESPIRATORY (INHALATION) at 19:46

## 2023-04-18 RX ADMIN — INSULIN LISPRO 4 UNITS: 100 INJECTION, SOLUTION INTRAVENOUS; SUBCUTANEOUS at 08:27

## 2023-04-18 RX ADMIN — ENOXAPARIN SODIUM 30 MG: 100 INJECTION SUBCUTANEOUS at 14:26

## 2023-04-18 RX ADMIN — AMIODARONE HYDROCHLORIDE 200 MG: 200 TABLET ORAL at 10:43

## 2023-04-18 RX ADMIN — ACETYLCYSTEINE 3 ML: 200 SOLUTION ORAL; RESPIRATORY (INHALATION) at 07:23

## 2023-04-18 RX ADMIN — AMIODARONE HYDROCHLORIDE 200 MG: 200 TABLET ORAL at 21:27

## 2023-04-18 RX ADMIN — METOPROLOL TARTRATE 12.5 MG: 25 TABLET, FILM COATED ORAL at 21:27

## 2023-04-18 RX ADMIN — MIDODRINE HYDROCHLORIDE 2.5 MG: 2.5 TABLET ORAL at 17:10

## 2023-04-18 RX ADMIN — IPRATROPIUM BROMIDE AND ALBUTEROL SULFATE 3 ML: 2.5; .5 SOLUTION RESPIRATORY (INHALATION) at 13:11

## 2023-04-18 RX ADMIN — PANTOPRAZOLE SODIUM 40 MG: 40 TABLET, DELAYED RELEASE ORAL at 06:45

## 2023-04-18 RX ADMIN — MIDODRINE HYDROCHLORIDE 2.5 MG: 2.5 TABLET ORAL at 06:45

## 2023-04-18 RX ADMIN — MUPIROCIN 1 APPLICATION: 20 OINTMENT TOPICAL at 08:27

## 2023-04-18 RX ADMIN — ACETAMINOPHEN 650 MG: 325 TABLET, FILM COATED ORAL at 08:27

## 2023-04-18 RX ADMIN — CALCIUM GLUCONATE 2 G: 20 INJECTION, SOLUTION INTRAVENOUS at 10:43

## 2023-04-18 RX ADMIN — ASPIRIN 81 MG: 81 TABLET, COATED ORAL at 08:28

## 2023-04-18 RX ADMIN — DULOXETINE HYDROCHLORIDE 30 MG: 30 CAPSULE, DELAYED RELEASE ORAL at 21:27

## 2023-04-18 RX ADMIN — ATORVASTATIN CALCIUM 40 MG: 20 TABLET, FILM COATED ORAL at 21:32

## 2023-04-18 RX ADMIN — DOCUSATE SODIUM 50MG AND SENNOSIDES 8.6MG 2 TABLET: 8.6; 5 TABLET, FILM COATED ORAL at 21:27

## 2023-04-18 RX ADMIN — AMIODARONE HYDROCHLORIDE 1 MG/MIN: 1.8 INJECTION, SOLUTION INTRAVENOUS at 06:45

## 2023-04-18 RX ADMIN — METOPROLOL TARTRATE 5 MG: 1 INJECTION, SOLUTION INTRAVENOUS at 08:39

## 2023-04-18 RX ADMIN — Medication 150 MG: at 08:28

## 2023-04-18 RX ADMIN — MORPHINE SULFATE 1 MG: 2 INJECTION, SOLUTION INTRAMUSCULAR; INTRAVENOUS at 03:28

## 2023-04-18 RX ADMIN — IPRATROPIUM BROMIDE AND ALBUTEROL SULFATE 3 ML: 2.5; .5 SOLUTION RESPIRATORY (INHALATION) at 07:22

## 2023-04-18 NOTE — PROGRESS NOTES
[facility] Progress Note      Patient Name: Lefty Cai  : 1950  MRN: [id number]  Primary Care Physician:  Daksha Ng APRN  Date of admission: 2023    Subjective     Interval History:   Follow-up end-stage renal disease  The patient had dialysis yesterday blood pressure was low removed 3 L but he still have significant edema.  He is lethargic but arousable and he is alert and oriented, he had chest discomfort related to surgery note shortness of breath, no nausea or vomiting      Review of Systems:   As noted above    Objective     Vitals:   Temp:  [97.6 °F (36.4 °C)-97.8 °F (36.6 °C)] 97.6 °F (36.4 °C)  Heart Rate:  [] 109  Resp:  [16-20] 20  BP: ()/() 146/83    Intake/Output Summary (Last 24 hours) at 2023 0836  Last data filed at 2023 0645  Gross per 24 hour   Intake 1575.32 ml   Output 2675 ml   Net -1099.68 ml       Physical Exam:    General Appearance: Patient is more awake, no acute distress and chronically ill  Skin: warm and dry  HEENT: oral mucosa normal, nonicteric sclera  Neck: Cordis in the left IJ, tunneled dialysis catheter in the right IJ with exit site below the right clavicle  Lungs: Bilateral rhonchi, breathing effort not labored  Heart: RRR, normal S1 and S2, no S3, no rub  Abdomen: soft, nontender, nondistended, normoactive bowel  : no palpable bladder.  Extremities: 2+ upper and lower extremity, cyanosis or clubbing, functional AV fistula in the left forearm with good thrill and bruit  Neuro: Moving all extremities    Scheduled Meds:     acetylcysteine, 3 mL, Nebulization, TID - RT  aspirin, 81 mg, Oral, Daily  atorvastatin, 40 mg, Oral, Nightly  DULoxetine, 30 mg, Oral, Nightly  insulin lispro, 0-9 Units, Subcutaneous, 4x Daily With Meals & Nightly  ipratropium-albuterol, 3 mL, Nebulization, Q6H While Awake - RT  iron polysaccharides, 150 mg, Oral, Daily  metoprolol tartrate, 5 mg, Intravenous, Once  midodrine,  2.5 mg, Oral, BID AC  mupirocin, , Each Nare, BID  pantoprazole, 40 mg, Oral, QAM  senna-docusate sodium, 2 tablet, Oral, Nightly      IV Meds:   amiodarone, 1 mg/min, Last Rate: 1 mg/min (04/18/23 0645)  phenylephrine, 0.5-3 mcg/kg/min, Last Rate: Stopped (04/17/23 1608)  sodium chloride, 9 mL/hr, Last Rate: 9 mL/hr (04/15/23 1624)  vasopressin, 0.05 Units/min, Last Rate: Stopped (04/18/23 0545)        Results Reviewed:   I have personally reviewed the results from the time of this admission to 4/18/2023 08:36 EDT     Results from last 7 days   Lab Units 04/18/23  0328 04/17/23  1711 04/17/23  0308 04/16/23  0410 04/15/23  1125 04/15/23  0300 04/14/23  1639   SODIUM mmol/L 137 142 139   < > 142   < > 148*   POTASSIUM mmol/L 4.5 3.9 4.0   < > 4.5   < > 5.1   CHLORIDE mmol/L 101 104 101   < > 103   < > 109*   CO2 mmol/L 21.0* 21.9* 20.9*   < > 23.0   < > 26.0   BUN mg/dL 33* 22 52*   < > 30*   < > 45*   CREATININE mg/dL 4.38* 2.68* 6.32*   < > 4.11*   < > 5.45*   CALCIUM mg/dL 8.3* 8.5* 8.8   < > 8.6   < > 8.6   BILIRUBIN mg/dL  --  0.5  --   --  0.4  --  0.4   ALK PHOS U/L  --  107  --   --  38*  --  32*   ALT (SGPT) U/L  --  41  --   --  <5  --  8   AST (SGOT) U/L  --  132*  --   --  29  --  44*   GLUCOSE mg/dL 184* 114* 211*   < > 159*   < > 149*    < > = values in this interval not displayed.       Estimated Creatinine Clearance: 18.8 mL/min (A) (by C-G formula based on SCr of 4.38 mg/dL (H)).    Results from last 7 days   Lab Units 04/18/23  0328 04/16/23  0410 04/15/23  0300 04/14/23  1246 04/14/23  0635   MAGNESIUM mg/dL 2.3 2.5* 2.1   < > 2.5*   PHOSPHORUS mg/dL 3.9  --  4.6*  --  3.3    < > = values in this interval not displayed.             Results from last 7 days   Lab Units 04/18/23  0328 04/17/23  1711 04/17/23  0308 04/16/23  0410 04/15/23  1807   WBC 10*3/mm3 10.84* 12.00* 11.06* 12.94* 12.97*   HEMOGLOBIN g/dL 9.8* 10.2* 9.3* 9.2* 8.9*   PLATELETS 10*3/mm3 77* 82* 69* 56* 49*       Results from last  7 days   Lab Units 04/14/23  1246 04/14/23  0635 04/13/23 2041   INR  1.40* 1.58* 1.58*       Assessment / Plan     ASSESSMENT:  -End-stage renal disease and diabetic and hypertensive glomerulosclerosis on maintenance hemodialysis every Tuesday, Thursday and Saturday, his electrolyte within acceptable range but the patient is hypervolemic  -Diabetes mellitus type 2 with renal complication  -Hypertension with chronic kidney disease, blood pressure is on the low side  -Anemia of ESRD and acute blood loss anemia secondary to recent surgery., hemoglobin  Is better after blood transfusion, hemoglobin today 9.8  -Thrombocytopenia platelet: HIT antibody screen is underway, platelets 77,000  -Coronary artery disease underwent four-vessel CABG mitral valve repair on 4/13/2023  -Secondary hyperparathyroidism of renal origin  -Altered mental status postoperatively much improved  -Cardiogenic shock:on pressors, blood pressure improved  -Paroxysmal atrial fibrillation currently normal sinus rhythm    PLAN:  -Isolated ultrafiltration today and try to remove more fluid  -Surveillance labs    I discussed the case with Dr. Anaya    Thank you for involving us in the care of Lefty Cai.  Please feel free to call with any questions.    Quang Reyes MD  04/18/23  08:36 EDT    [facility]  [phone]    Please note that portions of this note were completed with a voice recognition program.

## 2023-04-18 NOTE — PROGRESS NOTES
" LOS: 10 days   Patient Care Team:  Daksha Ng APRN as PCP - General (Family Medicine)  Rudy Faith MD as Consulting Physician (Ophthalmology)  Jose Mccall MD as Consulting Physician (Nephrology)  Quang Reyes MD as Consulting Physician (Nephrology)    Chief Complaint: Post op    Subjective:  Symptoms:  No shortness of breath, cough or chest pain.    Diet:  Poor intake.  No nausea or vomiting.    Activity level: Impaired due to weakness.    Pain:  He complains of pain that is mild.  Pain is well controlled.          Vital Signs  Temp:  [97.6 °F (36.4 °C)-97.8 °F (36.6 °C)] 97.6 °F (36.4 °C)  Heart Rate:  [] 109  Resp:  [16-20] 20  BP: ()/() 146/83  Body mass index is 31.36 kg/m².    Intake/Output Summary (Last 24 hours) at 4/18/2023 0808  Last data filed at 4/18/2023 0645  Gross per 24 hour   Intake 1575.32 ml   Output 2675 ml   Net -1099.68 ml     No intake/output data recorded.    Chest tube drainage last 8 hours: 50        04/17/23  0315 04/17/23  1634 04/18/23  0315   Weight: 113 kg (248 lb 3.8 oz) 111 kg (243 lb 13.3 oz) 105 kg (231 lb 4.2 oz)         Objective:  General Appearance:  Comfortable and in no acute distress.    Vital signs: (most recent): Blood pressure 146/83, pulse 109, temperature 97.6 °F (36.4 °C), temperature source Oral, resp. rate 20, height 182.9 cm (72\"), weight 105 kg (231 lb 4.2 oz), SpO2 100 %.  Vital signs are normal.  No fever.    Output: Minimal urine output and no stool output.    Lungs:  Normal effort and normal respiratory rate.  There are decreased breath sounds.    Heart: Normal rate.  Regular rhythm.  (100% AV paced at 70; SR 60s underlying)  Abdomen: Abdomen is soft.  Bowel sounds are normal.     Extremities: There is dependent edema.    Pulses: Distal pulses are intact.    Neurological: Patient is alert and oriented to person, place and time.    Skin:  Warm and dry.  (Sternal dressing clean, dry, and intact)        Results " Review:        WBC WBC   Date Value Ref Range Status   04/18/2023 10.84 (H) 3.40 - 10.80 10*3/mm3 Final   04/17/2023 12.00 (H) 3.40 - 10.80 10*3/mm3 Final   04/17/2023 11.06 (H) 3.40 - 10.80 10*3/mm3 Final   04/16/2023 12.94 (H) 3.40 - 10.80 10*3/mm3 Final   04/15/2023 12.97 (H) 3.40 - 10.80 10*3/mm3 Final   04/15/2023 12.77 (H) 3.40 - 10.80 10*3/mm3 Final      HGB Hemoglobin   Date Value Ref Range Status   04/18/2023 9.8 (L) 13.0 - 17.7 g/dL Final   04/17/2023 10.2 (L) 13.0 - 17.7 g/dL Final   04/17/2023 9.3 (L) 13.0 - 17.7 g/dL Final   04/16/2023 9.2 (L) 13.0 - 17.7 g/dL Final   04/15/2023 8.9 (L) 13.0 - 17.7 g/dL Final   04/15/2023 8.9 (L) 13.0 - 17.7 g/dL Final      HCT Hematocrit   Date Value Ref Range Status   04/18/2023 30.0 (L) 37.5 - 51.0 % Final   04/17/2023 30.9 (L) 37.5 - 51.0 % Final   04/17/2023 28.8 (L) 37.5 - 51.0 % Final   04/16/2023 27.7 (L) 37.5 - 51.0 % Final   04/15/2023 27.0 (L) 37.5 - 51.0 % Final   04/15/2023 26.1 (L) 37.5 - 51.0 % Final      Platelets Platelets   Date Value Ref Range Status   04/18/2023 77 (L) 140 - 450 10*3/mm3 Final   04/17/2023 82 (L) 140 - 450 10*3/mm3 Final   04/17/2023 69 (L) 140 - 450 10*3/mm3 Final   04/16/2023 56 (L) 140 - 450 10*3/mm3 Final   04/15/2023 49 (C) 140 - 450 10*3/mm3 Final   04/15/2023 47 (C) 140 - 450 10*3/mm3 Final        PT/INR:    No results found for: PROTIME/  No results found for: INR    Sodium Sodium   Date Value Ref Range Status   04/18/2023 137 136 - 145 mmol/L Final   04/17/2023 142 136 - 145 mmol/L Final   04/17/2023 139 136 - 145 mmol/L Final   04/16/2023 142 136 - 145 mmol/L Final   04/15/2023 142 136 - 145 mmol/L Final      Potassium Potassium   Date Value Ref Range Status   04/18/2023 4.5 3.5 - 5.2 mmol/L Final   04/17/2023 3.9 3.5 - 5.2 mmol/L Final   04/17/2023 4.0 3.5 - 5.2 mmol/L Final   04/16/2023 4.5 3.5 - 5.2 mmol/L Final   04/15/2023 4.5 3.5 - 5.2 mmol/L Final      Chloride Chloride   Date Value Ref Range Status   04/18/2023 101  98 - 107 mmol/L Final   04/17/2023 104 98 - 107 mmol/L Final   04/17/2023 101 98 - 107 mmol/L Final   04/16/2023 103 98 - 107 mmol/L Final   04/15/2023 103 98 - 107 mmol/L Final      Bicarbonate CO2   Date Value Ref Range Status   04/18/2023 21.0 (L) 22.0 - 29.0 mmol/L Final   04/17/2023 21.9 (L) 22.0 - 29.0 mmol/L Final   04/17/2023 20.9 (L) 22.0 - 29.0 mmol/L Final   04/16/2023 22.0 22.0 - 29.0 mmol/L Final   04/15/2023 23.0 22.0 - 29.0 mmol/L Final      BUN BUN   Date Value Ref Range Status   04/18/2023 33 (H) 8 - 23 mg/dL Final   04/17/2023 22 8 - 23 mg/dL Final   04/17/2023 52 (H) 8 - 23 mg/dL Final   04/16/2023 38 (H) 8 - 23 mg/dL Final   04/15/2023 30 (H) 8 - 23 mg/dL Final      Creatinine Creatinine   Date Value Ref Range Status   04/18/2023 4.38 (H) 0.76 - 1.27 mg/dL Final   04/17/2023 2.68 (H) 0.76 - 1.27 mg/dL Final   04/17/2023 6.32 (H) 0.76 - 1.27 mg/dL Final   04/16/2023 5.19 (H) 0.76 - 1.27 mg/dL Final   04/15/2023 4.11 (H) 0.76 - 1.27 mg/dL Final      Calcium Calcium   Date Value Ref Range Status   04/18/2023 8.3 (L) 8.6 - 10.5 mg/dL Final   04/17/2023 8.5 (L) 8.6 - 10.5 mg/dL Final   04/17/2023 8.8 8.6 - 10.5 mg/dL Final   04/16/2023 8.7 8.6 - 10.5 mg/dL Final   04/15/2023 8.6 8.6 - 10.5 mg/dL Final      Magnesium Magnesium   Date Value Ref Range Status   04/18/2023 2.3 1.6 - 2.4 mg/dL Final   04/16/2023 2.5 (H) 1.6 - 2.4 mg/dL Final          acetylcysteine, 3 mL, Nebulization, TID - RT  aspirin, 81 mg, Oral, Daily  atorvastatin, 40 mg, Oral, Nightly  DULoxetine, 30 mg, Oral, Nightly  insulin lispro, 0-9 Units, Subcutaneous, 4x Daily With Meals & Nightly  ipratropium-albuterol, 3 mL, Nebulization, Q6H While Awake - RT  iron polysaccharides, 150 mg, Oral, Daily  midodrine, 2.5 mg, Oral, BID AC  mupirocin, , Each Nare, BID  pantoprazole, 40 mg, Oral, QAM  senna-docusate sodium, 2 tablet, Oral, Nightly      amiodarone, 1 mg/min, Last Rate: 1 mg/min (04/18/23 0645)  phenylephrine, 0.5-3 mcg/kg/min, Last  Rate: Stopped (04/17/23 1608)  sodium chloride, 9 mL/hr, Last Rate: 9 mL/hr (04/15/23 1624)  vasopressin, 0.05 Units/min, Last Rate: Stopped (04/18/23 4711)            Patient Active Problem List   Diagnosis Code   • 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.22   • Essential hypertension I10   • Hyperlipidemia E78.5   • Primary insomnia F51.01   • CKD (chronic kidney disease) stage 4, GFR 15-29 ml/min ([facility]) N18.4   • Vitamin D deficiency E55.9   • Diverticulitis of large intestine with perforation and abscess without bleeding K57.20   • Obesity (BMI 30-39.9) E66.9   • Impaired mobility and ADLs Z74.09, Z78.9   • History of colon resection Z90.49   • Colon polyps K63.5   • Diverticulosis K57.90   • CKD (chronic kidney disease) stage 5, GFR less than 15 ml/min N18.5   • Dyspnea on exertion R06.09   • NSTEMI (non-ST elevated myocardial infarction) I21.4   • Rhinovirus B34.8   • Abnormal findings on diagnostic imaging of heart and coronary circulation R93.1   • Hypernatremia E87.0       Assessment & Plan   - NSTEMI/multi-vessel CAD- s/p urgent CABGx4 LIMA/RSVG, MV repair-([facility]) POD#4  - HFrEF--30-35% per echo 4/8  - rhinovirus infection  - ESRD on HD  - hypertension  - hyperlipidemia  - DM II- A1c 5.0  -post op anemia- expected acute blood loss  -Leukocytosis- probable reactive   -TCP- plt count 69-- hold lovenox; HIT pending       In sinus rhythm this morning rate in the 50s-- Dr. Zuluaga would like to remove his AV wires today-- will see how he does with his ultrafiltration today  He had significant chest tube output yesterday. Only 50 out overnight, but he has not gotten up this morning. Will see how much he has out once they get him up. CXR with small left apical PTX. No air leak noted   Mobilize/aggressive pulmonary toilet--continued nebs/flutter and OT/PT  Increase activity    DUSTY Spring  04/18/23  08:08 EDT

## 2023-04-18 NOTE — SIGNIFICANT NOTE
04/18/23 1409   OTHER   Discipline physical therapist   Rehab Time/Intention   Session Not Performed patient unavailable for treatment  (pt is receiving HD, PT will follow up tomorrow)   Recommendation   PT - Next Appointment 04/19/23

## 2023-04-18 NOTE — PROGRESS NOTES
Name: Lefty Cai ADMIT: 2023   : 1950  PCP: Daksha Ng, DUSTY    MRN: [id number] LOS: 10 days   AGE/SEX: 72 y.o. male  ROOM: [facility]/     Subjective   Subjective   Patient appears more alert today, generally weak, relatively comfortable, & no apparent distress.  Daughter at bedside believes he's cognition improving daily.  RN reports overnight urinary retention whereby in/out cath performed, retrieved >600 mL, & IV morphine PRN provided as analgesic.     Review of Systems   Constitutional: Negative for chills and fever.   Respiratory: Negative for cough and shortness of breath.    Cardiovascular: Negative for chest pain and leg swelling.        Objective   Objective   Vital Signs  Temp:  [97.3 °F (36.3 °C)-97.8 °F (36.6 °C)] 97.3 °F (36.3 °C)  Heart Rate:  [] 60  Resp:  [16-20] 20  BP: ()/() 148/73  SpO2:  [94 %-100 %] 98 %  on   ;   Device (Oxygen Therapy): room air  Body mass index is 31.36 kg/m².     Physical Exam  Constitutional:       Appearance: He is obese.   Cardiovascular:      Rate and Rhythm: Normal rate. Rhythm irregular.      Heart sounds: Murmur heard.   Pulmonary:      Effort: Pulmonary effort is normal.      Breath sounds: Normal breath sounds.   Abdominal:      General: Bowel sounds are normal.      Palpations: Abdomen is soft.   Musculoskeletal:      Right lower leg: No edema.      Left lower leg: No edema.   Skin:     General: Skin is warm and dry.   Neurological:      Mental Status: He is alert.     Physical exam performed on 2023 as per above.    Results Review     I reviewed the patient's new clinical results.  Results from last 7 days   Lab Units 23  0308 23  0410   WBC 10*3/mm3 10.84* 12.00* 11.06* 12.94*   HEMOGLOBIN g/dL 9.8* 10.2* 9.3* 9.2*   PLATELETS 10*3/mm3 77* 82* 69* 56*     Results from last 7 days   Lab Units 23  03223  0308 23  0410   SODIUM mmol/L 137 142  139 142   POTASSIUM mmol/L 4.5 3.9 4.0 4.5   CHLORIDE mmol/L 101 104 101 103   CO2 mmol/L 21.0* 21.9* 20.9* 22.0   BUN mg/dL 33* 22 52* 38*   CREATININE mg/dL 4.38* 2.68* 6.32* 5.19*   GLUCOSE mg/dL 184* 114* 211* 103*   EGFR mL/min/1.73 13.5* 24.3* 8.7* 11.0*     Results from last 7 days   Lab Units 04/18/23  0328 04/17/23  1711 04/15/23  1125 04/15/23  0300 04/14/23  1639 04/14/23  1246   ALBUMIN g/dL 3.0* 3.1* 3.2* 3.3* 3.4* 3.5   BILIRUBIN mg/dL  --  0.5 0.4  --  0.4 0.4   ALK PHOS U/L  --  107 38*  --  32* 28*   AST (SGOT) U/L  --  132* 29  --  44* 41*   ALT (SGPT) U/L  --  41 <5  --  8 8     Results from last 7 days   Lab Units 04/18/23 0328 04/17/23 1711 04/17/23 0308 04/16/23  0410 04/15/23  1125 04/15/23  0300 04/14/23  1639 04/14/23  1246 04/14/23  0635 04/14/23  0002   CALCIUM mg/dL 8.3* 8.5* 8.8 8.7 8.6 8.2*   < > 9.1 8.6 8.1*   ALBUMIN g/dL 3.0* 3.1*  --   --  3.2* 3.3*   < > 3.5 3.7 3.6   MAGNESIUM mg/dL 2.3  --   --  2.5*  --  2.1  --  2.6* 2.5* 2.5*   PHOSPHORUS mg/dL 3.9  --   --   --   --  4.6*  --   --  3.3 4.6*    < > = values in this interval not displayed.       Glucose   Date/Time Value Ref Range Status   04/18/2023 1159 93 70 - 130 mg/dL Final     Comment:     Meter: [id number] : [id number] Jhony Liu    04/18/2023 0729 203 (H) 70 - 130 mg/dL Final     Comment:     Meter: [id number] : [id number] Jhony Liu    04/17/2023 1933 131 (H) 70 - 130 mg/dL Final     Comment:     Meter: [id number] : [id number] Mitch Maria    04/17/2023 1623 103 70 - 130 mg/dL Final     Comment:     Meter: [id number] : [id number] Sharon Claudio    04/17/2023 1201 93 70 - 130 mg/dL Final     Comment:     Meter: [id number] : [id number] Sharon Claudio    04/17/2023 0733 102 70 - 130 mg/dL Final     Comment:     Meter: [id number] : [id number] Caren Hemphill RN   04/16/2023 1936 138 (H) 70 - 130 mg/dL Final     Comment:     Meter: [id number] : [id number] Franco  Ирина CONNIE       XR Chest 1 View    Result Date: 4/18/2023  Stable left apical pneumothorax.  This report was finalized on 4/18/2023 5:20 AM by Dr. Mini Segura M.D.      XR Chest 1 View    Result Date: 4/17/2023   1. Stable left apical pneumothorax. 2. Persistent bibasilar atelectasis.  This report was finalized on 4/17/2023 5:40 AM by Dr. Mini Segura M.D.      I have personally reviewed all medications:  Scheduled Medications  acetylcysteine, 3 mL, Nebulization, TID - RT  amiodarone, 200 mg, Oral, Q12H  aspirin, 81 mg, Oral, Daily  atorvastatin, 40 mg, Oral, Nightly  DULoxetine, 30 mg, Oral, Nightly  insulin lispro, 0-9 Units, Subcutaneous, 4x Daily With Meals & Nightly  ipratropium-albuterol, 3 mL, Nebulization, Q6H While Awake - RT  iron polysaccharides, 150 mg, Oral, Daily  metoprolol tartrate, 12.5 mg, Oral, Q12H  midodrine, 2.5 mg, Oral, BID AC  mupirocin, , Each Nare, BID  pantoprazole, 40 mg, Oral, QAM  senna-docusate sodium, 2 tablet, Oral, BID    Infusions  sodium chloride, 9 mL/hr, Last Rate: 9 mL/hr (04/15/23 1624)  vasopressin, 0.05 Units/min, Last Rate: Stopped (04/18/23 0545)    Diet  Diet: Cardiac Diets, Diabetic Diets; Healthy Heart (2-3 Na+); Consistent Carbohydrate; Texture: Regular Texture (IDDSI 7); Fluid Consistency: Thin (IDDSI 0)    I have personally reviewed:  [x]  Laboratory   []  Microbiology   []  Radiology   [x]  EKG/Telemetry  []  Cardiology/Vascular   []  Pathology    []  Records       Assessment/Plan     Active Hospital Problems    Diagnosis  POA   • **NSTEMI (non-ST elevated myocardial infarction) [I21.4]  Unknown   • Hypernatremia [E87.0]  Unknown   • Rhinovirus [B34.8]  Yes   • Dyspnea on exertion [R06.09]  Yes   • Abnormal findings on diagnostic imaging of heart and coronary circulation [R93.1]  Unknown   • Type 2 diabetes mellitus with diabetic chronic kidney disease [E11.22]  Yes   • Essential hypertension [I10]  Yes   • Hyperlipidemia [E78.5]  Yes   • CKD (chronic  kidney disease) stage 4, GFR 15-29 ml/min ([facility]) [N18.4]  Yes      Resolved Hospital Problems   No resolved problems to display.       72 y.o. male admitted with NSTEMI (non-ST elevated myocardial infarction).    72 y.o. male admitted with NSTEMI (non-ST elevated myocardial infarction).     Severe three-vessel coronary disease with an occluded right coronary system s/p CABG x4 on 4/13/2023.  Extubated on 4/14/2023. Atrial fibrillation & started on amiodarone with plans to switch to oral formulation & current low dose BB per cardiology. [facility] during HD on 4/17/2023 & CV surgery starting midodrine & amiodarone.  Incentive spirometry / flutter valve encouraged.     Metabolic encephalopathy improved.  Disoriented to year & baseline AOx4.     Thrombocytopenia: Heparin antibody NEGATIVE on CPA report.  PLT count waxing & waning.     Nephrology managing ESRD + HD--isolated ultrafiltration today for fluid removal     Diabetes: Controlled.  Moderate dose correctional sliding scale continued.    Urinary retention:  Possibly due to logistics of urinating while in bed.  Encourage staff to dangle patient bedside with urinal.  Bladder scan every 4-6 hours PRN & in /out cath for urine measuring >300mL.  Consider UA/C&S & urology consult if retention persists.  Caution use of Flomax given labile BP now requiring midodrine.    Constipation:  Most likely due to decreased intake post-op.  Aggravated by narcotics & immobility.  Possibly contributing to suspected onset urinary retention.  Escalate bowel regimen.      · SCDs for DVT prophylaxis.  · Full code.  · Discussed with patient, RN, & family.  · Anticipate discharge pending clinical course & consultants' recommendations      DUSTY Ford  [facility]  04/18/23  13:28 EDT

## 2023-04-18 NOTE — PROGRESS NOTES
"Lefty Cai  1950 72 y.o.  [id number]      Patient Care Team:  Daksha Ng APRN as PCP - General (Family Medicine)  Rudy Faith MD as Consulting Physician (Ophthalmology)  Jose Mccall MD as Consulting Physician (Nephrology)  Quang Reyes MD as Consulting Physician (Nephrology)    CC: Non-STEMI,, end-stage renal failure, EF 30 to 35%, severe three-vessel coronary disease, severe MR, underwent three-vessel CABG and mitral valve repair, postop A-fib    Interval History: He is doing okay still is in A-fib    Objective   Vital Signs  Temp:  [97.6 °F (36.4 °C)-97.8 °F (36.6 °C)] 97.6 °F (36.4 °C)  Heart Rate:  [] 53  Resp:  [16-20] 20  BP: ()/() 119/60    Intake/Output Summary (Last 24 hours) at 4/18/2023 0956  Last data filed at 4/18/2023 0800  Gross per 24 hour   Intake 1575.32 ml   Output 2675 ml   Net -1099.68 ml     Flowsheet Rows    Flowsheet Row First Filed Value   Admission Height 177.8 cm (70\") Documented at 04/08/2023 1028   Admission Weight 102 kg (225 lb) Documented at 04/08/2023 1028          Physical Exam:   General Appearance:    Alert,oriented, in no acute distress   Lungs:     Clear to auscultation,BS are equal    Heart:    Normal S1 and S2, RRR without murmur, gallop or rub   HEENT:    Sclerae are clear, no JVD or adenopathy   Abdomen:     Normal bowel sounds, soft nontender, nondistended, no HSM   Extremities:   Moves all extremities well, no edema, no cyanosis, no             Redness, no rash     Medication Review:      acetylcysteine, 3 mL, Nebulization, TID - RT  amiodarone, 200 mg, Oral, Q12H  aspirin, 81 mg, Oral, Daily  atorvastatin, 40 mg, Oral, Nightly  calcium gluconate, 2 g, Intravenous, Once  DULoxetine, 30 mg, Oral, Nightly  insulin lispro, 0-9 Units, Subcutaneous, 4x Daily With Meals & Nightly  ipratropium-albuterol, 3 mL, Nebulization, Q6H While Awake - RT  iron polysaccharides, 150 mg, Oral, Daily  metoprolol tartrate, 12.5 mg, " Oral, Q12H  midodrine, 2.5 mg, Oral, BID AC  mupirocin, , Each Nare, BID  pantoprazole, 40 mg, Oral, QAM  senna-docusate sodium, 2 tablet, Oral, Nightly      sodium chloride, 9 mL/hr, Last Rate: 9 mL/hr (04/15/23 1624)  vasopressin, 0.05 Units/min, Last Rate: Stopped (04/18/23 0545)          I reviewed the patient's new clinical results.  I personally viewed and interpreted the patient's EKG/Telemetry data    Assessment/Plan  Active Hospital Problems    Diagnosis  POA   • **NSTEMI (non-ST elevated myocardial infarction) [I21.4]  Unknown   • Hypernatremia [E87.0]  Unknown   • Rhinovirus [B34.8]  Yes   • Dyspnea on exertion [R06.09]  Yes   • Abnormal findings on diagnostic imaging of heart and coronary circulation [R93.1]  Unknown   • Type 2 diabetes mellitus with diabetic chronic kidney disease [E11.22]  Yes   • Essential hypertension [I10]  Yes   • Hyperlipidemia [E78.5]  Yes   • CKD (chronic kidney disease) stage 4, GFR 15-29 ml/min ([facility]) [N18.4]  Yes      Resolved Hospital Problems   No resolved problems to display.     Ischemic cardiomyopathy severe mitral insufficiency status post CABG and mitral valve repair on dialysis we have had some difficulty with paroxysmal A-fib I gave him a little bit of Lopressor this morning he converted.  I Norma keep him on a low-dose of Lopressor and switch him over to p.o. amiodarone hopefully he will stay in sinus    Lobito Garcia MD  04/18/23  09:56 EDT

## 2023-04-18 NOTE — PROGRESS NOTES
[facility]  [phone]  Dr. Jim Anaya    Subjective:  LOS: 10    Chief Complaint: Hypotension    Patient is status post CABG.  Now in sinus rhythm and tolerated HD - just completed. Denies chest pain or soa.    Objective   Vital Signs past 24hrs  Temp range: Temp (24hrs), Av.3 °F (36.3 °C), Min:96.1 °F (35.6 °C), Max:97.8 °F (36.6 °C)    BP range: BP: ()/() 116/66  Pulse range: Heart Rate:  [] 58  Resp rate range: Resp:  [16-20] 20  Device (Oxygen Therapy): room air   Oxygen range:SpO2:  [94 %-100 %] 98 %   Mechanical Ventilator:Mode: PS (23 0656)    Physical Exam  Constitutional:       Appearance: He is obese.   Eyes:      Pupils: Pupils are equal, round, and reactive to light.   Cardiovascular:      Rate and Rhythm: Normal rate and regular rhythm.      Heart sounds: Normal heart sounds.      Comments: sinus  Pulmonary:      Effort: Pulmonary effort is normal.      Breath sounds: Normal breath sounds.      Comments: Mediastinal chest tube  Abdominal:      General: Bowel sounds are normal.      Palpations: Abdomen is soft. There is no mass.      Tenderness: There is no abdominal tenderness.   Musculoskeletal:         General: No swelling.   Neurological:      Mental Status: He is alert.       Results Review:    I have reviewed the laboratory and imaging data since the last note by [facility] physician.  My annotations are noted in assessment and plan.      Result Review:  I have personally reviewed the results from last note by [facility] physician to 2023 16:04 EDT and agree with these findings:  [x]  Laboratory list / accordion  [x]  Microbiology  [x]  Radiology  [x]  EKG/Telemetry    [x]  Cardiology/Vascular   []  Pathology  []  Old records  []  Other:    Medication Review:  I have reviewed the current MAR.  My annotations are noted in assessment and plan.    acetylcysteine, 3 mL, Nebulization, TID - RT  amiodarone, 200 mg, Oral, Q12H  aspirin, 81 mg, Oral,  Daily  atorvastatin, 40 mg, Oral, Nightly  DULoxetine, 30 mg, Oral, Nightly  enoxaparin, 30 mg, Subcutaneous, Q24H  insulin lispro, 0-9 Units, Subcutaneous, 4x Daily With Meals & Nightly  ipratropium-albuterol, 3 mL, Nebulization, Q6H While Awake - RT  iron polysaccharides, 150 mg, Oral, Daily  metoprolol tartrate, 12.5 mg, Oral, Q12H  midodrine, 2.5 mg, Oral, BID AC  mupirocin, , Each Nare, BID  pantoprazole, 40 mg, Oral, QAM  senna-docusate sodium, 2 tablet, Oral, BID        sodium chloride, 9 mL/hr, Last Rate: 9 mL/hr (04/15/23 1624)  vasopressin, 0.05 Units/min, Last Rate: Stopped (04/18/23 0545)      Lines, Drains & Airways     Active LDAs     Name Placement date Placement time Site Days    CVC Double Lumen 04/13/23 Left Internal jugular 04/13/23  1325  created via procedure documentation  Internal jugular  3    Peripheral IV 04/08/23 1700 Anterior;Distal;Right Forearm 04/08/23  1700  Forearm  8    Y Chest Tube 1 and 2 1 Mediastinal 28 Fr. 2 Left Pleural 28 Fr. 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  Cortisol normal. BP is good and tolerated HD.    A-fib with RVR but now sinus. Not needing pacing.    Cardiomyopathy with EF of 30%.  With bilateral pleural effusions and should improve with  HD.    Acute rhinovirus infection and symptomatic treatment for same.  On nebs as well as acetylcysteine to clear secretions.    Anemia expected postop and also from underlying kidney disease.  No evidence of active bleeding.    Low platelets of uncertain etiology.  HIT negative.    Diabetes mellitus on sliding scale and with control blood sugars.    Continue to monitor in ICU per cardiothoracic team.    Jim Anaya MD  04/18/23  16:04 EDT      Part of this note may be an electronic transcription/translation of spoken language to printed text using the Dragon Dictation System.

## 2023-04-18 NOTE — PLAN OF CARE
Goal Outcome Evaluation:               Pace on standby after frequent R on T. Vasopressin wean off , amiodarone gtt at 1 mg , controlled afib ,MS chest incision is approximated, open to air AV wires attached to pacer on stand by  ,  75 cc out of Chest tube. Bladder scanner showed 590 in bladder , pt unable to urinate, straight cath preformed 600, yellow in color. Had been in better spirits , drank mighty shake last night.

## 2023-04-19 PROBLEM — E87.0 HYPERNATREMIA: Status: RESOLVED | Noted: 2023-04-14 | Resolved: 2023-04-19

## 2023-04-19 LAB
ALBUMIN SERPL-MCNC: 2.9 G/DL (ref 3.5–5.2)
ANION GAP SERPL CALCULATED.3IONS-SCNC: 17.2 MMOL/L (ref 5–15)
BASOPHILS # BLD AUTO: 0.02 10*3/MM3 (ref 0–0.2)
BASOPHILS NFR BLD AUTO: 0.2 % (ref 0–1.5)
BUN SERPL-MCNC: 48 MG/DL (ref 8–23)
BUN/CREAT SERPL: 9 (ref 7–25)
CALCIUM SPEC-SCNC: 8.7 MG/DL (ref 8.6–10.5)
CHLORIDE SERPL-SCNC: 101 MMOL/L (ref 98–107)
CO2 SERPL-SCNC: 19.8 MMOL/L (ref 22–29)
CREAT SERPL-MCNC: 5.34 MG/DL (ref 0.76–1.27)
DEPRECATED RDW RBC AUTO: 49.6 FL (ref 37–54)
EGFRCR SERPLBLD CKD-EPI 2021: 10.6 ML/MIN/1.73
EOSINOPHIL # BLD AUTO: 0.24 10*3/MM3 (ref 0–0.4)
EOSINOPHIL NFR BLD AUTO: 2.5 % (ref 0.3–6.2)
ERYTHROCYTE [DISTWIDTH] IN BLOOD BY AUTOMATED COUNT: 15.6 % (ref 12.3–15.4)
FERRITIN SERPL-MCNC: 437 NG/ML (ref 30–400)
GLUCOSE BLDC GLUCOMTR-MCNC: 116 MG/DL (ref 70–130)
GLUCOSE BLDC GLUCOMTR-MCNC: 122 MG/DL (ref 70–130)
GLUCOSE BLDC GLUCOMTR-MCNC: 122 MG/DL (ref 70–130)
GLUCOSE BLDC GLUCOMTR-MCNC: 131 MG/DL (ref 70–130)
GLUCOSE BLDC GLUCOMTR-MCNC: 88 MG/DL (ref 70–130)
GLUCOSE BLDC GLUCOMTR-MCNC: 90 MG/DL (ref 70–130)
GLUCOSE SERPL-MCNC: 99 MG/DL (ref 65–99)
HCT VFR BLD AUTO: 30 % (ref 37.5–51)
HGB BLD-MCNC: 9.9 G/DL (ref 13–17.7)
LYMPHOCYTES # BLD AUTO: 1.01 10*3/MM3 (ref 0.7–3.1)
LYMPHOCYTES NFR BLD AUTO: 10.7 % (ref 19.6–45.3)
MCH RBC QN AUTO: 29.2 PG (ref 26.6–33)
MCHC RBC AUTO-ENTMCNC: 33 G/DL (ref 31.5–35.7)
MCV RBC AUTO: 88.5 FL (ref 79–97)
MONOCYTES # BLD AUTO: 1.13 10*3/MM3 (ref 0.1–0.9)
MONOCYTES NFR BLD AUTO: 11.9 % (ref 5–12)
NEUTROPHILS NFR BLD AUTO: 6.95 10*3/MM3 (ref 1.7–7)
NEUTROPHILS NFR BLD AUTO: 73.5 % (ref 42.7–76)
PHOSPHATE SERPL-MCNC: 4.3 MG/DL (ref 2.5–4.5)
PLATELET # BLD AUTO: 94 10*3/MM3 (ref 140–450)
PMV BLD AUTO: 11.1 FL (ref 6–12)
POTASSIUM SERPL-SCNC: 4 MMOL/L (ref 3.5–5.2)
RBC # BLD AUTO: 3.39 10*6/MM3 (ref 4.14–5.8)
SODIUM SERPL-SCNC: 138 MMOL/L (ref 136–145)
WBC NRBC COR # BLD: 9.46 10*3/MM3 (ref 3.4–10.8)

## 2023-04-19 PROCEDURE — 25010000002 HEPARIN (PORCINE) PER 1000 UNITS: Performed by: INTERNAL MEDICINE

## 2023-04-19 PROCEDURE — 82728 ASSAY OF FERRITIN: CPT | Performed by: INTERNAL MEDICINE

## 2023-04-19 PROCEDURE — 94761 N-INVAS EAR/PLS OXIMETRY MLT: CPT

## 2023-04-19 PROCEDURE — 94664 DEMO&/EVAL PT USE INHALER: CPT

## 2023-04-19 PROCEDURE — 94799 UNLISTED PULMONARY SVC/PX: CPT

## 2023-04-19 PROCEDURE — 97530 THERAPEUTIC ACTIVITIES: CPT

## 2023-04-19 PROCEDURE — 25010000002 EPOETIN ALFA-EPBX 3000 UNIT/ML SOLUTION: Performed by: INTERNAL MEDICINE

## 2023-04-19 PROCEDURE — 99232 SBSQ HOSP IP/OBS MODERATE 35: CPT | Performed by: INTERNAL MEDICINE

## 2023-04-19 PROCEDURE — 25010000002 MANNITOL PER 50 ML: Performed by: INTERNAL MEDICINE

## 2023-04-19 PROCEDURE — 99024 POSTOP FOLLOW-UP VISIT: CPT | Performed by: NURSE PRACTITIONER

## 2023-04-19 PROCEDURE — 97110 THERAPEUTIC EXERCISES: CPT

## 2023-04-19 PROCEDURE — 97166 OT EVAL MOD COMPLEX 45 MIN: CPT

## 2023-04-19 PROCEDURE — 85025 COMPLETE CBC W/AUTO DIFF WBC: CPT | Performed by: INTERNAL MEDICINE

## 2023-04-19 PROCEDURE — 94760 N-INVAS EAR/PLS OXIMETRY 1: CPT

## 2023-04-19 PROCEDURE — 82962 GLUCOSE BLOOD TEST: CPT

## 2023-04-19 PROCEDURE — 25010000002 ENOXAPARIN PER 10 MG: Performed by: NURSE PRACTITIONER

## 2023-04-19 PROCEDURE — 80069 RENAL FUNCTION PANEL: CPT | Performed by: INTERNAL MEDICINE

## 2023-04-19 RX ORDER — HEPARIN SODIUM 1000 [USP'U]/ML
3800 INJECTION, SOLUTION INTRAVENOUS; SUBCUTANEOUS AS NEEDED
Status: COMPLETED | OUTPATIENT
Start: 2023-04-19 | End: 2023-04-19

## 2023-04-19 RX ORDER — MIDODRINE HYDROCHLORIDE 5 MG/1
5 TABLET ORAL
Status: DISCONTINUED | OUTPATIENT
Start: 2023-04-19 | End: 2023-04-26

## 2023-04-19 RX ORDER — ACETAMINOPHEN 500 MG
500 TABLET ORAL EVERY 4 HOURS PRN
Status: DISCONTINUED | OUTPATIENT
Start: 2023-04-19 | End: 2023-05-02 | Stop reason: HOSPADM

## 2023-04-19 RX ORDER — MANNITOL 250 MG/ML
12.5 INJECTION, SOLUTION INTRAVENOUS AS NEEDED
Status: DISPENSED | OUTPATIENT
Start: 2023-04-19 | End: 2023-04-20

## 2023-04-19 RX ORDER — ACETAMINOPHEN 650 MG/1
650 SUPPOSITORY RECTAL EVERY 4 HOURS PRN
Status: DISCONTINUED | OUTPATIENT
Start: 2023-04-19 | End: 2023-04-30

## 2023-04-19 RX ADMIN — HEPARIN SODIUM 3800 UNITS: 1000 INJECTION INTRAVENOUS; SUBCUTANEOUS at 16:42

## 2023-04-19 RX ADMIN — MUPIROCIN 1 APPLICATION: 20 OINTMENT TOPICAL at 21:07

## 2023-04-19 RX ADMIN — DOCUSATE SODIUM 50MG AND SENNOSIDES 8.6MG 2 TABLET: 8.6; 5 TABLET, FILM COATED ORAL at 08:57

## 2023-04-19 RX ADMIN — MUPIROCIN 1 APPLICATION: 20 OINTMENT TOPICAL at 08:58

## 2023-04-19 RX ADMIN — ACETYLCYSTEINE 3 ML: 200 SOLUTION ORAL; RESPIRATORY (INHALATION) at 15:03

## 2023-04-19 RX ADMIN — EPOETIN ALFA-EPBX 6000 UNITS: 3000 INJECTION, SOLUTION INTRAVENOUS; SUBCUTANEOUS at 18:19

## 2023-04-19 RX ADMIN — MIDODRINE HYDROCHLORIDE 2.5 MG: 2.5 TABLET ORAL at 06:49

## 2023-04-19 RX ADMIN — IPRATROPIUM BROMIDE AND ALBUTEROL SULFATE 3 ML: 2.5; .5 SOLUTION RESPIRATORY (INHALATION) at 19:26

## 2023-04-19 RX ADMIN — ACETYLCYSTEINE 3 ML: 200 SOLUTION ORAL; RESPIRATORY (INHALATION) at 19:26

## 2023-04-19 RX ADMIN — AMIODARONE HYDROCHLORIDE 200 MG: 200 TABLET ORAL at 08:57

## 2023-04-19 RX ADMIN — MIDODRINE HYDROCHLORIDE 5 MG: 5 TABLET ORAL at 16:41

## 2023-04-19 RX ADMIN — IPRATROPIUM BROMIDE AND ALBUTEROL SULFATE 3 ML: 2.5; .5 SOLUTION RESPIRATORY (INHALATION) at 07:18

## 2023-04-19 RX ADMIN — Medication 150 MG: at 08:58

## 2023-04-19 RX ADMIN — ACETYLCYSTEINE 3 ML: 200 SOLUTION ORAL; RESPIRATORY (INHALATION) at 07:19

## 2023-04-19 RX ADMIN — ENOXAPARIN SODIUM 30 MG: 100 INJECTION SUBCUTANEOUS at 14:53

## 2023-04-19 RX ADMIN — AMIODARONE HYDROCHLORIDE 200 MG: 200 TABLET ORAL at 21:07

## 2023-04-19 RX ADMIN — PANTOPRAZOLE SODIUM 40 MG: 40 TABLET, DELAYED RELEASE ORAL at 06:49

## 2023-04-19 RX ADMIN — IPRATROPIUM BROMIDE AND ALBUTEROL SULFATE 3 ML: 2.5; .5 SOLUTION RESPIRATORY (INHALATION) at 15:02

## 2023-04-19 RX ADMIN — ASPIRIN 81 MG: 81 TABLET, COATED ORAL at 08:57

## 2023-04-19 RX ADMIN — METOPROLOL TARTRATE 12.5 MG: 25 TABLET, FILM COATED ORAL at 21:02

## 2023-04-19 RX ADMIN — MANNITOL 12.5 G: 12.5 INJECTION, SOLUTION INTRAVENOUS at 15:40

## 2023-04-19 RX ADMIN — METOPROLOL TARTRATE 12.5 MG: 25 TABLET, FILM COATED ORAL at 08:57

## 2023-04-19 RX ADMIN — DOCUSATE SODIUM 50MG AND SENNOSIDES 8.6MG 2 TABLET: 8.6; 5 TABLET, FILM COATED ORAL at 21:07

## 2023-04-19 RX ADMIN — ACETAMINOPHEN 500 MG: 500 TABLET, FILM COATED ORAL at 11:33

## 2023-04-19 RX ADMIN — DULOXETINE HYDROCHLORIDE 30 MG: 30 CAPSULE, DELAYED RELEASE ORAL at 21:07

## 2023-04-19 RX ADMIN — ATORVASTATIN CALCIUM 40 MG: 20 TABLET, FILM COATED ORAL at 21:07

## 2023-04-19 NOTE — PROGRESS NOTES
"Nutrition Services    Patient Name:  Lefty Cai  YOB: 1950  MRN: [id number]  Admit Date:  4/8/2023  FOLLOW UP - CLINICAL NUTRITION    Assessment Date:  04/19/23     Nutrition follow up assessment completed. Pt advanced to DM consistent CHO and HH diet on 4/17. 25-50% PO x 2 days. Visited pt at bedside at lunch. He seemed in better spirits and wife was assisting him with feeding. Wife endorses that the pt prefers strawberry or chocolate flavors, will honor. Plans for dialysis today 4/19. Last documented BM: 4/11. Pt experiencing constipation, bowel regimen in place per MD.     REC:  1. Continue Novasource Renal chocolate or strawberry BID B/D  2. Encourage PO/ONS intake     Will continue to monitor per protocol.    Encounter Information         Reason for Encounter Follow up     Current Issues NSTEMI MI, T2DM with DM CKD, HTN, HLD, ESRD, dyspnea on exertion, hypernatremia, abnormal findings on diagnostic imaging of heart and coronary circulation, former smoker     Current Nutrition Orders & Evaluation of Intake       Oral Nutrition     Current PO Diet Diet: Cardiac Diets, Diabetic Diets; Healthy Heart (2-3 Na+); Consistent Carbohydrate; Texture: Regular Texture (IDDSI 7); Fluid Consistency: Thin (IDDSI 0)   Supplement Novasource Renal , BID B/D   PO Evaluation     % PO Intake 25-50%    # of Days Evaluated 2    Factors Affecting Intake  fatigue, weakness   --  Anthropometrics          Height    Weight Height: 182.9 cm (72\")  Weight: 105 kg (231 lb 4.2 oz) (04/19/23 0535)    BMI kg/m2 Body mass index is 31.36 kg/m².    Weight trend Loss, Amount/Timeframe: 5 lb (2.2%) wt loss x 2 mo     Labs        Pertinent Labs Reviewed, listed below     Results from last 7 days   Lab Units 04/19/23  0316 04/18/23  0328 04/17/23  1711 04/16/23  0410 04/15/23  1125 04/15/23  0300 04/14/23  1639   SODIUM mmol/L 138 137 142   < > 142   < > 148*   POTASSIUM mmol/L 4.0 4.5 3.9   < > 4.5   < > 5.1   CHLORIDE mmol/L " 101 101 104   < > 103   < > 109*   CO2 mmol/L 19.8* 21.0* 21.9*   < > 23.0   < > 26.0   BUN mg/dL 48* 33* 22   < > 30*   < > 45*   CREATININE mg/dL 5.34* 4.38* 2.68*   < > 4.11*   < > 5.45*   CALCIUM mg/dL 8.7 8.3* 8.5*   < > 8.6   < > 8.6   BILIRUBIN mg/dL  --   --  0.5  --  0.4  --  0.4   ALK PHOS U/L  --   --  107  --  38*  --  32*   ALT (SGPT) U/L  --   --  41  --  <5  --  8   AST (SGOT) U/L  --   --  132*  --  29  --  44*   GLUCOSE mg/dL 99 184* 114*   < > 159*   < > 149*    < > = values in this interval not displayed.     Results from last 7 days   Lab Units 04/19/23 0316 04/18/23 0328 04/17/23 0308 04/16/23 0410 04/15/23  1125 04/15/23  0300   MAGNESIUM mg/dL  --  2.3  --  2.5*  --  2.1   PHOSPHORUS mg/dL 4.3 3.9  --   --   --  4.6*   HEMOGLOBIN g/dL 9.9* 9.8*   < > 9.2*   < > 7.8*   HEMATOCRIT % 30.0* 30.0*   < > 27.7*   < > 23.2*   WBC 10*3/mm3 9.46 10.84*   < > 12.94*   < > 13.19*   ALBUMIN g/dL 2.9* 3.0*   < >  --    < > 3.3*    < > = values in this interval not displayed.     Results from last 7 days   Lab Units 04/19/23 0316 04/18/23 0328 04/17/23  1711 04/17/23 0308 04/16/23  0410 04/15/23  0300 04/14/23  1246 04/14/23  0635 04/14/23  0005 04/13/23  2041 04/13/23  0239 04/13/23  0238   INR   --   --   --   --   --   --  1.40* 1.58*  --  1.58*  --   --    APTT seconds  --   --   --   --   --   --  37.4*  --   --  >200.0*  --  81.4*   PLATELETS 10*3/mm3 94* 77* 82* 69* 56*   < > 64* 78*   < > 145   < >  --     < > = values in this interval not displayed.     COVID19   Date Value Ref Range Status   04/12/2023 Not Detected Not Detected - Ref. Range Final     Lab Results   Component Value Date    HGBA1C 5.00 04/09/2023          Medications            Scheduled Medications acetylcysteine, 3 mL, Nebulization, TID - RT  amiodarone, 200 mg, Oral, Q12H  aspirin, 81 mg, Oral, Daily  atorvastatin, 40 mg, Oral, Nightly  DULoxetine, 30 mg, Oral, Nightly  enoxaparin, 30 mg, Subcutaneous, Q24H  epoetin  bernice/bernice-epbx, 6,000 Units, Intravenous, Once per day on Mon Wed Fri  insulin lispro, 0-9 Units, Subcutaneous, 4x Daily With Meals & Nightly  ipratropium-albuterol, 3 mL, Nebulization, Q6H While Awake - RT  iron polysaccharides, 150 mg, Oral, Daily  metoprolol tartrate, 12.5 mg, Oral, Q12H  midodrine, 5 mg, Oral, BID AC  mupirocin, , Each Nare, BID  pantoprazole, 40 mg, Oral, QAM  senna-docusate sodium, 2 tablet, Oral, BID        Infusions sodium chloride, 9 mL/hr, Last Rate: 9 mL/hr (04/15/23 2614)  vasopressin, 0.05 Units/min, Last Rate: Stopped (04/18/23 0594)        PRN Medications •  acetaminophen **OR** [DISCONTINUED] acetaminophen **OR** acetaminophen  •  bisacodyl  •  bisacodyl  •  dextrose  •  dextrose  •  glucagon (human recombinant)  •  ipratropium-albuterol  •  magnesium hydroxide  •  ondansetron  •  polyethylene glycol     Physical Findings          Physical Appearance alert, obese, oriented, room air   Oral/Mouth Cavity teeth missing   Edema  lower extremity , upper extremity, 3+ (moderate)   Gastrointestinal constipation, normoactive, last bowel movement:not documented   Skin  bruising, surgical incision upper sternal, abdomen, right proximal leg   Tubes/Drains chest tube, hemodialysis cath   NFPE Not applicable at this time   --  NUTRITION INTERVENTION / PLAN OF CARE  Intervention Goal         Intervention Goal(s) Maintain nutrition status, Reduce/improve symptoms, Disease management/therapy, Increase intake, Continue positive trend and Maintain weight     Nutrition Intervention         RD Action Encourage intake, Follow Tx Progress and Care plan reviewed     Nutrition Prescription         Diet Prescription     Supplement Prescription Novasource Renal chocolate or strawberry BID B/D    EN/PN Prescription    New Prescription Ordered? No changes at this time   --  Monitor/Evaluation        Monitor Per protocol, I&O, PO intake, Supplement intake, Pertinent labs, Weight, Symptoms, Hemodynamic stability    Discharge Needs Pending clinical course   Education Will instruct as appropriate   --    RD to follow up per protocol.    Electronically signed by:  Louann Martell, Dietetic Intern   04/19/23 10:41 EDT

## 2023-04-19 NOTE — PLAN OF CARE
Goal Outcome Evaluation:  Plan of Care Reviewed With: patient           Outcome Evaluation: Pt seen for OT eval this date. Pt agreeable and motivated to participate. Pt admitted for CABG/MVR on 4/13. Pt receives dialysis. Pt was ind prior, no AD/DME. Lives with wife. Performed bed mobility with mod assist x2. Sat edge of bed with sba. Unable to perform LE dressing sitting edge of bed. Having difficulty feeding self due to weakness and edema in hands which impacts . OT issued cylyndrical foam to pt/wife and ed on use. Performed sit/stand min assist x2 and transfered to recliner with min assist x2. Pt fatigued, but toelrated well. Pt demonstrating below baseline functional strength/endurance impacting abilty to perform functional tasks, will benefit from cont acute OT. May need snf at time of dc.OT wore appropriate PPE during session and performed hand hygiene before/after session.

## 2023-04-19 NOTE — NURSING NOTE
Pre V/S 109/51 , 64 , 22 , and 97.8. Dialysis was completed. Removed 2.7 kg. Goal was decreased and 25% Mannitol was given to maintain SBP above 100. Interventions effective. Post V/S 115/67 , 77 , 21 , and 97.8.

## 2023-04-19 NOTE — PLAN OF CARE
Goal Outcome Evaluation:  Plan of Care Reviewed With: patient           Outcome Evaluation: Pt in supine, agreeable to therapy, reports no pain but very tired of being in the bed. Patient performed bed mobility with modAx2, sat on EOB while performing exercises, then able to perform sit to stand and take a few small steps around to recliner with minAx2. Patient progressing with mobility.

## 2023-04-19 NOTE — PROGRESS NOTES
[facility] Progress Note      Patient Name: Lefty Cai  : 1950  MRN: [id number]  Primary Care Physician:  Daksha Ng APRN  Date of admission: 2023    Subjective     Interval History:   Follow up ESRD.  Isolated UF yesterday 3.4 Kg off. Denies pain, but not comfortable. Taking nepro.  Eating a little better.     Review of Systems:   As noted above    Objective     Vitals:   Temp:  [96.1 °F (35.6 °C)-97.7 °F (36.5 °C)] 97.7 °F (36.5 °C)  Heart Rate:  [58-71] 65  Resp:  [14-20] 14  BP: ()/(32-96) 113/96    Intake/Output Summary (Last 24 hours) at 2023 0940  Last data filed at 2023 0500  Gross per 24 hour   Intake 335.76 ml   Output 3800 ml   Net -3464.24 ml       Physical Exam:    General Appearance: alert,  no acute distress . Very weak, fatigued.   Skin: warm and dry  HEENT: oral mucosa normal, nonicteric sclera  Neck: TDC RIJ  Lungs: Coarse bs, upper airway rhonchi. Unlabored.   Heart: RRR, normal S1 and S2  Abdomen: soft, nontender, slightly distended. + bs body wall edema.   Extremities: 2+ edema. Upper and lower ext edema. LUE AVF   Neuro: normal speech and mental status     Scheduled Meds:     acetylcysteine, 3 mL, Nebulization, TID - RT  amiodarone, 200 mg, Oral, Q12H  aspirin, 81 mg, Oral, Daily  atorvastatin, 40 mg, Oral, Nightly  DULoxetine, 30 mg, Oral, Nightly  enoxaparin, 30 mg, Subcutaneous, Q24H  insulin lispro, 0-9 Units, Subcutaneous, 4x Daily With Meals & Nightly  ipratropium-albuterol, 3 mL, Nebulization, Q6H While Awake - RT  iron polysaccharides, 150 mg, Oral, Daily  metoprolol tartrate, 12.5 mg, Oral, Q12H  midodrine, 5 mg, Oral, BID AC  mupirocin, , Each Nare, BID  pantoprazole, 40 mg, Oral, QAM  senna-docusate sodium, 2 tablet, Oral, BID      IV Meds:   sodium chloride, 9 mL/hr, Last Rate: 9 mL/hr (04/15/23 8848)  vasopressin, 0.05 Units/min, Last Rate: Stopped (23 0156)        Results Reviewed:   I have personally  reviewed the results from the time of this admission to 4/19/2023 09:40 EDT     Results from last 7 days   Lab Units 04/19/23 0316 04/18/23 0328 04/17/23 1711 04/16/23  0410 04/15/23  1125 04/15/23  0300 04/14/23  1639   SODIUM mmol/L 138 137 142   < > 142   < > 148*   POTASSIUM mmol/L 4.0 4.5 3.9   < > 4.5   < > 5.1   CHLORIDE mmol/L 101 101 104   < > 103   < > 109*   CO2 mmol/L 19.8* 21.0* 21.9*   < > 23.0   < > 26.0   BUN mg/dL 48* 33* 22   < > 30*   < > 45*   CREATININE mg/dL 5.34* 4.38* 2.68*   < > 4.11*   < > 5.45*   CALCIUM mg/dL 8.7 8.3* 8.5*   < > 8.6   < > 8.6   BILIRUBIN mg/dL  --   --  0.5  --  0.4  --  0.4   ALK PHOS U/L  --   --  107  --  38*  --  32*   ALT (SGPT) U/L  --   --  41  --  <5  --  8   AST (SGOT) U/L  --   --  132*  --  29  --  44*   GLUCOSE mg/dL 99 184* 114*   < > 159*   < > 149*    < > = values in this interval not displayed.       Estimated Creatinine Clearance: 15.4 mL/min (A) (by C-G formula based on SCr of 5.34 mg/dL (H)).    Results from last 7 days   Lab Units 04/19/23 0316 04/18/23 0328 04/16/23 0410 04/15/23  0300   MAGNESIUM mg/dL  --  2.3 2.5* 2.1   PHOSPHORUS mg/dL 4.3 3.9  --  4.6*             Results from last 7 days   Lab Units 04/19/23 0316 04/18/23 0328 04/17/23 1711 04/17/23  0308 04/16/23  0410   WBC 10*3/mm3 9.46 10.84* 12.00* 11.06* 12.94*   HEMOGLOBIN g/dL 9.9* 9.8* 10.2* 9.3* 9.2*   PLATELETS 10*3/mm3 94* 77* 82* 69* 56*       Results from last 7 days   Lab Units 04/14/23  1246 04/14/23  0635 04/13/23 2041   INR  1.40* 1.58* 1.58*       Assessment / Plan     ASSESSMENT:  1. ESRD. UF 3.4 K yesterday, but no change in weight. Hemodialysis today. Using TDC due to AVF infiltration.    2. NSTEMI. SP 3 vessel CABG, MV repair.  3. Post op afib. Now SR.   4. Anemia CKD and blood loss  5. TCP.  HIT panel negative.  Platelets improving.   6. DM2     PLAN:  1. Dialysis today  2. Dw patient and wife .  3. Needs PT.  Understand dialysis timing is difficult.     [place]  Keeley Fiore MD  04/19/23  09:40 EDT    [facility]  [phone]

## 2023-04-19 NOTE — PLAN OF CARE
Goal Outcome Evaluation:  Plan of Care Reviewed With: patient        Progress: no change     Pt remain in CICU, A&O x4. Chest tubes remains in had 100 serosanguinous output. Pt had 100 u/o after bladder scanning twice. VSS, will continue to monitor.

## 2023-04-19 NOTE — THERAPY TREATMENT NOTE
Patient Name: Lefty Cai  : 1950    MRN: [id number]                              Today's Date: 2023       Admit Date: 2023    Visit Dx:     ICD-10-CM ICD-9-CM   1. Dyspnea on exertion  R06.09 786.09   2. NSTEMI (non-ST elevated myocardial infarction)  I21.4 410.70   3. ESRD (end stage renal disease)  N18.6 585.6   4. Former smoker  Z87.891 V15.82   5. Elevated blood pressure reading in office with diagnosis of hypertension  I10 401.9   6. Rhinovirus infection  B34.8 079.3   7. Abnormal findings on diagnostic imaging of heart and coronary circulation  R93.1 794.39   8. S/P CABG (coronary artery bypass graft)  Z95.1 V45.81     Patient Active Problem List   Diagnosis   • Type 2 diabetes mellitus with diabetic chronic kidney disease   • Essential hypertension   • Hyperlipidemia   • Primary insomnia   • CKD (chronic kidney disease) stage 4, GFR 15-29 ml/min ([facility])   • Vitamin D deficiency   • Diverticulitis of large intestine with perforation and abscess without bleeding   • Obesity (BMI 30-39.9)   • Impaired mobility and ADLs   • History of colon resection   • Colon polyps   • Diverticulosis   • CKD (chronic kidney disease) stage 5, GFR less than 15 ml/min   • Dyspnea on exertion   • NSTEMI (non-ST elevated myocardial infarction)   • Rhinovirus   • Abnormal findings on diagnostic imaging of heart and coronary circulation   • Hypernatremia     Past Medical History:   Diagnosis Date   • Anemia    • Anesthesia complication     HYPOTENSION   • Arthritis    • Cancer of kidney, left    • CKD (chronic kidney disease)     STAGE 5   • Diabetes mellitus, type 2    • Fatigue    • GERD (gastroesophageal reflux disease)    • H/O renal cell carcinoma 2007    partial nephrectomy   • History of colostomy reversal 2022   • [place] (hard of hearing)     NO DEVICE   • Hyperlipidemia    • Hypertension    • Primary insomnia 2016   • Proteinuria    • Risk factors for obstructive sleep apnea    • Sinus drainage     • Vitamin D deficiency 08/14/2017     Past Surgical History:   Procedure Laterality Date   • ARTERIOVENOUS FISTULA/SHUNT SURGERY Left 08/15/2019    Procedure: LEFT MID FOREARM RADIAL CEPHALIC ARTERIAL VENOUS FISTULA;  Surgeon: Louann Miles Jr., MD;  Location: [facility] MAIN OR;  Service: Vascular   • CARDIAC CATHETERIZATION N/A 4/9/2023    Procedure: Left Heart Cath;  Surgeon: Lobito Garcia MD;  Location: [facility] CATH INVASIVE LOCATION;  Service: Cardiovascular;  Laterality: N/A;   • CARDIAC CATHETERIZATION N/A 4/9/2023    Procedure: Left ventriculography;  Surgeon: Lobito Garcia MD;  Location: [facility] CATH INVASIVE LOCATION;  Service: Cardiovascular;  Laterality: N/A;   • CARDIAC CATHETERIZATION N/A 4/9/2023    Procedure: Coronary angiography;  Surgeon: Lobito Garcia MD;  Location: [facility] CATH INVASIVE LOCATION;  Service: Cardiovascular;  Laterality: N/A;   • COLONOSCOPY  03/24/2022   • COLONOSCOPY N/A 03/24/2022    Procedure: COLONOSCOPY TO CECUM WITH POLYPECTOMY  (HOT SNARE);  Surgeon: Yelena Guerra MD;  Location: [facility] ENDOSCOPY;  Service: General;  Laterality: N/A;  PREOP/ HX OF DIVERTICULITIS, COLOSTOMY  POSTOP/ POLYPS, DIVERTICULOSIS    • COLOSTOMY  09/06/2021   • COLOSTOMY CLOSURE N/A 04/12/2022    Procedure: open Colostomy reversal;  Surgeon: Yelena Guerra MD;  Location: [facility] MAIN OR;  Service: General;  Laterality: N/A;   • CORONARY ARTERY BYPASS GRAFT N/A 4/13/2023    Procedure: MAT STERNOTOMY CORONARY ARTERY BYPASS GRAFT TIMES 4 USING LEFT INTERNAL MAMMARY ARTERY AND RIGHT GREATER SAPHENOUS VEIN  PER ENDOSCOPIC VEIN HARVESTING, MITRAL VALVE REPAIR  AND PRP;  Surgeon: Mirtha Zuluaga MD;  Location: [facility] CVOR;  Service: Cardiothoracic;  Laterality: N/A;   • DIAGNOSTIC LAPAROSCOPY N/A 2/27/2023    Procedure: DIAGNOSTIC LAPAROSCOPY;  Surgeon: Murali Ferreira MD;  Location: [facility] MAIN OR;  Service: General;  Laterality: N/A;   • EXPLORATORY LAPAROTOMY N/A  09/06/2021    Procedure: Open sigmoind colectomy, colostomy, and appendectomy;  Surgeon: Yelean Guerra MD;  Location: [facility]  Service: General;  Laterality: N/A;   • EYE SURGERY      CATARACTS   • KNEE ARTHROSCOPY Right    • NEPHRECTOMY PARTIAL  2007    Dr. Victor      General Information     Row Name 04/19/23 1150          Physical Therapy Time and Intention    Document Type therapy note (daily note)  -EM     Mode of Treatment co-treatment;physical therapy;occupational therapy  -EM     Row Name 04/19/23 1150          General Information    Existing Precautions/Restrictions fall;cardiac;sternal  -EM           User Key  (r) = Recorded By, (t) = Taken By, (c) = Cosigned By    Initials Name Provider Type    Jennie Angulo PT Physical Therapist               Mobility     Row Name 04/19/23 1151          Bed Mobility    Supine-Sit [place] (Bed Mobility) moderate assist (50% patient effort);2 person assist  -EM     Assistive Device (Bed Mobility) head of bed elevated;draw sheet  -EM     Row Name 04/19/23 1151          Bed-Chair Transfer    Bed-Chair [place] (Transfers) minimum assist (75% patient effort);2 person assist;verbal cues  -EM     Comment, (Bed-Chair Transfer) able to take a few small steps around from bed to chair  -EM     Row Name 04/19/23 1151          Sit-Stand Transfer    Sit-Stand [place] (Transfers) minimum assist (75% patient effort);2 person assist;verbal cues  -EM           User Key  (r) = Recorded By, (t) = Taken By, (c) = Cosigned By    Initials Name Provider Type    Jennie Angulo PT Physical Therapist               Obj/Interventions     Row Name 04/19/23 1152          Motor Skills    Therapeutic Exercise other (see comments)  5 reps per cardiac protocol  -EM           User Key  (r) = Recorded By, (t) = Taken By, (c) = Cosigned By    Initials Name Provider Type    Jennie Angulo PT Physical Therapist               Goals/Plan    No  documentation.                Clinical Impression     Row Name 04/19/23 1153          Pain    Pretreatment Pain Rating 0/10 - no pain  -EM     Row Name 04/19/23 1153          Plan of Care Review    Plan of Care Reviewed With patient  -EM     Outcome Evaluation Pt in supine, agreeable to therapy, reports no pain but very tired of being in the bed. Patient performed bed mobility with modAx2, sat on EOB while performing exercises, then able to perform sit to stand and take a few small steps around to recliner with minAx2. Patient progressing with mobility.  -EM     Row Name 04/19/23 1153          Vital Signs    Pre Systolic BP Rehab 124  -EM     Pre Treatment Diastolic BP 47  -EM     Pretreatment Heart Rate (beats/min) 67  -EM     Pre SpO2 (%) 100  -EM     O2 Delivery Pre Treatment room air  -EM     Row Name 04/19/23 1153          Positioning and Restraints    Pre-Treatment Position in bed  -EM     Post Treatment Position chair  -EM     In Chair reclined;call light within reach;with family/caregiver;notified nsg  -EM           User Key  (r) = Recorded By, (t) = Taken By, (c) = Cosigned By    Initials Name Provider Type    Jennie Angulo, PT Physical Therapist               Outcome Measures     Row Name 04/19/23 1156          How much help from another person do you currently need...    Turning from your back to your side while in flat bed without using bedrails? 2  -EM     Moving from lying on back to sitting on the side of a flat bed without bedrails? 2  -EM     Moving to and from a bed to a chair (including a wheelchair)? 2  -EM     Standing up from a chair using your arms (e.g., wheelchair, bedside chair)? 2  -EM     Climbing 3-5 steps with a railing? 1  -EM     To walk in hospital room? 2  -EM     AM-PAC 6 Clicks Score (PT) 11  -EM     Highest level of mobility 4 --> Transferred to chair/commode  -EM           User Key  (r) = Recorded By, (t) = Taken By, (c) = Cosigned By    Initials Name Provider Type     EM Jennie Cuevas, PT Physical Therapist                             Physical Therapy Education     Title: PT OT SLP Therapies (In Progress)     Topic: Physical Therapy (In Progress)     Point: Mobility training (In Progress)     Learning Progress Summary           Patient Acceptance, E, VU by EM at 4/19/2023 1156    Acceptance, E, NR by AR at 4/16/2023 1633    Acceptance, TB,E, VU by  at 4/11/2023 0900   Family Acceptance, E, NR by AR at 4/16/2023 1633                   Point: Home exercise program (In Progress)     Learning Progress Summary           Patient Acceptance, E, VU by EM at 4/19/2023 1156    Acceptance, E, NR by AR at 4/16/2023 1633    Acceptance, TB,E, VU by  at 4/11/2023 0900   Family Acceptance, E, NR by AR at 4/16/2023 1633                   Point: Body mechanics (In Progress)     Learning Progress Summary           Patient Acceptance, E, NR by AR at 4/16/2023 1633    Acceptance, TB,E, VU by  at 4/11/2023 0900   Family Acceptance, E, NR by AR at 4/16/2023 1633                   Point: Precautions (In Progress)     Learning Progress Summary           Patient Acceptance, E, NR by AR at 4/16/2023 1633    Acceptance, TB,E, VU by  at 4/11/2023 0900   Family Acceptance, E, NR by AR at 4/16/2023 1633                               User Key     Initials Effective Dates Name Provider Type Discipline     06/16/21 -  Jennie Cuevas, PT Physical Therapist PT    AR 06/16/21 -  Mihaela Shields PT Physical Therapist PT     06/16/21 -  Kacy Deluca, RN Registered Nurse Nurse              PT Recommendation and Plan     Plan of Care Reviewed With: patient  Outcome Evaluation: Pt in supine, agreeable to therapy, reports no pain but very tired of being in the bed. Patient performed bed mobility with modAx2, sat on EOB while performing exercises, then able to perform sit to stand and take a few small steps around to recliner with minAx2. Patient progressing with mobility.     Time  Calculation:    PT Charges     Row Name 04/19/23 1157             Time Calculation    Start Time 1047  -EM      Stop Time 1110  -EM      Time Calculation (min) 23 min  -EM      PT Received On 04/19/23  -EM      PT - Next Appointment 04/20/23  -EM         Time Calculation- PT    Total Timed Code Minutes- PT 23 minute(s)  -EM         Timed Charges    28921 - PT Therapeutic Exercise Minutes 8  -EM      44452 - PT Therapeutic Activity Minutes 15  -EM         Total Minutes    Timed Charges Total Minutes 23  -EM       Total Minutes 23  -EM            User Key  (r) = Recorded By, (t) = Taken By, (c) = Cosigned By    Initials Name Provider Type    EM Jennie Cuevas, PT Physical Therapist              Therapy Charges for Today     Code Description Service Date Service Provider Modifiers Qty    62796338393 HC PT THER PROC EA 15 MIN 4/19/2023 Jennie Cuevas PT GP 1    06167313665 HC PT THERAPEUTIC ACT EA 15 MIN 4/19/2023 Jennie Cuevas PT GP 1          PT G-Codes  Outcome Measure Options: AM-PAC 6 Clicks Basic Mobility (PT)  AM-PAC 6 Clicks Score (PT): 11       Jennie Cuevas PT  4/19/2023

## 2023-04-19 NOTE — PROGRESS NOTES
Name: Lefty Cai ADMIT: 2023   : 1950  PCP: Daksha Ng, DUSTY    MRN: [id number] LOS: 11 days   AGE/SEX: 72 y.o. male  ROOM: 3006/1     Subjective   Subjective   Resting in bed in CICU. Wife and HD nurse at bedside. Wife states he has had a big day today with therapy. Sat up in chair. He reports he is tired but otherwise feels okay. Denies any chest pain, breathing is stable. No nausea or vomiting. Plans to move to CVU.     Objective   Objective   Vital Signs  Temp:  [96.1 °F (35.6 °C)-97.8 °F (36.6 °C)] 97.8 °F (36.6 °C)  Heart Rate:  [60-71] 65  Resp:  [14-22] 15  BP: ()/() 106/53  SpO2:  [97 %-100 %] 98 %  on   ;   Device (Oxygen Therapy): room air  Body mass index is 31.36 kg/m².     Physical Exam  Vitals and nursing note reviewed.   Constitutional:       Appearance: He is ill-appearing. He is not toxic-appearing.   Cardiovascular:      Rate and Rhythm: Normal rate. Rhythm irregular.      Pulses: Normal pulses.   Pulmonary:      Effort: Pulmonary effort is normal. No respiratory distress.      Comments: Diminished, on RA. Poor inspiratory effort  Abdominal:      General: Bowel sounds are normal. There is no distension.      Palpations: Abdomen is soft.      Tenderness: There is no abdominal tenderness.   Musculoskeletal:         General: Swelling present. No deformity. Normal range of motion.   Skin:     General: Skin is warm and dry.      Findings: No bruising.      Comments: Midline sternal incision C/D/I   Neurological:      Mental Status: He is alert and oriented to person, place, and time.      Sensory: No sensory deficit.      Motor: Weakness present.      Coordination: Coordination normal.   Psychiatric:         Mood and Affect: Mood normal.         Behavior: Behavior normal.       Results Review:       I reviewed the patient's new clinical results.  Results from last 7 days   Lab Units 23  0316 23  0328 23  1711 23  0308   WBC 10*3/mm3 9.46  10.84* 12.00* 11.06*   HEMOGLOBIN g/dL 9.9* 9.8* 10.2* 9.3*   PLATELETS 10*3/mm3 94* 77* 82* 69*     Results from last 7 days   Lab Units 04/19/23 0316 04/18/23 0328 04/17/23 1711 04/17/23  0308   SODIUM mmol/L 138 137 142 139   POTASSIUM mmol/L 4.0 4.5 3.9 4.0   CHLORIDE mmol/L 101 101 104 101   CO2 mmol/L 19.8* 21.0* 21.9* 20.9*   BUN mg/dL 48* 33* 22 52*   CREATININE mg/dL 5.34* 4.38* 2.68* 6.32*   GLUCOSE mg/dL 99 184* 114* 211*   Estimated Creatinine Clearance: 15.4 mL/min (A) (by C-G formula based on SCr of 5.34 mg/dL (H)).  Results from last 7 days   Lab Units 04/19/23  0316 04/18/23  0328 04/17/23  1711 04/15/23  1125 04/15/23  0300 04/14/23  1639 04/14/23  1246   ALBUMIN g/dL 2.9* 3.0* 3.1* 3.2*   < > 3.4* 3.5   BILIRUBIN mg/dL  --   --  0.5 0.4  --  0.4 0.4   ALK PHOS U/L  --   --  107 38*  --  32* 28*   AST (SGOT) U/L  --   --  132* 29  --  44* 41*   ALT (SGPT) U/L  --   --  41 <5  --  8 8    < > = values in this interval not displayed.     Results from last 7 days   Lab Units 04/19/23 0316 04/18/23 0328 04/17/23 1711 04/17/23 0308 04/16/23  0410 04/15/23  1125 04/15/23  0300 04/14/23  1639 04/14/23  1246 04/14/23  0635   CALCIUM mg/dL 8.7 8.3* 8.5* 8.8 8.7 8.6 8.2*   < > 9.1 8.6   ALBUMIN g/dL 2.9* 3.0* 3.1*  --   --  3.2* 3.3*   < > 3.5 3.7   MAGNESIUM mg/dL  --  2.3  --   --  2.5*  --  2.1  --  2.6* 2.5*   PHOSPHORUS mg/dL 4.3 3.9  --   --   --   --  4.6*  --   --  3.3    < > = values in this interval not displayed.       Glucose   Date/Time Value Ref Range Status   04/19/2023 1525 122 70 - 130 mg/dL Final     Comment:     Meter: [id number] : [id number] [place] Saturnino ROSALES   04/19/2023 1132 131 (H) 70 - 130 mg/dL Final     Comment:     Meter: [id number] : [id number] [place] Saturnino    04/19/2023 0743 88 70 - 130 mg/dL Final     Comment:     Meter: [id number] : [id number] Armando Matamoros    04/19/2023 0618 90 70 - 130 mg/dL Final     Comment:     Meter: [id number] : [id number] Alessandra  Mick RN   04/18/2023 2135 101 70 - 130 mg/dL Final     Comment:     Meter: [id number] : [id number] Alessandra Tamayoin RN   04/18/2023 1550 129 70 - 130 mg/dL Final     Comment:     Meter: [id number] : [id number] Jhonysendy Liu CONNIE   04/18/2023 1159 93 70 - 130 mg/dL Final     Comment:     Meter: [id number] : [id number] Jhony ROSALES       acetylcysteine, 3 mL, Nebulization, TID - RT  amiodarone, 200 mg, Oral, Q12H  aspirin, 81 mg, Oral, Daily  atorvastatin, 40 mg, Oral, Nightly  DULoxetine, 30 mg, Oral, Nightly  enoxaparin, 30 mg, Subcutaneous, Q24H  epoetin bernice/bernice-epbx, 6,000 Units, Intravenous, Once per day on Mon Wed Fri  insulin lispro, 0-9 Units, Subcutaneous, 4x Daily With Meals & Nightly  ipratropium-albuterol, 3 mL, Nebulization, Q6H While Awake - RT  iron polysaccharides, 150 mg, Oral, Daily  metoprolol tartrate, 12.5 mg, Oral, Q12H  midodrine, 5 mg, Oral, BID AC  mupirocin, , Each Nare, BID  pantoprazole, 40 mg, Oral, QAM  senna-docusate sodium, 2 tablet, Oral, BID      sodium chloride, 9 mL/hr, Last Rate: 9 mL/hr (04/15/23 1624)  vasopressin, 0.05 Units/min, Last Rate: Stopped (04/18/23 0545)    Diet: Cardiac Diets, Diabetic Diets; Healthy Heart (2-3 Na+); Consistent Carbohydrate; Texture: Regular Texture (IDDSI 7); Fluid Consistency: Thin (IDDSI 0)       Assessment/Plan     Active Hospital Problems    Diagnosis  POA   • **NSTEMI (non-ST elevated myocardial infarction) [I21.4]  Yes   • Rhinovirus [B34.8]  Yes   • Dyspnea on exertion [R06.09]  Yes   • Abnormal findings on diagnostic imaging of heart and coronary circulation [R93.1]  Yes   • Type 2 diabetes mellitus with diabetic chronic kidney disease [E11.22]  Yes   • Essential hypertension [I10]  Yes   • Hyperlipidemia [E78.5]  Yes   • ESRD (end stage renal disease) [N18.6]  Yes      Resolved Hospital Problems    Diagnosis Date Resolved POA   • Hypernatremia [E87.0] 04/19/2023 Yes     Mr. Cai is a 72 year old male who presented to  the hospital with complaints of increased dyspnea in the setting of ESRD with HD compliance. He was found to have new T wave inversions with troponin elevation and taken for heart cath that revealed severe three vessel CAD with occluded right coronary system. Cardiothoracic surgery was consulted for CABG and RVP was obtained showing positive for rhinovirus. ID was consulted given need for OR who cleared for surgery without antibiotics. Timing was per surgeon preference. He was taken for open heart on 4/13. He had hypotension and atrial fibrillation with RVR following surgery. He has been in the cardiac ICU for this reason.     · NSTEMI: S/P CABG x4 on 4/13. Cardiology and Cardiothoracic surgery managing. Echo 4/8 with EF 30-35%. On amiodarone and metoprolol with conversion of atrial fibrillation to NSR. On ASA/statin. Plans to move to CVU.    · Rhinovirus: Supportive care. ID saw and cleared for OR. Pulmonology following as well. He is on Duonebs/Mucomyst.     · ESRD: Via TDC. Nephrology managing. HD today.    · DM2: Sugars very stable. Not requiring correctional insulin.    · HTN: BP stable on BB as well as midodrine.    Discussed with patient and family.    Plans to move to step down unit today. Appreciate all specialists.     VTE Prophylaxis - Lovenox 30 mg SC daily  Code Status - Full code  Disposition - Anticipate discharge TBD.       DUSTY Roberts  [facility]  04/19/23  15:44 EDT

## 2023-04-19 NOTE — PROGRESS NOTES
[facility]  [phone]  Dr. Jim Anaya    Subjective:  LOS: 11    Chief Complaint: Hypotension    Patient is status post CABG.  Remains in sinus rhythm. Off iv pressors. Slightly tired after PT.    Objective   Vital Signs past 24hrs  Temp range: Temp (24hrs), Av.1 °F (36.2 °C), Min:96.1 °F (35.6 °C), Max:97.7 °F (36.5 °C)    BP range: BP: ()/(32-96) 113/96  Pulse range: Heart Rate:  [58-71] 65  Resp rate range: Resp:  [14-20] 14  Device (Oxygen Therapy): room air   Oxygen range:SpO2:  [98 %-100 %] 100 %     Physical Exam  Constitutional:       Appearance: He is obese.   Eyes:      Pupils: Pupils are equal, round, and reactive to light.   Cardiovascular:      Rate and Rhythm: Normal rate and regular rhythm.      Heart sounds: Normal heart sounds.      Comments: sinus  Pulmonary:      Effort: Pulmonary effort is normal.      Breath sounds: Normal breath sounds.      Comments: Mediastinal chest tube  Abdominal:      General: Bowel sounds are normal.      Palpations: Abdomen is soft. There is no mass.      Tenderness: There is no abdominal tenderness.   Musculoskeletal:         General: No swelling.   Neurological:      Mental Status: He is alert.       Results Review:    I have reviewed the laboratory and imaging data since the last note by [facility] physician.  My annotations are noted in assessment and plan.      Result Review:  I have personally reviewed the results from last note by [facility] physician to 2023 10:20 EDT and agree with these findings:  [x]  Laboratory list / accordion  [x]  Microbiology  [x]  Radiology  [x]  EKG/Telemetry    [x]  Cardiology/Vascular   []  Pathology  []  Old records  []  Other:    Medication Review:  I have reviewed the current MAR.  My annotations are noted in assessment and plan.    acetylcysteine, 3 mL, Nebulization, TID - RT  amiodarone, 200 mg, Oral, Q12H  aspirin, 81 mg, Oral, Daily  atorvastatin, 40 mg, Oral, Nightly  DULoxetine, 30 mg, Oral,  Nightly  enoxaparin, 30 mg, Subcutaneous, Q24H  epoetin bernice/bernice-epbx, 6,000 Units, Intravenous, Once per day on Mon Wed Fri  insulin lispro, 0-9 Units, Subcutaneous, 4x Daily With Meals & Nightly  ipratropium-albuterol, 3 mL, Nebulization, Q6H While Awake - RT  iron polysaccharides, 150 mg, Oral, Daily  metoprolol tartrate, 12.5 mg, Oral, Q12H  midodrine, 5 mg, Oral, BID AC  mupirocin, , Each Nare, BID  pantoprazole, 40 mg, Oral, QAM  senna-docusate sodium, 2 tablet, Oral, BID        sodium chloride, 9 mL/hr, Last Rate: 9 mL/hr (04/15/23 1624)  vasopressin, 0.05 Units/min, Last Rate: Stopped (04/18/23 0545)      Lines, Drains & Airways     Active LDAs     Name Placement date Placement time Site Days    CVC Double Lumen 04/13/23 Left Internal jugular 04/13/23  1325  created via procedure documentation  Internal jugular  3    Peripheral IV 04/08/23 1700 Anterior;Distal;Right Forearm 04/08/23  1700  Forearm  8    Y Chest Tube 1 and 2 1 Mediastinal 28 Fr. 2 Left Pleural 28 Fr. 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    A-fib with RVR but now sinus.    Cardiomyopathy with EF of 30%.  With bilateral pleural effusions and should improve with HD.    Acute rhinovirus infection and symptomatic treatment for  same.  On nebs as well as acetylcysteine to clear secretions.    Anemia expected postop and also from underlying kidney disease.  No evidence of active bleeding.    Low platelets of uncertain etiology.  HIT negative.    Diabetes mellitus on sliding scale and with control blood sugars.    Continue to monitor in ICU per cardiothoracic team. ? Chest tubes and pacing wires out. ? Transfer out.    Jim Anaya MD  04/19/23  10:20 EDT      Part of this note may be an electronic transcription/translation of spoken language to printed text using the Dragon Dictation System.

## 2023-04-19 NOTE — THERAPY EVALUATION
Patient Name: Lefty Cai  : 1950    MRN: [id number]                              Today's Date: 2023       Admit Date: 2023    Visit Dx:     ICD-10-CM ICD-9-CM   1. Dyspnea on exertion  R06.09 786.09   2. NSTEMI (non-ST elevated myocardial infarction)  I21.4 410.70   3. ESRD (end stage renal disease)  N18.6 585.6   4. Former smoker  Z87.891 V15.82   5. Elevated blood pressure reading in office with diagnosis of hypertension  I10 401.9   6. Rhinovirus infection  B34.8 079.3   7. Abnormal findings on diagnostic imaging of heart and coronary circulation  R93.1 794.39   8. S/P CABG (coronary artery bypass graft)  Z95.1 V45.81     Patient Active Problem List   Diagnosis   • Type 2 diabetes mellitus with diabetic chronic kidney disease   • Essential hypertension   • Hyperlipidemia   • Primary insomnia   • CKD (chronic kidney disease) stage 4, GFR 15-29 ml/min ([facility])   • Vitamin D deficiency   • Diverticulitis of large intestine with perforation and abscess without bleeding   • Obesity (BMI 30-39.9)   • Impaired mobility and ADLs   • History of colon resection   • Colon polyps   • Diverticulosis   • CKD (chronic kidney disease) stage 5, GFR less than 15 ml/min   • Dyspnea on exertion   • NSTEMI (non-ST elevated myocardial infarction)   • Rhinovirus   • Abnormal findings on diagnostic imaging of heart and coronary circulation   • Hypernatremia     Past Medical History:   Diagnosis Date   • Anemia    • Anesthesia complication     HYPOTENSION   • Arthritis    • Cancer of kidney, left    • CKD (chronic kidney disease)     STAGE 5   • Diabetes mellitus, type 2    • Fatigue    • GERD (gastroesophageal reflux disease)    • H/O renal cell carcinoma 2007    partial nephrectomy   • History of colostomy reversal 2022   • [place] (hard of hearing)     NO DEVICE   • Hyperlipidemia    • Hypertension    • Primary insomnia 2016   • Proteinuria    • Risk factors for obstructive sleep apnea    • Sinus drainage     • Vitamin D deficiency 08/14/2017     Past Surgical History:   Procedure Laterality Date   • ARTERIOVENOUS FISTULA/SHUNT SURGERY Left 08/15/2019    Procedure: LEFT MID FOREARM RADIAL CEPHALIC ARTERIAL VENOUS FISTULA;  Surgeon: Louann Miles Jr., MD;  Location: [facility] MAIN OR;  Service: Vascular   • CARDIAC CATHETERIZATION N/A 4/9/2023    Procedure: Left Heart Cath;  Surgeon: Lobito Garcia MD;  Location: [facility] CATH INVASIVE LOCATION;  Service: Cardiovascular;  Laterality: N/A;   • CARDIAC CATHETERIZATION N/A 4/9/2023    Procedure: Left ventriculography;  Surgeon: Lobito Garcia MD;  Location: [facility] CATH INVASIVE LOCATION;  Service: Cardiovascular;  Laterality: N/A;   • CARDIAC CATHETERIZATION N/A 4/9/2023    Procedure: Coronary angiography;  Surgeon: Lobito Garcia MD;  Location: [facility] CATH INVASIVE LOCATION;  Service: Cardiovascular;  Laterality: N/A;   • COLONOSCOPY  03/24/2022   • COLONOSCOPY N/A 03/24/2022    Procedure: COLONOSCOPY TO CECUM WITH POLYPECTOMY  (HOT SNARE);  Surgeon: Yelena Guerra MD;  Location: [facility] ENDOSCOPY;  Service: General;  Laterality: N/A;  PREOP/ HX OF DIVERTICULITIS, COLOSTOMY  POSTOP/ POLYPS, DIVERTICULOSIS    • COLOSTOMY  09/06/2021   • COLOSTOMY CLOSURE N/A 04/12/2022    Procedure: open Colostomy reversal;  Surgeon: Yelena Guerra MD;  Location: [facility] MAIN OR;  Service: General;  Laterality: N/A;   • CORONARY ARTERY BYPASS GRAFT N/A 4/13/2023    Procedure: MAT STERNOTOMY CORONARY ARTERY BYPASS GRAFT TIMES 4 USING LEFT INTERNAL MAMMARY ARTERY AND RIGHT GREATER SAPHENOUS VEIN  PER ENDOSCOPIC VEIN HARVESTING, MITRAL VALVE REPAIR  AND PRP;  Surgeon: Mirtha Zuluaga MD;  Location: [facility] CVOR;  Service: Cardiothoracic;  Laterality: N/A;   • DIAGNOSTIC LAPAROSCOPY N/A 2/27/2023    Procedure: DIAGNOSTIC LAPAROSCOPY;  Surgeon: Murali Ferreira MD;  Location: [facility] MAIN OR;  Service: General;  Laterality: N/A;   • EXPLORATORY LAPAROTOMY N/A  09/06/2021    Procedure: Open sigmoind colectomy, colostomy, and appendectomy;  Surgeon: Yelena Guerra MD;  Location: [facility] MAIN OR;  Service: General;  Laterality: N/A;   • EYE SURGERY      CATARACTS   • KNEE ARTHROSCOPY Right    • NEPHRECTOMY PARTIAL  2007    Dr. Victor      General Information     Row Name 04/19/23 1240          OT Time and Intention    Document Type evaluation  -KB     Mode of Treatment co-treatment;occupational therapy  -KB     Row Name 04/19/23 1240          General Information    Patient Profile Reviewed yes  -KB     Prior Level of Function independent:;ADL's;home management;community mobility  -KB     Existing Precautions/Restrictions fall;cardiac;sternal  -KB     Barriers to Rehab medically complex  -KB     Row Name 04/19/23 1240          Living Environment    People in Home spouse  -KB     Row Name 04/19/23 1240          Home Main Entrance    Number of Stairs, Main Entrance two  -KB     Stair Railings, Main Entrance railings safe and in good condition  -KB     Row Name 04/19/23 1240          Cognition    Orientation Status (Cognition) oriented x 3  -KB     Row Name 04/19/23 1240          Safety Issues, Functional Mobility    Impairments Affecting Function (Mobility) endurance/activity tolerance;shortness of breath  -KB           User Key  (r) = Recorded By, (t) = Taken By, (c) = Cosigned By    Initials Name Provider Type    KB Elly Mccarthy OT Occupational Therapist                 Mobility/ADL's     Row Name 04/19/23 1241          Bed Mobility    Bed Mobility supine-sit  -KB     Supine-Sit [place] (Bed Mobility) moderate assist (50% patient effort);2 person assist  -KB     Assistive Device (Bed Mobility) head of bed elevated;draw sheet  -KB     Row Name 04/19/23 1241          Bed-Chair Transfer    Bed-Chair [place] (Transfers) minimum assist (75% patient effort);2 person assist;verbal cues  -KB     Row Name 04/19/23 1241          Sit-Stand Transfer    Sit-Stand  [place] (Transfers) minimum assist (75% patient effort);2 person assist;verbal cues  -     Row Name 04/19/23 1241          Activities of Daily Living    BADL Assessment/Intervention lower body dressing;feeding  -     Row Name 04/19/23 1241          Lower Body Dressing Assessment/Training    [place] Level (Lower Body Dressing) don;socks;dependent (less than 25% patient effort)  -     Position (Lower Body Dressing) edge of bed sitting  -     Row Name 04/19/23 1241          Self-Feeding Assessment/Training    [place] Level (Feeding) feeding skills;moderate assist (50% patient effort)  -     Comment, (Feeding) difficutly holding utensil due to hand edema. OT issued cylindrical foam  -           User Key  (r) = Recorded By, (t) = Taken By, (c) = Cosigned By    Initials Name Provider Type    Elly Rodríguez OT Occupational Therapist               Obj/Interventions     Row Name 04/19/23 1244          Sensory Assessment (Somatosensory)    Sensory Assessment (Somatosensory) sensation intact  -     Row Name 04/19/23 1244          Vision Assessment/Intervention    Visual Impairment/Limitations WNL  -     Row Name 04/19/23 1244          Range of Motion Comprehensive    General Range of Motion bilateral upper extremity ROM WFL  -     Row Name 04/19/23 1244          Strength Comprehensive (MMT)    Comment, General Manual Muscle Testing (MMT) Assessment B UE grossly 3+/5  -     Row Name 04/19/23 1244          Motor Skills    Motor Skills functional endurance  -     Functional Endurance poor  -     Row Name 04/19/23 1244          Balance    Balance Assessment sitting static balance;sitting dynamic balance;standing dynamic balance;standing static balance  -KB     Static Sitting Balance standby assist  -     Dynamic Sitting Balance contact guard  -     Static Standing Balance contact guard  -     Dynamic Standing Balance minimal assist  -           User Key  (r) = Recorded By, (t) =  Taken By, (c) = Cosigned By    Initials Name Provider Type    Elly Rodríguez OT Occupational Therapist               Goals/Plan     Row Name 04/19/23 1250          Bed Mobility Goal 1 (OT)    Activity/Assistive Device (Bed Mobility Goal 1, OT) sit to supine/supine to sit  -KB     [place] Level/Cues Needed (Bed Mobility Goal 1, OT) standby assist  -KB     Time Frame (Bed Mobility Goal 1, OT) 2 weeks  -KB     Progress/Outcomes (Bed Mobility Goal 1, OT) goal ongoing  -KB     Row Name 04/19/23 1250          Transfer Goal 1 (OT)    Activity/Assistive Device (Transfer Goal 1, OT) toilet  -KB     [place] Level/Cues Needed (Transfer Goal 1, OT) standby assist  -KB     Time Frame (Transfer Goal 1, OT) 2 weeks  -KB     Progress/Outcome (Transfer Goal 1, OT) goal ongoing  -KB     Row Name 04/19/23 1250          Dressing Goal 1 (OT)    Activity/Device (Dressing Goal 1, OT) lower body dressing  -KB     [place]/Cues Needed (Dressing Goal 1, OT) minimum assist (75% or more patient effort)  -KB     Time Frame (Dressing Goal 1, OT) 2 weeks  -KB     Progress/Outcome (Dressing Goal 1, OT) goal ongoing  -KB     Row Name 04/19/23 1250          Problem Specific Goal 1 (OT)    Problem Specific Goal 1 (OT) poor+ functional endurance  -KB     Time Frame (Problem Specific Goal 1, OT) 2 weeks  -KB     Progress/Outcome (Problem Specific Goal 1, OT) goal ongoing  -KB     Row Name 04/19/23 1250          Therapy Assessment/Plan (OT)    Planned Therapy Interventions (OT) activity tolerance training;BADL retraining;transfer/mobility retraining;strengthening exercise;patient/caregiver education/training;occupation/activity based interventions  -KB           User Key  (r) = Recorded By, (t) = Taken By, (c) = Cosigned By    Initials Name Provider Type    Elly Rodríguez OT Occupational Therapist               Clinical Impression     Row Name 04/19/23 1245          Pain Assessment    Pretreatment Pain Rating 0/10 - no pain  -KB      Posttreatment Pain Rating 0/10 - no pain  -     Row Name 04/19/23 1245          Plan of Care Review    Plan of Care Reviewed With patient  -     Outcome Evaluation Pt seen for OT eval this date. Pt agreeable and motivated to participate. Pt admitted for CABG/MVR on 4/13. Pt receives dialysis. Pt was ind prior, no AD/DME. Lives with wife. Performed bed mobility with mod assist x2. Sat edge of bed with sba. Unable to perform LE dressing sitting edge of bed. Having difficulty feeding self due to weakness and edema in hands which impacts . OT issued cylyndrical foam to pt/wife and ed on use. Performed sit/stand min assist x2 and transfered to recliner with min assist x2. Pt fatigued, but toelrated well. Pt demonstrating below baseline functional strength/endurance impacting abilty to perform functional tasks, will benefit from cont acute OT. May need snf at time of dc.  -     Row Name 04/19/23 1243          Therapy Assessment/Plan (OT)    Rehab Potential (OT) good, to achieve stated therapy goals  -     Criteria for Skilled Therapeutic Interventions Met (OT) yes;meets criteria;skilled treatment is necessary  -     Therapy Frequency (OT) 5 times/wk  -     Row Name 04/19/23 1245          Therapy Plan Review/Discharge Plan (OT)    Anticipated Discharge Disposition (OT) skilled nursing facility  -     Row Name 04/19/23 1247          Positioning and Restraints    Pre-Treatment Position in bed  -KB     Post Treatment Position chair  -KB     In Chair reclined;sitting;call light within reach;encouraged to call for assist;with family/caregiver  -           User Key  (r) = Recorded By, (t) = Taken By, (c) = Cosigned By    Initials Name Provider Type    Elly Rodríguez, BAUTISTA Occupational Therapist               Outcome Measures     Row Name 04/19/23 1251          How much help from another is currently needed...    Putting on and taking off regular lower body clothing? 1  -KB     Bathing (including washing,  rinsing, and drying) 1  -KB     Toileting (which includes using toilet bed pan or urinal) 1  -KB     Putting on and taking off regular upper body clothing 2  -KB     Taking care of personal grooming (such as brushing teeth) 2  -KB     Eating meals 2  -KB     AM-PAC 6 Clicks Score (OT) 9  -KB     Row Name 04/19/23 1156          How much help from another person do you currently need...    Turning from your back to your side while in flat bed without using bedrails? 2  -EM     Moving from lying on back to sitting on the side of a flat bed without bedrails? 2  -EM     Moving to and from a bed to a chair (including a wheelchair)? 2  -EM     Standing up from a chair using your arms (e.g., wheelchair, bedside chair)? 2  -EM     Climbing 3-5 steps with a railing? 1  -EM     To walk in hospital room? 2  -EM     AM-PAC 6 Clicks Score (PT) 11  -EM     Highest level of mobility 4 --> Transferred to chair/commode  -EM     Row Name 04/19/23 1251          Functional Assessment    Outcome Measure Options AM-PAC 6 Clicks Daily Activity (OT)  -KB           User Key  (r) = Recorded By, (t) = Taken By, (c) = Cosigned By    Initials Name Provider Type    EM Jennie Cuevas, PT Physical Therapist    Elly Rodríguez OT Occupational Therapist                Occupational Therapy Education     Title: PT OT SLP Therapies (In Progress)     Topic: Occupational Therapy (In Progress)     Point: ADL training (Done)     Description:   Instruct learner(s) on proper safety adaptation and remediation techniques during self care or transfers.   Instruct in proper use of assistive devices.              Learning Progress Summary           Patient ESTEPHANIA Jim VU, DU by BELEN at 4/19/2023 1251    Comment: ed on AE for self feeding  adl safety   Family ESTEPHANIA Jim VU, DU by BELEN at 4/19/2023 1251    Comment: ed on AE for self feeding  adl safety                   Point: Home exercise program (Not Started)     Description:   Instruct learner(s) on appropriate  technique for monitoring, assisting and/or progressing therapeutic exercises/activities.              Learner Progress:  Not documented in this visit.          Point: Precautions (Done)     Description:   Instruct learner(s) on prescribed precautions during self-care and functional transfers.              Learning Progress Summary           Patient ESTEPHANIA Jim ADELINA CHERRY by BELEN at 4/19/2023 1251    Comment: ed on AE for self feeding  adl safety   Family ESTEPHANIA Jim JO ANN,ADELINA by BELEN at 4/19/2023 1251    Comment: ed on AE for self feeding  adl safety                   Point: Body mechanics (Not Started)     Description:   Instruct learner(s) on proper positioning and spine alignment during self-care, functional mobility activities and/or exercises.              Learner Progress:  Not documented in this visit.                      User Key     Initials Effective Dates Name Provider Type Discipline    BELEN 01/03/23 -  Elly Mccarthy OT Occupational Therapist OT              OT Recommendation and Plan  Planned Therapy Interventions (OT): activity tolerance training, BADL retraining, transfer/mobility retraining, strengthening exercise, patient/caregiver education/training, occupation/activity based interventions  Therapy Frequency (OT): 5 times/wk  Plan of Care Review  Plan of Care Reviewed With: patient  Outcome Evaluation: Pt seen for OT eval this date. Pt agreeable and motivated to participate. Pt admitted for CABG/MVR on 4/13. Pt receives dialysis. Pt was ind prior, no AD/DME. Lives with wife. Performed bed mobility with mod assist x2. Sat edge of bed with sba. Unable to perform LE dressing sitting edge of bed. Having difficulty feeding self due to weakness and edema in hands which impacts . OT issued cylyndrical foam to pt/wife and ed on use. Performed sit/stand min assist x2 and transfered to recliner with min assist x2. Pt fatigued, but toelrated well. Pt demonstrating below baseline functional strength/endurance impacting  abilty to perform functional tasks, will benefit from cont acute OT. May need snf at time of dc.     Time Calculation:    Time Calculation- OT     Row Name 04/19/23 1252             Time Calculation- OT    OT Start Time 1047  -KB      OT Stop Time 1110  -KB      OT Time Calculation (min) 23 min  -KB      Total Timed Code Minutes- OT 15 minute(s)  -KB      OT Received On 04/19/23  -KB      OT - Next Appointment 04/20/23  -KB      OT Goal Re-Cert Due Date 05/03/23  -KB         Timed Charges    84683 - OT Therapeutic Activity Minutes 15  -KB         Untimed Charges    OT Eval/Re-eval Minutes 8  -KB         Total Minutes    Timed Charges Total Minutes 15  -KB      Untimed Charges Total Minutes 8  -KB       Total Minutes 23  -KB            User Key  (r) = Recorded By, (t) = Taken By, (c) = Cosigned By    Initials Name Provider Type    KB Elly Mccarthy OT Occupational Therapist              Therapy Charges for Today     Code Description Service Date Service Provider Modifiers Qty    29777151547 HC OT THERAPEUTIC ACT EA 15 MIN 4/19/2023 Elly Mccarthy OT GO 1    52086551574 HC OT EVAL MOD COMPLEXITY 2 4/19/2023 Elly Mccarthy OT GO 1               Elly Mccarthy OT  4/19/2023

## 2023-04-19 NOTE — PLAN OF CARE
Goal Outcome Evaluation:         Pt remains in ccu, VSS no drips, hemodialysis done, pacer wires still in

## 2023-04-19 NOTE — PROGRESS NOTES
" LOS: 11 days   Patient Care Team:  Daksha Ng APRN as PCP - General (Family Medicine)  Rudy Faith MD as Consulting Physician (Ophthalmology)  Jose Mccall MD as Consulting Physician (Nephrology)  Quang Reyes MD as Consulting Physician (Nephrology)    Chief Complaint: Post op    Subjective:  Symptoms:  No shortness of breath, cough or chest pain.    Diet:  Poor intake.  No nausea or vomiting.    Activity level: Impaired due to weakness.    Pain:  He complains of pain that is mild.  Pain is well controlled.          Vital Signs  Temp:  [96.1 °F (35.6 °C)-97.7 °F (36.5 °C)] 97.7 °F (36.5 °C)  Heart Rate:  [] 64  Resp:  [14-20] 14  BP: ()/(32-73) 95/47  Body mass index is 31.36 kg/m².    Intake/Output Summary (Last 24 hours) at 4/19/2023 0854  Last data filed at 4/19/2023 0500  Gross per 24 hour   Intake 335.76 ml   Output 3800 ml   Net -3464.24 ml     No intake/output data recorded.    Chest tube drainage last 8 hours: 100        04/17/23  1634 04/18/23  0315 04/19/23  0535   Weight: 111 kg (243 lb 13.3 oz) 105 kg (231 lb 4.2 oz) 105 kg (231 lb 4.2 oz)         Objective:  General Appearance:  Comfortable and in no acute distress.    Vital signs: (most recent): Blood pressure 95/47, pulse 64, temperature 97.7 °F (36.5 °C), temperature source Oral, resp. rate 14, height 182.9 cm (72\"), weight 105 kg (231 lb 4.2 oz), SpO2 100 %.  Vital signs are normal.  No fever.    Output: Minimal urine output and no stool output.    Lungs:  Normal effort and normal respiratory rate.  There are decreased breath sounds.    Heart: Normal rate.  Regular rhythm.  (100% AV paced at 70; SR 60s underlying)  Abdomen: Abdomen is soft.  Bowel sounds are normal.     Extremities: There is dependent edema.    Pulses: Distal pulses are intact.    Neurological: Patient is alert and oriented to person, place and time.    Skin:  Warm and dry.  (Sternal dressing clean, dry, and intact)        Results Review:  "       WBC WBC   Date Value Ref Range Status   04/19/2023 9.46 3.40 - 10.80 10*3/mm3 Final   04/18/2023 10.84 (H) 3.40 - 10.80 10*3/mm3 Final   04/17/2023 12.00 (H) 3.40 - 10.80 10*3/mm3 Final   04/17/2023 11.06 (H) 3.40 - 10.80 10*3/mm3 Final      HGB Hemoglobin   Date Value Ref Range Status   04/19/2023 9.9 (L) 13.0 - 17.7 g/dL Final   04/18/2023 9.8 (L) 13.0 - 17.7 g/dL Final   04/17/2023 10.2 (L) 13.0 - 17.7 g/dL Final   04/17/2023 9.3 (L) 13.0 - 17.7 g/dL Final      HCT Hematocrit   Date Value Ref Range Status   04/19/2023 30.0 (L) 37.5 - 51.0 % Final   04/18/2023 30.0 (L) 37.5 - 51.0 % Final   04/17/2023 30.9 (L) 37.5 - 51.0 % Final   04/17/2023 28.8 (L) 37.5 - 51.0 % Final      Platelets Platelets   Date Value Ref Range Status   04/19/2023 94 (L) 140 - 450 10*3/mm3 Final   04/18/2023 77 (L) 140 - 450 10*3/mm3 Final   04/17/2023 82 (L) 140 - 450 10*3/mm3 Final   04/17/2023 69 (L) 140 - 450 10*3/mm3 Final        PT/INR:    No results found for: PROTIME/  No results found for: INR    Sodium Sodium   Date Value Ref Range Status   04/19/2023 138 136 - 145 mmol/L Final   04/18/2023 137 136 - 145 mmol/L Final   04/17/2023 142 136 - 145 mmol/L Final   04/17/2023 139 136 - 145 mmol/L Final      Potassium Potassium   Date Value Ref Range Status   04/19/2023 4.0 3.5 - 5.2 mmol/L Final   04/18/2023 4.5 3.5 - 5.2 mmol/L Final   04/17/2023 3.9 3.5 - 5.2 mmol/L Final   04/17/2023 4.0 3.5 - 5.2 mmol/L Final      Chloride Chloride   Date Value Ref Range Status   04/19/2023 101 98 - 107 mmol/L Final   04/18/2023 101 98 - 107 mmol/L Final   04/17/2023 104 98 - 107 mmol/L Final   04/17/2023 101 98 - 107 mmol/L Final      Bicarbonate CO2   Date Value Ref Range Status   04/19/2023 19.8 (L) 22.0 - 29.0 mmol/L Final   04/18/2023 21.0 (L) 22.0 - 29.0 mmol/L Final   04/17/2023 21.9 (L) 22.0 - 29.0 mmol/L Final   04/17/2023 20.9 (L) 22.0 - 29.0 mmol/L Final      BUN BUN   Date Value Ref Range Status   04/19/2023 48 (H) 8 - 23 mg/dL  Final   04/18/2023 33 (H) 8 - 23 mg/dL Final   04/17/2023 22 8 - 23 mg/dL Final   04/17/2023 52 (H) 8 - 23 mg/dL Final      Creatinine Creatinine   Date Value Ref Range Status   04/19/2023 5.34 (H) 0.76 - 1.27 mg/dL Final   04/18/2023 4.38 (H) 0.76 - 1.27 mg/dL Final   04/17/2023 2.68 (H) 0.76 - 1.27 mg/dL Final   04/17/2023 6.32 (H) 0.76 - 1.27 mg/dL Final      Calcium Calcium   Date Value Ref Range Status   04/19/2023 8.7 8.6 - 10.5 mg/dL Final   04/18/2023 8.3 (L) 8.6 - 10.5 mg/dL Final   04/17/2023 8.5 (L) 8.6 - 10.5 mg/dL Final   04/17/2023 8.8 8.6 - 10.5 mg/dL Final      Magnesium Magnesium   Date Value Ref Range Status   04/18/2023 2.3 1.6 - 2.4 mg/dL Final          acetylcysteine, 3 mL, Nebulization, TID - RT  amiodarone, 200 mg, Oral, Q12H  aspirin, 81 mg, Oral, Daily  atorvastatin, 40 mg, Oral, Nightly  DULoxetine, 30 mg, Oral, Nightly  enoxaparin, 30 mg, Subcutaneous, Q24H  insulin lispro, 0-9 Units, Subcutaneous, 4x Daily With Meals & Nightly  ipratropium-albuterol, 3 mL, Nebulization, Q6H While Awake - RT  iron polysaccharides, 150 mg, Oral, Daily  metoprolol tartrate, 12.5 mg, Oral, Q12H  midodrine, 5 mg, Oral, BID AC  mupirocin, , Each Nare, BID  pantoprazole, 40 mg, Oral, QAM  senna-docusate sodium, 2 tablet, Oral, BID      sodium chloride, 9 mL/hr, Last Rate: 9 mL/hr (04/15/23 6946)  vasopressin, 0.05 Units/min, Last Rate: Stopped (04/18/23 0541)            Patient Active Problem List   Diagnosis Code   • 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.22   • Essential hypertension I10   • Hyperlipidemia E78.5   • Primary insomnia F51.01   • CKD (chronic kidney disease) stage 4, GFR 15-29 ml/min ([facility]) N18.4   • Vitamin D deficiency E55.9   • Diverticulitis of large intestine with perforation and abscess without bleeding K57.20   • Obesity (BMI 30-39.9) E66.9   • Impaired mobility and ADLs Z74.09, Z78.9   • History of colon resection Z90.49   • Colon polyps K63.5   • Diverticulosis  K57.90   • CKD (chronic kidney disease) stage 5, GFR less than 15 ml/min N18.5   • Dyspnea on exertion R06.09   • NSTEMI (non-ST elevated myocardial infarction) I21.4   • Rhinovirus B34.8   • Abnormal findings on diagnostic imaging of heart and coronary circulation R93.1   • Hypernatremia E87.0       Assessment & Plan     - NSTEMI/multi-vessel CAD- s/p urgent CABGx4 LIMA/RSVG, MV repair-([place]) POD#5  - HFrEF--30-35% per echo 4/8  - rhinovirus infection  - ESRD on HD  - hypertension  - hyperlipidemia  - DM II- A1c 5.0  -post op anemia- expected acute blood loss  -Leukocytosis- probable reactive   -TCP- plt count 69-- hold lovenox; HIT pending     Looks better this morning   In sinus rhythm this morning rate in the 60s  Mobilize/aggressive pulmonary toilet--continued nebs/flutter and OT/PT  Increase activity  Okay for transfer to CVU  Plan to discontinue chest tubes, AV wires and central line     Laya Ackerman, DUSTY  04/19/23  08:54 EDT

## 2023-04-19 NOTE — PROGRESS NOTES
"Lefty Cai  1950 72 y.o.  [id number]      Patient Care Team:  Daksha Ng APRN as PCP - General (Family Medicine)  Rudy Faith MD as Consulting Physician (Ophthalmology)  Jose Mccall MD as Consulting Physician (Nephrology)  Quang Reyes MD as Consulting Physician (Nephrology)    CC: Non-STEMI,, end-stage renal failure, EF 30 to 35%, severe three-vessel coronary disease, severe MR, underwent three-vessel CABG and mitral valve repair, postop A-fib    Interval History: He is doing a little bit better today    Objective   Vital Signs  Temp:  [96.1 °F (35.6 °C)-97.7 °F (36.5 °C)] 97.7 °F (36.5 °C)  Heart Rate:  [58-71] 65  Resp:  [14-20] 14  BP: ()/(32-96) 113/96    Intake/Output Summary (Last 24 hours) at 4/19/2023 1045  Last data filed at 4/19/2023 0500  Gross per 24 hour   Intake 335.76 ml   Output 3800 ml   Net -3464.24 ml     Flowsheet Rows    Flowsheet Row First Filed Value   Admission Height 177.8 cm (70\") Documented at 04/08/2023 1028   Admission Weight 102 kg (225 lb) Documented at 04/08/2023 1028          Physical Exam:   General Appearance:    Alert,oriented, in no acute distress   Lungs:     Clear to auscultation,BS are equal    Heart:    Normal S1 and S2, RRR without murmur, gallop or rub   HEENT:    Sclerae are clear, no JVD or adenopathy   Abdomen:     Normal bowel sounds, soft nontender, nondistended, no HSM   Extremities:   Moves all extremities well, no edema, no cyanosis, no             Redness, no rash     Medication Review:      acetylcysteine, 3 mL, Nebulization, TID - RT  amiodarone, 200 mg, Oral, Q12H  aspirin, 81 mg, Oral, Daily  atorvastatin, 40 mg, Oral, Nightly  DULoxetine, 30 mg, Oral, Nightly  enoxaparin, 30 mg, Subcutaneous, Q24H  epoetin bernice/bernice-epbx, 6,000 Units, Intravenous, Once per day on Mon Wed Fri  insulin lispro, 0-9 Units, Subcutaneous, 4x Daily With Meals & Nightly  ipratropium-albuterol, 3 mL, Nebulization, Q6H While Awake - " RT  iron polysaccharides, 150 mg, Oral, Daily  metoprolol tartrate, 12.5 mg, Oral, Q12H  midodrine, 5 mg, Oral, BID AC  mupirocin, , Each Nare, BID  pantoprazole, 40 mg, Oral, QAM  senna-docusate sodium, 2 tablet, Oral, BID      sodium chloride, 9 mL/hr, Last Rate: 9 mL/hr (04/15/23 1624)  vasopressin, 0.05 Units/min, Last Rate: Stopped (04/18/23 0545)          I reviewed the patient's new clinical results.  I personally viewed and interpreted the patient's EKG/Telemetry data    Assessment/Plan  Active Hospital Problems    Diagnosis  POA   • **NSTEMI (non-ST elevated myocardial infarction) [I21.4]  Unknown   • Hypernatremia [E87.0]  Unknown   • Rhinovirus [B34.8]  Yes   • Dyspnea on exertion [R06.09]  Yes   • Abnormal findings on diagnostic imaging of heart and coronary circulation [R93.1]  Unknown   • Type 2 diabetes mellitus with diabetic chronic kidney disease [E11.22]  Yes   • Essential hypertension [I10]  Yes   • Hyperlipidemia [E78.5]  Yes   • CKD (chronic kidney disease) stage 4, GFR 15-29 ml/min ([facility]) [N18.4]  Yes      Resolved Hospital Problems   No resolved problems to display.     Ischemic cardiomyopathy severe mitral insufficiency status post CABG and mitral valve repair on dialysis we have had some difficulty with paroxysmal A-fib.  He has stayed in sinus rhythm we will going to keep him on a little bit of beta-blockade as well as his amiodarone for right now we will get a hold off on further anticoagulation he does have thrombocytopenia but that seems to be improving.  I would like to see him increase his activity may be moving to the CV U.  This can be a long road for him to get better hopefully he will not have any further A-fib because when he does he drops his blood pressure with it he does not have a lot of blood pressure and actually is on a little bit of midodrine which seems counterintuitive while we are giving him beta-blocker also but will work that out in the future    Lobito Garcia,  MD  04/19/23  10:45 EDT

## 2023-04-20 ENCOUNTER — APPOINTMENT (OUTPATIENT)
Dept: GENERAL RADIOLOGY | Facility: HOSPITAL | Age: 73
DRG: 216 | End: 2023-04-20
Payer: MEDICARE

## 2023-04-20 ENCOUNTER — APPOINTMENT (OUTPATIENT)
Dept: CARDIOLOGY | Facility: HOSPITAL | Age: 73
DRG: 216 | End: 2023-04-20
Payer: MEDICARE

## 2023-04-20 LAB
ALBUMIN SERPL-MCNC: 2.6 G/DL (ref 3.5–5.2)
ALBUMIN/GLOB SERPL: 1 G/DL
ALP SERPL-CCNC: 101 U/L (ref 39–117)
ALT SERPL W P-5'-P-CCNC: 16 U/L (ref 1–41)
ANION GAP SERPL CALCULATED.3IONS-SCNC: 13.7 MMOL/L (ref 5–15)
AST SERPL-CCNC: 66 U/L (ref 1–40)
BH CV VAS DIALYSIS ARTERIAL ANASTOMOSIS EDV: 194 CM/SEC
BH CV VAS DIALYSIS ARTERIAL ANASTOMOSIS PSV: 343 CM/SEC
BH CV VAS DIALYSIS CONDUIT PROX EDV: 85 CM/SEC
BH CV VAS DIALYSIS CONDUIT PROX PSV: 196 CM/SEC
BH CV VAS DIALYSIS PRE-INFLOW RADIAL DIAMETER: 0.59 CM
BH CV VAS DIALYSIS PRE-INFLOW RADIAL EDV: 54 CM/SEC
BH CV VAS DIALYSIS PRE-INFLOW RADIAL FLOW VOL: 695 ML/MIN
BH CV VAS DIALYSIS PRE-INFLOW RADIAL PSV: 128 CM/SEC
BILIRUB SERPL-MCNC: 0.6 MG/DL (ref 0–1.2)
BUN SERPL-MCNC: 28 MG/DL (ref 8–23)
BUN/CREAT SERPL: 6.6 (ref 7–25)
CALCIUM SPEC-SCNC: 8.3 MG/DL (ref 8.6–10.5)
CHLORIDE SERPL-SCNC: 99 MMOL/L (ref 98–107)
CO2 SERPL-SCNC: 23.3 MMOL/L (ref 22–29)
CREAT SERPL-MCNC: 4.25 MG/DL (ref 0.76–1.27)
DEPRECATED RDW RBC AUTO: 48.7 FL (ref 37–54)
EGFRCR SERPLBLD CKD-EPI 2021: 14 ML/MIN/1.73
ERYTHROCYTE [DISTWIDTH] IN BLOOD BY AUTOMATED COUNT: 15.5 % (ref 12.3–15.4)
GLOBULIN UR ELPH-MCNC: 2.5 GM/DL
GLUCOSE BLDC GLUCOMTR-MCNC: 126 MG/DL (ref 70–130)
GLUCOSE BLDC GLUCOMTR-MCNC: 127 MG/DL (ref 70–130)
GLUCOSE BLDC GLUCOMTR-MCNC: 143 MG/DL (ref 70–130)
GLUCOSE BLDC GLUCOMTR-MCNC: 96 MG/DL (ref 70–130)
GLUCOSE SERPL-MCNC: 95 MG/DL (ref 65–99)
HCT VFR BLD AUTO: 30.1 % (ref 37.5–51)
HGB BLD-MCNC: 10 G/DL (ref 13–17.7)
MAGNESIUM SERPL-MCNC: 2 MG/DL (ref 1.6–2.4)
MAXIMAL PREDICTED HEART RATE: 147 BPM
MCH RBC QN AUTO: 28.7 PG (ref 26.6–33)
MCHC RBC AUTO-ENTMCNC: 33.2 G/DL (ref 31.5–35.7)
MCV RBC AUTO: 86.5 FL (ref 79–97)
PHOSPHATE SERPL-MCNC: 2.6 MG/DL (ref 2.5–4.5)
PLATELET # BLD AUTO: 120 10*3/MM3 (ref 140–450)
PMV BLD AUTO: 10.7 FL (ref 6–12)
POTASSIUM SERPL-SCNC: 3.8 MMOL/L (ref 3.5–5.2)
PROT SERPL-MCNC: 5.1 G/DL (ref 6–8.5)
RBC # BLD AUTO: 3.48 10*6/MM3 (ref 4.14–5.8)
SODIUM SERPL-SCNC: 136 MMOL/L (ref 136–145)
STRESS TARGET HR: 125 BPM
WBC NRBC COR # BLD: 11.68 10*3/MM3 (ref 3.4–10.8)

## 2023-04-20 PROCEDURE — 25010000002 CALCIUM GLUCONATE-NACL 1-0.675 GM/50ML-% SOLUTION: Performed by: NURSE PRACTITIONER

## 2023-04-20 PROCEDURE — 25010000002 ENOXAPARIN PER 10 MG: Performed by: NURSE PRACTITIONER

## 2023-04-20 PROCEDURE — 94664 DEMO&/EVAL PT USE INHALER: CPT

## 2023-04-20 PROCEDURE — 80053 COMPREHEN METABOLIC PANEL: CPT | Performed by: INTERNAL MEDICINE

## 2023-04-20 PROCEDURE — 82962 GLUCOSE BLOOD TEST: CPT

## 2023-04-20 PROCEDURE — 85027 COMPLETE CBC AUTOMATED: CPT | Performed by: NURSE PRACTITIONER

## 2023-04-20 PROCEDURE — 94761 N-INVAS EAR/PLS OXIMETRY MLT: CPT

## 2023-04-20 PROCEDURE — 94760 N-INVAS EAR/PLS OXIMETRY 1: CPT

## 2023-04-20 PROCEDURE — 83735 ASSAY OF MAGNESIUM: CPT | Performed by: INTERNAL MEDICINE

## 2023-04-20 PROCEDURE — 99232 SBSQ HOSP IP/OBS MODERATE 35: CPT | Performed by: INTERNAL MEDICINE

## 2023-04-20 PROCEDURE — 93990 DOPPLER FLOW TESTING: CPT

## 2023-04-20 PROCEDURE — 71045 X-RAY EXAM CHEST 1 VIEW: CPT

## 2023-04-20 PROCEDURE — 25010000002 DIGOXIN PER 500 MCG: Performed by: INTERNAL MEDICINE

## 2023-04-20 PROCEDURE — 94799 UNLISTED PULMONARY SVC/PX: CPT

## 2023-04-20 PROCEDURE — 97530 THERAPEUTIC ACTIVITIES: CPT

## 2023-04-20 PROCEDURE — 84100 ASSAY OF PHOSPHORUS: CPT | Performed by: INTERNAL MEDICINE

## 2023-04-20 RX ORDER — DIGOXIN 0.25 MG/ML
250 INJECTION INTRAMUSCULAR; INTRAVENOUS EVERY 6 HOURS
Status: COMPLETED | OUTPATIENT
Start: 2023-04-21 | End: 2023-04-21

## 2023-04-20 RX ORDER — CALCIUM GLUCONATE 20 MG/ML
2 INJECTION, SOLUTION INTRAVENOUS ONCE
Status: COMPLETED | OUTPATIENT
Start: 2023-04-20 | End: 2023-04-20

## 2023-04-20 RX ORDER — DIGOXIN 0.25 MG/ML
500 INJECTION INTRAMUSCULAR; INTRAVENOUS EVERY 6 HOURS
Status: COMPLETED | OUTPATIENT
Start: 2023-04-20 | End: 2023-04-20

## 2023-04-20 RX ADMIN — Medication 150 MG: at 08:42

## 2023-04-20 RX ADMIN — ACETYLCYSTEINE 3 ML: 200 SOLUTION ORAL; RESPIRATORY (INHALATION) at 07:14

## 2023-04-20 RX ADMIN — ACETYLCYSTEINE 3 ML: 200 SOLUTION ORAL; RESPIRATORY (INHALATION) at 15:18

## 2023-04-20 RX ADMIN — ENOXAPARIN SODIUM 30 MG: 100 INJECTION SUBCUTANEOUS at 12:51

## 2023-04-20 RX ADMIN — CALCIUM GLUCONATE 2 G: 20 INJECTION, SOLUTION INTRAVENOUS at 12:51

## 2023-04-20 RX ADMIN — DIGOXIN 500 MCG: 0.25 INJECTION INTRAMUSCULAR; INTRAVENOUS at 22:14

## 2023-04-20 RX ADMIN — ATORVASTATIN CALCIUM 40 MG: 20 TABLET, FILM COATED ORAL at 22:05

## 2023-04-20 RX ADMIN — MIDODRINE HYDROCHLORIDE 5 MG: 5 TABLET ORAL at 22:06

## 2023-04-20 RX ADMIN — IPRATROPIUM BROMIDE AND ALBUTEROL SULFATE 3 ML: 2.5; .5 SOLUTION RESPIRATORY (INHALATION) at 07:14

## 2023-04-20 RX ADMIN — IPRATROPIUM BROMIDE AND ALBUTEROL SULFATE 3 ML: 2.5; .5 SOLUTION RESPIRATORY (INHALATION) at 20:09

## 2023-04-20 RX ADMIN — ASPIRIN 81 MG: 81 TABLET, COATED ORAL at 08:42

## 2023-04-20 RX ADMIN — IPRATROPIUM BROMIDE AND ALBUTEROL SULFATE 3 ML: 2.5; .5 SOLUTION RESPIRATORY (INHALATION) at 15:17

## 2023-04-20 RX ADMIN — MIDODRINE HYDROCHLORIDE 5 MG: 5 TABLET ORAL at 06:34

## 2023-04-20 RX ADMIN — DIGOXIN 500 MCG: 0.25 INJECTION INTRAMUSCULAR; INTRAVENOUS at 14:56

## 2023-04-20 RX ADMIN — AMIODARONE HYDROCHLORIDE 200 MG: 200 TABLET ORAL at 08:42

## 2023-04-20 RX ADMIN — MUPIROCIN 1 APPLICATION: 20 OINTMENT TOPICAL at 22:05

## 2023-04-20 RX ADMIN — DOCUSATE SODIUM 50MG AND SENNOSIDES 8.6MG 2 TABLET: 8.6; 5 TABLET, FILM COATED ORAL at 08:42

## 2023-04-20 RX ADMIN — DOCUSATE SODIUM 50MG AND SENNOSIDES 8.6MG 2 TABLET: 8.6; 5 TABLET, FILM COATED ORAL at 22:05

## 2023-04-20 RX ADMIN — PANTOPRAZOLE SODIUM 40 MG: 40 TABLET, DELAYED RELEASE ORAL at 06:34

## 2023-04-20 RX ADMIN — MUPIROCIN 1 APPLICATION: 20 OINTMENT TOPICAL at 08:42

## 2023-04-20 RX ADMIN — ACETYLCYSTEINE 3 ML: 200 SOLUTION ORAL; RESPIRATORY (INHALATION) at 20:09

## 2023-04-20 RX ADMIN — METOPROLOL TARTRATE 12.5 MG: 25 TABLET, FILM COATED ORAL at 08:41

## 2023-04-20 RX ADMIN — DULOXETINE HYDROCHLORIDE 30 MG: 30 CAPSULE, DELAYED RELEASE ORAL at 22:06

## 2023-04-20 RX ADMIN — ACETAMINOPHEN 500 MG: 500 TABLET, FILM COATED ORAL at 12:51

## 2023-04-20 NOTE — CONSULTS
Met with patient and wife, discussed benefits of cardiac rehab. Provided phase II information along with the contact information for cardiac rehab here at [facility]. Wife reports that patient is very weak and will possibly be discharged to inpatient rehab. They then will need to see how his dialysis schedule is going to work for him. Patient will be having dialysis on Tuesday, Thursday and Saturdays. Patient to call and schedule when ready to attend.

## 2023-04-20 NOTE — THERAPY TREATMENT NOTE
Patient Name: Lefty Cai  : 1950    MRN: [id number]                              Today's Date: 2023       Admit Date: 2023    Visit Dx:     ICD-10-CM ICD-9-CM   1. Dyspnea on exertion  R06.09 786.09   2. NSTEMI (non-ST elevated myocardial infarction)  I21.4 410.70   3. ESRD (end stage renal disease)  N18.6 585.6   4. Former smoker  Z87.891 V15.82   5. Elevated blood pressure reading in office with diagnosis of hypertension  I10 401.9   6. Rhinovirus infection  B34.8 079.3   7. Abnormal findings on diagnostic imaging of heart and coronary circulation  R93.1 794.39   8. S/P CABG (coronary artery bypass graft)  Z95.1 V45.81     Patient Active Problem List   Diagnosis   • Type 2 diabetes mellitus with diabetic chronic kidney disease   • Essential hypertension   • Hyperlipidemia   • Primary insomnia   • ESRD (end stage renal disease)   • Vitamin D deficiency   • Diverticulitis of large intestine with perforation and abscess without bleeding   • Obesity (BMI 30-39.9)   • Impaired mobility and ADLs   • History of colon resection   • Colon polyps   • Diverticulosis   • CKD (chronic kidney disease) stage 5, GFR less than 15 ml/min   • Dyspnea on exertion   • NSTEMI (non-ST elevated myocardial infarction)   • Rhinovirus   • Abnormal findings on diagnostic imaging of heart and coronary circulation     Past Medical History:   Diagnosis Date   • Anemia    • Anesthesia complication     HYPOTENSION   • Arthritis    • Cancer of kidney, left    • CKD (chronic kidney disease)     STAGE 5   • Diabetes mellitus, type 2    • Fatigue    • GERD (gastroesophageal reflux disease)    • H/O renal cell carcinoma     partial nephrectomy   • History of colostomy reversal 2022   • [place] (hard of hearing)     NO DEVICE   • Hyperlipidemia    • Hypertension    • Primary insomnia 2016   • Proteinuria    • Risk factors for obstructive sleep apnea    • Sinus drainage    • Vitamin D deficiency 2017     Past  Surgical History:   Procedure Laterality Date   • ARTERIOVENOUS FISTULA/SHUNT SURGERY Left 08/15/2019    Procedure: LEFT MID FOREARM RADIAL CEPHALIC ARTERIAL VENOUS FISTULA;  Surgeon: Louann Miles Jr., MD;  Location: [facility] MAIN OR;  Service: Vascular   • CARDIAC CATHETERIZATION N/A 4/9/2023    Procedure: Left Heart Cath;  Surgeon: Lobito Garcia MD;  Location: [facility] CATH INVASIVE LOCATION;  Service: Cardiovascular;  Laterality: N/A;   • CARDIAC CATHETERIZATION N/A 4/9/2023    Procedure: Left ventriculography;  Surgeon: Lobito Garcia MD;  Location: [facility] CATH INVASIVE LOCATION;  Service: Cardiovascular;  Laterality: N/A;   • CARDIAC CATHETERIZATION N/A 4/9/2023    Procedure: Coronary angiography;  Surgeon: Lobito Garcia MD;  Location: [facility] CATH INVASIVE LOCATION;  Service: Cardiovascular;  Laterality: N/A;   • COLONOSCOPY  03/24/2022   • COLONOSCOPY N/A 03/24/2022    Procedure: COLONOSCOPY TO CECUM WITH POLYPECTOMY  (HOT SNARE);  Surgeon: Yelena Guerra MD;  Location: [facility] ENDOSCOPY;  Service: General;  Laterality: N/A;  PREOP/ HX OF DIVERTICULITIS, COLOSTOMY  POSTOP/ POLYPS, DIVERTICULOSIS    • COLOSTOMY  09/06/2021   • COLOSTOMY CLOSURE N/A 04/12/2022    Procedure: open Colostomy reversal;  Surgeon: Yelena Guerra MD;  Location: [facility] OR;  Service: General;  Laterality: N/A;   • CORONARY ARTERY BYPASS GRAFT N/A 4/13/2023    Procedure: MAT STERNOTOMY CORONARY ARTERY BYPASS GRAFT TIMES 4 USING LEFT INTERNAL MAMMARY ARTERY AND RIGHT GREATER SAPHENOUS VEIN  PER ENDOSCOPIC VEIN HARVESTING, MITRAL VALVE REPAIR  AND PRP;  Surgeon: Mirtha Zuluaga MD;  Location: [facility] CVOR;  Service: Cardiothoracic;  Laterality: N/A;   • DIAGNOSTIC LAPAROSCOPY N/A 2/27/2023    Procedure: DIAGNOSTIC LAPAROSCOPY;  Surgeon: Murali Ferreira MD;  Location: [facility] OR;  Service: General;  Laterality: N/A;   • EXPLORATORY LAPAROTOMY N/A 09/06/2021    Procedure: Open sigmoind colectomy,  colostomy, and appendectomy;  Surgeon: Yelena Guerra MD;  Location: [facility] MAIN OR;  Service: General;  Laterality: N/A;   • EYE SURGERY      CATARACTS   • KNEE ARTHROSCOPY Right    • NEPHRECTOMY PARTIAL  2007    Dr. Victor      General Information     Row Name 04/20/23 1437          OT Time and Intention    Document Type therapy note (daily note)  -KB     Mode of Treatment occupational therapy;co-treatment  -KB     Row Name 04/20/23 1437          General Information    Patient Profile Reviewed yes  -KB     Existing Precautions/Restrictions fall;cardiac;sternal  -KB     Row Name 04/20/23 1437          Cognition    Orientation Status (Cognition) oriented x 3  -KB     Row Name 04/20/23 1437          Safety Issues, Functional Mobility    Impairments Affecting Function (Mobility) endurance/activity tolerance;shortness of breath  -KB           User Key  (r) = Recorded By, (t) = Taken By, (c) = Cosigned By    Initials Name Provider Type    KB Elly Mccarthy, OT Occupational Therapist                 Mobility/ADL's     Row Name 04/20/23 1437          Bed Mobility    Bed Mobility supine-sit  -KB     Supine-Sit [place] (Bed Mobility) contact guard;1 person assist  -KB     Comment, (Bed Mobility) pt sat up on his own into long sit in bed using hand rail  -KB     Row Name 04/20/23 1437          Transfers    Transfers sit-stand transfer;stand-sit transfer  -KB     Row Name 04/20/23 1437          Bed-Chair Transfer    Bed-Chair [place] (Transfers) contact guard  -KB     Row Name 04/20/23 1437          Sit-Stand Transfer    Sit-Stand [place] (Transfers) contact guard  -KB     Assistive Device (Sit-Stand Transfers) walker, 4-wheeled  -KB     Row Name 04/20/23 1437          Functional Mobility    Functional Mobility- Comment used rw to walk 5 ft to recliner with cga  -KB     Row Name 04/20/23 1437          Activities of Daily Living    BADL Assessment/Intervention lower body dressing  -KB     Row Name 04/20/23  1437          Lower Body Dressing Assessment/Training    [place] Level (Lower Body Dressing) don;socks;minimum assist (75% patient effort)  -KB     Position (Lower Body Dressing) long sitting  -KB           User Key  (r) = Recorded By, (t) = Taken By, (c) = Cosigned By    Initials Name Provider Type    Elly Rodríguez OT Occupational Therapist               Obj/Interventions     Row Name 04/20/23 1439          Motor Skills    Motor Skills functional endurance  -     Functional Endurance poor +  -KB     Row Name 04/20/23 1439          Balance    Balance Assessment sitting static balance;sitting dynamic balance;standing static balance;standing dynamic balance  -KB     Static Sitting Balance modified independence  -KB     Dynamic Sitting Balance standby assist  -KB     Static Standing Balance contact guard  -KB     Dynamic Standing Balance contact guard  -KB           User Key  (r) = Recorded By, (t) = Taken By, (c) = Cosigned By    Initials Name Provider Type    Elly Rodríguez OT Occupational Therapist               Goals/Plan    No documentation.                Clinical Impression     Row Name 04/20/23 1440          Pain Assessment    Pre/Posttreatment Pain Comment pt having pain on buttox, rn aware  -     Row Name 04/20/23 1440          Plan of Care Review    Plan of Care Reviewed With patient  -KB     Outcome Evaluation Pt seen for OT treatment this date. Pt upgraded out of cicu and has chest tube removed. Performed bed mobiity into long sit with cga. Sat long sit to attempt to don socks, required mod assist to complete. Performed sit/stand and fm with rw to recliner, about 5 ft with cga. Pt performed UE rom exercises. Pt c/o buttock pain, nursing aware and placed waffle cushion in recliner. Pt cont to demo below baseline functional endurance/strength for functional tasks, will cont to benefit from acute OT. Recommending snf at [place] vs   -     Row Name 04/20/23 1440          Therapy  Assessment/Plan (OT)    Rehab Potential (OT) good, to achieve stated therapy goals  -KB     Row Name 04/20/23 1440          Therapy Plan Review/Discharge Plan (OT)    Anticipated Discharge Disposition (OT) skilled nursing facility;home with home health  -KB     Row Name 04/20/23 1440          Positioning and Restraints    Pre-Treatment Position in bed  -KB     Post Treatment Position chair  -KB     In Chair reclined;sitting;call light within reach;encouraged to call for assist;with family/caregiver  -KB           User Key  (r) = Recorded By, (t) = Taken By, (c) = Cosigned By    Initials Name Provider Type    KB Elly Mccarthy, BAUTISTA Occupational Therapist               Outcome Measures     Row Name 04/20/23 1445          How much help from another is currently needed...    Putting on and taking off regular lower body clothing? 2  -KB     Bathing (including washing, rinsing, and drying) 2  -KB     Toileting (which includes using toilet bed pan or urinal) 2  -KB     Putting on and taking off regular upper body clothing 3  -KB     Taking care of personal grooming (such as brushing teeth) 3  -KB     Eating meals 3  -KB     AM-PAC 6 Clicks Score (OT) 15  -KB     Row Name 04/20/23 1222          How much help from another person do you currently need...    Turning from your back to your side while in flat bed without using bedrails? 2  -CB     Moving from lying on back to sitting on the side of a flat bed without bedrails? 2  -CB     Moving to and from a bed to a chair (including a wheelchair)? 2  -CB     Standing up from a chair using your arms (e.g., wheelchair, bedside chair)? 2  -CB     Climbing 3-5 steps with a railing? 2  -CB     To walk in hospital room? 2  -CB     AM-PAC 6 Clicks Score (PT) 12  -CB     Highest level of mobility 4 --> Transferred to chair/commode  -CB     Row Name 04/20/23 1445          Functional Assessment    Outcome Measure Options AM-PAC 6 Clicks Daily Activity (OT)  -KB           User Key  (r) =  Recorded By, (t) = Taken By, (c) = Cosigned By    Initials Name Provider Type    Evelyn Juan, RN Registered Nurse    Elly Rodríguez OT Occupational Therapist                Occupational Therapy Education     Title: PT OT SLP Therapies (Done)     Topic: Occupational Therapy (Done)     Point: ADL training (Done)     Description:   Instruct learner(s) on proper safety adaptation and remediation techniques during self care or transfers.   Instruct in proper use of assistive devices.              Learning Progress Summary           Patient Acceptance, E, VU by  at 4/19/2023 1815    Eager, E, VU,DU by BELEN at 4/19/2023 1251    Comment: ed on AE for self feeding  adl safety   Family Acceptance, E, VU by  at 4/19/2023 1815    Eager, E, VU,DU by BELEN at 4/19/2023 1251    Comment: ed on AE for self feeding  adl safety                   Point: Home exercise program (Done)     Description:   Instruct learner(s) on appropriate technique for monitoring, assisting and/or progressing therapeutic exercises/activities.              Learning Progress Summary           Patient Acceptance, E, VU by  at 4/19/2023 1815   Family Acceptance, E, VU by  at 4/19/2023 1815                   Point: Precautions (Done)     Description:   Instruct learner(s) on prescribed precautions during self-care and functional transfers.              Learning Progress Summary           Patient Acceptance, E, VU by  at 4/19/2023 1815    Eager, E, VU,DU by BELEN at 4/19/2023 1251    Comment: ed on AE for self feeding  adl safety   Family Acceptance, E, VU by  at 4/19/2023 1815    Eager, E, VU,DU by BELEN at 4/19/2023 1251    Comment: ed on AE for self feeding  adl safety                   Point: Body mechanics (Done)     Description:   Instruct learner(s) on proper positioning and spine alignment during self-care, functional mobility activities and/or exercises.              Learning Progress Summary           Patient Acceptance, E, VU by  at  4/19/2023 1815   Family Acceptance, E, VU by  at 4/19/2023 1815                               User Key     Initials Effective Dates Name Provider Type Discipline     12/20/21 -  Mitesh Sage, RN Registered Nurse Nurse     01/03/23 -  Elly Mccarthy OT Occupational Therapist OT              OT Recommendation and Plan  Planned Therapy Interventions (OT): activity tolerance training, BADL retraining, transfer/mobility retraining, strengthening exercise, patient/caregiver education/training, occupation/activity based interventions  Therapy Frequency (OT): 5 times/wk  Plan of Care Review  Plan of Care Reviewed With: patient  Outcome Evaluation: Pt seen for OT treatment this date. Pt upgraded out of cicu and has chest tube removed. Performed bed mobiity into long sit with cga. Sat long sit to attempt to don socks, required mod assist to complete. Performed sit/stand and fm with rw to recliner, about 5 ft with cga. Pt performed UE rom exercises. Pt c/o buttock pain, nursing aware and placed waffle cushion in recliner. Pt cont to demo below baseline functional endurance/strength for functional tasks, will cont to benefit from acute OT. Recommending snf at [place] vs      Time Calculation:    Time Calculation- OT     Row Name 04/20/23 1446             Time Calculation- OT    OT Start Time 1410  -KB      OT Stop Time 1427  -KB      OT Time Calculation (min) 17 min  -KB      Total Timed Code Minutes- OT 17 minute(s)  -KB      OT Received On 04/20/23  -KB      OT - Next Appointment 04/21/23  -KB      OT Goal Re-Cert Due Date 05/03/23  -KB         Timed Charges    52784 - OT Therapeutic Activity Minutes 17  -KB         Total Minutes    Timed Charges Total Minutes 17  -KB       Total Minutes 17  -KB            User Key  (r) = Recorded By, (t) = Taken By, (c) = Cosigned By    Initials Name Provider Type    Elly Rodríguez OT Occupational Therapist              Therapy Charges for Today     Code Description Service Date  Service Provider Modifiers Qty    58772045222 HC OT THERAPEUTIC ACT EA 15 MIN 4/19/2023 Elly Mccarthy OT GO 1    92964043021  OT EVAL MOD COMPLEXITY 2 4/19/2023 Elly Mccarthy OT GO 1    94507776698 HC OT THERAPEUTIC ACT EA 15 MIN 4/20/2023 Elly Mcacrthy OT GO 1               Elly Mccarthy OT  4/20/2023

## 2023-04-20 NOTE — PLAN OF CARE
Goal Outcome Evaluation:  Plan of Care Reviewed With: patient           Outcome Evaluation: Patient awake and alert, agreeable to therapy. patient performed bed mobility with CGA, sit to stand with CGA, ambulated short distance from bed to chair with CGA and rwx. Pt demonstrates slow but steady gait, fatigues quickly but overall demonstrates improvement in mobility and activity tolerance.

## 2023-04-20 NOTE — PROGRESS NOTES
[facility] Progress Note      Patient Name: Lefty Cai  : 1950  MRN: [id number]  Primary Care Physician:  Daksha Ng APRN  Date of admission: 2023    Subjective     Interval History:   Follow up ESRD.  Dialysis yesterday 2.7 kg off .  AFib this am RVR.    No symptoms except feeling tired.  Eating better. No bm. Worked with PT yesterday .  Review of Systems:   As noted above    Objective     Vitals:   Temp:  [97.3 °F (36.3 °C)-98.5 °F (36.9 °C)] 97.8 °F (36.6 °C)  Heart Rate:  [] 124  Resp:  [15-22] 18  BP: (106-140)/() 121/59    Intake/Output Summary (Last 24 hours) at 2023 1059  Last data filed at 2023 0900  Gross per 24 hour   Intake --   Output 3725 ml   Net -3725 ml       Physical Exam:    General Appearance: alert,  no acute distress . Very weak, fatigued.   Skin: warm and dry  HEENT: oral mucosa normal, nonicteric sclera  Neck: TDC RIJ. Left IJ cordis. Dressing loose .  Lungs: Coarse bs, upper airway rhonchi. Unlabored. Mediastinal chest tubes .  Heart: Irreg, irreg, tachy .normal S1 and S2  Abdomen: soft, nontender, slightly distended. + bs body wall edema.   Extremities: 2+ edema. Upper and lower ext edema. LUE AVF   Neuro: normal speech and mental status     Scheduled Meds:     acetylcysteine, 3 mL, Nebulization, TID - RT  amiodarone, 200 mg, Oral, Q12H  aspirin, 81 mg, Oral, Daily  atorvastatin, 40 mg, Oral, Nightly  DULoxetine, 30 mg, Oral, Nightly  enoxaparin, 30 mg, Subcutaneous, Q24H  epoetin bernice/bernice-epbx, 6,000 Units, Intravenous, Once per day on   insulin lispro, 0-9 Units, Subcutaneous, 4x Daily With Meals & Nightly  ipratropium-albuterol, 3 mL, Nebulization, Q6H While Awake - RT  iron polysaccharides, 150 mg, Oral, Daily  metoprolol tartrate, 12.5 mg, Oral, Q12H  midodrine, 5 mg, Oral, BID AC  mupirocin, , Each Nare, BID  pantoprazole, 40 mg, Oral, QAM  senna-docusate sodium, 2 tablet, Oral, BID      IV Meds:    sodium chloride, 9 mL/hr, Last Rate: 9 mL/hr (04/15/23 1624)  vasopressin, 0.05 Units/min, Last Rate: Stopped (04/18/23 0545)        Results Reviewed:   I have personally reviewed the results from the time of this admission to 4/20/2023 10:59 EDT     Results from last 7 days   Lab Units 04/20/23  0324 04/19/23  0316 04/18/23  0328 04/17/23  1711 04/16/23  0410 04/15/23  1125   SODIUM mmol/L 136 138 137 142   < > 142   POTASSIUM mmol/L 3.8 4.0 4.5 3.9   < > 4.5   CHLORIDE mmol/L 99 101 101 104   < > 103   CO2 mmol/L 23.3 19.8* 21.0* 21.9*   < > 23.0   BUN mg/dL 28* 48* 33* 22   < > 30*   CREATININE mg/dL 4.25* 5.34* 4.38* 2.68*   < > 4.11*   CALCIUM mg/dL 8.3* 8.7 8.3* 8.5*   < > 8.6   BILIRUBIN mg/dL 0.6  --   --  0.5  --  0.4   ALK PHOS U/L 101  --   --  107  --  38*   ALT (SGPT) U/L 16  --   --  41  --  <5   AST (SGOT) U/L 66*  --   --  132*  --  29   GLUCOSE mg/dL 95 99 184* 114*   < > 159*    < > = values in this interval not displayed.       Estimated Creatinine Clearance: 18.9 mL/min (A) (by C-G formula based on SCr of 4.25 mg/dL (H)).    Results from last 7 days   Lab Units 04/20/23  0324 04/19/23  0316 04/18/23  0328 04/16/23  0410   MAGNESIUM mg/dL 2.0  --  2.3 2.5*   PHOSPHORUS mg/dL 2.6 4.3 3.9  --              Results from last 7 days   Lab Units 04/20/23  0324 04/19/23  0316 04/18/23  0328 04/17/23  1711 04/17/23  0308   WBC 10*3/mm3 11.68* 9.46 10.84* 12.00* 11.06*   HEMOGLOBIN g/dL 10.0* 9.9* 9.8* 10.2* 9.3*   PLATELETS 10*3/mm3 120* 94* 77* 82* 69*       Results from last 7 days   Lab Units 04/14/23  1246 04/14/23  0635 04/13/23  2041   INR  1.40* 1.58* 1.58*       Assessment / Plan     ASSESSMENT:  1. ESRD. UF 3.4 K yesterday, but no change in weight. Hemodialysis today. Using TDC due to AVF infiltration about a month ago in home training.  Has seen Dr. Lazo.     2. NSTEMI. SP 3 vessel CABG, MV repair 4/13/23.  3. Post op afib. RVR this am.  4. Anemia CKD and blood loss.   5. TCP.  HIT panel  negative.  Platelets improving.   6. DM2     PLAN:  1. Dialysis tomorrow .  2. Dw patient and sister.  3. Ok for transfer to [facility].  4. DC cordis.   5. Get duplex of AVF>  Will follow up with Dr. Lazo.   Hyacinth Fiore MD  04/20/23  10:59 EDT    [facility]  [phone]

## 2023-04-20 NOTE — PLAN OF CARE
Goal Outcome Evaluation:  Plan of Care Reviewed With: patient           Outcome Evaluation: Pt seen for OT treatment this date. Pt upgraded out of cicu and has chest tube removed. Performed bed mobiity into long sit with cga. Sat long sit to attempt to don socks, required mod assist to complete. Performed sit/stand and fm with rw to recliner, about 5 ft with cga. Pt performed UE rom exercises. Pt c/o buttock pain, nursing aware and placed waffle cushion in recliner. Pt cont to demo below baseline functional endurance/strength for functional tasks, will cont to benefit from acute OT. Recommending snf at [place] vs . OT wore appropriate PPE during session and performed hand hygiene before/after session.

## 2023-04-20 NOTE — PLAN OF CARE
Goal Outcome Evaluation:  Plan of Care Reviewed With: patient        Progress: no change     Pt remain in CICU, A&O x4  and no c/o pain. Pt had 40 cc out of the chest chest tube for the shift and it was serosanguinous. VSS will continue to monitor.

## 2023-04-20 NOTE — PROGRESS NOTES
"Lefty Cai  1950 72 y.o.  [id number]      Patient Care Team:  Daksha Ng APRN as PCP - General (Family Medicine)  Rudy Faith MD as Consulting Physician (Ophthalmology)  Jose Mccall MD as Consulting Physician (Nephrology)  Quang Reyes MD as Consulting Physician (Nephrology)    CC: Non-STEMI,, end-stage renal failure, EF 30 to 35%, severe three-vessel coronary disease, severe MR, underwent three-vessel CABG and mitral valve repair, postop A-fib    Interval History: He looks better but he is in A-fib again hypotensive and tachycardic    Objective   Vital Signs  Temp:  [97.4 °F (36.3 °C)-98.5 °F (36.9 °C)] 98.1 °F (36.7 °C)  Heart Rate:  [] 127  Resp:  [15-21] 18  BP: ()/() 96/66    Intake/Output Summary (Last 24 hours) at 4/20/2023 1428  Last data filed at 4/20/2023 1222  Gross per 24 hour   Intake --   Output 3495 ml   Net -3495 ml     Flowsheet Rows    Flowsheet Row First Filed Value   Admission Height 177.8 cm (70\") Documented at 04/08/2023 1028   Admission Weight 102 kg (225 lb) Documented at 04/08/2023 1028          Physical Exam:   General Appearance:    Alert,oriented, in no acute distress   Lungs:     Clear to auscultation,BS are equal    Heart:    Normal S1 and S2, RRR without murmur, gallop or rub   HEENT:    Sclerae are clear, no JVD or adenopathy   Abdomen:     Normal bowel sounds, soft nontender, nondistended, no HSM   Extremities:   Moves all extremities well, no edema, no cyanosis, no             Redness, no rash     Medication Review:      acetylcysteine, 3 mL, Nebulization, TID - RT  aspirin, 81 mg, Oral, Daily  atorvastatin, 40 mg, Oral, Nightly  digoxin, 250 mcg, Intravenous, Q6H  digoxin, 500 mcg, Intravenous, Q6H  DULoxetine, 30 mg, Oral, Nightly  enoxaparin, 30 mg, Subcutaneous, Q24H  epoetin bernice/bernice-epbx, 6,000 Units, Intravenous, Once per day on Mon Wed Fri  insulin lispro, 0-9 Units, Subcutaneous, 4x Daily With Meals & " Nightly  ipratropium-albuterol, 3 mL, Nebulization, Q6H While Awake - RT  iron polysaccharides, 150 mg, Oral, Daily  metoprolol tartrate, 25 mg, Oral, Q12H  midodrine, 5 mg, Oral, BID AC  mupirocin, , Each Nare, BID  pantoprazole, 40 mg, Oral, QAM  senna-docusate sodium, 2 tablet, Oral, BID      sodium chloride, 9 mL/hr, Last Rate: 9 mL/hr (04/15/23 1624)  vasopressin, 0.05 Units/min, Last Rate: Stopped (04/18/23 0545)          I reviewed the patient's new clinical results.  I personally viewed and interpreted the patient's EKG/Telemetry data    Assessment/Plan  Active Hospital Problems    Diagnosis  POA   • **NSTEMI (non-ST elevated myocardial infarction) [I21.4]  Yes   • Rhinovirus [B34.8]  Yes   • Dyspnea on exertion [R06.09]  Yes   • Abnormal findings on diagnostic imaging of heart and coronary circulation [R93.1]  Yes   • Type 2 diabetes mellitus with diabetic chronic kidney disease [E11.22]  Yes   • Essential hypertension [I10]  Yes   • Hyperlipidemia [E78.5]  Yes   • ESRD (end stage renal disease) [N18.6]  Yes      Resolved Hospital Problems    Diagnosis Date Resolved POA   • Hypernatremia [E87.0] 04/19/2023 Yes     Ischemic cardiomyopathy severe mitral insufficiency status post CABG and mitral valve repair on dialysis we have had some difficulty with paroxysmal A-fib.  He is doing better but we continue to have A-fib and when he does he really gets hypotensive so I Norma fully load him with digoxin the dig load is the same regardless of renal status it is the maintenance dose that is different.  I Norma stop his amiodarone and we will just rate control him and when we can anticoagulate him and we will reassess his A-fib in 6 weeks    Lobito Garcia MD  04/20/23  14:28 EDT

## 2023-04-20 NOTE — PROGRESS NOTES
" LOS: 12 days   Patient Care Team:  Daksha Ng APRN as PCP - General (Family Medicine)  Rudy Faith MD as Consulting Physician (Ophthalmology)  Jose Mccall MD as Consulting Physician (Nephrology)  Quang Reyes MD as Consulting Physician (Nephrology)    Chief Complaint: Post op    Subjective:  Symptoms:  No shortness of breath, cough or chest pain.    Diet:  Poor intake.  No nausea or vomiting.    Activity level: Impaired due to weakness.    Pain:  He complains of pain that is mild.  Pain is well controlled.          Vital Signs  Temp:  [97.3 °F (36.3 °C)-98.5 °F (36.9 °C)] 97.8 °F (36.6 °C)  Heart Rate:  [] 128  Resp:  [15-22] 18  BP: (102-140)/() 125/60  Body mass index is 29.81 kg/m².    Intake/Output Summary (Last 24 hours) at 4/20/2023 0748  Last data filed at 4/20/2023 0500  Gross per 24 hour   Intake --   Output 3600 ml   Net -3600 ml     No intake/output data recorded.    Chest tube drainage last 8 hours: 100        04/18/23  0315 04/19/23  0535 04/20/23  0520   Weight: 105 kg (231 lb 4.2 oz) 105 kg (231 lb 4.2 oz) 99.7 kg (219 lb 12.8 oz)         Objective:  General Appearance:  Comfortable and in no acute distress.    Vital signs: (most recent): Blood pressure 125/60, pulse (!) 128, temperature 97.8 °F (36.6 °C), temperature source Axillary, resp. rate 18, height 182.9 cm (72\"), weight 99.7 kg (219 lb 12.8 oz), SpO2 97 %.  Vital signs are normal.  No fever.    Output: Minimal urine output and no stool output.    Lungs:  Normal effort and normal respiratory rate.  There are decreased breath sounds.    Heart: Normal rate.  Regular rhythm.  (100% AV paced at 70; SR 60s underlying)  Abdomen: Abdomen is soft.  Bowel sounds are normal.     Extremities: There is dependent edema.    Pulses: Distal pulses are intact.    Neurological: Patient is alert and oriented to person, place and time.    Skin:  Warm and dry.  (Sternal dressing clean, dry, and intact)        Results " Review:        WBC WBC   Date Value Ref Range Status   04/20/2023 11.68 (H) 3.40 - 10.80 10*3/mm3 Final   04/19/2023 9.46 3.40 - 10.80 10*3/mm3 Final   04/18/2023 10.84 (H) 3.40 - 10.80 10*3/mm3 Final   04/17/2023 12.00 (H) 3.40 - 10.80 10*3/mm3 Final      HGB Hemoglobin   Date Value Ref Range Status   04/20/2023 10.0 (L) 13.0 - 17.7 g/dL Final   04/19/2023 9.9 (L) 13.0 - 17.7 g/dL Final   04/18/2023 9.8 (L) 13.0 - 17.7 g/dL Final   04/17/2023 10.2 (L) 13.0 - 17.7 g/dL Final      HCT Hematocrit   Date Value Ref Range Status   04/20/2023 30.1 (L) 37.5 - 51.0 % Final   04/19/2023 30.0 (L) 37.5 - 51.0 % Final   04/18/2023 30.0 (L) 37.5 - 51.0 % Final   04/17/2023 30.9 (L) 37.5 - 51.0 % Final      Platelets Platelets   Date Value Ref Range Status   04/20/2023 120 (L) 140 - 450 10*3/mm3 Final   04/19/2023 94 (L) 140 - 450 10*3/mm3 Final   04/18/2023 77 (L) 140 - 450 10*3/mm3 Final   04/17/2023 82 (L) 140 - 450 10*3/mm3 Final        PT/INR:    No results found for: PROTIME/  No results found for: INR    Sodium Sodium   Date Value Ref Range Status   04/20/2023 136 136 - 145 mmol/L Final   04/19/2023 138 136 - 145 mmol/L Final   04/18/2023 137 136 - 145 mmol/L Final   04/17/2023 142 136 - 145 mmol/L Final      Potassium Potassium   Date Value Ref Range Status   04/20/2023 3.8 3.5 - 5.2 mmol/L Final   04/19/2023 4.0 3.5 - 5.2 mmol/L Final   04/18/2023 4.5 3.5 - 5.2 mmol/L Final   04/17/2023 3.9 3.5 - 5.2 mmol/L Final      Chloride Chloride   Date Value Ref Range Status   04/20/2023 99 98 - 107 mmol/L Final   04/19/2023 101 98 - 107 mmol/L Final   04/18/2023 101 98 - 107 mmol/L Final   04/17/2023 104 98 - 107 mmol/L Final      Bicarbonate CO2   Date Value Ref Range Status   04/20/2023 23.3 22.0 - 29.0 mmol/L Final   04/19/2023 19.8 (L) 22.0 - 29.0 mmol/L Final   04/18/2023 21.0 (L) 22.0 - 29.0 mmol/L Final   04/17/2023 21.9 (L) 22.0 - 29.0 mmol/L Final      BUN BUN   Date Value Ref Range Status   04/20/2023 28 (H) 8 - 23  mg/dL Final   04/19/2023 48 (H) 8 - 23 mg/dL Final   04/18/2023 33 (H) 8 - 23 mg/dL Final   04/17/2023 22 8 - 23 mg/dL Final      Creatinine Creatinine   Date Value Ref Range Status   04/20/2023 4.25 (H) 0.76 - 1.27 mg/dL Final   04/19/2023 5.34 (H) 0.76 - 1.27 mg/dL Final   04/18/2023 4.38 (H) 0.76 - 1.27 mg/dL Final   04/17/2023 2.68 (H) 0.76 - 1.27 mg/dL Final      Calcium Calcium   Date Value Ref Range Status   04/20/2023 8.3 (L) 8.6 - 10.5 mg/dL Final   04/19/2023 8.7 8.6 - 10.5 mg/dL Final   04/18/2023 8.3 (L) 8.6 - 10.5 mg/dL Final   04/17/2023 8.5 (L) 8.6 - 10.5 mg/dL Final      Magnesium Magnesium   Date Value Ref Range Status   04/20/2023 2.0 1.6 - 2.4 mg/dL Final   04/18/2023 2.3 1.6 - 2.4 mg/dL Final          acetylcysteine, 3 mL, Nebulization, TID - RT  amiodarone, 200 mg, Oral, Q12H  aspirin, 81 mg, Oral, Daily  atorvastatin, 40 mg, Oral, Nightly  DULoxetine, 30 mg, Oral, Nightly  enoxaparin, 30 mg, Subcutaneous, Q24H  epoetin bernice/bernice-epbx, 6,000 Units, Intravenous, Once per day on Mon Wed Fri  insulin lispro, 0-9 Units, Subcutaneous, 4x Daily With Meals & Nightly  ipratropium-albuterol, 3 mL, Nebulization, Q6H While Awake - RT  iron polysaccharides, 150 mg, Oral, Daily  metoprolol tartrate, 12.5 mg, Oral, Q12H  midodrine, 5 mg, Oral, BID AC  mupirocin, , Each Nare, BID  pantoprazole, 40 mg, Oral, QAM  senna-docusate sodium, 2 tablet, Oral, BID      sodium chloride, 9 mL/hr, Last Rate: 9 mL/hr (04/15/23 8364)  vasopressin, 0.05 Units/min, Last Rate: Stopped (04/18/23 2126)            Patient Active Problem List   Diagnosis Code   • 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.22   • Essential hypertension I10   • Hyperlipidemia E78.5   • Primary insomnia F51.01   • ESRD (end stage renal disease) N18.6   • Vitamin D deficiency E55.9   • Diverticulitis of large intestine with perforation and abscess without bleeding K57.20   • Obesity (BMI 30-39.9) E66.9   • Impaired mobility and  ADLs Z74.09, Z78.9   • History of colon resection Z90.49   • Colon polyps K63.5   • Diverticulosis K57.90   • CKD (chronic kidney disease) stage 5, GFR less than 15 ml/min N18.5   • Dyspnea on exertion R06.09   • NSTEMI (non-ST elevated myocardial infarction) I21.4   • Rhinovirus B34.8   • Abnormal findings on diagnostic imaging of heart and coronary circulation R93.1       Assessment & Plan     - NSTEMI/multi-vessel CAD- s/p urgent CABGx4 LIMA/RSVG, MV repair-([place]) POD#8  - HFrEF--30-35% per echo 4/8  - rhinovirus infection  - ESRD on HD  - hypertension  - hyperlipidemia  - DM II- A1c 5.0  -post op anemia- expected acute blood loss  -Leukocytosis- probable reactive   -TCP- plt count 69-- hold lovenox; HIT pending    Looks better this morning  In atrial fibrillation rate 120 blood pressure borderline-- will give a little calcium  Increase beta blocker  Mobilize/aggressive pulmonary toilet--continued nebs/flutter and OT/PT  Increase activity  Plan to discontinue chest tubes, central line   Continue routine care    Laya Ackerman, DUSTY  04/20/23  07:48 EDT

## 2023-04-20 NOTE — PROGRESS NOTES
Name: Lefty Cai ADMIT: 2023   : 1950  PCP: Daksha Ng ROMERO, DUSTY    MRN: [id number] LOS: 12 days   AGE/SEX: 72 y.o. male  ROOM: /     Subjective   Subjective   Resting in bed. Wife and two other family members at bedside. He denies any current pain, nausea, vomiting. Breathing is good. Reports good appetite and eating well. Rate better controlled after he went into RVR this AM. He is now in CVU.     Objective   Objective   Vital Signs  Temp:  [97.4 °F (36.3 °C)-98.5 °F (36.9 °C)] 98.1 °F (36.7 °C)  Heart Rate:  [] 103  Resp:  [16-21] 18  BP: ()/() 90/78  SpO2:  [94 %-100 %] 100 %  on   ;   Device (Oxygen Therapy): room air  Body mass index is 29.81 kg/m².     Physical Exam  Vitals and nursing note reviewed.   Constitutional:       Appearance: He is ill-appearing. He is not toxic-appearing.   Cardiovascular:      Rate and Rhythm: Normal rate. Rhythm irregular.      Pulses: Normal pulses.   Pulmonary:      Effort: Pulmonary effort is normal. No respiratory distress.      Comments: Diminished, on RA. Poor inspiratory effort  Abdominal:      General: Bowel sounds are normal. There is no distension.      Palpations: Abdomen is soft.      Tenderness: There is no abdominal tenderness.   Musculoskeletal:         General: Swelling present. No deformity. Normal range of motion.   Skin:     General: Skin is warm and dry.      Findings: No bruising.      Comments: Midline sternal incision C/D/I   Neurological:      Mental Status: He is alert and oriented to person, place, and time.      Sensory: No sensory deficit.      Motor: Weakness present.      Coordination: Coordination normal.   Psychiatric:         Mood and Affect: Mood normal.         Behavior: Behavior normal.     Results Review:       I reviewed the patient's new clinical results.  Results from last 7 days   Lab Units 23  0324 23  0316 23  0328 23  1711   WBC 10*3/mm3 11.68* 9.46 10.84* 12.00*    HEMOGLOBIN g/dL 10.0* 9.9* 9.8* 10.2*   PLATELETS 10*3/mm3 120* 94* 77* 82*     Results from last 7 days   Lab Units 04/20/23 0324 04/19/23 0316 04/18/23 0328 04/17/23 1711   SODIUM mmol/L 136 138 137 142   POTASSIUM mmol/L 3.8 4.0 4.5 3.9   CHLORIDE mmol/L 99 101 101 104   CO2 mmol/L 23.3 19.8* 21.0* 21.9*   BUN mg/dL 28* 48* 33* 22   CREATININE mg/dL 4.25* 5.34* 4.38* 2.68*   GLUCOSE mg/dL 95 99 184* 114*   Estimated Creatinine Clearance: 18.9 mL/min (A) (by C-G formula based on SCr of 4.25 mg/dL (H)).  Results from last 7 days   Lab Units 04/20/23  0324 04/19/23  0316 04/18/23  0328 04/17/23  1711 04/15/23  1125 04/15/23  0300 04/14/23  1639   ALBUMIN g/dL 2.6* 2.9* 3.0* 3.1* 3.2*   < > 3.4*   BILIRUBIN mg/dL 0.6  --   --  0.5 0.4  --  0.4   ALK PHOS U/L 101  --   --  107 38*  --  32*   AST (SGOT) U/L 66*  --   --  132* 29  --  44*   ALT (SGPT) U/L 16  --   --  41 <5  --  8    < > = values in this interval not displayed.     Results from last 7 days   Lab Units 04/20/23 0324 04/19/23 0316 04/18/23 0328 04/17/23 1711 04/17/23 0308 04/16/23  0410 04/15/23  1125 04/15/23  0300   CALCIUM mg/dL 8.3* 8.7 8.3* 8.5*   < > 8.7   < > 8.2*   ALBUMIN g/dL 2.6* 2.9* 3.0* 3.1*  --   --    < > 3.3*   MAGNESIUM mg/dL 2.0  --  2.3  --   --  2.5*  --  2.1   PHOSPHORUS mg/dL 2.6 4.3 3.9  --   --   --   --  4.6*    < > = values in this interval not displayed.       Glucose   Date/Time Value Ref Range Status   04/20/2023 1337 127 70 - 130 mg/dL Final     Comment:     Meter: [id number] : [id number] Patric Kirk NA   04/20/2023 0734 96 70 - 130 mg/dL Final     Comment:     Meter: [id number] : [id number] Armando Matamoros NA   04/19/2023 2106 116 70 - 130 mg/dL Final     Comment:     Meter: [id number] : [id number] Alessandra Barton RN   04/19/2023 1742 122 70 - 130 mg/dL Final     Comment:     Meter: [id number] : [id number] Chu Sagastume RN   04/19/2023 1525 122 70 - 130 mg/dL Final     Comment:     Meter:  [id number] : [id number] Armando ROSALES   04/19/2023 1132 131 (H) 70 - 130 mg/dL Final     Comment:     Meter: [id number] : [id number] Armando ROSALES   04/19/2023 0743 88 70 - 130 mg/dL Final     Comment:     Meter: [id number] : [id number] Armando ROSALES       acetylcysteine, 3 mL, Nebulization, TID - RT  aspirin, 81 mg, Oral, Daily  atorvastatin, 40 mg, Oral, Nightly  [START ON 4/21/2023] digoxin, 250 mcg, Intravenous, Q6H  digoxin, 500 mcg, Intravenous, Q6H  DULoxetine, 30 mg, Oral, Nightly  enoxaparin, 30 mg, Subcutaneous, Q24H  epoetin bernice/bernice-epbx, 6,000 Units, Intravenous, Once per day on Mon Wed Fri  insulin lispro, 0-9 Units, Subcutaneous, 4x Daily With Meals & Nightly  ipratropium-albuterol, 3 mL, Nebulization, Q6H While Awake - RT  iron polysaccharides, 150 mg, Oral, Daily  metoprolol tartrate, 25 mg, Oral, Q12H  midodrine, 5 mg, Oral, BID AC  mupirocin, , Each Nare, BID  pantoprazole, 40 mg, Oral, QAM  senna-docusate sodium, 2 tablet, Oral, BID      sodium chloride, 9 mL/hr, Last Rate: 9 mL/hr (04/15/23 1624)  vasopressin, 0.05 Units/min, Last Rate: Stopped (04/18/23 0545)    Diet: Cardiac Diets, Diabetic Diets; Healthy Heart (2-3 Na+); Consistent Carbohydrate; Texture: Regular Texture (IDDSI 7); Fluid Consistency: Thin (IDDSI 0)       Assessment/Plan     Active Hospital Problems    Diagnosis  POA   • **NSTEMI (non-ST elevated myocardial infarction) [I21.4]  Yes   • Rhinovirus [B34.8]  Yes   • Dyspnea on exertion [R06.09]  Yes   • Abnormal findings on diagnostic imaging of heart and coronary circulation [R93.1]  Yes   • Type 2 diabetes mellitus with diabetic chronic kidney disease [E11.22]  Yes   • Essential hypertension [I10]  Yes   • Hyperlipidemia [E78.5]  Yes   • ESRD (end stage renal disease) [N18.6]  Yes      Resolved Hospital Problems    Diagnosis Date Resolved POA   • Hypernatremia [E87.0] 04/19/2023 Yes     Mr. Cai is a 72 year old male who presented to the hospital with  complaints of increased dyspnea in the setting of ESRD with HD compliance. He was found to have new T wave inversions with troponin elevation and taken for heart cath that revealed severe three vessel CAD with occluded right coronary system. Cardiothoracic surgery was consulted for CABG and RVP was obtained showing positive for rhinovirus. ID was consulted given need for OR who cleared for surgery without antibiotics. Timing was per surgeon preference. He was taken for open heart on 4/13. He had hypotension and atrial fibrillation with RVR following surgery. Moved out of CICU on 4/20.     • NSTEMI: S/P CABG x4 on 4/13. Cardiology and Cardiothoracic surgery managing. Echo 4/8 with EF 30-35%. On amiodarone and metoprolol with conversion of atrial fibrillation to NSR initially but went back into RVR this AM. Beta blocker increased and digoxin added. On ASA/statin. Now on CVU.     • Rhinovirus: Supportive care. ID saw and cleared for OR. Pulmonology followed (signed off 4/20). He is on Duonebs/Mucomyst.      • ESRD: Via TDC. Nephrology managing. HD tomorrow.     • DM2: Sugars very stable. Not requiring correctional insulin.     • HTN: BP stable on BB as well as midodrine.     Discussed with patient and family.     Appreciate all specialists.      VTE Prophylaxis - Lovenox 30 mg SC daily  Code Status - Full code  Disposition - Anticipate discharge TBD.        DUSTY Roberts  [facility]  04/20/23  15:31 EDT

## 2023-04-20 NOTE — THERAPY TREATMENT NOTE
Patient Name: Lefty Cai  : 1950    MRN: [id number]                              Today's Date: 2023       Admit Date: 2023    Visit Dx:     ICD-10-CM ICD-9-CM   1. Dyspnea on exertion  R06.09 786.09   2. NSTEMI (non-ST elevated myocardial infarction)  I21.4 410.70   3. ESRD (end stage renal disease)  N18.6 585.6   4. Former smoker  Z87.891 V15.82   5. Elevated blood pressure reading in office with diagnosis of hypertension  I10 401.9   6. Rhinovirus infection  B34.8 079.3   7. Abnormal findings on diagnostic imaging of heart and coronary circulation  R93.1 794.39   8. S/P CABG (coronary artery bypass graft)  Z95.1 V45.81     Patient Active Problem List   Diagnosis   • Type 2 diabetes mellitus with diabetic chronic kidney disease   • Essential hypertension   • Hyperlipidemia   • Primary insomnia   • ESRD (end stage renal disease)   • Vitamin D deficiency   • Diverticulitis of large intestine with perforation and abscess without bleeding   • Obesity (BMI 30-39.9)   • Impaired mobility and ADLs   • History of colon resection   • Colon polyps   • Diverticulosis   • CKD (chronic kidney disease) stage 5, GFR less than 15 ml/min   • Dyspnea on exertion   • NSTEMI (non-ST elevated myocardial infarction)   • Rhinovirus   • Abnormal findings on diagnostic imaging of heart and coronary circulation     Past Medical History:   Diagnosis Date   • Anemia    • Anesthesia complication     HYPOTENSION   • Arthritis    • Cancer of kidney, left    • CKD (chronic kidney disease)     STAGE 5   • Diabetes mellitus, type 2    • Fatigue    • GERD (gastroesophageal reflux disease)    • H/O renal cell carcinoma     partial nephrectomy   • History of colostomy reversal 2022   • [place] (hard of hearing)     NO DEVICE   • Hyperlipidemia    • Hypertension    • Primary insomnia 2016   • Proteinuria    • Risk factors for obstructive sleep apnea    • Sinus drainage    • Vitamin D deficiency 2017     Past  Surgical History:   Procedure Laterality Date   • ARTERIOVENOUS FISTULA/SHUNT SURGERY Left 08/15/2019    Procedure: LEFT MID FOREARM RADIAL CEPHALIC ARTERIAL VENOUS FISTULA;  Surgeon: Louann Miles Jr., MD;  Location: [facility] MAIN OR;  Service: Vascular   • CARDIAC CATHETERIZATION N/A 4/9/2023    Procedure: Left Heart Cath;  Surgeon: Lobito Garcia MD;  Location: [facility] CATH INVASIVE LOCATION;  Service: Cardiovascular;  Laterality: N/A;   • CARDIAC CATHETERIZATION N/A 4/9/2023    Procedure: Left ventriculography;  Surgeon: Lobito Garcia MD;  Location: [facility] CATH INVASIVE LOCATION;  Service: Cardiovascular;  Laterality: N/A;   • CARDIAC CATHETERIZATION N/A 4/9/2023    Procedure: Coronary angiography;  Surgeon: Lobito Garcia MD;  Location: [facility] CATH INVASIVE LOCATION;  Service: Cardiovascular;  Laterality: N/A;   • COLONOSCOPY  03/24/2022   • COLONOSCOPY N/A 03/24/2022    Procedure: COLONOSCOPY TO CECUM WITH POLYPECTOMY  (HOT SNARE);  Surgeon: Yelena Guerra MD;  Location: [facility] ENDOSCOPY;  Service: General;  Laterality: N/A;  PREOP/ HX OF DIVERTICULITIS, COLOSTOMY  POSTOP/ POLYPS, DIVERTICULOSIS    • COLOSTOMY  09/06/2021   • COLOSTOMY CLOSURE N/A 04/12/2022    Procedure: open Colostomy reversal;  Surgeon: Yelena Guerra MD;  Location: [facility] OR;  Service: General;  Laterality: N/A;   • CORONARY ARTERY BYPASS GRAFT N/A 4/13/2023    Procedure: MAT STERNOTOMY CORONARY ARTERY BYPASS GRAFT TIMES 4 USING LEFT INTERNAL MAMMARY ARTERY AND RIGHT GREATER SAPHENOUS VEIN  PER ENDOSCOPIC VEIN HARVESTING, MITRAL VALVE REPAIR  AND PRP;  Surgeon: Mirtha Zuluaga MD;  Location: [facility] CVOR;  Service: Cardiothoracic;  Laterality: N/A;   • DIAGNOSTIC LAPAROSCOPY N/A 2/27/2023    Procedure: DIAGNOSTIC LAPAROSCOPY;  Surgeon: Murali Ferreira MD;  Location: [facility] OR;  Service: General;  Laterality: N/A;   • EXPLORATORY LAPAROTOMY N/A 09/06/2021    Procedure: Open sigmoind colectomy,  colostomy, and appendectomy;  Surgeon: Yelena Guerra MD;  Location: [facility] OR;  Service: General;  Laterality: N/A;   • EYE SURGERY      CATARACTS   • KNEE ARTHROSCOPY Right    • NEPHRECTOMY PARTIAL  2007    Dr. Victor      General Information     Row Name 04/20/23 1503          Physical Therapy Time and Intention    Document Type therapy note (daily note)  -EM     Mode of Treatment co-treatment;physical therapy;occupational therapy  -EM     Row Name 04/20/23 1503          General Information    Existing Precautions/Restrictions fall;cardiac;sternal  -EM           User Key  (r) = Recorded By, (t) = Taken By, (c) = Cosigned By    Initials Name Provider Type    EM Jennie Cuevas PT Physical Therapist               Mobility     Row Name 04/20/23 1503          Bed Mobility    Supine-Sit [place] (Bed Mobility) contact guard  -EM     Comment, (Bed Mobility) pt sat up in long sitting prior to moving legs over to EOB  -EM     Row Name 04/20/23 1503          Sit-Stand Transfer    Sit-Stand [place] (Transfers) contact guard  -EM     Assistive Device (Sit-Stand Transfers) walker, front-wheeled  -EM     Row Name 04/20/23 1503          Gait/Stairs (Locomotion)    [place] Level (Gait) contact guard;1 person to manage equipment  -EM     Assistive Device (Gait) walker, front-wheeled  -EM     Distance in Feet (Gait) 10  -EM     Deviations/Abnormal Patterns (Gait) stride length decreased;gait speed decreased  -EM     Comment, (Gait/Stairs) slow shuffling steps from bed to chair, chair placed over by doorway  -EM           User Key  (r) = Recorded By, (t) = Taken By, (c) = Cosigned By    Initials Name Provider Type    EM Jennie Cuevas PT Physical Therapist               Obj/Interventions     Row Name 04/20/23 1501          Motor Skills    Therapeutic Exercise other (see comments)  5 reps per cardiac protocol  -EM           User Key  (r) = Recorded By, (t) = Taken By, (c) = Cosigned By     "Initials Name Provider Type    EM Jennie Cuevas, PT Physical Therapist               Goals/Plan    No documentation.                Clinical Impression     Row Name 04/20/23 1505          Pain    Pretreatment Pain Rating 0/10 - no pain  -EM     Pre/Posttreatment Pain Comment pt states \"my butt is on fire\", did not rate on numeric scale, waffle cushion placed in chair prior to sitting up  -EM     Pain Intervention(s) Repositioned  -EM     Row Name 04/20/23 1505          Plan of Care Review    Plan of Care Reviewed With patient  -EM     Outcome Evaluation Patient awake and alert, agreeable to therapy. patient performed bed mobility with CGA, sit to stand with CGA, ambulated short distance from bed to chair with CGA and rwx. Pt demonstrates slow but steady gait, fatigues quickly but overall demonstrates improvement in mobility and activity tolerance.  -EM     Row Name 04/20/23 1505          Positioning and Restraints    Pre-Treatment Position in bed  -EM     Post Treatment Position chair  -EM     In Chair reclined;call light within reach;with family/caregiver;with nsg  -EM           User Key  (r) = Recorded By, (t) = Taken By, (c) = Cosigned By    Initials Name Provider Type    EM Jennie Cuevas, PT Physical Therapist               Outcome Measures     Row Name 04/20/23 1507 04/20/23 1222       How much help from another person do you currently need...    Turning from your back to your side while in flat bed without using bedrails? 3  -EM 2  -CB    Moving from lying on back to sitting on the side of a flat bed without bedrails? 3  -EM 2  -CB    Moving to and from a bed to a chair (including a wheelchair)? 3  -EM 2  -CB    Standing up from a chair using your arms (e.g., wheelchair, bedside chair)? 3  -EM 2  -CB    Climbing 3-5 steps with a railing? 2  -EM 2  -CB    To walk in hospital room? 3  -EM 2  -CB    AM-PAC 6 Clicks Score (PT) 17  -EM 12  -CB    Highest level of mobility 5 --> Static standing  -EM 4 " --> Transferred to chair/commode  -    Row Name 04/20/23 1445          Functional Assessment    Outcome Measure Options AM-PAC 6 Clicks Daily Activity (OT)  -BELEN           User Key  (r) = Recorded By, (t) = Taken By, (c) = Cosigned By    Initials Name Provider Type    Jennie Angulo, PT Physical Therapist    Evelyn Juan, RN Registered Nurse    Elly Rodríguez, OT Occupational Therapist                             Physical Therapy Education     Title: PT OT SLP Therapies (Done)     Topic: Physical Therapy (Done)     Point: Mobility training (Done)     Learning Progress Summary           Patient Acceptance, E, VU by EM at 4/20/2023 1507    Acceptance, E, VU by  at 4/19/2023 1815    Acceptance, E, VU by EM at 4/19/2023 1156    Acceptance, E, NR by AR at 4/16/2023 1633    Acceptance, TB,E, VU by  at 4/11/2023 0900   Family Acceptance, E, VU by  at 4/19/2023 1815    Acceptance, E, NR by AR at 4/16/2023 1633                   Point: Home exercise program (Done)     Learning Progress Summary           Patient Acceptance, E, VU by EM at 4/20/2023 1507    Acceptance, E, VU by  at 4/19/2023 1815    Acceptance, E, VU by  at 4/19/2023 1156    Acceptance, E, NR by AR at 4/16/2023 1633    Acceptance, TB,E, VU by  at 4/11/2023 0900   Family Acceptance, E, VU by  at 4/19/2023 1815    Acceptance, E, NR by AR at 4/16/2023 1633                   Point: Body mechanics (Done)     Learning Progress Summary           Patient Acceptance, E, VU by  at 4/19/2023 1815    Acceptance, E, NR by AR at 4/16/2023 1633    Acceptance, TB,E, VU by TH at 4/11/2023 0900   Family Acceptance, E, VU by  at 4/19/2023 1815    Acceptance, E, NR by AR at 4/16/2023 1633                   Point: Precautions (Done)     Learning Progress Summary           Patient Acceptance, E, VU by  at 4/19/2023 1815    Acceptance, E, NR by AR at 4/16/2023 1633    Acceptance, TB,E, VU by  at 4/11/2023 0900   Family Acceptance, E, VU by  at  4/19/2023 1815    Acceptance, E, NR by AR at 4/16/2023 1633                               User Key     Initials Effective Dates Name Provider Type Discipline    EM 06/16/21 -  Jennie Cuevas, PT Physical Therapist PT    AR 06/16/21 -  Mihaela Shields PT Physical Therapist PT     06/16/21 -  Kacy Deluca, RN Registered Nurse Nurse     12/20/21 -  Mitesh Sage, RN Registered Nurse Nurse              PT Recommendation and Plan     Plan of Care Reviewed With: patient  Outcome Evaluation: Patient awake and alert, agreeable to therapy. patient performed bed mobility with CGA, sit to stand with CGA, ambulated short distance from bed to chair with CGA and rwx. Pt demonstrates slow but steady gait, fatigues quickly but overall demonstrates improvement in mobility and activity tolerance.     Time Calculation:    PT Charges     Row Name 04/20/23 1508             Time Calculation    Start Time 1414  -EM      Stop Time 1428  -EM      Time Calculation (min) 14 min  -EM      PT Received On 04/20/23  -EM      PT - Next Appointment 04/21/23  -EM         Time Calculation- PT    Total Timed Code Minutes- PT 14 minute(s)  -EM         Timed Charges    19594 - PT Therapeutic Exercise Minutes 5  -EM      73997 - PT Therapeutic Activity Minutes 9  -EM         Total Minutes    Timed Charges Total Minutes 14  -EM       Total Minutes 14  -EM            User Key  (r) = Recorded By, (t) = Taken By, (c) = Cosigned By    Initials Name Provider Type    EM Jennie Cuevas, PT Physical Therapist              Therapy Charges for Today     Code Description Service Date Service Provider Modifiers Qty    28055255800 HC PT THER PROC EA 15 MIN 4/19/2023 Jennie Cuevas, PT GP 1    98726456701 HC PT THERAPEUTIC ACT EA 15 MIN 4/19/2023 Jennie Cuevas, PT GP 1    73663915370 HC PT THERAPEUTIC ACT EA 15 MIN 4/20/2023 Jennie Cuevas, PT GP 1          PT G-Codes  Outcome Measure Options: AM-PAC 6 Clicks Daily Activity  (OT)  AM-PAC 6 Clicks Score (PT): 17  AM-PAC 6 Clicks Score (OT): 15       Jennie Cuevas, PT  4/20/2023

## 2023-04-20 NOTE — PROGRESS NOTES
[facility]  [phone]  Dr. Jim Aanya    Subjective:  LOS: 12    Chief Complaint: Hypotension    Patient is status post CABG.  Back in afib.    Objective   Vital Signs past 24hrs  Temp range: Temp (24hrs), Av.8 °F (36.6 °C), Min:97.3 °F (36.3 °C), Max:98.5 °F (36.9 °C)    BP range: BP: (106-140)/() 121/59  Pulse range: Heart Rate:  [] 124  Resp rate range: Resp:  [15-22] 18  Device (Oxygen Therapy): room air   Oxygen range:SpO2:  [94 %-100 %] 94 %     Physical Exam  Constitutional:       Appearance: He is obese.   Eyes:      Pupils: Pupils are equal, round, and reactive to light.   Cardiovascular:      Rate and Rhythm: Tachycardia present. Rhythm irregular.      Heart sounds: Normal heart sounds.   Pulmonary:      Effort: Pulmonary effort is normal.      Breath sounds: Normal breath sounds.      Comments: Mediastinal chest tube  Abdominal:      General: Bowel sounds are normal.      Palpations: Abdomen is soft. There is no mass.      Tenderness: There is no abdominal tenderness.   Musculoskeletal:         General: No swelling.   Neurological:      Mental Status: He is alert.       Results Review:    I have reviewed the laboratory and imaging data since the last note by [facility] physician.  My annotations are noted in assessment and plan.      Result Review:  I have personally reviewed the results from last note by [facility] physician to 2023 10:35 EDT and agree with these findings:  [x]  Laboratory list / accordion  [x]  Microbiology  [x]  Radiology  [x]  EKG/Telemetry    [x]  Cardiology/Vascular   []  Pathology  []  Old records  []  Other:    Medication Review:  I have reviewed the current MAR.  My annotations are noted in assessment and plan.    acetylcysteine, 3 mL, Nebulization, TID - RT  amiodarone, 200 mg, Oral, Q12H  aspirin, 81 mg, Oral, Daily  atorvastatin, 40 mg, Oral, Nightly  DULoxetine, 30 mg, Oral, Nightly  enoxaparin, 30 mg, Subcutaneous, Q24H  epoetin bernice/bernice-epbx,  6,000 Units, Intravenous, Once per day on Mon Wed Fri  insulin lispro, 0-9 Units, Subcutaneous, 4x Daily With Meals & Nightly  ipratropium-albuterol, 3 mL, Nebulization, Q6H While Awake - RT  iron polysaccharides, 150 mg, Oral, Daily  metoprolol tartrate, 12.5 mg, Oral, Q12H  midodrine, 5 mg, Oral, BID AC  mupirocin, , Each Nare, BID  pantoprazole, 40 mg, Oral, QAM  senna-docusate sodium, 2 tablet, Oral, BID        sodium chloride, 9 mL/hr, Last Rate: 9 mL/hr (04/15/23 1624)  vasopressin, 0.05 Units/min, Last Rate: Stopped (04/18/23 0521)      Lines, Drains & Airways     Active LDAs     Name Placement date Placement time Site Days    CVC Double Lumen 04/13/23 Left Internal jugular 04/13/23  1325  created via procedure documentation  Internal jugular  3    Peripheral IV 04/08/23 1700 Anterior;Distal;Right Forearm 04/08/23  1700  Forearm  8    Y Chest Tube 1 and 2 1 Mediastinal 28 Fr. 2 Left Pleural 28 Fr. 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    A-fib with RVR and back in afib with tachycardia. Adjust meds per cardiology.    Cardiomyopathy with EF of 30%.  With bilateral pleural effusions and should improve with HD.    Acute rhinovirus infection and symptomatic treatment for same.  On nebs as well  as acetylcysteine to clear secretions.    Anemia expected postop and also from underlying kidney disease.  No evidence of active bleeding.    Low platelets of uncertain etiology (from tiburcio-clip?).  HIT negative.    Diabetes mellitus on sliding scale and with control blood sugars.    Pending bed. ICU team will not follow patient out of units.    Jim Anaya MD  04/20/23  10:35 EDT      Part of this note may be an electronic transcription/translation of spoken language to printed text using the Dragon Dictation System.

## 2023-04-21 ENCOUNTER — APPOINTMENT (OUTPATIENT)
Dept: GENERAL RADIOLOGY | Facility: HOSPITAL | Age: 73
DRG: 216 | End: 2023-04-21
Payer: MEDICARE

## 2023-04-21 LAB
ALBUMIN SERPL-MCNC: 2.8 G/DL (ref 3.5–5.2)
ANION GAP SERPL CALCULATED.3IONS-SCNC: 15.1 MMOL/L (ref 5–15)
BACTERIA SPEC RESP CULT: NORMAL
BILIRUB UR QL STRIP: NEGATIVE
BUN SERPL-MCNC: 24 MG/DL (ref 8–23)
BUN/CREAT SERPL: 7.2 (ref 7–25)
CALCIUM SPEC-SCNC: 9.1 MG/DL (ref 8.6–10.5)
CHLORIDE SERPL-SCNC: 99 MMOL/L (ref 98–107)
CLARITY UR: ABNORMAL
CO2 SERPL-SCNC: 23.9 MMOL/L (ref 22–29)
COLOR UR: YELLOW
CREAT SERPL-MCNC: 3.34 MG/DL (ref 0.76–1.27)
DEPRECATED RDW RBC AUTO: 51.3 FL (ref 37–54)
EGFRCR SERPLBLD CKD-EPI 2021: 18.7 ML/MIN/1.73
ERYTHROCYTE [DISTWIDTH] IN BLOOD BY AUTOMATED COUNT: 15.7 % (ref 12.3–15.4)
GLUCOSE BLDC GLUCOMTR-MCNC: 108 MG/DL (ref 70–130)
GLUCOSE BLDC GLUCOMTR-MCNC: 117 MG/DL (ref 70–130)
GLUCOSE BLDC GLUCOMTR-MCNC: 120 MG/DL (ref 70–130)
GLUCOSE BLDC GLUCOMTR-MCNC: 92 MG/DL (ref 70–130)
GLUCOSE SERPL-MCNC: 134 MG/DL (ref 65–99)
GLUCOSE UR STRIP-MCNC: ABNORMAL MG/DL
GRAM STN SPEC: NORMAL
HCT VFR BLD AUTO: 33.9 % (ref 37.5–51)
HGB BLD-MCNC: 10.5 G/DL (ref 13–17.7)
HGB UR QL STRIP.AUTO: ABNORMAL
KETONES UR QL STRIP: NEGATIVE
LEUKOCYTE ESTERASE UR QL STRIP.AUTO: ABNORMAL
MCH RBC QN AUTO: 27.8 PG (ref 26.6–33)
MCHC RBC AUTO-ENTMCNC: 31 G/DL (ref 31.5–35.7)
MCV RBC AUTO: 89.7 FL (ref 79–97)
NITRITE UR QL STRIP: NEGATIVE
PH UR STRIP.AUTO: 8.5 [PH] (ref 5–8)
PHOSPHATE SERPL-MCNC: 2.6 MG/DL (ref 2.5–4.5)
PLATELET # BLD AUTO: 142 10*3/MM3 (ref 140–450)
PMV BLD AUTO: 9.8 FL (ref 6–12)
POTASSIUM SERPL-SCNC: 4.3 MMOL/L (ref 3.5–5.2)
PROT UR QL STRIP: ABNORMAL
QT INTERVAL: 327 MS
QT INTERVAL: 380 MS
QT INTERVAL: 382 MS
RBC # BLD AUTO: 3.78 10*6/MM3 (ref 4.14–5.8)
SODIUM SERPL-SCNC: 138 MMOL/L (ref 136–145)
SP GR UR STRIP: 1.01 (ref 1–1.03)
UROBILINOGEN UR QL STRIP: ABNORMAL
WBC NRBC COR # BLD: 18.12 10*3/MM3 (ref 3.4–10.8)

## 2023-04-21 PROCEDURE — 87077 CULTURE AEROBIC IDENTIFY: CPT | Performed by: NURSE PRACTITIONER

## 2023-04-21 PROCEDURE — 25010000002 ENOXAPARIN PER 10 MG: Performed by: NURSE PRACTITIONER

## 2023-04-21 PROCEDURE — 94799 UNLISTED PULMONARY SVC/PX: CPT

## 2023-04-21 PROCEDURE — 81001 URINALYSIS AUTO W/SCOPE: CPT | Performed by: THORACIC SURGERY (CARDIOTHORACIC VASCULAR SURGERY)

## 2023-04-21 PROCEDURE — 87205 SMEAR GRAM STAIN: CPT | Performed by: THORACIC SURGERY (CARDIOTHORACIC VASCULAR SURGERY)

## 2023-04-21 PROCEDURE — 71045 X-RAY EXAM CHEST 1 VIEW: CPT

## 2023-04-21 PROCEDURE — 99232 SBSQ HOSP IP/OBS MODERATE 35: CPT | Performed by: INTERNAL MEDICINE

## 2023-04-21 PROCEDURE — 93005 ELECTROCARDIOGRAM TRACING: CPT | Performed by: INTERNAL MEDICINE

## 2023-04-21 PROCEDURE — 82962 GLUCOSE BLOOD TEST: CPT

## 2023-04-21 PROCEDURE — 25010000002 DIGOXIN PER 500 MCG: Performed by: INTERNAL MEDICINE

## 2023-04-21 PROCEDURE — 97530 THERAPEUTIC ACTIVITIES: CPT

## 2023-04-21 PROCEDURE — 87086 URINE CULTURE/COLONY COUNT: CPT | Performed by: NURSE PRACTITIONER

## 2023-04-21 PROCEDURE — 85027 COMPLETE CBC AUTOMATED: CPT | Performed by: INTERNAL MEDICINE

## 2023-04-21 PROCEDURE — 93010 ELECTROCARDIOGRAM REPORT: CPT | Performed by: INTERNAL MEDICINE

## 2023-04-21 PROCEDURE — 80069 RENAL FUNCTION PANEL: CPT | Performed by: INTERNAL MEDICINE

## 2023-04-21 PROCEDURE — 99024 POSTOP FOLLOW-UP VISIT: CPT | Performed by: THORACIC SURGERY (CARDIOTHORACIC VASCULAR SURGERY)

## 2023-04-21 PROCEDURE — 87186 SC STD MICRODIL/AGAR DIL: CPT | Performed by: NURSE PRACTITIONER

## 2023-04-21 PROCEDURE — 87040 BLOOD CULTURE FOR BACTERIA: CPT | Performed by: THORACIC SURGERY (CARDIOTHORACIC VASCULAR SURGERY)

## 2023-04-21 PROCEDURE — 25010000002 EPOETIN ALFA-EPBX 3000 UNIT/ML SOLUTION: Performed by: INTERNAL MEDICINE

## 2023-04-21 PROCEDURE — 25010000002 HEPARIN (PORCINE) PER 1000 UNITS: Performed by: INTERNAL MEDICINE

## 2023-04-21 RX ORDER — DIGOXIN 125 MCG
62.5 TABLET ORAL EVERY OTHER DAY
Status: DISCONTINUED | OUTPATIENT
Start: 2023-04-21 | End: 2023-04-22

## 2023-04-21 RX ORDER — HEPARIN SODIUM 1000 [USP'U]/ML
3800 INJECTION, SOLUTION INTRAVENOUS; SUBCUTANEOUS ONCE
Status: COMPLETED | OUTPATIENT
Start: 2023-04-21 | End: 2023-04-21

## 2023-04-21 RX ORDER — TRAZODONE HYDROCHLORIDE 50 MG/1
50 TABLET ORAL NIGHTLY
Status: DISCONTINUED | OUTPATIENT
Start: 2023-04-21 | End: 2023-04-24

## 2023-04-21 RX ADMIN — POLYETHYLENE GLYCOL 3350 17 G: 17 POWDER, FOR SOLUTION ORAL at 21:32

## 2023-04-21 RX ADMIN — DIGOXIN 250 MCG: 0.25 INJECTION INTRAMUSCULAR; INTRAVENOUS at 08:17

## 2023-04-21 RX ADMIN — IPRATROPIUM BROMIDE AND ALBUTEROL SULFATE 3 ML: 2.5; .5 SOLUTION RESPIRATORY (INHALATION) at 19:27

## 2023-04-21 RX ADMIN — HEPARIN SODIUM 3800 UNITS: 1000 INJECTION INTRAVENOUS; SUBCUTANEOUS at 12:25

## 2023-04-21 RX ADMIN — BISACODYL 10 MG: 5 TABLET ORAL at 19:06

## 2023-04-21 RX ADMIN — DULOXETINE HYDROCHLORIDE 30 MG: 30 CAPSULE, DELAYED RELEASE ORAL at 22:07

## 2023-04-21 RX ADMIN — DIGOXIN 250 MCG: 0.25 INJECTION INTRAMUSCULAR; INTRAVENOUS at 03:18

## 2023-04-21 RX ADMIN — Medication 150 MG: at 08:17

## 2023-04-21 RX ADMIN — ENOXAPARIN SODIUM 30 MG: 100 INJECTION SUBCUTANEOUS at 15:34

## 2023-04-21 RX ADMIN — ATORVASTATIN CALCIUM 40 MG: 20 TABLET, FILM COATED ORAL at 21:32

## 2023-04-21 RX ADMIN — MUPIROCIN: 20 OINTMENT TOPICAL at 09:21

## 2023-04-21 RX ADMIN — ACETYLCYSTEINE 3 ML: 200 SOLUTION ORAL; RESPIRATORY (INHALATION) at 15:15

## 2023-04-21 RX ADMIN — DOCUSATE SODIUM 50MG AND SENNOSIDES 8.6MG 2 TABLET: 8.6; 5 TABLET, FILM COATED ORAL at 21:32

## 2023-04-21 RX ADMIN — DIGOXIN 62.5 MCG: 125 TABLET ORAL at 12:03

## 2023-04-21 RX ADMIN — MIDODRINE HYDROCHLORIDE 5 MG: 5 TABLET ORAL at 06:09

## 2023-04-21 RX ADMIN — DOCUSATE SODIUM 50MG AND SENNOSIDES 8.6MG 2 TABLET: 8.6; 5 TABLET, FILM COATED ORAL at 08:17

## 2023-04-21 RX ADMIN — MUPIROCIN 1 APPLICATION: 20 OINTMENT TOPICAL at 21:32

## 2023-04-21 RX ADMIN — MIDODRINE HYDROCHLORIDE 5 MG: 5 TABLET ORAL at 19:06

## 2023-04-21 RX ADMIN — ACETYLCYSTEINE 3 ML: 200 SOLUTION ORAL; RESPIRATORY (INHALATION) at 19:27

## 2023-04-21 RX ADMIN — METOPROLOL TARTRATE 25 MG: 25 TABLET, FILM COATED ORAL at 21:32

## 2023-04-21 RX ADMIN — ASPIRIN 81 MG: 81 TABLET, COATED ORAL at 08:17

## 2023-04-21 RX ADMIN — ACETYLCYSTEINE 3 ML: 200 SOLUTION ORAL; RESPIRATORY (INHALATION) at 08:48

## 2023-04-21 RX ADMIN — EPOETIN ALFA-EPBX 6000 UNITS: 3000 INJECTION, SOLUTION INTRAVENOUS; SUBCUTANEOUS at 12:25

## 2023-04-21 RX ADMIN — IPRATROPIUM BROMIDE AND ALBUTEROL SULFATE 3 ML: 2.5; .5 SOLUTION RESPIRATORY (INHALATION) at 08:48

## 2023-04-21 RX ADMIN — IPRATROPIUM BROMIDE AND ALBUTEROL SULFATE 3 ML: 2.5; .5 SOLUTION RESPIRATORY (INHALATION) at 15:15

## 2023-04-21 RX ADMIN — PANTOPRAZOLE SODIUM 40 MG: 40 TABLET, DELAYED RELEASE ORAL at 06:05

## 2023-04-21 RX ADMIN — METOPROLOL TARTRATE 25 MG: 25 TABLET, FILM COATED ORAL at 00:03

## 2023-04-21 NOTE — NURSING NOTE
Cwon consult received. Patient's skin to sacrum and buttocks were assessed. Skin is dry and intact and over each buttock there is blanchable erythema/maroon. Skin changes are likely 2° to a combination of both friction and moisture and not pressure as edges are irregular and not over a bony prominence.  He is currently on a specialty bed and I stressed with CNA and with spouse at bedside the importance of frequent repositioning. Of note, documentation supports that pt is often refusing.     Cwon recommends barrier ointment to buttocks, Q2 turns and floating heels at all times.I will add skin orders as well as prevention measures.    Thank you for consult and please call with any questions.

## 2023-04-21 NOTE — NURSING NOTE
Cwon note: patient is receiving dialysis and is unavailable to assess at this time.  Will attempt later this afternoon.

## 2023-04-21 NOTE — PROGRESS NOTES
[facility] Progress Note      Patient Name: Lefty Cai  : 1950  MRN: [id number]  Primary Care Physician:  Daksha Ng APRN  Date of admission: 2023    Subjective     Interval History:   Follow up ESRD.      The patient is more awake and alert, he is up in chair, denies any chest pain or shortness of air, no orthopnea or PND, no nausea or vomiting.    Review of Systems:   As noted above    Objective     Vitals:   Temp:  [97.5 °F (36.4 °C)-98.1 °F (36.7 °C)] 97.6 °F (36.4 °C)  Heart Rate:  [] 79  Resp:  [18-20] 20  BP: ()/(65-78) 107/65    Intake/Output Summary (Last 24 hours) at 2023 0734  Last data filed at 2023 0420  Gross per 24 hour   Intake 460 ml   Output 525 ml   Net -65 ml       Physical Exam:    General Appearance: alert,  no acute distress . Very weak, fatigued.   Skin: warm and dry  HEENT: oral mucosa normal, nonicteric sclera  Neck: TDC RIJ catheter with exit site below the right clavicle. Dressing loose .  Lungs: Coarse bs, upper airway rhonchi. Unlabored. Mediastinal chest tubes .  Heart: Regular rate and rhythm, no rub  Abdomen: soft, nontender, slightly distended.  Normoactive bowel  Extremities: No ankle edema. Upper and lower ext edema. LUE AVF   Neuro: normal speech and mental status     Scheduled Meds:     acetylcysteine, 3 mL, Nebulization, TID - RT  aspirin, 81 mg, Oral, Daily  atorvastatin, 40 mg, Oral, Nightly  digoxin, 250 mcg, Intravenous, Q6H  DULoxetine, 30 mg, Oral, Nightly  enoxaparin, 30 mg, Subcutaneous, Q24H  epoetin bernice/bernice-epbx, 6,000 Units, Intravenous, Once per day on   insulin lispro, 0-9 Units, Subcutaneous, 4x Daily With Meals & Nightly  ipratropium-albuterol, 3 mL, Nebulization, Q6H While Awake - RT  iron polysaccharides, 150 mg, Oral, Daily  metoprolol tartrate, 25 mg, Oral, Q12H  midodrine, 5 mg, Oral, BID AC  mupirocin, , Each Nare, BID  pantoprazole, 40 mg, Oral, QAM  senna-docusate  sodium, 2 tablet, Oral, BID      IV Meds:   sodium chloride, 9 mL/hr, Last Rate: 9 mL/hr (04/15/23 1624)  vasopressin, 0.05 Units/min, Last Rate: Stopped (04/18/23 0545)        Results Reviewed:   I have personally reviewed the results from the time of this admission to 4/21/2023 07:34 EDT     Results from last 7 days   Lab Units 04/20/23  0324 04/19/23  0316 04/18/23 0328 04/17/23  1711 04/16/23  0410 04/15/23  1125   SODIUM mmol/L 136 138 137 142   < > 142   POTASSIUM mmol/L 3.8 4.0 4.5 3.9   < > 4.5   CHLORIDE mmol/L 99 101 101 104   < > 103   CO2 mmol/L 23.3 19.8* 21.0* 21.9*   < > 23.0   BUN mg/dL 28* 48* 33* 22   < > 30*   CREATININE mg/dL 4.25* 5.34* 4.38* 2.68*   < > 4.11*   CALCIUM mg/dL 8.3* 8.7 8.3* 8.5*   < > 8.6   BILIRUBIN mg/dL 0.6  --   --  0.5  --  0.4   ALK PHOS U/L 101  --   --  107  --  38*   ALT (SGPT) U/L 16  --   --  41  --  <5   AST (SGOT) U/L 66*  --   --  132*  --  29   GLUCOSE mg/dL 95 99 184* 114*   < > 159*    < > = values in this interval not displayed.       Estimated Creatinine Clearance: 18.7 mL/min (A) (by C-G formula based on SCr of 4.25 mg/dL (H)).    Results from last 7 days   Lab Units 04/20/23  0324 04/19/23  0316 04/18/23 0328 04/16/23  0410   MAGNESIUM mg/dL 2.0  --  2.3 2.5*   PHOSPHORUS mg/dL 2.6 4.3 3.9  --              Results from last 7 days   Lab Units 04/20/23  0324 04/19/23  0316 04/18/23  0328 04/17/23  1711 04/17/23  0308   WBC 10*3/mm3 11.68* 9.46 10.84* 12.00* 11.06*   HEMOGLOBIN g/dL 10.0* 9.9* 9.8* 10.2* 9.3*   PLATELETS 10*3/mm3 120* 94* 77* 82* 69*       Results from last 7 days   Lab Units 04/14/23  1246   INR  1.40*       Assessment / Plan     ASSESSMENT:  1. ESRD.  Be getting dialysis today.    2. NSTEMI. SP 3 vessel CABG, MV repair 4/13/23.  3.  Postop atrial fibrillation currently in normal sinus rhythm  4. Anemia CKD and blood loss.  Hemoglobin yesterday was 10  5. TCP.  Negative HIT panel, platelets improving slowly yesterday was up to 120,000  6.  DM2 with renal complex     PLAN:  1. Dialysis today  2.  Surveillance labs      Quang Reyes MD  04/21/23  07:34 EDT    [facility]  [phone]

## 2023-04-21 NOTE — PLAN OF CARE
Goal Outcome Evaluation:  Plan of Care Reviewed With: patient        Progress: no change   72 yr old male Post op day 8 from Cabg and MVR VSS Hr 85 NSR. Room air no o2 needed.MS incision well approximated no signs of infection noted.  External pacer wire removed as ordered. Hemo dialysis done at bedside. Pt reports not sleeping last night so sleepy today. Will continue to monitor

## 2023-04-21 NOTE — PROGRESS NOTES
Name: Lefty Cai ADMIT: 2023   : 1950  PCP: BereniceDaksha, DUSTY    MRN: [id number] LOS: 13 days   AGE/SEX: 72 y.o. male  ROOM:      Subjective   Subjective   Lying in bed. Wife at bedside. She states that the patient has been more sleepy today. Apparently did not sleep well last night and then had HD which usually wipes him out. He denies any complaints including chest pain, dyspnea, cough, fever, chills, nausea or vomiting. Not eating as much today. Wife asking about the protein shakes. States she has been asking for them but they are never brought to them. They are both very interested in acute rehab.     Objective   Objective   Vital Signs  Temp:  [97.5 °F (36.4 °C)-98 °F (36.7 °C)] 98 °F (36.7 °C)  Heart Rate:  [] 90  Resp:  [18-20] 18  BP: ()/(53-78) 90/53  SpO2:  [95 %-100 %] 100 %  on   ;   Device (Oxygen Therapy): room air  Body mass index is 29.02 kg/m².     Physical Exam  Vitals and nursing note reviewed.   Constitutional:       Appearance: He is ill-appearing. He is not toxic-appearing.   Cardiovascular:      Rate and Rhythm: Normal rate. Rhythm irregular.      Pulses: Normal pulses.   Pulmonary:      Effort: Pulmonary effort is normal. No respiratory distress.      Comments: Diminished, on RA. Poor inspiratory effort  Abdominal:      General: Bowel sounds are normal. There is no distension.      Palpations: Abdomen is soft.      Tenderness: There is no abdominal tenderness.   Musculoskeletal:         General: Swelling present. No deformity. Normal range of motion.   Skin:     General: Skin is warm and dry.      Findings: No bruising.      Comments: Midline sternal incision C/D/I   Neurological:      Mental Status: He is alert and oriented to person, place, and time.      Sensory: No sensory deficit.      Motor: Weakness present.      Coordination: Coordination normal.   Psychiatric:         Mood and Affect: Mood normal.         Behavior: Behavior normal.      Results Review:       I reviewed the patient's new clinical results.  Results from last 7 days   Lab Units 04/20/23 0324 04/19/23 0316 04/18/23 0328 04/17/23 1711   WBC 10*3/mm3 11.68* 9.46 10.84* 12.00*   HEMOGLOBIN g/dL 10.0* 9.9* 9.8* 10.2*   PLATELETS 10*3/mm3 120* 94* 77* 82*     Results from last 7 days   Lab Units 04/20/23 0324 04/19/23 0316 04/18/23 0328 04/17/23  1711   SODIUM mmol/L 136 138 137 142   POTASSIUM mmol/L 3.8 4.0 4.5 3.9   CHLORIDE mmol/L 99 101 101 104   CO2 mmol/L 23.3 19.8* 21.0* 21.9*   BUN mg/dL 28* 48* 33* 22   CREATININE mg/dL 4.25* 5.34* 4.38* 2.68*   GLUCOSE mg/dL 95 99 184* 114*   Estimated Creatinine Clearance: 18.7 mL/min (A) (by C-G formula based on SCr of 4.25 mg/dL (H)).  Results from last 7 days   Lab Units 04/20/23  0324 04/19/23  0316 04/18/23  0328 04/17/23  1711 04/15/23  1125 04/15/23  0300 04/14/23  1639   ALBUMIN g/dL 2.6* 2.9* 3.0* 3.1* 3.2*   < > 3.4*   BILIRUBIN mg/dL 0.6  --   --  0.5 0.4  --  0.4   ALK PHOS U/L 101  --   --  107 38*  --  32*   AST (SGOT) U/L 66*  --   --  132* 29  --  44*   ALT (SGPT) U/L 16  --   --  41 <5  --  8    < > = values in this interval not displayed.     Results from last 7 days   Lab Units 04/20/23 0324 04/19/23 0316 04/18/23 0328 04/17/23 1711 04/17/23  0308 04/16/23  0410 04/15/23  1125 04/15/23  0300   CALCIUM mg/dL 8.3* 8.7 8.3* 8.5*   < > 8.7   < > 8.2*   ALBUMIN g/dL 2.6* 2.9* 3.0* 3.1*  --   --    < > 3.3*   MAGNESIUM mg/dL 2.0  --  2.3  --   --  2.5*  --  2.1   PHOSPHORUS mg/dL 2.6 4.3 3.9  --   --   --   --  4.6*    < > = values in this interval not displayed.       Glucose   Date/Time Value Ref Range Status   04/21/2023 1053 92 70 - 130 mg/dL Final     Comment:     Meter: [id number] : [id number] Patric ROSALES   04/21/2023 0623 117 70 - 130 mg/dL Final     Comment:     Meter: [id number] : [id number] Steven ROSALES   04/20/2023 2051 143 (H) 70 - 130 mg/dL Final     Comment:     Meter: [id number]  : [id number] Steven Jorgensen NA   04/20/2023 1552 126 70 - 130 mg/dL Final     Comment:     Meter: [id number] : [id number] Vi Braga NA   04/20/2023 1337 127 70 - 130 mg/dL Final     Comment:     Meter: [id number] : [id number] Patric Kirk NA   04/20/2023 0734 96 70 - 130 mg/dL Final     Comment:     Meter: [id number] : [id number] Armando Matamoros NA   04/19/2023 2106 116 70 - 130 mg/dL Final     Comment:     Meter: [id number] : [id number] Alessandra Barton RN       acetylcysteine, 3 mL, Nebulization, TID - RT  aspirin, 81 mg, Oral, Daily  atorvastatin, 40 mg, Oral, Nightly  digoxin, 62.5 mcg, Oral, Every Other Day  DULoxetine, 30 mg, Oral, Nightly  enoxaparin, 30 mg, Subcutaneous, Q24H  epoetin bernice/bernice-epbx, 6,000 Units, Intravenous, Once per day on Mon Wed Fri  insulin lispro, 0-9 Units, Subcutaneous, 4x Daily With Meals & Nightly  ipratropium-albuterol, 3 mL, Nebulization, Q6H While Awake - RT  iron polysaccharides, 150 mg, Oral, Daily  metoprolol tartrate, 25 mg, Oral, Q12H  midodrine, 5 mg, Oral, BID AC  mupirocin, , Each Nare, BID  pantoprazole, 40 mg, Oral, QAM  senna-docusate sodium, 2 tablet, Oral, BID      sodium chloride, 9 mL/hr, Last Rate: 9 mL/hr (04/15/23 1624)  vasopressin, 0.05 Units/min, Last Rate: Stopped (04/18/23 0545)    Diet: Cardiac Diets, Diabetic Diets; Healthy Heart (2-3 Na+); Consistent Carbohydrate; Texture: Regular Texture (IDDSI 7); Fluid Consistency: Thin (IDDSI 0)       Assessment/Plan     Active Hospital Problems    Diagnosis  POA   • **NSTEMI (non-ST elevated myocardial infarction) [I21.4]  Yes   • Rhinovirus [B34.8]  Yes   • Dyspnea on exertion [R06.09]  Yes   • Abnormal findings on diagnostic imaging of heart and coronary circulation [R93.1]  Yes   • Type 2 diabetes mellitus with diabetic chronic kidney disease [E11.22]  Yes   • Essential hypertension [I10]  Yes   • Hyperlipidemia [E78.5]  Yes   • ESRD (end stage renal disease) [N18.6]  Yes      Resolved Hospital  Problems    Diagnosis Date Resolved POA   • Hypernatremia [E87.0] 04/19/2023 Yes     Mr. Cai is a 72 year old male who presented to the hospital with complaints of increased dyspnea in the setting of ESRD with HD compliance. He was found to have new T wave inversions with troponin elevation and taken for heart cath that revealed severe three vessel CAD with occluded right coronary system. Cardiothoracic surgery was consulted for CABG and RVP was obtained showing positive for rhinovirus. ID was consulted given need for OR who cleared for surgery without antibiotics. Timing was per surgeon preference. He was taken for open heart on 4/13. He had hypotension and atrial fibrillation with RVR following surgery. Moved out of CICU on 4/20.     • NSTEMI: S/P CABG x4 on 4/13. Cardiology and Cardiothoracic surgery managing. Echo 4/8 with EF 30-35%. On amiodarone and metoprolol with conversion of atrial fibrillation to NSR. Then had recurrence of afib RVR 4/20 so beta blocker increased and digoxin added instead. On ASA/statin. Now on CVU.     • Rhinovirus: Supportive care. ID saw and cleared for OR. Pulmonology followed (signed off 4/20). He is on Duonebs/Mucomyst.      • ESRD: Via TDC. Nephrology managing. HD today.     • DM2: Sugars very stable. Not requiring correctional insulin.     • HTN: BP stable on BB as well as midodrine.     Discussed with patient and wife. Asked nurse to inquire about protein shakes as well.    Hopefully to BAR once medically stable.     Appreciate all specialists.      VTE Prophylaxis - Lovenox 30 mg SC daily  Code Status - Full code  Disposition - Anticipate discharge TBD.     DUSTY Roberts  [facility]  04/21/23  14:35 EDT

## 2023-04-21 NOTE — THERAPY TREATMENT NOTE
Patient Name: Lefty Cai  : 1950    MRN: [id number]                              Today's Date: 2023       Admit Date: 2023    Visit Dx:     ICD-10-CM ICD-9-CM   1. Dyspnea on exertion  R06.09 786.09   2. NSTEMI (non-ST elevated myocardial infarction)  I21.4 410.70   3. ESRD (end stage renal disease)  N18.6 585.6   4. Former smoker  Z87.891 V15.82   5. Elevated blood pressure reading in office with diagnosis of hypertension  I10 401.9   6. Rhinovirus infection  B34.8 079.3   7. Abnormal findings on diagnostic imaging of heart and coronary circulation  R93.1 794.39   8. S/P CABG (coronary artery bypass graft)  Z95.1 V45.81     Patient Active Problem List   Diagnosis   • Type 2 diabetes mellitus with diabetic chronic kidney disease   • Essential hypertension   • Hyperlipidemia   • Primary insomnia   • ESRD (end stage renal disease)   • Vitamin D deficiency   • Diverticulitis of large intestine with perforation and abscess without bleeding   • Obesity (BMI 30-39.9)   • Impaired mobility and ADLs   • History of colon resection   • Colon polyps   • Diverticulosis   • CKD (chronic kidney disease) stage 5, GFR less than 15 ml/min   • Dyspnea on exertion   • NSTEMI (non-ST elevated myocardial infarction)   • Rhinovirus   • Abnormal findings on diagnostic imaging of heart and coronary circulation     Past Medical History:   Diagnosis Date   • Anemia    • Anesthesia complication     HYPOTENSION   • Arthritis    • Cancer of kidney, left    • CKD (chronic kidney disease)     STAGE 5   • Diabetes mellitus, type 2    • Fatigue    • GERD (gastroesophageal reflux disease)    • H/O renal cell carcinoma     partial nephrectomy   • History of colostomy reversal 2022   • [place] (hard of hearing)     NO DEVICE   • Hyperlipidemia    • Hypertension    • Primary insomnia 2016   • Proteinuria    • Risk factors for obstructive sleep apnea    • Sinus drainage    • Vitamin D deficiency 2017     Past  Surgical History:   Procedure Laterality Date   • ARTERIOVENOUS FISTULA/SHUNT SURGERY Left 08/15/2019    Procedure: LEFT MID FOREARM RADIAL CEPHALIC ARTERIAL VENOUS FISTULA;  Surgeon: Louann Miles Jr., MD;  Location: [facility] MAIN OR;  Service: Vascular   • CARDIAC CATHETERIZATION N/A 4/9/2023    Procedure: Left Heart Cath;  Surgeon: Lobito Garcia MD;  Location: [facility] CATH INVASIVE LOCATION;  Service: Cardiovascular;  Laterality: N/A;   • CARDIAC CATHETERIZATION N/A 4/9/2023    Procedure: Left ventriculography;  Surgeon: Lobito Garcia MD;  Location: [facility] CATH INVASIVE LOCATION;  Service: Cardiovascular;  Laterality: N/A;   • CARDIAC CATHETERIZATION N/A 4/9/2023    Procedure: Coronary angiography;  Surgeon: Lobito Garcia MD;  Location: [facility] CATH INVASIVE LOCATION;  Service: Cardiovascular;  Laterality: N/A;   • COLONOSCOPY  03/24/2022   • COLONOSCOPY N/A 03/24/2022    Procedure: COLONOSCOPY TO CECUM WITH POLYPECTOMY  (HOT SNARE);  Surgeon: Yelena Guerra MD;  Location: [facility] ENDOSCOPY;  Service: General;  Laterality: N/A;  PREOP/ HX OF DIVERTICULITIS, COLOSTOMY  POSTOP/ POLYPS, DIVERTICULOSIS    • COLOSTOMY  09/06/2021   • COLOSTOMY CLOSURE N/A 04/12/2022    Procedure: open Colostomy reversal;  Surgeon: Yelena Guerra MD;  Location: [facility] OR;  Service: General;  Laterality: N/A;   • CORONARY ARTERY BYPASS GRAFT N/A 4/13/2023    Procedure: MAT STERNOTOMY CORONARY ARTERY BYPASS GRAFT TIMES 4 USING LEFT INTERNAL MAMMARY ARTERY AND RIGHT GREATER SAPHENOUS VEIN  PER ENDOSCOPIC VEIN HARVESTING, MITRAL VALVE REPAIR  AND PRP;  Surgeon: Mirtha Zuluaga MD;  Location: [facility] CVOR;  Service: Cardiothoracic;  Laterality: N/A;   • DIAGNOSTIC LAPAROSCOPY N/A 2/27/2023    Procedure: DIAGNOSTIC LAPAROSCOPY;  Surgeon: Murali Ferreira MD;  Location: [facility] OR;  Service: General;  Laterality: N/A;   • EXPLORATORY LAPAROTOMY N/A 09/06/2021    Procedure: Open sigmoind colectomy,  colostomy, and appendectomy;  Surgeon: Yelena Guerra MD;  Location: [facility] MAIN OR;  Service: General;  Laterality: N/A;   • EYE SURGERY      CATARACTS   • KNEE ARTHROSCOPY Right    • NEPHRECTOMY PARTIAL  2007    Dr. Victor      General Information     Row Name 04/21/23 1623          Physical Therapy Time and Intention    Document Type therapy note (daily note)  -     Mode of Treatment individual therapy;physical therapy  -     Row Name 04/21/23 1623          General Information    Existing Precautions/Restrictions fall;cardiac;sternal  -     Row Name 04/21/23 1623          Cognition    Orientation Status (Cognition) oriented x 3  -     Row Name 04/21/23 1623          Safety Issues, Functional Mobility    Impairments Affecting Function (Mobility) endurance/activity tolerance;shortness of breath  -           User Key  (r) = Recorded By, (t) = Taken By, (c) = Cosigned By    Initials Name Provider Type     Kavitha Sen PT Physical Therapist               Mobility     Row Name 04/21/23 1623          Bed Mobility    Bed Mobility supine-sit;sit-supine  -     Supine-Sit [place] (Bed Mobility) verbal cues;nonverbal cues (demo/gesture);minimum assist (75% patient effort)  -     Sit-Supine [place] (Bed Mobility) verbal cues;nonverbal cues (demo/gesture);minimum assist (75% patient effort)  -     Assistive Device (Bed Mobility) bed rails;head of bed elevated  -     Row Name 04/21/23 1623          Sit-Stand Transfer    Sit-Stand [place] (Transfers) verbal cues;nonverbal cues (demo/gesture);contact guard  -     Assistive Device (Sit-Stand Transfers) walker, front-wheeled  -     Row Name 04/21/23 1623          Gait/Stairs (Locomotion)    [place] Level (Gait) verbal cues;nonverbal cues (demo/gesture);contact guard  -     Assistive Device (Gait) walker, front-wheeled  -     Distance in Feet (Gait) 25 ft + 10 ft + 10 ft  -     Deviations/Abnormal Patterns (Gait) stride  length decreased;gait speed decreased  -     Bilateral Gait Deviations forward flexed posture  -     Comment, (Gait/Stairs) gait distance limited by fatigue and bathroom needs  -           User Key  (r) = Recorded By, (t) = Taken By, (c) = Cosigned By    Initials Name Provider Type    Kavitha Da Silva, PT Physical Therapist               Obj/Interventions     Row Name 04/21/23 1624          Motor Skills    Therapeutic Exercise --  10 reps cardiac protocol  -           User Key  (r) = Recorded By, (t) = Taken By, (c) = Cosigned By    Initials Name Provider Type    Kavitha Da Silva, PT Physical Therapist               Goals/Plan    No documentation.                Clinical Impression     Row Name 04/21/23 1624          Pain    Pretreatment Pain Rating 0/10 - no pain  -     Posttreatment Pain Rating 2/10  -     Pain Location - chest  -     Pain Intervention(s) Repositioned  -     Row Name 04/21/23 1624          Plan of Care Review    Plan of Care Reviewed With patient;spouse  -     Outcome Evaluation Pt demonstrates some increased functional strength and activity tolerance as he was able to increase his gait distance and required less assistance. Pt reports he is very tired today after not sleeping well and having dialysis this AM. PT will continue to follow to address strength, mobility, and gait.  -     Row Name 04/21/23 1624          Positioning and Restraints    Pre-Treatment Position in bed  -     Post Treatment Position bed  -     In Bed supine;call light within reach;encouraged to call for assist;with family/caregiver  -           User Key  (r) = Recorded By, (t) = Taken By, (c) = Cosigned By    Initials Name Provider Type    Kavitha Da Silva, PT Physical Therapist               Outcome Measures     Row Name 04/21/23 1626          How much help from another person do you currently need...    Turning from your back to your side while in flat bed without using bedrails? 3   -CH     Moving from lying on back to sitting on the side of a flat bed without bedrails? 3  -CH     Moving to and from a bed to a chair (including a wheelchair)? 3  -CH     Standing up from a chair using your arms (e.g., wheelchair, bedside chair)? 3  -CH     Climbing 3-5 steps with a railing? 2  -CH     To walk in hospital room? 3  -CH     AM-PAC 6 Clicks Score (PT) 17  -CH     Highest level of mobility 5 --> Static standing  -     Row Name 04/21/23 1626          Functional Assessment    Outcome Measure Options AM-PAC 6 Clicks Basic Mobility (PT)  -           User Key  (r) = Recorded By, (t) = Taken By, (c) = Cosigned By    Initials Name Provider Type     Kavitha Sen PT Physical Therapist                             Physical Therapy Education     Title: PT OT SLP Therapies (Done)     Topic: Physical Therapy (Done)     Point: Mobility training (Done)     Learning Progress Summary           Patient Acceptance, E,TB,D, VU,NR by  at 4/21/2023 1626    Acceptance, E, VU by EM at 4/20/2023 1507    Acceptance, E, VU by  at 4/19/2023 1815    Acceptance, E, VU by EM at 4/19/2023 1156    Acceptance, E, NR by AR at 4/16/2023 1633    Acceptance, TB,E, VU by  at 4/11/2023 0900   Family Acceptance, E, VU by  at 4/19/2023 1815    Acceptance, E, NR by AR at 4/16/2023 1633                   Point: Home exercise program (Done)     Learning Progress Summary           Patient Acceptance, E,TB,D, VU,NR by  at 4/21/2023 1626    Acceptance, E, VU by EM at 4/20/2023 1507    Acceptance, E, VU by  at 4/19/2023 1815    Acceptance, E, VU by EM at 4/19/2023 1156    Acceptance, E, NR by AR at 4/16/2023 1633    Acceptance, TB,E, VU by  at 4/11/2023 0900   Family Acceptance, E, VU by  at 4/19/2023 1815    Acceptance, E, NR by AR at 4/16/2023 1633                   Point: Body mechanics (Done)     Learning Progress Summary           Patient Acceptance, E,TB,D, VU,NR by  at 4/21/2023 1626    Acceptance, E, VU by  at  4/19/2023 1815    Acceptance, E, NR by AR at 4/16/2023 1633    Acceptance, TB,E, VU by  at 4/11/2023 0900   Family Acceptance, E, VU by  at 4/19/2023 1815    Acceptance, E, NR by AR at 4/16/2023 1633                   Point: Precautions (Done)     Learning Progress Summary           Patient Acceptance, E,TB,D, VU,NR by  at 4/21/2023 1626    Acceptance, E, VU by  at 4/19/2023 1815    Acceptance, E, NR by AR at 4/16/2023 1633    Acceptance, TB,E, VU by  at 4/11/2023 0900   Family Acceptance, E, VU by  at 4/19/2023 1815    Acceptance, E, NR by AR at 4/16/2023 1633                               User Key     Initials Effective Dates Name Provider Type Discipline     06/16/21 -  Kavitha Sen, PT Physical Therapist PT    EM 06/16/21 -  Jennie Cuevas PT Physical Therapist PT    AR 06/16/21 -  Mihaela Shields PT Physical Therapist PT     06/16/21 -  Kacy Deluca, RN Registered Nurse Nurse     12/20/21 -  Mitesh Sage, RN Registered Nurse Nurse              PT Recommendation and Plan     Plan of Care Reviewed With: patient, spouse  Outcome Evaluation: Pt demonstrates some increased functional strength and activity tolerance as he was able to increase his gait distance and required less assistance. Pt reports he is very tired today after not sleeping well and having dialysis this AM. PT will continue to follow to address strength, mobility, and gait.     Time Calculation:    PT Charges     Row Name 04/21/23 1626             Time Calculation    Start Time 1435  -CH      Stop Time 1506  -CH      Time Calculation (min) 31 min  -CH      PT Received On 04/21/23  -      PT - Next Appointment 04/22/23  -         Time Calculation- PT    Total Timed Code Minutes- PT 30 minute(s)  -CH         Timed Charges    84172 - PT Therapeutic Activity Minutes 30  -CH         Total Minutes    Timed Charges Total Minutes 30  -CH       Total Minutes 30  -CH            User Key  (r) = Recorded By, (t) =  Taken By, (c) = Cosigned By    Initials Name Provider Type    CH Kavitha Sen, PT Physical Therapist              Therapy Charges for Today     Code Description Service Date Service Provider Modifiers Qty    [id number]  PT THERAPEUTIC ACT EA 15 MIN 4/21/2023 Kavitha Sen, PT GP 2          PT G-Codes  Outcome Measure Options: AM-PAC 6 Clicks Basic Mobility (PT)  AM-PAC 6 Clicks Score (PT): 17  AM-PAC 6 Clicks Score (OT): 15  PT Discharge Summary  Anticipated Discharge Disposition (PT): inpatient rehabilitation facility    Kavitha Sen, PT  4/21/2023

## 2023-04-21 NOTE — PROGRESS NOTES
" LOS: 13 days   Patient Care Team:  Daksha Ng APRN as PCP - General (Family Medicine)  Rudy Faith MD as Consulting Physician (Ophthalmology)  Jose Mccall MD as Consulting Physician (Nephrology)  Quang Reyes MD as Consulting Physician (Nephrology)    Chief Complaint: Post op    Subjective:  Symptoms:  No shortness of breath, cough or chest pain.    Diet:  Poor intake.  No nausea or vomiting.    Activity level: Impaired due to weakness.    Pain:  He complains of pain that is mild.  Pain is well controlled.          Vital Signs  Temp:  [97.5 °F (36.4 °C)-98.1 °F (36.7 °C)] 97.5 °F (36.4 °C)  Heart Rate:  [] 70  Resp:  [18-20] 18  BP: ()/(56-78) 123/56  Body mass index is 29.02 kg/m².    Intake/Output Summary (Last 24 hours) at 4/21/2023 0852  Last data filed at 4/21/2023 0826  Gross per 24 hour   Intake 580 ml   Output 525 ml   Net 55 ml     I/O this shift:  In: 120 [P.O.:120]  Out: -     Chest tube drainage last 8 hours: 100        04/19/23  0535 04/20/23  0520 04/21/23  0647   Weight: 105 kg (231 lb 4.2 oz) 99.7 kg (219 lb 12.8 oz) 97.1 kg (214 lb)         Objective:  General Appearance:  Comfortable and in no acute distress.    Vital signs: (most recent): Blood pressure 123/56, pulse 70, temperature 97.5 °F (36.4 °C), temperature source Oral, resp. rate 18, height 182.9 cm (72\"), weight 97.1 kg (214 lb), SpO2 100 %.  Vital signs are normal.  No fever.    Output: Minimal urine output and no stool output.    Lungs:  Normal effort and normal respiratory rate.  There are decreased breath sounds.    Heart: Normal rate.  Regular rhythm.  (100% AV paced at 70; SR 60s underlying)  Abdomen: Abdomen is soft.  Bowel sounds are normal.     Extremities: There is dependent edema.    Pulses: Distal pulses are intact.    Neurological: Patient is alert and oriented to person, place and time.    Skin:  Warm and dry.  (Sternal dressing clean, dry, and intact)        Results Review:  "       WBC WBC   Date Value Ref Range Status   04/20/2023 11.68 (H) 3.40 - 10.80 10*3/mm3 Final   04/19/2023 9.46 3.40 - 10.80 10*3/mm3 Final      HGB Hemoglobin   Date Value Ref Range Status   04/20/2023 10.0 (L) 13.0 - 17.7 g/dL Final   04/19/2023 9.9 (L) 13.0 - 17.7 g/dL Final      HCT Hematocrit   Date Value Ref Range Status   04/20/2023 30.1 (L) 37.5 - 51.0 % Final   04/19/2023 30.0 (L) 37.5 - 51.0 % Final      Platelets Platelets   Date Value Ref Range Status   04/20/2023 120 (L) 140 - 450 10*3/mm3 Final   04/19/2023 94 (L) 140 - 450 10*3/mm3 Final        PT/INR:    No results found for: PROTIME/  No results found for: INR    Sodium Sodium   Date Value Ref Range Status   04/20/2023 136 136 - 145 mmol/L Final   04/19/2023 138 136 - 145 mmol/L Final      Potassium Potassium   Date Value Ref Range Status   04/20/2023 3.8 3.5 - 5.2 mmol/L Final   04/19/2023 4.0 3.5 - 5.2 mmol/L Final      Chloride Chloride   Date Value Ref Range Status   04/20/2023 99 98 - 107 mmol/L Final   04/19/2023 101 98 - 107 mmol/L Final      Bicarbonate CO2   Date Value Ref Range Status   04/20/2023 23.3 22.0 - 29.0 mmol/L Final   04/19/2023 19.8 (L) 22.0 - 29.0 mmol/L Final      BUN BUN   Date Value Ref Range Status   04/20/2023 28 (H) 8 - 23 mg/dL Final   04/19/2023 48 (H) 8 - 23 mg/dL Final      Creatinine Creatinine   Date Value Ref Range Status   04/20/2023 4.25 (H) 0.76 - 1.27 mg/dL Final   04/19/2023 5.34 (H) 0.76 - 1.27 mg/dL Final      Calcium Calcium   Date Value Ref Range Status   04/20/2023 8.3 (L) 8.6 - 10.5 mg/dL Final   04/19/2023 8.7 8.6 - 10.5 mg/dL Final      Magnesium Magnesium   Date Value Ref Range Status   04/20/2023 2.0 1.6 - 2.4 mg/dL Final          acetylcysteine, 3 mL, Nebulization, TID - RT  aspirin, 81 mg, Oral, Daily  atorvastatin, 40 mg, Oral, Nightly  DULoxetine, 30 mg, Oral, Nightly  enoxaparin, 30 mg, Subcutaneous, Q24H  epoetin bernice/bernice-epbx, 6,000 Units, Intravenous, Once per day on Mon Wed Fri  insulin  lispro, 0-9 Units, Subcutaneous, 4x Daily With Meals & Nightly  ipratropium-albuterol, 3 mL, Nebulization, Q6H While Awake - RT  iron polysaccharides, 150 mg, Oral, Daily  metoprolol tartrate, 25 mg, Oral, Q12H  midodrine, 5 mg, Oral, BID AC  mupirocin, , Each Nare, BID  pantoprazole, 40 mg, Oral, QAM  senna-docusate sodium, 2 tablet, Oral, BID      sodium chloride, 9 mL/hr, Last Rate: 9 mL/hr (04/15/23 1624)  vasopressin, 0.05 Units/min, Last Rate: Stopped (04/18/23 0545)            Patient Active Problem List   Diagnosis Code   • 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.22   • Essential hypertension I10   • Hyperlipidemia E78.5   • Primary insomnia F51.01   • ESRD (end stage renal disease) N18.6   • Vitamin D deficiency E55.9   • Diverticulitis of large intestine with perforation and abscess without bleeding K57.20   • Obesity (BMI 30-39.9) E66.9   • Impaired mobility and ADLs Z74.09, Z78.9   • History of colon resection Z90.49   • Colon polyps K63.5   • Diverticulosis K57.90   • CKD (chronic kidney disease) stage 5, GFR less than 15 ml/min N18.5   • Dyspnea on exertion R06.09   • NSTEMI (non-ST elevated myocardial infarction) I21.4   • Rhinovirus B34.8   • Abnormal findings on diagnostic imaging of heart and coronary circulation R93.1       Assessment & Plan     - NSTEMI/multi-vessel CAD- s/p urgent CABGx4 LIMA/RSVG, MV repair-([facility]) POD#9  - HFrEF--30-35% per echo 4/8  - rhinovirus infection  - ESRD on HD  - hypertension  - hyperlipidemia  - DM II- A1c 5.0  -post op anemia- expected acute blood loss  -Leukocytosis- probable reactive   -TCP- plt count 69-- hold lovenox; HIT pending    Looks better this morning  Sinus rhythm rate in the 60-70s  Dr. Garcia to dose digoxin  Dialysis today  Mobilize/aggressive pulmonary toilet--continued nebs/flutter and OT/PT  Increase activity  He is making good progress-- will see about [facility] for discharge placement I think he would be a good candidate    Continue routine care    Laya Ackerman, DUSTY  04/21/23  08:52 EDT

## 2023-04-21 NOTE — PLAN OF CARE
Goal Outcome Evaluation:  Plan of Care Reviewed With: patient, spouse           Outcome Evaluation: Pt demonstrates some increased functional strength and activity tolerance as he was able to increase his gait distance and required less assistance. Pt reports he is very tired today after not sleeping well and having dialysis this AM. PT will continue to follow to address strength, mobility, and gait.

## 2023-04-21 NOTE — NURSING NOTE
hd without incident or c/o. tolerated well.  removed 2.1 L.  retacrit as ordered. ana lilia current, cdi. stable, no c/o upon completion of hd.

## 2023-04-21 NOTE — PLAN OF CARE
Goal Outcome Evaluation:  Plan of Care Reviewed With: patient        Progress: improving  Outcome Evaluation: A&O x4  and no c/o pain. Pt came from cicu, All VSS, Afib on the monitor. Up to the bathroom with 2 assist and a walker. On RA, pt refused to turned and reposition most of the time at night, educated on PI. No other isuesthis shift.

## 2023-04-21 NOTE — PROGRESS NOTES
"Lefty Cai  1950 72 y.o.  [id number]      Patient Care Team:  Daksha Ng APRN as PCP - General (Family Medicine)  Rudy Faith MD as Consulting Physician (Ophthalmology)  Jose Mccall MD as Consulting Physician (Nephrology)  Quang Reyes MD as Consulting Physician (Nephrology)    CC: Non-STEMI,, end-stage renal failure, EF 30 to 35%, severe three-vessel coronary disease, severe MR, underwent three-vessel CABG and mitral valve repair, postop A-fib    Interval History: He is doing better chest tubes are out he is converted spontaneously to sinus    Objective   Vital Signs  Temp:  [97.5 °F (36.4 °C)-98.1 °F (36.7 °C)] 97.5 °F (36.4 °C)  Heart Rate:  [] 70  Resp:  [18-20] 18  BP: ()/(56-78) 123/56    Intake/Output Summary (Last 24 hours) at 4/21/2023 1010  Last data filed at 4/21/2023 0826  Gross per 24 hour   Intake 580 ml   Output 400 ml   Net 180 ml     Flowsheet Rows    Flowsheet Row First Filed Value   Admission Height 177.8 cm (70\") Documented at 04/08/2023 1028   Admission Weight 102 kg (225 lb) Documented at 04/08/2023 1028          Physical Exam:   General Appearance:    Alert,oriented, in no acute distress   Lungs:     Clear to auscultation,BS are equal    Heart:    Normal S1 and S2, RRR without murmur, gallop or rub   HEENT:    Sclerae are clear, no JVD or adenopathy   Abdomen:     Normal bowel sounds, soft nontender, nondistended, no HSM   Extremities:   Moves all extremities well, no edema, no cyanosis, no             Redness, no rash     Medication Review:      acetylcysteine, 3 mL, Nebulization, TID - RT  aspirin, 81 mg, Oral, Daily  atorvastatin, 40 mg, Oral, Nightly  digoxin, 62.5 mcg, Oral, Every Other Day  DULoxetine, 30 mg, Oral, Nightly  enoxaparin, 30 mg, Subcutaneous, Q24H  epoetin bernice/bernice-epbx, 6,000 Units, Intravenous, Once per day on Mon Wed Fri  insulin lispro, 0-9 Units, Subcutaneous, 4x Daily With Meals & Nightly  ipratropium-albuterol, 3 " mL, Nebulization, Q6H While Awake - RT  iron polysaccharides, 150 mg, Oral, Daily  metoprolol tartrate, 25 mg, Oral, Q12H  midodrine, 5 mg, Oral, BID AC  mupirocin, , Each Nare, BID  pantoprazole, 40 mg, Oral, QAM  senna-docusate sodium, 2 tablet, Oral, BID      sodium chloride, 9 mL/hr, Last Rate: 9 mL/hr (04/15/23 1624)  vasopressin, 0.05 Units/min, Last Rate: Stopped (04/18/23 0545)          I reviewed the patient's new clinical results.  I personally viewed and interpreted the patient's EKG/Telemetry data    Assessment/Plan  Active Hospital Problems    Diagnosis  POA   • **NSTEMI (non-ST elevated myocardial infarction) [I21.4]  Yes   • Rhinovirus [B34.8]  Yes   • Dyspnea on exertion [R06.09]  Yes   • Abnormal findings on diagnostic imaging of heart and coronary circulation [R93.1]  Yes   • Type 2 diabetes mellitus with diabetic chronic kidney disease [E11.22]  Yes   • Essential hypertension [I10]  Yes   • Hyperlipidemia [E78.5]  Yes   • ESRD (end stage renal disease) [N18.6]  Yes      Resolved Hospital Problems    Diagnosis Date Resolved POA   • Hypernatremia [E87.0] 04/19/2023 Yes     Ischemic cardiomyopathy severe mitral insufficiency status post CABG and mitral valve repair on dialysis we have had some difficulty with paroxysmal A-fib.  We have been having difficulty with paroxysmal A-fib and low blood pressures.  We have to control his rate and can add 0.0625 of digoxin every other day.  If he has more A-fib we will have to anticoagulate him with warfarin.  I see him as a aneta that we will probably be going up for inpatient rehab    Lobito Garcia MD  04/21/23  10:10 EDT

## 2023-04-22 ENCOUNTER — APPOINTMENT (OUTPATIENT)
Dept: GENERAL RADIOLOGY | Facility: HOSPITAL | Age: 73
DRG: 216 | End: 2023-04-22
Payer: MEDICARE

## 2023-04-22 LAB
ALBUMIN SERPL-MCNC: 2.3 G/DL (ref 3.5–5.2)
ANION GAP SERPL CALCULATED.3IONS-SCNC: 9.8 MMOL/L (ref 5–15)
BACTERIA UR QL AUTO: ABNORMAL /HPF
BASOPHILS # BLD AUTO: 0.03 10*3/MM3 (ref 0–0.2)
BASOPHILS NFR BLD AUTO: 0.2 % (ref 0–1.5)
BUN SERPL-MCNC: 31 MG/DL (ref 8–23)
BUN/CREAT SERPL: 7.1 (ref 7–25)
CALCIUM SPEC-SCNC: 9 MG/DL (ref 8.6–10.5)
CHLORIDE SERPL-SCNC: 98 MMOL/L (ref 98–107)
CO2 SERPL-SCNC: 27.2 MMOL/L (ref 22–29)
CREAT SERPL-MCNC: 4.36 MG/DL (ref 0.76–1.27)
DEPRECATED RDW RBC AUTO: 52.8 FL (ref 37–54)
EGFRCR SERPLBLD CKD-EPI 2021: 13.6 ML/MIN/1.73
EOSINOPHIL # BLD AUTO: 0.44 10*3/MM3 (ref 0–0.4)
EOSINOPHIL NFR BLD AUTO: 2.7 % (ref 0.3–6.2)
ERYTHROCYTE [DISTWIDTH] IN BLOOD BY AUTOMATED COUNT: 15.8 % (ref 12.3–15.4)
GLUCOSE BLDC GLUCOMTR-MCNC: 110 MG/DL (ref 70–130)
GLUCOSE BLDC GLUCOMTR-MCNC: 114 MG/DL (ref 70–130)
GLUCOSE BLDC GLUCOMTR-MCNC: 168 MG/DL (ref 70–130)
GLUCOSE BLDC GLUCOMTR-MCNC: 191 MG/DL (ref 70–130)
GLUCOSE SERPL-MCNC: 105 MG/DL (ref 65–99)
HCT VFR BLD AUTO: 32 % (ref 37.5–51)
HGB BLD-MCNC: 10.3 G/DL (ref 13–17.7)
HYALINE CASTS UR QL AUTO: ABNORMAL /LPF
IMM GRANULOCYTES # BLD AUTO: 0.15 10*3/MM3 (ref 0–0.05)
IMM GRANULOCYTES NFR BLD AUTO: 0.9 % (ref 0–0.5)
LYMPHOCYTES # BLD AUTO: 1.38 10*3/MM3 (ref 0.7–3.1)
LYMPHOCYTES NFR BLD AUTO: 8.6 % (ref 19.6–45.3)
MCH RBC QN AUTO: 29.2 PG (ref 26.6–33)
MCHC RBC AUTO-ENTMCNC: 32.2 G/DL (ref 31.5–35.7)
MCV RBC AUTO: 90.7 FL (ref 79–97)
MONOCYTES # BLD AUTO: 1.4 10*3/MM3 (ref 0.1–0.9)
MONOCYTES NFR BLD AUTO: 8.7 % (ref 5–12)
NEUTROPHILS NFR BLD AUTO: 12.73 10*3/MM3 (ref 1.7–7)
NEUTROPHILS NFR BLD AUTO: 78.9 % (ref 42.7–76)
NRBC BLD AUTO-RTO: 0.1 /100 WBC (ref 0–0.2)
PHOSPHATE SERPL-MCNC: 3.7 MG/DL (ref 2.5–4.5)
PLATELET # BLD AUTO: 142 10*3/MM3 (ref 140–450)
PMV BLD AUTO: 9.9 FL (ref 6–12)
POTASSIUM SERPL-SCNC: 4.2 MMOL/L (ref 3.5–5.2)
RBC # BLD AUTO: 3.53 10*6/MM3 (ref 4.14–5.8)
RBC # UR STRIP: ABNORMAL /HPF
REF LAB TEST METHOD: ABNORMAL
SODIUM SERPL-SCNC: 135 MMOL/L (ref 136–145)
SQUAMOUS #/AREA URNS HPF: ABNORMAL /HPF
WBC # UR STRIP: ABNORMAL /HPF
WBC NRBC COR # BLD: 16.13 10*3/MM3 (ref 3.4–10.8)

## 2023-04-22 PROCEDURE — 94799 UNLISTED PULMONARY SVC/PX: CPT

## 2023-04-22 PROCEDURE — 74018 RADEX ABDOMEN 1 VIEW: CPT

## 2023-04-22 PROCEDURE — 99232 SBSQ HOSP IP/OBS MODERATE 35: CPT | Performed by: INTERNAL MEDICINE

## 2023-04-22 PROCEDURE — 80069 RENAL FUNCTION PANEL: CPT | Performed by: INTERNAL MEDICINE

## 2023-04-22 PROCEDURE — 97530 THERAPEUTIC ACTIVITIES: CPT

## 2023-04-22 PROCEDURE — 71045 X-RAY EXAM CHEST 1 VIEW: CPT

## 2023-04-22 PROCEDURE — 63710000001 INSULIN LISPRO (HUMAN) PER 5 UNITS: Performed by: NURSE PRACTITIONER

## 2023-04-22 PROCEDURE — 25010000002 ENOXAPARIN PER 10 MG: Performed by: NURSE PRACTITIONER

## 2023-04-22 PROCEDURE — 82962 GLUCOSE BLOOD TEST: CPT

## 2023-04-22 PROCEDURE — 85025 COMPLETE CBC W/AUTO DIFF WBC: CPT | Performed by: INTERNAL MEDICINE

## 2023-04-22 RX ORDER — LACTULOSE 10 G/15ML
10 SOLUTION ORAL 2 TIMES DAILY
Status: DISCONTINUED | OUTPATIENT
Start: 2023-04-22 | End: 2023-04-26

## 2023-04-22 RX ADMIN — ACETYLCYSTEINE 3 ML: 200 SOLUTION ORAL; RESPIRATORY (INHALATION) at 08:49

## 2023-04-22 RX ADMIN — METOPROLOL TARTRATE 25 MG: 25 TABLET, FILM COATED ORAL at 20:26

## 2023-04-22 RX ADMIN — DOCUSATE SODIUM 50MG AND SENNOSIDES 8.6MG 2 TABLET: 8.6; 5 TABLET, FILM COATED ORAL at 08:43

## 2023-04-22 RX ADMIN — LACTULOSE 10 G: 10 SOLUTION ORAL at 20:28

## 2023-04-22 RX ADMIN — INSULIN LISPRO 2 UNITS: 100 INJECTION, SOLUTION INTRAVENOUS; SUBCUTANEOUS at 16:15

## 2023-04-22 RX ADMIN — ASPIRIN 81 MG: 81 TABLET, COATED ORAL at 08:43

## 2023-04-22 RX ADMIN — ENOXAPARIN SODIUM 30 MG: 100 INJECTION SUBCUTANEOUS at 14:04

## 2023-04-22 RX ADMIN — TRAZODONE HYDROCHLORIDE 50 MG: 50 TABLET ORAL at 20:26

## 2023-04-22 RX ADMIN — Medication 150 MG: at 08:43

## 2023-04-22 RX ADMIN — PANTOPRAZOLE SODIUM 40 MG: 40 TABLET, DELAYED RELEASE ORAL at 06:48

## 2023-04-22 RX ADMIN — ATORVASTATIN CALCIUM 40 MG: 20 TABLET, FILM COATED ORAL at 20:26

## 2023-04-22 RX ADMIN — TRAZODONE HYDROCHLORIDE 50 MG: 50 TABLET ORAL at 00:11

## 2023-04-22 RX ADMIN — MIDODRINE HYDROCHLORIDE 5 MG: 5 TABLET ORAL at 06:48

## 2023-04-22 RX ADMIN — ACETYLCYSTEINE 3 ML: 200 SOLUTION ORAL; RESPIRATORY (INHALATION) at 20:08

## 2023-04-22 RX ADMIN — IPRATROPIUM BROMIDE AND ALBUTEROL SULFATE 3 ML: 2.5; .5 SOLUTION RESPIRATORY (INHALATION) at 08:49

## 2023-04-22 RX ADMIN — METOPROLOL TARTRATE 25 MG: 25 TABLET, FILM COATED ORAL at 08:43

## 2023-04-22 RX ADMIN — MUPIROCIN 1 APPLICATION: 20 OINTMENT TOPICAL at 20:29

## 2023-04-22 RX ADMIN — MIDODRINE HYDROCHLORIDE 5 MG: 5 TABLET ORAL at 16:16

## 2023-04-22 RX ADMIN — INSULIN LISPRO 2 UNITS: 100 INJECTION, SOLUTION INTRAVENOUS; SUBCUTANEOUS at 21:31

## 2023-04-22 RX ADMIN — DOCUSATE SODIUM 50MG AND SENNOSIDES 8.6MG 2 TABLET: 8.6; 5 TABLET, FILM COATED ORAL at 20:26

## 2023-04-22 RX ADMIN — IPRATROPIUM BROMIDE AND ALBUTEROL SULFATE 3 ML: 2.5; .5 SOLUTION RESPIRATORY (INHALATION) at 20:08

## 2023-04-22 RX ADMIN — DULOXETINE HYDROCHLORIDE 30 MG: 30 CAPSULE, DELAYED RELEASE ORAL at 20:26

## 2023-04-22 RX ADMIN — ACETYLCYSTEINE 3 ML: 200 SOLUTION ORAL; RESPIRATORY (INHALATION) at 14:14

## 2023-04-22 RX ADMIN — IPRATROPIUM BROMIDE AND ALBUTEROL SULFATE 3 ML: 2.5; .5 SOLUTION RESPIRATORY (INHALATION) at 14:14

## 2023-04-22 NOTE — PROGRESS NOTES
"CC: Coronary artery/A-fib    Interval History: No new acute events overnight      Vital Signs  Temp:  [97.6 °F (36.4 °C)-98 °F (36.7 °C)] 98 °F (36.7 °C)  Heart Rate:  [59-90] 73  Resp:  [16-18] 18  BP: ()/(46-78) 101/46    Intake/Output Summary (Last 24 hours) at 4/22/2023 1004  Last data filed at 4/22/2023 0600  Gross per 24 hour   Intake 480 ml   Output 2240 ml   Net -1760 ml     Flowsheet Rows    Flowsheet Row First Filed Value   Admission Height 177.8 cm (70\") Documented at 04/08/2023 1028   Admission Weight 102 kg (225 lb) Documented at 04/08/2023 1028          PHYSICAL EXAM:  General: No acute distress  Resp:NL Rate, symmetric chest expansion,unlabored, clear  CV:NL rate and rhythm, NL PMI, NL S1 and S2, no Murmur, no gallop, no rub, No JVD.   ABD:Nl sounds, no masses or tenderness, nondistended, no guarding or rebound  Neuro: alert,cooperative and oriented  Extr:Normal pedal pulses, No edema or cyanosis, moves all extremities      Results Review:    Results from last 7 days   Lab Units 04/22/23  0239   SODIUM mmol/L 135*   POTASSIUM mmol/L 4.2   CHLORIDE mmol/L 98   CO2 mmol/L 27.2   BUN mg/dL 31*   CREATININE mg/dL 4.36*   GLUCOSE mg/dL 105*   CALCIUM mg/dL 9.0         Results from last 7 days   Lab Units 04/22/23  0239   WBC 10*3/mm3 16.13*   HEMOGLOBIN g/dL 10.3*   HEMATOCRIT % 32.0*   PLATELETS 10*3/mm3 142             Results from last 7 days   Lab Units 04/20/23  0324   MAGNESIUM mg/dL 2.0         I reviewed the patient's new clinical results.  I personally viewed and interpreted the patient's EKG/Telemetry data        Medication Review:   Meds reviewed    sodium chloride, 9 mL/hr, Last Rate: 9 mL/hr (04/15/23 1624)        Assessment/Plan    1.  NSTEMI/multivessel CAD/ischemic cardiomyopathy with EF of 31 to 35% status post urgent CABG x4: LIMA to distal LAD, SVG to diagonal, SVG to OM, SVG to LV branch  2.  Severe mitral regurgitation status post repair using 28 mm physio 2 annuloplasty ring "   3.  End-stage renal disease on hemodialysis  4.  Paroxysmal atrial fibrillation with RVR  5.  Anemia/leukocytosis-primary team following    No significant new acute events overnight.  He has stayed in sinus rhythm overnight and this morning.  Would likely not benefit from digoxin at this point.  May need at least temporal anticoagulation.   Overall generalized body weakness, decreased oral intake.  He has been weak and sedentary for several months now.      Paddy Macias MD  04/22/23  10:04 EDT

## 2023-04-22 NOTE — THERAPY TREATMENT NOTE
Patient Name: Lefty Cai  : 1950    MRN: [id number]                              Today's Date: 2023       Admit Date: 2023    Visit Dx:     ICD-10-CM ICD-9-CM   1. Dyspnea on exertion  R06.09 786.09   2. NSTEMI (non-ST elevated myocardial infarction)  I21.4 410.70   3. ESRD (end stage renal disease)  N18.6 585.6   4. Former smoker  Z87.891 V15.82   5. Elevated blood pressure reading in office with diagnosis of hypertension  I10 401.9   6. Rhinovirus infection  B34.8 079.3   7. Abnormal findings on diagnostic imaging of heart and coronary circulation  R93.1 794.39   8. S/P CABG (coronary artery bypass graft)  Z95.1 V45.81     Patient Active Problem List   Diagnosis   • Type 2 diabetes mellitus with diabetic chronic kidney disease   • Essential hypertension   • Hyperlipidemia   • Primary insomnia   • ESRD (end stage renal disease)   • Vitamin D deficiency   • Diverticulitis of large intestine with perforation and abscess without bleeding   • Obesity (BMI 30-39.9)   • Impaired mobility and ADLs   • History of colon resection   • Colon polyps   • Diverticulosis   • CKD (chronic kidney disease) stage 5, GFR less than 15 ml/min   • Dyspnea on exertion   • NSTEMI (non-ST elevated myocardial infarction)   • Rhinovirus   • Abnormal findings on diagnostic imaging of heart and coronary circulation     Past Medical History:   Diagnosis Date   • Anemia    • Anesthesia complication     HYPOTENSION   • Arthritis    • Cancer of kidney, left    • CKD (chronic kidney disease)     STAGE 5   • Diabetes mellitus, type 2    • Fatigue    • GERD (gastroesophageal reflux disease)    • H/O renal cell carcinoma     partial nephrectomy   • History of colostomy reversal 2022   • [place] (hard of hearing)     NO DEVICE   • Hyperlipidemia    • Hypertension    • Primary insomnia 2016   • Proteinuria    • Risk factors for obstructive sleep apnea    • Sinus drainage    • Vitamin D deficiency 2017     Past  Surgical History:   Procedure Laterality Date   • ARTERIOVENOUS FISTULA/SHUNT SURGERY Left 08/15/2019    Procedure: LEFT MID FOREARM RADIAL CEPHALIC ARTERIAL VENOUS FISTULA;  Surgeon: Louann Miles Jr., MD;  Location: [facility] MAIN OR;  Service: Vascular   • CARDIAC CATHETERIZATION N/A 4/9/2023    Procedure: Left Heart Cath;  Surgeon: Lobito Garcia MD;  Location: [facility] CATH INVASIVE LOCATION;  Service: Cardiovascular;  Laterality: N/A;   • CARDIAC CATHETERIZATION N/A 4/9/2023    Procedure: Left ventriculography;  Surgeon: Lobito Garcia MD;  Location: [facility] CATH INVASIVE LOCATION;  Service: Cardiovascular;  Laterality: N/A;   • CARDIAC CATHETERIZATION N/A 4/9/2023    Procedure: Coronary angiography;  Surgeon: Lobito Garcia MD;  Location: [facility] CATH INVASIVE LOCATION;  Service: Cardiovascular;  Laterality: N/A;   • COLONOSCOPY  03/24/2022   • COLONOSCOPY N/A 03/24/2022    Procedure: COLONOSCOPY TO CECUM WITH POLYPECTOMY  (HOT SNARE);  Surgeon: Yelena Guerra MD;  Location: [facility] ENDOSCOPY;  Service: General;  Laterality: N/A;  PREOP/ HX OF DIVERTICULITIS, COLOSTOMY  POSTOP/ POLYPS, DIVERTICULOSIS    • COLOSTOMY  09/06/2021   • COLOSTOMY CLOSURE N/A 04/12/2022    Procedure: open Colostomy reversal;  Surgeon: Yelena Guerra MD;  Location: [facility] OR;  Service: General;  Laterality: N/A;   • CORONARY ARTERY BYPASS GRAFT N/A 4/13/2023    Procedure: MAT STERNOTOMY CORONARY ARTERY BYPASS GRAFT TIMES 4 USING LEFT INTERNAL MAMMARY ARTERY AND RIGHT GREATER SAPHENOUS VEIN  PER ENDOSCOPIC VEIN HARVESTING, MITRAL VALVE REPAIR  AND PRP;  Surgeon: Mirtha Zuluaga MD;  Location: [facility] CVOR;  Service: Cardiothoracic;  Laterality: N/A;   • DIAGNOSTIC LAPAROSCOPY N/A 2/27/2023    Procedure: DIAGNOSTIC LAPAROSCOPY;  Surgeon: Murali Ferreira MD;  Location: [facility] OR;  Service: General;  Laterality: N/A;   • EXPLORATORY LAPAROTOMY N/A 09/06/2021    Procedure: Open sigmoind colectomy,  colostomy, and appendectomy;  Surgeon: Yelena Guerra MD;  Location: [facility] MAIN OR;  Service: General;  Laterality: N/A;   • EYE SURGERY      CATARACTS   • KNEE ARTHROSCOPY Right    • NEPHRECTOMY PARTIAL  2007    Dr. Victor      General Information     Row Name 04/22/23 1623          Physical Therapy Time and Intention    Document Type therapy note (daily note)  -     Mode of Treatment individual therapy;physical therapy  -     Row Name 04/22/23 1623          General Information    Existing Precautions/Restrictions fall;cardiac;sternal  -     Row Name 04/22/23 1623          Cognition    Orientation Status (Cognition) oriented x 3  -     Row Name 04/22/23 1623          Safety Issues, Functional Mobility    Impairments Affecting Function (Mobility) endurance/activity tolerance;shortness of breath  -           User Key  (r) = Recorded By, (t) = Taken By, (c) = Cosigned By    Initials Name Provider Type     Kavitha Sen, PT Physical Therapist               Mobility     Row Name 04/22/23 1623          Bed Mobility    Bed Mobility supine-sit;sit-supine  -     Supine-Sit [place] (Bed Mobility) verbal cues;nonverbal cues (demo/gesture);contact guard  -CH     Sit-Supine [place] (Bed Mobility) verbal cues;nonverbal cues (demo/gesture);contact guard  -     Assistive Device (Bed Mobility) bed rails;head of bed elevated  -     Row Name 04/22/23 1623          Sit-Stand Transfer    Sit-Stand [place] (Transfers) verbal cues;nonverbal cues (demo/gesture);contact guard  -     Assistive Device (Sit-Stand Transfers) walker, front-wheeled  -     Row Name 04/22/23 1623          Gait/Stairs (Locomotion)    [place] Level (Gait) verbal cues;nonverbal cues (demo/gesture);contact guard  -     Assistive Device (Gait) walker, front-wheeled  -     Distance in Feet (Gait) 25  -     Deviations/Abnormal Patterns (Gait) stride length decreased;gait speed decreased  -     Bilateral Gait  Deviations forward flexed posture  -     Comment, (Gait/Stairs) gait distance limited secondary to fatigue  -           User Key  (r) = Recorded By, (t) = Taken By, (c) = Cosigned By    Initials Name Provider Type    Kavitha Da Silva, PT Physical Therapist               Obj/Interventions     Row Name 04/22/23 1624          Motor Skills    Therapeutic Exercise --  exercises deferred this date secondary to fatigue  -           User Key  (r) = Recorded By, (t) = Taken By, (c) = Cosigned By    Initials Name Provider Type    Kavitha Da Silva, PT Physical Therapist               Goals/Plan    No documentation.                Clinical Impression     Row Name 04/22/23 1624          Pain    Pretreatment Pain Rating 2/10  -     Posttreatment Pain Rating 2/10  -     Pain Location - buttock  -     Pain Intervention(s) Repositioned  -     Row Name 04/22/23 1624          Plan of Care Review    Plan of Care Reviewed With patient  -     Outcome Evaluation Pt continues to make steady progress with PT as he required less assistance with bed mobility and transfers. Gait distance was limited this date secondary to fatigue. Acute care PT will continue to follow to address strength, mobility, and gait.  -     Row Name 04/22/23 1624          Positioning and Restraints    Pre-Treatment Position in bed  -     Post Treatment Position bed  -     In Bed side lying left;call light within reach;encouraged to call for assist;exit alarm on;with family/caregiver;with nsg  -           User Key  (r) = Recorded By, (t) = Taken By, (c) = Cosigned By    Initials Name Provider Type    Kavitha Da Silva, PT Physical Therapist               Outcome Measures     Row Name 04/22/23 1627 04/22/23 1205       How much help from another person do you currently need...    Turning from your back to your side while in flat bed without using bedrails? 3  -CH 3  -AP    Moving from lying on back to sitting on the side of a flat bed  without bedrails? 3  -CH 3  -AP    Moving to and from a bed to a chair (including a wheelchair)? 3  -CH 3  -AP    Standing up from a chair using your arms (e.g., wheelchair, bedside chair)? 3  -CH 3  -AP    Climbing 3-5 steps with a railing? 2  -CH 2  -AP    To walk in hospital room? 3  -CH 3  -AP    AM-PAC 6 Clicks Score (PT) 17  -CH 17  -AP    Highest level of mobility 5 --> Static standing  -CH 5 --> Static standing  -AP    Row Name 04/22/23 0957 04/22/23 0840       How much help from another person do you currently need...    Turning from your back to your side while in flat bed without using bedrails? 3  -AP 3  -AP    Moving from lying on back to sitting on the side of a flat bed without bedrails? 3  -AP 3  -AP    Moving to and from a bed to a chair (including a wheelchair)? 3  -AP 3  -AP    Standing up from a chair using your arms (e.g., wheelchair, bedside chair)? 3  -AP 3  -AP    Climbing 3-5 steps with a railing? 2  -AP 2  -AP    To walk in hospital room? 3  -AP 3  -AP    AM-PAC 6 Clicks Score (PT) 17  -AP 17  -AP    Highest level of mobility 5 --> Static standing  -AP 5 --> Static standing  -AP    Row Name 04/22/23 1627          Functional Assessment    Outcome Measure Options AM-PAC 6 Clicks Basic Mobility (PT)  -           User Key  (r) = Recorded By, (t) = Taken By, (c) = Cosigned By    Initials Name Provider Type     Kavitha Sen, PT Physical Therapist    Marina James, RN Registered Nurse                             Physical Therapy Education     Title: PT OT SLP Therapies (Done)     Topic: Physical Therapy (Done)     Point: Mobility training (Done)     Learning Progress Summary           Patient Acceptance, E,TB,D, VU,NR by  at 4/22/2023 1627    Acceptance, E,TB, VU by  at 4/21/2023 1756    Acceptance, E,TB,D, VU,NR by  at 4/21/2023 1626    Acceptance, E, VU by EM at 4/20/2023 1507    Acceptance, E, VU by  at 4/19/2023 1815    Acceptance, E, VU by EM at 4/19/2023 1156    Acceptance,  E, NR by AR at 4/16/2023 1633    Acceptance, TB,E, VU by  at 4/11/2023 0900   Family Acceptance, E, VU by  at 4/19/2023 1815    Acceptance, E, NR by AR at 4/16/2023 1633                   Point: Home exercise program (Done)     Learning Progress Summary           Patient Acceptance, E,TB,D, VU,NR by  at 4/22/2023 1627    Acceptance, E,TB, VU by  at 4/21/2023 1756    Acceptance, E,TB,D, VU,NR by  at 4/21/2023 1626    Acceptance, E, VU by EM at 4/20/2023 1507    Acceptance, E, VU by  at 4/19/2023 1815    Acceptance, E, VU by EM at 4/19/2023 1156    Acceptance, E, NR by AR at 4/16/2023 1633    Acceptance, TB,E, VU by  at 4/11/2023 0900   Family Acceptance, E, VU by  at 4/19/2023 1815    Acceptance, E, NR by AR at 4/16/2023 1633                   Point: Body mechanics (Done)     Learning Progress Summary           Patient Acceptance, E,TB,D, VU,NR by  at 4/22/2023 1627    Acceptance, E,TB, VU by  at 4/21/2023 1756    Acceptance, E,TB,D, VU,NR by  at 4/21/2023 1626    Acceptance, E, VU by  at 4/19/2023 1815    Acceptance, E, NR by AR at 4/16/2023 1633    Acceptance, TB,E, VU by  at 4/11/2023 0900   Family Acceptance, E, VU by  at 4/19/2023 1815    Acceptance, E, NR by AR at 4/16/2023 1633                   Point: Precautions (Done)     Learning Progress Summary           Patient Acceptance, E,TB,D, VU,NR by  at 4/22/2023 1627    Acceptance, E,TB, VU by  at 4/21/2023 1756    Acceptance, E,TB,D, VU,NR by  at 4/21/2023 1626    Acceptance, E, VU by  at 4/19/2023 1815    Acceptance, E, NR by AR at 4/16/2023 1633    Acceptance, TB,E, VU by  at 4/11/2023 0900   Family Acceptance, E, VU by  at 4/19/2023 1815    Acceptance, E, NR by AR at 4/16/2023 1633                               User Key     Initials Effective Dates Name Provider Type Discipline     06/16/21 -  Kavitha Sen, PT Physical Therapist PT    EM 06/16/21 -  Jennie Cuevas, PT Physical Therapist PT    AR 06/16/21  -  Mihaela Shields, PT Physical Therapist PT     06/16/21 -  Kacy Deluca, RN Registered Nurse Nurse     12/20/21 -  Mitesh Sage, RN Registered Nurse Nurse              PT Recommendation and Plan     Plan of Care Reviewed With: patient  Outcome Evaluation: Pt continues to make steady progress with PT as he required less assistance with bed mobility and transfers. Gait distance was limited this date secondary to fatigue. Acute care PT will continue to follow to address strength, mobility, and gait.     Time Calculation:    PT Charges     Row Name 04/22/23 1627             Time Calculation    Start Time 1606  -CH      Stop Time 1618  -CH      Time Calculation (min) 12 min  -CH      PT Received On 04/22/23  -      PT - Next Appointment 04/24/23  -         Time Calculation- PT    Total Timed Code Minutes- PT 12 minute(s)  -CH         Timed Charges    19632 - PT Therapeutic Activity Minutes 12  -CH         Total Minutes    Timed Charges Total Minutes 12  -CH       Total Minutes 12  -CH            User Key  (r) = Recorded By, (t) = Taken By, (c) = Cosigned By    Initials Name Provider Type     Kavitha Sen PT Physical Therapist              Therapy Charges for Today     Code Description Service Date Service Provider Modifiers Qty    18898497150 HC PT THERAPEUTIC ACT EA 15 MIN 4/21/2023 Kavitha Sen, PT GP 2    31927337893 HC PT THERAPEUTIC ACT EA 15 MIN 4/22/2023 Kavitha Sen PT GP 1          PT G-Codes  Outcome Measure Options: AM-PAC 6 Clicks Basic Mobility (PT)  AM-PAC 6 Clicks Score (PT): 17  AM-PAC 6 Clicks Score (OT): 15  PT Discharge Summary  Anticipated Discharge Disposition (PT): inpatient rehabilitation facility    Kavitha Sen PT  4/22/2023

## 2023-04-22 NOTE — PLAN OF CARE
Goal Outcome Evaluation:  Plan of Care Reviewed With: patient, spouse        Progress: no change  Outcome Evaluation: VSS, AOx3, some confusion to time but easily re-oriented. Poor appetite, encouraged family to bring meals, seems to help with nutrition intake. Cream applied to bottom, rotating sides to keep pressure off as well. Pt is very tired today, allowing sleep as he is able. Hemodialysis scheduled for Sunday 4/23/23. TM.

## 2023-04-22 NOTE — PROGRESS NOTES
[facility] Progress Note      Patient Name: Lefty Cai  : 1950  MRN: [id number]  Primary Care Physician:  Daksha Ng APRN  Date of admission: 2023    Subjective     Interval History:   Follow up ESRD.      Had HD yesterday; 2.1 L removed  Breathing is comfortable on room air  Appetite mediocre; had nausea earlier this morning  Outpatient HD arranged for TTS following upcoming stay at [facility]  Had Doppler done of left arm AV fistula: marginal flow and inadequate size      Review of Systems:   As noted above    Objective     Vitals:   Temp:  [97.6 °F (36.4 °C)-98 °F (36.7 °C)] 98 °F (36.7 °C)  Heart Rate:  [59-90] 73  Resp:  [16-18] 18  BP: ()/(46-78) 101/46    Intake/Output Summary (Last 24 hours) at 2023 1054  Last data filed at 2023 1012  Gross per 24 hour   Intake 480 ml   Output 2240 ml   Net -1760 ml       Physical Exam:    General Appearance: alert, NAD, chronically ill, overweight  Skin: warm and dry  HEENT: oral mucosa normal, nonicteric sclera  Neck: TDC RIJ catheter with exit site below the right clavicle  Lungs: Coarse breath sounds but otherwise clear, unlabored on RA   Heart: Regular rate and rhythm, no rub  Abdomen: soft, nontender, slightly distended.  Normoactive bowel  Extremities: Trace lower extremity edema  Vascular: Left arm AV fistula patent  Neuro: normal speech and mental status     Scheduled Meds:     acetylcysteine, 3 mL, Nebulization, TID - RT  aspirin, 81 mg, Oral, Daily  atorvastatin, 40 mg, Oral, Nightly  DULoxetine, 30 mg, Oral, Nightly  enoxaparin, 30 mg, Subcutaneous, Q24H  epoetin bernice/bernice-epbx, 6,000 Units, Intravenous, Once per day on   insulin lispro, 0-9 Units, Subcutaneous, 4x Daily With Meals & Nightly  ipratropium-albuterol, 3 mL, Nebulization, Q6H While Awake - RT  iron polysaccharides, 150 mg, Oral, Daily  metoprolol tartrate, 25 mg, Oral, Q12H  midodrine, 5 mg, Oral, BID AC  mupirocin,  , Each Nare, BID  pantoprazole, 40 mg, Oral, QAM  senna-docusate sodium, 2 tablet, Oral, BID  traZODone, 50 mg, Oral, Nightly      IV Meds:   sodium chloride, 9 mL/hr, Last Rate: 9 mL/hr (04/15/23 1624)        Results Reviewed:   I have personally reviewed the results from the time of this admission to 4/22/2023 10:54 EDT     Results from last 7 days   Lab Units 04/22/23  0239 04/21/23  1442 04/20/23  0324 04/18/23 0328 04/17/23  1711 04/16/23  0410 04/15/23  1125   SODIUM mmol/L 135* 138 136   < > 142   < > 142   POTASSIUM mmol/L 4.2 4.3 3.8   < > 3.9   < > 4.5   CHLORIDE mmol/L 98 99 99   < > 104   < > 103   CO2 mmol/L 27.2 23.9 23.3   < > 21.9*   < > 23.0   BUN mg/dL 31* 24* 28*   < > 22   < > 30*   CREATININE mg/dL 4.36* 3.34* 4.25*   < > 2.68*   < > 4.11*   CALCIUM mg/dL 9.0 9.1 8.3*   < > 8.5*   < > 8.6   BILIRUBIN mg/dL  --   --  0.6  --  0.5  --  0.4   ALK PHOS U/L  --   --  101  --  107  --  38*   ALT (SGPT) U/L  --   --  16  --  41  --  <5   AST (SGOT) U/L  --   --  66*  --  132*  --  29   GLUCOSE mg/dL 105* 134* 95   < > 114*   < > 159*    < > = values in this interval not displayed.       Estimated Creatinine Clearance: 18.1 mL/min (A) (by C-G formula based on SCr of 4.36 mg/dL (H)).    Results from last 7 days   Lab Units 04/22/23  0239 04/21/23  1442 04/20/23  0324 04/19/23  0316 04/18/23  0328 04/16/23  0410   MAGNESIUM mg/dL  --   --  2.0  --  2.3 2.5*   PHOSPHORUS mg/dL 3.7 2.6 2.6   < > 3.9  --     < > = values in this interval not displayed.             Results from last 7 days   Lab Units 04/22/23  0239 04/21/23  1442 04/20/23  0324 04/19/23  0316 04/18/23  0328   WBC 10*3/mm3 16.13* 18.12* 11.68* 9.46 10.84*   HEMOGLOBIN g/dL 10.3* 10.5* 10.0* 9.9* 9.8*   PLATELETS 10*3/mm3 142 142 120* 94* 77*             Assessment / Plan     ASSESSMENT:  1.  ESRD.  Volume stable; electrolytes compensated.  Last HD was yesterday via TDC.  Doppler study done several days ago on AV fistula revealed marginal  flows and inadequate size of vessels  2.  NSTEMI. SP 3-vessel CABG, MV repair 4/13/23.  3.  Postop atrial fibrillation currently in normal sinus rhythm  4.  Anemia CKD and blood loss  5.  TCP.  Negative HIT panel, platelets improving slowly  6.  DM2 with renal complications     PLAN:  1.  HD tomorrow via right chest TDC to facilitate transition to TTS  2.  Will discuss with Vascular to ensure that he has outpatient follow-up for abnormal AV fistula Doppler study done 4/20  3.  [facility] anytime from renal view      Jose Mccall MD  04/22/23  10:54 EDT    [facility]  [phone]

## 2023-04-22 NOTE — PROGRESS NOTES
Worrisome leukocytosis. High risk for infection whether respiratory, urine, sternal, etc.    Pan culture ordered. U/A ordered Check chest xray in am.

## 2023-04-22 NOTE — PROGRESS NOTES
White count 16,000.  Chest x-ray clear minimal bacteria in urine.  Await culture.  Otherwise waiting for rehab.

## 2023-04-22 NOTE — PROGRESS NOTES
"    Name: Lefty Cai ADMIT: 2023   : 1950  PCP: BereniceDaksha, APRN    MRN: [id number] LOS: 14 days   AGE/SEX: 72 y.o. male  ROOM: /     Subjective   Subjective   Lying in bed. Wife at bedside. Had some nausea with vomiting last night.  Appetite poor.  She states he has been \"wiped\" out since HD yesterday.  No BM since admission wife reports and confirmed by nurse aid.  + flatus.  Denies abd pain, dysuria, or flank pain. Min urinary output following HD.  IS and flutter valve at bedside wife has been trying to encourage use.     ROS:   Gen: appetite poor, denies fever or chills  CV: denies palps or orthopnea  Resp: dry cough, denies shoa  GI: + nausea and vomiting, + constipation; denies abd pain  : denies dysuria or flank pain.        Objective   Objective   Vital Signs  Temp:  [97.6 °F (36.4 °C)-98 °F (36.7 °C)] 98 °F (36.7 °C)  Heart Rate:  [59-88] 73  Resp:  [16-18] 18  BP: (101-116)/(46-78) 101/46  SpO2:  [95 %-98 %] 95 %  on   ;   Device (Oxygen Therapy): room air  Body mass index is 28.45 kg/m².     Physical Exam  Vitals and nursing note reviewed.   Constitutional:       Appearance: He is ill-appearing. He is not toxic-appearing.   Cardiovascular:      Rate and Rhythm: Normal rate. Regular rhythm. No M/G/R     Pulses: Normal pulses.   Pulmonary:      Effort: Pulmonary effort is normal. No respiratory distress.      Comments: Diminished, some rhonchi noted anteriorly.  Is on RA. Poor inspiratory effort.    Abdominal:      General: Bowel sounds are normal. There is no distension.      Palpations: Abdomen is soft.      Tenderness: There is no abdominal tenderness.   Musculoskeletal:         General: Swelling 1+ Lower extremities present. No deformity. Normal range of motion.   Skin:     General: Skin is warm and dry.      Findings: No bruising.      Comments: Midline sternal incision C/D/I   Neurological:      Mental Status: He is alert and oriented to person, place, and time. "      Motor: generalized Weakness present.      Coordination: Coordination normal.   Psychiatric:         Mood and Affect: Mood normal.         Behavior: Behavior normal.     Results Review:       I reviewed the patient's new clinical results.  Results from last 7 days   Lab Units 04/22/23 0239 04/21/23 1442 04/20/23 0324 04/19/23 0316   WBC 10*3/mm3 16.13* 18.12* 11.68* 9.46   HEMOGLOBIN g/dL 10.3* 10.5* 10.0* 9.9*   PLATELETS 10*3/mm3 142 142 120* 94*     Results from last 7 days   Lab Units 04/22/23 0239 04/21/23 1442 04/20/23 0324 04/19/23 0316   SODIUM mmol/L 135* 138 136 138   POTASSIUM mmol/L 4.2 4.3 3.8 4.0   CHLORIDE mmol/L 98 99 99 101   CO2 mmol/L 27.2 23.9 23.3 19.8*   BUN mg/dL 31* 24* 28* 48*   CREATININE mg/dL 4.36* 3.34* 4.25* 5.34*   GLUCOSE mg/dL 105* 134* 95 99   Estimated Creatinine Clearance: 18.1 mL/min (A) (by C-G formula based on SCr of 4.36 mg/dL (H)).  Results from last 7 days   Lab Units 04/22/23 0239 04/21/23 1442 04/20/23 0324 04/19/23 0316 04/18/23 0328 04/17/23  1711   ALBUMIN g/dL 2.3* 2.8* 2.6* 2.9*   < > 3.1*   BILIRUBIN mg/dL  --   --  0.6  --   --  0.5   ALK PHOS U/L  --   --  101  --   --  107   AST (SGOT) U/L  --   --  66*  --   --  132*   ALT (SGPT) U/L  --   --  16  --   --  41    < > = values in this interval not displayed.     Results from last 7 days   Lab Units 04/22/23 0239 04/21/23 1442 04/20/23 0324 04/19/23 0316 04/18/23 0328 04/17/23  0308 04/16/23  0410   CALCIUM mg/dL 9.0 9.1 8.3* 8.7 8.3*   < > 8.7   ALBUMIN g/dL 2.3* 2.8* 2.6* 2.9* 3.0*   < >  --    MAGNESIUM mg/dL  --   --  2.0  --  2.3  --  2.5*   PHOSPHORUS mg/dL 3.7 2.6 2.6 4.3 3.9   < >  --     < > = values in this interval not displayed.       Glucose   Date/Time Value Ref Range Status   04/22/2023 1110 110 70 - 130 mg/dL Final     Comment:     Meter: [id number] : [id number] Kareem ROSALES   04/22/2023 0702 114 70 - 130 mg/dL Final     Comment:     Meter: [id number] : [id number]  [facility]   04/21/2023 2105 120 70 - 130 mg/dL Final     Comment:     Meter: [id number] : [id number] Oral Jacobson NA   04/21/2023 1619 108 70 - 130 mg/dL Final     Comment:     Meter: [id number] : [id number] Patric Kirk NA   04/21/2023 1053 92 70 - 130 mg/dL Final     Comment:     Meter: [id number] : [id number] Patric Kirk NA   04/21/2023 0623 117 70 - 130 mg/dL Final     Comment:     Meter: [id number] : [id number] Steven Jorgensen CONNIE   04/20/2023 2051 143 (H) 70 - 130 mg/dL Final     Comment:     Meter: [id number] : [id number] Steven ROSALES       acetylcysteine, 3 mL, Nebulization, TID - RT  aspirin, 81 mg, Oral, Daily  atorvastatin, 40 mg, Oral, Nightly  DULoxetine, 30 mg, Oral, Nightly  enoxaparin, 30 mg, Subcutaneous, Q24H  epoetin bernice/bernice-epbx, 6,000 Units, Intravenous, Once per day on Mon Wed Fri  insulin lispro, 0-9 Units, Subcutaneous, 4x Daily With Meals & Nightly  ipratropium-albuterol, 3 mL, Nebulization, Q6H While Awake - RT  iron polysaccharides, 150 mg, Oral, Daily  metoprolol tartrate, 25 mg, Oral, Q12H  midodrine, 5 mg, Oral, BID AC  mupirocin, , Each Nare, BID  pantoprazole, 40 mg, Oral, QAM  senna-docusate sodium, 2 tablet, Oral, BID  traZODone, 50 mg, Oral, Nightly      sodium chloride, 9 mL/hr, Last Rate: 9 mL/hr (04/15/23 1624)    Diet: Cardiac Diets, Diabetic Diets; Healthy Heart (2-3 Na+); Consistent Carbohydrate; Texture: Regular Texture (IDDSI 7); Fluid Consistency: Thin (IDDSI 0)    Study Result 4/22/23    Narrative & Impression   XR CHEST 1 VW-     HISTORY: Male who is 72 years-old,  postoperative evaluation     TECHNIQUE: Frontal views of the chest     COMPARISON: 04/21/2023     FINDINGS: Stable appearing right-sided central venous catheter.  Sternotomy wires, cardiac valve marker noted. Heart size is borderline.  Pulmonary vasculature is unremarkable. Minimal left basilar atelectasis.  Previous right apical pneumothorax appears mostly resolved. No  large  pleural effusion, or left pneumothorax. No acute osseous process.     IMPRESSION:  Decreased right apical pneumothorax.            Assessment/Plan     Active Hospital Problems    Diagnosis  POA   • **NSTEMI (non-ST elevated myocardial infarction) [I21.4]  Yes   • Rhinovirus [B34.8]  Yes   • Dyspnea on exertion [R06.09]  Yes   • Abnormal findings on diagnostic imaging of heart and coronary circulation [R93.1]  Yes   • Type 2 diabetes mellitus with diabetic chronic kidney disease [E11.22]  Yes   • Essential hypertension [I10]  Yes   • Hyperlipidemia [E78.5]  Yes   • ESRD (end stage renal disease) [N18.6]  Yes      Resolved Hospital Problems    Diagnosis Date Resolved POA   • Hypernatremia [E87.0] 04/19/2023 Yes     Mr. Cai is a 72 year old male who presented to the hospital with complaints of increased dyspnea in the setting of ESRD with HD compliance. He was found to have new T wave inversions with troponin elevation and taken for heart cath that revealed severe three vessel CAD with occluded right coronary system. Cardiothoracic surgery was consulted for CABG and RVP was obtained showing positive for rhinovirus. ID was consulted given need for OR who cleared for surgery without antibiotics. Timing was per surgeon preference. He was taken for open heart on 4/13. He had hypotension and atrial fibrillation with RVR following surgery. Moved out of CICU on 4/20.     • NSTEMI : S/P CABG x4 with MV repair on 4/13. Cardiology and Cardiothoracic surgery managing. Echo 4/8 with EF 30-35%. On amiodarone and metoprolol with conversion of atrial fibrillation to NSR. Then had recurrence of afib RVR 4/20 so beta blocker increased and digoxin added instead. On ASA/statin. Now on CVU.     • Rhinovirus: Supportive care. ID saw and cleared for OR. Pulmonology followed (signed off 4/20). He is on Duonebs/Mucomyst. Remains on RA.  IS encouraged.      • ESRD: Via TDC. Nephrology managing. HD 4/21 via right chest TDC. .  Doppler on left arm AV fistula marginal flow and inadequate size. Renal feels stable for discharge to [facility] from renal standpoint.      • DM2: Sugars very stable. Not requiring correctional insulin.     • HTN: BP stable on BB as well as midodrine.    • Leukocytosis: chest xray ok.  UA min bacteria in urine.  I ordered urine culture. WBC is trending down today 16 (18 <--11.68).  BC pending from 4/21/23. Respiratory culture normal respiratory nikunj. Remains afebrile.     • ABL on chronic anemia CKD: Hgb 10.3 (10.5). Looks like he trends mid 10 -11 range.  Monitor.      • Constipation: no BM documented since admission.  + flatus.  abd NTP.  Bowel sounds active.  With the nausea and vomiting and leukocytosis will check KUB.      Discussed with patient and wife.       Hopefully to [facility] once medically stable.     Appreciate all specialists.      VTE Prophylaxis - Lovenox 30 mg SC daily  Code Status - Full code  Disposition - Anticipate discharge TBD.     DUSTY Edwards  [facility]  04/22/23  12:00 EDT     Addendum 4/22/2023 at 4:25 PM results of KUB reviewed.  Nonobstructive bowel gas pattern noted with some mild stool burden apparent in colon.  Will start lactulose and titrate for bowel movements.  Study Result    Narrative & Impression   XR ABDOMEN KUB-     INDICATIONS: Nausea, vomiting, constipation     TECHNIQUE: Supine views of the abdomen     COMPARISON: None available     FINDINGS:      The bowel gas pattern is nonobstructive. No supine evidence for free  intraperitoneal gas. A mild amount of stool is apparent in the colon. If  there is further clinical concern, CT can be obtained for further  evaluation.     IMPRESSION:     As described.

## 2023-04-22 NOTE — PLAN OF CARE
Goal Outcome Evaluation:  Plan of Care Reviewed With: patient           Outcome Evaluation: Pt continues to make steady progress with PT as he required less assistance with bed mobility and transfers. Gait distance was limited this date secondary to fatigue. Acute care PT will continue to follow to address strength, mobility, and gait.

## 2023-04-23 LAB
ALBUMIN SERPL-MCNC: 2.6 G/DL (ref 3.5–5.2)
ANION GAP SERPL CALCULATED.3IONS-SCNC: 12.4 MMOL/L (ref 5–15)
BUN SERPL-MCNC: 44 MG/DL (ref 8–23)
BUN/CREAT SERPL: 8.5 (ref 7–25)
CALCIUM SPEC-SCNC: 8.8 MG/DL (ref 8.6–10.5)
CHLORIDE SERPL-SCNC: 97 MMOL/L (ref 98–107)
CO2 SERPL-SCNC: 24.6 MMOL/L (ref 22–29)
CREAT SERPL-MCNC: 5.19 MG/DL (ref 0.76–1.27)
DEPRECATED RDW RBC AUTO: 48.6 FL (ref 37–54)
EGFRCR SERPLBLD CKD-EPI 2021: 11 ML/MIN/1.73
ERYTHROCYTE [DISTWIDTH] IN BLOOD BY AUTOMATED COUNT: 15.5 % (ref 12.3–15.4)
GLUCOSE BLDC GLUCOMTR-MCNC: 112 MG/DL (ref 70–130)
GLUCOSE BLDC GLUCOMTR-MCNC: 125 MG/DL (ref 70–130)
GLUCOSE BLDC GLUCOMTR-MCNC: 127 MG/DL (ref 70–130)
GLUCOSE BLDC GLUCOMTR-MCNC: 96 MG/DL (ref 70–130)
GLUCOSE SERPL-MCNC: 111 MG/DL (ref 65–99)
HCT VFR BLD AUTO: 30.1 % (ref 37.5–51)
HGB BLD-MCNC: 10 G/DL (ref 13–17.7)
MCH RBC QN AUTO: 29 PG (ref 26.6–33)
MCHC RBC AUTO-ENTMCNC: 33.2 G/DL (ref 31.5–35.7)
MCV RBC AUTO: 87.2 FL (ref 79–97)
PHOSPHATE SERPL-MCNC: 4.6 MG/DL (ref 2.5–4.5)
PLATELET # BLD AUTO: 159 10*3/MM3 (ref 140–450)
PMV BLD AUTO: 9.3 FL (ref 6–12)
POTASSIUM SERPL-SCNC: 3.9 MMOL/L (ref 3.5–5.2)
RBC # BLD AUTO: 3.45 10*6/MM3 (ref 4.14–5.8)
SODIUM SERPL-SCNC: 134 MMOL/L (ref 136–145)
T4 FREE SERPL-MCNC: 1.15 NG/DL (ref 0.93–1.7)
WBC NRBC COR # BLD: 12.01 10*3/MM3 (ref 3.4–10.8)

## 2023-04-23 PROCEDURE — 94799 UNLISTED PULMONARY SVC/PX: CPT

## 2023-04-23 PROCEDURE — 84439 ASSAY OF FREE THYROXINE: CPT | Performed by: INTERNAL MEDICINE

## 2023-04-23 PROCEDURE — 25010000002 ENOXAPARIN PER 10 MG: Performed by: NURSE PRACTITIONER

## 2023-04-23 PROCEDURE — 99232 SBSQ HOSP IP/OBS MODERATE 35: CPT | Performed by: INTERNAL MEDICINE

## 2023-04-23 PROCEDURE — 80069 RENAL FUNCTION PANEL: CPT | Performed by: INTERNAL MEDICINE

## 2023-04-23 PROCEDURE — 85027 COMPLETE CBC AUTOMATED: CPT | Performed by: NURSE PRACTITIONER

## 2023-04-23 PROCEDURE — 25010000002 HEPARIN (PORCINE) PER 1000 UNITS: Performed by: INTERNAL MEDICINE

## 2023-04-23 PROCEDURE — 82962 GLUCOSE BLOOD TEST: CPT

## 2023-04-23 RX ORDER — HEPARIN SODIUM 1000 [USP'U]/ML
4000 INJECTION, SOLUTION INTRAVENOUS; SUBCUTANEOUS AS NEEDED
Status: DISCONTINUED | OUTPATIENT
Start: 2023-04-23 | End: 2023-05-02 | Stop reason: HOSPADM

## 2023-04-23 RX ORDER — GUAIFENESIN 600 MG/1
600 TABLET, EXTENDED RELEASE ORAL EVERY 12 HOURS SCHEDULED
Status: DISCONTINUED | OUTPATIENT
Start: 2023-04-23 | End: 2023-04-25

## 2023-04-23 RX ADMIN — MIDODRINE HYDROCHLORIDE 5 MG: 5 TABLET ORAL at 07:50

## 2023-04-23 RX ADMIN — IPRATROPIUM BROMIDE AND ALBUTEROL SULFATE 3 ML: 2.5; .5 SOLUTION RESPIRATORY (INHALATION) at 08:53

## 2023-04-23 RX ADMIN — ACETYLCYSTEINE 3 ML: 200 SOLUTION ORAL; RESPIRATORY (INHALATION) at 08:53

## 2023-04-23 RX ADMIN — METOPROLOL TARTRATE 25 MG: 25 TABLET, FILM COATED ORAL at 21:59

## 2023-04-23 RX ADMIN — ASPIRIN 81 MG: 81 TABLET, COATED ORAL at 12:29

## 2023-04-23 RX ADMIN — MUPIROCIN 1 APPLICATION: 20 OINTMENT TOPICAL at 21:59

## 2023-04-23 RX ADMIN — LACTULOSE 10 G: 10 SOLUTION ORAL at 12:28

## 2023-04-23 RX ADMIN — DULOXETINE HYDROCHLORIDE 30 MG: 30 CAPSULE, DELAYED RELEASE ORAL at 21:58

## 2023-04-23 RX ADMIN — Medication 150 MG: at 12:29

## 2023-04-23 RX ADMIN — TRAZODONE HYDROCHLORIDE 50 MG: 50 TABLET ORAL at 21:58

## 2023-04-23 RX ADMIN — ENOXAPARIN SODIUM 30 MG: 100 INJECTION SUBCUTANEOUS at 14:44

## 2023-04-23 RX ADMIN — ATORVASTATIN CALCIUM 40 MG: 20 TABLET, FILM COATED ORAL at 23:26

## 2023-04-23 RX ADMIN — IPRATROPIUM BROMIDE AND ALBUTEROL SULFATE 3 ML: 2.5; .5 SOLUTION RESPIRATORY (INHALATION) at 20:13

## 2023-04-23 RX ADMIN — ACETAMINOPHEN 500 MG: 500 TABLET, FILM COATED ORAL at 12:29

## 2023-04-23 RX ADMIN — PANTOPRAZOLE SODIUM 40 MG: 40 TABLET, DELAYED RELEASE ORAL at 07:50

## 2023-04-23 RX ADMIN — GUAIFENESIN 600 MG: 600 TABLET, EXTENDED RELEASE ORAL at 14:44

## 2023-04-23 RX ADMIN — METOPROLOL TARTRATE 25 MG: 25 TABLET, FILM COATED ORAL at 12:29

## 2023-04-23 RX ADMIN — DOCUSATE SODIUM 50MG AND SENNOSIDES 8.6MG 2 TABLET: 8.6; 5 TABLET, FILM COATED ORAL at 12:29

## 2023-04-23 RX ADMIN — HEPARIN SODIUM 4000 UNITS: 1000 INJECTION INTRAVENOUS; SUBCUTANEOUS at 12:13

## 2023-04-23 RX ADMIN — MIDODRINE HYDROCHLORIDE 5 MG: 5 TABLET ORAL at 16:37

## 2023-04-23 RX ADMIN — IPRATROPIUM BROMIDE AND ALBUTEROL SULFATE 3 ML: 2.5; .5 SOLUTION RESPIRATORY (INHALATION) at 15:40

## 2023-04-23 RX ADMIN — GUAIFENESIN 600 MG: 600 TABLET, EXTENDED RELEASE ORAL at 21:58

## 2023-04-23 NOTE — CONSULTS
ID note for positive urine culture    Subjective: He is seen on hemodialysis.  He denies any fevers or chills or night sweats.  He is not having any urinary symptoms.  He does make urine despite being on hemodialysis.  Had a bowel movement today.    Objective:   Temp:  [97.3 °F (36.3 °C)-98.2 °F (36.8 °C)] 97.8 °F (36.6 °C)  Heart Rate:  [61-67] 64  Resp:  [16-18] 18  BP: (108-126)/(53-65) 108/65  GENERAL: Awake and alert, in no acute distress.   HEENT: Oropharynx is clear. Hearing is grossly normal.   EYES: . No conjunctival injection. No lid lag.   LUNGS:normal respiratory effort.   SKIN: no cutaneous eruptions in exposed areas  PSYCHIATRIC: Appropriate mood, affect, insight, and judgment.   Abdomen is soft and nontender    White count 12  Creatinine 5.19  4/21 blood cultures no growth to date  4/23 urine culture greater than 100,000 Pseudomonas    Assessment and plan  1.  Pseudomonal bacteriuria, asymptomatic, do not think he needs treatment at this time.  Likely bladder stasis from ESRD and he had a Reddy catheter during this hospital stay.  2.  ESRD  3.  Coronary artery disease status post CABG and mitral valve repair  4.  Leukocytosis, improving off antibiotics.  May have been related to constipation.  He did have a bowel movement today.  Doubt bacterial etiology given he has improved off antibiotics    ID will sign off for now.  Please call with questions or concerns

## 2023-04-23 NOTE — PROGRESS NOTES
"CC: Coronary artery disease/paroxysmal atrial fibrillation    Interval History: No new acute events overnight      Vital Signs  Temp:  [97.3 °F (36.3 °C)-98.2 °F (36.8 °C)] 97.8 °F (36.6 °C)  Heart Rate:  [61-67] 64  Resp:  [16-18] 18  BP: (108-126)/(53-65) 108/65    Intake/Output Summary (Last 24 hours) at 4/23/2023 1145  Last data filed at 4/23/2023 0700  Gross per 24 hour   Intake 720 ml   Output 500 ml   Net 220 ml     Flowsheet Rows    Flowsheet Row First Filed Value   Admission Height 177.8 cm (70\") Documented at 04/08/2023 1028   Admission Weight 102 kg (225 lb) Documented at 04/08/2023 1028          PHYSICAL EXAM:  General: Looked tired and chronically sick.  Resp:NL Rate, symmetric chest expansion,unlabored, clear  CV:NL rate and rhythm, NL PMI, NL S1 and S2, no Murmur, no gallop, no rub, No JVD.   ABD:Nl sounds, no masses or tenderness, nondistended, no guarding or rebound  Neuro: alert,cooperative and oriented  Extr:Normal pedal pulses, No edema or cyanosis, moves all extremities      Results Review:    Results from last 7 days   Lab Units 04/23/23  0355   SODIUM mmol/L 134*   POTASSIUM mmol/L 3.9   CHLORIDE mmol/L 97*   CO2 mmol/L 24.6   BUN mg/dL 44*   CREATININE mg/dL 5.19*   GLUCOSE mg/dL 111*   CALCIUM mg/dL 8.8         Results from last 7 days   Lab Units 04/23/23  0355   WBC 10*3/mm3 12.01*   HEMOGLOBIN g/dL 10.0*   HEMATOCRIT % 30.1*   PLATELETS 10*3/mm3 159             Results from last 7 days   Lab Units 04/20/23  0324   MAGNESIUM mg/dL 2.0         I reviewed the patient's new clinical results.  I personally viewed and interpreted the patient's EKG/Telemetry data        Medication Review:   Meds reviewed         Assessment/Plan    1.  NSTEMI/multivessel CAD/ischemic cardiomyopathy with EF of 31 to 35% status post urgent CABG x4: LIMA to distal LAD, SVG to diagonal, SVG to OM, SVG to LV branch. On aspirin, atorvastatin, metoprolol tartrate  2.  Severe mitral regurgitation status post repair using " 28 mm physio 2 annuloplasty ring   3.  End-stage renal disease on hemodialysis  4.  Paroxysmal atrial fibrillation with RVR  5.  Anemia/leukocytosis-primary team following  6.  Chronic debilitation/poor ambulation/poor oral intake    No acute events overnight.  Remains in sinus rhythm.  Start anticoagulation for A-fib when okay with CT surgery: Eliquis should be acceptable regimen.  Continue continues to feel tired and weak with significantly reduced oral intake      Paddy Macias MD  04/23/23  11:45 EDT

## 2023-04-23 NOTE — PROGRESS NOTES
Name: Lefty Cai ADMIT: 2023   : 1950  PCP: BereniceDaksha, DUSTY    MRN: [id number] LOS: 15 days   AGE/SEX: 72 y.o. male  ROOM: 0/     Subjective   Subjective   Sitting up on edge of bed.  Tolerated dialysis today well.  Appetite remains poor but improved from yesterday.  Wife at bedside.  Was able to have a bowel movement.  No further nausea or vomiting.  Biggest complaint is coccyx pain.  RN reports he does have.   Denies fever, chills, abd pain, dysuria, or flank pain. Min urinary output on days of HD.  IS and flutter valve at bedside wife has been trying to encourage use.  He was able to demonstrate at thousand to 1250 on incentive spirometer and also good technique with flutter valve.  Cough that is thick nonproductive he states he is unable to get it up    ROS:   Gen: appetite poor, denies fever or chills  CV: denies palps or orthopnea  Resp: dry cough, denies shoa  GI: Constipation resolved, denies abdominal pain, nausea or vomiting  : denies dysuria or flank pain.        Objective   Objective   Vital Signs  Temp:  [97.3 °F (36.3 °C)-98.2 °F (36.8 °C)] 97.8 °F (36.6 °C)  Heart Rate:  [61-72] 72  Resp:  [16-18] 18  BP: (108-126)/(53-65) 113/54  SpO2:  [95 %-97 %] 95 %  on   ;   Device (Oxygen Therapy): room air  Body mass index is 29.21 kg/m².     Physical Exam  Vitals and nursing note reviewed.   Constitutional:       Appearance: He is chronically ill-appearing. He is not toxic-appearing.   Cardiovascular:      Rate and Rhythm: Normal rate. Regular rhythm. No M/G/R     Pulses: Normal pulses.   Pulmonary:      Effort: Pulmonary effort is normal. No respiratory distress.      Comments: Clear to auscultation.  No rhonchi noted today   Is on RA. Poor inspiratory effort, but improved over yesterday.    Abdominal:      General: Bowel sounds are normal. There is no distension.      Palpations: Abdomen is soft.      Tenderness: There is no abdominal tenderness.   Musculoskeletal:          General: Swelling 1+ Lower extremities present. No deformity. Normal range of motion.   Skin:     General: Skin is warm and dry.      Findings: No bruising.      Comments: Midline sternal incision C/D/I, stage I to coccyx /gluteal fold.    Neurological:      Mental Status: He is alert and oriented to person, place, and time.      Motor: generalized Weakness present.      Coordination: Coordination normal.   Psychiatric:         Mood and Affect: Mood normal.         Behavior: Behavior normal.     Results Review:       I reviewed the patient's new clinical results.  Results from last 7 days   Lab Units 04/23/23  0355 04/22/23  0239 04/21/23  1442 04/20/23  0324   WBC 10*3/mm3 12.01* 16.13* 18.12* 11.68*   HEMOGLOBIN g/dL 10.0* 10.3* 10.5* 10.0*   PLATELETS 10*3/mm3 159 142 142 120*     Results from last 7 days   Lab Units 04/23/23  0355 04/22/23 0239 04/21/23  1442 04/20/23  0324   SODIUM mmol/L 134* 135* 138 136   POTASSIUM mmol/L 3.9 4.2 4.3 3.8   CHLORIDE mmol/L 97* 98 99 99   CO2 mmol/L 24.6 27.2 23.9 23.3   BUN mg/dL 44* 31* 24* 28*   CREATININE mg/dL 5.19* 4.36* 3.34* 4.25*   GLUCOSE mg/dL 111* 105* 134* 95   Estimated Creatinine Clearance: 15.3 mL/min (A) (by C-G formula based on SCr of 5.19 mg/dL (H)).  Results from last 7 days   Lab Units 04/23/23  0355 04/22/23  0239 04/21/23  1442 04/20/23  0324 04/18/23  0328 04/17/23  1711   ALBUMIN g/dL 2.6* 2.3* 2.8* 2.6*   < > 3.1*   BILIRUBIN mg/dL  --   --   --  0.6  --  0.5   ALK PHOS U/L  --   --   --  101  --  107   AST (SGOT) U/L  --   --   --  66*  --  132*   ALT (SGPT) U/L  --   --   --  16  --  41    < > = values in this interval not displayed.     Results from last 7 days   Lab Units 04/23/23  0355 04/22/23  0239 04/21/23  1442 04/20/23  0324 04/19/23  0316 04/18/23  0328   CALCIUM mg/dL 8.8 9.0 9.1 8.3*   < > 8.3*   ALBUMIN g/dL 2.6* 2.3* 2.8* 2.6*   < > 3.0*   MAGNESIUM mg/dL  --   --   --  2.0  --  2.3   PHOSPHORUS mg/dL 4.6* 3.7 2.6 2.6   < > 3.9     < > = values in this interval not displayed.       Glucose   Date/Time Value Ref Range Status   04/23/2023 1122 96 70 - 130 mg/dL Final     Comment:     Meter: [id number] : [id number] Vi ROSALES   04/23/2023 0721 127 70 - 130 mg/dL Final     Comment:     Meter: [id number] : [id number] Oral ROSALES   04/22/2023 2055 168 (H) 70 - 130 mg/dL Final     Comment:     Meter: [id number] : [id number] Oral ROSALES   04/22/2023 1609 191 (H) 70 - 130 mg/dL Final     Comment:     Meter: [id number] : [id number] Serna Alysia NA   04/22/2023 1110 110 70 - 130 mg/dL Final     Comment:     M[facility]: [id number] : [id number] [facility]   04/22/2023 0702 114 70 - 130 mg/dL Final     Comment:     Meter: [id number] : [id number] [facility]   04/21/2023 2105 120 70 - 130 mg/dL Final     Comment:     Meter: [id number] : [id number] Oral ROSALES       aspirin, 81 mg, Oral, Daily  atorvastatin, 40 mg, Oral, Nightly  DULoxetine, 30 mg, Oral, Nightly  enoxaparin, 30 mg, Subcutaneous, Q24H  epoetin bernice/bernice-epbx, 6,000 Units, Intravenous, Once per day on Mon Wed Fri  insulin lispro, 0-9 Units, Subcutaneous, 4x Daily With Meals & Nightly  ipratropium-albuterol, 3 mL, Nebulization, Q6H While Awake - RT  iron polysaccharides, 150 mg, Oral, Daily  lactulose, 10 g, Oral, BID  metoprolol tartrate, 25 mg, Oral, Q12H  midodrine, 5 mg, Oral, BID AC  mupirocin, , Each Nare, BID  pantoprazole, 40 mg, Oral, QAM  senna-docusate sodium, 2 tablet, Oral, BID  traZODone, 50 mg, Oral, Nightly       Diet: Cardiac Diets, Diabetic Diets; Healthy Heart (2-3 Na+); Consistent Carbohydrate; Texture: Regular Texture (IDDSI 7); Fluid Consistency: Thin (IDDSI 0)    Study Result 4/22/23    Narrative & Impression   XR CHEST 1 VW-     HISTORY: Male who is 72 years-old,  postoperative evaluation     TECHNIQUE: Frontal views of the chest     COMPARISON: 04/21/2023     FINDINGS: Stable appearing right-sided central venous  catheter.  Sternotomy wires, cardiac valve marker noted. Heart size is borderline.  Pulmonary vasculature is unremarkable. Minimal left basilar atelectasis.  Previous right apical pneumothorax appears mostly resolved. No large  pleural effusion, or left pneumothorax. No acute osseous process.     IMPRESSION:  Decreased right apical pneumothorax.       Study Result 4/22/23    Narrative & Impression   XR ABDOMEN KUB-     INDICATIONS: Nausea, vomiting, constipation     TECHNIQUE: Supine views of the abdomen     COMPARISON: None available     FINDINGS:      The bowel gas pattern is nonobstructive. No supine evidence for free  intraperitoneal gas. A mild amount of stool is apparent in the colon. If  there is further clinical concern, CT can be obtained for further  evaluation.     IMPRESSION:     As described.                 Assessment/Plan     Active Hospital Problems    Diagnosis  POA   • **NSTEMI (non-ST elevated myocardial infarction) [I21.4]  Yes   • Rhinovirus [B34.8]  Yes   • Dyspnea on exertion [R06.09]  Yes   • Abnormal findings on diagnostic imaging of heart and coronary circulation [R93.1]  Yes   • Type 2 diabetes mellitus with diabetic chronic kidney disease [E11.22]  Yes   • Essential hypertension [I10]  Yes   • Hyperlipidemia [E78.5]  Yes   • ESRD (end stage renal disease) [N18.6]  Yes      Resolved Hospital Problems    Diagnosis Date Resolved POA   • Hypernatremia [E87.0] 04/19/2023 Yes     Mr. Cai is a 72 year old male who presented to the hospital with complaints of increased dyspnea in the setting of ESRD with HD compliance. He was found to have new T wave inversions with troponin elevation and taken for heart cath that revealed severe three vessel CAD with occluded right coronary system. Cardiothoracic surgery was consulted for CABG and RVP was obtained showing positive for rhinovirus. ID was consulted given need for OR who cleared for surgery without antibiotics. Timing was per surgeon  preference. He was taken for open heart on 4/13. He had hypotension and atrial fibrillation with RVR following surgery. Moved out of CICU on 4/20.     • NSTEMI/ PAF : S/P CABG x4 with MV repair on 4/13. Cardiology and Cardiothoracic surgery managing. Echo 4/8 with EF 30-35%. On amiodarone and metoprolol with conversion of atrial fibrillation to NSR. Then had recurrence of afib RVR 4/20 so beta blocker increased and digoxin added instead. On ASA/statin. Now on CVU.  Cardiology is following as is cardiothoracic surgery.  We will leave timing of full anticoagulation up to cardiology and cardiothoracic surgery     • Rhinovirus: Supportive care. ID saw and cleared for OR. Pulmonology followed (signed off 4/20). He is on Duonebs/Mucomyst. Remains on RA.   Will resume guaifenesin to help with secretion mobilization continue to encourage pulmonary toilet with flutter and incentive spirometer     • ESRD: Via TDC. Nephrology managing. HD 4/21 via right chest TDC. . Doppler on left arm AV fistula marginal flow and inadequate size. Renal feels stable for discharge to [facility] from renal standpoint.      • DM2: Sugars very stable. Not requiring correctional insulin.  Continue to encourage oral intake     • HTN: BP stable on BB as well as midodrine.    • Leukocytosis: chest xray ok.  UA min bacteria in urine that culture grew pseudomonas.  WBC continues to improved off antibiotics so bacterial etiology less likely but  With his risk factors will ask ID to see and provide recommendations. I discussed with Dr. Schreiber and he agrees with ID consult.    BC pending from 4/21/23 NGTD. Respiratory culture normal respiratory nikunj. Remains afebrile. Appreciate ID's recommendations ID feels UA represents pseudomonal bacteruria, asymptomatic and they do not recommend treatment at this time.      • ABL on chronic anemia CKD: Hgb 10 (10.3 <--10.5). Looks like he trends mid 10 -11 range.  Monitor.      • Constipation: KUB yesterday showed stool  burden but nothing acute.  Lactulose started.  Was able to have large BM.  Continue lactulose and we can titrate down once normal bowel function restored.  Abdomen feels a bit distended but much softer than yesterday.  Bowel sounds active.       • Coccyx buttock skin breakdown: We will reconsult wound care.  He is already on a specialty bed mattress and nursing staff is providing with topical wound care and turning schedule.  Improving nutrition and mobilization should help.     Discussed with patient, RN, sister, and wife.       Hopefully to rehab once medically stable.  Doing better with PT they feel he may be a better candidate for subacute rehab given his improvement he may not be a candidate for acute rehab at time of discharge     Appreciate all specialists.      VTE Prophylaxis - Lovenox 30 mg SC daily  Code Status - Full code  Disposition - Anticipate discharge AMANDAD.     DUSTY Edwards  [facility]  04/23/23  12:24 EDT

## 2023-04-23 NOTE — PROGRESS NOTES
[facility] Progress Note      Patient Name: Lefty Cai  : 1950  MRN: [id number]  Primary Care Physician:  Daksha Ng APRN  Date of admission: 2023    Subjective     Interval History:   Follow up ESRD.      Seen and examined on HD  Low blood pressures earlier, responsive to decreasing UF  Breathing is comfortable on room air  Appetite mediocre; no N/V  Outpatient HD arranged for TTS following upcoming stay at [facility]  Had Doppler done of left arm AV fistula: marginal flow and inadequate size      Review of Systems:   As noted above    Objective     Vitals:   Temp:  [97.3 °F (36.3 °C)-98.2 °F (36.8 °C)] 97.8 °F (36.6 °C)  Heart Rate:  [61-67] 64  Resp:  [16-18] 18  BP: (108-126)/(53-65) 108/65    Intake/Output Summary (Last 24 hours) at 2023 0922  Last data filed at 2023 0700  Gross per 24 hour   Intake 720 ml   Output 500 ml   Net 220 ml       Physical Exam:    General Appearance: alert, NAD, chronically ill, overweight  Qb 350, 105/72, HR 64, fluid removal goal 2.6 L  Skin: warm and dry  HEENT: oral mucosa normal, nonicteric sclera  Neck: TDC RIJ catheter with exit site below the right clavicle  Lungs: Coarse breath sounds but otherwise clear, unlabored on RA   Heart: Regular rate and rhythm, no rub  Abdomen: soft, nontender, slightly distended.  Normoactive bowel  Extremities: Trace lower extremity edema  Vascular: Left arm AV fistula patent  Neuro: normal speech and mental status     Scheduled Meds:     acetylcysteine, 3 mL, Nebulization, TID - RT  aspirin, 81 mg, Oral, Daily  atorvastatin, 40 mg, Oral, Nightly  DULoxetine, 30 mg, Oral, Nightly  enoxaparin, 30 mg, Subcutaneous, Q24H  epoetin bernice/bernice-epbx, 6,000 Units, Intravenous, Once per day on Mon Wed Fri  insulin lispro, 0-9 Units, Subcutaneous, 4x Daily With Meals & Nightly  ipratropium-albuterol, 3 mL, Nebulization, Q6H While Awake - RT  iron polysaccharides, 150 mg, Oral,  Daily  lactulose, 10 g, Oral, BID  metoprolol tartrate, 25 mg, Oral, Q12H  midodrine, 5 mg, Oral, BID AC  mupirocin, , Each Nare, BID  pantoprazole, 40 mg, Oral, QAM  senna-docusate sodium, 2 tablet, Oral, BID  traZODone, 50 mg, Oral, Nightly      IV Meds:        Results Reviewed:   I have personally reviewed the results from the time of this admission to 4/23/2023 09:22 EDT     Results from last 7 days   Lab Units 04/23/23  0355 04/22/23  0239 04/21/23  1442 04/20/23  0324 04/18/23  0328 04/17/23  1711   SODIUM mmol/L 134* 135* 138 136   < > 142   POTASSIUM mmol/L 3.9 4.2 4.3 3.8   < > 3.9   CHLORIDE mmol/L 97* 98 99 99   < > 104   CO2 mmol/L 24.6 27.2 23.9 23.3   < > 21.9*   BUN mg/dL 44* 31* 24* 28*   < > 22   CREATININE mg/dL 5.19* 4.36* 3.34* 4.25*   < > 2.68*   CALCIUM mg/dL 8.8 9.0 9.1 8.3*   < > 8.5*   BILIRUBIN mg/dL  --   --   --  0.6  --  0.5   ALK PHOS U/L  --   --   --  101  --  107   ALT (SGPT) U/L  --   --   --  16  --  41   AST (SGOT) U/L  --   --   --  66*  --  132*   GLUCOSE mg/dL 111* 105* 134* 95   < > 114*    < > = values in this interval not displayed.       Estimated Creatinine Clearance: 15.3 mL/min (A) (by C-G formula based on SCr of 5.19 mg/dL (H)).    Results from last 7 days   Lab Units 04/23/23  0355 04/22/23  0239 04/21/23  1442 04/20/23  0324 04/19/23  0316 04/18/23  0328   MAGNESIUM mg/dL  --   --   --  2.0  --  2.3   PHOSPHORUS mg/dL 4.6* 3.7 2.6 2.6   < > 3.9    < > = values in this interval not displayed.             Results from last 7 days   Lab Units 04/23/23  0355 04/22/23  0239 04/21/23  1442 04/20/23  0324 04/19/23  0316   WBC 10*3/mm3 12.01* 16.13* 18.12* 11.68* 9.46   HEMOGLOBIN g/dL 10.0* 10.3* 10.5* 10.0* 9.9*   PLATELETS 10*3/mm3 159 142 142 120* 94*             Assessment / Plan     ASSESSMENT:  1.  ESRD.  Volume stable; electrolytes compensated.  HD underway now.  Doppler study done several days ago on AV fistula revealed marginal flows and inadequate size of  vessels  2.  NSTEMI. SP 3-vessel CABG, MV repair 4/13/23.  3.  Postop atrial fibrillation currently in normal sinus rhythm  4.  Anemia CKD and blood loss  5.  TCP.  Negative HIT panel, platelets improving slowly  6.  DM2 with renal complications     PLAN:  1.  HD today via right chest TDC to facilitate transition to TTS  2.  Discussed with Dr. Cook of Vascular:  will arrange outpatient follow-up for abnormal AV fistula Doppler study done 4/20  3.  [facility] anytime from renal view      Jose Mccall MD  04/23/23  09:22 EDT    [facility]  [phone]

## 2023-04-23 NOTE — PROGRESS NOTES
[facility]    Evaluation initiated Chart and therapies reviewed. Patient improved today with PT. Able to transfer with CGA and ambulate CGA with a rwx 25 ft. At current level, anticipate he will be too high level for admission to acute rehab at time of discharge. Will continue to follow.    Daksha Lozada RN  Rehab Admissions Coordinator  [phone]

## 2023-04-23 NOTE — PLAN OF CARE
Goal Outcome Evaluation:  Plan of Care Reviewed With: patient, spouse        Progress: no change  Outcome Evaluation: VSS, AOx4. Hemodialysis this am, 950cc removed. Numerous bowel movements today. Barrier cream applied to bottom several times today as well, rotating postitions q2 hrs to keep off of affected area. Poor appetite and fluid intake, encouraging family to bring meals that may be appealing. WCTM.

## 2023-04-23 NOTE — NURSING NOTE
sbp maintained above 100 throughout tx. 950ml net uf. Post vss 1113/54 hr 72. Stable tx. Cath performance good. Lines reversed

## 2023-04-24 PROBLEM — R53.83 FATIGUE: Status: ACTIVE | Noted: 2023-04-24

## 2023-04-24 PROBLEM — R06.09 DYSPNEA ON EXERTION: Status: RESOLVED | Noted: 2023-04-08 | Resolved: 2023-04-24

## 2023-04-24 LAB
ALBUMIN SERPL-MCNC: 2.5 G/DL (ref 3.5–5.2)
ANION GAP SERPL CALCULATED.3IONS-SCNC: 14.3 MMOL/L (ref 5–15)
BACTERIA SPEC AEROBE CULT: ABNORMAL
BUN SERPL-MCNC: 29 MG/DL (ref 8–23)
BUN/CREAT SERPL: 6.9 (ref 7–25)
CALCIUM SPEC-SCNC: 8.3 MG/DL (ref 8.6–10.5)
CHLORIDE SERPL-SCNC: 93 MMOL/L (ref 98–107)
CO2 SERPL-SCNC: 23.7 MMOL/L (ref 22–29)
CREAT SERPL-MCNC: 4.23 MG/DL (ref 0.76–1.27)
DEPRECATED RDW RBC AUTO: 50 FL (ref 37–54)
EGFRCR SERPLBLD CKD-EPI 2021: 14.1 ML/MIN/1.73
ERYTHROCYTE [DISTWIDTH] IN BLOOD BY AUTOMATED COUNT: 15.4 % (ref 12.3–15.4)
GLUCOSE BLDC GLUCOMTR-MCNC: 101 MG/DL (ref 70–130)
GLUCOSE BLDC GLUCOMTR-MCNC: 122 MG/DL (ref 70–130)
GLUCOSE BLDC GLUCOMTR-MCNC: 140 MG/DL (ref 70–130)
GLUCOSE BLDC GLUCOMTR-MCNC: 99 MG/DL (ref 70–130)
GLUCOSE SERPL-MCNC: 91 MG/DL (ref 65–99)
HCT VFR BLD AUTO: 29.1 % (ref 37.5–51)
HGB BLD-MCNC: 9.2 G/DL (ref 13–17.7)
MCH RBC QN AUTO: 28.2 PG (ref 26.6–33)
MCHC RBC AUTO-ENTMCNC: 31.6 G/DL (ref 31.5–35.7)
MCV RBC AUTO: 89.3 FL (ref 79–97)
PHOSPHATE SERPL-MCNC: 3.4 MG/DL (ref 2.5–4.5)
PLATELET # BLD AUTO: 158 10*3/MM3 (ref 140–450)
PMV BLD AUTO: 9.8 FL (ref 6–12)
POTASSIUM SERPL-SCNC: 4.2 MMOL/L (ref 3.5–5.2)
RBC # BLD AUTO: 3.26 10*6/MM3 (ref 4.14–5.8)
SODIUM SERPL-SCNC: 131 MMOL/L (ref 136–145)
WBC NRBC COR # BLD: 12.87 10*3/MM3 (ref 3.4–10.8)

## 2023-04-24 PROCEDURE — 80069 RENAL FUNCTION PANEL: CPT | Performed by: INTERNAL MEDICINE

## 2023-04-24 PROCEDURE — 94799 UNLISTED PULMONARY SVC/PX: CPT

## 2023-04-24 PROCEDURE — 82962 GLUCOSE BLOOD TEST: CPT

## 2023-04-24 PROCEDURE — 25010000002 ENOXAPARIN PER 10 MG: Performed by: NURSE PRACTITIONER

## 2023-04-24 PROCEDURE — 99232 SBSQ HOSP IP/OBS MODERATE 35: CPT | Performed by: INTERNAL MEDICINE

## 2023-04-24 PROCEDURE — 85027 COMPLETE CBC AUTOMATED: CPT | Performed by: NURSE PRACTITIONER

## 2023-04-24 PROCEDURE — 97530 THERAPEUTIC ACTIVITIES: CPT

## 2023-04-24 PROCEDURE — 94664 DEMO&/EVAL PT USE INHALER: CPT

## 2023-04-24 RX ORDER — TRAZODONE HYDROCHLORIDE 50 MG/1
50 TABLET ORAL NIGHTLY PRN
Status: DISCONTINUED | OUTPATIENT
Start: 2023-04-24 | End: 2023-04-30

## 2023-04-24 RX ADMIN — IPRATROPIUM BROMIDE AND ALBUTEROL SULFATE 3 ML: 2.5; .5 SOLUTION RESPIRATORY (INHALATION) at 07:16

## 2023-04-24 RX ADMIN — ANORECTAL OINTMENT 1 APPLICATION: 15.7; .44; 24; 20.6 OINTMENT TOPICAL at 16:08

## 2023-04-24 RX ADMIN — ASPIRIN 81 MG: 81 TABLET, COATED ORAL at 11:55

## 2023-04-24 RX ADMIN — MIDODRINE HYDROCHLORIDE 5 MG: 5 TABLET ORAL at 18:10

## 2023-04-24 RX ADMIN — IPRATROPIUM BROMIDE AND ALBUTEROL SULFATE 3 ML: 2.5; .5 SOLUTION RESPIRATORY (INHALATION) at 19:45

## 2023-04-24 RX ADMIN — PANTOPRAZOLE SODIUM 40 MG: 40 TABLET, DELAYED RELEASE ORAL at 06:56

## 2023-04-24 RX ADMIN — MUPIROCIN 1 APPLICATION: 20 OINTMENT TOPICAL at 09:45

## 2023-04-24 RX ADMIN — Medication 150 MG: at 09:45

## 2023-04-24 RX ADMIN — ENOXAPARIN SODIUM 30 MG: 100 INJECTION SUBCUTANEOUS at 14:56

## 2023-04-24 RX ADMIN — MUPIROCIN 1 APPLICATION: 20 OINTMENT TOPICAL at 20:35

## 2023-04-24 RX ADMIN — MIDODRINE HYDROCHLORIDE 5 MG: 5 TABLET ORAL at 06:56

## 2023-04-24 RX ADMIN — METOPROLOL TARTRATE 25 MG: 25 TABLET, FILM COATED ORAL at 20:36

## 2023-04-24 RX ADMIN — TRAZODONE HYDROCHLORIDE 50 MG: 50 TABLET ORAL at 20:36

## 2023-04-24 RX ADMIN — ANORECTAL OINTMENT 1 APPLICATION: 15.7; .44; 24; 20.6 OINTMENT TOPICAL at 11:56

## 2023-04-24 RX ADMIN — DULOXETINE HYDROCHLORIDE 30 MG: 30 CAPSULE, DELAYED RELEASE ORAL at 20:36

## 2023-04-24 RX ADMIN — ANORECTAL OINTMENT 1 APPLICATION: 15.7; .44; 24; 20.6 OINTMENT TOPICAL at 20:35

## 2023-04-24 RX ADMIN — IPRATROPIUM BROMIDE AND ALBUTEROL SULFATE 3 ML: 2.5; .5 SOLUTION RESPIRATORY (INHALATION) at 13:27

## 2023-04-24 RX ADMIN — GUAIFENESIN 600 MG: 600 TABLET, EXTENDED RELEASE ORAL at 09:45

## 2023-04-24 RX ADMIN — ATORVASTATIN CALCIUM 40 MG: 20 TABLET, FILM COATED ORAL at 20:35

## 2023-04-24 RX ADMIN — GUAIFENESIN 600 MG: 600 TABLET, EXTENDED RELEASE ORAL at 20:36

## 2023-04-24 NOTE — CASE MANAGEMENT/SOCIAL WORK
Continued Stay Note  [facility]     Patient Name: Lefty Cai  MRN: [id number]  Today's Date: 4/24/2023    Admit Date: 4/8/2023    Plan: [facility] evaluating.  Need updated OT note   Discharge Plan     Row Name 04/24/23 0958       Plan    Plan [facility] evaluating.  Need updated OT note    Plan Comments CCP called [facility] and was advised that PRATIK Claros would be following up on Pt consult for Acute rehab.  CCP following....Irene [facility]/PRATIK CM               Discharge Codes    No documentation.               Expected Discharge Date and Time     Expected Discharge Date Expected Discharge Time    Apr 24, 2023             Irene Lawrence RN

## 2023-04-24 NOTE — PROGRESS NOTES
[facility]pt [facility]    Discussed with pt and wife the difference between acute vs subacute rehab.  Will continue to monitor progress with therapies to determine the most appropriate level of rehab for pt upon DC.    Thank you,  Hyacinth Cohen RN  Acute Rehab Admission Nurse

## 2023-04-24 NOTE — PLAN OF CARE
Goal Outcome Evaluation:  Plan of Care Reviewed With: patient, spouse        Progress: no change  Outcome Evaluation: Patient is agreeable to PT this AM. He completed supine to sitting requiring CGA and dizziness reported. He completed multiple STS to rwx with CGA. BP taken sitting SILKE 97/50, standing 88/68, and sitting post session 109/53. Pt UIC post session with donut pillow under buttocks that wife brought in. Pt will continue to benefit from skilled PT to address fucntional deficits and will progress as pt tolerates.

## 2023-04-24 NOTE — PROGRESS NOTES
Name: Lefty Cai ADMIT: 2023   : 1950  PCP: Daksha Ng ROMERO, DUSTY    MRN: [id number] LOS: 16 days   AGE/SEX: 72 y.o. male  ROOM:      Subjective   Subjective   Lying in bed. Wife at bedside. She is concerned because she states all the patient wants to do is sleep. He has been out of bed once. States that he is not gotten out of bed but maybe once a day. Sometimes he cannot tolerate this d/t buttocks pain from some redness/impending sore. He had a few BMs overnight. Appetite still not great. He denies any pain, dyspnea, cough, fever or chills. Wife is worried about SNF stay. She was really hoping for [facility] at discharge.     Objective   Objective   Vital Signs  Temp:  [97.3 °F (36.3 °C)-98.6 °F (37 °C)] 97.3 °F (36.3 °C)  Heart Rate:  [61-72] 63  Resp:  [18] 18  BP: ()/(48-72) 111/72  SpO2:  [95 %-99 %] 95 %  on   ;   Device (Oxygen Therapy): room air  Body mass index is 28.89 kg/m².     Physical Exam  Vitals and nursing note reviewed.   Constitutional:       Appearance: He is chronically ill-appearing. He is not toxic-appearing.   Cardiovascular:      Rate and Rhythm: Normal rate. Regular rhythm.      Pulses: Normal pulses.   Pulmonary:      Effort: Pulmonary effort is normal. No respiratory distress.      Comments: Clear to auscultation.  No rhonchi noted today   Is on RA. Poor inspiratory effort  Abdominal:      General: Bowel sounds are normal. There is no distension.      Palpations: Abdomen is soft.      Tenderness: There is no abdominal tenderness.   Musculoskeletal:         General: No deformity. Normal range of motion.   Skin:     General: Skin is warm and dry.      Findings: No bruising.      Comments: Midline sternal incision C/D/I  Neurological:      Mental Status: He is alert and oriented to person, place, and time.      Motor: generalized Weakness present.      Coordination: Coordination normal.   Psychiatric:         Mood and Affect: Mood normal.         Behavior:  Behavior normal.     Results Review:       I reviewed the patient's new clinical results.  Results from last 7 days   Lab Units 04/24/23  0522 04/23/23 0355 04/22/23 0239 04/21/23  1442   WBC 10*3/mm3 12.87* 12.01* 16.13* 18.12*   HEMOGLOBIN g/dL 9.2* 10.0* 10.3* 10.5*   PLATELETS 10*3/mm3 158 159 142 142     Results from last 7 days   Lab Units 04/24/23  0355 04/23/23 0355 04/22/23 0239 04/21/23  1442   SODIUM mmol/L 131* 134* 135* 138   POTASSIUM mmol/L 4.2 3.9 4.2 4.3   CHLORIDE mmol/L 93* 97* 98 99   CO2 mmol/L 23.7 24.6 27.2 23.9   BUN mg/dL 29* 44* 31* 24*   CREATININE mg/dL 4.23* 5.19* 4.36* 3.34*   GLUCOSE mg/dL 91 111* 105* 134*   Estimated Creatinine Clearance: 18.7 mL/min (A) (by C-G formula based on SCr of 4.23 mg/dL (H)).  Results from last 7 days   Lab Units 04/24/23 0355 04/23/23 0355 04/22/23 0239 04/21/23 1442 04/20/23  0324 04/18/23 0328 04/17/23  1711   ALBUMIN g/dL 2.5* 2.6* 2.3* 2.8* 2.6*   < > 3.1*   BILIRUBIN mg/dL  --   --   --   --  0.6  --  0.5   ALK PHOS U/L  --   --   --   --  101  --  107   AST (SGOT) U/L  --   --   --   --  66*  --  132*   ALT (SGPT) U/L  --   --   --   --  16  --  41    < > = values in this interval not displayed.     Results from last 7 days   Lab Units 04/24/23  0355 04/23/23 0355 04/22/23 0239 04/21/23  1442 04/20/23  0324 04/19/23  0316 04/18/23  0328   CALCIUM mg/dL 8.3* 8.8 9.0 9.1 8.3*   < > 8.3*   ALBUMIN g/dL 2.5* 2.6* 2.3* 2.8* 2.6*   < > 3.0*   MAGNESIUM mg/dL  --   --   --   --  2.0  --  2.3   PHOSPHORUS mg/dL 3.4 4.6* 3.7 2.6 2.6   < > 3.9    < > = values in this interval not displayed.       Glucose   Date/Time Value Ref Range Status   04/24/2023 1112 122 70 - 130 mg/dL Final     Comment:     Meter: [id number] : [id number] Anitha ROSALES   04/24/2023 0656 99 70 - 130 mg/dL Final     Comment:     Meter: [id number] : [id number] Paloma ROSALES   04/23/2023 2113 112 70 - 130 mg/dL Final     Comment:     Meter: [id number] : [id number]  Paloma Horne NA   04/23/2023 1635 125 70 - 130 mg/dL Final     Comment:     Meter: [id number] : [id number] Vi Braga NA   04/23/2023 1122 96 70 - 130 mg/dL Final     Comment:     Meter: [id number] : [id number] Vi ROSALES   04/23/2023 0721 127 70 - 130 mg/dL Final     Comment:     Meter: [id number] : [id number] Oral ROSALES   04/22/2023 2055 168 (H) 70 - 130 mg/dL Final     Comment:     Meter: [id number] : [id number] Oral ROSALES       aspirin, 81 mg, Oral, Daily  atorvastatin, 40 mg, Oral, Nightly  DULoxetine, 30 mg, Oral, Nightly  enoxaparin, 30 mg, Subcutaneous, Q24H  epoetin bernice/bernice-epbx, 6,000 Units, Intravenous, Once per day on Mon Wed Fri  guaiFENesin, 600 mg, Oral, Q12H  insulin lispro, 0-9 Units, Subcutaneous, 4x Daily With Meals & Nightly  ipratropium-albuterol, 3 mL, Nebulization, Q6H While Awake - RT  iron polysaccharides, 150 mg, Oral, Daily  lactulose, 10 g, Oral, BID  Menthol-Zinc Oxide, 1 application, Topical, TID  metoprolol tartrate, 25 mg, Oral, Q12H  midodrine, 5 mg, Oral, BID AC  mupirocin, , Each Nare, BID  pantoprazole, 40 mg, Oral, QAM  senna-docusate sodium, 2 tablet, Oral, BID       Diet: Cardiac Diets, Diabetic Diets; Healthy Heart (2-3 Na+); Consistent Carbohydrate; Texture: Regular Texture (IDDSI 7); Fluid Consistency: Thin (IDDSI 0)       Assessment/Plan     Active Hospital Problems    Diagnosis  POA   • **NSTEMI (non-ST elevated myocardial infarction) [I21.4]  Yes   • Fatigue [R53.83]  Unknown   • Rhinovirus [B34.8]  Yes   • Dyspnea on exertion [R06.09]  Yes   • Abnormal findings on diagnostic imaging of heart and coronary circulation [R93.1]  Yes   • Type 2 diabetes mellitus with diabetic chronic kidney disease [E11.22]  Yes   • Essential hypertension [I10]  Yes   • Hyperlipidemia [E78.5]  Yes   • ESRD (end stage renal disease) [N18.6]  Yes      Resolved Hospital Problems    Diagnosis Date Resolved POA   • Hypernatremia [E87.0] 04/19/2023 Yes       Trell is a 72 year old male who presented to the hospital with complaints of increased dyspnea in the setting of ESRD with HD compliance. He was found to have new T wave inversions with troponin elevation and taken for heart cath that revealed severe three vessel CAD with occluded right coronary system. Cardiothoracic surgery was consulted for CABG and RVP was obtained showing positive for rhinovirus. ID was consulted given need for OR who cleared for surgery without antibiotics. Timing was per surgeon preference. He was taken for open heart on 4/13. He had hypotension and atrial fibrillation with RVR following surgery. Moved out of CICU on 4/20.     • NSTEMI/ PAF : S/P CABG x4 with MV repair on 4/13. Cardiology and Cardiothoracic surgery managing. Echo 4/8 with EF 30-35%. On amiodarone and metoprolol with conversion of atrial fibrillation to NSR. Then had recurrence of afib RVR 4/20 so beta blocker increased and digoxin added instead. On ASA/statin. Now on CVU.  Cardiology is following as is cardiothoracic surgery.  We will leave timing of full anticoagulation up to cardiology and cardiothoracic surgery     • Rhinovirus: Supportive care. ID saw and cleared for OR. Pulmonology followed (signed off 4/20). He is on Duonebs/Mucomyst. Remains on RA.     • ESRD: Via TDC. Nephrology managing. Doppler on left arm AV fistula marginal flow and inadequate size. Renal feels stable for discharge to [facility] from renal standpoint.      • DM2: Sugars very stable. Not requiring correctional insulin.  Continue to encourage oral intake     • HTN: BP stable on BB as well as midodrine.     • Leukocytosis: Up to 18K on 4/21. CXR negative. UA showed > 100 K Pseudomonas in ESRD patient. Respiratory culture with normal respiratory nikunj. Remains afebrile. ID consulted and feels feels UA represents asymptomatic bacteruria  and they do not recommend treatment at this time. WBC trending down despite no antibiotic therapy. Monitor closely.       • ABL on chronic anemia CKD: Looks like he trends mid 10 -11 range.  Monitor.       · Constipation:  Resolved with lactulose/Senokot.     • Coccyx buttock skin breakdown: Encouraged turn every 2 hours, waffle cushion, pressure reduction measures, etc.     • Fatigue: Wife concerned about his fatigue. Suspect multifactorial d/t chronic comorbidities, prolonged hospital stay, open heart surgery. I highly suspect underlying PATY as well. Would benefit from outpatient sleep study. I reviewed his medications and he is on trazodone 50 mg nightly. Appears home medications suggest this was just PRN at home. I will change to PRN and have nursing confirm with wife about this. Will order for out of bed TID with meals to see if we can get him on a better sleep/wake cycle. TSH this admission normal. Will check vitamin B12/folate as well.      Discussed with patient, wife and nurse.     Hopefully to rehab once medically stable- [facility] versus [facility]. Discussed with CCP.     Appreciate all specialists.      VTE Prophylaxis - Lovenox 30 mg SC daily  Code Status - Full code  Disposition - Anticipate discharge TBD.        DUSTY Roberts  [facility]  04/24/23  15:39 EDT

## 2023-04-24 NOTE — PLAN OF CARE
Goal Outcome Evaluation:  Plan of Care Reviewed With: patient, spouse        Progress: improving  Outcome Evaluation: Orthostatic BP this am, Metoprolol held per parameters. BP improved Up to chair for all meals and ambulated to bathroom. Drowsy today Yu with CT surgery aware. Skin care per orders. Patient turns with reminders. This evening states he feels much better. Conitnue to encourage activity to chair and turning to keep off of back.

## 2023-04-24 NOTE — THERAPY TREATMENT NOTE
Patient Name: Lefty Cai  : 1950    MRN: [id number]                              Today's Date: 2023       Admit Date: 2023    Visit Dx:     ICD-10-CM ICD-9-CM   1. Dyspnea on exertion  R06.09 786.09   2. NSTEMI (non-ST elevated myocardial infarction)  I21.4 410.70   3. ESRD (end stage renal disease)  N18.6 585.6   4. Former smoker  Z87.891 V15.82   5. Elevated blood pressure reading in office with diagnosis of hypertension  I10 401.9   6. Rhinovirus infection  B34.8 079.3   7. Abnormal findings on diagnostic imaging of heart and coronary circulation  R93.1 794.39   8. S/P CABG (coronary artery bypass graft)  Z95.1 V45.81     Patient Active Problem List   Diagnosis   • Type 2 diabetes mellitus with diabetic chronic kidney disease   • Essential hypertension   • Hyperlipidemia   • Primary insomnia   • ESRD (end stage renal disease)   • Vitamin D deficiency   • Diverticulitis of large intestine with perforation and abscess without bleeding   • Obesity (BMI 30-39.9)   • Impaired mobility and ADLs   • History of colon resection   • Colon polyps   • Diverticulosis   • CKD (chronic kidney disease) stage 5, GFR less than 15 ml/min   • Dyspnea on exertion   • NSTEMI (non-ST elevated myocardial infarction)   • Rhinovirus   • Abnormal findings on diagnostic imaging of heart and coronary circulation     Past Medical History:   Diagnosis Date   • Anemia    • Anesthesia complication     HYPOTENSION   • Arthritis    • Cancer of kidney, left    • CKD (chronic kidney disease)     STAGE 5   • Diabetes mellitus, type 2    • Fatigue    • GERD (gastroesophageal reflux disease)    • H/O renal cell carcinoma     partial nephrectomy   • History of colostomy reversal 2022   • [place] (hard of hearing)     NO DEVICE   • Hyperlipidemia    • Hypertension    • Primary insomnia 2016   • Proteinuria    • Risk factors for obstructive sleep apnea    • Sinus drainage    • Vitamin D deficiency 2017     Past  Surgical History:   Procedure Laterality Date   • ARTERIOVENOUS FISTULA/SHUNT SURGERY Left 08/15/2019    Procedure: LEFT MID FOREARM RADIAL CEPHALIC ARTERIAL VENOUS FISTULA;  Surgeon: Louann Miles Jr., MD;  Location: [facility] MAIN OR;  Service: Vascular   • CARDIAC CATHETERIZATION N/A 4/9/2023    Procedure: Left Heart Cath;  Surgeon: Lobito Garcia MD;  Location: [facility] CATH INVASIVE LOCATION;  Service: Cardiovascular;  Laterality: N/A;   • CARDIAC CATHETERIZATION N/A 4/9/2023    Procedure: Left ventriculography;  Surgeon: Lobito Garcia MD;  Location: [facility] CATH INVASIVE LOCATION;  Service: Cardiovascular;  Laterality: N/A;   • CARDIAC CATHETERIZATION N/A 4/9/2023    Procedure: Coronary angiography;  Surgeon: Lobito Garcia MD;  Location: [facility] CATH INVASIVE LOCATION;  Service: Cardiovascular;  Laterality: N/A;   • COLONOSCOPY  03/24/2022   • COLONOSCOPY N/A 03/24/2022    Procedure: COLONOSCOPY TO CECUM WITH POLYPECTOMY  (HOT SNARE);  Surgeon: Yelena Guerra MD;  Location: [facility] ENDOSCOPY;  Service: General;  Laterality: N/A;  PREOP/ HX OF DIVERTICULITIS, COLOSTOMY  POSTOP/ POLYPS, DIVERTICULOSIS    • COLOSTOMY  09/06/2021   • COLOSTOMY CLOSURE N/A 04/12/2022    Procedure: open Colostomy reversal;  Surgeon: Yelena Guerra MD;  Location: [facility] OR;  Service: General;  Laterality: N/A;   • CORONARY ARTERY BYPASS GRAFT N/A 4/13/2023    Procedure: MAT STERNOTOMY CORONARY ARTERY BYPASS GRAFT TIMES 4 USING LEFT INTERNAL MAMMARY ARTERY AND RIGHT GREATER SAPHENOUS VEIN  PER ENDOSCOPIC VEIN HARVESTING, MITRAL VALVE REPAIR  AND PRP;  Surgeon: Mirtha Zuluaga MD;  Location: [facility] CVOR;  Service: Cardiothoracic;  Laterality: N/A;   • DIAGNOSTIC LAPAROSCOPY N/A 2/27/2023    Procedure: DIAGNOSTIC LAPAROSCOPY;  Surgeon: Murali Ferreira MD;  Location: [facility] OR;  Service: General;  Laterality: N/A;   • EXPLORATORY LAPAROTOMY N/A 09/06/2021    Procedure: Open sigmoind colectomy,  colostomy, and appendectomy;  Surgeon: Yelena Guerra MD;  Location: [facility] MAIN OR;  Service: General;  Laterality: N/A;   • EYE SURGERY      CATARACTS   • KNEE ARTHROSCOPY Right    • NEPHRECTOMY PARTIAL  2007    Dr. Victor      General Information     Row Name 04/24/23 1218          Physical Therapy Time and Intention    Document Type therapy note (daily note)  -CB     Mode of Treatment individual therapy;physical therapy  -CB     Row Name 04/24/23 1218          General Information    Existing Precautions/Restrictions fall;cardiac;sternal  -CB     Row Name 04/24/23 1218          Cognition    Orientation Status (Cognition) oriented x 3  -CB     Row Name 04/24/23 1218          Safety Issues, Functional Mobility    Impairments Affecting Function (Mobility) endurance/activity tolerance;shortness of breath  -CB           User Key  (r) = Recorded By, (t) = Taken By, (c) = Cosigned By    Initials Name Provider Type    Geni Gilliam PT Physical Therapist               Mobility     Row Name 04/24/23 1219          Bed Mobility    Bed Mobility supine-sit  -CB     Supine-Sit [place] (Bed Mobility) verbal cues;nonverbal cues (demo/gesture);contact guard  -CB     Assistive Device (Bed Mobility) bed rails;head of bed elevated  -CB     Row Name 04/24/23 1219          Bed-Chair Transfer    Bed-Chair [place] (Transfers) contact guard;verbal cues  -CB     Assistive Device (Bed-Chair Transfers) walker, front-wheeled  -CB     Row Name 04/24/23 1219          Sit-Stand Transfer    Sit-Stand [place] (Transfers) verbal cues;nonverbal cues (demo/gesture);contact guard  -CB     Assistive Device (Sit-Stand Transfers) walker, front-wheeled  -CB     Comment, (Sit-Stand Transfer) x4 STS to rwx; dizziness in standing and cues for upright posture.BP taken- see flowsheet  -CB           User Key  (r) = Recorded By, (t) = Taken By, (c) = Cosigned By    Initials Name Provider Type    Geni Gilliam PT Physical Therapist                Obj/Interventions     Row Name 04/24/23 1221          Motor Skills    Therapeutic Exercise --  cardiac protocol x10 reps  -CB     Row Name 04/24/23 1221          Balance    Balance Assessment standing static balance;standing dynamic balance  -CB     Static Standing Balance contact guard;verbal cues  -CB     Dynamic Standing Balance contact guard;verbal cues  -CB     Position/Device Used, Standing Balance supported;walker, front-wheeled  -CB     Balance Interventions sitting;standing;sit to stand;supported;static;dynamic;minimal challenge  -CB           User Key  (r) = Recorded By, (t) = Taken By, (c) = Cosigned By    Initials Name Provider Type    CB Geni Pederson, PT Physical Therapist               Goals/Plan    No documentation.                Clinical Impression     Row Name 04/24/23 1222          Pain    Pre/Posttreatment Pain Comment does not rate pain but states buttocks pain  -CB     Pain Intervention(s) Repositioned;Rest;Ambulation/increased activity  donut pillow under buttocks in chair  -CB     Row Name 04/24/23 1222          Plan of Care Review    Plan of Care Reviewed With patient;spouse  -CB     Progress no change  -CB     Outcome Evaluation Patient is agreeable to PT this AM. He completed supine to sitting requiring CGA and dizziness reported. He completed multiple STS to rwx with CGA. BP taken sitting SILKE 97/50, standing 88/68, and sitting post session 109/53. Pt UIC post session with donut pillow under buttocks that wife brought in. Pt will continue to benefit from skilled PT to address fucntional deficits and will progress as pt tolerates.  -CB     Row Name 04/24/23 1222          Vital Signs    Pre Systolic BP Rehab 97  -CB     Pre Treatment Diastolic BP 50  -CB     Intra Systolic BP Rehab 88  -CB     Intra Treatment Diastolic BP 68  -CB     Post Systolic BP Rehab 109  -CB     Post Treatment Diastolic BP 53  -CB     Row Name 04/24/23 1222          Positioning and Restraints     Pre-Treatment Position in bed  -CB     Post Treatment Position chair  -CB     In Chair notified nsg;reclined;call light within reach;encouraged to call for assist;with family/caregiver  donut pillow  -CB           User Key  (r) = Recorded By, (t) = Taken By, (c) = Cosigned By    Initials Name Provider Type    Geni Gilliam, PT Physical Therapist               Outcome Measures     Row Name 04/24/23 1226 04/24/23 0935       How much help from another person do you currently need...    Turning from your back to your side while in flat bed without using bedrails? 3  -CB 3  -AC    Moving from lying on back to sitting on the side of a flat bed without bedrails? 3  -CB 3  -AC    Moving to and from a bed to a chair (including a wheelchair)? 3  -CB 3  -AC    Standing up from a chair using your arms (e.g., wheelchair, bedside chair)? 3  -CB 3  -AC    Climbing 3-5 steps with a railing? 2  -CB 2  -AC    To walk in hospital room? 3  -CB 3  -AC    AM-PAC 6 Clicks Score (PT) 17  -CB 17  -AC    Highest level of mobility 5 --> Static standing  -CB 5 --> Static standing  -AC    Row Name 04/24/23 0740          How much help from another person do you currently need...    Turning from your back to your side while in flat bed without using bedrails? 3  -AC     Moving from lying on back to sitting on the side of a flat bed without bedrails? 3  -AC     Moving to and from a bed to a chair (including a wheelchair)? 3  -AC     Standing up from a chair using your arms (e.g., wheelchair, bedside chair)? 3  -AC     Climbing 3-5 steps with a railing? 2  -AC     To walk in hospital room? 3  -AC     AM-PAC 6 Clicks Score (PT) 17  -AC     Highest level of mobility 5 --> Static standing  -AC     Row Name 04/24/23 1226          Functional Assessment    Outcome Measure Options AM-PAC 6 Clicks Basic Mobility (PT)  -CB           User Key  (r) = Recorded By, (t) = Taken By, (c) = Cosigned By    Initials Name Provider Type    Yady Pompa RN  Registered Nurse    CB Geni Pederson PT Physical Therapist                             Physical Therapy Education     Title: PT OT SLP Therapies (Done)     Topic: Physical Therapy (Done)     Point: Mobility training (Done)     Learning Progress Summary           Patient Acceptance, E,TB, VU,NR by CB at 4/24/2023 1226    Acceptance, E,TB,D, VU,NR by  at 4/22/2023 1627    Acceptance, E,TB, VU by  at 4/21/2023 1756    Acceptance, E,TB,D, VU,NR by  at 4/21/2023 1626    Acceptance, E, VU by EM at 4/20/2023 1507    Acceptance, E, VU by  at 4/19/2023 1815    Acceptance, E, VU by EM at 4/19/2023 1156    Acceptance, E, NR by AR at 4/16/2023 1633    Acceptance, TB,E, VU by  at 4/11/2023 0900   Family Acceptance, E, VU by  at 4/19/2023 1815    Acceptance, E, NR by AR at 4/16/2023 1633                   Point: Home exercise program (Done)     Learning Progress Summary           Patient Acceptance, E,TB, VU,NR by CB at 4/24/2023 1226    Acceptance, E,TB,D, VU,NR by  at 4/22/2023 1627    Acceptance, E,TB, VU by  at 4/21/2023 1756    Acceptance, E,TB,D, VU,NR by  at 4/21/2023 1626    Acceptance, E, VU by EM at 4/20/2023 1507    Acceptance, E, VU by  at 4/19/2023 1815    Acceptance, E, VU by EM at 4/19/2023 1156    Acceptance, E, NR by AR at 4/16/2023 1633    Acceptance, TB,E, VU by  at 4/11/2023 0900   Family Acceptance, E, VU by  at 4/19/2023 1815    Acceptance, E, NR by AR at 4/16/2023 1633                   Point: Body mechanics (Done)     Learning Progress Summary           Patient Acceptance, E,TB, VU,NR by CB at 4/24/2023 1226    Acceptance, E,TB,D, VU,NR by  at 4/22/2023 1627    Acceptance, E,TB, VU by TH at 4/21/2023 1756    Acceptance, E,TB,D, VU,NR by  at 4/21/2023 1626    Acceptance, E, VU by  at 4/19/2023 1815    Acceptance, E, NR by AR at 4/16/2023 1633    Acceptance, TB,E, VU by  at 4/11/2023 0900   Family Acceptance, E, VU by  at 4/19/2023 1815    Acceptance, E, NR by AR at  4/16/2023 1633                   Point: Precautions (Done)     Learning Progress Summary           Patient Acceptance, E,TB, VU,NR by  at 4/24/2023 1226    Acceptance, E,TB,D, VU,NR by  at 4/22/2023 1627    Acceptance, E,TB, VU by  at 4/21/2023 1756    Acceptance, E,TB,D, VU,NR by  at 4/21/2023 1626    Acceptance, E, VU by  at 4/19/2023 1815    Acceptance, E, NR by AR at 4/16/2023 1633    Acceptance, TB,E, VU by  at 4/11/2023 0900   Family Acceptance, E, VU by  at 4/19/2023 1815    Acceptance, E, NR by AR at 4/16/2023 1633                               User Key     Initials Effective Dates Name Provider Type Discipline     06/16/21 -  Kavitha Sen, PT Physical Therapist PT    EM 06/16/21 -  Jennie Cuevas, PT Physical Therapist PT    AR 06/16/21 -  Mihaela Shields, PT Physical Therapist PT     06/16/21 -  Kacy Deluca, RN Registered Nurse Nurse     10/22/21 -  Geni Pederson, PT Physical Therapist PT     12/20/21 -  Mitesh Sage, RN Registered Nurse Nurse              PT Recommendation and Plan     Plan of Care Reviewed With: patient, spouse  Progress: no change  Outcome Evaluation: Patient is agreeable to PT this AM. He completed supine to sitting requiring CGA and dizziness reported. He completed multiple STS to rwx with CGA. BP taken sitting SILKE 97/50, standing 88/68, and sitting post session 109/53. Pt UIC post session with donut pillow under buttocks that wife brought in. Pt will continue to benefit from skilled PT to address fucntional deficits and will progress as pt tolerates.     Time Calculation:    PT Charges     Row Name 04/24/23 1227             Time Calculation    Start Time 0931  -CB      Stop Time 0954  -CB      Time Calculation (min) 23 min  -CB      PT Received On 04/24/23  -CB      PT - Next Appointment 04/25/23  -CB         Time Calculation- PT    Total Timed Code Minutes- PT 23 minute(s)  -CB         Timed Charges    12961 - PT Therapeutic Activity  Minutes 23  -CB         Total Minutes    Timed Charges Total Minutes 23  -CB       Total Minutes 23  -CB            User Key  (r) = Recorded By, (t) = Taken By, (c) = Cosigned By    Initials Name Provider Type    CB Geni Pederson, PT Physical Therapist              Therapy Charges for Today     Code Description Service Date Service Provider Modifiers Qty    [id number]  PT THERAPEUTIC ACT EA 15 MIN 4/24/2023 Geni Pederson PT GP 2          PT G-Codes  Outcome Measure Options: AM-PAC 6 Clicks Basic Mobility (PT)  AM-PAC 6 Clicks Score (PT): 17  AM-PAC 6 Clicks Score (OT): 15       Geni Pederson PT  4/24/2023

## 2023-04-24 NOTE — PROGRESS NOTES
[facility] Progress Note      Patient Name: Lefty Cai  : 1950  MRN: [id number]  Primary Care Physician:  Daksha Ng APRN  Date of admission: 2023    Subjective     Interval History:   Follow up ESRD.      The patient continued to have orthostatic hypotension, symptomatic when trying to get up, no chest pain,  he has chest discomfort related to his surgery, no nausea or vomiting, no abdominal pain.      Review of Systems:   As noted above    Objective     Vitals:   Temp:  [97.7 °F (36.5 °C)-98.7 °F (37.1 °C)] 97.7 °F (36.5 °C)  Heart Rate:  [61-72] 68  Resp:  [18] 18  BP: ()/(48-68) 109/53    Intake/Output Summary (Last 24 hours) at 2023 1106  Last data filed at 2023 0825  Gross per 24 hour   Intake 360 ml   Output 950 ml   Net -590 ml       Physical Exam:    General Appearance: alert, NAD, chronically ill, overweight  Qb 350, 105/72, HR 64, fluid removal goal 2.6 L  Skin: warm and dry  HEENT: oral mucosa normal, nonicteric sclera  Neck: TDC RIJ catheter with exit site below the right clavicle  Lungs: Coarse breath sounds but otherwise clear, unlabored on RA   Heart: Regular rate and rhythm, no rub  Abdomen: soft, nontender, slightly distended.  Normoactive bowel  Extremities: Trace lower extremity edema  Vascular: Left arm AV fistula patent  Neuro: normal speech and mental status     Scheduled Meds:     aspirin, 81 mg, Oral, Daily  atorvastatin, 40 mg, Oral, Nightly  DULoxetine, 30 mg, Oral, Nightly  enoxaparin, 30 mg, Subcutaneous, Q24H  epoetin bernice/bernice-epbx, 6,000 Units, Intravenous, Once per day on   guaiFENesin, 600 mg, Oral, Q12H  insulin lispro, 0-9 Units, Subcutaneous, 4x Daily With Meals & Nightly  ipratropium-albuterol, 3 mL, Nebulization, Q6H While Awake - RT  iron polysaccharides, 150 mg, Oral, Daily  lactulose, 10 g, Oral, BID  Menthol-Zinc Oxide, 1 application, Topical, TID  metoprolol tartrate, 25 mg, Oral,  Q12H  midodrine, 5 mg, Oral, BID AC  mupirocin, , Each Nare, BID  pantoprazole, 40 mg, Oral, QAM  senna-docusate sodium, 2 tablet, Oral, BID  traZODone, 50 mg, Oral, Nightly      IV Meds:        Results Reviewed:   I have personally reviewed the results from the time of this admission to 4/24/2023 11:06 EDT     Results from last 7 days   Lab Units 04/24/23  0355 04/23/23  0355 04/22/23  0239 04/21/23  1442 04/20/23 0324 04/18/23 0328 04/17/23  1711   SODIUM mmol/L 131* 134* 135*   < > 136   < > 142   POTASSIUM mmol/L 4.2 3.9 4.2   < > 3.8   < > 3.9   CHLORIDE mmol/L 93* 97* 98   < > 99   < > 104   CO2 mmol/L 23.7 24.6 27.2   < > 23.3   < > 21.9*   BUN mg/dL 29* 44* 31*   < > 28*   < > 22   CREATININE mg/dL 4.23* 5.19* 4.36*   < > 4.25*   < > 2.68*   CALCIUM mg/dL 8.3* 8.8 9.0   < > 8.3*   < > 8.5*   BILIRUBIN mg/dL  --   --   --   --  0.6  --  0.5   ALK PHOS U/L  --   --   --   --  101  --  107   ALT (SGPT) U/L  --   --   --   --  16  --  41   AST (SGOT) U/L  --   --   --   --  66*  --  132*   GLUCOSE mg/dL 91 111* 105*   < > 95   < > 114*    < > = values in this interval not displayed.           Results from last 7 days   Lab Units 04/24/23  0355 04/23/23  0355 04/22/23 0239 04/21/23  1442 04/20/23 0324 04/19/23 0316 04/18/23  0328   MAGNESIUM mg/dL  --   --   --   --  2.0  --  2.3   PHOSPHORUS mg/dL 3.4 4.6* 3.7   < > 2.6   < > 3.9    < > = values in this interval not displayed.             Results from last 7 days   Lab Units 04/24/23  0522 04/23/23  0355 04/22/23  0239 04/21/23  1442 04/20/23  0324   WBC 10*3/mm3 12.87* 12.01* 16.13* 18.12* 11.68*   HEMOGLOBIN g/dL 9.2* 10.0* 10.3* 10.5* 10.0*   PLATELETS 10*3/mm3 158 159 142 142 120*             Assessment / Plan     ASSESSMENT:  1.  ESRD.  Volume stable; electrolytes compensated.  Had dialysis yesterday on a Tuesday, Thursday Saturday schedule. Doppler study done several days ago on AV fistula revealed marginal flows and inadequate size of vessels  2.   NSTEMI. SP 3-vessel CABG, MV repair 4/13/23.  3.  Postop atrial fibrillation currently in normal sinus rhythm  4.  Anemia CKD and blood loss  5.  TCP.  Negative HIT panel, platelets improving slowly  6.  DM2 with renal complications     PLAN:  1.  Hemodialysis tomorrow  2.  Vascular surgeon will review evaluate and advise about the use of the AV fistula  3.  [facility] anytime from renal view  4.  Surveillance labs    I discussed the case with the patient and his wife at the bedside      Quang Reyes MD  04/24/23  11:06 EDT    [facility]  [phone]

## 2023-04-24 NOTE — CASE MANAGEMENT/SOCIAL WORK
Continued Stay Note  [facility]     Patient Name: Lefty Cai  MRN: [id number]  Today's Date: 4/24/2023    Admit Date: 4/8/2023    Plan: BAR eval ongoing;  Waiting for tomorrows updated OT & PT note.   Discharge Plan     Row Name 04/24/23 1625       Plan    Plan BAR eval ongoing;  Waiting for tomorrows updated OT & PT note.    Provided Post Acute Provider Quality & Resource List? Yes    Post Acute Provider Quality and Resource List Nursing Home    Delivered To Support Person    Support Person santa Diggs    Method of Delivery In person    Plan Comments [facility] spoke with Pt and wife, Caterina Cai [phone] at bedside about Pt dc plan.  Wife is very focused on Pt going to [facility] and is hopeful that with encouragement from therapy staff, that Pt will participate more when they work with him.  Wife concerned that therapy did not see Pt both days on weekend and that O.T. has not been able to work with Pt since Thursday of last week.  CCP offered apologies.  CCP gave wife a SNF list with the six facilities that have HEMODIALYSIS on site.  Wife advised she would look over list but at this time, she really wants therapy to work with Pt in hopes he is a candidate for BAR.  CCP following...........SRS/RN CM               Discharge Codes    No documentation.               Expected Discharge Date and Time     Expected Discharge Date Expected Discharge Time    Apr 25, 2023             Irene Lawrence RN

## 2023-04-24 NOTE — PROGRESS NOTES
LOS: 16 days   Patient Care Team:  Daksha gN APRN as PCP - General (Family Medicine)  Rudy Faith MD as Consulting Physician (Ophthalmology)  Jose Mccall MD as Consulting Physician (Nephrology)  Quang Reyes MD as Consulting Physician (Nephrology)    Chief Complaint: post op    Subjective      Vital Signs  Temp:  [97.9 °F (36.6 °C)-98.7 °F (37.1 °C)] 97.9 °F (36.6 °C)  Heart Rate:  [61-72] 61  Resp:  [18] 18  BP: ()/(49-63) 117/63  Body mass index is 28.89 kg/m².    Intake/Output Summary (Last 24 hours) at 4/24/2023 0803  Last data filed at 4/23/2023 1700  Gross per 24 hour   Intake 120 ml   Output 950 ml   Net -830 ml     No intake/output data recorded.    Chest tube drainage last 8 hours         04/22/23  0600 04/23/23  0610 04/24/23  0634   Weight: 95.2 kg (209 lb 12.8 oz) 97.7 kg (215 lb 6.4 oz) 96.6 kg (213 lb)         Objective    Results Review:        WBC WBC   Date Value Ref Range Status   04/24/2023 12.87 (H) 3.40 - 10.80 10*3/mm3 Final   04/23/2023 12.01 (H) 3.40 - 10.80 10*3/mm3 Final   04/22/2023 16.13 (H) 3.40 - 10.80 10*3/mm3 Final   04/21/2023 18.12 (H) 3.40 - 10.80 10*3/mm3 Final      HGB Hemoglobin   Date Value Ref Range Status   04/24/2023 9.2 (L) 13.0 - 17.7 g/dL Final   04/23/2023 10.0 (L) 13.0 - 17.7 g/dL Final   04/22/2023 10.3 (L) 13.0 - 17.7 g/dL Final   04/21/2023 10.5 (L) 13.0 - 17.7 g/dL Final      HCT Hematocrit   Date Value Ref Range Status   04/24/2023 29.1 (L) 37.5 - 51.0 % Final   04/23/2023 30.1 (L) 37.5 - 51.0 % Final   04/22/2023 32.0 (L) 37.5 - 51.0 % Final   04/21/2023 33.9 (L) 37.5 - 51.0 % Final      Platelets Platelets   Date Value Ref Range Status   04/24/2023 158 140 - 450 10*3/mm3 Final   04/23/2023 159 140 - 450 10*3/mm3 Final   04/22/2023 142 140 - 450 10*3/mm3 Final   04/21/2023 142 140 - 450 10*3/mm3 Final        PT/INR:  No results found for: PROTIME/No results found for: INR    Sodium Sodium   Date Value Ref Range Status    04/24/2023 131 (L) 136 - 145 mmol/L Final   04/23/2023 134 (L) 136 - 145 mmol/L Final   04/22/2023 135 (L) 136 - 145 mmol/L Final   04/21/2023 138 136 - 145 mmol/L Final      Potassium Potassium   Date Value Ref Range Status   04/24/2023 4.2 3.5 - 5.2 mmol/L Final   04/23/2023 3.9 3.5 - 5.2 mmol/L Final   04/22/2023 4.2 3.5 - 5.2 mmol/L Final     Comment:     Slight hemolysis detected by analyzer. Results may be affected.   04/21/2023 4.3 3.5 - 5.2 mmol/L Final     Comment:     Slight hemolysis detected by analyzer. Results may be affected.      Chloride Chloride   Date Value Ref Range Status   04/24/2023 93 (L) 98 - 107 mmol/L Final   04/23/2023 97 (L) 98 - 107 mmol/L Final   04/22/2023 98 98 - 107 mmol/L Final   04/21/2023 99 98 - 107 mmol/L Final      Bicarbonate CO2   Date Value Ref Range Status   04/24/2023 23.7 22.0 - 29.0 mmol/L Final   04/23/2023 24.6 22.0 - 29.0 mmol/L Final   04/22/2023 27.2 22.0 - 29.0 mmol/L Final   04/21/2023 23.9 22.0 - 29.0 mmol/L Final      BUN BUN   Date Value Ref Range Status   04/24/2023 29 (H) 8 - 23 mg/dL Final   04/23/2023 44 (H) 8 - 23 mg/dL Final   04/22/2023 31 (H) 8 - 23 mg/dL Final   04/21/2023 24 (H) 8 - 23 mg/dL Final      Creatinine Creatinine   Date Value Ref Range Status   04/24/2023 4.23 (H) 0.76 - 1.27 mg/dL Final   04/23/2023 5.19 (H) 0.76 - 1.27 mg/dL Final   04/22/2023 4.36 (H) 0.76 - 1.27 mg/dL Final   04/21/2023 3.34 (H) 0.76 - 1.27 mg/dL Final      Calcium Calcium   Date Value Ref Range Status   04/24/2023 8.3 (L) 8.6 - 10.5 mg/dL Final   04/23/2023 8.8 8.6 - 10.5 mg/dL Final   04/22/2023 9.0 8.6 - 10.5 mg/dL Final   04/21/2023 9.1 8.6 - 10.5 mg/dL Final      Magnesium No results found for: MG       aspirin, 81 mg, Oral, Daily  atorvastatin, 40 mg, Oral, Nightly  DULoxetine, 30 mg, Oral, Nightly  enoxaparin, 30 mg, Subcutaneous, Q24H  epoetin bernice/bernice-epbx, 6,000 Units, Intravenous, Once per day on Mon Wed Fri  guaiFENesin, 600 mg, Oral, Q12H  insulin  lispro, 0-9 Units, Subcutaneous, 4x Daily With Meals & Nightly  ipratropium-albuterol, 3 mL, Nebulization, Q6H While Awake - RT  iron polysaccharides, 150 mg, Oral, Daily  lactulose, 10 g, Oral, BID  metoprolol tartrate, 25 mg, Oral, Q12H  midodrine, 5 mg, Oral, BID AC  mupirocin, , Each Nare, BID  pantoprazole, 40 mg, Oral, QAM  senna-docusate sodium, 2 tablet, Oral, BID  traZODone, 50 mg, Oral, Nightly               Patient Active Problem List   Diagnosis Code   • 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.22   • Essential hypertension I10   • Hyperlipidemia E78.5   • Primary insomnia F51.01   • ESRD (end stage renal disease) N18.6   • Vitamin D deficiency E55.9   • Diverticulitis of large intestine with perforation and abscess without bleeding K57.20   • Obesity (BMI 30-39.9) E66.9   • Impaired mobility and ADLs Z74.09, Z78.9   • History of colon resection Z90.49   • Colon polyps K63.5   • Diverticulosis K57.90   • CKD (chronic kidney disease) stage 5, GFR less than 15 ml/min N18.5   • Dyspnea on exertion R06.09   • NSTEMI (non-ST elevated myocardial infarction) I21.4   • Rhinovirus B34.8   • Abnormal findings on diagnostic imaging of heart and coronary circulation R93.1       Assessment & Plan    - NSTEMI/multi-vessel CAD- s/p urgent CABGx4 LIMA/RSVG, MV repair-([place]) POD#12  - HFrEF--30-35% per echo 4/8  - rhinovirus infection  - ESRD on HD  - hypertension  - hyperlipidemia  - DM II- A1c 5.0  -post op anemia- expected acute blood loss  -Leukocytosis- probable reactive   -TCP- resolved       Looks better this morning  Sinus rhythm rate in the 60-70s  Mobilize/aggressive pulmonary toilet--continued nebs/flutter and OT/PT  Increase activity-- still pretty weak  Rehab at discharge acute vs subacute  Continue routine care      DUSTY Spring  04/24/23  08:03 EDT

## 2023-04-24 NOTE — PROGRESS NOTES
"Lefty Cai  1950 72 y.o.  [id number]      Patient Care Team:  Daksha Ng APRN as PCP - General (Family Medicine)  Rudy Faith MD as Consulting Physician (Ophthalmology)  Jose Mccall MD as Consulting Physician (Nephrology)  Quang Reyes MD as Consulting Physician (Nephrology)    CC: Non-STEMI,, end-stage renal failure, EF 30 to 35%, severe three-vessel coronary disease, severe MR, underwent three-vessel CABG and mitral valve repair, postop A-fib    Interval History: He is doing better is in sinus rhythm    Objective   Vital Signs  Temp:  [97.9 °F (36.6 °C)-98.7 °F (37.1 °C)] 97.9 °F (36.6 °C)  Heart Rate:  [61-72] 61  Resp:  [18] 18  BP: ()/(49-63) 117/63    Intake/Output Summary (Last 24 hours) at 4/24/2023 0823  Last data filed at 4/23/2023 1700  Gross per 24 hour   Intake 120 ml   Output 950 ml   Net -830 ml     Flowsheet Rows    Flowsheet Row First Filed Value   Admission Height 177.8 cm (70\") Documented at 04/08/2023 1028   Admission Weight 102 kg (225 lb) Documented at 04/08/2023 1028          Physical Exam:   General Appearance:    Alert,oriented, in no acute distress   Lungs:     Clear to auscultation,BS are equal    Heart:    Normal S1 and S2, RRR without murmur, gallop or rub   HEENT:    Sclerae are clear, no JVD or adenopathy   Abdomen:     Normal bowel sounds, soft nontender, nondistended, no HSM   Extremities:   Moves all extremities well, no edema, no cyanosis, no             Redness, no rash     Medication Review:      aspirin, 81 mg, Oral, Daily  atorvastatin, 40 mg, Oral, Nightly  DULoxetine, 30 mg, Oral, Nightly  enoxaparin, 30 mg, Subcutaneous, Q24H  epoetin bernice/bernice-epbx, 6,000 Units, Intravenous, Once per day on Mon Wed Fri  guaiFENesin, 600 mg, Oral, Q12H  insulin lispro, 0-9 Units, Subcutaneous, 4x Daily With Meals & Nightly  ipratropium-albuterol, 3 mL, Nebulization, Q6H While Awake - RT  iron polysaccharides, 150 mg, Oral, Daily  lactulose, 10 " g, Oral, BID  metoprolol tartrate, 25 mg, Oral, Q12H  midodrine, 5 mg, Oral, BID AC  mupirocin, , Each Nare, BID  pantoprazole, 40 mg, Oral, QAM  senna-docusate sodium, 2 tablet, Oral, BID  traZODone, 50 mg, Oral, Nightly             I reviewed the patient's new clinical results.  I personally viewed and interpreted the patient's EKG/Telemetry data    Assessment/Plan  Active Hospital Problems    Diagnosis  POA   • **NSTEMI (non-ST elevated myocardial infarction) [I21.4]  Yes   • Rhinovirus [B34.8]  Yes   • Dyspnea on exertion [R06.09]  Yes   • Abnormal findings on diagnostic imaging of heart and coronary circulation [R93.1]  Yes   • Type 2 diabetes mellitus with diabetic chronic kidney disease [E11.22]  Yes   • Essential hypertension [I10]  Yes   • Hyperlipidemia [E78.5]  Yes   • ESRD (end stage renal disease) [N18.6]  Yes      Resolved Hospital Problems    Diagnosis Date Resolved POA   • Hypernatremia [E87.0] 04/19/2023 Yes     Ischemic cardiomyopathy severe mitral insufficiency status post CABG and mitral valve repair on dialysis we have had some difficulty with paroxysmal A-fib.  He is doing better is ambulating some we just need to get him up moving around and then I think he could probably think about going home    Lobito Garcia MD  04/24/23  08:23 EDT

## 2023-04-24 NOTE — NURSING NOTE
CWON note: pt consult received for follow up on sacral skin.  Pt continues to c/o of buttocks pain. Patient's skin to sacrum and buttocks were assessed. Skin is dry and intact, flaky; over each buttock there is blanchable erythema/maroon discoloration. Skin changes are likely 2° to a combination of both friction and moisture and not pressure as edges are irregular and not over a bony prominence.  He now has some partial thickness skin loss low in the gluteal cleft near the anus secondary to residual stool being on his skin. Pt reports he had a BM yesterday, but apparently did not clean himself well afterwards. He is wearing underwear and is not usually incontinent. He is currently on a specialty bed and able to turn and reposition self.     Optifoam dressing placed over the flaky discolored skin, Calmoseptine ordered for the MASD near his anus. Orders updated in Epic. Please re-consult for any additional needs.

## 2023-04-25 PROBLEM — R13.10 DYSPHAGIA: Status: ACTIVE | Noted: 2023-04-25

## 2023-04-25 LAB
25(OH)D3 SERPL-MCNC: 46.9 NG/ML (ref 30–100)
ALBUMIN SERPL-MCNC: 2.9 G/DL (ref 3.5–5.2)
ANION GAP SERPL CALCULATED.3IONS-SCNC: 14 MMOL/L (ref 5–15)
BASOPHILS # BLD AUTO: 0.04 10*3/MM3 (ref 0–0.2)
BASOPHILS NFR BLD AUTO: 0.3 % (ref 0–1.5)
BUN SERPL-MCNC: 42 MG/DL (ref 8–23)
BUN/CREAT SERPL: 7.8 (ref 7–25)
CALCIUM SPEC-SCNC: 8.4 MG/DL (ref 8.6–10.5)
CHLORIDE SERPL-SCNC: 97 MMOL/L (ref 98–107)
CO2 SERPL-SCNC: 24 MMOL/L (ref 22–29)
CREAT SERPL-MCNC: 5.36 MG/DL (ref 0.76–1.27)
DEPRECATED RDW RBC AUTO: 48.5 FL (ref 37–54)
EGFRCR SERPLBLD CKD-EPI 2021: 10.6 ML/MIN/1.73
EOSINOPHIL # BLD AUTO: 0.38 10*3/MM3 (ref 0–0.4)
EOSINOPHIL NFR BLD AUTO: 3 % (ref 0.3–6.2)
ERYTHROCYTE [DISTWIDTH] IN BLOOD BY AUTOMATED COUNT: 15.4 % (ref 12.3–15.4)
FOLATE SERPL-MCNC: 6.31 NG/ML (ref 4.78–24.2)
GLUCOSE BLDC GLUCOMTR-MCNC: 104 MG/DL (ref 70–130)
GLUCOSE BLDC GLUCOMTR-MCNC: 123 MG/DL (ref 70–130)
GLUCOSE BLDC GLUCOMTR-MCNC: 128 MG/DL (ref 70–130)
GLUCOSE BLDC GLUCOMTR-MCNC: 84 MG/DL (ref 70–130)
GLUCOSE BLDC GLUCOMTR-MCNC: 89 MG/DL (ref 70–130)
GLUCOSE SERPL-MCNC: 100 MG/DL (ref 65–99)
HCT VFR BLD AUTO: 29.2 % (ref 37.5–51)
HGB BLD-MCNC: 9.5 G/DL (ref 13–17.7)
IMM GRANULOCYTES # BLD AUTO: 0.09 10*3/MM3 (ref 0–0.05)
IMM GRANULOCYTES NFR BLD AUTO: 0.7 % (ref 0–0.5)
LYMPHOCYTES # BLD AUTO: 1.42 10*3/MM3 (ref 0.7–3.1)
LYMPHOCYTES NFR BLD AUTO: 11.2 % (ref 19.6–45.3)
MCH RBC QN AUTO: 28.3 PG (ref 26.6–33)
MCHC RBC AUTO-ENTMCNC: 32.5 G/DL (ref 31.5–35.7)
MCV RBC AUTO: 86.9 FL (ref 79–97)
MONOCYTES # BLD AUTO: 1.1 10*3/MM3 (ref 0.1–0.9)
MONOCYTES NFR BLD AUTO: 8.7 % (ref 5–12)
NEUTROPHILS NFR BLD AUTO: 76.1 % (ref 42.7–76)
NEUTROPHILS NFR BLD AUTO: 9.64 10*3/MM3 (ref 1.7–7)
NRBC BLD AUTO-RTO: 0 /100 WBC (ref 0–0.2)
PHOSPHATE SERPL-MCNC: 4.7 MG/DL (ref 2.5–4.5)
PLATELET # BLD AUTO: 172 10*3/MM3 (ref 140–450)
PMV BLD AUTO: 9.5 FL (ref 6–12)
POTASSIUM SERPL-SCNC: 4 MMOL/L (ref 3.5–5.2)
RBC # BLD AUTO: 3.36 10*6/MM3 (ref 4.14–5.8)
SODIUM SERPL-SCNC: 135 MMOL/L (ref 136–145)
VIT B12 BLD-MCNC: 1105 PG/ML (ref 211–946)
WBC NRBC COR # BLD: 12.67 10*3/MM3 (ref 3.4–10.8)

## 2023-04-25 PROCEDURE — 82607 VITAMIN B-12: CPT | Performed by: NURSE PRACTITIONER

## 2023-04-25 PROCEDURE — 25010000002 CEFEPIME PER 500 MG: Performed by: NURSE PRACTITIONER

## 2023-04-25 PROCEDURE — 82962 GLUCOSE BLOOD TEST: CPT

## 2023-04-25 PROCEDURE — 25010000002 HEPARIN (PORCINE) PER 1000 UNITS: Performed by: INTERNAL MEDICINE

## 2023-04-25 PROCEDURE — 92610 EVALUATE SWALLOWING FUNCTION: CPT

## 2023-04-25 PROCEDURE — 25010000002 ENOXAPARIN PER 10 MG: Performed by: NURSE PRACTITIONER

## 2023-04-25 PROCEDURE — 94799 UNLISTED PULMONARY SVC/PX: CPT

## 2023-04-25 PROCEDURE — 82306 VITAMIN D 25 HYDROXY: CPT | Performed by: NURSE PRACTITIONER

## 2023-04-25 PROCEDURE — 85025 COMPLETE CBC W/AUTO DIFF WBC: CPT | Performed by: INTERNAL MEDICINE

## 2023-04-25 PROCEDURE — 99024 POSTOP FOLLOW-UP VISIT: CPT | Performed by: NURSE PRACTITIONER

## 2023-04-25 PROCEDURE — 25010000002 EPOETIN ALFA-EPBX 3000 UNIT/ML SOLUTION: Performed by: INTERNAL MEDICINE

## 2023-04-25 PROCEDURE — 82746 ASSAY OF FOLIC ACID SERUM: CPT | Performed by: NURSE PRACTITIONER

## 2023-04-25 PROCEDURE — 94664 DEMO&/EVAL PT USE INHALER: CPT

## 2023-04-25 PROCEDURE — 80069 RENAL FUNCTION PANEL: CPT | Performed by: INTERNAL MEDICINE

## 2023-04-25 PROCEDURE — 94761 N-INVAS EAR/PLS OXIMETRY MLT: CPT

## 2023-04-25 PROCEDURE — 99232 SBSQ HOSP IP/OBS MODERATE 35: CPT | Performed by: INTERNAL MEDICINE

## 2023-04-25 RX ORDER — DIPHENHYDRAMINE HYDROCHLORIDE AND LIDOCAINE HYDROCHLORIDE AND ALUMINUM HYDROXIDE AND MAGNESIUM HYDRO
5 KIT EVERY 8 HOURS
Status: DISCONTINUED | OUTPATIENT
Start: 2023-04-25 | End: 2023-05-02 | Stop reason: HOSPADM

## 2023-04-25 RX ORDER — GUAIFENESIN 600 MG/1
1200 TABLET, EXTENDED RELEASE ORAL EVERY 12 HOURS SCHEDULED
Status: DISCONTINUED | OUTPATIENT
Start: 2023-04-25 | End: 2023-05-02 | Stop reason: HOSPADM

## 2023-04-25 RX ORDER — MANNITOL 250 MG/ML
12.5 INJECTION, SOLUTION INTRAVENOUS AS NEEDED
Status: DISCONTINUED | OUTPATIENT
Start: 2023-04-25 | End: 2023-04-25 | Stop reason: SDUPTHER

## 2023-04-25 RX ORDER — MANNITOL 250 MG/ML
12.5 INJECTION, SOLUTION INTRAVENOUS AS NEEDED
Status: ACTIVE | OUTPATIENT
Start: 2023-04-25 | End: 2023-04-26

## 2023-04-25 RX ADMIN — MIDODRINE HYDROCHLORIDE 5 MG: 5 TABLET ORAL at 17:43

## 2023-04-25 RX ADMIN — ANORECTAL OINTMENT 1 APPLICATION: 15.7; .44; 24; 20.6 OINTMENT TOPICAL at 21:57

## 2023-04-25 RX ADMIN — IPRATROPIUM BROMIDE AND ALBUTEROL SULFATE 3 ML: 2.5; .5 SOLUTION RESPIRATORY (INHALATION) at 06:41

## 2023-04-25 RX ADMIN — ANORECTAL OINTMENT 1 APPLICATION: 15.7; .44; 24; 20.6 OINTMENT TOPICAL at 14:23

## 2023-04-25 RX ADMIN — DULOXETINE HYDROCHLORIDE 30 MG: 30 CAPSULE, DELAYED RELEASE ORAL at 21:57

## 2023-04-25 RX ADMIN — MIDODRINE HYDROCHLORIDE 5 MG: 5 TABLET ORAL at 06:12

## 2023-04-25 RX ADMIN — CEFEPIME 1 G: 1 INJECTION, POWDER, FOR SOLUTION INTRAMUSCULAR; INTRAVENOUS at 17:43

## 2023-04-25 RX ADMIN — Medication 150 MG: at 14:22

## 2023-04-25 RX ADMIN — MUPIROCIN 1 APPLICATION: 20 OINTMENT TOPICAL at 21:58

## 2023-04-25 RX ADMIN — IPRATROPIUM BROMIDE AND ALBUTEROL SULFATE 3 ML: 2.5; .5 SOLUTION RESPIRATORY (INHALATION) at 11:36

## 2023-04-25 RX ADMIN — ATORVASTATIN CALCIUM 40 MG: 20 TABLET, FILM COATED ORAL at 21:56

## 2023-04-25 RX ADMIN — ENOXAPARIN SODIUM 30 MG: 100 INJECTION SUBCUTANEOUS at 14:22

## 2023-04-25 RX ADMIN — GUAIFENESIN 1200 MG: 600 TABLET, EXTENDED RELEASE ORAL at 21:56

## 2023-04-25 RX ADMIN — IPRATROPIUM BROMIDE AND ALBUTEROL SULFATE 3 ML: 2.5; .5 SOLUTION RESPIRATORY (INHALATION) at 20:13

## 2023-04-25 RX ADMIN — HEPARIN SODIUM 3800 UNITS: 1000 INJECTION INTRAVENOUS; SUBCUTANEOUS at 13:52

## 2023-04-25 RX ADMIN — EPOETIN ALFA-EPBX 6000 UNITS: 3000 INJECTION, SOLUTION INTRAVENOUS; SUBCUTANEOUS at 13:50

## 2023-04-25 RX ADMIN — DIPHENHYDRAMINE HYDROCHLORIDE AND LIDOCAINE HYDROCHLORIDE AND ALUMINUM HYDROXIDE AND MAGNESIUM HYDRO 5 ML: KIT at 18:35

## 2023-04-25 RX ADMIN — ASPIRIN 81 MG: 81 TABLET, COATED ORAL at 14:22

## 2023-04-25 RX ADMIN — GUAIFENESIN 600 MG: 600 TABLET, EXTENDED RELEASE ORAL at 14:00

## 2023-04-25 RX ADMIN — PANTOPRAZOLE SODIUM 40 MG: 40 TABLET, DELAYED RELEASE ORAL at 06:12

## 2023-04-25 NOTE — PROGRESS NOTES
Name: Lefty Cai ADMIT: 2023   : 1950  PCP: Berenice Daksha JASSO, DUSTY    MRN: [id number] LOS: 17 days   AGE/SEX: 72 y.o. male  ROOM:      Subjective   Subjective   Resting in bed. Being fitted for Jobst stockings. Wife at bedside. He is more alert today but wife states he is having some intermittent confusion. Was getting dialysis earlier and didn't know where he was. He denies any chest pain, dyspnea. Having some trouble swallowing today- reports mucus is making it difficult as well as sore on gum inhibiting him from wearing dentures. He states he feels like he needs to void but doesn't. Maybe some possible dysuria yesterday?     Objective   Objective   Vital Signs  Temp:  [97.2 °F (36.2 °C)-98.5 °F (36.9 °C)] 97.2 °F (36.2 °C)  Heart Rate:  [62-81] 78  Resp:  [17-20] 20  BP: ()/(47-91) 84/47  SpO2:  [94 %-100 %] 94 %  on   ;   Device (Oxygen Therapy): room air  Body mass index is 28.82 kg/m².     Physical Exam  Vitals and nursing note reviewed.   Constitutional:       Appearance: He is chronically ill-appearing. He is not toxic-appearing.   Cardiovascular:      Rate and Rhythm: Normal rate. Regular rhythm.      Pulses: Normal pulses.   Pulmonary:      Effort: Pulmonary effort is normal. No respiratory distress.      Comments: Clear to auscultation.  No rhonchi noted today   Is on RA. Poor inspiratory effort  Abdominal:      General: Bowel sounds are normal. There is no distension.      Palpations: Abdomen is soft.      Tenderness: There is no abdominal tenderness.   Musculoskeletal:         General: No deformity. Normal range of motion.   Skin:     General: Skin is warm and dry.      Findings: No bruising.      Comments: Midline sternal incision C/D/I  Neurological:      Mental Status: He is alert and oriented to person, place, and time.      Motor: generalized Weakness present.      Coordination: Coordination normal.   Psychiatric:         Mood and Affect: Mood normal.          Behavior: Behavior normal.     Results Review:       I reviewed the patient's new clinical results.  Results from last 7 days   Lab Units 04/25/23 0325 04/24/23 0522 04/23/23 0355 04/22/23 0239   WBC 10*3/mm3 12.67* 12.87* 12.01* 16.13*   HEMOGLOBIN g/dL 9.5* 9.2* 10.0* 10.3*   PLATELETS 10*3/mm3 172 158 159 142     Results from last 7 days   Lab Units 04/25/23 0325 04/24/23 0355 04/23/23 0355 04/22/23 0239   SODIUM mmol/L 135* 131* 134* 135*   POTASSIUM mmol/L 4.0 4.2 3.9 4.2   CHLORIDE mmol/L 97* 93* 97* 98   CO2 mmol/L 24.0 23.7 24.6 27.2   BUN mg/dL 42* 29* 44* 31*   CREATININE mg/dL 5.36* 4.23* 5.19* 4.36*   GLUCOSE mg/dL 100* 91 111* 105*   Estimated Creatinine Clearance: 14.8 mL/min (A) (by C-G formula based on SCr of 5.36 mg/dL (H)).  Results from last 7 days   Lab Units 04/25/23 0325 04/24/23 0355 04/23/23 0355 04/22/23 0239 04/21/23 1442 04/20/23  0324   ALBUMIN g/dL 2.9* 2.5* 2.6* 2.3*   < > 2.6*   BILIRUBIN mg/dL  --   --   --   --   --  0.6   ALK PHOS U/L  --   --   --   --   --  101   AST (SGOT) U/L  --   --   --   --   --  66*   ALT (SGPT) U/L  --   --   --   --   --  16    < > = values in this interval not displayed.     Results from last 7 days   Lab Units 04/25/23 0325 04/24/23 0355 04/23/23 0355 04/22/23 0239 04/21/23 1442 04/20/23  0324   CALCIUM mg/dL 8.4* 8.3* 8.8 9.0   < > 8.3*   ALBUMIN g/dL 2.9* 2.5* 2.6* 2.3*   < > 2.6*   MAGNESIUM mg/dL  --   --   --   --   --  2.0   PHOSPHORUS mg/dL 4.7* 3.4 4.6* 3.7   < > 2.6    < > = values in this interval not displayed.       Glucose   Date/Time Value Ref Range Status   04/25/2023 1249 84 70 - 130 mg/dL Final     Comment:     Meter: [id number] : [id number] Christine Perry RN   04/25/2023 1113 89 70 - 130 mg/dL Final     Comment:     Meter: [id number] : [id number] Serna Beth NA   04/25/2023 0630 104 70 - 130 mg/dL Final     Comment:     Meter: [id number] : [id number] Samantha SIM   04/24/2023 2023 140 (H)  70 - 130 mg/dL Final     Comment:     Meter: [id number] : [id number] Samantha Pino CHIQUIS   04/24/2023 1627 101 70 - 130 mg/dL Final     Comment:     Meter: [id number] : [id number] Anitha Catherine NA   04/24/2023 1112 122 70 - 130 mg/dL Final     Comment:     Meter: [id number] : [id number] Anitha Catherine NA   04/24/2023 0656 99 70 - 130 mg/dL Final     Comment:     Meter: [id number] : [id number] Paloma ROSALES       aspirin, 81 mg, Oral, Daily  atorvastatin, 40 mg, Oral, Nightly  DULoxetine, 30 mg, Oral, Nightly  enoxaparin, 30 mg, Subcutaneous, Q24H  epoetin bernice/bernice-epbx, 6,000 Units, Intravenous, Once per day on Mon Wed Fri  First Mouthwash (Magic Mouthwash), 5 mL, Swish & Spit, Q8H  guaiFENesin, 1,200 mg, Oral, Q12H  insulin lispro, 0-9 Units, Subcutaneous, 4x Daily With Meals & Nightly  ipratropium-albuterol, 3 mL, Nebulization, Q6H While Awake - RT  iron polysaccharides, 150 mg, Oral, Daily  lactulose, 10 g, Oral, BID  Menthol-Zinc Oxide, 1 application, Topical, TID  midodrine, 5 mg, Oral, BID AC  mupirocin, , Each Nare, BID  pantoprazole, 40 mg, Oral, QAM  senna-docusate sodium, 2 tablet, Oral, BID      Pharmacy Consult - Pharmacy to dose,     Diet: Regular/House Diet; Texture: Regular Texture (IDDSI 7); Fluid Consistency: Thin (IDDSI 0)       Assessment/Plan     Active Hospital Problems    Diagnosis  POA   • **NSTEMI (non-ST elevated myocardial infarction) [I21.4]  Yes   • Dysphagia [R13.10]  Unknown   • Fatigue [R53.83]  Unknown   • Rhinovirus [B34.8]  Yes   • Abnormal findings on diagnostic imaging of heart and coronary circulation [R93.1]  Yes   • Type 2 diabetes mellitus with diabetic chronic kidney disease [E11.22]  Yes   • Essential hypertension [I10]  Yes   • Hyperlipidemia [E78.5]  Yes   • ESRD (end stage renal disease) [N18.6]  Yes      Resolved Hospital Problems    Diagnosis Date Resolved POA   • Hypernatremia [E87.0] 04/19/2023 Yes   • Dyspnea on exertion [R06.09] 04/24/2023 Yes       Trell is a 72 year old male who presented to the hospital with complaints of increased dyspnea in the setting of ESRD with HD compliance. He was found to have new T wave inversions with troponin elevation and taken for heart cath that revealed severe three vessel CAD with occluded right coronary system. Cardiothoracic surgery was consulted for CABG and RVP was obtained showing positive for rhinovirus. ID was consulted given need for OR who cleared for surgery without antibiotics. Timing was per surgeon preference. He was taken for open heart on 4/13. He had hypotension and atrial fibrillation with RVR following surgery. Moved out of CICU on 4/20.     • NSTEMI/ PAF : S/P CABG x4 with MV repair on 4/13. Cardiology and Cardiothoracic surgery managing. Echo 4/8 with EF 30-35%. On amiodarone and metoprolol with conversion of atrial fibrillation to NSR. Then had recurrence of afib RVR 4/20 so beta blocker increased and digoxin added instead. Still with orthostatic hypotension so Nephrology/Cards planning removal of BB. Compression stockings ordered. On ASA/statin. Now on CVU.  Cardiology is following as is cardiothoracic surgery.  We will leave timing of full anticoagulation up to cardiology and cardiothoracic surgery     • Rhinovirus: Supportive care. ID saw and cleared for OR. Pulmonology followed (signed off 4/20). He is on Duonebs/Mucomyst. Remains on RA.     • ESRD: Via TDC. Nephrology managing. Doppler on left arm AV fistula marginal flow and inadequate size. Renal feels stable for discharge to [facility] from renal standpoint.      • DM2: Sugars very stable. Not requiring correctional insulin.  Continue to encourage oral intake     • HTN: BP soft. BB removed today. Continues on midodrine.     • Leukocytosis: Up to 18K on 4/21. CXR negative. UA showed > 100 K Pseudomonas in ESRD patient. Respiratory culture with normal respiratory nikunj. Remains afebrile. ID consulted and felt UA represented asymptomatic bacteruria and  they did not recommend treatment at that time. WBC continue in the 12K range and now with reports of intermittent confusion, feeling of need to void and possible dysuria. Discussed with Dr. Schreiber- will treat for 3 days with pharmacy to dose Cefepime given new onset of symptoms.      • ABL on chronic anemia CKD: Looks like he trends mid 10 -11 range. Currently 9.5. Monitor.       • Constipation:  Resolved with lactulose/Senokot.      • Coccyx buttock skin breakdown: Encouraged turn every 2 hours, waffle cushion, pressure reduction measures, etc.      • Fatigue: Suspect multifactorial d/t chronic comorbidities, prolonged hospital stay, open heart surgery. I highly suspect underlying PATY as well. Would benefit from outpatient sleep study. Trazodone changed to PRN. BB stopped today- may also help with drowsiness. TSH/vitamin B12/folate all okay. Seems a little improved today.    • Dysphagia: Unclear if this is related to increased mucus or trouble with dentures d/t reported sore. Will add Magic Norah's mouthwash for oral discomfort. Increase Mucinex to 1200 mg BID. Ask ST to evaluate to make sure no dietary modifications need to be made. Denies any choking episodes.     Discussed with patient, wife and Dr. Schreiber.     Hopefully to rehab once medically stable- BAR versus [facility]. Discussed with CCP.     Appreciate all specialists.      VTE Prophylaxis - Lovenox 30 mg SC daily  Code Status - Full code  Disposition - Anticipate discharge TBD.     DUSTY Roberts  [facility]  04/25/23  14:33 EDT

## 2023-04-25 NOTE — NURSING NOTE
Post treatment vitals  BP- 133/80  OR- 75  RR- 18  Temp- 98.1  Post weight by bed- 96.8kg  BVP- 78.9  UF- 2400

## 2023-04-25 NOTE — SIGNIFICANT NOTE
04/25/23 1414   OTHER   Discipline physical therapist   Rehab Time/Intention   Session Not Performed other (see comments)  (Per RN pt positive for orthostatic hypotension with supine to sitting in bed and symptomatic. PT to f/u tomorrow.)   Recommendation   PT - Next Appointment 04/26/23

## 2023-04-25 NOTE — CASE MANAGEMENT/SOCIAL WORK
Continued Stay Note  [facility]     Patient Name: Lefty Cai  MRN: [id number]  Today's Date: 4/25/2023    Admit Date: 4/8/2023    Plan: BAR eval ongoing.   Discharge Plan     Row Name 04/25/23 1206       Plan    Plan BAR eval ongoing.    Plan Comments Pt has order for thigh high compression stockings.  [facility][phone][facility] and [facility] [facility] ([phone]).  [facility] will deliver the stockings today to Pt at bedside.  Pt currently having hemodialysis.  Robert/O.T. will see Pt first thing in the morning........Irene SUH/PRATIK CM               Discharge Codes    No documentation.               Expected Discharge Date and Time     Expected Discharge Date Expected Discharge Time    Apr 27, 2023             Irene Lawrence RN

## 2023-04-25 NOTE — PLAN OF CARE
Goal Outcome Evaluation:  Plan of Care Reviewed With: patient        Progress: improving  Outcome Evaluation: Pt is AO X4. All vs stable, No complaints of pain overnight. Q2 turns, Fall precautions and Safety maintained. [facility]    Problem: Adult Inpatient Plan of Care  Goal: Plan of Care Review  Outcome: Ongoing, Progressing  Flowsheets (Taken 4/25/2023 0442)  Progress: improving  Plan of Care Reviewed With: patient  Outcome Evaluation: Pt is AO X4. All vs stable, No complaints of pain overnight. Q2 turns, Fall precautions and Safety maintained. WC  Goal: Patient-Specific Goal (Individualized)  Outcome: Ongoing, Progressing  Goal: Absence of Hospital-Acquired Illness or Injury  Outcome: Ongoing, Progressing  Intervention: Identify and Manage Fall Risk  Description: Perform standard risk assessment on admission using a validated tool or comprehensive approach appropriate to the patient; reassess fall risk frequently, with change in status or transfer to another level of care.  Communicate fall injury risk to interprofessional healthcare team.  Determine need for increased observation, equipment and environmental modification, such as low bed, signage and supportive, nonskid footwear.  Adjust safety measures to individual developmental age, stage and identified risk factors.  Reinforce the importance of safety and physical activity with patient and family.  Perform regular intentional rounding to assess need for position change, pain assessment and personal needs, including assistance with toileting.  Recent Flowsheet Documentation  Taken 4/25/2023 0415 by Matthew Oviedo, PRATIK  Safety Promotion/Fall Prevention:   assistive device/personal items within reach   activity supervised   fall prevention program maintained   clutter free environment maintained   nonskid shoes/slippers when out of bed   muscle strengthening facilitated   safety round/check completed   room organization consistent  Taken 4/25/2023 0234 by Ivelisse  PRATIK Castro  Safety Promotion/Fall Prevention:   assistive device/personal items within reach   activity supervised   mobility aid in reach   lighting adjusted   safety round/check completed   room organization consistent  Taken 4/25/2023 0034 by Matthew Oviedo RN  Safety Promotion/Fall Prevention:   assistive device/personal items within reach   activity supervised   lighting adjusted   mobility aid in reach   safety round/check completed   room organization consistent  Taken 4/24/2023 2200 by Matthew Oviedo RN  Safety Promotion/Fall Prevention:   assistive device/personal items within reach   activity supervised   fall prevention program maintained   clutter free environment maintained   nonskid shoes/slippers when out of bed   muscle strengthening facilitated   mobility aid in reach   safety round/check completed   room organization consistent  Taken 4/24/2023 2030 by Matthew Oviedo RN  Safety Promotion/Fall Prevention:   assistive device/personal items within reach   activity supervised   mobility aid in reach   lighting adjusted   safety round/check completed   room organization consistent  Intervention: Prevent Skin Injury  Description: Perform a screening for skin injury risk, such as pressure or moisture associated skin damage on admission and at regular intervals throughout hospital stay.  Keep all areas of skin (especially folds) clean and dry.  Maintain adequate skin hydration.  Relieve and redistribute pressure and protect bony prominences; implement measures based on patient-specific risk factors.  Match turning and repositioning schedule to clinical condition.  Encourage weight shift frequently; assist with reposition if unable to complete independently.  Float heels off bed; avoid pressure on the Achilles tendon.  Keep skin free from extended contact with medical devices.  Encourage functional activity and mobility, as early as tolerated.  Use aids (e.g., slide boards, mechanical lift) during  transfer.  Recent Flowsheet Documentation  Taken 4/25/2023 0415 by Matthew Oviedo RN  Body Position:   turned   left   upper extremity elevated   weight shifting  Taken 4/25/2023 0234 by Matthew Oviedo RN  Body Position:   turned   right   upper extremity elevated   weight shifting  Taken 4/25/2023 0034 by Matthew Oviedo RN  Body Position:   turned   supine   supine, legs elevated   upper extremity elevated   weight shifting  Taken 4/24/2023 2200 by Matthew Oviedo RN  Body Position:   turned   left   weight shifting   upper extremity elevated  Taken 4/24/2023 2030 by Matthew Oviedo RN  Body Position:   turned   right   weight shifting   upper extremity elevated  Intervention: Prevent and Manage VTE (Venous Thromboembolism) Risk  Description: Assess for VTE (venous thromboembolism) risk.  Encourage and assist with early ambulation.  Initiate and maintain compression or other therapy, as indicated, based on identified risk in accordance with organizational protocol and provider order.  Encourage both active and passive leg exercises while in bed, if unable to ambulate.  Recent Flowsheet Documentation  Taken 4/25/2023 0415 by Matthew Oviedo RN  Activity Management: activity encouraged  VTE Prevention/Management:   bilateral   compression stockings off  Taken 4/25/2023 0234 by Matthew Oviedo RN  Activity Management: activity encouraged  Taken 4/25/2023 0034 by Matthew Oviedo RN  Activity Management: activity encouraged  Taken 4/24/2023 2200 by Matthew Oviedo RN  Activity Management: activity encouraged  Taken 4/24/2023 2030 by Matthew Oviedo RN  Activity Management: activity encouraged  VTE Prevention/Management:   bilateral   compression stockings on  Intervention: Prevent Infection  Description: Maintain skin and mucous membrane integrity; promote hand, oral and pulmonary hygiene.  Optimize fluid balance, nutrition, sleep and glycemic control to maximize infection resistance.  Identify potential sources of infection early to  prevent or mitigate progression of infection (e.g., wound, lines, devices).  Evaluate ongoing need for invasive devices; remove promptly when no longer indicated.  Recent Flowsheet Documentation  Taken 4/25/2023 0415 by Matthew Oviedo RN  Infection Prevention:   visitors restricted/screened   single patient room provided   rest/sleep promoted   personal protective equipment utilized   equipment surfaces disinfected   environmental surveillance performed   cohorting utilized   hand hygiene promoted  Taken 4/25/2023 0234 by Matthew Oviedo RN  Infection Prevention:   visitors restricted/screened   single patient room provided   rest/sleep promoted   equipment surfaces disinfected   environmental surveillance performed   cohorting utilized   personal protective equipment utilized   hand hygiene promoted  Taken 4/25/2023 0034 by Matthew Oviedo RN  Infection Prevention:   visitors restricted/screened   single patient room provided   rest/sleep promoted   personal protective equipment utilized   hand hygiene promoted   environmental surveillance performed   cohorting utilized   equipment surfaces disinfected  Taken 4/24/2023 2200 by Matthew Oviedo RN  Infection Prevention:   visitors restricted/screened   single patient room provided   rest/sleep promoted   personal protective equipment utilized   hand hygiene promoted   equipment surfaces disinfected   environmental surveillance performed   cohorting utilized  Taken 4/24/2023 2030 by Matthew Oviedo RN  Infection Prevention:   visitors restricted/screened   rest/sleep promoted   personal protective equipment utilized   hand hygiene promoted   equipment surfaces disinfected   environmental surveillance performed   cohorting utilized   single patient room provided  Goal: Optimal Comfort and Wellbeing  Outcome: Ongoing, Progressing  Intervention: Provide Person-Centered Care  Description: Use a family-focused approach to care.  Develop trust and rapport by proactively providing  information, encouraging questions, addressing concerns and offering reassurance.  Acknowledge emotional response to hospitalization.  Recognize and utilize personal coping strategies.  Honor spiritual and cultural preferences.  Recent Flowsheet Documentation  Taken 4/25/2023 0415 by Matthew Oviedo RN  Trust Relationship/Rapport:   thoughts/feelings acknowledged   reassurance provided   questions encouraged   questions answered   empathic listening provided   emotional support provided   choices provided   care explained  Taken 4/25/2023 0034 by Matthew Oviedo RN  Trust Relationship/Rapport:   thoughts/feelings acknowledged   reassurance provided   questions encouraged   care explained   choices provided   empathic listening provided   emotional support provided   questions answered  Taken 4/24/2023 2030 by Matthew Oviedo RN  Trust Relationship/Rapport:   thoughts/feelings acknowledged   reassurance provided   questions encouraged   questions answered   empathic listening provided   emotional support provided   choices provided   care explained  Goal: Readiness for Transition of Care  Outcome: Ongoing, Progressing

## 2023-04-25 NOTE — THERAPY DISCHARGE NOTE
Acute Care - Speech Language Pathology   Swallow Initial Evaluation/Discharge [facility]     Patient Name: Lefty Cai  : 1950  MRN: [id number]  Today's Date: 2023               Admit Date: 2023    Visit Dx:    ICD-10-CM ICD-9-CM   1. Dyspnea on exertion  R06.09 786.09   2. NSTEMI (non-ST elevated myocardial infarction)  I21.4 410.70   3. ESRD (end stage renal disease)  N18.6 585.6   4. Former smoker  Z87.891 V15.82   5. Elevated blood pressure reading in office with diagnosis of hypertension  I10 401.9   6. Rhinovirus infection  B34.8 079.3   7. Abnormal findings on diagnostic imaging of heart and coronary circulation  R93.1 794.39   8. S/P CABG (coronary artery bypass graft)  Z95.1 V45.81   9. Orthostasis  I95.1 458.0     Patient Active Problem List   Diagnosis   • Type 2 diabetes mellitus with diabetic chronic kidney disease   • Essential hypertension   • Hyperlipidemia   • Primary insomnia   • ESRD (end stage renal disease)   • Vitamin D deficiency   • Diverticulitis of large intestine with perforation and abscess without bleeding   • Obesity (BMI 30-39.9)   • Impaired mobility and ADLs   • History of colon resection   • Colon polyps   • Diverticulosis   • CKD (chronic kidney disease) stage 5, GFR less than 15 ml/min   • NSTEMI (non-ST elevated myocardial infarction)   • Rhinovirus   • Abnormal findings on diagnostic imaging of heart and coronary circulation   • Fatigue   • Dysphagia     Past Medical History:   Diagnosis Date   • Anemia    • Anesthesia complication     HYPOTENSION   • Arthritis    • Cancer of kidney, left    • CKD (chronic kidney disease)     STAGE 5   • Diabetes mellitus, type 2    • Fatigue    • GERD (gastroesophageal reflux disease)    • H/O renal cell carcinoma 2007    partial nephrectomy   • History of colostomy reversal 2022   • [place] (hard of hearing)     NO DEVICE   • Hyperlipidemia    • Hypertension    • Primary insomnia 2016   • Proteinuria    • Risk  factors for obstructive sleep apnea    • Sinus drainage    • Vitamin D deficiency 08/14/2017     Past Surgical History:   Procedure Laterality Date   • ARTERIOVENOUS FISTULA/SHUNT SURGERY Left 08/15/2019    Procedure: LEFT MID FOREARM RADIAL CEPHALIC ARTERIAL VENOUS FISTULA;  Surgeon: Louann Miles Jr., MD;  Location: [facility] MAIN OR;  Service: Vascular   • CARDIAC CATHETERIZATION N/A 4/9/2023    Procedure: Left Heart Cath;  Surgeon: Lobito Garcia MD;  Location: [facility] CATH INVASIVE LOCATION;  Service: Cardiovascular;  Laterality: N/A;   • CARDIAC CATHETERIZATION N/A 4/9/2023    Procedure: Left ventriculography;  Surgeon: Lobito Garcia MD;  Location: [facility] CATH INVASIVE LOCATION;  Service: Cardiovascular;  Laterality: N/A;   • CARDIAC CATHETERIZATION N/A 4/9/2023    Procedure: Coronary angiography;  Surgeon: Lobito Garcia MD;  Location: [facility] CATH INVASIVE LOCATION;  Service: Cardiovascular;  Laterality: N/A;   • COLONOSCOPY  03/24/2022   • COLONOSCOPY N/A 03/24/2022    Procedure: COLONOSCOPY TO CECUM WITH POLYPECTOMY  (HOT SNARE);  Surgeon: Yelena Guerra MD;  Location: [facility] ENDOSCOPY;  Service: General;  Laterality: N/A;  PREOP/ HX OF DIVERTICULITIS, COLOSTOMY  POSTOP/ POLYPS, DIVERTICULOSIS    • COLOSTOMY  09/06/2021   • COLOSTOMY CLOSURE N/A 04/12/2022    Procedure: open Colostomy reversal;  Surgeon: Yelena Guerra MD;  Location: [facility] MAIN OR;  Service: General;  Laterality: N/A;   • CORONARY ARTERY BYPASS GRAFT N/A 4/13/2023    Procedure: MAT STERNOTOMY CORONARY ARTERY BYPASS GRAFT TIMES 4 USING LEFT INTERNAL MAMMARY ARTERY AND RIGHT GREATER SAPHENOUS VEIN  PER ENDOSCOPIC VEIN HARVESTING, MITRAL VALVE REPAIR  AND PRP;  Surgeon: Mirtha Zuluaga MD;  Location: [facility] CVOR;  Service: Cardiothoracic;  Laterality: N/A;   • DIAGNOSTIC LAPAROSCOPY N/A 2/27/2023    Procedure: DIAGNOSTIC LAPAROSCOPY;  Surgeon: Murali Ferreira MD;  Location: [facility] MAIN OR;  Service: General;   "Laterality: N/A;   • EXPLORATORY LAPAROTOMY N/A 09/06/2021    Procedure: Open sigmoind colectomy, colostomy, and appendectomy;  Surgeon: Yelena Guerra MD;  Location: [facility] OR;  Service: General;  Laterality: N/A;   • EYE SURGERY      CATARACTS   • KNEE ARTHROSCOPY Right    • NEPHRECTOMY PARTIAL  2007    Dr. Victor       SLP Recommendation and Plan  SLP Swallowing Diagnosis: swallow WFL/no suspected pharyngeal impairment (04/25/23 1500)  SLP Diet Recommendation: regular textures, thin liquids (04/25/23 1500)     Monitor for Signs of Aspiration: yes, notify SLP if any concerns (04/25/23 1500)        Anticipated Discharge Disposition (SLP): unknown (04/25/23 1500)     Therapy Frequency (Swallow): evaluation only (04/25/23 1500)              Anticipated Discharge Disposition (SLP): unknown (04/25/23 1500)                            Plan of Care Reviewed With: patient (04/25/23 1522)  Outcome Evaluation: Orders received d/t new onset dysphagia. Wife reports prolonged mastication and oral holding. Patient denies deficits. Some missing lower dentition. Pt unable to tolerate wearing upper dentures d/t \"sore\" per patient. Dentures are now poor fitting d/t weight loss. No overt s/s of aspiration with ice or thins via straw. Adequate bolus preparation with solids. Increased mastication time with hard solids, but judged functional. No difficulty preparing and propelling bolus with puree and soft solids. SLP recs regular and thins. Meds as erika. Will sign off, functional swallow demonstrated. (04/25/23 1522)    SWALLOW EVALUATION (last 72 hours)     SLP Adult Swallow Evaluation     Row Name 04/25/23 1500                   Rehab Evaluation    Document Type evaluation  -SH        Patient Effort adequate  -           General Information    Patient Profile Reviewed yes  -SH        Pertinent History Of Current Problem Rhinovirus, ESRD, DM2, CABG 4/13.  -SH        Current Method of Nutrition regular textures;thin liquids " " -        Precautions/Limitations, Vision WFL;for purposes of eval  -        Precautions/Limitations, Hearing WFL;for purposes of eval  -        Prior Level of Function-Communication WFL  -        Prior Level of Function-Swallowing no diet consistency restrictions  [facility]        Plans/Goals Discussed with patient;agreed upon  [facility]        Barriers to Rehab none identified  -           Pain    Additional Documentation Pain Scale: FACES Pre/Post-Treatment (Group)  -           Pain Scale: FACES Pre/Post-Treatment    Pain: FACES Scale, Pretreatment 4-->hurts little more  -        Pre/Posttreatment Pain Comment Back  -           Clinical Swallow Eval    Clinical Swallow Evaluation Summary Orders received d/t new onset dysphagia. Wife reports prolonged mastication and oral holding. Patient denies deficits. Some missing lower dentition. Pt unable to tolerate wearing upper dentures d/t \"sore\" per patient. Dentures are now poor fitting d/t weight loss. No overt s/s of aspiration with ice or thins via straw. Adequate bolus preparation with solids. Increased mastication time with hard solids, but judged functional. No difficulty preparing and propelling bolus with puree and soft solids. SLP recs regular and thins. Meds as erika. Will sign off, functional swallow demonstrated.  -           SLP Evaluation Clinical Impression    SLP Swallowing Diagnosis swallow WFL/no suspected pharyngeal impairment  -           Recommendations    Therapy Frequency (Swallow) evaluation only  -        SLP Diet Recommendation regular textures;thin liquids  -        Recommended Precautions and Strategies upright posture during/after eating;small bites of food and sips of liquid  -        Oral Care Recommendations Oral Care BID/PRN  -        SLP Rec. for Method of Medication Administration meds whole;as tolerated  [facility]        Monitor for Signs of Aspiration yes;notify SLP if any concerns  -        Anticipated Discharge " Disposition (SLP) unknown  -              User Key  (r) = Recorded By, (t) = Taken By, (c) = Cosigned By    Initials Name Effective Dates     Mari Crandall MS CCC-CHENTE 06/16/21 -                 EDUCATION  The patient has been educated in the following areas:   Dysphagia (Swallowing Impairment).                 Time Calculation:    Time Calculation- SLP     Row Name 04/25/23 1538             Time Calculation- SLP    SLP Start Time 1300  -      SLP Received On 04/25/23  -         Untimed Charges    39775-XI Eval Oral Pharyng Swallow Minutes 75  -SH         Total Minutes    Untimed Charges Total Minutes 75  -SH       Total Minutes 75  -SH            User Key  (r) = Recorded By, (t) = Taken By, (c) = Cosigned By    Initials Name Provider Type    Mari Contreras MS CCC-SLP Speech and Language Pathologist                Therapy Charges for Today     Code Description Service Date Service Provider Modifiers Qty    [id number] HC ST EVAL ORAL PHARYNG SWALLOW 5 4/25/2023 Mari Crandall MS CCC-SLP GN 1               SLP Discharge Summary  Anticipated Discharge Disposition (SLP): unknown    MS VALERY Aguirre  4/25/2023

## 2023-04-25 NOTE — PROGRESS NOTES
[facility] Progress Note      Patient Name: Lefty Cai  : 1950  MRN: [id number]  Primary Care Physician:  Daksha Ng APRN  Date of admission: 2023    Subjective     Interval History:   Follow up ESRD. On dialysis.   on TDC.  Very fatigued.  PT to come back later today.  Fair appetite, but not eating very well. Bowels moving. Trazodone changed to PRN .  Review of Systems:   As noted above    Objective     Vitals:   Temp:  [97.2 °F (36.2 °C)-98.5 °F (36.9 °C)] 97.2 °F (36.2 °C)  Heart Rate:  [62-70] 67  Resp:  [17-20] 18  BP: (111-139)/(58-76) 127/76    Intake/Output Summary (Last 24 hours) at 2023 0954  Last data filed at 2023 0635  Gross per 24 hour   Intake 480 ml   Output 675 ml   Net -195 ml       Physical Exam:    General Appearance: alert,  no acute distress . Very weak, fatigued. On dialysis .  Skin: warm and dry  HEENT: oral mucosa normal, nonicteric sclera  Neck: TDC RIJ.   Lungs: Coarse bs, no rales anteriorly. Unlabored.   Heart: RRR no s3 or rub  Abdomen: soft, nontender, not  + bs body wall edema.   Extremities: 2+ edema. Upper and lower ext edema. LUE AVF patent .  Neuro: normal speech and mental status     Scheduled Meds:     aspirin, 81 mg, Oral, Daily  atorvastatin, 40 mg, Oral, Nightly  DULoxetine, 30 mg, Oral, Nightly  enoxaparin, 30 mg, Subcutaneous, Q24H  epoetin bernice/bernice-epbx, 6,000 Units, Intravenous, Once per day on   guaiFENesin, 600 mg, Oral, Q12H  insulin lispro, 0-9 Units, Subcutaneous, 4x Daily With Meals & Nightly  ipratropium-albuterol, 3 mL, Nebulization, Q6H While Awake - RT  iron polysaccharides, 150 mg, Oral, Daily  lactulose, 10 g, Oral, BID  Menthol-Zinc Oxide, 1 application, Topical, TID  metoprolol tartrate, 12.5 mg, Oral, Q12H  midodrine, 5 mg, Oral, BID AC  mupirocin, , Each Nare, BID  pantoprazole, 40 mg, Oral, QAM  senna-docusate sodium, 2 tablet, Oral, BID      IV Meds:        Results  Reviewed:   I have personally reviewed the results from the time of this admission to 4/25/2023 09:54 EDT     Results from last 7 days   Lab Units 04/25/23 0325 04/24/23 0355 04/23/23 0355 04/21/23  1442 04/20/23  0324   SODIUM mmol/L 135* 131* 134*   < > 136   POTASSIUM mmol/L 4.0 4.2 3.9   < > 3.8   CHLORIDE mmol/L 97* 93* 97*   < > 99   CO2 mmol/L 24.0 23.7 24.6   < > 23.3   BUN mg/dL 42* 29* 44*   < > 28*   CREATININE mg/dL 5.36* 4.23* 5.19*   < > 4.25*   CALCIUM mg/dL 8.4* 8.3* 8.8   < > 8.3*   BILIRUBIN mg/dL  --   --   --   --  0.6   ALK PHOS U/L  --   --   --   --  101   ALT (SGPT) U/L  --   --   --   --  16   AST (SGOT) U/L  --   --   --   --  66*   GLUCOSE mg/dL 100* 91 111*   < > 95    < > = values in this interval not displayed.       Estimated Creatinine Clearance: 14.8 mL/min (A) (by C-G formula based on SCr of 5.36 mg/dL (H)).    Results from last 7 days   Lab Units 04/25/23 0325 04/24/23 0355 04/23/23 0355 04/21/23 1442 04/20/23  0324   MAGNESIUM mg/dL  --   --   --   --  2.0   PHOSPHORUS mg/dL 4.7* 3.4 4.6*   < > 2.6    < > = values in this interval not displayed.             Results from last 7 days   Lab Units 04/25/23 0325 04/24/23  0522 04/23/23 0355 04/22/23 0239 04/21/23  1442   WBC 10*3/mm3 12.67* 12.87* 12.01* 16.13* 18.12*   HEMOGLOBIN g/dL 9.5* 9.2* 10.0* 10.3* 10.5*   PLATELETS 10*3/mm3 172 158 159 142 142             Assessment / Plan     ASSESSMENT:  1. ESRD. HD today .   2. NSTEMI. SP 3 vessel CABG, MV repair 4/13/23.  3. PAF,  SR. On Metoprolol, but orthostatic .  4. Anemia CKD and blood loss.   5. TCP.  HIT panel negative.  Resolved .  6. DM2  7. Deconditioning.   Orthostatic. Will ask Dr. Garcia about dc metoprolol.    PLAN:  1. Dialysis today .  2. Liberalize his diet to regular. BS have been fine and he need nutrition .  Hyacinth Fiore MD  04/25/23  09:54 EDT    [facility]  [phone]

## 2023-04-25 NOTE — PROGRESS NOTES
"Lefty Cai  1950 72 y.o.  [id number]      Patient Care Team:  Daksha Ng APRN as PCP - General (Family Medicine)  Rudy Faith MD as Consulting Physician (Ophthalmology)  Jose Mccall MD as Consulting Physician (Nephrology)  Quang Reyes MD as Consulting Physician (Nephrology)    CC: Non-STEMI,, end-stage renal failure, EF 30 to 35%, severe three-vessel coronary disease, severe MR, underwent three-vessel CABG and mitral valve repair, postop A-fib    Interval History: No real change complains about his rear-end hurting  Objective   Vital Signs  Temp:  [97.2 °F (36.2 °C)-98.5 °F (36.9 °C)] 97.2 °F (36.2 °C)  Heart Rate:  [62-70] 67  Resp:  [17-20] 18  BP: (111-139)/(58-76) 127/76    Intake/Output Summary (Last 24 hours) at 4/25/2023 1049  Last data filed at 4/25/2023 0850  Gross per 24 hour   Intake 600 ml   Output 675 ml   Net -75 ml     Flowsheet Rows    Flowsheet Row First Filed Value   Admission Height 177.8 cm (70\") Documented at 04/08/2023 1028   Admission Weight 102 kg (225 lb) Documented at 04/08/2023 1028          Physical Exam:   General Appearance:    Alert,oriented, in no acute distress   Lungs:     Clear to auscultation,BS are equal    Heart:    Normal S1 and S2, RRR without murmur, gallop or rub   HEENT:    Sclerae are clear, no JVD or adenopathy   Abdomen:     Normal bowel sounds, soft nontender, nondistended, no HSM   Extremities:   Moves all extremities well, no edema, no cyanosis, no             Redness, no rash     Medication Review:      aspirin, 81 mg, Oral, Daily  atorvastatin, 40 mg, Oral, Nightly  DULoxetine, 30 mg, Oral, Nightly  enoxaparin, 30 mg, Subcutaneous, Q24H  epoetin bernice/bernice-epbx, 6,000 Units, Intravenous, Once per day on Mon Wed Fri  guaiFENesin, 600 mg, Oral, Q12H  insulin lispro, 0-9 Units, Subcutaneous, 4x Daily With Meals & Nightly  ipratropium-albuterol, 3 mL, Nebulization, Q6H While Awake - RT  iron polysaccharides, 150 mg, Oral, " Daily  lactulose, 10 g, Oral, BID  Menthol-Zinc Oxide, 1 application, Topical, TID  midodrine, 5 mg, Oral, BID AC  mupirocin, , Each Nare, BID  pantoprazole, 40 mg, Oral, QAM  senna-docusate sodium, 2 tablet, Oral, BID             I reviewed the patient's new clinical results.  I personally viewed and interpreted the patient's EKG/Telemetry data    Assessment/Plan  Active Hospital Problems    Diagnosis  POA   • **NSTEMI (non-ST elevated myocardial infarction) [I21.4]  Yes   • Fatigue [R53.83]  Unknown   • Rhinovirus [B34.8]  Yes   • Abnormal findings on diagnostic imaging of heart and coronary circulation [R93.1]  Yes   • Type 2 diabetes mellitus with diabetic chronic kidney disease [E11.22]  Yes   • Essential hypertension [I10]  Yes   • Hyperlipidemia [E78.5]  Yes   • ESRD (end stage renal disease) [N18.6]  Yes      Resolved Hospital Problems    Diagnosis Date Resolved POA   • Hypernatremia [E87.0] 04/19/2023 Yes   • Dyspnea on exertion [R06.09] 04/24/2023 Yes     Ischemic cardiomyopathy severe mitral insufficiency status post CABG and mitral valve repair on dialysis his blood pressure is pretty soft and I talked with nephrology we are going to stop his beta-blocker and see how he does off of it we did have some issues with A-fib early on but he has been in sinus for several days.  We really need to work on ambulation and placement  Lobito Garcia MD  04/25/23  10:49 EDT

## 2023-04-25 NOTE — PLAN OF CARE
"Goal Outcome Evaluation:  Plan of Care Reviewed With: patient           Outcome Evaluation: Orders received d/t new onset dysphagia. Wife reports prolonged mastication and oral holding. Patient denies deficits. Some missing lower dentition. Pt unable to tolerate wearing upper dentures d/t \"sore\" per patient. Dentures are now poor fitting d/t weight loss. No overt s/s of aspiration with ice or thins via straw. Adequate bolus preparation with solids. Increased mastication time with hard solids, but judged functional. No difficulty preparing and propelling bolus with puree and soft solids. SLP recs regular and thins. Meds as erika. Will sign off, functional swallow demonstrated.      Patient was not wearing a face mask during this therapy encounter. Therapist used appropriate personal protective equipment including mask, eye protection and gloves.  Mask used was standard procedure mask. Appropriate PPE was worn during the entire therapy session. Hand hygiene was completed before and after therapy session. Patient is not in enhanced droplet precautions.             "

## 2023-04-25 NOTE — PROGRESS NOTES
LOS: 17 days   Patient Care Team:  Daksha Ng APRN as PCP - General (Family Medicine)  Rudy Faith MD as Consulting Physician (Ophthalmology)  Jose Mccall MD as Consulting Physician (Nephrology)  Quang Reyes MD as Consulting Physician (Nephrology)    Chief Complaint: post op    Subjective      Vital Signs  Temp:  [97.2 °F (36.2 °C)-98.5 °F (36.9 °C)] 97.2 °F (36.2 °C)  Heart Rate:  [62-70] 65  Resp:  [17-20] 20  BP: ()/(48-72) 132/65  Body mass index is 28.82 kg/m².    Intake/Output Summary (Last 24 hours) at 4/25/2023 0855  Last data filed at 4/25/2023 0635  Gross per 24 hour   Intake 480 ml   Output 675 ml   Net -195 ml     No intake/output data recorded.    Chest tube drainage last 8 hours         04/23/23  0610 04/24/23  0634 04/25/23  0622   Weight: 97.7 kg (215 lb 6.4 oz) 96.6 kg (213 lb) 96.4 kg (212 lb 8 oz)         Objective    Results Review:        WBC WBC   Date Value Ref Range Status   04/25/2023 12.67 (H) 3.40 - 10.80 10*3/mm3 Final   04/24/2023 12.87 (H) 3.40 - 10.80 10*3/mm3 Final   04/23/2023 12.01 (H) 3.40 - 10.80 10*3/mm3 Final      HGB Hemoglobin   Date Value Ref Range Status   04/25/2023 9.5 (L) 13.0 - 17.7 g/dL Final   04/24/2023 9.2 (L) 13.0 - 17.7 g/dL Final   04/23/2023 10.0 (L) 13.0 - 17.7 g/dL Final      HCT Hematocrit   Date Value Ref Range Status   04/25/2023 29.2 (L) 37.5 - 51.0 % Final   04/24/2023 29.1 (L) 37.5 - 51.0 % Final   04/23/2023 30.1 (L) 37.5 - 51.0 % Final      Platelets Platelets   Date Value Ref Range Status   04/25/2023 172 140 - 450 10*3/mm3 Final   04/24/2023 158 140 - 450 10*3/mm3 Final   04/23/2023 159 140 - 450 10*3/mm3 Final        PT/INR:  No results found for: PROTIME/No results found for: INR    Sodium Sodium   Date Value Ref Range Status   04/25/2023 135 (L) 136 - 145 mmol/L Final   04/24/2023 131 (L) 136 - 145 mmol/L Final   04/23/2023 134 (L) 136 - 145 mmol/L Final      Potassium Potassium   Date Value Ref Range Status    04/25/2023 4.0 3.5 - 5.2 mmol/L Final   04/24/2023 4.2 3.5 - 5.2 mmol/L Final   04/23/2023 3.9 3.5 - 5.2 mmol/L Final      Chloride Chloride   Date Value Ref Range Status   04/25/2023 97 (L) 98 - 107 mmol/L Final   04/24/2023 93 (L) 98 - 107 mmol/L Final   04/23/2023 97 (L) 98 - 107 mmol/L Final      Bicarbonate CO2   Date Value Ref Range Status   04/25/2023 24.0 22.0 - 29.0 mmol/L Final   04/24/2023 23.7 22.0 - 29.0 mmol/L Final   04/23/2023 24.6 22.0 - 29.0 mmol/L Final      BUN BUN   Date Value Ref Range Status   04/25/2023 42 (H) 8 - 23 mg/dL Final   04/24/2023 29 (H) 8 - 23 mg/dL Final   04/23/2023 44 (H) 8 - 23 mg/dL Final      Creatinine Creatinine   Date Value Ref Range Status   04/25/2023 5.36 (H) 0.76 - 1.27 mg/dL Final   04/24/2023 4.23 (H) 0.76 - 1.27 mg/dL Final   04/23/2023 5.19 (H) 0.76 - 1.27 mg/dL Final      Calcium Calcium   Date Value Ref Range Status   04/25/2023 8.4 (L) 8.6 - 10.5 mg/dL Final   04/24/2023 8.3 (L) 8.6 - 10.5 mg/dL Final   04/23/2023 8.8 8.6 - 10.5 mg/dL Final      Magnesium No results found for: MG       aspirin, 81 mg, Oral, Daily  atorvastatin, 40 mg, Oral, Nightly  DULoxetine, 30 mg, Oral, Nightly  enoxaparin, 30 mg, Subcutaneous, Q24H  epoetin bernice/bernice-epbx, 6,000 Units, Intravenous, Once per day on Mon Wed Fri  guaiFENesin, 600 mg, Oral, Q12H  insulin lispro, 0-9 Units, Subcutaneous, 4x Daily With Meals & Nightly  ipratropium-albuterol, 3 mL, Nebulization, Q6H While Awake - RT  iron polysaccharides, 150 mg, Oral, Daily  lactulose, 10 g, Oral, BID  Menthol-Zinc Oxide, 1 application, Topical, TID  metoprolol tartrate, 25 mg, Oral, Q12H  midodrine, 5 mg, Oral, BID AC  mupirocin, , Each Nare, BID  pantoprazole, 40 mg, Oral, QAM  senna-docusate sodium, 2 tablet, Oral, BID               Patient Active Problem List   Diagnosis Code   • 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.22   • Essential hypertension I10   • Hyperlipidemia E78.5   • Primary insomnia  F51.01   • ESRD (end stage renal disease) N18.6   • Vitamin D deficiency E55.9   • Diverticulitis of large intestine with perforation and abscess without bleeding K57.20   • Obesity (BMI 30-39.9) E66.9   • Impaired mobility and ADLs Z74.09, Z78.9   • History of colon resection Z90.49   • Colon polyps K63.5   • Diverticulosis K57.90   • CKD (chronic kidney disease) stage 5, GFR less than 15 ml/min N18.5   • NSTEMI (non-ST elevated myocardial infarction) I21.4   • Rhinovirus B34.8   • Abnormal findings on diagnostic imaging of heart and coronary circulation R93.1   • Fatigue R53.83       Assessment & Plan     - NSTEMI/multi-vessel CAD- s/p urgent CABGx4 LIMA/RSVG, MV repair-([place]) POD#13  - HFrEF--30-35% per echo 4/8  - rhinovirus infection  - ESRD on HD  - hypertension  - hyperlipidemia  - DM II- A1c 5.0  -post op anemia- expected acute blood loss  -Leukocytosis- probable reactive   -TCP- resolved       Looks better this morning  Sinus rhythm rate in the 60-70s  Mobilize/aggressive pulmonary toilet--continued nebs/flutter and OT/PT  Increase activity-- still pretty weak  Rehab at discharge acute vs subacute  Continue routine care         DUSTY Spring  04/25/23  08:55 EDT

## 2023-04-25 NOTE — PROGRESS NOTES
"Nutrition Services    Patient Name:  Lefty Cai  YOB: 1950  MRN: [id number]  Admit Date:  4/8/2023    FOLLOW UP - CLINICAL NUTRITION    Assessment Date:  04/25/23    Encounter Information         Reason for Encounter Follow up    Current Issues S/p CABG and MV repair.  HD today.  Fair appetite, but not eating great.  Bowels moving.  Dr. Fiore liberalized his diet to regular today.  25-75% at meals.  NovaRenal in place BID.  Unavailable at time of attempted visit.  S/p SLP eval today.     Current Nutrition Orders & Evaluation of Intake       Oral Nutrition     Current PO Diet Diet: Regular/House Diet; Texture: Regular Texture (IDDSI 7); Fluid Consistency: Thin (IDDSI 0)   Supplement Novasource Renal , BID   PO Evaluation     % PO Intake 25-75% x 6 meals    # of Days Evaluated 3    Factors Affecting Intake  decreased appetite   --  Anthropometrics          Height    Weight Height: 182.9 cm (72\")  Weight: 96.4 kg (212 lb 8 oz) (04/25/23 0622)    BMI kg/m2 Body mass index is 28.82 kg/m².    Weight trend Loss     Labs        Pertinent Labs Reviewed, listed below     Results from last 7 days   Lab Units 04/25/23  0325 04/24/23  0355 04/23/23  0355 04/21/23  1442 04/20/23  0324   SODIUM mmol/L 135* 131* 134*   < > 136   POTASSIUM mmol/L 4.0 4.2 3.9   < > 3.8   CHLORIDE mmol/L 97* 93* 97*   < > 99   CO2 mmol/L 24.0 23.7 24.6   < > 23.3   BUN mg/dL 42* 29* 44*   < > 28*   CREATININE mg/dL 5.36* 4.23* 5.19*   < > 4.25*   CALCIUM mg/dL 8.4* 8.3* 8.8   < > 8.3*   BILIRUBIN mg/dL  --   --   --   --  0.6   ALK PHOS U/L  --   --   --   --  101   ALT (SGPT) U/L  --   --   --   --  16   AST (SGOT) U/L  --   --   --   --  66*   GLUCOSE mg/dL 100* 91 111*   < > 95    < > = values in this interval not displayed.     Results from last 7 days   Lab Units 04/25/23  0325 04/21/23  1442 04/20/23  0324   MAGNESIUM mg/dL  --   --  2.0   PHOSPHORUS mg/dL 4.7*   < > 2.6   HEMOGLOBIN g/dL 9.5*   < > 10.0*   HEMATOCRIT % " 29.2*   < > 30.1*   WBC 10*3/mm3 12.67*   < > 11.68*   ALBUMIN g/dL 2.9*   < > 2.6*    < > = values in this interval not displayed.     Results from last 7 days   Lab Units 04/25/23  0325 04/24/23  0522 04/23/23  0355 04/22/23  0239 04/21/23  1442   PLATELETS 10*3/mm3 172 158 159 142 142     COVID19   Date Value Ref Range Status   04/12/2023 Not Detected Not Detected - Ref. Range Final     Lab Results   Component Value Date    HGBA1C 5.00 04/09/2023          Medications            Scheduled Medications aspirin, 81 mg, Oral, Daily  atorvastatin, 40 mg, Oral, Nightly  cefepime, 1 g, Intravenous, Q24H  DULoxetine, 30 mg, Oral, Nightly  enoxaparin, 30 mg, Subcutaneous, Q24H  epoetin bernice/bernice-epbx, 6,000 Units, Intravenous, Once per day on Mon Wed Fri  First Mouthwash (Magic Mouthwash), 5 mL, Swish & Spit, Q8H  guaiFENesin, 1,200 mg, Oral, Q12H  insulin lispro, 0-9 Units, Subcutaneous, 4x Daily With Meals & Nightly  ipratropium-albuterol, 3 mL, Nebulization, Q6H While Awake - RT  iron polysaccharides, 150 mg, Oral, Daily  lactulose, 10 g, Oral, BID  Menthol-Zinc Oxide, 1 application, Topical, TID  midodrine, 5 mg, Oral, BID AC  mupirocin, , Each Nare, BID  pantoprazole, 40 mg, Oral, QAM  senna-docusate sodium, 2 tablet, Oral, BID        Infusions      PRN Medications •  acetaminophen **OR** [DISCONTINUED] acetaminophen **OR** acetaminophen  •  bisacodyl  •  bisacodyl  •  dextrose  •  dextrose  •  glucagon (human recombinant)  •  heparin (porcine)  •  ipratropium-albuterol  •  mannitol  •  ondansetron  •  polyethylene glycol  •  sodium chloride  •  traZODone     Physical Findings          Physical Appearance alert, oriented, overweight, room air   Oral/Mouth Cavity teeth missing   Edema  lower extremity , upper extremity, 1+ (trace), 2+ (mild)   Gastrointestinal normoactive, last bowel movement:4/23   Skin  bruising, pressure injury, surgical incision bilateral gluteal DTI   Tubes/Drains none   NFPE Not applicable at  this time   --  NUTRITION INTERVENTION / PLAN OF CARE  Intervention Goal         Intervention Goal(s) Maintain nutrition status, Reduce/improve symptoms, Disease management/therapy, Tolerate PO , Increase intake and No significant weight loss     Nutrition Intervention         RD Action Supplement provided, Follow Tx Progress and Care plan reviewed     Nutrition Prescription         Diet Prescription     Supplement Prescription Novasource Renal , BID   EN/PN Prescription    New Prescription Ordered? Continue same per protocol   --  Monitor/Evaluation        Monitor Per protocol, PO intake, Supplement intake, Pertinent labs, Weight, Skin status, Symptoms   Discharge Needs Pending clinical course   Education Will instruct as appropriate   --    RD to follow up per protocol.    Electronically signed by:  Karen Santos RD  04/25/23 16:40 EDT

## 2023-04-25 NOTE — PLAN OF CARE
Goal Outcome Evaluation:      Pt dialysis day today, took 2.5 L out per HD nurse. Pt attempted to get up after dialysis to sit in a chair but felt very weak and dizzy , bp checked 84/56 . Pt said he can't do PT today . Pt is on and off confuse about where he is sometimes in [employer] sometimes he is down the basement. SBP upon lying down is up to 106 to 116.turned and reposition every 2 hours specialty mattress in use will cont to monitor

## 2023-04-25 NOTE — PROGRESS NOTES
[facility] Clinical Pharmacy Services: Cefepime Consult    Pt Name: Lefty Cai   : 1950  Weight: 96.4 kg (212 lb 8 oz)  Antibiotic: Cefepime  Indication: Urinary Tract Infection    Relevant clinical data and objective history reviewed:    Vitals:    23 1230 23 1249 23 1253 23 1301   BP: 142/91 (!) 83/59 102/51 (!) 84/47   BP Location: Right arm Right arm Right arm Right arm   Pulse: 74 79 81 78   Resp:       Temp:       TempSrc:       SpO2:         Creatinine   Date Value Ref Range Status   2023 5.36 (H) 0.76 - 1.27 mg/dL Final   2023 4.23 (H) 0.76 - 1.27 mg/dL Final   2023 5.19 (H) 0.76 - 1.27 mg/dL Final     BUN   Date Value Ref Range Status   2023 42 (H) 8 - 23 mg/dL Final     Estimated Creatinine Clearance: 14.8 mL/min (A) (by C-G formula based on SCr of 5.36 mg/dL (H)).    Lab Results   Component Value Date    WBC 12.67 (H) 2023     Temp Readings from Last 3 Encounters:   23 97.2 °F (36.2 °C) (Oral)      Assessment/Plan    Ordered Cefepime 1000 mg IV q24h for a total of 3 days. Will monitor and adjust if culture data or pertinent lab values indicate this is best for the patient.     Thank you for this consult and please contact pharmacy with any questions or concerns.     Rhonda Mccray, Pharm.D., [facility]  Clinical Pharmacist  Phone Extension #[phone]

## 2023-04-25 NOTE — PROGRESS NOTES
[facility] Acute Inpt Rehab    Continuing to monitor pt's progress with therapies to determine most appropriate level of  Rehab for pt at time of discharge.    Thank you,   Hyacinth Cohen RN  Acute Rehab Admission Nurse

## 2023-04-26 ENCOUNTER — APPOINTMENT (OUTPATIENT)
Dept: CARDIOLOGY | Facility: HOSPITAL | Age: 73
DRG: 216 | End: 2023-04-26
Payer: MEDICARE

## 2023-04-26 ENCOUNTER — APPOINTMENT (OUTPATIENT)
Dept: CT IMAGING | Facility: HOSPITAL | Age: 73
DRG: 216 | End: 2023-04-26
Payer: MEDICARE

## 2023-04-26 PROBLEM — I82.811 ACUTE SUPERFICIAL VENOUS THROMBOSIS OF RIGHT LOWER EXTREMITY: Status: ACTIVE | Noted: 2023-04-26

## 2023-04-26 LAB
ALBUMIN SERPL-MCNC: 2.9 G/DL (ref 3.5–5.2)
ALBUMIN/GLOB SERPL: 0.9 G/DL
ALP SERPL-CCNC: 121 U/L (ref 39–117)
ALT SERPL W P-5'-P-CCNC: 23 U/L (ref 1–41)
AMMONIA BLD-SCNC: 10 UMOL/L (ref 16–60)
ANION GAP SERPL CALCULATED.3IONS-SCNC: 13 MMOL/L (ref 5–15)
ANION GAP SERPL CALCULATED.3IONS-SCNC: 15 MMOL/L (ref 5–15)
AST SERPL-CCNC: 48 U/L (ref 1–40)
BACTERIA SPEC AEROBE CULT: NORMAL
BACTERIA SPEC AEROBE CULT: NORMAL
BACTERIA SPEC RESP CULT: NORMAL
BH CV LOW VAS LEFT LESSER SAPH VESSEL: 1
BH CV LOW VAS RIGHT GREATER SAPH AK VESSEL: 1
BH CV LOW VAS RIGHT GREATER SAPH BK VESSEL: 1
BH CV LOW VAS RIGHT LESSER SAPH VESSEL: 1
BH CV LOWER VASCULAR LEFT COMMON FEMORAL AUGMENT: NORMAL
BH CV LOWER VASCULAR LEFT COMMON FEMORAL COMPETENT: NORMAL
BH CV LOWER VASCULAR LEFT COMMON FEMORAL COMPRESS: NORMAL
BH CV LOWER VASCULAR LEFT COMMON FEMORAL PHASIC: NORMAL
BH CV LOWER VASCULAR LEFT COMMON FEMORAL SPONT: NORMAL
BH CV LOWER VASCULAR LEFT DISTAL FEMORAL COMPRESS: NORMAL
BH CV LOWER VASCULAR LEFT GASTRONEMIUS COMPRESS: NORMAL
BH CV LOWER VASCULAR LEFT GREATER SAPH AK COMPRESS: NORMAL
BH CV LOWER VASCULAR LEFT GREATER SAPH BK COMPRESS: NORMAL
BH CV LOWER VASCULAR LEFT LESSER SAPH COMPRESS: NORMAL
BH CV LOWER VASCULAR LEFT LESSER SAPH THROMBUS: NORMAL
BH CV LOWER VASCULAR LEFT MID FEMORAL AUGMENT: NORMAL
BH CV LOWER VASCULAR LEFT MID FEMORAL COMPETENT: NORMAL
BH CV LOWER VASCULAR LEFT MID FEMORAL COMPRESS: NORMAL
BH CV LOWER VASCULAR LEFT MID FEMORAL PHASIC: NORMAL
BH CV LOWER VASCULAR LEFT MID FEMORAL SPONT: NORMAL
BH CV LOWER VASCULAR LEFT PERONEAL COMPRESS: NORMAL
BH CV LOWER VASCULAR LEFT POPLITEAL AUGMENT: NORMAL
BH CV LOWER VASCULAR LEFT POPLITEAL COMPETENT: NORMAL
BH CV LOWER VASCULAR LEFT POPLITEAL COMPRESS: NORMAL
BH CV LOWER VASCULAR LEFT POPLITEAL PHASIC: NORMAL
BH CV LOWER VASCULAR LEFT POPLITEAL SPONT: NORMAL
BH CV LOWER VASCULAR LEFT POSTERIOR TIBIAL COMPRESS: NORMAL
BH CV LOWER VASCULAR LEFT PROFUNDA FEMORAL COMPRESS: NORMAL
BH CV LOWER VASCULAR LEFT PROXIMAL FEMORAL COMPRESS: NORMAL
BH CV LOWER VASCULAR LEFT SAPHENOFEMORAL JUNCTION COMPRESS: NORMAL
BH CV LOWER VASCULAR RIGHT COMMON FEMORAL AUGMENT: NORMAL
BH CV LOWER VASCULAR RIGHT COMMON FEMORAL COMPETENT: NORMAL
BH CV LOWER VASCULAR RIGHT COMMON FEMORAL COMPRESS: NORMAL
BH CV LOWER VASCULAR RIGHT COMMON FEMORAL PHASIC: NORMAL
BH CV LOWER VASCULAR RIGHT COMMON FEMORAL SPONT: NORMAL
BH CV LOWER VASCULAR RIGHT DISTAL FEMORAL COMPRESS: NORMAL
BH CV LOWER VASCULAR RIGHT GASTRONEMIUS COMPRESS: NORMAL
BH CV LOWER VASCULAR RIGHT GREATER SAPH AK COMPRESS: NORMAL
BH CV LOWER VASCULAR RIGHT GREATER SAPH AK THROMBUS: NORMAL
BH CV LOWER VASCULAR RIGHT GREATER SAPH BK COMPRESS: NORMAL
BH CV LOWER VASCULAR RIGHT GREATER SAPH BK THROMBUS: NORMAL
BH CV LOWER VASCULAR RIGHT LESSER SAPH COMPRESS: NORMAL
BH CV LOWER VASCULAR RIGHT LESSER SAPH THROMBUS: NORMAL
BH CV LOWER VASCULAR RIGHT MID FEMORAL AUGMENT: NORMAL
BH CV LOWER VASCULAR RIGHT MID FEMORAL COMPETENT: NORMAL
BH CV LOWER VASCULAR RIGHT MID FEMORAL COMPRESS: NORMAL
BH CV LOWER VASCULAR RIGHT MID FEMORAL PHASIC: NORMAL
BH CV LOWER VASCULAR RIGHT MID FEMORAL SPONT: NORMAL
BH CV LOWER VASCULAR RIGHT PERONEAL COMPRESS: NORMAL
BH CV LOWER VASCULAR RIGHT POPLITEAL AUGMENT: NORMAL
BH CV LOWER VASCULAR RIGHT POPLITEAL COMPETENT: NORMAL
BH CV LOWER VASCULAR RIGHT POPLITEAL COMPRESS: NORMAL
BH CV LOWER VASCULAR RIGHT POPLITEAL PHASIC: NORMAL
BH CV LOWER VASCULAR RIGHT POPLITEAL SPONT: NORMAL
BH CV LOWER VASCULAR RIGHT POSTERIOR TIBIAL COMPRESS: NORMAL
BH CV LOWER VASCULAR RIGHT PROFUNDA FEMORAL COMPRESS: NORMAL
BH CV LOWER VASCULAR RIGHT PROXIMAL FEMORAL COMPRESS: NORMAL
BH CV LOWER VASCULAR RIGHT SAPHENOFEMORAL JUNCTION COMPRESS: NORMAL
BH CV UPPER VENOUS LEFT AXILLARY AUGMENT: NORMAL
BH CV UPPER VENOUS LEFT AXILLARY COMPRESS: NORMAL
BH CV UPPER VENOUS LEFT AXILLARY PHASIC: NORMAL
BH CV UPPER VENOUS LEFT AXILLARY SPONT: NORMAL
BH CV UPPER VENOUS LEFT BASILIC FOREARM COMPRESS: NORMAL
BH CV UPPER VENOUS LEFT BASILIC UPPER COMPRESS: NORMAL
BH CV UPPER VENOUS LEFT BRACHIAL COMPRESS: NORMAL
BH CV UPPER VENOUS LEFT CEPHALIC UPPER COMPRESS: NORMAL
BH CV UPPER VENOUS LEFT INTERNAL JUGULAR AUGMENT: NORMAL
BH CV UPPER VENOUS LEFT INTERNAL JUGULAR COMPRESS: NORMAL
BH CV UPPER VENOUS LEFT INTERNAL JUGULAR PHASIC: NORMAL
BH CV UPPER VENOUS LEFT INTERNAL JUGULAR SPONT: NORMAL
BH CV UPPER VENOUS LEFT RADIAL COMPRESS: NORMAL
BH CV UPPER VENOUS LEFT SUBCLAVIAN AUGMENT: NORMAL
BH CV UPPER VENOUS LEFT SUBCLAVIAN COMPRESS: NORMAL
BH CV UPPER VENOUS LEFT SUBCLAVIAN PHASIC: NORMAL
BH CV UPPER VENOUS LEFT SUBCLAVIAN SPONT: NORMAL
BH CV UPPER VENOUS LEFT ULNAR COMPRESS: NORMAL
BH CV UPPER VENOUS RIGHT AXILLARY AUGMENT: NORMAL
BH CV UPPER VENOUS RIGHT AXILLARY COMPRESS: NORMAL
BH CV UPPER VENOUS RIGHT AXILLARY PHASIC: NORMAL
BH CV UPPER VENOUS RIGHT AXILLARY SPONT: NORMAL
BH CV UPPER VENOUS RIGHT BASILIC FOREARM COMPRESS: NORMAL
BH CV UPPER VENOUS RIGHT BASILIC UPPER COMPRESS: NORMAL
BH CV UPPER VENOUS RIGHT BRACHIAL COMPRESS: NORMAL
BH CV UPPER VENOUS RIGHT CEPHALIC FOREARM COMPRESS: NORMAL
BH CV UPPER VENOUS RIGHT CEPHALIC UPPER COMPRESS: NORMAL
BH CV UPPER VENOUS RIGHT INTERNAL JUGULAR AUGMENT: NORMAL
BH CV UPPER VENOUS RIGHT INTERNAL JUGULAR COMPRESS: NORMAL
BH CV UPPER VENOUS RIGHT INTERNAL JUGULAR PHASIC: NORMAL
BH CV UPPER VENOUS RIGHT INTERNAL JUGULAR SPONT: NORMAL
BH CV UPPER VENOUS RIGHT RADIAL COMPRESS: NORMAL
BH CV UPPER VENOUS RIGHT SUBCLAVIAN AUGMENT: NORMAL
BH CV UPPER VENOUS RIGHT SUBCLAVIAN COMPRESS: NORMAL
BH CV UPPER VENOUS RIGHT SUBCLAVIAN PHASIC: NORMAL
BH CV UPPER VENOUS RIGHT SUBCLAVIAN SPONT: NORMAL
BH CV UPPER VENOUS RIGHT ULNAR COMPRESS: NORMAL
BILIRUB SERPL-MCNC: 0.8 MG/DL (ref 0–1.2)
BUN SERPL-MCNC: 26 MG/DL (ref 8–23)
BUN SERPL-MCNC: 27 MG/DL (ref 8–23)
BUN/CREAT SERPL: 6.3 (ref 7–25)
BUN/CREAT SERPL: 6.4 (ref 7–25)
CALCIUM SPEC-SCNC: 8.5 MG/DL (ref 8.6–10.5)
CALCIUM SPEC-SCNC: 8.6 MG/DL (ref 8.6–10.5)
CHLORIDE SERPL-SCNC: 100 MMOL/L (ref 98–107)
CHLORIDE SERPL-SCNC: 100 MMOL/L (ref 98–107)
CO2 SERPL-SCNC: 24 MMOL/L (ref 22–29)
CO2 SERPL-SCNC: 25 MMOL/L (ref 22–29)
CREAT SERPL-MCNC: 4.14 MG/DL (ref 0.76–1.27)
CREAT SERPL-MCNC: 4.21 MG/DL (ref 0.76–1.27)
DEPRECATED RDW RBC AUTO: 49.6 FL (ref 37–54)
EGFRCR SERPLBLD CKD-EPI 2021: 14.2 ML/MIN/1.73
EGFRCR SERPLBLD CKD-EPI 2021: 14.4 ML/MIN/1.73
ERYTHROCYTE [DISTWIDTH] IN BLOOD BY AUTOMATED COUNT: 15.5 % (ref 12.3–15.4)
GLOBULIN UR ELPH-MCNC: 3.1 GM/DL
GLUCOSE BLDC GLUCOMTR-MCNC: 118 MG/DL (ref 70–130)
GLUCOSE BLDC GLUCOMTR-MCNC: 120 MG/DL (ref 70–130)
GLUCOSE BLDC GLUCOMTR-MCNC: 129 MG/DL (ref 70–130)
GLUCOSE SERPL-MCNC: 106 MG/DL (ref 65–99)
GLUCOSE SERPL-MCNC: 110 MG/DL (ref 65–99)
GRAM STN SPEC: NORMAL
HCT VFR BLD AUTO: 30.7 % (ref 37.5–51)
HGB BLD-MCNC: 9.7 G/DL (ref 13–17.7)
MAXIMAL PREDICTED HEART RATE: 147 BPM
MAXIMAL PREDICTED HEART RATE: 147 BPM
MCH RBC QN AUTO: 27.8 PG (ref 26.6–33)
MCHC RBC AUTO-ENTMCNC: 31.6 G/DL (ref 31.5–35.7)
MCV RBC AUTO: 88 FL (ref 79–97)
MRSA DNA SPEC QL NAA+PROBE: NORMAL
PLATELET # BLD AUTO: 183 10*3/MM3 (ref 140–450)
PMV BLD AUTO: 9.4 FL (ref 6–12)
POTASSIUM SERPL-SCNC: 3.9 MMOL/L (ref 3.5–5.2)
POTASSIUM SERPL-SCNC: 4 MMOL/L (ref 3.5–5.2)
PROCALCITONIN SERPL-MCNC: 0.92 NG/ML (ref 0–0.25)
PROT SERPL-MCNC: 6 G/DL (ref 6–8.5)
RBC # BLD AUTO: 3.49 10*6/MM3 (ref 4.14–5.8)
SODIUM SERPL-SCNC: 138 MMOL/L (ref 136–145)
SODIUM SERPL-SCNC: 139 MMOL/L (ref 136–145)
STRESS TARGET HR: 125 BPM
STRESS TARGET HR: 125 BPM
VANCOMYCIN SERPL-MCNC: 25.1 MCG/ML (ref 5–40)
WBC NRBC COR # BLD: 19.8 10*3/MM3 (ref 3.4–10.8)

## 2023-04-26 PROCEDURE — 99024 POSTOP FOLLOW-UP VISIT: CPT | Performed by: NURSE PRACTITIONER

## 2023-04-26 PROCEDURE — 82140 ASSAY OF AMMONIA: CPT | Performed by: INTERNAL MEDICINE

## 2023-04-26 PROCEDURE — 74176 CT ABD & PELVIS W/O CONTRAST: CPT

## 2023-04-26 PROCEDURE — 97530 THERAPEUTIC ACTIVITIES: CPT

## 2023-04-26 PROCEDURE — 25010000002 VANCOMYCIN 10 G RECONSTITUTED SOLUTION: Performed by: STUDENT IN AN ORGANIZED HEALTH CARE EDUCATION/TRAINING PROGRAM

## 2023-04-26 PROCEDURE — 80202 ASSAY OF VANCOMYCIN: CPT | Performed by: STUDENT IN AN ORGANIZED HEALTH CARE EDUCATION/TRAINING PROGRAM

## 2023-04-26 PROCEDURE — 94799 UNLISTED PULMONARY SVC/PX: CPT

## 2023-04-26 PROCEDURE — 93970 EXTREMITY STUDY: CPT

## 2023-04-26 PROCEDURE — 80053 COMPREHEN METABOLIC PANEL: CPT | Performed by: NURSE PRACTITIONER

## 2023-04-26 PROCEDURE — 94664 DEMO&/EVAL PT USE INHALER: CPT

## 2023-04-26 PROCEDURE — 87641 MR-STAPH DNA AMP PROBE: CPT | Performed by: STUDENT IN AN ORGANIZED HEALTH CARE EDUCATION/TRAINING PROGRAM

## 2023-04-26 PROCEDURE — 84145 PROCALCITONIN (PCT): CPT | Performed by: NURSE PRACTITIONER

## 2023-04-26 PROCEDURE — 25010000002 ENOXAPARIN PER 10 MG: Performed by: NURSE PRACTITIONER

## 2023-04-26 PROCEDURE — 25010000002 CEFEPIME PER 500 MG: Performed by: STUDENT IN AN ORGANIZED HEALTH CARE EDUCATION/TRAINING PROGRAM

## 2023-04-26 PROCEDURE — 71250 CT THORAX DX C-: CPT

## 2023-04-26 PROCEDURE — 94760 N-INVAS EAR/PLS OXIMETRY 1: CPT

## 2023-04-26 PROCEDURE — 87205 SMEAR GRAM STAIN: CPT | Performed by: NURSE PRACTITIONER

## 2023-04-26 PROCEDURE — 99232 SBSQ HOSP IP/OBS MODERATE 35: CPT | Performed by: INTERNAL MEDICINE

## 2023-04-26 PROCEDURE — 94761 N-INVAS EAR/PLS OXIMETRY MLT: CPT

## 2023-04-26 PROCEDURE — 82962 GLUCOSE BLOOD TEST: CPT

## 2023-04-26 PROCEDURE — 85027 COMPLETE CBC AUTOMATED: CPT | Performed by: NURSE PRACTITIONER

## 2023-04-26 PROCEDURE — 99233 SBSQ HOSP IP/OBS HIGH 50: CPT | Performed by: STUDENT IN AN ORGANIZED HEALTH CARE EDUCATION/TRAINING PROGRAM

## 2023-04-26 PROCEDURE — 87040 BLOOD CULTURE FOR BACTERIA: CPT | Performed by: INTERNAL MEDICINE

## 2023-04-26 RX ORDER — POLYETHYLENE GLYCOL 3350 17 G/17G
17 POWDER, FOR SOLUTION ORAL DAILY
Status: DISCONTINUED | OUTPATIENT
Start: 2023-04-26 | End: 2023-05-02 | Stop reason: HOSPADM

## 2023-04-26 RX ADMIN — GUAIFENESIN 1200 MG: 600 TABLET, EXTENDED RELEASE ORAL at 08:09

## 2023-04-26 RX ADMIN — IPRATROPIUM BROMIDE AND ALBUTEROL SULFATE 3 ML: 2.5; .5 SOLUTION RESPIRATORY (INHALATION) at 07:26

## 2023-04-26 RX ADMIN — ANORECTAL OINTMENT 1 APPLICATION: 15.7; .44; 24; 20.6 OINTMENT TOPICAL at 17:47

## 2023-04-26 RX ADMIN — DIPHENHYDRAMINE HYDROCHLORIDE AND LIDOCAINE HYDROCHLORIDE AND ALUMINUM HYDROXIDE AND MAGNESIUM HYDRO 5 ML: KIT at 00:01

## 2023-04-26 RX ADMIN — DOCUSATE SODIUM 50MG AND SENNOSIDES 8.6MG 2 TABLET: 8.6; 5 TABLET, FILM COATED ORAL at 08:10

## 2023-04-26 RX ADMIN — Medication 150 MG: at 08:09

## 2023-04-26 RX ADMIN — MIDODRINE HYDROCHLORIDE 5 MG: 5 TABLET ORAL at 08:09

## 2023-04-26 RX ADMIN — POLYETHYLENE GLYCOL 3350 17 G: 17 POWDER, FOR SOLUTION ORAL at 10:13

## 2023-04-26 RX ADMIN — CEFEPIME 1 G: 1 INJECTION, POWDER, FOR SOLUTION INTRAMUSCULAR; INTRAVENOUS at 21:45

## 2023-04-26 RX ADMIN — IPRATROPIUM BROMIDE AND ALBUTEROL SULFATE 3 ML: 2.5; .5 SOLUTION RESPIRATORY (INHALATION) at 21:04

## 2023-04-26 RX ADMIN — MUPIROCIN 1 APPLICATION: 20 OINTMENT TOPICAL at 21:23

## 2023-04-26 RX ADMIN — ENOXAPARIN SODIUM 30 MG: 100 INJECTION SUBCUTANEOUS at 14:27

## 2023-04-26 RX ADMIN — MUPIROCIN 1 APPLICATION: 20 OINTMENT TOPICAL at 08:10

## 2023-04-26 RX ADMIN — GUAIFENESIN 1200 MG: 600 TABLET, EXTENDED RELEASE ORAL at 21:23

## 2023-04-26 RX ADMIN — DIPHENHYDRAMINE HYDROCHLORIDE AND LIDOCAINE HYDROCHLORIDE AND ALUMINUM HYDROXIDE AND MAGNESIUM HYDRO 5 ML: KIT at 06:20

## 2023-04-26 RX ADMIN — ANORECTAL OINTMENT 1 APPLICATION: 15.7; .44; 24; 20.6 OINTMENT TOPICAL at 21:24

## 2023-04-26 RX ADMIN — DIPHENHYDRAMINE HYDROCHLORIDE AND LIDOCAINE HYDROCHLORIDE AND ALUMINUM HYDROXIDE AND MAGNESIUM HYDRO 5 ML: KIT at 14:26

## 2023-04-26 RX ADMIN — ATORVASTATIN CALCIUM 40 MG: 20 TABLET, FILM COATED ORAL at 21:23

## 2023-04-26 RX ADMIN — ASPIRIN 81 MG: 81 TABLET, COATED ORAL at 08:10

## 2023-04-26 RX ADMIN — PANTOPRAZOLE SODIUM 40 MG: 40 TABLET, DELAYED RELEASE ORAL at 06:20

## 2023-04-26 RX ADMIN — DIPHENHYDRAMINE HYDROCHLORIDE AND LIDOCAINE HYDROCHLORIDE AND ALUMINUM HYDROXIDE AND MAGNESIUM HYDRO 5 ML: KIT at 21:23

## 2023-04-26 RX ADMIN — DOCUSATE SODIUM 50MG AND SENNOSIDES 8.6MG 2 TABLET: 8.6; 5 TABLET, FILM COATED ORAL at 21:23

## 2023-04-26 RX ADMIN — MIDODRINE HYDROCHLORIDE 7.5 MG: 5 TABLET ORAL at 17:46

## 2023-04-26 RX ADMIN — ANORECTAL OINTMENT 1 APPLICATION: 15.7; .44; 24; 20.6 OINTMENT TOPICAL at 08:10

## 2023-04-26 NOTE — PLAN OF CARE
Goal Outcome Evaluation:  Plan of Care Reviewed With: patient, family           Outcome Evaluation: NSR on monitor, room air, blood pressures laible but midodrine increased today. Possible sepsis with unkown eitology as this time.. labs and CT pending. Patient positive for superficial venous thrombosis, treatment team aware. Skin percautions in place, speacialty bed utilized and ambulation encouraged. C/o butt hurting but better when re positioned on side. Plan or care explained to patient and family. Will continue with current plan of care and adjust as needed.   Cheilitis Normal Treatment: I recommended application of Vaseline or Aquaphor numerous times a day (as often as every hour) and before going to bed.

## 2023-04-26 NOTE — PROGRESS NOTES
" LOS: 18 days   Patient Care Team:  Daksha Ng APRN as PCP - General (Family Medicine)  Rudy Faith MD as Consulting Physician (Ophthalmology)  Jose Mccall MD as Consulting Physician (Nephrology)  Quang Reyes MD as Consulting Physician (Nephrology)    Chief Complaint: post op    Subjective:  Symptoms:  He reports weakness.  No chest pain.    Activity level: Impaired due to weakness.          Vital Signs  Temp:  [97.5 °F (36.4 °C)-98 °F (36.7 °C)] 97.9 °F (36.6 °C)  Heart Rate:  [67-81] 72  Resp:  [18-20] 18  BP: ()/(45-91) 101/45  Body mass index is 27.83 kg/m².    Intake/Output Summary (Last 24 hours) at 4/26/2023 0822  Last data filed at 4/26/2023 0655  Gross per 24 hour   Intake 760 ml   Output 150 ml   Net 610 ml     No intake/output data recorded.    Chest tube drainage last 8 hours         04/24/23  0634 04/25/23  0622 04/26/23  0725   Weight: 96.6 kg (213 lb) 96.4 kg (212 lb 8 oz) 93.1 kg (205 lb 3.2 oz)         Objective:  General Appearance:  Ill-appearing.    Vital signs: (most recent): Blood pressure 101/45, pulse 72, temperature 97.9 °F (36.6 °C), temperature source Oral, resp. rate 18, height 182.9 cm (72\"), weight 93.1 kg (205 lb 3.2 oz), SpO2 94 %.            Results Review:        WBC WBC   Date Value Ref Range Status   04/26/2023 19.80 (H) 3.40 - 10.80 10*3/mm3 Final   04/25/2023 12.67 (H) 3.40 - 10.80 10*3/mm3 Final   04/24/2023 12.87 (H) 3.40 - 10.80 10*3/mm3 Final      HGB Hemoglobin   Date Value Ref Range Status   04/26/2023 9.7 (L) 13.0 - 17.7 g/dL Final   04/25/2023 9.5 (L) 13.0 - 17.7 g/dL Final   04/24/2023 9.2 (L) 13.0 - 17.7 g/dL Final      HCT Hematocrit   Date Value Ref Range Status   04/26/2023 30.7 (L) 37.5 - 51.0 % Final   04/25/2023 29.2 (L) 37.5 - 51.0 % Final   04/24/2023 29.1 (L) 37.5 - 51.0 % Final      Platelets Platelets   Date Value Ref Range Status   04/26/2023 183 140 - 450 10*3/mm3 Final   04/25/2023 172 140 - 450 10*3/mm3 Final "   04/24/2023 158 140 - 450 10*3/mm3 Final        PT/INR:  No results found for: PROTIME/No results found for: INR    Sodium Sodium   Date Value Ref Range Status   04/26/2023 138 136 - 145 mmol/L Final   04/25/2023 135 (L) 136 - 145 mmol/L Final   04/24/2023 131 (L) 136 - 145 mmol/L Final      Potassium Potassium   Date Value Ref Range Status   04/26/2023 3.9 3.5 - 5.2 mmol/L Final   04/25/2023 4.0 3.5 - 5.2 mmol/L Final   04/24/2023 4.2 3.5 - 5.2 mmol/L Final      Chloride Chloride   Date Value Ref Range Status   04/26/2023 100 98 - 107 mmol/L Final   04/25/2023 97 (L) 98 - 107 mmol/L Final   04/24/2023 93 (L) 98 - 107 mmol/L Final      Bicarbonate CO2   Date Value Ref Range Status   04/26/2023 25.0 22.0 - 29.0 mmol/L Final   04/25/2023 24.0 22.0 - 29.0 mmol/L Final   04/24/2023 23.7 22.0 - 29.0 mmol/L Final      BUN BUN   Date Value Ref Range Status   04/26/2023 26 (H) 8 - 23 mg/dL Final   04/25/2023 42 (H) 8 - 23 mg/dL Final   04/24/2023 29 (H) 8 - 23 mg/dL Final      Creatinine Creatinine   Date Value Ref Range Status   04/26/2023 4.14 (H) 0.76 - 1.27 mg/dL Final   04/25/2023 5.36 (H) 0.76 - 1.27 mg/dL Final   04/24/2023 4.23 (H) 0.76 - 1.27 mg/dL Final      Calcium Calcium   Date Value Ref Range Status   04/26/2023 8.5 (L) 8.6 - 10.5 mg/dL Final   04/25/2023 8.4 (L) 8.6 - 10.5 mg/dL Final   04/24/2023 8.3 (L) 8.6 - 10.5 mg/dL Final      Magnesium No results found for: MG       aspirin, 81 mg, Oral, Daily  atorvastatin, 40 mg, Oral, Nightly  cefepime, 1 g, Intravenous, Q24H  DULoxetine, 30 mg, Oral, Nightly  enoxaparin, 30 mg, Subcutaneous, Q24H  epoetin bernice/bernice-epbx, 6,000 Units, Intravenous, Once per day on Mon Wed Fri  First Mouthwash (Magic Mouthwash), 5 mL, Swish & Spit, Q8H  guaiFENesin, 1,200 mg, Oral, Q12H  insulin lispro, 0-9 Units, Subcutaneous, 4x Daily With Meals & Nightly  ipratropium-albuterol, 3 mL, Nebulization, Q6H While Awake - RT  iron polysaccharides, 150 mg, Oral, Daily  lactulose, 10 g,  Oral, BID  Menthol-Zinc Oxide, 1 application, Topical, TID  midodrine, 5 mg, Oral, BID AC  mupirocin, , Each Nare, BID  pantoprazole, 40 mg, Oral, QAM  senna-docusate sodium, 2 tablet, Oral, BID               Patient Active Problem List   Diagnosis Code   • 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.22   • Essential hypertension I10   • Hyperlipidemia E78.5   • Primary insomnia F51.01   • ESRD (end stage renal disease) N18.6   • Vitamin D deficiency E55.9   • Diverticulitis of large intestine with perforation and abscess without bleeding K57.20   • Obesity (BMI 30-39.9) E66.9   • Impaired mobility and ADLs Z74.09, Z78.9   • History of colon resection Z90.49   • Colon polyps K63.5   • Diverticulosis K57.90   • CKD (chronic kidney disease) stage 5, GFR less than 15 ml/min N18.5   • NSTEMI (non-ST elevated myocardial infarction) I21.4   • Rhinovirus B34.8   • Abnormal findings on diagnostic imaging of heart and coronary circulation R93.1   • Fatigue R53.83   • Dysphagia R13.10       Assessment & Plan     - NSTEMI/multi-vessel CAD- s/p urgent CABGx4 LIMA/RSVG, MV repair-([place]) POD#13  - HFrEF--30-35% per echo 4/8  - rhinovirus infection  - ESRD on HD  - hypertension  - hyperlipidemia  - DM II- A1c 5.0  -post op anemia- expected acute blood loss  -Leukocytosis- probable reactive   -TCP- resolved       He looks terrible this morning  Confused, weak and his WBC increased to 19 from 12 yesterday  He has been started on cefepime yesterday for UTI but I am concerned he is getting septic   Blood pressure low, weakness and he is jerky involuntarily   Plan to check upper and lower venous duplex to see if there is a blood clot  Will check CT of chest abdomen and pelvis and sputum culture  Continue routine care       DUSTY Spring  04/26/23  08:22 EDT

## 2023-04-26 NOTE — PROGRESS NOTES
"Lefty Cai  1950 72 y.o.  [id number]      Patient Care Team:  Daksha Ng APRN as PCP - General (Family Medicine)  Rudy Faith MD as Consulting Physician (Ophthalmology)  Jose Mccall MD as Consulting Physician (Nephrology)  Quang Reyes MD as Consulting Physician (Nephrology)    CC: Non-STEMI,, end-stage renal failure, EF 30 to 35%, severe three-vessel coronary disease, severe MR, underwent three-vessel CABG and mitral valve repair, postop A-fib    Interval History: Not improved has an elevated white count and confusion    Objective   Vital Signs  Temp:  [97.5 °F (36.4 °C)-98 °F (36.7 °C)] 97.9 °F (36.6 °C)  Heart Rate:  [72-81] 72  Resp:  [18-20] 18  BP: ()/(45-91) 101/45    Intake/Output Summary (Last 24 hours) at 4/26/2023 0949  Last data filed at 4/26/2023 0655  Gross per 24 hour   Intake 640 ml   Output 150 ml   Net 490 ml     Flowsheet Rows    Flowsheet Row First Filed Value   Admission Height 177.8 cm (70\") Documented at 04/08/2023 1028   Admission Weight 102 kg (225 lb) Documented at 04/08/2023 1028          Physical Exam:   General Appearance:    Alert,oriented, in no acute distress   Lungs:     Clear to auscultation,BS are equal    Heart:    Normal S1 and S2, RRR without murmur, gallop or rub   HEENT:    Sclerae are clear, no JVD or adenopathy   Abdomen:     Normal bowel sounds, soft nontender, nondistended, no HSM   Extremities:   Moves all extremities well, no edema, no cyanosis, no             Redness, no rash     Medication Review:      aspirin, 81 mg, Oral, Daily  atorvastatin, 40 mg, Oral, Nightly  cefepime, 1 g, Intravenous, Q24H  DULoxetine, 30 mg, Oral, Nightly  enoxaparin, 30 mg, Subcutaneous, Q24H  epoetin bernice/bernice-epbx, 6,000 Units, Intravenous, Once per day on Mon Wed Fri  First Mouthwash (Magic Mouthwash), 5 mL, Swish & Spit, Q8H  guaiFENesin, 1,200 mg, Oral, Q12H  insulin lispro, 0-9 Units, Subcutaneous, 4x Daily With Meals & " Nightly  ipratropium-albuterol, 3 mL, Nebulization, Q6H While Awake - RT  iron polysaccharides, 150 mg, Oral, Daily  Menthol-Zinc Oxide, 1 application, Topical, TID  midodrine, 7.5 mg, Oral, BID AC  mupirocin, , Each Nare, BID  pantoprazole, 40 mg, Oral, QAM  polyethylene glycol, 17 g, Oral, Daily  senna-docusate sodium, 2 tablet, Oral, BID             I reviewed the patient's new clinical results.  I personally viewed and interpreted the patient's EKG/Telemetry data    Assessment/Plan  Active Hospital Problems    Diagnosis  POA   • **NSTEMI (non-ST elevated myocardial infarction) [I21.4]  Yes   • Dysphagia [R13.10]  Unknown   • Fatigue [R53.83]  Unknown   • Rhinovirus [B34.8]  Yes   • Abnormal findings on diagnostic imaging of heart and coronary circulation [R93.1]  Yes   • Type 2 diabetes mellitus with diabetic chronic kidney disease [E11.22]  Yes   • Essential hypertension [I10]  Yes   • Hyperlipidemia [E78.5]  Yes   • ESRD (end stage renal disease) [N18.6]  Yes      Resolved Hospital Problems    Diagnosis Date Resolved POA   • Hypernatremia [E87.0] 04/19/2023 Yes   • Dyspnea on exertion [R06.09] 04/24/2023 Yes     Ischemic cardiomyopathy severe mitral insufficiency status post CABG and mitral valve repair on dialysis his blood pressure is pretty soft.  Now has skin breakdown on his coccyx and elevated white count confusion.  It we just really seem struggling to get better cardiac wise has been stable    Lobito Garcia MD  04/26/23  09:49 EDT

## 2023-04-26 NOTE — PROGRESS NOTES
[facility] Progress Note      Patient Name: Lefty Cai  : 1950  MRN: [id number]  Primary Care Physician:  Daksha Ng APRN  Date of admission: 2023    Subjective     Interval History:   Follow up ESRD. Intermittent systolic 80's.  Metoprolol dc yesterday . Looks very ill today.  Confused, hallucinating. Myoclonic jerking . Loose cough . Wife says he will not swallow food, just chews continuously.    Review of Systems:   As noted above    Objective     Vitals:   Temp:  [97.5 °F (36.4 °C)-98 °F (36.7 °C)] 97.9 °F (36.6 °C)  Heart Rate:  [72-81] 72  Resp:  [18-20] 18  BP: ()/(45-91) 101/45    Intake/Output Summary (Last 24 hours) at 2023 0906  Last data filed at 2023 0655  Gross per 24 hour   Intake 640 ml   Output 150 ml   Net 490 ml       Physical Exam:    General Appearance: alert,  no acute distress . Confused. Hallucinating. Myoclonic jerking.   Skin: warm and dry  HEENT: oral mucosa dry. Apthous ulcer on palate. No thrush, nonicteric sclera  Neck: TDC RIJ.   Lungs: Coarse bs, no rales anteriorly. Unlabored.   Heart: RRR no s3 or rub  Abdomen: soft, nontender, not  + bs body wall edema.   Extremities: 2+ edema. Upper and lower ext edema. LUE AVF patent .  Neuro: normal speech and mental status     Scheduled Meds:     aspirin, 81 mg, Oral, Daily  atorvastatin, 40 mg, Oral, Nightly  cefepime, 1 g, Intravenous, Q24H  DULoxetine, 30 mg, Oral, Nightly  enoxaparin, 30 mg, Subcutaneous, Q24H  epoetin bernice/bernice-epbx, 6,000 Units, Intravenous, Once per day on   First Mouthwash (Magic Mouthwash), 5 mL, Swish & Spit, Q8H  guaiFENesin, 1,200 mg, Oral, Q12H  insulin lispro, 0-9 Units, Subcutaneous, 4x Daily With Meals & Nightly  ipratropium-albuterol, 3 mL, Nebulization, Q6H While Awake - RT  iron polysaccharides, 150 mg, Oral, Daily  lactulose, 10 g, Oral, BID  Menthol-Zinc Oxide, 1 application, Topical, TID  midodrine, 5 mg, Oral, BID  AC  mupirocin, , Each Nare, BID  pantoprazole, 40 mg, Oral, QAM  senna-docusate sodium, 2 tablet, Oral, BID      IV Meds:        Results Reviewed:   I have personally reviewed the results from the time of this admission to 4/26/2023 09:06 EDT     Results from last 7 days   Lab Units 04/26/23  0300 04/25/23  0325 04/24/23  0355 04/21/23  1442 04/20/23  0324   SODIUM mmol/L 138 135* 131*   < > 136   POTASSIUM mmol/L 3.9 4.0 4.2   < > 3.8   CHLORIDE mmol/L 100 97* 93*   < > 99   CO2 mmol/L 25.0 24.0 23.7   < > 23.3   BUN mg/dL 26* 42* 29*   < > 28*   CREATININE mg/dL 4.14* 5.36* 4.23*   < > 4.25*   CALCIUM mg/dL 8.5* 8.4* 8.3*   < > 8.3*   BILIRUBIN mg/dL  --   --   --   --  0.6   ALK PHOS U/L  --   --   --   --  101   ALT (SGPT) U/L  --   --   --   --  16   AST (SGOT) U/L  --   --   --   --  66*   GLUCOSE mg/dL 110* 100* 91   < > 95    < > = values in this interval not displayed.       Estimated Creatinine Clearance: 20.9 mL/min (A) (by C-G formula based on SCr of 4.14 mg/dL (H)).    Results from last 7 days   Lab Units 04/25/23 0325 04/24/23 0355 04/23/23 0355 04/21/23  1442 04/20/23  0324   MAGNESIUM mg/dL  --   --   --   --  2.0   PHOSPHORUS mg/dL 4.7* 3.4 4.6*   < > 2.6    < > = values in this interval not displayed.             Results from last 7 days   Lab Units 04/26/23  0300 04/25/23  0325 04/24/23  0522 04/23/23  0355 04/22/23  0239   WBC 10*3/mm3 19.80* 12.67* 12.87* 12.01* 16.13*   HEMOGLOBIN g/dL 9.7* 9.5* 9.2* 10.0* 10.3*   PLATELETS 10*3/mm3 183 172 158 159 142             Assessment / Plan     ASSESSMENT:  1. ESRD. HD tomorrow  .   2. NSTEMI. SP 3 vessel CABG, MV repair 4/13/23.  3. PAF,  SR. Metoprolol dc.   4. Anemia CKD and blood loss.   5. TCP.  HIT panel negative.  Resolved .  6. DM2  7. Deconditioning.    8. Hypotension, acute on chronic, leukocytosis. Urine pseudomonas. Cefepime started yesterday.  ID to see.  Has tunneled dialysis catheter as potential source, as well . Possible aspiration  based on history .   9. Encephalopathy. Meds reviewed.  Suspect related to sepsis .  PLAN:  1. Dialysis tomorrow .  2. Increase midodrine .  3. Blood cultures  4 .Ammonia level.   5. Add Vanc if ok with ID.  6. ID to see  7. CT chest, abdomen, pelvis  8. DW wife.   Hyacinth Fiore MD  04/26/23  09:06 EDT    [facility]  [phone]

## 2023-04-26 NOTE — SIGNIFICANT NOTE
04/26/23 1422   OTHER   Discipline physical therapist   Rehab Time/Intention   Session Not Performed other (see comments)  (pt just back on floor from vascular, per Nurse pt has large blood clot R LE. Will hold PT today pending clearance for mobility. nurse aware. f/u 4/27 if appropriate)   Recommendation   PT - Next Appointment 04/27/23

## 2023-04-26 NOTE — THERAPY TREATMENT NOTE
Patient Name: Lefty Cai  : 1950    MRN: [id number]                              Today's Date: 2023       Admit Date: 2023    Visit Dx:     ICD-10-CM ICD-9-CM   1. Dyspnea on exertion  R06.09 786.09   2. NSTEMI (non-ST elevated myocardial infarction)  I21.4 410.70   3. ESRD (end stage renal disease)  N18.6 585.6   4. Former smoker  Z87.891 V15.82   5. Elevated blood pressure reading in office with diagnosis of hypertension  I10 401.9   6. Rhinovirus infection  B34.8 079.3   7. Abnormal findings on diagnostic imaging of heart and coronary circulation  R93.1 794.39   8. S/P CABG (coronary artery bypass graft)  Z95.1 V45.81   9. Orthostasis  I95.1 458.0     Patient Active Problem List   Diagnosis   • Type 2 diabetes mellitus with diabetic chronic kidney disease   • Essential hypertension   • Hyperlipidemia   • Primary insomnia   • ESRD (end stage renal disease)   • Vitamin D deficiency   • Diverticulitis of large intestine with perforation and abscess without bleeding   • Obesity (BMI 30-39.9)   • Impaired mobility and ADLs   • History of colon resection   • Colon polyps   • Diverticulosis   • CKD (chronic kidney disease) stage 5, GFR less than 15 ml/min   • NSTEMI (non-ST elevated myocardial infarction)   • Rhinovirus   • Abnormal findings on diagnostic imaging of heart and coronary circulation   • Fatigue   • Dysphagia     Past Medical History:   Diagnosis Date   • Anemia    • Anesthesia complication     HYPOTENSION   • Arthritis    • Cancer of kidney, left    • CKD (chronic kidney disease)     STAGE 5   • Diabetes mellitus, type 2    • Fatigue    • GERD (gastroesophageal reflux disease)    • H/O renal cell carcinoma 2007    partial nephrectomy   • History of colostomy reversal 2022   • [place] (hard of hearing)     NO DEVICE   • Hyperlipidemia    • Hypertension    • Primary insomnia 2016   • Proteinuria    • Risk factors for obstructive sleep apnea    • Sinus drainage    • Vitamin D  deficiency 08/14/2017     Past Surgical History:   Procedure Laterality Date   • ARTERIOVENOUS FISTULA/SHUNT SURGERY Left 08/15/2019    Procedure: LEFT MID FOREARM RADIAL CEPHALIC ARTERIAL VENOUS FISTULA;  Surgeon: Louann Miles Jr., MD;  Location: [facility] MAIN OR;  Service: Vascular   • CARDIAC CATHETERIZATION N/A 4/9/2023    Procedure: Left Heart Cath;  Surgeon: Lobito Garcia MD;  Location: [facility] CATH INVASIVE LOCATION;  Service: Cardiovascular;  Laterality: N/A;   • CARDIAC CATHETERIZATION N/A 4/9/2023    Procedure: Left ventriculography;  Surgeon: Lobito Garcia MD;  Location: [facility] CATH INVASIVE LOCATION;  Service: Cardiovascular;  Laterality: N/A;   • CARDIAC CATHETERIZATION N/A 4/9/2023    Procedure: Coronary angiography;  Surgeon: Lobito Garcia MD;  Location: [facility] CATH INVASIVE LOCATION;  Service: Cardiovascular;  Laterality: N/A;   • COLONOSCOPY  03/24/2022   • COLONOSCOPY N/A 03/24/2022    Procedure: COLONOSCOPY TO CECUM WITH POLYPECTOMY  (HOT SNARE);  Surgeon: Yelena Guerra MD;  Location: [facility] ENDOSCOPY;  Service: General;  Laterality: N/A;  PREOP/ HX OF DIVERTICULITIS, COLOSTOMY  POSTOP/ POLYPS, DIVERTICULOSIS    • COLOSTOMY  09/06/2021   • COLOSTOMY CLOSURE N/A 04/12/2022    Procedure: open Colostomy reversal;  Surgeon: Yelena Guerra MD;  Location: [facility] MAIN OR;  Service: General;  Laterality: N/A;   • CORONARY ARTERY BYPASS GRAFT N/A 4/13/2023    Procedure: MAT STERNOTOMY CORONARY ARTERY BYPASS GRAFT TIMES 4 USING LEFT INTERNAL MAMMARY ARTERY AND RIGHT GREATER SAPHENOUS VEIN  PER ENDOSCOPIC VEIN HARVESTING, MITRAL VALVE REPAIR  AND PRP;  Surgeon: Mirtha Zuluaga MD;  Location: [facility] CVOR;  Service: Cardiothoracic;  Laterality: N/A;   • DIAGNOSTIC LAPAROSCOPY N/A 2/27/2023    Procedure: DIAGNOSTIC LAPAROSCOPY;  Surgeon: Murali Ferreira MD;  Location: [facility] MAIN OR;  Service: General;  Laterality: N/A;   • EXPLORATORY LAPAROTOMY N/A 09/06/2021     Procedure: Open sigmoind colectomy, colostomy, and appendectomy;  Surgeon: Yelena Guerra MD;  Location: [facility] MAIN OR;  Service: General;  Laterality: N/A;   • EYE SURGERY      CATARACTS   • KNEE ARTHROSCOPY Right    • NEPHRECTOMY PARTIAL  2007    Dr. Victor      General Information     Row Name 04/26/23 1452          OT Time and Intention    Document Type therapy note (daily note)  -KN     Mode of Treatment individual therapy;occupational therapy  -     Row Name 04/26/23 1452          General Information    Existing Precautions/Restrictions fall;cardiac;sternal  -KN     Row Name 04/26/23 1452          Cognition    Orientation Status (Cognition) oriented x 3  -KN     Row Name 04/26/23 1452          Safety Issues, Functional Mobility    Impairments Affecting Function (Mobility) endurance/activity tolerance;shortness of breath  -KN           User Key  (r) = Recorded By, (t) = Taken By, (c) = Cosigned By    Initials Name Provider Type    Robert Maldonado OT Occupational Therapist                 Mobility/ADL's     Row Name 04/26/23 1452          Activities of Daily Living    BADL Assessment/Intervention --  refused this date  -KN           User Key  (r) = Recorded By, (t) = Taken By, (c) = Cosigned By    Initials Name Provider Type    Robert Maldonado OT Occupational Therapist               Obj/Interventions     Row Name 04/26/23 1453          Motor Skills    Therapeutic Exercise --  Pt completed BUE PROM/AROM in shoulder flexion, abd, and IR/ER.  -IRENE           User Key  (r) = Recorded By, (t) = Taken By, (c) = Cosigned By    Initials Name Provider Type    Robert Maldonado OT Occupational Therapist               Goals/Plan    No documentation.                Clinical Impression     Row Name 04/26/23 1454          Pain Assessment    Pretreatment Pain Rating 0/10 - no pain  -KN     Posttreatment Pain Rating 0/10 - no pain  -     Row Name 04/26/23 1454          Plan of Care Review    Plan of Care Reviewed With  patient;spouse  -KN     Outcome Evaluation Pt seen for OT this AM. In bed only this AM per nursing. Pt completed BUE PROM/AROM for shoulders. Will check back tomorrow  -KN     Row Name 04/26/23 0359          Positioning and Restraints    Pre-Treatment Position in bed  -KN     Post Treatment Position bed  -KN     In Bed notified nsg;call light within reach;encouraged to call for assist;exit alarm on  -KN           User Key  (r) = Recorded By, (t) = Taken By, (c) = Cosigned By    Initials Name Provider Type    Robert Maldonado, BAUTISTA Occupational Therapist               Outcome Measures     Row Name 04/26/23 1456          How much help from another is currently needed...    Putting on and taking off regular lower body clothing? 2  -KN     Bathing (including washing, rinsing, and drying) 2  -KN     Toileting (which includes using toilet bed pan or urinal) 2  -KN     Putting on and taking off regular upper body clothing 2  -KN     Taking care of personal grooming (such as brushing teeth) 2  -KN     Eating meals 3  -KN     AM-PAC 6 Clicks Score (OT) 13  -KN     Row Name 04/26/23 0810          How much help from another person do you currently need...    Turning from your back to your side while in flat bed without using bedrails? 3  -CB     Moving from lying on back to sitting on the side of a flat bed without bedrails? 3  -CB     Moving to and from a bed to a chair (including a wheelchair)? 3  -CB     Standing up from a chair using your arms (e.g., wheelchair, bedside chair)? 3  -CB     Climbing 3-5 steps with a railing? 3  -CB     To walk in hospital room? 3  -CB     AM-PAC 6 Clicks Score (PT) 18  -CB     Highest level of mobility 6 --> Walked 10 steps or more  -CB     Row Name 04/26/23 5086          Functional Assessment    Outcome Measure Options AM-PAC 6 Clicks Daily Activity (OT)  -KN           User Key  (r) = Recorded By, (t) = Taken By, (c) = Cosigned By    Initials Name Provider Type    Evelyn Juan RN  Registered Nurse    Robert Maldonado OT Occupational Therapist                Occupational Therapy Education     Title: PT OT SLP Therapies (Done)     Topic: Occupational Therapy (Done)     Point: ADL training (Done)     Description:   Instruct learner(s) on proper safety adaptation and remediation techniques during self care or transfers.   Instruct in proper use of assistive devices.              Learning Progress Summary           Patient Acceptance, E, VU by IRENE at 4/26/2023 1456    Acceptance, E,TB, VU by  at 4/21/2023 1756    Acceptance, E, VU by  at 4/19/2023 1815    Eager, E, VU,DU by  at 4/19/2023 1251    Comment: ed on AE for self feeding  adl safety   Family Acceptance, E, VU by  at 4/19/2023 1815    Eager, E, VU,DU by  at 4/19/2023 1251    Comment: ed on AE for self feeding  adl safety                   Point: Home exercise program (Done)     Description:   Instruct learner(s) on appropriate technique for monitoring, assisting and/or progressing therapeutic exercises/activities.              Learning Progress Summary           Patient Acceptance, E, VU by IRENE at 4/26/2023 1456    Acceptance, E,TB, VU by  at 4/21/2023 1756    Acceptance, E, VU by  at 4/19/2023 1815   Family Acceptance, E, VU by  at 4/19/2023 1815                   Point: Precautions (Done)     Description:   Instruct learner(s) on prescribed precautions during self-care and functional transfers.              Learning Progress Summary           Patient Acceptance, E, VU by IRENE at 4/26/2023 1456    Acceptance, E,TB, VU by  at 4/21/2023 1756    Acceptance, E, VU by  at 4/19/2023 1815    Eager, E, VU,DU by  at 4/19/2023 1251    Comment: ed on AE for self feeding  adl safety   Family Acceptance, E, VU by  at 4/19/2023 1815    Eager, E, VU,DU by  at 4/19/2023 1251    Comment: ed on AE for self feeding  adl safety                   Point: Body mechanics (Done)     Description:   Instruct learner(s) on proper positioning  and spine alignment during self-care, functional mobility activities and/or exercises.              Learning Progress Summary           Patient Acceptance, E, VU by  at 4/26/2023 1456    Acceptance, E,TB, VU by  at 4/21/2023 1756    Acceptance, E, VU by  at 4/19/2023 1815   Family Acceptance, E, VU by  at 4/19/2023 1815                               User Key     Initials Effective Dates Name Provider Type Discipline     06/16/21 -  Kacy Deluca, RN Registered Nurse Nurse     12/20/21 -  Mitesh Sage, RN Registered Nurse Nurse     08/02/22 -  Robert Tadeo OT Occupational Therapist OT     01/03/23 -  Elly Mccarthy OT Occupational Therapist OT              OT Recommendation and Plan     Plan of Care Review  Plan of Care Reviewed With: patient, spouse  Outcome Evaluation: Pt seen for OT this AM. In bed only this AM per nursing. Pt completed BUE PROM/AROM for shoulders. Will check back tomorrow     Time Calculation:    Time Calculation- OT     Row Name 04/26/23 1457             Time Calculation- OT    OT Received On 04/26/23  -KN      OT - Next Appointment 04/27/23  -KN         Timed Charges    16980 - OT Therapeutic Activity Minutes 10  -KN         Total Minutes    Timed Charges Total Minutes 10  -KN       Total Minutes 10  -KN            User Key  (r) = Recorded By, (t) = Taken By, (c) = Cosigned By    Initials Name Provider Type     Robert Tadeo OT Occupational Therapist              Therapy Charges for Today     Code Description Service Date Service Provider Modifiers Qty    03664377016 HC OT THERAPEUTIC ACT EA 15 MIN 4/26/2023 Robert Tadeo OT GO 1               Robert Tadeo OT  4/26/2023

## 2023-04-26 NOTE — PLAN OF CARE
Goal Outcome Evaluation:  Plan of Care Reviewed With: patient, spouse           Outcome Evaluation: Pt seen for OT this AM. In bed only this AM per nursing. Pt completed BUE PROM/AROM for shoulders. Will check back tomorrow

## 2023-04-26 NOTE — NURSING NOTE
Patients bilateral lower venous doppler results are positive for acute superficial vein thrombosis in the right leg. [facility] and CTS notified. No new orders at this time.

## 2023-04-26 NOTE — PROGRESS NOTES
Name: Lefty Cai ADMIT: 2023   : 1950  PCP: AnithaDaksha srinivasan, DUSTY    MRN: [id number] LOS: 18 days   AGE/SEX: 72 y.o. male  ROOM:      Subjective   Subjective   Resting in bed. Daughter and nurse at bedside. He apparently looked pretty ill this AM. Was more confused and having some myoclonic twitching. Wife reported increased confusion yesterday with some urinary complaints- we started him on Cefepime and I did also discuss this with Dr. Mckeon and Dr. Fiore as he had Pseudomonas in urine a few days ago. I reconsulted ID this AM as WBC were up to 19K and some concerns for impending sepsis.     Patient is currently resting in bed. He denies any pain, nausea, vomiting. Appetite still not great. He still feels a little difficulty to void but is aware urine output may decrease as HD continues. He denies any chest pain, dyspnea. No fever or chills.      Objective   Objective   Vital Signs  Temp:  [97.5 °F (36.4 °C)-98 °F (36.7 °C)] 97.9 °F (36.6 °C)  Heart Rate:  [72-81] 72  Resp:  [18] 18  BP: ()/(45-68) 101/45     on   ;   Device (Oxygen Therapy): room air  Body mass index is 27.83 kg/m².     Physical Exam  Vitals and nursing note reviewed.   Constitutional:       Appearance: He is chronically ill-appearing. He is not toxic-appearing.   Cardiovascular:      Rate and Rhythm: Normal rate. Regular rhythm.      Pulses: Normal pulses.   Pulmonary:      Effort: Pulmonary effort is normal. No respiratory distress.      Comments: Clear to auscultation.  No rhonchi noted today   Is on RA. Poor inspiratory effort  Abdominal:      General: Bowel sounds are normal. There is no distension.      Palpations: Abdomen is soft.      Tenderness: There is no abdominal tenderness.   Musculoskeletal:         General: No deformity. Normal range of motion.   Skin:     General: Skin is warm and dry. Legs with TEDs in place.     Findings: No bruising.      Comments: Midline sternal incision  C/D/I  Neurological:      Mental Status: He is alert and oriented to person, place, and time.      Motor: generalized Weakness present.      Coordination: Coordination normal.   Psychiatric:         Mood and Affect: Mood normal.         Behavior: Behavior normal.     Results Review:       I reviewed the patient's new clinical results.  Results from last 7 days   Lab Units 04/26/23 0300 04/25/23 0325 04/24/23  0522 04/23/23  0355   WBC 10*3/mm3 19.80* 12.67* 12.87* 12.01*   HEMOGLOBIN g/dL 9.7* 9.5* 9.2* 10.0*   PLATELETS 10*3/mm3 183 172 158 159     Results from last 7 days   Lab Units 04/26/23 0300 04/25/23 0325 04/24/23 0355 04/23/23 0355   SODIUM mmol/L 139  138 135* 131* 134*   POTASSIUM mmol/L 4.0  3.9 4.0 4.2 3.9   CHLORIDE mmol/L 100  100 97* 93* 97*   CO2 mmol/L 24.0  25.0 24.0 23.7 24.6   BUN mg/dL 27*  26* 42* 29* 44*   CREATININE mg/dL 4.21*  4.14* 5.36* 4.23* 5.19*   GLUCOSE mg/dL 106*  110* 100* 91 111*   Estimated Creatinine Clearance: 20.9 mL/min (A) (by C-G formula based on SCr of 4.14 mg/dL (H)).  Results from last 7 days   Lab Units 04/26/23 0300 04/25/23 0325 04/24/23 0355 04/23/23 0355 04/21/23  1442 04/20/23  0324   ALBUMIN g/dL 2.9* 2.9* 2.5* 2.6*   < > 2.6*   BILIRUBIN mg/dL 0.8  --   --   --   --  0.6   ALK PHOS U/L 121*  --   --   --   --  101   AST (SGOT) U/L 48*  --   --   --   --  66*   ALT (SGPT) U/L 23  --   --   --   --  16    < > = values in this interval not displayed.     Results from last 7 days   Lab Units 04/26/23 0300 04/25/23 0325 04/24/23  0355 04/23/23  0355 04/22/23  0239 04/21/23  1442 04/20/23  0324   CALCIUM mg/dL 8.6  8.5* 8.4* 8.3* 8.8 9.0   < > 8.3*   ALBUMIN g/dL 2.9* 2.9* 2.5* 2.6* 2.3*   < > 2.6*   MAGNESIUM mg/dL  --   --   --   --   --   --  2.0   PHOSPHORUS mg/dL  --  4.7* 3.4 4.6* 3.7   < > 2.6    < > = values in this interval not displayed.     Results from last 7 days   Lab Units 04/26/23  0300   PROCALCITONIN ng/mL 0.92*     Glucose    Date/Time Value Ref Range Status   04/26/2023 0552 118 70 - 130 mg/dL Final     Comment:     Meter: [id number] : [id number] Samantha Pino CHIQUIS   04/25/2023 2109 123 70 - 130 mg/dL Final     Comment:     Meter: [id number] : [id number] Oral Jacobson NA   04/25/2023 1606 128 70 - 130 mg/dL Final     Comment:     Meter: [id number] : [id number] Serna Beth NA   04/25/2023 1249 84 70 - 130 mg/dL Final     Comment:     Meter: [id number] : [id number] Christine Perry RN   04/25/2023 1113 89 70 - 130 mg/dL Final     Comment:     Meter: [id number] : [id number] Serna Alysia NA   04/25/2023 0630 104 70 - 130 mg/dL Final     Comment:     Meter: [id number] : [id number] Samantha Pino CHIQUIS   04/24/2023 2023 140 (H) 70 - 130 mg/dL Final     Comment:     Meter: [id number] : [id number] Samantha Pino CHIQUIS       aspirin, 81 mg, Oral, Daily  atorvastatin, 40 mg, Oral, Nightly  cefepime, 1 g, Intravenous, Q24H  enoxaparin, 30 mg, Subcutaneous, Q24H  epoetin bernice/bernice-epbx, 6,000 Units, Intravenous, Once per day on Mon Wed Fri  First Mouthwash (Magic Mouthwash), 5 mL, Swish & Spit, Q8H  guaiFENesin, 1,200 mg, Oral, Q12H  insulin lispro, 0-9 Units, Subcutaneous, 4x Daily With Meals & Nightly  ipratropium-albuterol, 3 mL, Nebulization, Q6H While Awake - RT  iron polysaccharides, 150 mg, Oral, Daily  Menthol-Zinc Oxide, 1 application, Topical, TID  midodrine, 7.5 mg, Oral, BID AC  mupirocin, , Each Nare, BID  pantoprazole, 40 mg, Oral, QAM  polyethylene glycol, 17 g, Oral, Daily  senna-docusate sodium, 2 tablet, Oral, BID  vancomycin, 20 mg/kg, Intravenous, Once  [START ON 4/27/2023] Vancomycin Pharmacy Intermittent/Pulse Dosing, , Does not apply, Daily      Pharmacy to dose vancomycin,     Diet: Regular/House Diet; Texture: Regular Texture (IDDSI 7); Fluid Consistency: Thin (IDDSI 0)       Assessment/Plan     Active Hospital Problems    Diagnosis  POA   • **NSTEMI (non-ST elevated myocardial infarction) [I21.4]   Yes   • Dysphagia [R13.10]  Unknown   • Fatigue [R53.83]  Unknown   • Rhinovirus [B34.8]  Yes   • Abnormal findings on diagnostic imaging of heart and coronary circulation [R93.1]  Yes   • Type 2 diabetes mellitus with diabetic chronic kidney disease [E11.22]  Yes   • Essential hypertension [I10]  Yes   • Hyperlipidemia [E78.5]  Yes   • ESRD (end stage renal disease) [N18.6]  Yes      Resolved Hospital Problems    Diagnosis Date Resolved POA   • Hypernatremia [E87.0] 04/19/2023 Yes   • Dyspnea on exertion [R06.09] 04/24/2023 Yes     Mr. Cai is a 72 year old male who presented to the hospital with complaints of increased dyspnea in the setting of ESRD with HD compliance. He was found to have new T wave inversions with troponin elevation and taken for heart cath that revealed severe three vessel CAD with occluded right coronary system. Cardiothoracic surgery was consulted for CABG and RVP was obtained showing positive for rhinovirus. ID was consulted given need for OR who cleared for surgery without antibiotics. Timing was per surgeon preference. He was taken for open heart on 4/13. He had hypotension and atrial fibrillation with RVR following surgery. Moved out of CICU on 4/20.     • NSTEMI/ PAF : S/P CABG x4 with MV repair on 4/13. Cardiology and Cardiothoracic surgery managing. Echo 4/8 with EF 30-35%. On amiodarone and metoprolol with conversion of atrial fibrillation to NSR. Then had recurrence of afib RVR 4/20 so beta blocker increased and digoxin added instead. Still with orthostatic hypotension so Nephrology/Cards planning removal of BB. Compression stockings ordered. On ASA/statin. Now on CVU.  Cardiology is following as is cardiothoracic surgery.  We will leave timing of full anticoagulation up to cardiology and cardiothoracic surgery     • Rhinovirus: Supportive care. ID saw and cleared for OR. Pulmonology followed (signed off 4/20). He is on Duonebs/Mucomyst. Remains on RA.     • ESRD: Via TDC.  Nephrology managing. Doppler on left arm AV fistula marginal flow and inadequate size. Renal feels stable for discharge to [facility] from renal standpoint.      • DM2: Sugars very stable. Not requiring correctional insulin.  Continue to encourage oral intake     • HTN: BP soft. BB removed 4/25. Continues on midodrine.      • Leukocytosis: Up to 18K on 4/21. CXR negative. UA showed > 100 K Pseudomonas in ESRD patient. Respiratory culture with normal respiratory nikunj. Remains afebrile. ID consulted and felt UA represented asymptomatic bacteruria and they did not recommend treatment at that time. WBC up to 19K today. Cefepime started 4/25 for presumed UTI, but with increasing WBC, confusion, decreasing BP I asked ID to see again. CTsx has ordered further imaging with CT A/P/chest. Procal 0.9. Vancomycin added for skin/gram positive coverage.      • ABL on chronic anemia CKD: Looks like he trends mid 10 -11 range. Currently 9.7. Monitor.       • Constipation:  Resolved with lactulose/Senokot. Last BM 2 days ago.     • Coccyx buttock skin breakdown: Encouraged turn every 2 hours, waffle cushion, pressure reduction measures, etc.      • Fatigue: Suspect multifactorial d/t chronic comorbidities, prolonged hospital stay, open heart surgery. I highly suspect underlying PATY as well. Would benefit from outpatient sleep study. Trazodone changed to PRN. BB stopped 4/25- may also help with drowsiness. TSH/vitamin B12/folate all okay.     • Dysphagia: ST re-evaluated.     • Acute superficial thrombophlebitis RLE: Venous dopplers with greater saphenous thrombophlebitis. Nothing deep. Spoke with nursing and he is okay for out of bed from my standpoint. I also confirmed this with Cardiothoracic who is okay with up and out of bed as well. PT to be notified. Continue compression and can use warm compresses as needed. He is already of VTE prophylaxis with Lovenox.     Discussed with patient, daughter, wife on the phone, CCP, nursing staff  and consulting provider.     Hopefully to rehab once medically stable- [facility] versus [facility]. TBD. Not medically ready.     Appreciate all specialists.      VTE Prophylaxis - Lovenox 30 mg SC daily   Code Status - Full code  Disposition - Anticipate discharge TBD.     DUSTY Roberts  [facility]  04/26/23  15:38 EDT

## 2023-04-26 NOTE — PROGRESS NOTES
Today's bilateral lower venous doppler preliminary results are positive for acute superficial vein thrombosis in the right leg. The preliminary results were given to Evelyn REDD RN.

## 2023-04-26 NOTE — NURSING NOTE
Patient screened positive for sepsis today in assessment. [facility] notified. CXR, sputum culture, and duplex pending per CTS. Wbc elevated, fatigued and loose hacking cough. BP borderline although orthostatic yesterday. ID consulted. Possible UTI, abx started 4/25. Will continue to provide care.

## 2023-04-26 NOTE — CONSULTS
LOS: 18 days     Chief Complaint: Leukocytosis    Interval History: ID reconsulted in the setting of leukocytosis.  Patient was seen earlier in his hospital stay in the setting of positive urine culture.    Currently patient has had increase in WBC count to 19 and has developed worsening encephalopathy.  Wife is at bedside who reports that over the last couple days he has been more confused.  Today he is afebrile and without tachycardia.  Remains on room air.  Procalcitonin 0.92.  Remains on dialysis.    Patient is reporting that he has had some dysuria over the last couple days with burning with urination.  Urine output is decreased while on hemodialysis per their report.  States he feels fatigued and is having a cough that is slightly productive.  Denies any pain at the sternal site.  States has been several days since a bowel movement.  Does report some pain at the sacral area.    Vital Signs  Temp:  [97.5 °F (36.4 °C)-98 °F (36.7 °C)] 97.9 °F (36.6 °C)  Heart Rate:  [72-81] 72  Resp:  [18-20] 18  BP: ()/(45-91) 101/45    Physical Exam:  General: In no acute distress  HEENT: Oropharynx clear, moist mucous membranes  Cardiovascular: RRR  Respiratory: Normal work of breathing  GI: Soft, NT/ND  Skin: Skin breakdown and redness over the sacrum.  Right tunneled dialysis line without purulence.  Only minor erythema at the insertion site.  Extremities: No lower extremity edema.  Access: Right chest subclavian line and peripheral IV.    Antibiotics:  Anti-Infectives (From admission, onward)    Ordered     Dose/Rate Route Frequency Start Stop    04/26/23 1016  Pharmacy to dose vancomycin        Ordering Provider: Kevin Parra DO     Does not apply Continuous PRN 04/26/23 1016 05/03/23 1015    04/25/23 1448  cefepime 1 gm IVPB in 100 mL NS (VTB)        Ordering Provider: Kevin Parra DO    1 g  over 4 Hours Intravenous Every 24 Hours 04/25/23 1700 04/29/23 1011    04/14/23 1954  ceFAZolin (ANCEF)  IVPB 1 g        Ordering Provider: Laya Osman APRN    1 g Intravenous Every 24 Hours 04/14/23 2045 04/16/23 2044           Results Review:     I reviewed the patient's new clinical results.  I reviewed the patient's new imaging results and agree with the interpretation.    Lab Results   Component Value Date    WBC 19.80 (H) 04/26/2023    HGB 9.7 (L) 04/26/2023    HCT 30.7 (L) 04/26/2023    MCV 88.0 04/26/2023     04/26/2023     Lab Results   Component Value Date    GLUCOSE 110 (H) 04/26/2023    GLUCOSE 106 (H) 04/26/2023    BUN 26 (H) 04/26/2023    BUN 27 (H) 04/26/2023    CREATININE 4.14 (H) 04/26/2023    CREATININE 4.21 (H) 04/26/2023    EGFRIFNONA 12 (L) 09/12/2021    EGFRIFAFRI  09/12/2021      Comment:      <15 Indicative of kidney failure.    BCR 6.3 (L) 04/26/2023    BCR 6.4 (L) 04/26/2023    CO2 25.0 04/26/2023    CO2 24.0 04/26/2023    CALCIUM 8.5 (L) 04/26/2023    CALCIUM 8.6 04/26/2023    PROTENTOTREF 6.6 06/23/2021    ALBUMIN 2.9 (L) 04/26/2023    LABIL2 1.8 06/23/2021    AST 48 (H) 04/26/2023    ALT 23 04/26/2023       Microbiology:  4/8 respiratory panel positive for rhinovirus  4/12 COVID-negative  4/21 respiratory culture rejected  4/21 blood cultures no growth  4/21 urine culture greater than 100,000 Pseudomonas aeruginosa  4/26 blood cultures in process    Assessment    #Leukocytosis  #Encephalopathy  #Acute UTI  #ESRD on hemodialysis via tunneled line  #Coronary artery disease status post CABG and mitral valve repair  #Recent rhinovirus infection  #Type 2 diabetes  #Dysphagia    Patient now with leukocytosis and worsening mental status.  He is voicing dysuria and recently had positive urine culture so at this point think it is worthwhile to treat.  He has grown Pseudomonas and will start cefepime 1 g daily (adjusted for hemodialysis).  Given his skin breakdown on the sacral area I will also add vancomycin intermittently dosed for hemodialysis.  Appreciate pharmacy's help with dosing  and will monitor levels.  Agree with repeat blood cultures, CT chest, CT abdomen and pelvis and sputum culture to evaluate further possible sources.    ID will follow.

## 2023-04-26 NOTE — PROGRESS NOTES
"[facility] Clinical Pharmacy Services: Vancomycin Pharmacokinetic Initial Consult Note    Lefty Cai is a 72 y.o. male who is on day 1 of pharmacy to dose vancomycin.    Indication: Sepsis  Consulting Provider: Dr. Parra  Planned Duration of Therapy: 10 days  Loading Dose Ordered: 1750 mg IV once now  MRSA PCR performed: No  Culture/Source: BCx-pending  Target: Dose by Levels  Other Antimicrobials: Cefepime 1g IV q24h    Vitals/Labs  Ht: 182.9 cm (72\"); Wt: 93.1 kg (205 lb 3.2 oz)  Temp Readings from Last 1 Encounters:   04/26/23 97.9 °F (36.6 °C) (Oral)      Estimated Creatinine Clearance: 20.9 mL/min (A) (by C-G formula based on SCr of 4.14 mg/dL (H)).    Results from last 7 days   Lab Units 04/26/23  0300 04/25/23  0325 04/24/23  0522 04/24/23  0355   CREATININE mg/dL 4.21*  4.14* 5.36*  --  4.23*   WBC 10*3/mm3 19.80* 12.67* 12.87*  --      Assessment/Plan:    Vancomycin Dose: Intermittent dosing due to dialysis status (usually MWF, but on a different schedule in the hospital)  Random goal tonight should be between 15-20 mcg/mL.  12-hr Vanc Random has been ordered for tonight 4/26 at 2100.    Pharmacy will follow dialysis schedule and will adjust doses and obtain levels as necessary. Thank you for involving pharmacy in this patient's care. Please contact pharmacy with any questions or concerns.                           Rhonda Mccray, Pharm.D., [facility]   Clinical Pharmacist   Phone Extension #[phone]  "

## 2023-04-27 PROBLEM — N39.0 ACUTE UTI (URINARY TRACT INFECTION): Status: ACTIVE | Noted: 2023-04-27

## 2023-04-27 PROBLEM — I95.1 ORTHOSTATIC HYPOTENSION: Status: ACTIVE | Noted: 2023-04-27

## 2023-04-27 LAB
ALBUMIN SERPL-MCNC: 2.7 G/DL (ref 3.5–5.2)
ALBUMIN/GLOB SERPL: 0.8 G/DL
ALP SERPL-CCNC: 109 U/L (ref 39–117)
ALT SERPL W P-5'-P-CCNC: 23 U/L (ref 1–41)
ANION GAP SERPL CALCULATED.3IONS-SCNC: 14.4 MMOL/L (ref 5–15)
AST SERPL-CCNC: 34 U/L (ref 1–40)
BILIRUB SERPL-MCNC: 0.7 MG/DL (ref 0–1.2)
BUN SERPL-MCNC: 41 MG/DL (ref 8–23)
BUN/CREAT SERPL: 7.3 (ref 7–25)
CALCIUM SPEC-SCNC: 9 MG/DL (ref 8.6–10.5)
CHLORIDE SERPL-SCNC: 102 MMOL/L (ref 98–107)
CO2 SERPL-SCNC: 20.6 MMOL/L (ref 22–29)
CREAT SERPL-MCNC: 5.59 MG/DL (ref 0.76–1.27)
DEPRECATED RDW RBC AUTO: 48.7 FL (ref 37–54)
EGFRCR SERPLBLD CKD-EPI 2021: 10.1 ML/MIN/1.73
ERYTHROCYTE [DISTWIDTH] IN BLOOD BY AUTOMATED COUNT: 15.5 % (ref 12.3–15.4)
GLOBULIN UR ELPH-MCNC: 3.4 GM/DL
GLUCOSE BLDC GLUCOMTR-MCNC: 102 MG/DL (ref 70–130)
GLUCOSE BLDC GLUCOMTR-MCNC: 113 MG/DL (ref 70–130)
GLUCOSE BLDC GLUCOMTR-MCNC: 118 MG/DL (ref 70–130)
GLUCOSE BLDC GLUCOMTR-MCNC: 144 MG/DL (ref 70–130)
GLUCOSE SERPL-MCNC: 101 MG/DL (ref 65–99)
HCT VFR BLD AUTO: 28 % (ref 37.5–51)
HCT VFR BLD AUTO: 30.1 % (ref 37.5–51)
HGB BLD-MCNC: 9.2 G/DL (ref 13–17.7)
HGB BLD-MCNC: 9.9 G/DL (ref 13–17.7)
MCH RBC QN AUTO: 28.7 PG (ref 26.6–33)
MCHC RBC AUTO-ENTMCNC: 32.9 G/DL (ref 31.5–35.7)
MCV RBC AUTO: 87.2 FL (ref 79–97)
PHOSPHATE SERPL-MCNC: 3.3 MG/DL (ref 2.5–4.5)
PLATELET # BLD AUTO: 175 10*3/MM3 (ref 140–450)
PMV BLD AUTO: 9.5 FL (ref 6–12)
POTASSIUM SERPL-SCNC: 3.8 MMOL/L (ref 3.5–5.2)
PROT SERPL-MCNC: 6.1 G/DL (ref 6–8.5)
RBC # BLD AUTO: 3.21 10*6/MM3 (ref 4.14–5.8)
SODIUM SERPL-SCNC: 137 MMOL/L (ref 136–145)
WBC NRBC COR # BLD: 16.01 10*3/MM3 (ref 3.4–10.8)

## 2023-04-27 PROCEDURE — 85027 COMPLETE CBC AUTOMATED: CPT | Performed by: NURSE PRACTITIONER

## 2023-04-27 PROCEDURE — 0 CEFEPIME PER 500 MG: Performed by: STUDENT IN AN ORGANIZED HEALTH CARE EDUCATION/TRAINING PROGRAM

## 2023-04-27 PROCEDURE — 94761 N-INVAS EAR/PLS OXIMETRY MLT: CPT

## 2023-04-27 PROCEDURE — 84100 ASSAY OF PHOSPHORUS: CPT | Performed by: INTERNAL MEDICINE

## 2023-04-27 PROCEDURE — 80053 COMPREHEN METABOLIC PANEL: CPT | Performed by: INTERNAL MEDICINE

## 2023-04-27 PROCEDURE — 99232 SBSQ HOSP IP/OBS MODERATE 35: CPT | Performed by: INTERNAL MEDICINE

## 2023-04-27 PROCEDURE — 82962 GLUCOSE BLOOD TEST: CPT

## 2023-04-27 PROCEDURE — 85014 HEMATOCRIT: CPT | Performed by: INTERNAL MEDICINE

## 2023-04-27 PROCEDURE — 94799 UNLISTED PULMONARY SVC/PX: CPT

## 2023-04-27 PROCEDURE — 94664 DEMO&/EVAL PT USE INHALER: CPT

## 2023-04-27 PROCEDURE — 25010000002 HEPARIN (PORCINE) PER 1000 UNITS: Performed by: INTERNAL MEDICINE

## 2023-04-27 PROCEDURE — 99024 POSTOP FOLLOW-UP VISIT: CPT | Performed by: NURSE PRACTITIONER

## 2023-04-27 PROCEDURE — 82948 REAGENT STRIP/BLOOD GLUCOSE: CPT

## 2023-04-27 PROCEDURE — 85018 HEMOGLOBIN: CPT | Performed by: INTERNAL MEDICINE

## 2023-04-27 PROCEDURE — 99232 SBSQ HOSP IP/OBS MODERATE 35: CPT | Performed by: STUDENT IN AN ORGANIZED HEALTH CARE EDUCATION/TRAINING PROGRAM

## 2023-04-27 PROCEDURE — 25010000002 ENOXAPARIN PER 10 MG: Performed by: NURSE PRACTITIONER

## 2023-04-27 RX ORDER — MIDODRINE HYDROCHLORIDE 5 MG/1
5 TABLET ORAL ONCE
Status: COMPLETED | OUTPATIENT
Start: 2023-04-27 | End: 2023-04-27

## 2023-04-27 RX ADMIN — CEFEPIME 2 G: 2 INJECTION, POWDER, FOR SOLUTION INTRAVENOUS at 20:02

## 2023-04-27 RX ADMIN — ANORECTAL OINTMENT 1 APPLICATION: 15.7; .44; 24; 20.6 OINTMENT TOPICAL at 15:52

## 2023-04-27 RX ADMIN — DIPHENHYDRAMINE HYDROCHLORIDE AND LIDOCAINE HYDROCHLORIDE AND ALUMINUM HYDROXIDE AND MAGNESIUM HYDRO 5 ML: KIT at 16:46

## 2023-04-27 RX ADMIN — SODIUM CHLORIDE 500 ML: 9 INJECTION, SOLUTION INTRAVENOUS at 16:48

## 2023-04-27 RX ADMIN — GUAIFENESIN 1200 MG: 600 TABLET, EXTENDED RELEASE ORAL at 20:03

## 2023-04-27 RX ADMIN — DIPHENHYDRAMINE HYDROCHLORIDE AND LIDOCAINE HYDROCHLORIDE AND ALUMINUM HYDROXIDE AND MAGNESIUM HYDRO 5 ML: KIT at 20:03

## 2023-04-27 RX ADMIN — ENOXAPARIN SODIUM 30 MG: 100 INJECTION SUBCUTANEOUS at 16:46

## 2023-04-27 RX ADMIN — GUAIFENESIN 1200 MG: 600 TABLET, EXTENDED RELEASE ORAL at 13:56

## 2023-04-27 RX ADMIN — DOCUSATE SODIUM 50MG AND SENNOSIDES 8.6MG 2 TABLET: 8.6; 5 TABLET, FILM COATED ORAL at 20:03

## 2023-04-27 RX ADMIN — MIDODRINE HYDROCHLORIDE 5 MG: 5 TABLET ORAL at 16:46

## 2023-04-27 RX ADMIN — ANORECTAL OINTMENT 1 APPLICATION: 15.7; .44; 24; 20.6 OINTMENT TOPICAL at 20:04

## 2023-04-27 RX ADMIN — IPRATROPIUM BROMIDE AND ALBUTEROL SULFATE 3 ML: 2.5; .5 SOLUTION RESPIRATORY (INHALATION) at 19:21

## 2023-04-27 RX ADMIN — PANTOPRAZOLE SODIUM 40 MG: 40 TABLET, DELAYED RELEASE ORAL at 06:30

## 2023-04-27 RX ADMIN — DIPHENHYDRAMINE HYDROCHLORIDE AND LIDOCAINE HYDROCHLORIDE AND ALUMINUM HYDROXIDE AND MAGNESIUM HYDRO 5 ML: KIT at 06:30

## 2023-04-27 RX ADMIN — IPRATROPIUM BROMIDE AND ALBUTEROL SULFATE 3 ML: 2.5; .5 SOLUTION RESPIRATORY (INHALATION) at 07:32

## 2023-04-27 RX ADMIN — SODIUM CHLORIDE 1000 ML: 9 INJECTION, SOLUTION INTRAVENOUS at 14:21

## 2023-04-27 RX ADMIN — POLYETHYLENE GLYCOL 3350 17 G: 17 POWDER, FOR SOLUTION ORAL at 16:46

## 2023-04-27 RX ADMIN — HEPARIN SODIUM 4000 UNITS: 1000 INJECTION INTRAVENOUS; SUBCUTANEOUS at 13:21

## 2023-04-27 RX ADMIN — ATORVASTATIN CALCIUM 40 MG: 20 TABLET, FILM COATED ORAL at 20:03

## 2023-04-27 RX ADMIN — ASPIRIN 81 MG: 81 TABLET, COATED ORAL at 16:44

## 2023-04-27 RX ADMIN — MIDODRINE HYDROCHLORIDE 7.5 MG: 5 TABLET ORAL at 13:57

## 2023-04-27 RX ADMIN — IPRATROPIUM BROMIDE AND ALBUTEROL SULFATE 3 ML: 2.5; .5 SOLUTION RESPIRATORY (INHALATION) at 11:36

## 2023-04-27 RX ADMIN — MIDODRINE HYDROCHLORIDE 7.5 MG: 5 TABLET ORAL at 07:20

## 2023-04-27 RX ADMIN — Medication 150 MG: at 13:57

## 2023-04-27 NOTE — PROGRESS NOTES
[facility] Progress Note      Patient Name: Lefty Cai  : 1950  MRN: [id number]  Primary Care Physician:  Daksha Ng APRN  Date of admission: 2023    Subjective     Interval History:   Follow up ESRD. Looks much better. Slept. No hallucinations.  Moved rooms.  Able to see out the window. Drank novasource with ice this am.  CT scans unrevealing.  On Cefepime and Vanc.   Review of Systems:   As noted above    Objective     Vitals:   Temp:  [91.6 °F (33.1 °C)-98.6 °F (37 °C)] 97.4 °F (36.3 °C)  Heart Rate:  [67-72] 72  Resp:  [16-18] 16  BP: (108-124)/(51-72) 114/51    Intake/Output Summary (Last 24 hours) at 2023 0902  Last data filed at 2023 0655  Gross per 24 hour   Intake 120 ml   Output 420 ml   Net -300 ml       Physical Exam:    General Appearance: alert,  no acute distress .Looks better. Able to sit up and converse. no Myoclonic jerking.   Skin: warm and dry  HEENT: oral mucosa dry. Apthous ulcer on palate. No thrush, nonicteric sclera  Neck: TDC RIJ.   Lungs: Coarse bs, no rales anteriorly. Unlabored.   Heart: RRR no s3 or rub  Abdomen: soft, nontender, not  + bs body wall edema.   Extremities: 2+ edema. Upper and lower ext edema. LUE AVF paten  t ( not using ).  Neuro: normal speech and mental status     Scheduled Meds:     aspirin, 81 mg, Oral, Daily  atorvastatin, 40 mg, Oral, Nightly  cefepime, 1 g, Intravenous, Q24H  enoxaparin, 30 mg, Subcutaneous, Q24H  epoetin bernice/bernice-epbx, 6,000 Units, Intravenous, Once per day on   First Mouthwash (Magic Mouthwash), 5 mL, Swish & Spit, Q8H  guaiFENesin, 1,200 mg, Oral, Q12H  insulin lispro, 0-9 Units, Subcutaneous, 4x Daily With Meals & Nightly  ipratropium-albuterol, 3 mL, Nebulization, Q6H While Awake - RT  iron polysaccharides, 150 mg, Oral, Daily  Menthol-Zinc Oxide, 1 application, Topical, TID  midodrine, 7.5 mg, Oral, BID AC  mupirocin, , Each Nare, BID  pantoprazole, 40 mg, Oral,  QAM  polyethylene glycol, 17 g, Oral, Daily  senna-docusate sodium, 2 tablet, Oral, BID  Vancomycin Pharmacy Intermittent/Pulse Dosing, , Does not apply, Daily      IV Meds:   Pharmacy to dose vancomycin,         Results Reviewed:   I have personally reviewed the results from the time of this admission to 4/27/2023 09:02 EDT     Results from last 7 days   Lab Units 04/27/23  0324 04/26/23  0300 04/25/23  0325   SODIUM mmol/L 137 139  138 135*   POTASSIUM mmol/L 3.8 4.0  3.9 4.0   CHLORIDE mmol/L 102 100  100 97*   CO2 mmol/L 20.6* 24.0  25.0 24.0   BUN mg/dL 41* 27*  26* 42*   CREATININE mg/dL 5.59* 4.21*  4.14* 5.36*   CALCIUM mg/dL 9.0 8.6  8.5* 8.4*   BILIRUBIN mg/dL 0.7 0.8  --    ALK PHOS U/L 109 121*  --    ALT (SGPT) U/L 23 23  --    AST (SGOT) U/L 34 48*  --    GLUCOSE mg/dL 101* 106*  110* 100*       Estimated Creatinine Clearance: 14 mL/min (A) (by C-G formula based on SCr of 5.59 mg/dL (H)).    Results from last 7 days   Lab Units 04/27/23 0324 04/25/23 0325 04/24/23  0355   PHOSPHORUS mg/dL 3.3 4.7* 3.4             Results from last 7 days   Lab Units 04/27/23  0324 04/26/23  0300 04/25/23  0325 04/24/23  0522 04/23/23  0355   WBC 10*3/mm3 16.01* 19.80* 12.67* 12.87* 12.01*   HEMOGLOBIN g/dL 9.2* 9.7* 9.5* 9.2* 10.0*   PLATELETS 10*3/mm3 175 183 172 158 159             Assessment / Plan     ASSESSMENT:  1. ESRD. HD today.   2. NSTEMI. SP 3 vessel CABG, MV repair 4/13/23.  3. PAF,  SR. Metoprolol dc.   4. Anemia CKD and blood loss. Stable.  5. TCP.  HIT panel negative.  Resolved .  6. DM2  7. Deconditioning.    8. Hypotension, acute on chronic, leukocytosis. Concern for sepsis. Urine pseudomonas. Cefepime started yesterday.    Has tunneled dialysis catheter as potential source, as well . Possible aspiration based on history . vanc added yesterday for skin and line coverage. CT's negative. WBC down. BP better with increased midodrine.   9. Encephalopathy. Meds reviewed.  Suspect related to sepsis  .  Looks better today.  Ammonia level not elevated.   PLAN:  1. Dialysis today.   2. DW wife.     Hyacinth Fiore MD  04/27/23  09:02 EDT    [facility]  [phone]

## 2023-04-27 NOTE — PROGRESS NOTES
[facility]    Continue to monitor for Current level with therapies. No OOB therapy yesterday due to medical status. Will follow up for patient progress with therapies while in house to determine/confirm most appropriate level rehab at time of discharge.  Last PT session 4/24/23, patient able to come to a stand x 4 with CGA and a rwx, ambulation deferred due to c/o dizziness and BP 88/68 standing.    Daksha Lozada RN  Rehab Admissions Coordinator  [phone]

## 2023-04-27 NOTE — PROGRESS NOTES
LOS: 19 days     Chief Complaint: Leukocytosis    Interval History: Patient appears much improved today.  Oriented and interactive on exam.  Answering questions appropriately.  States he feels much better.  Denying any urinary symptoms today.  Denies any nausea, vomiting, diarrhea.  States he is breathing normally.  Denies any joint pains or rashes.    Vital Signs  Temp:  [91.6 °F (33.1 °C)-98.6 °F (37 °C)] 97.4 °F (36.3 °C)  Heart Rate:  [67-72] 72  Resp:  [16-18] 16  BP: (108-124)/(51-72) 114/51    Physical Exam:  General: In no acute distress  HEENT: Oropharynx clear, moist mucous membranes  Respiratory: Normal work of breathing  GI: Soft, NT/ND  Skin: Sternal site clean and intact  Extremities: No edema, cyanosis  Access: Peripheral IV.    Antibiotics:  Anti-Infectives (From admission, onward)    Ordered     Dose/Rate Route Frequency Start Stop    04/26/23 1058  Vancomycin Pharmacy Intermittent/Pulse Dosing        Ordering Provider: Kevin Parra DO     Does not apply Daily 04/27/23 0900 05/07/23 0859    04/26/23 1016  Pharmacy to dose vancomycin        Ordering Provider: Kevin Parra DO     Does not apply Continuous PRN 04/26/23 1016 05/03/23 1015    04/25/23 1448  cefepime 1 gm IVPB in 100 mL NS (VTB)        Ordering Provider: Kevin Parra DO    1 g  over 4 Hours Intravenous Every 24 Hours 04/25/23 1700 04/29/23 1859    04/14/23 1954  ceFAZolin (ANCEF) IVPB 1 g        Ordering Provider: Laya Osman APRN    1 g Intravenous Every 24 Hours 04/14/23 2045 04/16/23 2044           Results Review:     I reviewed the patient's new clinical results.  I reviewed the patient's new imaging results and agree with the interpretation.    Lab Results   Component Value Date    WBC 16.01 (H) 04/27/2023    HGB 9.2 (L) 04/27/2023    HCT 28.0 (L) 04/27/2023    MCV 87.2 04/27/2023     04/27/2023     Lab Results   Component Value Date    GLUCOSE 101 (H) 04/27/2023    BUN 41 (H) 04/27/2023     CREATININE 5.59 (H) 2023    EGFRIFNONA 12 (L) 2021    EGFRIFAFRI  2021      Comment:      <15 Indicative of kidney failure.    BCR 7.3 2023    CO2 20.6 (L) 2023    CALCIUM 9.0 2023    PROTENTOTREF 6.6 2021    ALBUMIN 2.7 (L) 2023    LABIL2 1.8 2021    AST 34 2023    ALT 23 2023       Microbiology:   respiratory panel positive for rhinovirus   COVID-negative   respiratory culture rejected   blood cultures no growth   urine culture greater than 100,000 Pseudomonas aeruginosa   blood cultures in process   respiratory culture rejected    New imagin/26 CT chest abdomen pelvis report reviewed.  Bilateral pleural effusions consistent with serous fluid and associated right and left lower lobe atelectasis.  Diverticulosis without diverticulitis.  Sternotomy site with no suspicious fluid collections.  Trace pericardial fluid.    Assessment    #Leukocytosis  #Encephalopathy  #Acute UTI  #ESRD on hemodialysis via tunneled line  #Coronary artery disease status post CABG and mitral valve repair  #Recent rhinovirus infection  #Type 2 diabetes  #Dysphagia    Clinically patient appears much improved today with improvement in leukocytosis.  CT chest abdomen pelvis was largely unremarkable which is reassuring.  My suspicion lies mostly with the urine growing Pseudomonas as he was reporting symptoms yesterday that have improved.  Plan to follow-up blood cultures to assure these remain negative but will continue cefepime 2 g daily adjusted for his renal function.  We will continue vancomycin for now but if blood cultures remain negative may be able to stop this.  Lower suspicion for any involvement of the sacral area at this time.    ID will follow.

## 2023-04-27 NOTE — PROGRESS NOTES
[facility] Clinical Pharmacy Services: Vancomycin Level Monitoring Note    Lefty Cai is a 72 y.o. male who is on day 1 of pharmacy to dose vancomycin for Sepsis.    Estimated Creatinine Clearance: 20.9 mL/min (A) (by C-G formula based on SCr of 4.14 mg/dL (H)).    Current Vanc Dose: Pulse dosing with dialysis  Results from last 7 days   Lab Units 04/26/23  2118   VANCOMYCIN RM mcg/mL 25.10     Next Level Date and Time: Defer to clinical pharmacist.      Vancomycin dose scheduled for 4/26 at noon was not marked given by nursing but random vancomycin level was 25.1.  Dialysis planned for tomorrow.  Clinical pharmacist will schedule next dose after dialysis.    No changes at this time. Pharmacy is continuing to monitor and will adjust as needed.    Sabino Martines III, [facility]  Clinical Pharmacist

## 2023-04-27 NOTE — PROGRESS NOTES
LOS: 19 days   Patient Care Team:  Daksha Ng APRN as PCP - General (Family Medicine)  Rudy Faith MD as Consulting Physician (Ophthalmology)  Jose Mccall MD as Consulting Physician (Nephrology)  Quang Reyes MD as Consulting Physician (Nephrology)    Chief Complaint: post op    Subjective      Vital Signs  Temp:  [91.6 °F (33.1 °C)-98.6 °F (37 °C)] 97.4 °F (36.3 °C)  Heart Rate:  [67-72] 72  Resp:  [16-18] 16  BP: (108-124)/(51-72) 114/51  Body mass index is 28.11 kg/m².    Intake/Output Summary (Last 24 hours) at 4/27/2023 0908  Last data filed at 4/27/2023 0655  Gross per 24 hour   Intake 120 ml   Output 420 ml   Net -300 ml     No intake/output data recorded.    Chest tube drainage last 8 hour         04/25/23  0622 04/26/23  0725 04/27/23  0645   Weight: 96.4 kg (212 lb 8 oz) 93.1 kg (205 lb 3.2 oz) 94 kg (207 lb 4.8 oz)         Objective    Results Review:        WBC WBC   Date Value Ref Range Status   04/27/2023 16.01 (H) 3.40 - 10.80 10*3/mm3 Final   04/26/2023 19.80 (H) 3.40 - 10.80 10*3/mm3 Final   04/25/2023 12.67 (H) 3.40 - 10.80 10*3/mm3 Final      HGB Hemoglobin   Date Value Ref Range Status   04/27/2023 9.2 (L) 13.0 - 17.7 g/dL Final   04/26/2023 9.7 (L) 13.0 - 17.7 g/dL Final   04/25/2023 9.5 (L) 13.0 - 17.7 g/dL Final      HCT Hematocrit   Date Value Ref Range Status   04/27/2023 28.0 (L) 37.5 - 51.0 % Final   04/26/2023 30.7 (L) 37.5 - 51.0 % Final   04/25/2023 29.2 (L) 37.5 - 51.0 % Final      Platelets Platelets   Date Value Ref Range Status   04/27/2023 175 140 - 450 10*3/mm3 Final   04/26/2023 183 140 - 450 10*3/mm3 Final   04/25/2023 172 140 - 450 10*3/mm3 Final        PT/INR:  No results found for: PROTIME/No results found for: INR    Sodium Sodium   Date Value Ref Range Status   04/27/2023 137 136 - 145 mmol/L Final   04/26/2023 138 136 - 145 mmol/L Final   04/26/2023 139 136 - 145 mmol/L Final   04/25/2023 135 (L) 136 - 145 mmol/L Final      Potassium  Potassium   Date Value Ref Range Status   04/27/2023 3.8 3.5 - 5.2 mmol/L Final     Comment:     Slight hemolysis detected by analyzer. Results may be affected.   04/26/2023 3.9 3.5 - 5.2 mmol/L Final   04/26/2023 4.0 3.5 - 5.2 mmol/L Final   04/25/2023 4.0 3.5 - 5.2 mmol/L Final      Chloride Chloride   Date Value Ref Range Status   04/27/2023 102 98 - 107 mmol/L Final   04/26/2023 100 98 - 107 mmol/L Final   04/26/2023 100 98 - 107 mmol/L Final   04/25/2023 97 (L) 98 - 107 mmol/L Final      Bicarbonate CO2   Date Value Ref Range Status   04/27/2023 20.6 (L) 22.0 - 29.0 mmol/L Final   04/26/2023 25.0 22.0 - 29.0 mmol/L Final   04/26/2023 24.0 22.0 - 29.0 mmol/L Final   04/25/2023 24.0 22.0 - 29.0 mmol/L Final      BUN BUN   Date Value Ref Range Status   04/27/2023 41 (H) 8 - 23 mg/dL Final   04/26/2023 26 (H) 8 - 23 mg/dL Final   04/26/2023 27 (H) 8 - 23 mg/dL Final   04/25/2023 42 (H) 8 - 23 mg/dL Final      Creatinine Creatinine   Date Value Ref Range Status   04/27/2023 5.59 (H) 0.76 - 1.27 mg/dL Final   04/26/2023 4.14 (H) 0.76 - 1.27 mg/dL Final   04/26/2023 4.21 (H) 0.76 - 1.27 mg/dL Final   04/25/2023 5.36 (H) 0.76 - 1.27 mg/dL Final      Calcium Calcium   Date Value Ref Range Status   04/27/2023 9.0 8.6 - 10.5 mg/dL Final   04/26/2023 8.5 (L) 8.6 - 10.5 mg/dL Final   04/26/2023 8.6 8.6 - 10.5 mg/dL Final   04/25/2023 8.4 (L) 8.6 - 10.5 mg/dL Final      Magnesium No results found for: MG       aspirin, 81 mg, Oral, Daily  atorvastatin, 40 mg, Oral, Nightly  cefepime, 1 g, Intravenous, Q24H  enoxaparin, 30 mg, Subcutaneous, Q24H  epoetin bernice/bernice-epbx, 6,000 Units, Intravenous, Once per day on Mon Wed Fri  First Mouthwash (Magic Mouthwash), 5 mL, Swish & Spit, Q8H  guaiFENesin, 1,200 mg, Oral, Q12H  insulin lispro, 0-9 Units, Subcutaneous, 4x Daily With Meals & Nightly  ipratropium-albuterol, 3 mL, Nebulization, Q6H While Awake - RT  iron polysaccharides, 150 mg, Oral, Daily  Menthol-Zinc Oxide, 1  application, Topical, TID  midodrine, 7.5 mg, Oral, BID AC  mupirocin, , Each Nare, BID  pantoprazole, 40 mg, Oral, QAM  polyethylene glycol, 17 g, Oral, Daily  senna-docusate sodium, 2 tablet, Oral, BID  Vancomycin Pharmacy Intermittent/Pulse Dosing, , Does not apply, Daily      Pharmacy to dose vancomycin,             Patient Active Problem List   Diagnosis Code   • 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.22   • Essential hypertension I10   • Hyperlipidemia E78.5   • Primary insomnia F51.01   • ESRD (end stage renal disease) N18.6   • Vitamin D deficiency E55.9   • Diverticulitis of large intestine with perforation and abscess without bleeding K57.20   • Obesity (BMI 30-39.9) E66.9   • Impaired mobility and ADLs Z74.09, Z78.9   • History of colon resection Z90.49   • Colon polyps K63.5   • Diverticulosis K57.90   • CKD (chronic kidney disease) stage 5, GFR less than 15 ml/min N18.5   • NSTEMI (non-ST elevated myocardial infarction) I21.4   • Rhinovirus B34.8   • Abnormal findings on diagnostic imaging of heart and coronary circulation R93.1   • Fatigue R53.83   • Dysphagia R13.10   • Acute superficial venous thrombosis of right lower extremity I82.811       Assessment & Plan  - NSTEMI/multi-vessel CAD- s/p urgent CABGx4 LIMA/RSVG, MV repair-([place]) POD#15  - HFrEF--30-35% per echo 4/8  - rhinovirus infection  - ESRD on HD  - hypertension  - hyperlipidemia  - DM II- A1c 5.0  -post op anemia- expected acute blood loss  -Leukocytosis- probable reactive   -TCP- resolved         He looks much better this morning  Getting dialysis today  WBC improved-- continue antibiotics-- appreciate ID recommendations   Increase activity-- continue PT and OT  Continue routine care    DUSTY Spring  04/27/23  09:08 EDT

## 2023-04-27 NOTE — PROGRESS NOTES
"Nutrition Services    Patient Name:  Lefty Cai  YOB: 1950  MRN: [id number]  Admit Date:  4/8/2023    FOLLOW UP - CLINICAL NUTRITION    Assessment Date:  04/27/23    Encounter Information         Reason for Encounter Follow up    Current Issues Visited pt during lunch time today. Pt was getting dialysis during visit and stated he couldn't eat while he got dialysis. Pt did drink 100% of Novasource Renal ONS at breakfast this am. Pt only getting Novasource Renal BID at breakfast and dinner, and asking for another one now at lunch, provided pt with one. Pt has lost 18 lb (8%) x 2 weeks since admission. Took pt's food order for dinner tonight as well. Encouraged increased po intake of meals/ONS.   Recommend:  1. Change Novasource Renal to TID, will change order.     Current Nutrition Orders & Evaluation of Intake       Oral Nutrition     Current PO Diet Diet: Regular/House Diet; Texture: Regular Texture (IDDSI 7); Fluid Consistency: Thin (IDDSI 0)   Supplement Novasource Renal , BID   PO Evaluation     % PO Intake 0% x 4 meals    # of Days Evaluated 2    Factors Affecting Intake  decreased appetite, Other:has to be upright for meals and getting dialysis where he has to lay flatter and unable to eat while on HD.    --  Anthropometrics          Height    Weight Height: 182.9 cm (72\")  Weight: 94 kg (207 lb 4.8 oz) (04/27/23 0645)    BMI kg/m2 Body mass index is 28.11 kg/m².    Weight trend Loss, Amount/Timeframe: 18 lb (8%) x 2 weeks     Labs        Pertinent Labs Reviewed, listed below     Results from last 7 days   Lab Units 04/27/23  0324 04/26/23  0300 04/25/23  0325   SODIUM mmol/L 137 139  138 135*   POTASSIUM mmol/L 3.8 4.0  3.9 4.0   CHLORIDE mmol/L 102 100  100 97*   CO2 mmol/L 20.6* 24.0  25.0 24.0   BUN mg/dL 41* 27*  26* 42*   CREATININE mg/dL 5.59* 4.21*  4.14* 5.36*   CALCIUM mg/dL 9.0 8.6  8.5* 8.4*   BILIRUBIN mg/dL 0.7 0.8  --    ALK PHOS U/L 109 121*  --    ALT (SGPT) U/L 23 " 23  --    AST (SGOT) U/L 34 48*  --    GLUCOSE mg/dL 101* 106*  110* 100*     Results from last 7 days   Lab Units 04/27/23  0324   PHOSPHORUS mg/dL 3.3   HEMOGLOBIN g/dL 9.2*   HEMATOCRIT % 28.0*   WBC 10*3/mm3 16.01*   ALBUMIN g/dL 2.7*     Results from last 7 days   Lab Units 04/27/23  0324 04/26/23  0300 04/25/23  0325 04/24/23  0522 04/23/23  0355   PLATELETS 10*3/mm3 175 183 172 158 159     COVID19   Date Value Ref Range Status   04/12/2023 Not Detected Not Detected - Ref. Range Final     Lab Results   Component Value Date    HGBA1C 5.00 04/09/2023          Medications            Scheduled Medications aspirin, 81 mg, Oral, Daily  atorvastatin, 40 mg, Oral, Nightly  cefepime, 2 g, Intravenous, Q24H  enoxaparin, 30 mg, Subcutaneous, Q24H  epoetin bernice/bernice-epbx, 6,000 Units, Intravenous, Once per day on Mon Wed Fri  First Mouthwash (Magic Mouthwash), 5 mL, Swish & Spit, Q8H  guaiFENesin, 1,200 mg, Oral, Q12H  insulin lispro, 0-9 Units, Subcutaneous, 4x Daily With Meals & Nightly  ipratropium-albuterol, 3 mL, Nebulization, Q6H While Awake - RT  iron polysaccharides, 150 mg, Oral, Daily  Menthol-Zinc Oxide, 1 application, Topical, TID  midodrine, 7.5 mg, Oral, BID AC  mupirocin, , Each Nare, BID  pantoprazole, 40 mg, Oral, QAM  polyethylene glycol, 17 g, Oral, Daily  senna-docusate sodium, 2 tablet, Oral, BID  Vancomycin Pharmacy Intermittent/Pulse Dosing, , Does not apply, Daily        Infusions Pharmacy to dose vancomycin,         PRN Medications •  acetaminophen **OR** [DISCONTINUED] acetaminophen **OR** acetaminophen  •  bisacodyl  •  bisacodyl  •  dextrose  •  dextrose  •  glucagon (human recombinant)  •  heparin (porcine)  •  ipratropium-albuterol  •  ondansetron  •  Pharmacy to dose vancomycin  •  polyethylene glycol  •  traZODone     Physical Findings          Physical Appearance alert, oriented, overweight, on oxygen therapy, room air   Oral/Mouth Cavity teeth missing   Edema  lower extremity , upper  extremity, 1+ (trace), 2+ (mild)   Gastrointestinal normoactive, last bowel movement:4/24   Skin  bruising, pressure injury, surgical incision bilateral gluteal DTI   Tubes/Drains tunneled dialysis catheter   NFPE Not applicable at this time   --  NUTRITION INTERVENTION / PLAN OF CARE  Intervention Goal         Intervention Goal(s) Maintain nutrition status, Reduce/improve symptoms, Disease management/therapy, Tolerate PO , Increase intake and No significant weight loss     Nutrition Intervention         RD Action Supplement provided, Follow Tx Progress and Care plan reviewed     Nutrition Prescription         Diet Prescription     Supplement Prescription Novasource Renal , TID changed from BID   EN/PN Prescription    New Prescription Ordered? Yes   --  Monitor/Evaluation        Monitor Per protocol, PO intake, Supplement intake, Pertinent labs, Weight, Skin status, Symptoms   Discharge Needs Pending clinical course   Education Will instruct as appropriate   --    RD to follow up per protocol.    Electronically signed by:  Regina Katz RD  04/27/23 13:14 EDT

## 2023-04-27 NOTE — PROGRESS NOTES
"Lefty Cai  1950 72 y.o.  [id number]      Patient Care Team:  Daksha Ng APRN as PCP - General (Family Medicine)  Rudy Faith MD as Consulting Physician (Ophthalmology)  Jose Mccall MD as Consulting Physician (Nephrology)  Quang Reyes MD as Consulting Physician (Nephrology)    CC: Non-STEMI,, end-stage renal failure, EF 30 to 35%, severe three-vessel coronary disease, severe MR, underwent three-vessel CABG and mitral valve repair, postop A-fib    Interval History: He is much improved today.  Much brighter awake oriented just looks better  Objective   Vital Signs  Temp:  [91.6 °F (33.1 °C)-98.6 °F (37 °C)] 97.4 °F (36.3 °C)  Heart Rate:  [67-72] 72  Resp:  [16-18] 16  BP: (108-124)/(51-72) 114/51    Intake/Output Summary (Last 24 hours) at 4/27/2023 1022  Last data filed at 4/27/2023 0655  Gross per 24 hour   Intake 120 ml   Output 420 ml   Net -300 ml     Flowsheet Rows    Flowsheet Row First Filed Value   Admission Height 177.8 cm (70\") Documented at 04/08/2023 1028   Admission Weight 102 kg (225 lb) Documented at 04/08/2023 1028          Physical Exam:   General Appearance:    Alert,oriented, in no acute distress   Lungs:     Clear to auscultation,BS are equal    Heart:    Normal S1 and S2, RRR without murmur, gallop or rub   HEENT:    Sclerae are clear, no JVD or adenopathy   Abdomen:     Normal bowel sounds, soft nontender, nondistended, no HSM   Extremities:   Moves all extremities well, no edema, no cyanosis, no             Redness, no rash     Medication Review:      aspirin, 81 mg, Oral, Daily  atorvastatin, 40 mg, Oral, Nightly  cefepime, 1 g, Intravenous, Q24H  enoxaparin, 30 mg, Subcutaneous, Q24H  epoetin bernice/bernice-epbx, 6,000 Units, Intravenous, Once per day on Mon Wed Fri  First Mouthwash (Magic Mouthwash), 5 mL, Swish & Spit, Q8H  guaiFENesin, 1,200 mg, Oral, Q12H  insulin lispro, 0-9 Units, Subcutaneous, 4x Daily With Meals & Nightly  ipratropium-albuterol, " 3 mL, Nebulization, Q6H While Awake - RT  iron polysaccharides, 150 mg, Oral, Daily  Menthol-Zinc Oxide, 1 application, Topical, TID  midodrine, 7.5 mg, Oral, BID AC  mupirocin, , Each Nare, BID  pantoprazole, 40 mg, Oral, QAM  polyethylene glycol, 17 g, Oral, Daily  senna-docusate sodium, 2 tablet, Oral, BID  Vancomycin Pharmacy Intermittent/Pulse Dosing, , Does not apply, Daily      Pharmacy to dose vancomycin,           I reviewed the patient's new clinical results.  I personally viewed and interpreted the patient's EKG/Telemetry data    Assessment/Plan  Active Hospital Problems    Diagnosis  POA   • **NSTEMI (non-ST elevated myocardial infarction) [I21.4]  Yes   • Acute superficial venous thrombosis of right lower extremity [I82.811]  Clinically Undetermined   • Dysphagia [R13.10]  Unknown   • Fatigue [R53.83]  Unknown   • Rhinovirus [B34.8]  Yes   • Abnormal findings on diagnostic imaging of heart and coronary circulation [R93.1]  Yes   • Type 2 diabetes mellitus with diabetic chronic kidney disease [E11.22]  Yes   • Essential hypertension [I10]  Yes   • Hyperlipidemia [E78.5]  Yes   • ESRD (end stage renal disease) [N18.6]  Yes      Resolved Hospital Problems    Diagnosis Date Resolved POA   • Hypernatremia [E87.0] 04/19/2023 Yes   • Dyspnea on exertion [R06.09] 04/24/2023 Yes     Ischemic cardiomyopathy severe mitral insufficiency status post CABG and mitral valve repair on dialysis his blood pressure is pretty soft.  He has Pseudomonas in his urine his white count was elevated I think he was probably about to get septic and got treated with antibiotics yesterday looks much improved today his white count improved we just need to work on getting his activity level higher and then he could be discharged    Lobito Garcia MD  04/27/23  10:22 EDT

## 2023-04-27 NOTE — PROGRESS NOTES
"    Name: Lefty Cai ADMIT: 2023   : 1950  PCP: AnithaDaksha srinivasan, DUSTY    MRN: [id number] LOS: 19 days   AGE/SEX: 72 y.o. male  ROOM: 4/     Subjective   Subjective   Resting in bed. Wife and two other family members as well as nurse at bedside. Notes from this AM indicate patient doing much better today. However, at the time of this visit, patient reports a lot of frustration/discontent as his BP bottomed out while on HD. Apparently as low at 60s systolic at one time from what I am told. HD nurse received order for 1 L bolus from Dr. Fiore but this was apparently after treatment was completed and he was de-accessed. Floor nurse administered the bolus. His BP is better now in the 100s systolic but he states, \"Ya'll are trying to kill me.\" He denies any current nausea, vomiting. Still not eating much but today has been difficult to d/t HD treatment. He hasn't been up either d/t dialysis. He denies any chest pain or dyspnea. Wife states he was having some cramping in his legs overnight and states his Jobst stockings were left in place all night.      Objective   Objective   Vital Signs  Temp:  [91.6 °F (33.1 °C)-98.6 °F (37 °C)] 97.4 °F (36.3 °C)  Heart Rate:  [67-94] 88  Resp:  [16-18] 18  BP: ()/(42-72) 103/54  SpO2:  [96 %-98 %] 98 %  on   ;   Device (Oxygen Therapy): nasal cannula  Body mass index is 28.11 kg/m².     Physical Exam  Vitals and nursing note reviewed.   Constitutional:       Appearance: He is chronically ill-appearing. He is not toxic-appearing.   Cardiovascular:      Rate and Rhythm: Normal rate. Regular rhythm.      Pulses: Normal pulses.   Pulmonary:      Effort: Pulmonary effort is normal. No respiratory distress.      Comments: Clear to auscultation.  No rhonchi noted today   Is on RA. Poor inspiratory effort  Abdominal:      General: Bowel sounds are normal. There is no distension.      Palpations: Abdomen is soft.      Tenderness: There is no abdominal tenderness. "   Musculoskeletal:         General: No deformity. Normal range of motion.   Skin:     General: Skin is warm and dry. Legs with Jobst stockings on.     Findings: No bruising.      Comments: Midline sternal incision C/D/I  Neurological:      Mental Status: He is alert and oriented to person, place, and time.      Motor: generalized Weakness present.      Coordination: Coordination normal.   Psychiatric:         Mood and Affect: Mood is irritable/agitated.        Behavior: Behavior normal.     Results Review:       I reviewed the patient's new clinical results.  Results from last 7 days   Lab Units 04/27/23 0324 04/26/23 0300 04/25/23 0325 04/24/23  0522   WBC 10*3/mm3 16.01* 19.80* 12.67* 12.87*   HEMOGLOBIN g/dL 9.2* 9.7* 9.5* 9.2*   PLATELETS 10*3/mm3 175 183 172 158     Results from last 7 days   Lab Units 04/27/23 0324 04/26/23 0300 04/25/23 0325 04/24/23  0355   SODIUM mmol/L 137 139  138 135* 131*   POTASSIUM mmol/L 3.8 4.0  3.9 4.0 4.2   CHLORIDE mmol/L 102 100  100 97* 93*   CO2 mmol/L 20.6* 24.0  25.0 24.0 23.7   BUN mg/dL 41* 27*  26* 42* 29*   CREATININE mg/dL 5.59* 4.21*  4.14* 5.36* 4.23*   GLUCOSE mg/dL 101* 106*  110* 100* 91   Estimated Creatinine Clearance: 14 mL/min (A) (by C-G formula based on SCr of 5.59 mg/dL (H)).  Results from last 7 days   Lab Units 04/27/23 0324 04/26/23 0300 04/25/23 0325 04/24/23  0355   ALBUMIN g/dL 2.7* 2.9* 2.9* 2.5*   BILIRUBIN mg/dL 0.7 0.8  --   --    ALK PHOS U/L 109 121*  --   --    AST (SGOT) U/L 34 48*  --   --    ALT (SGPT) U/L 23 23  --   --      Results from last 7 days   Lab Units 04/27/23 0324 04/26/23 0300 04/25/23  0325 04/24/23  0355 04/23/23  0355   CALCIUM mg/dL 9.0 8.6  8.5* 8.4* 8.3* 8.8   ALBUMIN g/dL 2.7* 2.9* 2.9* 2.5* 2.6*   PHOSPHORUS mg/dL 3.3  --  4.7* 3.4 4.6*     Results from last 7 days   Lab Units 04/26/23  0300   PROCALCITONIN ng/mL 0.92*     Glucose   Date/Time Value Ref Range Status   04/27/2023 1113 102 70 - 130 mg/dL  Final     Comment:     Meter: [id number] : [id number] Cristian Kang [place]QU[place]   04/27/2023 0625 113 70 - 130 mg/dL Final     Comment:     Meter: [id number] : [id number] Samantha Pino [place]   04/26/2023 2017 129 70 - 130 mg/dL Final     Comment:     Meter: [id number] : [id number] Edd Isabel NA   04/26/2023 1630 120 70 - 130 mg/dL Final     Comment:     Meter: [id number] : [id number] Serna Beth NA   04/26/2023 0552 118 70 - 130 mg/dL Final     Comment:     Meter: [id number] : [id number] Samantha Pino [place]   04/25/2023 2109 123 70 - 130 mg/dL Final     Comment:     Meter: [id number] : [id number] Oral Jacobson NA   04/25/2023 1606 128 70 - 130 mg/dL Final     Comment:     Meter: [id number] : [id number] [facility] NA       aspirin, 81 mg, Oral, Daily  atorvastatin, 40 mg, Oral, Nightly  cefepime, 2 g, Intravenous, Q24H  enoxaparin, 30 mg, Subcutaneous, Q24H  epoetin bernice/bernice-epbx, 6,000 Units, Intravenous, Once per day on Mon Wed Fri  First Mouthwash (Magic Mouthwash), 5 mL, Swish & Spit, Q8H  guaiFENesin, 1,200 mg, Oral, Q12H  insulin lispro, 0-9 Units, Subcutaneous, 4x Daily With Meals & Nightly  ipratropium-albuterol, 3 mL, Nebulization, Q6H While Awake - RT  iron polysaccharides, 150 mg, Oral, Daily  Menthol-Zinc Oxide, 1 application, Topical, TID  midodrine, 7.5 mg, Oral, BID AC  pantoprazole, 40 mg, Oral, QAM  polyethylene glycol, 17 g, Oral, Daily  senna-docusate sodium, 2 tablet, Oral, BID  Vancomycin Pharmacy Intermittent/Pulse Dosing, , Does not apply, Daily      Pharmacy to dose vancomycin,     Diet: Regular/House Diet; Texture: Regular Texture (IDDSI 7); Fluid Consistency: Thin (IDDSI 0)       Assessment/Plan     Active Hospital Problems    Diagnosis  POA   • **NSTEMI (non-ST elevated myocardial infarction) [I21.4]  Yes   • Orthostatic hypotension [I95.1]  Unknown   • Acute UTI (urinary tract infection) [N39.0]  Unknown   • Acute superficial venous thrombosis of right lower  extremity [I82.811]  Clinically Undetermined   • Dysphagia [R13.10]  Unknown   • Fatigue [R53.83]  Unknown   • Rhinovirus [B34.8]  Yes   • Abnormal findings on diagnostic imaging of heart and coronary circulation [R93.1]  Yes   • Type 2 diabetes mellitus with diabetic chronic kidney disease [E11.22]  Yes   • Essential hypertension [I10]  Yes   • Hyperlipidemia [E78.5]  Yes   • ESRD (end stage renal disease) [N18.6]  Yes      Resolved Hospital Problems    Diagnosis Date Resolved POA   • Hypernatremia [E87.0] 04/19/2023 Yes   • Dyspnea on exertion [R06.09] 04/24/2023 Yes     Mr. Cai is a 72 year old male who presented to the hospital with complaints of increased dyspnea in the setting of ESRD with HD compliance. He was found to have new T wave inversions with troponin elevation and taken for heart cath that revealed severe three vessel CAD with occluded right coronary system. Cardiothoracic surgery was consulted for CABG and RVP was obtained showing positive for rhinovirus. ID was consulted given need for OR who cleared for surgery without antibiotics. Timing was per surgeon preference. He was taken for open heart on 4/13. He had hypotension and atrial fibrillation with RVR following surgery. Moved out of CICU on 4/20.     • NSTEMI/ PAF : S/P CABG x4 with MV repair on 4/13. Cardiology and Cardiothoracic surgery managing. Echo 4/8 with EF 30-35%. On amiodarone and metoprolol with conversion of atrial fibrillation to NSR. Then had recurrence of afib RVR 4/20 so beta blocker increased and digoxin added instead. Still with orthostatic hypotension so Nephrology/Cards planning removal of BB. Compression stockings ordered- keep on during the day and remove at night time. On ASA/statin. Now on CVU. Cardiology is following as is cardiothoracic surgery.  We will leave timing of full anticoagulation up to cardiology and cardiothoracic surgery     • Rhinovirus: Supportive care. ID saw and cleared for OR. Pulmonology followed  (signed off 4/20). He is on Duonebs/Mucomyst. Remains on RA.     • ESRD: Via TDC. Nephrology managing. Doppler on left arm AV fistula marginal flow and inadequate size. Had hypotension during/after treatment today- patient very upset because he feels this should have been taken care of prior to his BP dropping so low. He is getting 1 L bolus now and BP improved.      • DM2: Sugars very stable. Not requiring correctional insulin.  Continue to encourage oral intake     • HTN: BP soft/stable with fluids as above. BB removed 4/25. Continues on midodrine.      • Leukocytosis/acute UTI: Up to 18K on 4/21. CXR negative. UA showed > 100 K Pseudomonas in ESRD patient. Respiratory culture with normal respiratory nikunj. Remains afebrile. ID consulted 4/23 and felt UA represented asymptomatic bacteruria and they did not recommend treatment at that time. Developed confusion/dysuria on 4/25- Cefepime started. Had worsening WBC/confusion/low BP so ID re-consulted 4/26. Vancomycin added. CT A/P/chest without obvious source of infection. Felt to have true UTI.     • ABL on chronic anemia CKD: Looks like he trends mid 10 -11 range. Currently 9.2. Monitor.       • Constipation:  Resolved with lactulose/Senokot. Last BM 3 days ago. He hasn't been eating much either. If no BM by tomorrow, consider increasing bowel regimen.     • Coccyx buttock skin breakdown: Encouraged turn every 2 hours, waffle cushion, pressure reduction measures, etc.      • Fatigue: Suspect multifactorial d/t chronic comorbidities, prolonged hospital stay, open heart surgery. I highly suspect underlying PATY as well. Would benefit from outpatient sleep study. Trazodone changed to PRN. BB stopped 4/25- may also help with drowsiness. TSH/vitamin B12/folate all okay.     • Dysphagia: ST re-evaluated.      • Acute superficial thrombophlebitis RLE: Venous dopplers with greater saphenous thrombophlebitis. Nothing deep. Continue compression and can use warm compresses as  needed. He is already of VTE prophylaxis with Lovenox.     Discussed with patient, family at bedside and nursing staff.     Hopefully to rehab once medically stable- [facility] versus [facility]. TBD. Not medically ready.     Appreciate all specialists.      VTE Prophylaxis - Lovenox 30 mg SC daily   Code Status - Full code  Disposition - Anticipate discharge TBD.        DUSTY Roberts  [facility]  04/27/23  15:49 EDT

## 2023-04-27 NOTE — NURSING NOTE
HD complete, 1.2 liters removed. Post rinse back BP 92/54 HR 88 T 97.8. Post treatment patients BP dropped to 74/43. Orders received per Dr. Fiore to give a liter of NS.

## 2023-04-27 NOTE — SIGNIFICANT NOTE
04/27/23 1457   OTHER   Discipline physical therapist   Rehab Time/Intention   Session Not Performed other (see comments)  (pt on HD this morning, upon return this afternoon, pt just finished dialysis but has low BP. discussed with PRATIK Woodruff, will hold PT treatment today due to low BP and f/u tomorrow)   Recommendation   PT - Next Appointment 04/28/23

## 2023-04-27 NOTE — PROGRESS NOTES
[facility] Clinical Pharmacy Services: Vancomycin Level Monitoring Note    Lefty Cai is a 72 y.o. male who is on day 2 of pharmacy to dose vancomycin for Sepsis.    Estimated Creatinine Clearance: 14 mL/min (A) (by C-G formula based on SCr of 5.59 mg/dL (H)).    Current Vanc Dose: Pulse dosing with dialysis  Results from last 7 days   Lab Units 04/26/23  2118   VANCOMYCIN RM mcg/mL 25.10     Next Level Date and Time: HD today. No dose planned today. Will obtain random level tomorrow. ID plans noted.     Issa Quesada [facility]  Clinical Pharmacist

## 2023-04-28 LAB
ALBUMIN SERPL-MCNC: 2.5 G/DL (ref 3.5–5.2)
ALBUMIN SERPL-MCNC: 2.6 G/DL (ref 3.5–5.2)
ALBUMIN/GLOB SERPL: 0.9 G/DL
ALP SERPL-CCNC: 102 U/L (ref 39–117)
ALT SERPL W P-5'-P-CCNC: 21 U/L (ref 1–41)
ANION GAP SERPL CALCULATED.3IONS-SCNC: 12 MMOL/L (ref 5–15)
ANION GAP SERPL CALCULATED.3IONS-SCNC: 17.7 MMOL/L (ref 5–15)
AST SERPL-CCNC: 38 U/L (ref 1–40)
BILIRUB SERPL-MCNC: 0.5 MG/DL (ref 0–1.2)
BUN SERPL-MCNC: 24 MG/DL (ref 8–23)
BUN SERPL-MCNC: 25 MG/DL (ref 8–23)
BUN/CREAT SERPL: 6.1 (ref 7–25)
BUN/CREAT SERPL: 7.4 (ref 7–25)
CALCIUM SPEC-SCNC: 8 MG/DL (ref 8.6–10.5)
CALCIUM SPEC-SCNC: 8.4 MG/DL (ref 8.6–10.5)
CHLORIDE SERPL-SCNC: 103 MMOL/L (ref 98–107)
CHLORIDE SERPL-SCNC: 104 MMOL/L (ref 98–107)
CO2 SERPL-SCNC: 17.3 MMOL/L (ref 22–29)
CO2 SERPL-SCNC: 22 MMOL/L (ref 22–29)
CREAT SERPL-MCNC: 3.38 MG/DL (ref 0.76–1.27)
CREAT SERPL-MCNC: 3.93 MG/DL (ref 0.76–1.27)
DEPRECATED RDW RBC AUTO: 49.3 FL (ref 37–54)
EGFRCR SERPLBLD CKD-EPI 2021: 15.4 ML/MIN/1.73
EGFRCR SERPLBLD CKD-EPI 2021: 18.4 ML/MIN/1.73
ERYTHROCYTE [DISTWIDTH] IN BLOOD BY AUTOMATED COUNT: 15.7 % (ref 12.3–15.4)
GLOBULIN UR ELPH-MCNC: 2.7 GM/DL
GLUCOSE BLDC GLUCOMTR-MCNC: 100 MG/DL (ref 70–130)
GLUCOSE BLDC GLUCOMTR-MCNC: 120 MG/DL (ref 70–130)
GLUCOSE BLDC GLUCOMTR-MCNC: 129 MG/DL (ref 70–130)
GLUCOSE BLDC GLUCOMTR-MCNC: 132 MG/DL (ref 70–130)
GLUCOSE SERPL-MCNC: 97 MG/DL (ref 65–99)
GLUCOSE SERPL-MCNC: 98 MG/DL (ref 65–99)
HCT VFR BLD AUTO: 26.4 % (ref 37.5–51)
HGB BLD-MCNC: 8.7 G/DL (ref 13–17.7)
MCH RBC QN AUTO: 28.9 PG (ref 26.6–33)
MCHC RBC AUTO-ENTMCNC: 33 G/DL (ref 31.5–35.7)
MCV RBC AUTO: 87.7 FL (ref 79–97)
PHOSPHATE SERPL-MCNC: 1.7 MG/DL (ref 2.5–4.5)
PLATELET # BLD AUTO: 191 10*3/MM3 (ref 140–450)
PMV BLD AUTO: 9.6 FL (ref 6–12)
POTASSIUM SERPL-SCNC: 4.2 MMOL/L (ref 3.5–5.2)
POTASSIUM SERPL-SCNC: 4.3 MMOL/L (ref 3.5–5.2)
PROT SERPL-MCNC: 5.2 G/DL (ref 6–8.5)
RBC # BLD AUTO: 3.01 10*6/MM3 (ref 4.14–5.8)
SODIUM SERPL-SCNC: 138 MMOL/L (ref 136–145)
SODIUM SERPL-SCNC: 138 MMOL/L (ref 136–145)
VANCOMYCIN SERPL-MCNC: 14.3 MCG/ML (ref 5–40)
WBC NRBC COR # BLD: 11.66 10*3/MM3 (ref 3.4–10.8)

## 2023-04-28 PROCEDURE — 85027 COMPLETE CBC AUTOMATED: CPT | Performed by: NURSE PRACTITIONER

## 2023-04-28 PROCEDURE — 99232 SBSQ HOSP IP/OBS MODERATE 35: CPT | Performed by: INTERNAL MEDICINE

## 2023-04-28 PROCEDURE — 97530 THERAPEUTIC ACTIVITIES: CPT

## 2023-04-28 PROCEDURE — 84100 ASSAY OF PHOSPHORUS: CPT | Performed by: INTERNAL MEDICINE

## 2023-04-28 PROCEDURE — 80053 COMPREHEN METABOLIC PANEL: CPT | Performed by: INTERNAL MEDICINE

## 2023-04-28 PROCEDURE — 94799 UNLISTED PULMONARY SVC/PX: CPT

## 2023-04-28 PROCEDURE — 99024 POSTOP FOLLOW-UP VISIT: CPT | Performed by: NURSE PRACTITIONER

## 2023-04-28 PROCEDURE — 80202 ASSAY OF VANCOMYCIN: CPT | Performed by: INTERNAL MEDICINE

## 2023-04-28 PROCEDURE — 99232 SBSQ HOSP IP/OBS MODERATE 35: CPT | Performed by: STUDENT IN AN ORGANIZED HEALTH CARE EDUCATION/TRAINING PROGRAM

## 2023-04-28 PROCEDURE — 25010000002 ENOXAPARIN PER 10 MG: Performed by: NURSE PRACTITIONER

## 2023-04-28 PROCEDURE — 94664 DEMO&/EVAL PT USE INHALER: CPT

## 2023-04-28 PROCEDURE — 97535 SELF CARE MNGMENT TRAINING: CPT

## 2023-04-28 PROCEDURE — 82948 REAGENT STRIP/BLOOD GLUCOSE: CPT

## 2023-04-28 PROCEDURE — 25010000002 EPOETIN ALFA-EPBX 3000 UNIT/ML SOLUTION: Performed by: INTERNAL MEDICINE

## 2023-04-28 PROCEDURE — 94761 N-INVAS EAR/PLS OXIMETRY MLT: CPT

## 2023-04-28 RX ORDER — LEVOFLOXACIN 750 MG/1
750 TABLET ORAL ONCE
Status: COMPLETED | OUTPATIENT
Start: 2023-04-28 | End: 2023-04-28

## 2023-04-28 RX ORDER — MANNITOL 250 MG/ML
12.5 INJECTION, SOLUTION INTRAVENOUS AS NEEDED
OUTPATIENT
Start: 2023-04-29 | End: 2023-04-29

## 2023-04-28 RX ORDER — LEVOFLOXACIN 250 MG/1
250 TABLET ORAL EVERY 24 HOURS
Status: DISCONTINUED | OUTPATIENT
Start: 2023-04-29 | End: 2023-05-02 | Stop reason: HOSPADM

## 2023-04-28 RX ADMIN — MIDODRINE HYDROCHLORIDE 7.5 MG: 5 TABLET ORAL at 16:59

## 2023-04-28 RX ADMIN — ANORECTAL OINTMENT 1 APPLICATION: 15.7; .44; 24; 20.6 OINTMENT TOPICAL at 16:59

## 2023-04-28 RX ADMIN — Medication 150 MG: at 09:07

## 2023-04-28 RX ADMIN — DIPHENHYDRAMINE HYDROCHLORIDE AND LIDOCAINE HYDROCHLORIDE AND ALUMINUM HYDROXIDE AND MAGNESIUM HYDRO 5 ML: KIT at 06:33

## 2023-04-28 RX ADMIN — DIBASIC SODIUM PHOSPHATE, MONOBASIC POTASSIUM PHOSPHATE AND MONOBASIC SODIUM PHOSPHATE 2 TABLET: 852; 155; 130 TABLET ORAL at 10:54

## 2023-04-28 RX ADMIN — ENOXAPARIN SODIUM 30 MG: 100 INJECTION SUBCUTANEOUS at 14:31

## 2023-04-28 RX ADMIN — GUAIFENESIN 1200 MG: 600 TABLET, EXTENDED RELEASE ORAL at 09:07

## 2023-04-28 RX ADMIN — LEVOFLOXACIN 750 MG: 750 TABLET, FILM COATED ORAL at 10:54

## 2023-04-28 RX ADMIN — DIPHENHYDRAMINE HYDROCHLORIDE AND LIDOCAINE HYDROCHLORIDE AND ALUMINUM HYDROXIDE AND MAGNESIUM HYDRO 5 ML: KIT at 14:31

## 2023-04-28 RX ADMIN — IPRATROPIUM BROMIDE AND ALBUTEROL SULFATE 3 ML: 2.5; .5 SOLUTION RESPIRATORY (INHALATION) at 09:33

## 2023-04-28 RX ADMIN — ANORECTAL OINTMENT 1 APPLICATION: 15.7; .44; 24; 20.6 OINTMENT TOPICAL at 09:07

## 2023-04-28 RX ADMIN — TRAZODONE HYDROCHLORIDE 50 MG: 50 TABLET ORAL at 22:29

## 2023-04-28 RX ADMIN — GUAIFENESIN 1200 MG: 600 TABLET, EXTENDED RELEASE ORAL at 21:43

## 2023-04-28 RX ADMIN — ATORVASTATIN CALCIUM 40 MG: 20 TABLET, FILM COATED ORAL at 21:43

## 2023-04-28 RX ADMIN — ASPIRIN 81 MG: 81 TABLET, COATED ORAL at 09:07

## 2023-04-28 RX ADMIN — EPOETIN ALFA-EPBX 6000 UNITS: 3000 INJECTION, SOLUTION INTRAVENOUS; SUBCUTANEOUS at 09:07

## 2023-04-28 RX ADMIN — SODIUM CHLORIDE 500 ML: 9 INJECTION, SOLUTION INTRAVENOUS at 00:40

## 2023-04-28 RX ADMIN — IPRATROPIUM BROMIDE AND ALBUTEROL SULFATE 3 ML: 2.5; .5 SOLUTION RESPIRATORY (INHALATION) at 19:36

## 2023-04-28 RX ADMIN — DOCUSATE SODIUM 50MG AND SENNOSIDES 8.6MG 2 TABLET: 8.6; 5 TABLET, FILM COATED ORAL at 09:07

## 2023-04-28 RX ADMIN — ACETAMINOPHEN 500 MG: 500 TABLET, FILM COATED ORAL at 09:06

## 2023-04-28 RX ADMIN — MIDODRINE HYDROCHLORIDE 7.5 MG: 5 TABLET ORAL at 06:33

## 2023-04-28 RX ADMIN — PANTOPRAZOLE SODIUM 40 MG: 40 TABLET, DELAYED RELEASE ORAL at 06:33

## 2023-04-28 RX ADMIN — IPRATROPIUM BROMIDE AND ALBUTEROL SULFATE 3 ML: 2.5; .5 SOLUTION RESPIRATORY (INHALATION) at 13:31

## 2023-04-28 RX ADMIN — MIDODRINE HYDROCHLORIDE 7.5 MG: 5 TABLET ORAL at 10:54

## 2023-04-28 NOTE — CASE MANAGEMENT/SOCIAL WORK
Continued Stay Note  [facility]     Patient Name: Lefty Cai  MRN: [id number]  Today's Date: 4/28/2023    Admit Date: 4/8/2023    Plan: BAR is the plan, Pt will need precert.  See note below.   Discharge Plan     Row Name 04/28/23 1137       Plan    Plan BAR is the plan, Pt will need precert.  See note below.    Plan Comments Pt has just today, been able to participate with therapy with controlled blood pressure.  [facility] is following and will discuss Pt new therapy progress with Dr. Williamson to see if Pt appropriate for admission and precert to be started.  CCP following...........[facility]/PRATIK CM               Discharge Codes    No documentation.               Expected Discharge Date and Time     Expected Discharge Date Expected Discharge Time    Apr 25, 2023             Irene Lawrence RN

## 2023-04-28 NOTE — THERAPY TREATMENT NOTE
Patient Name: Lefty Cai  : 1950    MRN: [id number]                              Today's Date: 2023       Admit Date: 2023    Visit Dx:     ICD-10-CM ICD-9-CM   1. Dyspnea on exertion  R06.09 786.09   2. NSTEMI (non-ST elevated myocardial infarction)  I21.4 410.70   3. ESRD (end stage renal disease)  N18.6 585.6   4. Former smoker  Z87.891 V15.82   5. Elevated blood pressure reading in office with diagnosis of hypertension  I10 401.9   6. Rhinovirus infection  B34.8 079.3   7. Abnormal findings on diagnostic imaging of heart and coronary circulation  R93.1 794.39   8. S/P CABG (coronary artery bypass graft)  Z95.1 V45.81   9. Orthostasis  I95.1 458.0     Patient Active Problem List   Diagnosis   • Type 2 diabetes mellitus with diabetic chronic kidney disease   • Essential hypertension   • Hyperlipidemia   • Primary insomnia   • ESRD (end stage renal disease)   • Vitamin D deficiency   • Diverticulitis of large intestine with perforation and abscess without bleeding   • Obesity (BMI 30-39.9)   • Impaired mobility and ADLs   • History of colon resection   • Colon polyps   • Diverticulosis   • CKD (chronic kidney disease) stage 5, GFR less than 15 ml/min   • NSTEMI (non-ST elevated myocardial infarction)   • Rhinovirus   • Abnormal findings on diagnostic imaging of heart and coronary circulation   • Fatigue   • Dysphagia   • Acute superficial venous thrombosis of right lower extremity   • Orthostatic hypotension   • Acute UTI (urinary tract infection)     Past Medical History:   Diagnosis Date   • Anemia    • Anesthesia complication     HYPOTENSION   • Arthritis    • Cancer of kidney, left    • CKD (chronic kidney disease)     STAGE 5   • Diabetes mellitus, type 2    • Fatigue    • GERD (gastroesophageal reflux disease)    • H/O renal cell carcinoma 2007    partial nephrectomy   • History of colostomy reversal 2022   • [place] (hard of hearing)     NO DEVICE   • Hyperlipidemia    • Hypertension     • Primary insomnia 06/13/2016   • Proteinuria    • Risk factors for obstructive sleep apnea    • Sinus drainage    • Vitamin D deficiency 08/14/2017     Past Surgical History:   Procedure Laterality Date   • ARTERIOVENOUS FISTULA/SHUNT SURGERY Left 08/15/2019    Procedure: LEFT MID FOREARM RADIAL CEPHALIC ARTERIAL VENOUS FISTULA;  Surgeon: Louann Miles Jr., MD;  Location: [facility] MAIN OR;  Service: Vascular   • CARDIAC CATHETERIZATION N/A 4/9/2023    Procedure: Left Heart Cath;  Surgeon: Lobito Garcia MD;  Location: [facility] CATH INVASIVE LOCATION;  Service: Cardiovascular;  Laterality: N/A;   • CARDIAC CATHETERIZATION N/A 4/9/2023    Procedure: Left ventriculography;  Surgeon: Lobito Garcia MD;  Location: [facility] CATH INVASIVE LOCATION;  Service: Cardiovascular;  Laterality: N/A;   • CARDIAC CATHETERIZATION N/A 4/9/2023    Procedure: Coronary angiography;  Surgeon: Lobito Garcia MD;  Location: [facility] CATH INVASIVE LOCATION;  Service: Cardiovascular;  Laterality: N/A;   • COLONOSCOPY  03/24/2022   • COLONOSCOPY N/A 03/24/2022    Procedure: COLONOSCOPY TO CECUM WITH POLYPECTOMY  (HOT SNARE);  Surgeon: Yelena Guerra MD;  Location: [facility] ENDOSCOPY;  Service: General;  Laterality: N/A;  PREOP/ HX OF DIVERTICULITIS, COLOSTOMY  POSTOP/ POLYPS, DIVERTICULOSIS    • COLOSTOMY  09/06/2021   • COLOSTOMY CLOSURE N/A 04/12/2022    Procedure: open Colostomy reversal;  Surgeon: Yelena Guerra MD;  Location: [facility] MAIN OR;  Service: General;  Laterality: N/A;   • CORONARY ARTERY BYPASS GRAFT N/A 4/13/2023    Procedure: MAT STERNOTOMY CORONARY ARTERY BYPASS GRAFT TIMES 4 USING LEFT INTERNAL MAMMARY ARTERY AND RIGHT GREATER SAPHENOUS VEIN  PER ENDOSCOPIC VEIN HARVESTING, MITRAL VALVE REPAIR  AND PRP;  Surgeon: Mirtha Zuluaga MD;  Location: [facility] CVOR;  Service: Cardiothoracic;  Laterality: N/A;   • DIAGNOSTIC LAPAROSCOPY N/A 2/27/2023    Procedure: DIAGNOSTIC LAPAROSCOPY;  Surgeon: Murali Ferreira  MD Lee;  Location: [facility] MAIN OR;  Service: General;  Laterality: N/A;   • EXPLORATORY LAPAROTOMY N/A 09/06/2021    Procedure: Open sigmoind colectomy, colostomy, and appendectomy;  Surgeon: Yelena Guerra MD;  Location: [facility] MAIN OR;  Service: General;  Laterality: N/A;   • EYE SURGERY      CATARACTS   • KNEE ARTHROSCOPY Right    • NEPHRECTOMY PARTIAL  2007    Dr. Victor      General Information     Row Name 04/28/23 1158          OT Time and Intention    Document Type therapy note (daily note)  -     Mode of Treatment individual therapy;occupational therapy  -     Row Name 04/28/23 1158          General Information    Patient Profile Reviewed yes  -KN     Existing Precautions/Restrictions fall;cardiac;sternal  -KN     Barriers to Rehab medically complex  -     Row Name 04/28/23 1158          Living Environment    People in Home spouse  -     Row Name 04/28/23 1158          Cognition    Orientation Status (Cognition) oriented x 3  -     Row Name 04/28/23 1158          Safety Issues, Functional Mobility    Impairments Affecting Function (Mobility) endurance/activity tolerance;shortness of breath  -           User Key  (r) = Recorded By, (t) = Taken By, (c) = Cosigned By    Initials Name Provider Type    KN Robert Tadeo OT Occupational Therapist                 Mobility/ADL's     Row Name 04/28/23 1210          Bed Mobility    Comment, (Bed Mobility) UIC upon arrival to room.  -     Row Name 04/28/23 1210          Sit-Stand Transfer    Sit-Stand [place] (Transfers) contact guard;verbal cues  -     Assistive Device (Sit-Stand Transfers) walker, front-wheeled  -IRENE     Comment, (Sit-Stand Transfer) performed 1 STS this AM. Pt complaining if pain around his kidney. Defer from standing any further this session.  -     Row Name 04/28/23 1210          Activities of Daily Living    BADL Assessment/Intervention grooming;lower body dressing  -     Row Name 04/28/23 1210          Lower  "Body Dressing Assessment/Training    [place] Level (Lower Body Dressing) socks;don;minimum assist (75% patient effort);verbal cues  -     Row Name 04/28/23 1210          Grooming Assessment/Training    [place] (Grooming) grooming skills;set up;oral care regimen  -KN     Position (Grooming) --  recliner chair  -KN           User Key  (r) = Recorded By, (t) = Taken By, (c) = Cosigned By    Initials Name Provider Type    Robert Maldonado OT Occupational Therapist               Obj/Interventions     Row Name 04/28/23 1214          Motor Skills    Therapeutic Exercise shoulder  BUE shoulder AROM. Shoulder flx, abd, IR/ER. 2 x 8 each  -KN           User Key  (r) = Recorded By, (t) = Taken By, (c) = Cosigned By    Initials Name Provider Type    Robert Maldonado, BAUTISTA Occupational Therapist               Goals/Plan    No documentation.                Clinical Impression     Row Name 04/28/23 1218          Pain Assessment    Pretreatment Pain Rating 5/10  -KN     Posttreatment Pain Rating 5/10  -KN     Pain Location - other (see comments)  \"kidney area\"  -KN     Pain Intervention(s) Medication (See MAR)  -     Row Name 04/28/23 1218          Plan of Care Review    Plan of Care Reviewed With patient  -KN     Progress improving  -KN     Outcome Evaluation Pt up in chair upon arrival to room. Pt reports that he is feeling much better today/ Does report 5/10 pain in \"kidney area\" tylenol suposedly given to pt for the pain. Pt BP check 110/50 in seated position. No post BP taken at end of session. Pt performed 1 STS with Min-mod A using RW, ALDRIDGE for grooming task, and Min A with VC to don/doff socks. Continue OT POC.  -KN     Row Name 04/28/23 1218          Vital Signs    Pre Systolic BP Rehab 110  -KN     Pre Treatment Diastolic BP 50  -KN     Row Name 04/28/23 1218          Positioning and Restraints    Pre-Treatment Position sitting in chair/recliner  -KN     Post Treatment Position chair  -KN     In Chair " reclined;call light within reach;with family/caregiver  -KN           User Key  (r) = Recorded By, (t) = Taken By, (c) = Cosigned By    Initials Name Provider Type    Robert Maldonado OT Occupational Therapist               Outcome Measures     Row Name 04/28/23 1221          How much help from another is currently needed...    Putting on and taking off regular lower body clothing? 2  -KN     Bathing (including washing, rinsing, and drying) 2  -KN     Toileting (which includes using toilet bed pan or urinal) 3  -KN     Putting on and taking off regular upper body clothing 4  -KN     Taking care of personal grooming (such as brushing teeth) 4  -KN     Eating meals 4  -KN     AM-PAC 6 Clicks Score (OT) 19  -KN     Row Name 04/28/23 1033          How much help from another person do you currently need...    Turning from your back to your side while in flat bed without using bedrails? 3  -EM     Moving from lying on back to sitting on the side of a flat bed without bedrails? 3  -EM     Moving to and from a bed to a chair (including a wheelchair)? 3  -EM     Standing up from a chair using your arms (e.g., wheelchair, bedside chair)? 3  -EM     Climbing 3-5 steps with a railing? 2  -EM     To walk in hospital room? 3  -EM     AM-PAC 6 Clicks Score (PT) 17  -EM     Highest level of mobility 5 --> Static standing  -EM     Row Name 04/28/23 1221          Functional Assessment    Outcome Measure Options AM-PAC 6 Clicks Daily Activity (OT)  -           User Key  (r) = Recorded By, (t) = Taken By, (c) = Cosigned By    Initials Name Provider Type    Jennie Angulo, PT Physical Therapist    Robert Maldonado OT Occupational Therapist                Occupational Therapy Education     Title: PT OT SLP Therapies (Done)     Topic: Occupational Therapy (Done)     Point: ADL training (Done)     Description:   Instruct learner(s) on proper safety adaptation and remediation techniques during self care or transfers.   Instruct  in proper use of assistive devices.              Learning Progress Summary           Patient Acceptance, E, VU by KN at 4/28/2023 1223    Acceptance, E,TB, VU by  at 4/27/2023 1549    Acceptance, E, VU by  at 4/26/2023 1456    Acceptance, E,TB, VU by  at 4/21/2023 1756    Acceptance, E, VU by  at 4/19/2023 1815    Eager, E, VU,DU by  at 4/19/2023 1251    Comment: ed on AE for self feeding  adl safety   Family Acceptance, E, VU by  at 4/19/2023 1815    Eager, E, VU,DU by  at 4/19/2023 1251    Comment: ed on AE for self feeding  adl safety                   Point: Home exercise program (Done)     Description:   Instruct learner(s) on appropriate technique for monitoring, assisting and/or progressing therapeutic exercises/activities.              Learning Progress Summary           Patient Acceptance, E, VU by KN at 4/28/2023 1223    Acceptance, E,TB, VU by  at 4/27/2023 1549    Acceptance, E, VU by  at 4/26/2023 1456    Acceptance, E,TB, VU by  at 4/21/2023 1756    Acceptance, E, VU by  at 4/19/2023 1815   Family Acceptance, E, VU by  at 4/19/2023 1815                   Point: Precautions (Done)     Description:   Instruct learner(s) on prescribed precautions during self-care and functional transfers.              Learning Progress Summary           Patient Acceptance, E, VU by  at 4/28/2023 1223    Acceptance, E,TB, VU by  at 4/27/2023 1549    Acceptance, E, VU by  at 4/26/2023 1456    Acceptance, E,TB, VU by  at 4/21/2023 1756    Acceptance, E, VU by  at 4/19/2023 1815    Eager, E, VU,DU by  at 4/19/2023 1251    Comment: ed on AE for self feeding  adl safety   Family Acceptance, E, VU by  at 4/19/2023 1815    Eager, E, VU,DU by  at 4/19/2023 1251    Comment: ed on AE for self feeding  adl safety                   Point: Body mechanics (Done)     Description:   Instruct learner(s) on proper positioning and spine alignment during self-care, functional mobility activities and/or  "exercises.              Learning Progress Summary           Patient Acceptance, E, VU by  at 4/28/2023 1223    Acceptance, E,TB, VU by  at 4/27/2023 1549    Acceptance, E, VU by  at 4/26/2023 1456    Acceptance, E,TB, VU by  at 4/21/2023 1756    Acceptance, E, VU by  at 4/19/2023 1815   Family Acceptance, E, VU by  at 4/19/2023 1815                               User Key     Initials Effective Dates Name Provider Type Discipline     06/16/21 -  Kacy Deluca, RN Registered Nurse Nurse     12/20/21 -  Mitesh Sage, RN Registered Nurse Nurse     08/02/22 -  Robert Tadeo OT Occupational Therapist OT    BELEN 01/03/23 -  Elly Mccarthy OT Occupational Therapist OT              OT Recommendation and Plan     Plan of Care Review  Plan of Care Reviewed With: patient  Progress: improving  Outcome Evaluation: Pt up in chair upon arrival to room. Pt reports that he is feeling much better today/ Does report 5/10 pain in \"kidney area\" tylenol suposedly given to pt for the pain. Pt BP check 110/50 in seated position. No post BP taken at end of session. Pt performed 1 STS with Min-mod A using RW, ALDRIDGE for grooming task, and Min A with VC to don/doff socks. Continue OT POC.     Time Calculation:    Time Calculation- OT     Row Name 04/28/23 1225             Timed Charges    69876 - OT Therapeutic Activity Minutes 3  -KN      01991 - OT Self Care/Mgmt Minutes 10  -KN         Total Minutes    Timed Charges Total Minutes 13  -KN       Total Minutes 13  -KN            User Key  (r) = Recorded By, (t) = Taken By, (c) = Cosigned By    Initials Name Provider Type    Robert Maldonado, OT Occupational Therapist              Therapy Charges for Today     Code Description Service Date Service Provider Modifiers Qty    09802337265 HC OT SELF CARE/MGMT/TRAIN EA 15 MIN 4/28/2023 Robert Tadeo OT GO 1               Robert Tadeo OT  4/28/2023  "

## 2023-04-28 NOTE — PROGRESS NOTES
[facility] Progress Note      Patient Name: Lefty Cai  : 1950  MRN: [id number]  Primary Care Physician:  Daksha Ng APRN  Date of admission: 2023    Subjective     Interval History:   Follow up ESRD.     The patient is feeling much better, no chest pain or shortness of air, no orthopnea or PND, his blood pressure has stabilized, noted to have hypophosphatemia today.  He had dialysis yesterday without any difficulties.     Review of Systems:   As noted above    Objective     Vitals:   Temp:  [97.4 °F (36.3 °C)-98.4 °F (36.9 °C)] 98.2 °F (36.8 °C)  Heart Rate:  [70-95] 70  Resp:  [16-18] 16  BP: ()/(41-65) 98/46    Intake/Output Summary (Last 24 hours) at 2023 0853  Last data filed at 2023 0400  Gross per 24 hour   Intake 100 ml   Output 100 ml   Net 0 ml       Physical Exam:    General Appearance: Awake and alert, chronically ill, no acute distress  Skin: warm and dry  HEENT: oral mucosa dry, nonicteric sclera  Neck: TDC RIJ.   Lungs: Clear to auscultation, unlabored breathing effort  Heart: RRR no s3 or rub  Abdomen: soft, nontender, not distended, normoactive bowel  Extremities: 2+ edema. Upper and lower ext edema. LUE AVF paten.  Neuro: normal speech and mental status     Scheduled Meds:     aspirin, 81 mg, Oral, Daily  atorvastatin, 40 mg, Oral, Nightly  cefepime, 2 g, Intravenous, Q24H  enoxaparin, 30 mg, Subcutaneous, Q24H  epoetin bernice/bernice-epbx, 6,000 Units, Intravenous, Once per day on   First Mouthwash (Magic Mouthwash), 5 mL, Swish & Spit, Q8H  guaiFENesin, 1,200 mg, Oral, Q12H  insulin lispro, 0-9 Units, Subcutaneous, 4x Daily With Meals & Nightly  ipratropium-albuterol, 3 mL, Nebulization, Q6H While Awake - RT  iron polysaccharides, 150 mg, Oral, Daily  Menthol-Zinc Oxide, 1 application, Topical, TID  midodrine, 7.5 mg, Oral, BID AC  pantoprazole, 40 mg, Oral, QAM  polyethylene glycol, 17 g, Oral, Daily  senna-docusate  sodium, 2 tablet, Oral, BID  Vancomycin Pharmacy Intermittent/Pulse Dosing, , Does not apply, Daily      IV Meds:   Pharmacy to dose vancomycin,         Results Reviewed:   I have personally reviewed the results from the time of this admission to 4/28/2023 08:53 EDT     Results from last 7 days   Lab Units 04/28/23  0319 04/27/23  0324 04/26/23  0300   SODIUM mmol/L 138  138 137 139  138   POTASSIUM mmol/L 4.2  4.3 3.8 4.0  3.9   CHLORIDE mmol/L 103  104 102 100  100   CO2 mmol/L 17.3*  22.0 20.6* 24.0  25.0   BUN mg/dL 24*  25* 41* 27*  26*   CREATININE mg/dL 3.93*  3.38* 5.59* 4.21*  4.14*   CALCIUM mg/dL 8.4*  8.0* 9.0 8.6  8.5*   BILIRUBIN mg/dL 0.5 0.7 0.8   ALK PHOS U/L 102 109 121*   ALT (SGPT) U/L 21 23 23   AST (SGOT) U/L 38 34 48*   GLUCOSE mg/dL 98  97 101* 106*  110*       Estimated Creatinine Clearance: 23.3 mL/min (A) (by C-G formula based on SCr of 3.38 mg/dL (H)).    Results from last 7 days   Lab Units 04/28/23 0319 04/27/23  0324 04/25/23  0325   PHOSPHORUS mg/dL 1.7* 3.3 4.7*             Results from last 7 days   Lab Units 04/28/23  0319 04/27/23  1649 04/27/23  0324 04/26/23  0300 04/25/23  0325 04/24/23  0522   WBC 10*3/mm3 11.66*  --  16.01* 19.80* 12.67* 12.87*   HEMOGLOBIN g/dL 8.7* 9.9* 9.2* 9.7* 9.5* 9.2*   PLATELETS 10*3/mm3 191  --  175 183 172 158             Assessment / Plan     ASSESSMENT:  1. ESRD.  Currently on dialysis every Tuesday, Thursday and Saturday to accommodate transfer to rehab  2. NSTEMI. SP 3 vessel CABG, MV repair 4/13/23.  3. PAF,  SR. Metoprolol dc.   4. Anemia CKD and blood loss.  Globin today 8.7.  5.  Hypophosphatemia, phosphorus 1.7  6. DM2, with renal complication  7. Deconditioning.    8. Hypotension, acute on chronic, leukocytosis improved after treating his Pseudomonas UTI   9. Encephalopathy.  Resolved    PLAN:  1.  Hemodialysis tomorrow  2.  Surveillance labs  3.  Replete phosphorus    I discussed the case with the patient and with   Jose Reyes MD  04/28/23  08:53 EDT    [facility]  [phone]

## 2023-04-28 NOTE — PLAN OF CARE
Goal Outcome Evaluation:  Plan of Care Reviewed With: patient        Progress: improving  Outcome Evaluation: Pt is AO X4. Low SBP 80'S. On-call DUSTY Carreon notified. 500 cc NS bolus given per order.  All other vs stable, No complaints of pain overnight. Q2 turns, Fall precautions and Safety maintained. WCTM         Problem: Adult Inpatient Plan of Care  Goal: Plan of Care Review  Outcome: Ongoing, Progressing  Flowsheets (Taken 4/28/2023 0400)  Progress: improving  Plan of Care Reviewed With: patient  Goal: Patient-Specific Goal (Individualized)  Outcome: Ongoing, Progressing  Goal: Absence of Hospital-Acquired Illness or Injury  Outcome: Ongoing, Progressing  Intervention: Identify and Manage Fall Risk  Description: Perform standard risk assessment on admission using a validated tool or comprehensive approach appropriate to the patient; reassess fall risk frequently, with change in status or transfer to another level of care.  Communicate fall injury risk to interprofessional healthcare team.  Determine need for increased observation, equipment and environmental modification, such as low bed, signage and supportive, nonskid footwear.  Adjust safety measures to individual developmental age, stage and identified risk factors.  Reinforce the importance of safety and physical activity with patient and family.  Perform regular intentional rounding to assess need for position change, pain assessment and personal needs, including assistance with toileting.  Recent Flowsheet Documentation  Taken 4/28/2023 0200 by Matthew Oviedo, RN  Safety Promotion/Fall Prevention:   assistive device/personal items within reach   activity supervised   mobility aid in reach   lighting adjusted   room organization consistent   safety round/check completed  Taken 4/28/2023 0045 by Matthew Oviedo, RN  Safety Promotion/Fall Prevention:   assistive device/personal items within reach   activity supervised   fall prevention program maintained    clutter free environment maintained   nonskid shoes/slippers when out of bed   muscle strengthening facilitated   safety round/check completed   room organization consistent  Taken 4/27/2023 2236 by Matthew Oviedo RN  Safety Promotion/Fall Prevention:   assistive device/personal items within reach   activity supervised   mobility aid in reach   lighting adjusted   room organization consistent   safety round/check completed  Taken 4/27/2023 2015 by Matthew Oviedo RN  Safety Promotion/Fall Prevention:   activity supervised   assistive device/personal items within reach   fall prevention program maintained   clutter free environment maintained   muscle strengthening facilitated   nonskid shoes/slippers when out of bed   room organization consistent   safety round/check completed  Intervention: Prevent Skin Injury  Description: Perform a screening for skin injury risk, such as pressure or moisture associated skin damage on admission and at regular intervals throughout hospital stay.  Keep all areas of skin (especially folds) clean and dry.  Maintain adequate skin hydration.  Relieve and redistribute pressure and protect bony prominences; implement measures based on patient-specific risk factors.  Match turning and repositioning schedule to clinical condition.  Encourage weight shift frequently; assist with reposition if unable to complete independently.  Float heels off bed; avoid pressure on the Achilles tendon.  Keep skin free from extended contact with medical devices.  Encourage functional activity and mobility, as early as tolerated.  Use aids (e.g., slide boards, mechanical lift) during transfer.  Recent Flowsheet Documentation  Taken 4/28/2023 0200 by Matthew Oviedo RN  Body Position: position changed independently  Taken 4/28/2023 0045 by Matthew Oviedo RN  Body Position: position changed independently  Taken 4/27/2023 2236 by Matthew Oviedo RN  Body Position: position changed independently  Taken 4/27/2023 2015 by  Matthew Oviedo RN  Body Position: position changed independently  Intervention: Prevent and Manage VTE (Venous Thromboembolism) Risk  Description: Assess for VTE (venous thromboembolism) risk.  Encourage and assist with early ambulation.  Initiate and maintain compression or other therapy, as indicated, based on identified risk in accordance with organizational protocol and provider order.  Encourage both active and passive leg exercises while in bed, if unable to ambulate.  Recent Flowsheet Documentation  Taken 4/28/2023 0200 by Matthew Oviedo RN  Activity Management: activity encouraged  Taken 4/28/2023 0045 by Matthew Oviedo RN  Activity Management: activity encouraged  VTE Prevention/Management:   bilateral   compression stockings off  Taken 4/27/2023 2236 by Matthew Oviedo RN  Activity Management: activity encouraged  Taken 4/27/2023 2015 by Matthew Oviedo RN  Activity Management: activity encouraged  VTE Prevention/Management:   bilateral   compression stockings on  Intervention: Prevent Infection  Description: Maintain skin and mucous membrane integrity; promote hand, oral and pulmonary hygiene.  Optimize fluid balance, nutrition, sleep and glycemic control to maximize infection resistance.  Identify potential sources of infection early to prevent or mitigate progression of infection (e.g., wound, lines, devices).  Evaluate ongoing need for invasive devices; remove promptly when no longer indicated.  Recent Flowsheet Documentation  Taken 4/28/2023 0200 by Matthew Oviedo RN  Infection Prevention:   visitors restricted/screened   environmental surveillance performed   cohorting utilized   personal protective equipment utilized   equipment surfaces disinfected   hand hygiene promoted   rest/sleep promoted   single patient room provided  Taken 4/28/2023 0045 by Matthew Oviedo RN  Infection Prevention:   visitors restricted/screened   single patient room provided   rest/sleep promoted   personal protective equipment  utilized   hand hygiene promoted   equipment surfaces disinfected   cohorting utilized   environmental surveillance performed  Taken 4/27/2023 2236 by Matthew Oviedo RN  Infection Prevention:   visitors restricted/screened   single patient room provided   rest/sleep promoted   personal protective equipment utilized   hand hygiene promoted   equipment surfaces disinfected   cohorting utilized   environmental surveillance performed  Taken 4/27/2023 2015 by Matthew Oviedo RN  Infection Prevention:   visitors restricted/screened   single patient room provided   rest/sleep promoted   hand hygiene promoted   equipment surfaces disinfected   environmental surveillance performed   cohorting utilized   personal protective equipment utilized  Goal: Optimal Comfort and Wellbeing  Outcome: Ongoing, Progressing  Intervention: Provide Person-Centered Care  Description: Use a family-focused approach to care.  Develop trust and rapport by proactively providing information, encouraging questions, addressing concerns and offering reassurance.  Acknowledge emotional response to hospitalization.  Recognize and utilize personal coping strategies.  Honor spiritual and cultural preferences.  Recent Flowsheet Documentation  Taken 4/28/2023 0045 by Matthew Oviedo RN  Trust Relationship/Rapport:   thoughts/feelings acknowledged   reassurance provided   questions encouraged   questions answered   emotional support provided   care explained   choices provided   empathic listening provided  Taken 4/27/2023 2015 by Matthew Oviedo RN  Trust Relationship/Rapport:   thoughts/feelings acknowledged   reassurance provided   questions encouraged   empathic listening provided   emotional support provided   choices provided   care explained   questions answered  Goal: Readiness for Transition of Care  Outcome: Ongoing, Progressing

## 2023-04-28 NOTE — PROGRESS NOTES
As per 4/27/23 Rehab note--first OOB therapy this a.m. since 4/24/23 due to medical status.  Will rediscuss with Dr. Williamson and follow up. If appropriate, would require precert with [employer] M/C Replacement Plan and would need updated OT.  If any questions, please call [phone].  Thank you--Tatyana Alas,  Rehab Admission Coordinator

## 2023-04-28 NOTE — PROGRESS NOTES
" LOS: 20 days   Patient Care Team:  Daksha Ng APRN as PCP - General (Family Medicine)  Rudy Faith MD as Consulting Physician (Ophthalmology)  Jose Mccall MD as Consulting Physician (Nephrology)  Quang Reyes MD as Consulting Physician (Nephrology)    Chief Complaint: post op    Subjective:  Symptoms:  No shortness of breath, cough or chest pain.    Diet:  Adequate intake.  No nausea or vomiting.    Activity level: Impaired due to weakness.    Pain:  He complains of pain that is mild.  Pain is well controlled.      Just finished ambulating with PT. Denies dizziness    Vital Signs  Temp:  [98.1 °F (36.7 °C)-98.4 °F (36.9 °C)] 98.2 °F (36.8 °C)  Heart Rate:  [70-95] 74  Resp:  [16-18] 18  BP: ()/(41-65) 109/50  Body mass index is 28.48 kg/m².    Intake/Output Summary (Last 24 hours) at 4/28/2023 0948  Last data filed at 4/28/2023 0900  Gross per 24 hour   Intake 340 ml   Output 100 ml   Net 240 ml     I/O this shift:  In: 240 [P.O.:240]  Out: -           04/26/23  0725 04/27/23  0645 04/28/23  0640   Weight: 93.1 kg (205 lb 3.2 oz) 94 kg (207 lb 4.8 oz) 95.3 kg (210 lb)         Objective:  General Appearance:  Comfortable and in no acute distress.    Vital signs: (most recent): Blood pressure 109/50, pulse 74, temperature 98.2 °F (36.8 °C), temperature source Oral, resp. rate 18, height 182.9 cm (72\"), weight 95.3 kg (210 lb), SpO2 100 %.  Vital signs are normal.  No fever.    Output: Producing urine and producing stool.    Lungs:  Normal effort and normal respiratory rate.  There are decreased breath sounds.    Heart: Normal rate.  Regular rhythm.    Abdomen: Abdomen is soft.  Bowel sounds are normal.     Extremities: There is dependent edema (mild bilateral LE).    Pulses: Distal pulses are intact.    Neurological: Patient is alert and oriented to person, place and time.    Skin:  Warm and dry.  (Sternal incision clean, dry, and intact)        Results Review:        WBC WBC "   Date Value Ref Range Status   04/28/2023 11.66 (H) 3.40 - 10.80 10*3/mm3 Final   04/27/2023 16.01 (H) 3.40 - 10.80 10*3/mm3 Final   04/26/2023 19.80 (H) 3.40 - 10.80 10*3/mm3 Final      HGB Hemoglobin   Date Value Ref Range Status   04/28/2023 8.7 (L) 13.0 - 17.7 g/dL Final   04/27/2023 9.9 (L) 13.0 - 17.7 g/dL Final   04/27/2023 9.2 (L) 13.0 - 17.7 g/dL Final   04/26/2023 9.7 (L) 13.0 - 17.7 g/dL Final      HCT Hematocrit   Date Value Ref Range Status   04/28/2023 26.4 (L) 37.5 - 51.0 % Final   04/27/2023 30.1 (L) 37.5 - 51.0 % Final   04/27/2023 28.0 (L) 37.5 - 51.0 % Final   04/26/2023 30.7 (L) 37.5 - 51.0 % Final      Platelets Platelets   Date Value Ref Range Status   04/28/2023 191 140 - 450 10*3/mm3 Final   04/27/2023 175 140 - 450 10*3/mm3 Final   04/26/2023 183 140 - 450 10*3/mm3 Final        PT/INR:  No results found for: PROTIME/No results found for: INR    Sodium Sodium   Date Value Ref Range Status   04/28/2023 138 136 - 145 mmol/L Final   04/28/2023 138 136 - 145 mmol/L Final   04/27/2023 137 136 - 145 mmol/L Final   04/26/2023 138 136 - 145 mmol/L Final   04/26/2023 139 136 - 145 mmol/L Final      Potassium Potassium   Date Value Ref Range Status   04/28/2023 4.3 3.5 - 5.2 mmol/L Final   04/28/2023 4.2 3.5 - 5.2 mmol/L Final   04/27/2023 3.8 3.5 - 5.2 mmol/L Final     Comment:     Slight hemolysis detected by analyzer. Results may be affected.   04/26/2023 3.9 3.5 - 5.2 mmol/L Final   04/26/2023 4.0 3.5 - 5.2 mmol/L Final      Chloride Chloride   Date Value Ref Range Status   04/28/2023 104 98 - 107 mmol/L Final   04/28/2023 103 98 - 107 mmol/L Final   04/27/2023 102 98 - 107 mmol/L Final   04/26/2023 100 98 - 107 mmol/L Final   04/26/2023 100 98 - 107 mmol/L Final      Bicarbonate CO2   Date Value Ref Range Status   04/28/2023 22.0 22.0 - 29.0 mmol/L Final   04/28/2023 17.3 (L) 22.0 - 29.0 mmol/L Final   04/27/2023 20.6 (L) 22.0 - 29.0 mmol/L Final   04/26/2023 25.0 22.0 - 29.0 mmol/L Final    04/26/2023 24.0 22.0 - 29.0 mmol/L Final      BUN BUN   Date Value Ref Range Status   04/28/2023 25 (H) 8 - 23 mg/dL Final   04/28/2023 24 (H) 8 - 23 mg/dL Final   04/27/2023 41 (H) 8 - 23 mg/dL Final   04/26/2023 26 (H) 8 - 23 mg/dL Final   04/26/2023 27 (H) 8 - 23 mg/dL Final      Creatinine Creatinine   Date Value Ref Range Status   04/28/2023 3.38 (H) 0.76 - 1.27 mg/dL Final   04/28/2023 3.93 (H) 0.76 - 1.27 mg/dL Final   04/27/2023 5.59 (H) 0.76 - 1.27 mg/dL Final   04/26/2023 4.14 (H) 0.76 - 1.27 mg/dL Final   04/26/2023 4.21 (H) 0.76 - 1.27 mg/dL Final      Calcium Calcium   Date Value Ref Range Status   04/28/2023 8.0 (L) 8.6 - 10.5 mg/dL Final   04/28/2023 8.4 (L) 8.6 - 10.5 mg/dL Final   04/27/2023 9.0 8.6 - 10.5 mg/dL Final   04/26/2023 8.5 (L) 8.6 - 10.5 mg/dL Final   04/26/2023 8.6 8.6 - 10.5 mg/dL Final      Magnesium No results found for: MG       aspirin, 81 mg, Oral, Daily  atorvastatin, 40 mg, Oral, Nightly  cefepime, 2 g, Intravenous, Q24H  enoxaparin, 30 mg, Subcutaneous, Q24H  epoetin bernice/bernice-epbx, 6,000 Units, Intravenous, Once per day on Mon Wed Fri  First Mouthwash (Magic Mouthwash), 5 mL, Swish & Spit, Q8H  guaiFENesin, 1,200 mg, Oral, Q12H  insulin lispro, 0-9 Units, Subcutaneous, 4x Daily With Meals & Nightly  ipratropium-albuterol, 3 mL, Nebulization, Q6H While Awake - RT  iron polysaccharides, 150 mg, Oral, Daily  Menthol-Zinc Oxide, 1 application, Topical, TID  midodrine, 7.5 mg, Oral, BID AC  pantoprazole, 40 mg, Oral, QAM  phosphorus, 500 mg, Oral, Once  polyethylene glycol, 17 g, Oral, Daily  senna-docusate sodium, 2 tablet, Oral, BID  Vancomycin Pharmacy Intermittent/Pulse Dosing, , Does not apply, Daily      Pharmacy to dose vancomycin,       Patient Active Problem List   Diagnosis Code   • 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.22   • Essential hypertension I10   • Hyperlipidemia E78.5   • Primary insomnia F51.01   • ESRD (end stage renal disease)  N18.6   • Vitamin D deficiency E55.9   • Diverticulitis of large intestine with perforation and abscess without bleeding K57.20   • Obesity (BMI 30-39.9) E66.9   • Impaired mobility and ADLs Z74.09, Z78.9   • History of colon resection Z90.49   • Colon polyps K63.5   • Diverticulosis K57.90   • CKD (chronic kidney disease) stage 5, GFR less than 15 ml/min N18.5   • NSTEMI (non-ST elevated myocardial infarction) I21.4   • Rhinovirus B34.8   • Abnormal findings on diagnostic imaging of heart and coronary circulation R93.1   • Fatigue R53.83   • Dysphagia R13.10   • Acute superficial venous thrombosis of right lower extremity I82.811   • Orthostatic hypotension I95.1   • Acute UTI (urinary tract infection) N39.0       Assessment & Plan   - NSTEMI/multi-vessel CAD- s/p urgent CABGx4 LIMA/RSVG, MV repair-([place]) POD#16  - HFrEF--30-35% per echo 4/8  - rhinovirus infection  - ESRD on HD  - hypertension  - hyperlipidemia  - DM II- A1c 5.0  -post op anemia- expected acute blood loss  -Leukocytosis- probable reactive/secondary to  -TCP- resolved    - acute UTI--culture with pseudomonas; on levofloxacin per ID     He looks much better this morning  BP low yesterday following dialysis requiring fluid bolus. Will increase frequency of midodrine  HD tomorrow  Remains in SR. Beta blocker held d/t hypotension  WBC improved to 11.6 today. Antibiotics changed to oral levofloxacin per ID  Increase activity-- continue PT and OT  Rehab evaluation ongoing. I feel that he is nearly ready for d/c from surgical standpoint  Continue routine care    Yu El, APRN  04/28/23  09:48 EDT

## 2023-04-28 NOTE — PROGRESS NOTES
LOS: 20 days     Chief Complaint: Leukocytosis    Interval History: Patient is looking much better today.  States he is ready to get out of the hospital.  Denies any fevers or chills.  States he is tolerating the antibiotics well.  States he has a swollen left testicle but otherwise no acute complaints.    Vital Signs  Temp:  [98.1 °F (36.7 °C)-98.4 °F (36.9 °C)] 98.2 °F (36.8 °C)  Heart Rate:  [70-95] 74  Resp:  [16-18] 18  BP: ()/(41-65) 109/50    Physical Exam:  General: In no acute distress  HEENT: Oropharynx clear, moist mucous membranes  Respiratory: Normal work of breathing  GI: Soft, NT/ND  Skin: Sternal site clean and intact  Extremities: No edema, cyanosis  Access: Peripheral IV.    Antibiotics:  Anti-Infectives (From admission, onward)    Ordered     Dose/Rate Route Frequency Start Stop    04/27/23 1128  cefepime 2 gm IVPB in 100 ml NS (VTB)        Ordering Provider: Kevin Parra DO    2 g  over 4 Hours Intravenous Every 24 Hours 04/27/23 1900 05/02/23 1859    04/26/23 1058  Vancomycin Pharmacy Intermittent/Pulse Dosing        Ordering Provider: Kevin Parra DO     Does not apply Daily 04/27/23 0900 05/07/23 0859    04/26/23 1016  Pharmacy to dose vancomycin        Ordering Provider: Kevin Parra DO     Does not apply Continuous PRN 04/26/23 1016 05/03/23 1015    04/14/23 1954  ceFAZolin (ANCEF) IVPB 1 g        Ordering Provider: Laya Osman APRN    1 g Intravenous Every 24 Hours 04/14/23 2045 04/16/23 2044           Results Review:     I reviewed the patient's new clinical results.  I reviewed the patient's new imaging results and agree with the interpretation.    Lab Results   Component Value Date    WBC 11.66 (H) 04/28/2023    HGB 8.7 (L) 04/28/2023    HCT 26.4 (L) 04/28/2023    MCV 87.7 04/28/2023     04/28/2023     Lab Results   Component Value Date    GLUCOSE 97 04/28/2023    GLUCOSE 98 04/28/2023    BUN 25 (H) 04/28/2023    BUN 24 (H) 04/28/2023     CREATININE 3.38 (H) 2023    CREATININE 3.93 (H) 2023    EGFRIFNONA 12 (L) 2021    EGFRIFAFRI  2021      Comment:      <15 Indicative of kidney failure.    BCR 7.4 2023    BCR 6.1 (L) 2023    CO2 22.0 2023    CO2 17.3 (L) 2023    CALCIUM 8.0 (L) 2023    CALCIUM 8.4 (L) 2023    PROTENTOTREF 6.6 2021    ALBUMIN 2.6 (L) 2023    ALBUMIN 2.5 (L) 2023    LABIL2 1.8 2021    AST 38 2023    ALT 21 2023       Microbiology:   respiratory panel positive for rhinovirus   COVID-negative   respiratory culture rejected   blood cultures no growth   urine culture greater than 100,000 Pseudomonas aeruginosa   blood cultures in process   respiratory culture rejected    New imagin/26 CT chest abdomen pelvis report reviewed.  Bilateral pleural effusions consistent with serous fluid and associated right and left lower lobe atelectasis.  Diverticulosis without diverticulitis.  Sternotomy site with no suspicious fluid collections.  Trace pericardial fluid.    Assessment    #Leukocytosis  #Encephalopathy  #Acute UTI  #ESRD on hemodialysis via tunneled line  #Coronary artery disease status post CABG and mitral valve repair  #Recent rhinovirus infection  #Type 2 diabetes  #Dysphagia    Patient is drastically improved on antibiotic therapy with improvement in his clinical symptoms and leukocytosis.  Blood cultures have remained negative.  I think it reasonable to stop vancomycin today.  My suspicion lies mostly in the positive urine culture as the likely culprit given his improvement.  We will plan to switch to levofloxacin 750 mg today followed by 250 mg daily(renally dosed) for total 7-day course for treatment of complicated UTI.  End date will be .    Thank you for allowing me to be involved in the care of this patient. Infectious diseases will sign off at this time with antibiotics plan in place, but please call  me at [phone] if any further ID questions or new ID concerns.

## 2023-04-28 NOTE — PLAN OF CARE
Goal Outcome Evaluation:  Plan of Care Reviewed With: patient           Outcome Evaluation: Patient sitting up in chair, awake and alert, agreeable to therapy. pt performed general LE seated exercises, sit to stand with CGA and cues for hand placement, ambulated 25 feet with rwx and cga x2 with extended seated rest break in between ambulation trials. Pt demonstrates significant improvement in mobility and activity tolerance today.

## 2023-04-28 NOTE — THERAPY TREATMENT NOTE
Patient Name: Lefty Cai  : 1950    MRN: [id number]                              Today's Date: 2023       Admit Date: 2023    Visit Dx:     ICD-10-CM ICD-9-CM   1. Dyspnea on exertion  R06.09 786.09   2. NSTEMI (non-ST elevated myocardial infarction)  I21.4 410.70   3. ESRD (end stage renal disease)  N18.6 585.6   4. Former smoker  Z87.891 V15.82   5. Elevated blood pressure reading in office with diagnosis of hypertension  I10 401.9   6. Rhinovirus infection  B34.8 079.3   7. Abnormal findings on diagnostic imaging of heart and coronary circulation  R93.1 794.39   8. S/P CABG (coronary artery bypass graft)  Z95.1 V45.81   9. Orthostasis  I95.1 458.0     Patient Active Problem List   Diagnosis   • Type 2 diabetes mellitus with diabetic chronic kidney disease   • Essential hypertension   • Hyperlipidemia   • Primary insomnia   • ESRD (end stage renal disease)   • Vitamin D deficiency   • Diverticulitis of large intestine with perforation and abscess without bleeding   • Obesity (BMI 30-39.9)   • Impaired mobility and ADLs   • History of colon resection   • Colon polyps   • Diverticulosis   • CKD (chronic kidney disease) stage 5, GFR less than 15 ml/min   • NSTEMI (non-ST elevated myocardial infarction)   • Rhinovirus   • Abnormal findings on diagnostic imaging of heart and coronary circulation   • Fatigue   • Dysphagia   • Acute superficial venous thrombosis of right lower extremity   • Orthostatic hypotension   • Acute UTI (urinary tract infection)     Past Medical History:   Diagnosis Date   • Anemia    • Anesthesia complication     HYPOTENSION   • Arthritis    • Cancer of kidney, left    • CKD (chronic kidney disease)     STAGE 5   • Diabetes mellitus, type 2    • Fatigue    • GERD (gastroesophageal reflux disease)    • H/O renal cell carcinoma 2007    partial nephrectomy   • History of colostomy reversal 2022   • [place] (hard of hearing)     NO DEVICE   • Hyperlipidemia    • Hypertension     • Primary insomnia 06/13/2016   • Proteinuria    • Risk factors for obstructive sleep apnea    • Sinus drainage    • Vitamin D deficiency 08/14/2017     Past Surgical History:   Procedure Laterality Date   • ARTERIOVENOUS FISTULA/SHUNT SURGERY Left 08/15/2019    Procedure: LEFT MID FOREARM RADIAL CEPHALIC ARTERIAL VENOUS FISTULA;  Surgeon: Louann Miles Jr., MD;  Location: [facility] MAIN OR;  Service: Vascular   • CARDIAC CATHETERIZATION N/A 4/9/2023    Procedure: Left Heart Cath;  Surgeon: Lobito Garcia MD;  Location: [facility] CATH INVASIVE LOCATION;  Service: Cardiovascular;  Laterality: N/A;   • CARDIAC CATHETERIZATION N/A 4/9/2023    Procedure: Left ventriculography;  Surgeon: Lobito Garcia MD;  Location: [facility] CATH INVASIVE LOCATION;  Service: Cardiovascular;  Laterality: N/A;   • CARDIAC CATHETERIZATION N/A 4/9/2023    Procedure: Coronary angiography;  Surgeon: Lobito Garcia MD;  Location: [facility] CATH INVASIVE LOCATION;  Service: Cardiovascular;  Laterality: N/A;   • COLONOSCOPY  03/24/2022   • COLONOSCOPY N/A 03/24/2022    Procedure: COLONOSCOPY TO CECUM WITH POLYPECTOMY  (HOT SNARE);  Surgeon: Yelena Guerra MD;  Location: [facility] ENDOSCOPY;  Service: General;  Laterality: N/A;  PREOP/ HX OF DIVERTICULITIS, COLOSTOMY  POSTOP/ POLYPS, DIVERTICULOSIS    • COLOSTOMY  09/06/2021   • COLOSTOMY CLOSURE N/A 04/12/2022    Procedure: open Colostomy reversal;  Surgeon: Yelena Guerra MD;  Location: [facility] MAIN OR;  Service: General;  Laterality: N/A;   • CORONARY ARTERY BYPASS GRAFT N/A 4/13/2023    Procedure: MAT STERNOTOMY CORONARY ARTERY BYPASS GRAFT TIMES 4 USING LEFT INTERNAL MAMMARY ARTERY AND RIGHT GREATER SAPHENOUS VEIN  PER ENDOSCOPIC VEIN HARVESTING, MITRAL VALVE REPAIR  AND PRP;  Surgeon: Mirtha Zuluaga MD;  Location: [facility] CVOR;  Service: Cardiothoracic;  Laterality: N/A;   • DIAGNOSTIC LAPAROSCOPY N/A 2/27/2023    Procedure: DIAGNOSTIC LAPAROSCOPY;  Surgeon: Murali Ferreira  MD Lee;  Location: [facility] MAIN OR;  Service: General;  Laterality: N/A;   • EXPLORATORY LAPAROTOMY N/A 09/06/2021    Procedure: Open sigmoind colectomy, colostomy, and appendectomy;  Surgeon: Yelena Guerra MD;  Location: [facility] MAIN OR;  Service: General;  Laterality: N/A;   • EYE SURGERY      CATARACTS   • KNEE ARTHROSCOPY Right    • NEPHRECTOMY PARTIAL  2007    Dr. Victor      General Information     Row Name 04/28/23 1027          Physical Therapy Time and Intention    Document Type therapy note (daily note)  -EM     Mode of Treatment individual therapy;physical therapy  -EM     Row Name 04/28/23 1027          General Information    Existing Precautions/Restrictions fall;cardiac;sternal  -EM           User Key  (r) = Recorded By, (t) = Taken By, (c) = Cosigned By    Initials Name Provider Type    EM Jennie Cuevas PT Physical Therapist               Mobility     Row Name 04/28/23 1027          Bed Mobility    Comment, (Bed Mobility) not tested, up in chair  -EM     Row Name 04/28/23 1027          Sit-Stand Transfer    Sit-Stand [place] (Transfers) contact guard;verbal cues  -EM     Assistive Device (Sit-Stand Transfers) walker, front-wheeled  -EM     Comment, (Sit-Stand Transfer) cues to stand by chair for a moment prior to ambulating  -EM     Row Name 04/28/23 1027          Gait/Stairs (Locomotion)    [place] Level (Gait) contact guard  -EM     Assistive Device (Gait) walker, front-wheeled  -EM     Distance in Feet (Gait) 25 (x 2 with extended seated rest break)  -EM     Deviations/Abnormal Patterns (Gait) stride length decreased;gait speed decreased  -EM     Comment, (Gait/Stairs) cues for upright posture, pt shaky but steady, no LOB, states he is a little dizzy but did not worsen with ambulation  -EM           User Key  (r) = Recorded By, (t) = Taken By, (c) = Cosigned By    Initials Name Provider Type    Jennie Angulo PT Physical Therapist                Obj/Interventions     Row Name 04/28/23 1029          Motor Skills    Therapeutic Exercise other (see comments)  AP, LAQ, marching in sitting x 10 reps  -EM           User Key  (r) = Recorded By, (t) = Taken By, (c) = Cosigned By    Initials Name Provider Type    Jennie Angulo PT Physical Therapist               Goals/Plan     Row Name 04/28/23 1033          Problem Specific Goal 1 (PT)    Problem Specific Goal 1 (PT) Pt able to complete cardiac level 3 per protocol  -EM     Time Frame (Problem Specific Goal 1, PT) 1 week  -EM     Progress/Outcome (Problem Specific Goal 1, PT) goal revised this date  -EM           User Key  (r) = Recorded By, (t) = Taken By, (c) = Cosigned By    Initials Name Provider Type    Jennie Angulo PT Physical Therapist               Clinical Impression     Row Name 04/28/23 1030          Pain    Pretreatment Pain Rating 0/10 - no pain  -EM     Row Name 04/28/23 1030          Plan of Care Review    Plan of Care Reviewed With patient  -EM     Outcome Evaluation Patient sitting up in chair, awake and alert, agreeable to therapy. pt performed general LE seated exercises, sit to stand with CGA and cues for hand placement, ambulated 25 feet with rwx and cga x2 with extended seated rest break in between ambulation trials. Pt demonstrates significant improvement in mobility and activity tolerance today.  -EM     Row Name 04/28/23 1030          Vital Signs    Pre Systolic BP Rehab 109  -EM     Pre Treatment Diastolic BP 50  -EM     Post Systolic BP Rehab 120  -EM     Post Treatment Diastolic BP 57  -EM     Pretreatment Heart Rate (beats/min) 79  -EM     Intratreatment Heart Rate (beats/min) 85  -EM     Posttreatment Heart Rate (beats/min) 83  -EM     Row Name 04/28/23 1030          Positioning and Restraints    Pre-Treatment Position sitting in chair/recliner  -EM     Post Treatment Position chair  -EM     In Chair reclined;call light within reach;exit alarm on;with  family/caregiver  -EM           User Key  (r) = Recorded By, (t) = Taken By, (c) = Cosigned By    Initials Name Provider Type    Jennie Angulo PT Physical Therapist               Outcome Measures     Row Name 04/28/23 1033          How much help from another person do you currently need...    Turning from your back to your side while in flat bed without using bedrails? 3  -EM     Moving from lying on back to sitting on the side of a flat bed without bedrails? 3  -EM     Moving to and from a bed to a chair (including a wheelchair)? 3  -EM     Standing up from a chair using your arms (e.g., wheelchair, bedside chair)? 3  -EM     Climbing 3-5 steps with a railing? 2  -EM     To walk in hospital room? 3  -EM     AM-PAC 6 Clicks Score (PT) 17  -EM     Highest level of mobility 5 --> Static standing  -EM           User Key  (r) = Recorded By, (t) = Taken By, (c) = Cosigned By    Initials Name Provider Type    Jennie Angulo PT Physical Therapist                             Physical Therapy Education     Title: PT OT SLP Therapies (Done)     Topic: Physical Therapy (Done)     Point: Mobility training (Done)     Learning Progress Summary           Patient Acceptance, E, VU by EM at 4/28/2023 1034    Acceptance, E,TB, VU by  at 4/27/2023 1549    Acceptance, E, VU by  at 4/26/2023 1456    Acceptance, E,TB, VU,NR by  at 4/24/2023 1226    Acceptance, E,TB,D, VU,NR by  at 4/22/2023 1627    Acceptance, E,TB, VU by  at 4/21/2023 1756    Acceptance, E,TB,D, VU,NR by  at 4/21/2023 1626    Acceptance, E, VU by EM at 4/20/2023 1507    Acceptance, E, VU by  at 4/19/2023 1815    Acceptance, E, VU by EM at 4/19/2023 1156    Acceptance, E, NR by AR at 4/16/2023 1633    Acceptance, TB,E, VU by  at 4/11/2023 0900   Family Acceptance, E, VU by EM at 4/28/2023 1034    Acceptance, E, VU by  at 4/19/2023 1815    Acceptance, E, NR by AR at 4/16/2023 1633                   Point: Home exercise program (Done)      Learning Progress Summary           Patient Acceptance, E,TB, VU by TH at 4/27/2023 1549    Acceptance, E, VU by KN at 4/26/2023 1456    Acceptance, E,TB, VU,NR by CB at 4/24/2023 1226    Acceptance, E,TB,D, VU,NR by CH at 4/22/2023 1627    Acceptance, E,TB, VU by TH at 4/21/2023 1756    Acceptance, E,TB,D, VU,NR by CH at 4/21/2023 1626    Acceptance, E, VU by EM at 4/20/2023 1507    Acceptance, E, VU by AH at 4/19/2023 1815    Acceptance, E, VU by EM at 4/19/2023 1156    Acceptance, E, NR by AR at 4/16/2023 1633    Acceptance, TB,E, VU by TH at 4/11/2023 0900   Family Acceptance, E, VU by  at 4/19/2023 1815    Acceptance, E, NR by AR at 4/16/2023 1633                   Point: Body mechanics (Done)     Learning Progress Summary           Patient Acceptance, E,TB, VU by TH at 4/27/2023 1549    Acceptance, E, VU by KN at 4/26/2023 1456    Acceptance, E,TB, VU,NR by CB at 4/24/2023 1226    Acceptance, E,TB,D, VU,NR by  at 4/22/2023 1627    Acceptance, E,TB, VU by TH at 4/21/2023 1756    Acceptance, E,TB,D, VU,NR by CH at 4/21/2023 1626    Acceptance, E, VU by AH at 4/19/2023 1815    Acceptance, E, NR by AR at 4/16/2023 1633    Acceptance, TB,E, VU by TH at 4/11/2023 0900   Family Acceptance, E, VU by AH at 4/19/2023 1815    Acceptance, E, NR by AR at 4/16/2023 1633                   Point: Precautions (Done)     Learning Progress Summary           Patient Acceptance, E,TB, VU by TH at 4/27/2023 1549    Acceptance, E, VU by KN at 4/26/2023 1456    Acceptance, E,TB, VU,NR by CB at 4/24/2023 1226    Acceptance, E,TB,D, VU,NR by  at 4/22/2023 1627    Acceptance, E,TB, VU by TH at 4/21/2023 1756    Acceptance, E,TB,D, VU,NR by  at 4/21/2023 1626    Acceptance, E, VU by  at 4/19/2023 1815    Acceptance, E, NR by AR at 4/16/2023 1633    Acceptance, TB,E, VU by  at 4/11/2023 0900   Family Acceptance, E, VU by  at 4/19/2023 1815    Acceptance, E, NR by AR at 4/16/2023 1633                               User  Key     Initials Effective Dates Name Provider Type Discipline     06/16/21 -  Kavitha Sen, PT Physical Therapist PT    EM 06/16/21 -  Jennie Cuevas PT Physical Therapist PT    AR 06/16/21 -  Mihaela Shields, PT Physical Therapist PT    TH 06/16/21 -  Kacy Deluca, RN Registered Nurse Nurse    CB 10/22/21 -  Geni Pederson, PT Physical Therapist PT     12/20/21 -  Mitesh Sage, RN Registered Nurse Nurse    KN 08/02/22 -  Robert Tadeo OT Occupational Therapist OT              PT Recommendation and Plan     Plan of Care Reviewed With: patient  Outcome Evaluation: Patient sitting up in chair, awake and alert, agreeable to therapy. pt performed general LE seated exercises, sit to stand with CGA and cues for hand placement, ambulated 25 feet with rwx and cga x2 with extended seated rest break in between ambulation trials. Pt demonstrates significant improvement in mobility and activity tolerance today.     Time Calculation:    PT Charges     Row Name 04/28/23 1035             Time Calculation    Start Time 1002  -EM      Stop Time 1018  -EM      Time Calculation (min) 16 min  -EM      PT Received On 04/28/23  -EM      PT - Next Appointment 04/29/23  -EM      PT Goal Re-Cert Due Date 05/05/23  -EM         Time Calculation- PT    Total Timed Code Minutes- PT 16 minute(s)  -EM         Timed Charges    65590 - PT Therapeutic Exercise Minutes 5  -EM      64021 - PT Therapeutic Activity Minutes 11  -EM         Total Minutes    Timed Charges Total Minutes 16  -EM       Total Minutes 16  -EM            User Key  (r) = Recorded By, (t) = Taken By, (c) = Cosigned By    Initials Name Provider Type    EM Jennie Cuevas PT Physical Therapist              Therapy Charges for Today     Code Description Service Date Service Provider Modifiers Qty    06957507288 HC PT THERAPEUTIC ACT EA 15 MIN 4/28/2023 Jennie Cuevas PT GP 1          PT G-Codes  Outcome Measure Options: AM-PAC 6 Clicks Daily  Activity (OT)  AM-PAC 6 Clicks Score (PT): 17  AM-PAC 6 Clicks Score (OT): 13       Jennie Cuevas, PT  4/28/2023

## 2023-04-28 NOTE — PROGRESS NOTES
"Lefty Cai  1950 72 y.o.  [id number]      Patient Care Team:  Daksha Ng APRN as PCP - General (Family Medicine)  Rudy Faith MD as Consulting Physician (Ophthalmology)  Jose Mccall MD as Consulting Physician (Nephrology)  Quang Reyes MD as Consulting Physician (Nephrology)    CC: Non-STEMI,, end-stage renal failure, EF 30 to 35%, severe three-vessel coronary disease, severe MR, underwent three-vessel CABG and mitral valve repair, postop A-fib    Interval History: He is doing better today      Objective   Vital Signs  Temp:  [98.1 °F (36.7 °C)-98.4 °F (36.9 °C)] 98.2 °F (36.8 °C)  Heart Rate:  [70-95] 70  Resp:  [16-18] 16  BP: ()/(41-65) 98/46    Intake/Output Summary (Last 24 hours) at 4/28/2023 0900  Last data filed at 4/28/2023 0400  Gross per 24 hour   Intake 100 ml   Output 100 ml   Net 0 ml     Flowsheet Rows    Flowsheet Row First Filed Value   Admission Height 177.8 cm (70\") Documented at 04/08/2023 1028   Admission Weight 102 kg (225 lb) Documented at 04/08/2023 1028          Physical Exam:   General Appearance:    Alert,oriented, in no acute distress   Lungs:     Clear to auscultation,BS are equal    Heart:    Normal S1 and S2, RRR without murmur, gallop or rub   HEENT:    Sclerae are clear, no JVD or adenopathy   Abdomen:     Normal bowel sounds, soft nontender, nondistended, no HSM   Extremities:   Moves all extremities well, no edema, no cyanosis, no             Redness, no rash     Medication Review:      aspirin, 81 mg, Oral, Daily  atorvastatin, 40 mg, Oral, Nightly  cefepime, 2 g, Intravenous, Q24H  enoxaparin, 30 mg, Subcutaneous, Q24H  epoetin bernice/bernice-epbx, 6,000 Units, Intravenous, Once per day on Mon Wed Fri  First Mouthwash (Magic Mouthwash), 5 mL, Swish & Spit, Q8H  guaiFENesin, 1,200 mg, Oral, Q12H  insulin lispro, 0-9 Units, Subcutaneous, 4x Daily With Meals & Nightly  ipratropium-albuterol, 3 mL, Nebulization, Q6H While Awake - RT  iron " polysaccharides, 150 mg, Oral, Daily  Menthol-Zinc Oxide, 1 application, Topical, TID  midodrine, 7.5 mg, Oral, BID AC  pantoprazole, 40 mg, Oral, QAM  polyethylene glycol, 17 g, Oral, Daily  senna-docusate sodium, 2 tablet, Oral, BID  Vancomycin Pharmacy Intermittent/Pulse Dosing, , Does not apply, Daily      Pharmacy to dose vancomycin,           I reviewed the patient's new clinical results.  I personally viewed and interpreted the patient's EKG/Telemetry data    Assessment/Plan  Active Hospital Problems    Diagnosis  POA   • **NSTEMI (non-ST elevated myocardial infarction) [I21.4]  Yes   • Orthostatic hypotension [I95.1]  Unknown   • Acute UTI (urinary tract infection) [N39.0]  Unknown   • Acute superficial venous thrombosis of right lower extremity [I82.811]  Clinically Undetermined   • Dysphagia [R13.10]  Unknown   • Fatigue [R53.83]  Unknown   • Rhinovirus [B34.8]  Yes   • Abnormal findings on diagnostic imaging of heart and coronary circulation [R93.1]  Yes   • Type 2 diabetes mellitus with diabetic chronic kidney disease [E11.22]  Yes   • Essential hypertension [I10]  Yes   • Hyperlipidemia [E78.5]  Yes   • ESRD (end stage renal disease) [N18.6]  Yes      Resolved Hospital Problems    Diagnosis Date Resolved POA   • Hypernatremia [E87.0] 04/19/2023 Yes   • Dyspnea on exertion [R06.09] 04/24/2023 Yes     Ischemic cardiomyopathy severe mitral insufficiency status post CABG and mitral valve repair on dialysis his blood pressure is pretty soft.  He has Pseudomonas in his urine his white count was elevated he continues to get better I think from a cardiac standpoint were probably maximized our benefit of this hospitalization and he could go to his rehab facility  Lobito Garcia MD  04/28/23  09:00 EDT

## 2023-04-28 NOTE — PLAN OF CARE
"Goal Outcome Evaluation:  Plan of Care Reviewed With: patient        Progress: improving  Outcome Evaluation: Pt up in chair upon arrival to room. Pt reports that he is feeling much better today/ Does report 5/10 pain in \"kidney area\" tylenol suposedly given to pt for the pain. Pt BP check 110/50 in seated position. No post BP taken at end of session. Pt performed 1 STS with Min-mod A using RW, ALDRIDGE for grooming task, and Min A with VC to don/doff socks. Continue OT POC.  "

## 2023-04-28 NOTE — PROGRESS NOTES
Name: Lefty Cai ADMIT: 2023   : 1950  PCP: Berenice Daksha JASSO, DUSTY    MRN: [id number] LOS: 20 days   AGE/SEX: 72 y.o. male  ROOM: 4/     Subjective   Subjective   Resting in bed. Daughter/wife/another family member at bedside. He is doing remarkably better today. Actually ambulated with PT. Denies any chest pain other than soreness. Breathing is good. Report some right flank pain discomfort. No nausea or vomiting. Eating better. Did have some dizziness with ambulation as well as when sitting up in chair earlier. He did not have his Jobst stockings on when he was out of bed. No further low blood pressures. He is feeling much improved and family is pleased.     Objective   Objective   Vital Signs  Temp:  [97.6 °F (36.4 °C)-98.4 °F (36.9 °C)] 97.6 °F (36.4 °C)  Heart Rate:  [70-95] 73  Resp:  [16-18] 18  BP: ()/(41-65) 115/58  SpO2:  [98 %-100 %] 100 %  on   ;   Device (Oxygen Therapy): room air  Body mass index is 28.48 kg/m².     Physical Exam  Vitals and nursing note reviewed.   Constitutional:       Appearance: He is chronically ill-appearing. He is not toxic-appearing.   Cardiovascular:      Rate and Rhythm: Normal rate. Regular rhythm.      Pulses: Normal pulses.   Pulmonary:      Effort: Pulmonary effort is normal. No respiratory distress.      Comments: Clear to auscultation.  No rhonchi noted today   Is on RA. Poor inspiratory effort  Abdominal:      General: Bowel sounds are normal. There is no distension.      Palpations: Abdomen is soft.      Tenderness: There is no abdominal tenderness.   Musculoskeletal:         General: No deformity. Normal range of motion.   Skin:     General: Skin is warm and dry. Mild BLE edema.     Findings: No bruising.      Comments: Midline sternal incision C/D/I  Neurological:      Mental Status: He is alert and oriented to person, place, and time.      Motor: generalized Weakness present.      Coordination: Coordination normal.   Psychiatric:          Mood and Affect: Mood is pleasant/joking.     Behavior: Behavior normal.     Results Review:       I reviewed the patient's new clinical results.  Results from last 7 days   Lab Units 04/28/23 0319 04/27/23  1649 04/27/23 0324 04/26/23 0300 04/25/23  0325   WBC 10*3/mm3 11.66*  --  16.01* 19.80* 12.67*   HEMOGLOBIN g/dL 8.7* 9.9* 9.2* 9.7* 9.5*   PLATELETS 10*3/mm3 191  --  175 183 172     Results from last 7 days   Lab Units 04/28/23 0319 04/27/23 0324 04/26/23 0300 04/25/23  0325   SODIUM mmol/L 138  138 137 139  138 135*   POTASSIUM mmol/L 4.2  4.3 3.8 4.0  3.9 4.0   CHLORIDE mmol/L 103  104 102 100  100 97*   CO2 mmol/L 17.3*  22.0 20.6* 24.0  25.0 24.0   BUN mg/dL 24*  25* 41* 27*  26* 42*   CREATININE mg/dL 3.93*  3.38* 5.59* 4.21*  4.14* 5.36*   GLUCOSE mg/dL 98  97 101* 106*  110* 100*   Estimated Creatinine Clearance: 23.3 mL/min (A) (by C-G formula based on SCr of 3.38 mg/dL (H)).  Results from last 7 days   Lab Units 04/28/23 0319 04/27/23 0324 04/26/23 0300 04/25/23  0325   ALBUMIN g/dL 2.5*  2.6* 2.7* 2.9* 2.9*   BILIRUBIN mg/dL 0.5 0.7 0.8  --    ALK PHOS U/L 102 109 121*  --    AST (SGOT) U/L 38 34 48*  --    ALT (SGPT) U/L 21 23 23  --      Results from last 7 days   Lab Units 04/28/23 0319 04/27/23 0324 04/26/23 0300 04/25/23  0325 04/24/23  0355   CALCIUM mg/dL 8.4*  8.0* 9.0 8.6  8.5* 8.4* 8.3*   ALBUMIN g/dL 2.5*  2.6* 2.7* 2.9* 2.9* 2.5*   PHOSPHORUS mg/dL 1.7* 3.3  --  4.7* 3.4     Results from last 7 days   Lab Units 04/26/23  0300   PROCALCITONIN ng/mL 0.92*     Glucose   Date/Time Value Ref Range Status   04/28/2023 1113 129 70 - 130 mg/dL Final     Comment:     Meter: [id number] : [id number] Anitha aCtherine NA   04/28/2023 0621 100 70 - 130 mg/dL Final     Comment:     Meter: [id number] : [id number] Gil Velasquez NA   04/27/2023 2041 144 (H) 70 - 130 mg/dL Final     Comment:     Meter: [id number] : [id number] Steven Jorgensen NA   04/27/2023 1606  118 70 - 130 mg/dL Final     Comment:     Meter: [id number] : [id number] Alberto Mahajan NA   04/27/2023 1113 102 70 - 130 mg/dL Final     Comment:     Meter: [id number] : [id number] Cristian Kang CHIQUIS   04/27/2023 0625 113 70 - 130 mg/dL Final     Comment:     Meter: [id number] : [id number] Samantha Pino CHIQUIS   04/26/2023 2017 129 70 - 130 mg/dL Final     Comment:     Meter: [id number] : [id number] Edd Isabel NA       aspirin, 81 mg, Oral, Daily  atorvastatin, 40 mg, Oral, Nightly  enoxaparin, 30 mg, Subcutaneous, Q24H  epoetin bernice/bernice-epbx, 6,000 Units, Intravenous, Once per day on Mon Wed Fri  First Mouthwash (Magic Mouthwash), 5 mL, Swish & Spit, Q8H  guaiFENesin, 1,200 mg, Oral, Q12H  insulin lispro, 0-9 Units, Subcutaneous, 4x Daily With Meals & Nightly  ipratropium-albuterol, 3 mL, Nebulization, Q6H While Awake - RT  iron polysaccharides, 150 mg, Oral, Daily  [START ON 4/29/2023] levoFLOXacin, 250 mg, Oral, Q24H  Menthol-Zinc Oxide, 1 application, Topical, TID  midodrine, 7.5 mg, Oral, TID AC  pantoprazole, 40 mg, Oral, QAM  polyethylene glycol, 17 g, Oral, Daily  senna-docusate sodium, 2 tablet, Oral, BID       Diet: Regular/House Diet; Texture: Regular Texture (IDDSI 7); Fluid Consistency: Thin (IDDSI 0)       Assessment/Plan     Active Hospital Problems    Diagnosis  POA   • **NSTEMI (non-ST elevated myocardial infarction) [I21.4]  Yes   • Orthostatic hypotension [I95.1]  Unknown   • Acute UTI (urinary tract infection) [N39.0]  Unknown   • Acute superficial venous thrombosis of right lower extremity [I82.811]  Clinically Undetermined   • Dysphagia [R13.10]  Unknown   • Fatigue [R53.83]  Unknown   • Rhinovirus [B34.8]  Yes   • Abnormal findings on diagnostic imaging of heart and coronary circulation [R93.1]  Yes   • Type 2 diabetes mellitus with diabetic chronic kidney disease [E11.22]  Yes   • Essential hypertension [I10]  Yes   • Hyperlipidemia [E78.5]  Yes   • ESRD (end stage renal  disease) [N18.6]  Yes      Resolved Hospital Problems    Diagnosis Date Resolved POA   • Hypernatremia [E87.0] 04/19/2023 Yes   • Dyspnea on exertion [R06.09] 04/24/2023 Yes     Mr. Cai is a 72 year old male who presented to the hospital with complaints of increased dyspnea in the setting of ESRD with HD compliance. He was found to have new T wave inversions with troponin elevation and taken for heart cath that revealed severe three vessel CAD with occluded right coronary system. Cardiothoracic surgery was consulted for CABG and RVP was obtained showing positive for rhinovirus. ID was consulted given need for OR who cleared for surgery without antibiotics. Timing was per surgeon preference. He was taken for open heart on 4/13. He had hypotension and atrial fibrillation with RVR following surgery. Moved out of CICU on 4/20.     • NSTEMI/ PAF : S/P CABG x4 with MV repair on 4/13. Cardiology and Cardiothoracic surgery managing. Echo 4/8 with EF 30-35%. On amiodarone and metoprolol with conversion of atrial fibrillation to NSR. Then had recurrence of afib RVR 4/20 so beta blocker increased and digoxin added instead. Had orthostatic hypotension so Nephrology/Cards removed BB. Compression stockings ordered- keep on during the day and remove at night time. On ASA/statin. Cardiology is following as is cardiothoracic surgery. We will leave timing of full anticoagulation up to cardiology and cardiothoracic surgery     • Rhinovirus: Supportive care. ID saw and cleared for OR. Pulmonology followed (signed off 4/20). He is on Duonebs/Mucomyst. Remains on RA. Encouraged IS and flutter valve at bedside.     • ESRD: Via TDC. Nephrology managing. Doppler on left arm AV fistula marginal flow and inadequate size. HD tomorrow.      • DM2: Sugars very stable. Not requiring correctional insulin.  Continue to encourage oral intake     • HTN: BP better. BB removed 4/25. Continues on midodrine.      • Leukocytosis/acute UTI: Up to 18K  on 4/21. CXR negative. UA showed > 100 K Pseudomonas in ESRD patient. Respiratory culture with normal respiratory nikunj. Remains afebrile. ID consulted 4/23 and felt UA represented asymptomatic bacteruria and they did not recommend treatment at that time. Developed confusion/dysuria on 4/25- Cefepime started. Had worsening WBC/confusion/low BP so ID re-consulted 4/26. Vancomycin added. CT A/P/chest without obvious source of infection. Felt to have true UTI. Vancomycin removed 4/28 and ID signed off with plans for Levaquin renally dosed for 7 days.      • ABL on chronic anemia CKD: Looks like he trends mid 10 -11 range. Currently 8.7. Monitor.       • Constipation:  Resolved with lactulose/Senokot. BM yesterday.     • Coccyx buttock skin breakdown: Encouraged turn every 2 hours, waffle cushion, pressure reduction measures, etc.      • Fatigue: Suspect multifactorial d/t chronic comorbidities, prolonged hospital stay, open heart surgery. I highly suspect underlying PATY as well. Would benefit from outpatient sleep study. Trazodone changed to PRN. BB stopped 4/25- may also help with drowsiness. TSH/vitamin B12/folate all okay.     • Dysphagia: ST re-evaluated.      • Acute superficial thrombophlebitis RLE: Venous dopplers with greater saphenous thrombophlebitis. Nothing deep. Continue compression and can use warm compresses as needed. He is already of VTE prophylaxis with Lovenox.    • Orthostatic hypotension: Educated patient/family on need to wear Jobst when out of bed. Will repeat orthostatics tomorrow.     Discussed with patient and family.     Doing much better. [facility] evaluating. Hopefully he will be accepted.      Appreciate all specialists.      VTE Prophylaxis - Lovenox 30 mg SC daily   Code Status - Full code  Disposition - Anticipate discharge likely early next week if okay with all and accepted to [facility]?     DUSTY Roberts  [facility]  04/28/23  14:17 EDT

## 2023-04-29 LAB
ALBUMIN SERPL-MCNC: 2.8 G/DL (ref 3.5–5.2)
ANION GAP SERPL CALCULATED.3IONS-SCNC: 13 MMOL/L (ref 5–15)
BASOPHILS # BLD AUTO: 0.05 10*3/MM3 (ref 0–0.2)
BASOPHILS NFR BLD AUTO: 0.7 % (ref 0–1.5)
BUN SERPL-MCNC: 36 MG/DL (ref 8–23)
BUN/CREAT SERPL: 7 (ref 7–25)
CALCIUM SPEC-SCNC: 8.2 MG/DL (ref 8.6–10.5)
CHLORIDE SERPL-SCNC: 101 MMOL/L (ref 98–107)
CO2 SERPL-SCNC: 22 MMOL/L (ref 22–29)
CREAT SERPL-MCNC: 5.14 MG/DL (ref 0.76–1.27)
DEPRECATED RDW RBC AUTO: 48.9 FL (ref 37–54)
EGFRCR SERPLBLD CKD-EPI 2021: 11.1 ML/MIN/1.73
EOSINOPHIL # BLD AUTO: 0.39 10*3/MM3 (ref 0–0.4)
EOSINOPHIL NFR BLD AUTO: 5.2 % (ref 0.3–6.2)
ERYTHROCYTE [DISTWIDTH] IN BLOOD BY AUTOMATED COUNT: 15.4 % (ref 12.3–15.4)
GLUCOSE BLDC GLUCOMTR-MCNC: 109 MG/DL (ref 70–130)
GLUCOSE BLDC GLUCOMTR-MCNC: 114 MG/DL (ref 70–130)
GLUCOSE BLDC GLUCOMTR-MCNC: 168 MG/DL (ref 70–130)
GLUCOSE BLDC GLUCOMTR-MCNC: 95 MG/DL (ref 70–130)
GLUCOSE SERPL-MCNC: 100 MG/DL (ref 65–99)
HCT VFR BLD AUTO: 27.2 % (ref 37.5–51)
HGB BLD-MCNC: 8.5 G/DL (ref 13–17.7)
IMM GRANULOCYTES # BLD AUTO: 0.09 10*3/MM3 (ref 0–0.05)
IMM GRANULOCYTES NFR BLD AUTO: 1.2 % (ref 0–0.5)
LYMPHOCYTES # BLD AUTO: 1.09 10*3/MM3 (ref 0.7–3.1)
LYMPHOCYTES NFR BLD AUTO: 14.6 % (ref 19.6–45.3)
MCH RBC QN AUTO: 27.6 PG (ref 26.6–33)
MCHC RBC AUTO-ENTMCNC: 31.3 G/DL (ref 31.5–35.7)
MCV RBC AUTO: 88.3 FL (ref 79–97)
MONOCYTES # BLD AUTO: 1.09 10*3/MM3 (ref 0.1–0.9)
MONOCYTES NFR BLD AUTO: 14.6 % (ref 5–12)
NEUTROPHILS NFR BLD AUTO: 4.78 10*3/MM3 (ref 1.7–7)
NEUTROPHILS NFR BLD AUTO: 63.7 % (ref 42.7–76)
NRBC BLD AUTO-RTO: 0.1 /100 WBC (ref 0–0.2)
PHOSPHATE SERPL-MCNC: 3.2 MG/DL (ref 2.5–4.5)
PLATELET # BLD AUTO: 189 10*3/MM3 (ref 140–450)
PMV BLD AUTO: 9.5 FL (ref 6–12)
POTASSIUM SERPL-SCNC: 3.6 MMOL/L (ref 3.5–5.2)
RBC # BLD AUTO: 3.08 10*6/MM3 (ref 4.14–5.8)
SODIUM SERPL-SCNC: 136 MMOL/L (ref 136–145)
WBC NRBC COR # BLD: 7.49 10*3/MM3 (ref 3.4–10.8)

## 2023-04-29 PROCEDURE — 85025 COMPLETE CBC W/AUTO DIFF WBC: CPT | Performed by: INTERNAL MEDICINE

## 2023-04-29 PROCEDURE — 94799 UNLISTED PULMONARY SVC/PX: CPT

## 2023-04-29 PROCEDURE — 80069 RENAL FUNCTION PANEL: CPT | Performed by: INTERNAL MEDICINE

## 2023-04-29 PROCEDURE — 94761 N-INVAS EAR/PLS OXIMETRY MLT: CPT

## 2023-04-29 PROCEDURE — 82948 REAGENT STRIP/BLOOD GLUCOSE: CPT

## 2023-04-29 PROCEDURE — 99232 SBSQ HOSP IP/OBS MODERATE 35: CPT | Performed by: INTERNAL MEDICINE

## 2023-04-29 PROCEDURE — 94760 N-INVAS EAR/PLS OXIMETRY 1: CPT

## 2023-04-29 PROCEDURE — 94664 DEMO&/EVAL PT USE INHALER: CPT

## 2023-04-29 PROCEDURE — 25010000002 ENOXAPARIN PER 10 MG: Performed by: NURSE PRACTITIONER

## 2023-04-29 RX ADMIN — ATORVASTATIN CALCIUM 40 MG: 20 TABLET, FILM COATED ORAL at 22:15

## 2023-04-29 RX ADMIN — TRAZODONE HYDROCHLORIDE 50 MG: 50 TABLET ORAL at 22:15

## 2023-04-29 RX ADMIN — MIDODRINE HYDROCHLORIDE 7.5 MG: 5 TABLET ORAL at 18:58

## 2023-04-29 RX ADMIN — ENOXAPARIN SODIUM 30 MG: 100 INJECTION SUBCUTANEOUS at 14:54

## 2023-04-29 RX ADMIN — POLYETHYLENE GLYCOL 3350 17 G: 17 POWDER, FOR SOLUTION ORAL at 14:53

## 2023-04-29 RX ADMIN — GUAIFENESIN 1200 MG: 600 TABLET, EXTENDED RELEASE ORAL at 14:53

## 2023-04-29 RX ADMIN — POLYETHYLENE GLYCOL 3350 17 G: 17 POWDER, FOR SOLUTION ORAL at 15:08

## 2023-04-29 RX ADMIN — PANTOPRAZOLE SODIUM 40 MG: 40 TABLET, DELAYED RELEASE ORAL at 06:45

## 2023-04-29 RX ADMIN — Medication 150 MG: at 14:53

## 2023-04-29 RX ADMIN — ANORECTAL OINTMENT 1 APPLICATION: 15.7; .44; 24; 20.6 OINTMENT TOPICAL at 14:55

## 2023-04-29 RX ADMIN — GUAIFENESIN 1200 MG: 600 TABLET, EXTENDED RELEASE ORAL at 22:15

## 2023-04-29 RX ADMIN — DIPHENHYDRAMINE HYDROCHLORIDE AND LIDOCAINE HYDROCHLORIDE AND ALUMINUM HYDROXIDE AND MAGNESIUM HYDRO 5 ML: KIT at 18:59

## 2023-04-29 RX ADMIN — IPRATROPIUM BROMIDE AND ALBUTEROL SULFATE 3 ML: 2.5; .5 SOLUTION RESPIRATORY (INHALATION) at 19:36

## 2023-04-29 RX ADMIN — DIPHENHYDRAMINE HYDROCHLORIDE AND LIDOCAINE HYDROCHLORIDE AND ALUMINUM HYDROXIDE AND MAGNESIUM HYDRO 5 ML: KIT at 06:45

## 2023-04-29 RX ADMIN — MIDODRINE HYDROCHLORIDE 7.5 MG: 5 TABLET ORAL at 14:53

## 2023-04-29 RX ADMIN — MIDODRINE HYDROCHLORIDE 7.5 MG: 5 TABLET ORAL at 06:44

## 2023-04-29 RX ADMIN — LEVOFLOXACIN 250 MG: 250 TABLET, FILM COATED ORAL at 14:54

## 2023-04-29 RX ADMIN — ANORECTAL OINTMENT 1 APPLICATION: 15.7; .44; 24; 20.6 OINTMENT TOPICAL at 22:15

## 2023-04-29 RX ADMIN — IPRATROPIUM BROMIDE AND ALBUTEROL SULFATE 3 ML: 2.5; .5 SOLUTION RESPIRATORY (INHALATION) at 07:00

## 2023-04-29 RX ADMIN — ASPIRIN 81 MG: 81 TABLET, COATED ORAL at 14:54

## 2023-04-29 RX ADMIN — ANORECTAL OINTMENT 1 APPLICATION: 15.7; .44; 24; 20.6 OINTMENT TOPICAL at 18:59

## 2023-04-29 RX ADMIN — DOCUSATE SODIUM 50MG AND SENNOSIDES 8.6MG 2 TABLET: 8.6; 5 TABLET, FILM COATED ORAL at 14:55

## 2023-04-29 NOTE — NURSING NOTE
Patient stable and tolerated hd tx.  Patient removed 1 L.  Heparin in lumen as prescribed and catheter care performed.      Lobito Sharp RN  [facility]

## 2023-04-29 NOTE — PROGRESS NOTES
Name: Lefty Cai ADMIT: 2023   : 1950  PCP: AnithaDaksha srinivasan, APRN    MRN: [id number] LOS: 21 days   AGE/SEX: 72 y.o. male  ROOM: /     Subjective   Subjective   Seen in HD. Feeling pretty good today. Slept well. Tolerating diet and eating well. No SOA or CP or palp. Tolerating HD better today than he did on Thursday. No N/V/D/abd pain/F/C/NS.    Review of Systems   as above    Objective   Objective   Vital Signs  Temp:  [97.6 °F (36.4 °C)-98.2 °F (36.8 °C)] 98.2 °F (36.8 °C)  Heart Rate:  [67-92] 84  Resp:  [16-18] 16  BP: (109-143)/(56-91) 117/56  SpO2:  [98 %-100 %] 99 %  on   ;   Device (Oxygen Therapy): room air  Body mass index is 28.82 kg/m².  Physical Exam  Vitals and nursing note reviewed. Exam conducted with a chaperone present (HD techs).   Constitutional:       General: He is not in acute distress.     Appearance: He is ill-appearing (chronically). He is not toxic-appearing or diaphoretic.   HENT:      Head: Normocephalic.      Nose: Nose normal.      Mouth/Throat:      Mouth: Mucous membranes are moist.      Pharynx: Oropharynx is clear.   Eyes:      General: No scleral icterus.        Right eye: No discharge.         Left eye: No discharge.      Extraocular Movements: Extraocular movements intact.      Conjunctiva/sclera: Conjunctivae normal.   Cardiovascular:      Rate and Rhythm: Normal rate and regular rhythm.      Pulses: Normal pulses.      Comments: EDWIN ORTIZ  TDC right upper chest  Midline sternal incision looks good  Pulmonary:      Effort: Pulmonary effort is normal. No respiratory distress.      Breath sounds: Normal breath sounds. No wheezing or rales.      Comments: Anteriorly   Abdominal:      General: Bowel sounds are normal. There is no distension.      Palpations: Abdomen is soft.      Tenderness: There is no abdominal tenderness.   Musculoskeletal:         General: Swelling (1+ edema in all 4 extremities) present. No deformity. Normal range of motion.       Cervical back: Neck supple. No rigidity.   Lymphadenopathy:      Cervical: No cervical adenopathy.   Skin:     General: Skin is warm and dry.      Capillary Refill: Capillary refill takes less than 2 seconds.      Coloration: Skin is pale. Skin is not jaundiced.   Neurological:      General: No focal deficit present.      Mental Status: He is alert and oriented to person, place, and time. Mental status is at baseline.      Cranial Nerves: No cranial nerve deficit.      Coordination: Coordination normal.   Psychiatric:         Mood and Affect: Mood normal.         Behavior: Behavior normal.         Thought Content: Thought content normal.      Comments: Very pleasant       Results Review     I reviewed the patient's new clinical results.  Results from last 7 days   Lab Units 04/29/23 0307 04/28/23 0319 04/27/23  1649 04/27/23 0324 04/26/23  0300   WBC 10*3/mm3 7.49 11.66*  --  16.01* 19.80*   HEMOGLOBIN g/dL 8.5* 8.7* 9.9* 9.2* 9.7*   PLATELETS 10*3/mm3 189 191  --  175 183     Results from last 7 days   Lab Units 04/29/23 0307 04/28/23 0319 04/27/23 0324 04/26/23  0300   SODIUM mmol/L 136 138  138 137 139  138   POTASSIUM mmol/L 3.6 4.2  4.3 3.8 4.0  3.9   CHLORIDE mmol/L 101 103  104 102 100  100   CO2 mmol/L 22.0 17.3*  22.0 20.6* 24.0  25.0   BUN mg/dL 36* 24*  25* 41* 27*  26*   CREATININE mg/dL 5.14* 3.93*  3.38* 5.59* 4.21*  4.14*   GLUCOSE mg/dL 100* 98  97 101* 106*  110*   EGFR mL/min/1.73 11.1* 15.4*  18.4* 10.1* 14.2*  14.4*     Results from last 7 days   Lab Units 04/29/23 0307 04/28/23 0319 04/27/23 0324 04/26/23  0300   ALBUMIN g/dL 2.8* 2.5*  2.6* 2.7* 2.9*   BILIRUBIN mg/dL  --  0.5 0.7 0.8   ALK PHOS U/L  --  102 109 121*   AST (SGOT) U/L  --  38 34 48*   ALT (SGPT) U/L  --  21 23 23     Results from last 7 days   Lab Units 04/29/23  0307 04/28/23  0319 04/27/23  0324 04/26/23  0300 04/25/23  0325   CALCIUM mg/dL 8.2* 8.4*  8.0* 9.0 8.6  8.5* 8.4*   ALBUMIN g/dL 2.8* 2.5*   2.6* 2.7* 2.9* 2.9*   PHOSPHORUS mg/dL 3.2 1.7* 3.3  --  4.7*     Results from last 7 days   Lab Units 04/26/23  0300   PROCALCITONIN ng/mL 0.92*     Glucose   Date/Time Value Ref Range Status   04/29/2023 0722 109 70 - 130 mg/dL Final     Comment:     Meter: [id number] : [id number] Oral Jacobson NA   04/28/2023 2114 120 70 - 130 mg/dL Final     Comment:     Meter: [id number] : [id number] Oral Jacobson NA   04/28/2023 1559 132 (H) 70 - 130 mg/dL Final     Comment:     Meter: [id number] : [id number] Cristian Kang CHIQUIS   04/28/2023 1113 129 70 - 130 mg/dL Final     Comment:     Meter: [id number] : [id number] Anitha Catherine NA   04/28/2023 0621 100 70 - 130 mg/dL Final     Comment:     Meter: [id number] : [id number] Gil Velasquez NA   04/27/2023 2041 144 (H) 70 - 130 mg/dL Final     Comment:     Meter: [id number] : [id number] Steven Jorgensen NA   04/27/2023 1606 118 70 - 130 mg/dL Final     Comment:     Meter: [id number] : [id number] Albertodelroy Vuine NA       No radiology results for the last day  I have personally reviewed all medications:  Scheduled Medications  aspirin, 81 mg, Oral, Daily  atorvastatin, 40 mg, Oral, Nightly  enoxaparin, 30 mg, Subcutaneous, Q24H  epoetin bernice/bernice-epbx, 6,000 Units, Intravenous, Once per day on Mon Wed Fri  First Mouthwash (Magic Mouthwash), 5 mL, Swish & Spit, Q8H  guaiFENesin, 1,200 mg, Oral, Q12H  insulin lispro, 0-9 Units, Subcutaneous, 4x Daily With Meals & Nightly  ipratropium-albuterol, 3 mL, Nebulization, Q6H While Awake - RT  iron polysaccharides, 150 mg, Oral, Daily  levoFLOXacin, 250 mg, Oral, Q24H  Menthol-Zinc Oxide, 1 application, Topical, TID  midodrine, 7.5 mg, Oral, TID AC  pantoprazole, 40 mg, Oral, QAM  polyethylene glycol, 17 g, Oral, Daily  senna-docusate sodium, 2 tablet, Oral, BID    Infusions   Diet  Diet: Regular/House Diet; Texture: Regular Texture (IDDSI 7); Fluid Consistency: Thin (IDDSI 0)    I have personally reviewed:  [x]  Laboratory    [x]  Microbiology   []  Radiology   []  EKG/Telemetry  []  Cardiology/Vascular   []  Pathology    [x]  Records       Assessment/Plan     Active Hospital Problems    Diagnosis  POA   • **NSTEMI (non-ST elevated myocardial infarction) [I21.4]  Yes   • Orthostatic hypotension [I95.1]  Unknown   • Acute UTI (urinary tract infection) [N39.0]  Unknown   • Acute superficial venous thrombosis of right lower extremity [I82.811]  Clinically Undetermined   • Dysphagia [R13.10]  Unknown   • Fatigue [R53.83]  Unknown   • Rhinovirus [B34.8]  Yes   • Abnormal findings on diagnostic imaging of heart and coronary circulation [R93.1]  Yes   • Type 2 diabetes mellitus with diabetic chronic kidney disease [E11.22]  Yes   • Essential hypertension [I10]  Yes   • Hyperlipidemia [E78.5]  Yes   • ESRD (end stage renal disease) [N18.6]  Yes      Resolved Hospital Problems    Diagnosis Date Resolved POA   • Hypernatremia [E87.0] 04/19/2023 Yes   • Dyspnea on exertion [R06.09] 04/24/2023 Yes       Mr. Cai is a 71yo gentleman who presented to the hospital with complaints of increased dyspnea in the setting of ESRD with HD compliance. He was found to have new T-wave inversions with troponin elevation and underwent heart cath that revealed severe three vessel CAD with occluded right coronary system. Cardiothoracic Surgery was consulted for CABG. RVP was positive for rhinovirus. ID was consulted given need for OR--they  cleared for surgery without antibiotics. He was taken for open heart on 4/13. He had hypotension and atrial fibrillation with RVR following surgery. Moved out of CICU on 4/20.     • NSTEMI/ PAF : S/p CABG x4 with MV repair on 4/13. Cardiology and Cardiothoracic Surgery managing. Echo 4/8 with EF 30-35%. On amiodarone and metoprolol with conversion of atrial fibrillation to NSR. Then had recurrence of afib RVR 4/20 so beta blocker increased and digoxin added instead. Had orthostatic hypotension so Nephrology/Cards removed BB.  Compression stockings ordered- keep on during the day and remove at night time. On ASA/statin. Cardiology is following as is cardiothoracic surgery. We will leave timing of full anticoagulation up to Cardiology and Cardiothoracic Surgery     • Rhinovirus: Supportive care. ID saw and cleared for OR. Pulmonology followed (signed off 4/20). He is on Duonebs/Mucomyst. Remains on RA. Encouraged IS and flutter valve at bedside.     • ESRD: Via TDC. Nephrology managing. Doppler on left arm AV fistula marginal flow and inadequate size. HD today.      • DM2: Sugars very stable. Not requiring correctional insulin. Continue to encourage oral intake     • HTN: BPs acceptable. BB removed 4/25. Continue midodrine.      • Leukocytosis/acute UTI: Up to 18K on 4/21. CXR negative. UA showed > 100 K Pseudomonas in ESRD patient. Respiratory culture with normal respiratory nikunj. Remains afebrile. ID consulted 4/23 and felt UA represented asymptomatic bacteruria and they did not recommend treatment at that time. Developed confusion/dysuria on 4/25- Cefepime started. Had worsening WBC/confusion/low BP so ID re-consulted 4/26. Vancomycin added. CT A/P/chest without obvious source of infection. Felt to have true UTI. Vancomycin removed 4/28 and ID signed off with plans for Levaquin renally dosed for 7 days (end date 5/2).     • ABLA on chronic anemia of CKD: Looks like he trends mid 10 -11 range. Currently 8.5. On DAVID per Nephrology. Monitoring Hgb.       • Constipation:  Continue Miralax/Senokot. BM 4/27.     • Coccyx buttock skin breakdown: Encouraged turn every 2 hours, waffle cushion, pressure reduction measures, etc.      • Fatigue: Suspect multifactorial d/t chronic comorbidities, prolonged hospital stay, open heart surgery. I highly suspect underlying PATY as well. Would benefit from outpatient sleep study. Trazodone changed to PRN. BB stopped 4/25--may also help with drowsiness. TSH/vitamin B12/folate all okay.     • Dysphagia: ST  re-evaluated and cleared for regular diet with thin liquids.     • Acute superficial thrombophlebitis RLE: Venous dopplers with greater saphenous thrombophlebitis. Nothing deep. Continue compression and can use warm compresses as needed. He is already on VTE prophylaxis with Lovenox.     • Orthostatic hypotension: Jobst when out of bed. Still mildly orthostatic this AM. Continue Midodrine.     Appreciate all specialists' attention to pt.      VTE Prophylaxis - Lovenox 30 mg SC daily   Code Status - Full code  Disposition - Anticipate discharge likely early next week if okay with all and accepted to [facility] Mcgrath MD  [facility]  04/29/23  09:14 EDT

## 2023-04-29 NOTE — PROGRESS NOTES
"Lefty Cai  1950 72 y.o.  [id number]      Patient Care Team:  Daksha Ng APRN as PCP - General (Family Medicine)  Rudy Faith MD as Consulting Physician (Ophthalmology)  Jose Mccall MD as Consulting Physician (Nephrology)  Quang Reyes MD as Consulting Physician (Nephrology)    CC: Non-STEMI,, end-stage renal failure, EF 30 to 35%, severe three-vessel coronary disease, severe MR, underwent three-vessel CABG and mitral valve repair, postop A-fib    Interval History: He looks the best so far since surgery      Objective   Vital Signs  Temp:  [97.6 °F (36.4 °C)-98.2 °F (36.8 °C)] 98.2 °F (36.8 °C)  Heart Rate:  [67-92] 84  Resp:  [16-18] 16  BP: (109-143)/(56-91) 117/56    Intake/Output Summary (Last 24 hours) at 4/29/2023 1414  Last data filed at 4/29/2023 0821  Gross per 24 hour   Intake 600 ml   Output --   Net 600 ml     Flowsheet Rows    Flowsheet Row First Filed Value   Admission Height 177.8 cm (70\") Documented at 04/08/2023 1028   Admission Weight 102 kg (225 lb) Documented at 04/08/2023 1028          Physical Exam:   General Appearance:    Alert,oriented, in no acute distress   Lungs:     Clear to auscultation,BS are equal    Heart:    Normal S1 and S2, RRR without murmur, gallop or rub   HEENT:    Sclerae are clear, no JVD or adenopathy   Abdomen:     Normal bowel sounds, soft nontender, nondistended, no HSM   Extremities:   Moves all extremities well, no edema, no cyanosis, no             Redness, no rash     Medication Review:      aspirin, 81 mg, Oral, Daily  atorvastatin, 40 mg, Oral, Nightly  enoxaparin, 30 mg, Subcutaneous, Q24H  epoetin bernice/bernice-epbx, 6,000 Units, Intravenous, Once per day on Mon Wed Fri  First Mouthwash (Magic Mouthwash), 5 mL, Swish & Spit, Q8H  guaiFENesin, 1,200 mg, Oral, Q12H  insulin lispro, 0-9 Units, Subcutaneous, 4x Daily With Meals & Nightly  ipratropium-albuterol, 3 mL, Nebulization, Q6H While Awake - RT  iron polysaccharides, 150 " mg, Oral, Daily  levoFLOXacin, 250 mg, Oral, Q24H  Menthol-Zinc Oxide, 1 application, Topical, TID  midodrine, 7.5 mg, Oral, TID AC  pantoprazole, 40 mg, Oral, QAM  polyethylene glycol, 17 g, Oral, Daily  senna-docusate sodium, 2 tablet, Oral, BID             I reviewed the patient's new clinical results.  I personally viewed and interpreted the patient's EKG/Telemetry data    Assessment/Plan  Active Hospital Problems    Diagnosis  POA   • **NSTEMI (non-ST elevated myocardial infarction) [I21.4]  Yes   • Orthostatic hypotension [I95.1]  Unknown   • Acute UTI (urinary tract infection) [N39.0]  Unknown   • Acute superficial venous thrombosis of right lower extremity [I82.811]  Clinically Undetermined   • Dysphagia [R13.10]  Unknown   • Fatigue [R53.83]  Unknown   • Rhinovirus [B34.8]  Yes   • Abnormal findings on diagnostic imaging of heart and coronary circulation [R93.1]  Yes   • Type 2 diabetes mellitus with diabetic chronic kidney disease [E11.22]  Yes   • Essential hypertension [I10]  Yes   • Hyperlipidemia [E78.5]  Yes   • ESRD (end stage renal disease) [N18.6]  Yes      Resolved Hospital Problems    Diagnosis Date Resolved POA   • Hypernatremia [E87.0] 04/19/2023 Yes   • Dyspnea on exertion [R06.09] 04/24/2023 Yes     Ischemic cardiomyopathy severe mitral insufficiency status post CABG and mitral valve repair on dialysis his blood pressure is pretty soft.  He has Pseudomonas in his urine his white count was elevated he continues to get better.  At this point he is ready for discharge       Lobito Garcia MD  04/29/23  14:14 EDT

## 2023-04-29 NOTE — SIGNIFICANT NOTE
04/29/23 1420   OTHER   Discipline physical therapy assistant   Rehab Time/Intention   Session Not Performed patient unavailable for treatment  (pt was off floor for dialysis in AM and when checked on again after lunch; PT will f/u tomorrow)   Recommendation   PT - Next Appointment 04/30/23

## 2023-04-29 NOTE — PROGRESS NOTES
[facility] Progress Note      Patient Name: Lefty Cai  : 1950  MRN: [id number]  Primary Care Physician:  Daksha Ng APRN  Date of admission: 2023    Subjective     Interval History:   Follow up ESRD.     Patient seen during dialysis, tolerating okay.  Denies any chest pain, shortness of breath, nausea or vomiting  Patient concerned that during last dialysis his blood pressure dropped significantly    Review of Systems:   As noted above    Objective     Vitals:   Temp:  [97.6 °F (36.4 °C)-98.2 °F (36.8 °C)] 98.2 °F (36.8 °C)  Heart Rate:  [67-92] 84  Resp:  [16-18] 16  BP: (109-143)/(56-91) 117/56    Intake/Output Summary (Last 24 hours) at 2023 0946  Last data filed at 2023 0821  Gross per 24 hour   Intake 960 ml   Output --   Net 960 ml       Physical Exam:    General Appearance: Awake and alert, chronically ill, no acute distress  Skin: warm and dry  HEENT: oral mucosa dry, nonicteric sclera  Neck: TDC RIJ.   Lungs: Clear to auscultation, unlabored breathing effort  Heart: RRR no s3 or rub  Abdomen: soft, nontender, not distended, normoactive bowel  Extremities: Trace edema. Upper and lower ext edema. LUE AVF.  Neuro: normal speech and mental status     Scheduled Meds:     aspirin, 81 mg, Oral, Daily  atorvastatin, 40 mg, Oral, Nightly  enoxaparin, 30 mg, Subcutaneous, Q24H  epoetin bernice/bernice-epbx, 6,000 Units, Intravenous, Once per day on   First Mouthwash (Magic Mouthwash), 5 mL, Swish & Spit, Q8H  guaiFENesin, 1,200 mg, Oral, Q12H  insulin lispro, 0-9 Units, Subcutaneous, 4x Daily With Meals & Nightly  ipratropium-albuterol, 3 mL, Nebulization, Q6H While Awake - RT  iron polysaccharides, 150 mg, Oral, Daily  levoFLOXacin, 250 mg, Oral, Q24H  Menthol-Zinc Oxide, 1 application, Topical, TID  midodrine, 7.5 mg, Oral, TID AC  pantoprazole, 40 mg, Oral, QAM  polyethylene glycol, 17 g, Oral, Daily  senna-docusate sodium, 2 tablet, Oral,  BID      IV Meds:        Results Reviewed:   I have personally reviewed the results from the time of this admission to 4/29/2023 09:46 EDT     Results from last 7 days   Lab Units 04/29/23 0307 04/28/23 0319 04/27/23  0324 04/26/23  0300   SODIUM mmol/L 136 138  138 137 139  138   POTASSIUM mmol/L 3.6 4.2  4.3 3.8 4.0  3.9   CHLORIDE mmol/L 101 103  104 102 100  100   CO2 mmol/L 22.0 17.3*  22.0 20.6* 24.0  25.0   BUN mg/dL 36* 24*  25* 41* 27*  26*   CREATININE mg/dL 5.14* 3.93*  3.38* 5.59* 4.21*  4.14*   CALCIUM mg/dL 8.2* 8.4*  8.0* 9.0 8.6  8.5*   BILIRUBIN mg/dL  --  0.5 0.7 0.8   ALK PHOS U/L  --  102 109 121*   ALT (SGPT) U/L  --  21 23 23   AST (SGOT) U/L  --  38 34 48*   GLUCOSE mg/dL 100* 98  97 101* 106*  110*       Estimated Creatinine Clearance: 15.4 mL/min (A) (by C-G formula based on SCr of 5.14 mg/dL (H)).    Results from last 7 days   Lab Units 04/29/23 0307 04/28/23 0319 04/27/23  0324   PHOSPHORUS mg/dL 3.2 1.7* 3.3             Results from last 7 days   Lab Units 04/29/23 0307 04/28/23 0319 04/27/23  1649 04/27/23  0324 04/26/23  0300 04/25/23  0325   WBC 10*3/mm3 7.49 11.66*  --  16.01* 19.80* 12.67*   HEMOGLOBIN g/dL 8.5* 8.7* 9.9* 9.2* 9.7* 9.5*   PLATELETS 10*3/mm3 189 191  --  175 183 172             Assessment / Plan     ASSESSMENT:  1. ESRD.  Patient seen during dialysis this morning.  Currently on dialysis every Tuesday, Thursday and Saturday to accommodate transfer to rehab  2. NSTEMI. SP 3 vessel CABG, MV repair 4/13/23.  3. PAF,  SR. Metoprolol dc.   4. Anemia CKD and blood loss.  Globin today 8.7.  5.  Hypophosphatemia, phosphorus 1.7  6. DM2, with renal complication  7. Deconditioning.    8. Hypotension, acute on chronic, leukocytosis improved after treating his Pseudomonas UTI   9. Encephalopathy.  Resolved    PLAN:  1.  Continue dialysis TTS.  Cautious fluid removal with dialysis.    2.  Surveillance labs      I discussed the case with the patient and with  Dr. Jose Moore Rai, MD  04/29/23  09:46 EDT    [facility]""  [phone]

## 2023-04-29 NOTE — PLAN OF CARE
Goal Outcome Evaluation:  Plan of Care Reviewed With: patient        Progress: no change   72 yr old male  Aox4  Pod 16 from Cabg x4 and MVR.  VSS. Hr 87 NSR incisions healing well. Bruised. No signs or symptoms of infection. Hemodialysis done today. Tolerated well. BP remained stable. Up with walker ambulating in rodriguez. Continues to improve. Plan to move to rehab when appropriate. Will continue to monitor.

## 2023-04-29 NOTE — PROGRESS NOTES
" LOS: 21 days   Patient Care Team:  Daksha Ng APRN as PCP - General (Family Medicine)  Rudy Faith MD as Consulting Physician (Ophthalmology)  Jose Mccall MD as Consulting Physician (Nephrology)  Quang Reyes MD as Consulting Physician (Nephrology)    Chief Complaint: post op    Subjective:  Symptoms:  No shortness of breath, cough or chest pain.    Diet:  Adequate intake.  No nausea or vomiting.    Activity level: Impaired due to weakness.    Pain:  He complains of pain that is mild.  Pain is well controlled.      No issues overnight. Currently getting HD    Vital Signs  Temp:  [97.6 °F (36.4 °C)-98.2 °F (36.8 °C)] 98.2 °F (36.8 °C)  Heart Rate:  [67-92] 84  Resp:  [16-18] 16  BP: (109-143)/(56-91) 117/56  Body mass index is 28.82 kg/m².    Intake/Output Summary (Last 24 hours) at 4/29/2023 1148  Last data filed at 4/29/2023 0821  Gross per 24 hour   Intake 960 ml   Output --   Net 960 ml     I/O this shift:  In: 240 [P.O.:240]  Out: -           04/27/23  0645 04/28/23  0640 04/29/23  0635   Weight: 94 kg (207 lb 4.8 oz) 95.3 kg (210 lb) 96.4 kg (212 lb 8 oz)         Objective:  General Appearance:  Comfortable and in no acute distress.    Vital signs: (most recent): Blood pressure 117/56, pulse 84, temperature 98.2 °F (36.8 °C), temperature source Oral, resp. rate 16, height 182.9 cm (72\"), weight 96.4 kg (212 lb 8 oz), SpO2 99 %.  Vital signs are normal.  No fever.    Output: Producing urine and producing stool.    Lungs:  Normal effort and normal respiratory rate.  There are decreased breath sounds.    Heart: Normal rate.  Regular rhythm.    Abdomen: Abdomen is soft.  Bowel sounds are normal.     Extremities: There is dependent edema (mild bilateral LE).    Pulses: Distal pulses are intact.    Neurological: Patient is alert and oriented to person, place and time.    Skin:  Warm and dry.  (Sternal incision clean, dry, and intact)        Results Review:        WBC WBC   Date Value " Ref Range Status   04/29/2023 7.49 3.40 - 10.80 10*3/mm3 Final   04/28/2023 11.66 (H) 3.40 - 10.80 10*3/mm3 Final   04/27/2023 16.01 (H) 3.40 - 10.80 10*3/mm3 Final      HGB Hemoglobin   Date Value Ref Range Status   04/29/2023 8.5 (L) 13.0 - 17.7 g/dL Final   04/28/2023 8.7 (L) 13.0 - 17.7 g/dL Final   04/27/2023 9.9 (L) 13.0 - 17.7 g/dL Final   04/27/2023 9.2 (L) 13.0 - 17.7 g/dL Final      HCT Hematocrit   Date Value Ref Range Status   04/29/2023 27.2 (L) 37.5 - 51.0 % Final   04/28/2023 26.4 (L) 37.5 - 51.0 % Final   04/27/2023 30.1 (L) 37.5 - 51.0 % Final   04/27/2023 28.0 (L) 37.5 - 51.0 % Final      Platelets Platelets   Date Value Ref Range Status   04/29/2023 189 140 - 450 10*3/mm3 Final   04/28/2023 191 140 - 450 10*3/mm3 Final   04/27/2023 175 140 - 450 10*3/mm3 Final        PT/INR:  No results found for: PROTIME/No results found for: INR    Sodium Sodium   Date Value Ref Range Status   04/29/2023 136 136 - 145 mmol/L Final   04/28/2023 138 136 - 145 mmol/L Final   04/28/2023 138 136 - 145 mmol/L Final   04/27/2023 137 136 - 145 mmol/L Final      Potassium Potassium   Date Value Ref Range Status   04/29/2023 3.6 3.5 - 5.2 mmol/L Final   04/28/2023 4.3 3.5 - 5.2 mmol/L Final   04/28/2023 4.2 3.5 - 5.2 mmol/L Final   04/27/2023 3.8 3.5 - 5.2 mmol/L Final     Comment:     Slight hemolysis detected by analyzer. Results may be affected.      Chloride Chloride   Date Value Ref Range Status   04/29/2023 101 98 - 107 mmol/L Final   04/28/2023 104 98 - 107 mmol/L Final   04/28/2023 103 98 - 107 mmol/L Final   04/27/2023 102 98 - 107 mmol/L Final      Bicarbonate CO2   Date Value Ref Range Status   04/29/2023 22.0 22.0 - 29.0 mmol/L Final   04/28/2023 22.0 22.0 - 29.0 mmol/L Final   04/28/2023 17.3 (L) 22.0 - 29.0 mmol/L Final   04/27/2023 20.6 (L) 22.0 - 29.0 mmol/L Final      BUN BUN   Date Value Ref Range Status   04/29/2023 36 (H) 8 - 23 mg/dL Final   04/28/2023 25 (H) 8 - 23 mg/dL Final   04/28/2023 24 (H) 8 -  23 mg/dL Final   04/27/2023 41 (H) 8 - 23 mg/dL Final      Creatinine Creatinine   Date Value Ref Range Status   04/29/2023 5.14 (H) 0.76 - 1.27 mg/dL Final   04/28/2023 3.38 (H) 0.76 - 1.27 mg/dL Final   04/28/2023 3.93 (H) 0.76 - 1.27 mg/dL Final   04/27/2023 5.59 (H) 0.76 - 1.27 mg/dL Final      Calcium Calcium   Date Value Ref Range Status   04/29/2023 8.2 (L) 8.6 - 10.5 mg/dL Final   04/28/2023 8.0 (L) 8.6 - 10.5 mg/dL Final   04/28/2023 8.4 (L) 8.6 - 10.5 mg/dL Final   04/27/2023 9.0 8.6 - 10.5 mg/dL Final      Magnesium No results found for: MG       aspirin, 81 mg, Oral, Daily  atorvastatin, 40 mg, Oral, Nightly  enoxaparin, 30 mg, Subcutaneous, Q24H  epoetin bernice/bernice-epbx, 6,000 Units, Intravenous, Once per day on Mon Wed Fri  First Mouthwash (Magic Mouthwash), 5 mL, Swish & Spit, Q8H  guaiFENesin, 1,200 mg, Oral, Q12H  insulin lispro, 0-9 Units, Subcutaneous, 4x Daily With Meals & Nightly  ipratropium-albuterol, 3 mL, Nebulization, Q6H While Awake - RT  iron polysaccharides, 150 mg, Oral, Daily  levoFLOXacin, 250 mg, Oral, Q24H  Menthol-Zinc Oxide, 1 application, Topical, TID  midodrine, 7.5 mg, Oral, TID AC  pantoprazole, 40 mg, Oral, QAM  polyethylene glycol, 17 g, Oral, Daily  senna-docusate sodium, 2 tablet, Oral, BID         Patient Active Problem List   Diagnosis Code   • 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.22   • Essential hypertension I10   • Hyperlipidemia E78.5   • Primary insomnia F51.01   • ESRD (end stage renal disease) N18.6   • Vitamin D deficiency E55.9   • Diverticulitis of large intestine with perforation and abscess without bleeding K57.20   • Obesity (BMI 30-39.9) E66.9   • Impaired mobility and ADLs Z74.09, Z78.9   • History of colon resection Z90.49   • Colon polyps K63.5   • Diverticulosis K57.90   • CKD (chronic kidney disease) stage 5, GFR less than 15 ml/min N18.5   • NSTEMI (non-ST elevated myocardial infarction) I21.4   • Rhinovirus B34.8   •  Abnormal findings on diagnostic imaging of heart and coronary circulation R93.1   • Fatigue R53.83   • Dysphagia R13.10   • Acute superficial venous thrombosis of right lower extremity I82.811   • Orthostatic hypotension I95.1   • Acute UTI (urinary tract infection) N39.0       Assessment & Plan   - NSTEMI/multi-vessel CAD- s/p urgent CABGx4 LIMA/RSVG, MV repair-([place]) POD#17  - HFrEF--30-35% per echo 4/8  - rhinovirus infection  - ESRD on HD  - hypertension  - hyperlipidemia  - DM II- A1c 5.0  -post op anemia- expected acute blood loss  -Leukocytosis- probable reactive/secondary to  -TCP- resolved    - acute UTI--culture with pseudomonas; on levofloxacin per ID     He looks much better this morning  BP stable after increase in midodrine  Tolerating HD better today  Remains in SR. Beta blocker held d/t hypotension  WBC normalized today. Antibiotics changed to oral levofloxacin per ID  Increase activity-- continue PT and OT  I feel that he is nearly ready for d/c from surgical standpoint. Dr. Williamson recommends sub-acute rehab. CCP to arrange for placement  Continue routine care    Yu El, APRDIRK  04/29/23  11:48 EDT

## 2023-04-30 LAB
ALBUMIN SERPL-MCNC: 2.6 G/DL (ref 3.5–5.2)
ANION GAP SERPL CALCULATED.3IONS-SCNC: 11 MMOL/L (ref 5–15)
BUN SERPL-MCNC: 18 MG/DL (ref 8–23)
BUN/CREAT SERPL: 4.8 (ref 7–25)
CALCIUM SPEC-SCNC: 8.3 MG/DL (ref 8.6–10.5)
CHLORIDE SERPL-SCNC: 103 MMOL/L (ref 98–107)
CO2 SERPL-SCNC: 24 MMOL/L (ref 22–29)
CREAT SERPL-MCNC: 3.77 MG/DL (ref 0.76–1.27)
DEPRECATED RDW RBC AUTO: 48.7 FL (ref 37–54)
EGFRCR SERPLBLD CKD-EPI 2021: 16.2 ML/MIN/1.73
ERYTHROCYTE [DISTWIDTH] IN BLOOD BY AUTOMATED COUNT: 15.3 % (ref 12.3–15.4)
GLUCOSE BLDC GLUCOMTR-MCNC: 102 MG/DL (ref 70–130)
GLUCOSE BLDC GLUCOMTR-MCNC: 129 MG/DL (ref 70–130)
GLUCOSE BLDC GLUCOMTR-MCNC: 135 MG/DL (ref 70–130)
GLUCOSE BLDC GLUCOMTR-MCNC: 177 MG/DL (ref 70–130)
GLUCOSE SERPL-MCNC: 100 MG/DL (ref 65–99)
HCT VFR BLD AUTO: 26.9 % (ref 37.5–51)
HGB BLD-MCNC: 8.6 G/DL (ref 13–17.7)
MAGNESIUM SERPL-MCNC: 1.8 MG/DL (ref 1.6–2.4)
MCH RBC QN AUTO: 28.1 PG (ref 26.6–33)
MCHC RBC AUTO-ENTMCNC: 32 G/DL (ref 31.5–35.7)
MCV RBC AUTO: 87.9 FL (ref 79–97)
PHOSPHATE SERPL-MCNC: 2.1 MG/DL (ref 2.5–4.5)
PLATELET # BLD AUTO: 179 10*3/MM3 (ref 140–450)
PMV BLD AUTO: 9.2 FL (ref 6–12)
POTASSIUM SERPL-SCNC: 3.6 MMOL/L (ref 3.5–5.2)
RBC # BLD AUTO: 3.06 10*6/MM3 (ref 4.14–5.8)
SODIUM SERPL-SCNC: 138 MMOL/L (ref 136–145)
WBC NRBC COR # BLD: 6.67 10*3/MM3 (ref 3.4–10.8)

## 2023-04-30 PROCEDURE — 99232 SBSQ HOSP IP/OBS MODERATE 35: CPT | Performed by: INTERNAL MEDICINE

## 2023-04-30 PROCEDURE — 94799 UNLISTED PULMONARY SVC/PX: CPT

## 2023-04-30 PROCEDURE — 85027 COMPLETE CBC AUTOMATED: CPT | Performed by: NURSE PRACTITIONER

## 2023-04-30 PROCEDURE — 80069 RENAL FUNCTION PANEL: CPT | Performed by: HOSPITALIST

## 2023-04-30 PROCEDURE — 83735 ASSAY OF MAGNESIUM: CPT | Performed by: HOSPITALIST

## 2023-04-30 PROCEDURE — 63710000001 INSULIN LISPRO (HUMAN) PER 5 UNITS: Performed by: NURSE PRACTITIONER

## 2023-04-30 PROCEDURE — 25010000002 ENOXAPARIN PER 10 MG: Performed by: NURSE PRACTITIONER

## 2023-04-30 PROCEDURE — 82948 REAGENT STRIP/BLOOD GLUCOSE: CPT

## 2023-04-30 PROCEDURE — 97110 THERAPEUTIC EXERCISES: CPT

## 2023-04-30 PROCEDURE — 94761 N-INVAS EAR/PLS OXIMETRY MLT: CPT

## 2023-04-30 PROCEDURE — 97116 GAIT TRAINING THERAPY: CPT

## 2023-04-30 RX ORDER — TRAZODONE HYDROCHLORIDE 50 MG/1
25 TABLET ORAL NIGHTLY PRN
Status: DISCONTINUED | OUTPATIENT
Start: 2023-04-30 | End: 2023-05-02 | Stop reason: HOSPADM

## 2023-04-30 RX ADMIN — LEVOFLOXACIN 250 MG: 250 TABLET, FILM COATED ORAL at 10:46

## 2023-04-30 RX ADMIN — INSULIN LISPRO 2 UNITS: 100 INJECTION, SOLUTION INTRAVENOUS; SUBCUTANEOUS at 17:53

## 2023-04-30 RX ADMIN — DOCUSATE SODIUM 50MG AND SENNOSIDES 8.6MG 2 TABLET: 8.6; 5 TABLET, FILM COATED ORAL at 08:04

## 2023-04-30 RX ADMIN — GUAIFENESIN 1200 MG: 600 TABLET, EXTENDED RELEASE ORAL at 20:58

## 2023-04-30 RX ADMIN — MIDODRINE HYDROCHLORIDE 7.5 MG: 5 TABLET ORAL at 17:52

## 2023-04-30 RX ADMIN — Medication 150 MG: at 08:05

## 2023-04-30 RX ADMIN — ATORVASTATIN CALCIUM 40 MG: 20 TABLET, FILM COATED ORAL at 20:58

## 2023-04-30 RX ADMIN — DIPHENHYDRAMINE HYDROCHLORIDE AND LIDOCAINE HYDROCHLORIDE AND ALUMINUM HYDROXIDE AND MAGNESIUM HYDRO 5 ML: KIT at 16:09

## 2023-04-30 RX ADMIN — IPRATROPIUM BROMIDE AND ALBUTEROL SULFATE 3 ML: 2.5; .5 SOLUTION RESPIRATORY (INHALATION) at 19:14

## 2023-04-30 RX ADMIN — GUAIFENESIN 1200 MG: 600 TABLET, EXTENDED RELEASE ORAL at 08:04

## 2023-04-30 RX ADMIN — ANORECTAL OINTMENT 1 APPLICATION: 15.7; .44; 24; 20.6 OINTMENT TOPICAL at 08:04

## 2023-04-30 RX ADMIN — IPRATROPIUM BROMIDE AND ALBUTEROL SULFATE 3 ML: 2.5; .5 SOLUTION RESPIRATORY (INHALATION) at 15:34

## 2023-04-30 RX ADMIN — MIDODRINE HYDROCHLORIDE 7.5 MG: 5 TABLET ORAL at 06:40

## 2023-04-30 RX ADMIN — POLYETHYLENE GLYCOL 3350 17 G: 17 POWDER, FOR SOLUTION ORAL at 08:05

## 2023-04-30 RX ADMIN — ANORECTAL OINTMENT 1 APPLICATION: 15.7; .44; 24; 20.6 OINTMENT TOPICAL at 16:09

## 2023-04-30 RX ADMIN — TRAZODONE HYDROCHLORIDE 25 MG: 50 TABLET ORAL at 20:58

## 2023-04-30 RX ADMIN — IPRATROPIUM BROMIDE AND ALBUTEROL SULFATE 3 ML: 2.5; .5 SOLUTION RESPIRATORY (INHALATION) at 07:04

## 2023-04-30 RX ADMIN — MIDODRINE HYDROCHLORIDE 7.5 MG: 5 TABLET ORAL at 10:45

## 2023-04-30 RX ADMIN — ENOXAPARIN SODIUM 30 MG: 100 INJECTION SUBCUTANEOUS at 16:08

## 2023-04-30 RX ADMIN — PANTOPRAZOLE SODIUM 40 MG: 40 TABLET, DELAYED RELEASE ORAL at 06:41

## 2023-04-30 RX ADMIN — ASPIRIN 81 MG: 81 TABLET, COATED ORAL at 08:04

## 2023-04-30 NOTE — PROGRESS NOTES
Name: Lefty Cai ADMIT: 2023   : 1950  PCP: AnithaDaksha srinivasan, DUSTY    MRN: [id number] LOS: 22 days   AGE/SEX: 72 y.o. male  ROOM: 4/     Subjective   Subjective   Feeling okay today. Slept well. Tolerating diet and eating well. No SOA or CP or palp. A little dizzy with activity still. No N/V/D/abd pain/F/C/NS.    Review of Systems     as above    Objective   Objective   Vital Signs  Temp:  [97.9 °F (36.6 °C)-98.3 °F (36.8 °C)] 98.2 °F (36.8 °C)  Heart Rate:  [80-98] 98  Resp:  [16-18] 18  BP: (119-135)/(61-69) 119/67  SpO2:  [97 %-98 %] 97 %  on  Flow (L/min):  [0] 0;   Device (Oxygen Therapy): room air  Body mass index is 28.54 kg/m².     (No change in exam today)    Physical Exam  Vitals and nursing note reviewed. Exam conducted with a chaperone present (Wife).   Constitutional:       General: He is not in acute distress.     Appearance: He is ill-appearing (chronically). He is not toxic-appearing or diaphoretic.   HENT:      Head: Normocephalic.      Nose: Nose normal.      Mouth/Throat:      Mouth: Mucous membranes are moist.      Pharynx: Oropharynx is clear.   Eyes:      General: No scleral icterus.        Right eye: No discharge.         Left eye: No discharge.      Extraocular Movements: Extraocular movements intact.      Conjunctiva/sclera: Conjunctivae normal.   Cardiovascular:      Rate and Rhythm: Normal rate and regular rhythm.      Pulses: Normal pulses.      Comments: REJIE AVSANDRA  TDC right upper chest  Midline sternal incision looks good  Pulmonary:      Effort: Pulmonary effort is normal. No respiratory distress.      Breath sounds: Normal breath sounds. No wheezing or rales.      Comments: Anteriorly   Abdominal:      General: Bowel sounds are normal. There is no distension.      Palpations: Abdomen is soft.      Tenderness: There is no abdominal tenderness.   Musculoskeletal:         General: Swelling (1+ edema in all 4 extremities) present. No deformity. Normal range of  motion.      Cervical back: Neck supple. No rigidity.   Lymphadenopathy:      Cervical: No cervical adenopathy.   Skin:     General: Skin is warm and dry.      Capillary Refill: Capillary refill takes less than 2 seconds.      Coloration: Skin is pale. Skin is not jaundiced.   Neurological:      General: No focal deficit present.      Mental Status: He is alert and oriented to person, place, and time. Mental status is at baseline.      Cranial Nerves: No cranial nerve deficit.      Coordination: Coordination normal.   Psychiatric:         Mood and Affect: Mood normal.         Behavior: Behavior normal.         Thought Content: Thought content normal.      Comments: Very pleasant       Results Review     I reviewed the patient's new clinical results.  Results from last 7 days   Lab Units 04/30/23 0327 04/29/23  0307 04/28/23 0319 04/27/23  1649 04/27/23  0324   WBC 10*3/mm3 6.67 7.49 11.66*  --  16.01*   HEMOGLOBIN g/dL 8.6* 8.5* 8.7* 9.9* 9.2*   PLATELETS 10*3/mm3 179 189 191  --  175     Results from last 7 days   Lab Units 04/30/23 0327 04/29/23  0307 04/28/23 0319 04/27/23  0324   SODIUM mmol/L 138 136 138  138 137   POTASSIUM mmol/L 3.6 3.6 4.2  4.3 3.8   CHLORIDE mmol/L 103 101 103  104 102   CO2 mmol/L 24.0 22.0 17.3*  22.0 20.6*   BUN mg/dL 18 36* 24*  25* 41*   CREATININE mg/dL 3.77* 5.14* 3.93*  3.38* 5.59*   GLUCOSE mg/dL 100* 100* 98  97 101*   EGFR mL/min/1.73 16.2* 11.1* 15.4*  18.4* 10.1*     Results from last 7 days   Lab Units 04/30/23 0327 04/29/23  0307 04/28/23 0319 04/27/23  0324 04/26/23  0300   ALBUMIN g/dL 2.6* 2.8* 2.5*  2.6* 2.7* 2.9*   BILIRUBIN mg/dL  --   --  0.5 0.7 0.8   ALK PHOS U/L  --   --  102 109 121*   AST (SGOT) U/L  --   --  38 34 48*   ALT (SGPT) U/L  --   --  21 23 23     Results from last 7 days   Lab Units 04/30/23  0327 04/29/23  0307 04/28/23 0319 04/27/23  0324   CALCIUM mg/dL 8.3* 8.2* 8.4*  8.0* 9.0   ALBUMIN g/dL 2.6* 2.8* 2.5*  2.6* 2.7*   MAGNESIUM  mg/dL 1.8  --   --   --    PHOSPHORUS mg/dL 2.1* 3.2 1.7* 3.3     Results from last 7 days   Lab Units 04/26/23  0300   PROCALCITONIN ng/mL 0.92*     Glucose   Date/Time Value Ref Range Status   04/30/2023 0644 102 70 - 130 mg/dL Final     Comment:     Meter: [id number] : [id number] Oral Jacobson    04/29/2023 2058 168 (H) 70 - 130 mg/dL Final     Comment:     Meter: [id number] : [id number] Oral Jacobson    04/29/2023 1646 95 70 - 130 mg/dL Final     Comment:     Meter: [id number] : [id number] Jailene Allen    04/29/2023 1417 114 70 - 130 mg/dL Final     Comment:     Meter: [id number] : [id number] [facility]   04/29/2023 0722 109 70 - 130 mg/dL Final     Comment:     Meter: [id number] : [id number] Oral Jacobson    04/28/2023 2114 120 70 - 130 mg/dL Final     Comment:     Meter: [id number] : [id number] Oral Jacobson    04/28/2023 1559 132 (H) 70 - 130 mg/dL Final     Comment:     Meter: [id number] : [id number] Cristianeduard Lunaedwina SIM       No radiology results for the last day  I have personally reviewed all medications:  Scheduled Medications  aspirin, 81 mg, Oral, Daily  atorvastatin, 40 mg, Oral, Nightly  enoxaparin, 30 mg, Subcutaneous, Q24H  epoetin bernice/bernice-epbx, 6,000 Units, Intravenous, Once per day on Mon Wed Fri  First Mouthwash (Magic Mouthwash), 5 mL, Swish & Spit, Q8H  guaiFENesin, 1,200 mg, Oral, Q12H  insulin lispro, 0-9 Units, Subcutaneous, 4x Daily With Meals & Nightly  ipratropium-albuterol, 3 mL, Nebulization, Q6H While Awake - RT  iron polysaccharides, 150 mg, Oral, Daily  levoFLOXacin, 250 mg, Oral, Q24H  Menthol-Zinc Oxide, 1 application, Topical, TID  midodrine, 7.5 mg, Oral, TID AC  pantoprazole, 40 mg, Oral, QAM  polyethylene glycol, 17 g, Oral, Daily  senna-docusate sodium, 2 tablet, Oral, BID    Infusions   Diet  Diet: Regular/House Diet; Texture: Regular Texture (IDDSI 7); Fluid Consistency: Thin (IDDSI 0)    I have personally reviewed:  [x]  Laboratory   []   Microbiology   []  Radiology   []  EKG/Telemetry  []  Cardiology/Vascular   []  Pathology    []  Records       Assessment/Plan     Active Hospital Problems    Diagnosis  POA   • **NSTEMI (non-ST elevated myocardial infarction) [I21.4]  Yes   • Orthostatic hypotension [I95.1]  Unknown   • Acute UTI (urinary tract infection) [N39.0]  Unknown   • Acute superficial venous thrombosis of right lower extremity [I82.811]  Clinically Undetermined   • Dysphagia [R13.10]  Unknown   • Fatigue [R53.83]  Unknown   • Rhinovirus [B34.8]  Yes   • Abnormal findings on diagnostic imaging of heart and coronary circulation [R93.1]  Yes   • Type 2 diabetes mellitus with diabetic chronic kidney disease [E11.22]  Yes   • Essential hypertension [I10]  Yes   • Hyperlipidemia [E78.5]  Yes   • ESRD (end stage renal disease) [N18.6]  Yes      Resolved Hospital Problems    Diagnosis Date Resolved POA   • Hypernatremia [E87.0] 04/19/2023 Yes   • Dyspnea on exertion [R06.09] 04/24/2023 Yes       Mr. Cai is a 73yo gentleman who presented to the hospital with complaints of increased dyspnea in the setting of ESRD with HD compliance. He was found to have new T-wave inversions with troponin elevation and underwent heart cath that revealed severe three vessel CAD with occluded right coronary system. Cardiothoracic Surgery was consulted for CABG. RVP was positive for rhinovirus. ID was consulted given need for OR--they  cleared for surgery without antibiotics. He was taken for open heart on 4/13. He had hypotension and atrial fibrillation with RVR following surgery. Moved out of CICU on 4/20.     • NSTEMI/ PAF : S/p CABG x4 with MV repair on 4/13. Cardiology and Cardiothoracic Surgery managing. Echo 4/8 with EF 30-35%. On amiodarone and metoprolol with conversion of atrial fibrillation to NSR. Then had recurrence of afib RVR 4/20 so beta blocker increased. Had orthostatic hypotension so Nephrology/Cards then removed beta blocker. Compression  stockings ordered- keep on during the day and remove at night time. On ASA/statin. We will leave timing of full anticoagulation up to Cardiology and Cardiothoracic Surgery     • Rhinovirus: Supportive care. ID saw and cleared for OR. Pulmonology followed (signed off 4/20). He is on Duonebs/Mucomyst. Remains on RA. Encouraged IS and flutter valve use at bedside.     • ESRD: Via TDC. Nephrology managing. Doppler of left arm AVF showed marginal flow and inadequate size.      • DM2: Sugars acceptable. Not requiring correctional insulin. Continue to encourage oral intake     • HTN: BPs acceptable. BB removed 4/25. Continue midodrine.      • Leukocytosis/acute UTI: Up to 18K on 4/21. CXR negative. UA showed >100k Pseudomonas in ESRD patient. Respiratory culture with normal respiratory nikunj. ID consulted 4/23 and felt UA represented asymptomatic bacteruria and they did not recommend treatment at that time. He then developed confusion/dysuria on 4/25--Cefepime started. Had worsening WBC/confusion/low BP so ID re-consulted 4/26. Vancomycin added. CT A/P/chest without obvious source of infection. Felt to have true UTI. Vancomycin removed 4/28 and ID signed off with plans for oral Levaquin, renally dosed, for 7 days (end date 5/2).     • ABLA on chronic anemia of CKD: Looks like he trends mid 10 -11 range. Currently stable around 8.5. On DAVID per Nephrology. Continue monitoring Hgb.       • Constipation:  Continue Miralax/Senokot. Last BM 4/27.     • Coccyx buttock skin breakdown: Encouraged turn every 2 hours, waffle cushion, pressure reduction measures, etc.      • Fatigue: Suspect multifactorial due to chronic comorbidities, prolonged hospital stay, open heart surgery, etc. Highly suspect underlying PATY as well. Would benefit from outpatient sleep study. Trazodone changed to PRN. BB stopped 4/25--this may also help with drowsiness. TSH/vitamin B12/folate all okay.     • Dysphagia: ST re-evaluated and has now cleared for  regular diet with thin liquids. Tolerating so far.     • Acute superficial thrombophlebitis RLE: Venous doppler showed greater saphenous thrombophlebitis--nothing deep. Continue compression and can use warm compresses as needed. He is already on VTE prophylaxis with Lovenox.     • Orthostatic hypotension: Jobst when out of bed. Repeat orthostatic BPs in AM. Continue Midodrine.     Appreciate all specialists' attention to pt.   D/w pt and wife.     VTE Prophylaxis - Lovenox 30 mg SC daily   Code Status - Full code  Disposition - Anticipate discharge whenever arrangements in place for [facility] (Dr. Williamson did not feel appropriate for [facility])       Matti Mcgrath MD  [facility]  04/30/23  09:49 EDT

## 2023-04-30 NOTE — PROGRESS NOTES
"Lefty Cai  1950 72 y.o.  [id number]      Patient Care Team:  Daksha Ng APRN as PCP - General (Family Medicine)  Rudy Faith MD as Consulting Physician (Ophthalmology)  Jose Mccall MD as Consulting Physician (Nephrology)  Quang Reyes MD as Consulting Physician (Nephrology)    CC: Non-STEMI,, end-stage renal failure, EF 30 to 35%, severe three-vessel coronary disease, severe MR, underwent three-vessel CABG and mitral valve repair, postop A-fib    Interval History: Doing well wants to see his dog      Objective   Vital Signs  Temp:  [97.5 °F (36.4 °C)-98.3 °F (36.8 °C)] 97.5 °F (36.4 °C)  Heart Rate:  [80-98] 86  Resp:  [16-18] 18  BP: (119-135)/(61-69) 126/63    Intake/Output Summary (Last 24 hours) at 4/30/2023 1408  Last data filed at 4/30/2023 1300  Gross per 24 hour   Intake 600 ml   Output 450 ml   Net 150 ml     Flowsheet Rows    Flowsheet Row First Filed Value   Admission Height 177.8 cm (70\") Documented at 04/08/2023 1028   Admission Weight 102 kg (225 lb) Documented at 04/08/2023 1028          Physical Exam:   General Appearance:    Alert,oriented, in no acute distress   Lungs:     Clear to auscultation,BS are equal    Heart:    Normal S1 and S2, RRR without murmur, gallop or rub   HEENT:    Sclerae are clear, no JVD or adenopathy   Abdomen:     Normal bowel sounds, soft nontender, nondistended, no HSM   Extremities:   Moves all extremities well, no edema, no cyanosis, no             Redness, no rash     Medication Review:      aspirin, 81 mg, Oral, Daily  atorvastatin, 40 mg, Oral, Nightly  enoxaparin, 30 mg, Subcutaneous, Q24H  epoetin bernice/bernice-epbx, 6,000 Units, Intravenous, Once per day on Mon Wed Fri  First Mouthwash (Magic Mouthwash), 5 mL, Swish & Spit, Q8H  guaiFENesin, 1,200 mg, Oral, Q12H  insulin lispro, 0-9 Units, Subcutaneous, 4x Daily With Meals & Nightly  ipratropium-albuterol, 3 mL, Nebulization, Q6H While Awake - RT  iron polysaccharides, 150 mg, " Oral, Daily  levoFLOXacin, 250 mg, Oral, Q24H  Menthol-Zinc Oxide, 1 application, Topical, TID  midodrine, 7.5 mg, Oral, TID AC  pantoprazole, 40 mg, Oral, QAM  polyethylene glycol, 17 g, Oral, Daily  senna-docusate sodium, 2 tablet, Oral, BID             I reviewed the patient's new clinical results.  I personally viewed and interpreted the patient's EKG/Telemetry data    Assessment/Plan  Active Hospital Problems    Diagnosis  POA   • **NSTEMI (non-ST elevated myocardial infarction) [I21.4]  Yes   • Orthostatic hypotension [I95.1]  Unknown   • Acute UTI (urinary tract infection) [N39.0]  Unknown   • Acute superficial venous thrombosis of right lower extremity [I82.811]  Clinically Undetermined   • Dysphagia [R13.10]  Unknown   • Fatigue [R53.83]  Unknown   • Rhinovirus [B34.8]  Yes   • Abnormal findings on diagnostic imaging of heart and coronary circulation [R93.1]  Yes   • Type 2 diabetes mellitus with diabetic chronic kidney disease [E11.22]  Yes   • Essential hypertension [I10]  Yes   • Hyperlipidemia [E78.5]  Yes   • ESRD (end stage renal disease) [N18.6]  Yes      Resolved Hospital Problems    Diagnosis Date Resolved POA   • Hypernatremia [E87.0] 04/19/2023 Yes   • Dyspnea on exertion [R06.09] 04/24/2023 Yes     Ischemic cardiomyopathy severe mitral insufficiency status post CABG and mitral valve repair on dialysis his blood pressure is pretty soft.  He has Pseudomonas in his urine his white count was elevated he continues to get better.  He really looks pretty good his blood pressures up I think he is ready for rehab I told him he could bring his dog in   Lobito Garcia MD  04/30/23  14:08 EDT

## 2023-04-30 NOTE — PROGRESS NOTES
" LOS: 22 days   Patient Care Team:  Daksha Ng APRN as PCP - General (Family Medicine)  Rudy Faith MD as Consulting Physician (Ophthalmology)  Jose Mccall MD as Consulting Physician (Nephrology)  Quang Reyes MD as Consulting Physician (Nephrology)    Chief Complaint: post op    Subjective:  Symptoms:  No shortness of breath, cough or chest pain.    Diet:  Adequate intake.  No nausea or vomiting.    Activity level: Impaired due to weakness.    Pain:  He complains of pain that is mild.  Pain is well controlled.      No issues overnight    Vital Signs  Temp:  [97.9 °F (36.6 °C)-98.3 °F (36.8 °C)] 98.2 °F (36.8 °C)  Heart Rate:  [80-98] 98  Resp:  [16-18] 18  BP: (119-135)/(61-69) 119/67  Body mass index is 28.54 kg/m².    Intake/Output Summary (Last 24 hours) at 4/30/2023 0941  Last data filed at 4/30/2023 0913  Gross per 24 hour   Intake 600 ml   Output 450 ml   Net 150 ml     I/O this shift:  In: 240 [P.O.:240]  Out: -           04/29/23  0635 04/30/23  0406 04/30/23  0536   Weight: 96.4 kg (212 lb 8 oz) 95.5 kg (210 lb 8 oz) 95.4 kg (210 lb 6.4 oz)         Objective:  General Appearance:  Comfortable and in no acute distress.    Vital signs: (most recent): Blood pressure 119/67, pulse 98, temperature 98.2 °F (36.8 °C), temperature source Oral, resp. rate 18, height 182.9 cm (72\"), weight 95.4 kg (210 lb 6.4 oz), SpO2 97 %.  Vital signs are normal.  No fever.    Output: Producing urine and producing stool.    Lungs:  Normal effort and normal respiratory rate.  There are decreased breath sounds.    Heart: Normal rate.  Regular rhythm.    Abdomen: Abdomen is soft.  Bowel sounds are normal.     Extremities: There is dependent edema (mild bilateral LE).    Pulses: Distal pulses are intact.    Neurological: Patient is alert and oriented to person, place and time.    Skin:  Warm and dry.  (Sternal incision clean, dry, and intact)        Results Review:        WBC WBC   Date Value Ref Range " Status   04/30/2023 6.67 3.40 - 10.80 10*3/mm3 Final   04/29/2023 7.49 3.40 - 10.80 10*3/mm3 Final   04/28/2023 11.66 (H) 3.40 - 10.80 10*3/mm3 Final      HGB Hemoglobin   Date Value Ref Range Status   04/30/2023 8.6 (L) 13.0 - 17.7 g/dL Final   04/29/2023 8.5 (L) 13.0 - 17.7 g/dL Final   04/28/2023 8.7 (L) 13.0 - 17.7 g/dL Final   04/27/2023 9.9 (L) 13.0 - 17.7 g/dL Final      HCT Hematocrit   Date Value Ref Range Status   04/30/2023 26.9 (L) 37.5 - 51.0 % Final   04/29/2023 27.2 (L) 37.5 - 51.0 % Final   04/28/2023 26.4 (L) 37.5 - 51.0 % Final   04/27/2023 30.1 (L) 37.5 - 51.0 % Final      Platelets Platelets   Date Value Ref Range Status   04/30/2023 179 140 - 450 10*3/mm3 Final   04/29/2023 189 140 - 450 10*3/mm3 Final   04/28/2023 191 140 - 450 10*3/mm3 Final        PT/INR:  No results found for: PROTIME/No results found for: INR    Sodium Sodium   Date Value Ref Range Status   04/30/2023 138 136 - 145 mmol/L Final   04/29/2023 136 136 - 145 mmol/L Final   04/28/2023 138 136 - 145 mmol/L Final   04/28/2023 138 136 - 145 mmol/L Final      Potassium Potassium   Date Value Ref Range Status   04/30/2023 3.6 3.5 - 5.2 mmol/L Final   04/29/2023 3.6 3.5 - 5.2 mmol/L Final   04/28/2023 4.3 3.5 - 5.2 mmol/L Final   04/28/2023 4.2 3.5 - 5.2 mmol/L Final      Chloride Chloride   Date Value Ref Range Status   04/30/2023 103 98 - 107 mmol/L Final   04/29/2023 101 98 - 107 mmol/L Final   04/28/2023 104 98 - 107 mmol/L Final   04/28/2023 103 98 - 107 mmol/L Final      Bicarbonate CO2   Date Value Ref Range Status   04/30/2023 24.0 22.0 - 29.0 mmol/L Final   04/29/2023 22.0 22.0 - 29.0 mmol/L Final   04/28/2023 22.0 22.0 - 29.0 mmol/L Final   04/28/2023 17.3 (L) 22.0 - 29.0 mmol/L Final      BUN BUN   Date Value Ref Range Status   04/30/2023 18 8 - 23 mg/dL Final   04/29/2023 36 (H) 8 - 23 mg/dL Final   04/28/2023 25 (H) 8 - 23 mg/dL Final   04/28/2023 24 (H) 8 - 23 mg/dL Final      Creatinine Creatinine   Date Value Ref Range  Status   04/30/2023 3.77 (H) 0.76 - 1.27 mg/dL Final   04/29/2023 5.14 (H) 0.76 - 1.27 mg/dL Final   04/28/2023 3.38 (H) 0.76 - 1.27 mg/dL Final   04/28/2023 3.93 (H) 0.76 - 1.27 mg/dL Final      Calcium Calcium   Date Value Ref Range Status   04/30/2023 8.3 (L) 8.6 - 10.5 mg/dL Final   04/29/2023 8.2 (L) 8.6 - 10.5 mg/dL Final   04/28/2023 8.0 (L) 8.6 - 10.5 mg/dL Final   04/28/2023 8.4 (L) 8.6 - 10.5 mg/dL Final      Magnesium Magnesium   Date Value Ref Range Status   04/30/2023 1.8 1.6 - 2.4 mg/dL Final          aspirin, 81 mg, Oral, Daily  atorvastatin, 40 mg, Oral, Nightly  enoxaparin, 30 mg, Subcutaneous, Q24H  epoetin bernice/bernice-epbx, 6,000 Units, Intravenous, Once per day on Mon Wed Fri  First Mouthwash (Magic Mouthwash), 5 mL, Swish & Spit, Q8H  guaiFENesin, 1,200 mg, Oral, Q12H  insulin lispro, 0-9 Units, Subcutaneous, 4x Daily With Meals & Nightly  ipratropium-albuterol, 3 mL, Nebulization, Q6H While Awake - RT  iron polysaccharides, 150 mg, Oral, Daily  levoFLOXacin, 250 mg, Oral, Q24H  Menthol-Zinc Oxide, 1 application, Topical, TID  midodrine, 7.5 mg, Oral, TID AC  pantoprazole, 40 mg, Oral, QAM  polyethylene glycol, 17 g, Oral, Daily  senna-docusate sodium, 2 tablet, Oral, BID         Patient Active Problem List   Diagnosis Code   • 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.22   • Essential hypertension I10   • Hyperlipidemia E78.5   • Primary insomnia F51.01   • ESRD (end stage renal disease) N18.6   • Vitamin D deficiency E55.9   • Diverticulitis of large intestine with perforation and abscess without bleeding K57.20   • Obesity (BMI 30-39.9) E66.9   • Impaired mobility and ADLs Z74.09, Z78.9   • History of colon resection Z90.49   • Colon polyps K63.5   • Diverticulosis K57.90   • CKD (chronic kidney disease) stage 5, GFR less than 15 ml/min N18.5   • NSTEMI (non-ST elevated myocardial infarction) I21.4   • Rhinovirus B34.8   • Abnormal findings on diagnostic imaging of heart  and coronary circulation R93.1   • Fatigue R53.83   • Dysphagia R13.10   • Acute superficial venous thrombosis of right lower extremity I82.811   • Orthostatic hypotension I95.1   • Acute UTI (urinary tract infection) N39.0       Assessment & Plan   - NSTEMI/multi-vessel CAD- s/p urgent CABGx4 LIMA/RSVG, MV repair-([place]) POD#18  - HFrEF--30-35% per echo 4/8  - rhinovirus infection  - ESRD on HD  - hypertension  - hyperlipidemia  - DM II- A1c 5.0  -post op anemia- expected acute blood loss  -Leukocytosis- probable reactive/secondary to  -TCP- resolved    - acute UTI--culture with pseudomonas; on levofloxacin per ID     He looks much better this morning  BP remains stable. Continue midodrine  Remains in SR. Beta blocker held d/t hypotension  WBC normalized today. Antibiotics changed to oral levofloxacin per ID  Increase activity-- continue PT and OT  I feel that he is nearly ready for d/c from surgical standpoint. Dr. Williamson recommends sub-acute rehab. CCP to arrange for placement  Continue routine care    Yu El, DUSTY  04/30/23  09:41 EDT

## 2023-04-30 NOTE — THERAPY TREATMENT NOTE
Patient Name: Lefty Cai  : 1950    MRN: [id number]                              Today's Date: 2023       Admit Date: 2023    Visit Dx:     ICD-10-CM ICD-9-CM   1. Dyspnea on exertion  R06.09 786.09   2. NSTEMI (non-ST elevated myocardial infarction)  I21.4 410.70   3. ESRD (end stage renal disease)  N18.6 585.6   4. Former smoker  Z87.891 V15.82   5. Elevated blood pressure reading in office with diagnosis of hypertension  I10 401.9   6. Rhinovirus infection  B34.8 079.3   7. Abnormal findings on diagnostic imaging of heart and coronary circulation  R93.1 794.39   8. S/P CABG (coronary artery bypass graft)  Z95.1 V45.81   9. Orthostasis  I95.1 458.0     Patient Active Problem List   Diagnosis   • Type 2 diabetes mellitus with diabetic chronic kidney disease   • Essential hypertension   • Hyperlipidemia   • Primary insomnia   • ESRD (end stage renal disease)   • Vitamin D deficiency   • Diverticulitis of large intestine with perforation and abscess without bleeding   • Obesity (BMI 30-39.9)   • Impaired mobility and ADLs   • History of colon resection   • Colon polyps   • Diverticulosis   • CKD (chronic kidney disease) stage 5, GFR less than 15 ml/min   • NSTEMI (non-ST elevated myocardial infarction)   • Rhinovirus   • Abnormal findings on diagnostic imaging of heart and coronary circulation   • Fatigue   • Dysphagia   • Acute superficial venous thrombosis of right lower extremity   • Orthostatic hypotension   • Acute UTI (urinary tract infection)     Past Medical History:   Diagnosis Date   • Anemia    • Anesthesia complication     HYPOTENSION   • Arthritis    • Cancer of kidney, left    • CKD (chronic kidney disease)     STAGE 5   • Diabetes mellitus, type 2    • Fatigue    • GERD (gastroesophageal reflux disease)    • H/O renal cell carcinoma 2007    partial nephrectomy   • History of colostomy reversal 2022   • [place] (hard of hearing)     NO DEVICE   • Hyperlipidemia    • Hypertension     • Primary insomnia 06/13/2016   • Proteinuria    • Risk factors for obstructive sleep apnea    • Sinus drainage    • Vitamin D deficiency 08/14/2017     Past Surgical History:   Procedure Laterality Date   • ARTERIOVENOUS FISTULA/SHUNT SURGERY Left 08/15/2019    Procedure: LEFT MID FOREARM RADIAL CEPHALIC ARTERIAL VENOUS FISTULA;  Surgeon: Louann Miles Jr., MD;  Location: [facility] MAIN OR;  Service: Vascular   • CARDIAC CATHETERIZATION N/A 4/9/2023    Procedure: Left Heart Cath;  Surgeon: Lobito Garcia MD;  Location: [facility] CATH INVASIVE LOCATION;  Service: Cardiovascular;  Laterality: N/A;   • CARDIAC CATHETERIZATION N/A 4/9/2023    Procedure: Left ventriculography;  Surgeon: Lobito Garcia MD;  Location: [facility] CATH INVASIVE LOCATION;  Service: Cardiovascular;  Laterality: N/A;   • CARDIAC CATHETERIZATION N/A 4/9/2023    Procedure: Coronary angiography;  Surgeon: Lobito Garcia MD;  Location: [facility] CATH INVASIVE LOCATION;  Service: Cardiovascular;  Laterality: N/A;   • COLONOSCOPY  03/24/2022   • COLONOSCOPY N/A 03/24/2022    Procedure: COLONOSCOPY TO CECUM WITH POLYPECTOMY  (HOT SNARE);  Surgeon: Yelena Guerra MD;  Location: [facility] ENDOSCOPY;  Service: General;  Laterality: N/A;  PREOP/ HX OF DIVERTICULITIS, COLOSTOMY  POSTOP/ POLYPS, DIVERTICULOSIS    • COLOSTOMY  09/06/2021   • COLOSTOMY CLOSURE N/A 04/12/2022    Procedure: open Colostomy reversal;  Surgeon: Yelena Guerra MD;  Location: [facility] MAIN OR;  Service: General;  Laterality: N/A;   • CORONARY ARTERY BYPASS GRAFT N/A 4/13/2023    Procedure: MAT STERNOTOMY CORONARY ARTERY BYPASS GRAFT TIMES 4 USING LEFT INTERNAL MAMMARY ARTERY AND RIGHT GREATER SAPHENOUS VEIN  PER ENDOSCOPIC VEIN HARVESTING, MITRAL VALVE REPAIR  AND PRP;  Surgeon: Mirtha Zuluaga MD;  Location: [facility] CVOR;  Service: Cardiothoracic;  Laterality: N/A;   • DIAGNOSTIC LAPAROSCOPY N/A 2/27/2023    Procedure: DIAGNOSTIC LAPAROSCOPY;  Surgeon: Murali eFrreira  MD Lee;  Location: [facility] MAIN OR;  Service: General;  Laterality: N/A;   • EXPLORATORY LAPAROTOMY N/A 09/06/2021    Procedure: Open sigmoind colectomy, colostomy, and appendectomy;  Surgeon: Yelena Guerra MD;  Location: [facility] MAIN OR;  Service: General;  Laterality: N/A;   • EYE SURGERY      CATARACTS   • KNEE ARTHROSCOPY Right    • NEPHRECTOMY PARTIAL  2007    Dr. Victor      General Information     Row Name 04/30/23 1223          Physical Therapy Time and Intention    Document Type therapy note (daily note)  -PC     Mode of Treatment physical therapy  -PC     Row Name 04/30/23 1223          General Information    Existing Precautions/Restrictions cardiac;fall;sternal  -PC           User Key  (r) = Recorded By, (t) = Taken By, (c) = Cosigned By    Initials Name Provider Type    PC Deann Jarrell, PT Physical Therapist               Mobility     Row Name 04/30/23 1223          Bed Mobility    Comment, (Bed Mobility) up in chair  -PC     Row Name 04/30/23 1223          Sit-Stand Transfer    Sit-Stand [place] (Transfers) contact guard  -PC     Assistive Device (Sit-Stand Transfers) walker, front-wheeled  -PC     Row Name 04/30/23 1223          Gait/Stairs (Locomotion)    [place] Level (Gait) minimum assist (75% patient effort)  -PC     Assistive Device (Gait) walker, front-wheeled  -PC     Distance in Feet (Gait) 100 ft  -PC     Deviations/Abnormal Patterns (Gait) gait speed decreased;stride length decreased  -PC     Bilateral Gait Deviations Trendelenburg sign  -PC     Comment, (Gait/Stairs) pt cont to be shaky, demonstrates pelvic instability, relies on rolling walker for support  -PC           User Key  (r) = Recorded By, (t) = Taken By, (c) = Cosigned By    Initials Name Provider Type    PC Deann Jarrell, PT Physical Therapist               Obj/Interventions     Row Name 04/30/23 1225          Motor Skills    Therapeutic Exercise --  pt perf cardiac ex protocol, 10 reps, added in hip  flexion, sit to stand x 5 with hands on knees, standing shallow knee bends, up on toes, high knees  -PC           User Key  (r) = Recorded By, (t) = Taken By, (c) = Cosigned By    Initials Name Provider Type    PC Deann Jarrell, PT Physical Therapist               Goals/Plan    No documentation.                Clinical Impression     Row Name 04/30/23 1226          Pain    Pre/Posttreatment Pain Comment no specific c/o pain this visit  -PC     Row Name 04/30/23 1226          Plan of Care Review    Plan of Care Reviewed With patient;spouse  -PC     Outcome Evaluation Pt with improved ability to tolerate therapy, he has been sitting up in chair, able to tolerate upright activity, pt was able to work on ambulation, presents with significant deficits due to core weakness with pelvic instability, reliance on walker, gait is unsteady and legs are shaky. Pt able to tolerate seated and standing exercises to address core weakness, he is highly motivated, due to pt's improved ability to tolerate activity, his previous independent level of function, and good family support, pt may now be a good candidate for acute rehab  -PC     Row Name 04/30/23 1226          Positioning and Restraints    Pre-Treatment Position sitting in chair/recliner  -PC     Post Treatment Position chair  -PC     In Chair reclined;call light within reach;encouraged to call for assist;with family/caregiver  -PC           User Key  (r) = Recorded By, (t) = Taken By, (c) = Cosigned By    Initials Name Provider Type    PC Deann Jarrell, PT Physical Therapist               Outcome Measures     Row Name 04/30/23 1232 04/30/23 0852       How much help from another person do you currently need...    Turning from your back to your side while in flat bed without using bedrails? 3  -PC 3  -TH    Moving from lying on back to sitting on the side of a flat bed without bedrails? 3  -PC 3  -TH    Moving to and from a bed to a chair (including a wheelchair)? 3  -PC 3   -TH    Standing up from a chair using your arms (e.g., wheelchair, bedside chair)? 3  -PC 3  -TH    Climbing 3-5 steps with a railing? 2  -PC 2  -TH    To walk in hospital room? 3  -PC 3  -TH    AM-PAC 6 Clicks Score (PT) 17  -PC 17  -TH    Highest level of mobility 5 --> Static standing  -PC 5 --> Static standing  -TH          User Key  (r) = Recorded By, (t) = Taken By, (c) = Cosigned By    Initials Name Provider Type     Deann Jarrell, PT Physical Therapist     Kacy Deluca, RN Registered Nurse                             Physical Therapy Education     Title: PT OT SLP Therapies (Done)     Topic: Physical Therapy (Done)     Point: Mobility training (Done)     Learning Progress Summary           Patient Acceptance, E,D, DU by  at 4/30/2023 1232    Acceptance, E,TB, VU by  at 4/30/2023 0901    Acceptance, E,TB, VU by  at 4/29/2023 1007    Acceptance, E, VU by KN at 4/28/2023 1223    Acceptance, E, VU by EM at 4/28/2023 1034    Acceptance, E,TB, VU by  at 4/27/2023 1549    Acceptance, E, VU by KN at 4/26/2023 1456    Acceptance, E,TB, VU,NR by  at 4/24/2023 1226    Acceptance, E,TB,D, VU,NR by  at 4/22/2023 1627    Acceptance, E,TB, VU by  at 4/21/2023 1756    Acceptance, E,TB,D, VU,NR by  at 4/21/2023 1626    Acceptance, E, VU by EM at 4/20/2023 1507    Acceptance, E, VU by  at 4/19/2023 1815    Acceptance, E, VU by EM at 4/19/2023 1156    Acceptance, E, NR by AR at 4/16/2023 1633    Acceptance, TB,E, VU by  at 4/11/2023 0900   Family Acceptance, E, VU by EM at 4/28/2023 1034    Acceptance, E, VU by  at 4/19/2023 1815    Acceptance, E, NR by AR at 4/16/2023 1633                   Point: Home exercise program (Done)     Learning Progress Summary           Patient Acceptance, E,D, DU by PC at 4/30/2023 1232    Acceptance, E,TB, VU by TH at 4/30/2023 0901    Acceptance, E,TB, VU by TH at 4/29/2023 1007    Acceptance, E, VU by  at 4/28/2023 1223    Acceptance, E,TB, VU by  at  4/27/2023 1549    Acceptance, E, VU by KN at 4/26/2023 1456    Acceptance, E,TB, VU,NR by CB at 4/24/2023 1226    Acceptance, E,TB,D, VU,NR by CH at 4/22/2023 1627    Acceptance, E,TB, VU by TH at 4/21/2023 1756    Acceptance, E,TB,D, VU,NR by CH at 4/21/2023 1626    Acceptance, E, VU by EM at 4/20/2023 1507    Acceptance, E, VU by AH at 4/19/2023 1815    Acceptance, E, VU by EM at 4/19/2023 1156    Acceptance, E, NR by AR at 4/16/2023 1633    Acceptance, TB,E, VU by TH at 4/11/2023 0900   Family Acceptance, E, VU by AH at 4/19/2023 1815    Acceptance, E, NR by AR at 4/16/2023 1633                   Point: Body mechanics (Done)     Learning Progress Summary           Patient Acceptance, E,D, DU by PC at 4/30/2023 1232    Acceptance, E,TB, VU by TH at 4/30/2023 0901    Acceptance, E,TB, VU by TH at 4/29/2023 1007    Acceptance, E, VU by KN at 4/28/2023 1223    Acceptance, E,TB, VU by TH at 4/27/2023 1549    Acceptance, E, VU by KN at 4/26/2023 1456    Acceptance, E,TB, VU,NR by CB at 4/24/2023 1226    Acceptance, E,TB,D, VU,NR by CH at 4/22/2023 1627    Acceptance, E,TB, VU by TH at 4/21/2023 1756    Acceptance, E,TB,D, VU,NR by CH at 4/21/2023 1626    Acceptance, E, VU by  at 4/19/2023 1815    Acceptance, E, NR by AR at 4/16/2023 1633    Acceptance, TB,E, VU by TH at 4/11/2023 0900   Family Acceptance, E, VU by AH at 4/19/2023 1815    Acceptance, E, NR by AR at 4/16/2023 1633                   Point: Precautions (Done)     Learning Progress Summary           Patient Acceptance, E,D, DU by PC at 4/30/2023 1232    Acceptance, E,TB, VU by TH at 4/30/2023 0901    Acceptance, E,TB, VU by TH at 4/29/2023 1007    Acceptance, E, VU by KN at 4/28/2023 1223    Acceptance, E,TB, VU by TH at 4/27/2023 1549    Acceptance, E, VU by KN at 4/26/2023 1456    Acceptance, E,TB, VU,NR by CB at 4/24/2023 1226    Acceptance, E,TB,D, VU,NR by  at 4/22/2023 1627    Acceptance, E,TB, VU by TH at 4/21/2023 1756    Acceptance, E,TB,D,  VU,NR by  at 4/21/2023 1626    Acceptance, E, VU by  at 4/19/2023 1815    Acceptance, E, NR by AR at 4/16/2023 1633    Acceptance, TB,E, VU by  at 4/11/2023 0900   Family Acceptance, E, VU by  at 4/19/2023 1815    Acceptance, E, NR by AR at 4/16/2023 1633                               User Key     Initials Effective Dates Name Provider Type Discipline     06/16/21 -  Kavitha Sen, PT Physical Therapist PT    PC 06/16/21 -  Deann Jarrell, PT Physical Therapist PT    EM 06/16/21 -  Jennie Cuevas PT Physical Therapist PT    AR 06/16/21 -  Mihaela Shields, PT Physical Therapist PT     06/16/21 -  Kacy Deluca, RN Registered Nurse Nurse     10/22/21 -  Geni Pederson, PT Physical Therapist PT     12/20/21 -  Mitesh Sage, RN Registered Nurse Nurse     08/02/22 -  Robert Tadeo, OT Occupational Therapist OT              PT Recommendation and Plan     Plan of Care Reviewed With: patient, spouse  Outcome Evaluation: Pt with improved ability to tolerate therapy, he has been sitting up in chair, able to tolerate upright activity, pt was able to work on ambulation, presents with significant deficits due to core weakness with pelvic instability, reliance on walker, gait is unsteady and legs are shaky. Pt able to tolerate seated and standing exercises to address core weakness, he is highly motivated, due to pt's improved ability to tolerate activity, his previous independent level of function, and good family support, pt may now be a good candidate for acute rehab     Time Calculation:    PT Charges     Row Name 04/30/23 1233             Time Calculation    Start Time 1100  -PC      Stop Time 1120  -PC      Time Calculation (min) 20 min  -PC      PT Received On 04/30/23  -PC      PT - Next Appointment 05/01/23  -PC            User Key  (r) = Recorded By, (t) = Taken By, (c) = Cosigned By    Initials Name Provider Type    PC Deann Jarrell, PT Physical Therapist              Therapy  Charges for Today     Code Description Service Date Service Provider Modifiers Qty    13674711279  PT THER PROC EA 15 MIN 4/30/2023 Deann Jarrell, PT GP 1    [id number] HC GAIT TRAINING EA 15 MIN 4/30/2023 Deann Jarrell, PT GP 1          PT G-Codes  Outcome Measure Options: AM-PAC 6 Clicks Daily Activity (OT)  AM-PAC 6 Clicks Score (PT): 17  AM-PAC 6 Clicks Score (OT): 19  PT Discharge Summary  Anticipated Discharge Disposition (PT): inpatient rehabilitation facility    Deann Jarrell, PT  4/30/2023

## 2023-04-30 NOTE — PROGRESS NOTES
[facility] Progress Note      Patient Name: Lefty Cai  : 1950  MRN: [id number]  Primary Care Physician:  Daksha Ng APRN  Date of admission: 2023    Subjective     Interval History:   Follow up ESRD.     Patient sitting comfortably.  S/p hemodialysis yesterday.  Patient felt very tired after dialysis last evening but feeling better this morning.  Denies any chest pain, shortness of breath, nausea or vomiting    Review of Systems:   As noted above    Objective     Vitals:   Temp:  [97.9 °F (36.6 °C)-98.3 °F (36.8 °C)] 98.2 °F (36.8 °C)  Heart Rate:  [80-98] 98  Resp:  [16-18] 18  BP: (119-135)/(61-69) 119/67  Flow (L/min):  [0] 0    Intake/Output Summary (Last 24 hours) at 2023 0850  Last data filed at 2023 0400  Gross per 24 hour   Intake 360 ml   Output 450 ml   Net -90 ml       Physical Exam:    General Appearance: Awake and alert, chronically ill, no acute distress  Skin: warm and dry  HEENT: oral mucosa dry, nonicteric sclera  Neck: TDC RIJ.   Lungs: Clear to auscultation, unlabored breathing effort  Heart: RRR no s3 or rub  Abdomen: soft, nontender, not distended, normoactive bowel  Extremities: Trace edema. Upper and lower ext edema. LUE AVF.  Neuro: normal speech and mental status     Scheduled Meds:     aspirin, 81 mg, Oral, Daily  atorvastatin, 40 mg, Oral, Nightly  enoxaparin, 30 mg, Subcutaneous, Q24H  epoetin bernice/bernice-epbx, 6,000 Units, Intravenous, Once per day on   First Mouthwash (Magic Mouthwash), 5 mL, Swish & Spit, Q8H  guaiFENesin, 1,200 mg, Oral, Q12H  insulin lispro, 0-9 Units, Subcutaneous, 4x Daily With Meals & Nightly  ipratropium-albuterol, 3 mL, Nebulization, Q6H While Awake - RT  iron polysaccharides, 150 mg, Oral, Daily  levoFLOXacin, 250 mg, Oral, Q24H  Menthol-Zinc Oxide, 1 application, Topical, TID  midodrine, 7.5 mg, Oral, TID AC  pantoprazole, 40 mg, Oral, QAM  polyethylene glycol, 17 g, Oral,  Daily  senna-docusate sodium, 2 tablet, Oral, BID      IV Meds:        Results Reviewed:   I have personally reviewed the results from the time of this admission to 4/30/2023 08:50 EDT     Results from last 7 days   Lab Units 04/30/23 0327 04/29/23 0307 04/28/23 0319 04/27/23 0324 04/26/23  0300   SODIUM mmol/L 138 136 138  138 137 139  138   POTASSIUM mmol/L 3.6 3.6 4.2  4.3 3.8 4.0  3.9   CHLORIDE mmol/L 103 101 103  104 102 100  100   CO2 mmol/L 24.0 22.0 17.3*  22.0 20.6* 24.0  25.0   BUN mg/dL 18 36* 24*  25* 41* 27*  26*   CREATININE mg/dL 3.77* 5.14* 3.93*  3.38* 5.59* 4.21*  4.14*   CALCIUM mg/dL 8.3* 8.2* 8.4*  8.0* 9.0 8.6  8.5*   BILIRUBIN mg/dL  --   --  0.5 0.7 0.8   ALK PHOS U/L  --   --  102 109 121*   ALT (SGPT) U/L  --   --  21 23 23   AST (SGOT) U/L  --   --  38 34 48*   GLUCOSE mg/dL 100* 100* 98  97 101* 106*  110*       Estimated Creatinine Clearance: 20.9 mL/min (A) (by C-G formula based on SCr of 3.77 mg/dL (H)).    Results from last 7 days   Lab Units 04/30/23 0327 04/29/23 0307 04/28/23 0319   MAGNESIUM mg/dL 1.8  --   --    PHOSPHORUS mg/dL 2.1* 3.2 1.7*             Results from last 7 days   Lab Units 04/30/23 0327 04/29/23 0307 04/28/23 0319 04/27/23  1649 04/27/23 0324 04/26/23  0300   WBC 10*3/mm3 6.67 7.49 11.66*  --  16.01* 19.80*   HEMOGLOBIN g/dL 8.6* 8.5* 8.7* 9.9* 9.2* 9.7*   PLATELETS 10*3/mm3 179 189 191  --  175 183             Assessment / Plan     ASSESSMENT:  1. ESRD.  Patient seen during dialysis this morning.  Currently on dialysis every Tuesday, Thursday and Saturday to accommodate transfer to rehab  2. NSTEMI. SP 3 vessel CABG, MV repair 4/13/23.  3. PAF,  SR. off metoprolol  4. Anemia CKD and blood loss.  Hemoglobin stable around mid 8s  5.  Hypophosphatemia,   6. DM2, with renal complication  7. Deconditioning.    8. Hypotension, acute on chronic, leukocytosis improved after treating his Pseudomonas UTI   9. Encephalopathy.   Resolved    PLAN:  1.  Continue dialysis TTS.  Cautious fluid removal with dialysis.  On oral midodrine for hypotension  2.  Surveillance labs        Oscar Grace MD  04/30/23  08:50 EDT    [facility]  [phone]

## 2023-04-30 NOTE — PLAN OF CARE
Goal Outcome Evaluation:  Plan of Care Reviewed With: patient        Progress: improving   72 yr old male Aox4 Vss Pod 16 Cabg x 4 and MVR. Incisions well approximated, bruised on signs or symptoms of infection. Much improvement today. Ambulating in rodriguez with walker 3 separate times. Walking avg of 240 feet. Tolerating well. Pt reports very little dizziness.  Midodrine given as ordered. Increase in appetite. Plan for rehab when appropriate. Will continue to monitor

## 2023-05-01 LAB
ALBUMIN SERPL-MCNC: 2.4 G/DL (ref 3.5–5.2)
ANION GAP SERPL CALCULATED.3IONS-SCNC: 14.1 MMOL/L (ref 5–15)
BACTERIA SPEC AEROBE CULT: NORMAL
BACTERIA SPEC AEROBE CULT: NORMAL
BUN SERPL-MCNC: 25 MG/DL (ref 8–23)
BUN/CREAT SERPL: 4.6 (ref 7–25)
CALCIUM SPEC-SCNC: 9.1 MG/DL (ref 8.6–10.5)
CHLORIDE SERPL-SCNC: 103 MMOL/L (ref 98–107)
CO2 SERPL-SCNC: 23.9 MMOL/L (ref 22–29)
CREAT SERPL-MCNC: 5.49 MG/DL (ref 0.76–1.27)
DEPRECATED RDW RBC AUTO: 52.9 FL (ref 37–54)
EGFRCR SERPLBLD CKD-EPI 2021: 10.3 ML/MIN/1.73
ERYTHROCYTE [DISTWIDTH] IN BLOOD BY AUTOMATED COUNT: 15.9 % (ref 12.3–15.4)
GLUCOSE BLDC GLUCOMTR-MCNC: 107 MG/DL (ref 70–130)
GLUCOSE BLDC GLUCOMTR-MCNC: 120 MG/DL (ref 70–130)
GLUCOSE BLDC GLUCOMTR-MCNC: 139 MG/DL (ref 70–130)
GLUCOSE BLDC GLUCOMTR-MCNC: 165 MG/DL (ref 70–130)
GLUCOSE SERPL-MCNC: 117 MG/DL (ref 65–99)
HCT VFR BLD AUTO: 27.4 % (ref 37.5–51)
HGB BLD-MCNC: 8.7 G/DL (ref 13–17.7)
MCH RBC QN AUTO: 28.8 PG (ref 26.6–33)
MCHC RBC AUTO-ENTMCNC: 31.8 G/DL (ref 31.5–35.7)
MCV RBC AUTO: 90.7 FL (ref 79–97)
PHOSPHATE SERPL-MCNC: 2.4 MG/DL (ref 2.5–4.5)
PLATELET # BLD AUTO: 180 10*3/MM3 (ref 140–450)
PMV BLD AUTO: 9.2 FL (ref 6–12)
POTASSIUM SERPL-SCNC: 4 MMOL/L (ref 3.5–5.2)
RBC # BLD AUTO: 3.02 10*6/MM3 (ref 4.14–5.8)
SODIUM SERPL-SCNC: 141 MMOL/L (ref 136–145)
WBC NRBC COR # BLD: 7.39 10*3/MM3 (ref 3.4–10.8)

## 2023-05-01 PROCEDURE — 25010000002 EPOETIN ALFA-EPBX 10000 UNIT/ML SOLUTION: Performed by: INTERNAL MEDICINE

## 2023-05-01 PROCEDURE — 97530 THERAPEUTIC ACTIVITIES: CPT

## 2023-05-01 PROCEDURE — 94799 UNLISTED PULMONARY SVC/PX: CPT

## 2023-05-01 PROCEDURE — 99232 SBSQ HOSP IP/OBS MODERATE 35: CPT | Performed by: INTERNAL MEDICINE

## 2023-05-01 PROCEDURE — 94761 N-INVAS EAR/PLS OXIMETRY MLT: CPT

## 2023-05-01 PROCEDURE — 63710000001 INSULIN LISPRO (HUMAN) PER 5 UNITS: Performed by: NURSE PRACTITIONER

## 2023-05-01 PROCEDURE — 82948 REAGENT STRIP/BLOOD GLUCOSE: CPT

## 2023-05-01 PROCEDURE — 25010000002 ENOXAPARIN PER 10 MG: Performed by: NURSE PRACTITIONER

## 2023-05-01 PROCEDURE — 97535 SELF CARE MNGMENT TRAINING: CPT

## 2023-05-01 PROCEDURE — 99024 POSTOP FOLLOW-UP VISIT: CPT | Performed by: NURSE PRACTITIONER

## 2023-05-01 PROCEDURE — 85027 COMPLETE CBC AUTOMATED: CPT | Performed by: NURSE PRACTITIONER

## 2023-05-01 PROCEDURE — 94664 DEMO&/EVAL PT USE INHALER: CPT

## 2023-05-01 PROCEDURE — 80069 RENAL FUNCTION PANEL: CPT | Performed by: INTERNAL MEDICINE

## 2023-05-01 PROCEDURE — 97110 THERAPEUTIC EXERCISES: CPT | Performed by: PHYSICAL THERAPIST

## 2023-05-01 RX ORDER — MIDODRINE HYDROCHLORIDE 5 MG/1
5 TABLET ORAL
Status: DISCONTINUED | OUTPATIENT
Start: 2023-05-01 | End: 2023-05-02 | Stop reason: HOSPADM

## 2023-05-01 RX ADMIN — LEVOFLOXACIN 250 MG: 250 TABLET, FILM COATED ORAL at 10:33

## 2023-05-01 RX ADMIN — GUAIFENESIN 1200 MG: 600 TABLET, EXTENDED RELEASE ORAL at 20:19

## 2023-05-01 RX ADMIN — ATORVASTATIN CALCIUM 40 MG: 20 TABLET, FILM COATED ORAL at 20:19

## 2023-05-01 RX ADMIN — GUAIFENESIN 1200 MG: 600 TABLET, EXTENDED RELEASE ORAL at 09:38

## 2023-05-01 RX ADMIN — ASPIRIN 81 MG: 81 TABLET, COATED ORAL at 09:38

## 2023-05-01 RX ADMIN — ANORECTAL OINTMENT 1 APPLICATION: 15.7; .44; 24; 20.6 OINTMENT TOPICAL at 20:20

## 2023-05-01 RX ADMIN — DOCUSATE SODIUM 50MG AND SENNOSIDES 8.6MG 2 TABLET: 8.6; 5 TABLET, FILM COATED ORAL at 09:38

## 2023-05-01 RX ADMIN — PANTOPRAZOLE SODIUM 40 MG: 40 TABLET, DELAYED RELEASE ORAL at 06:36

## 2023-05-01 RX ADMIN — ANORECTAL OINTMENT 1 APPLICATION: 15.7; .44; 24; 20.6 OINTMENT TOPICAL at 09:38

## 2023-05-01 RX ADMIN — ACETAMINOPHEN 500 MG: 500 TABLET, FILM COATED ORAL at 10:33

## 2023-05-01 RX ADMIN — MIDODRINE HYDROCHLORIDE 5 MG: 5 TABLET ORAL at 06:37

## 2023-05-01 RX ADMIN — Medication 150 MG: at 09:38

## 2023-05-01 RX ADMIN — MIDODRINE HYDROCHLORIDE 5 MG: 5 TABLET ORAL at 17:08

## 2023-05-01 RX ADMIN — DIPHENHYDRAMINE HYDROCHLORIDE AND LIDOCAINE HYDROCHLORIDE AND ALUMINUM HYDROXIDE AND MAGNESIUM HYDRO 5 ML: KIT at 17:08

## 2023-05-01 RX ADMIN — EPOETIN ALFA-EPBX 10000 UNITS: 10000 INJECTION, SOLUTION INTRAVENOUS; SUBCUTANEOUS at 11:51

## 2023-05-01 RX ADMIN — DOCUSATE SODIUM 50MG AND SENNOSIDES 8.6MG 2 TABLET: 8.6; 5 TABLET, FILM COATED ORAL at 20:19

## 2023-05-01 RX ADMIN — DIPHENHYDRAMINE HYDROCHLORIDE AND LIDOCAINE HYDROCHLORIDE AND ALUMINUM HYDROXIDE AND MAGNESIUM HYDRO 5 ML: KIT at 22:16

## 2023-05-01 RX ADMIN — ANORECTAL OINTMENT 1 APPLICATION: 15.7; .44; 24; 20.6 OINTMENT TOPICAL at 16:37

## 2023-05-01 RX ADMIN — IPRATROPIUM BROMIDE AND ALBUTEROL SULFATE 3 ML: 2.5; .5 SOLUTION RESPIRATORY (INHALATION) at 20:40

## 2023-05-01 RX ADMIN — POLYETHYLENE GLYCOL 3350 17 G: 17 POWDER, FOR SOLUTION ORAL at 09:38

## 2023-05-01 RX ADMIN — INSULIN LISPRO 2 UNITS: 100 INJECTION, SOLUTION INTRAVENOUS; SUBCUTANEOUS at 17:08

## 2023-05-01 RX ADMIN — IPRATROPIUM BROMIDE AND ALBUTEROL SULFATE 3 ML: 2.5; .5 SOLUTION RESPIRATORY (INHALATION) at 13:09

## 2023-05-01 RX ADMIN — ENOXAPARIN SODIUM 30 MG: 100 INJECTION SUBCUTANEOUS at 15:38

## 2023-05-01 RX ADMIN — IPRATROPIUM BROMIDE AND ALBUTEROL SULFATE 3 ML: 2.5; .5 SOLUTION RESPIRATORY (INHALATION) at 08:02

## 2023-05-01 NOTE — THERAPY TREATMENT NOTE
Patient Name: Lefty Cai  : 1950    MRN: [id number]                              Today's Date: 2023       Admit Date: 2023    Visit Dx:     ICD-10-CM ICD-9-CM   1. Dyspnea on exertion  R06.09 786.09   2. NSTEMI (non-ST elevated myocardial infarction)  I21.4 410.70   3. ESRD (end stage renal disease)  N18.6 585.6   4. Former smoker  Z87.891 V15.82   5. Elevated blood pressure reading in office with diagnosis of hypertension  I10 401.9   6. Rhinovirus infection  B34.8 079.3   7. Abnormal findings on diagnostic imaging of heart and coronary circulation  R93.1 794.39   8. S/P CABG (coronary artery bypass graft)  Z95.1 V45.81   9. Orthostasis  I95.1 458.0     Patient Active Problem List   Diagnosis   • Type 2 diabetes mellitus with diabetic chronic kidney disease   • Essential hypertension   • Hyperlipidemia   • Primary insomnia   • ESRD (end stage renal disease)   • Vitamin D deficiency   • Diverticulitis of large intestine with perforation and abscess without bleeding   • Obesity (BMI 30-39.9)   • Impaired mobility and ADLs   • History of colon resection   • Colon polyps   • Diverticulosis   • CKD (chronic kidney disease) stage 5, GFR less than 15 ml/min   • NSTEMI (non-ST elevated myocardial infarction)   • Rhinovirus   • Abnormal findings on diagnostic imaging of heart and coronary circulation   • Fatigue   • Dysphagia   • Acute superficial venous thrombosis of right lower extremity   • Orthostatic hypotension   • Acute UTI (urinary tract infection)     Past Medical History:   Diagnosis Date   • Anemia    • Anesthesia complication     HYPOTENSION   • Arthritis    • Cancer of kidney, left    • CKD (chronic kidney disease)     STAGE 5   • Diabetes mellitus, type 2    • Fatigue    • GERD (gastroesophageal reflux disease)    • H/O renal cell carcinoma 2007    partial nephrectomy   • History of colostomy reversal 2022   • [place] (hard of hearing)     NO DEVICE   • Hyperlipidemia    • Hypertension     • Primary insomnia 06/13/2016   • Proteinuria    • Risk factors for obstructive sleep apnea    • Sinus drainage    • Vitamin D deficiency 08/14/2017     Past Surgical History:   Procedure Laterality Date   • ARTERIOVENOUS FISTULA/SHUNT SURGERY Left 08/15/2019    Procedure: LEFT MID FOREARM RADIAL CEPHALIC ARTERIAL VENOUS FISTULA;  Surgeon: Louann Miles Jr., MD;  Location: [facility] MAIN OR;  Service: Vascular   • CARDIAC CATHETERIZATION N/A 4/9/2023    Procedure: Left Heart Cath;  Surgeon: Lobito Garcia MD;  Location: [facility] CATH INVASIVE LOCATION;  Service: Cardiovascular;  Laterality: N/A;   • CARDIAC CATHETERIZATION N/A 4/9/2023    Procedure: Left ventriculography;  Surgeon: Lobito Garcia MD;  Location: [facility] CATH INVASIVE LOCATION;  Service: Cardiovascular;  Laterality: N/A;   • CARDIAC CATHETERIZATION N/A 4/9/2023    Procedure: Coronary angiography;  Surgeon: Lobito Garcia MD;  Location: [facility] CATH INVASIVE LOCATION;  Service: Cardiovascular;  Laterality: N/A;   • COLONOSCOPY  03/24/2022   • COLONOSCOPY N/A 03/24/2022    Procedure: COLONOSCOPY TO CECUM WITH POLYPECTOMY  (HOT SNARE);  Surgeon: Yelena Guerra MD;  Location: [facility] ENDOSCOPY;  Service: General;  Laterality: N/A;  PREOP/ HX OF DIVERTICULITIS, COLOSTOMY  POSTOP/ POLYPS, DIVERTICULOSIS    • COLOSTOMY  09/06/2021   • COLOSTOMY CLOSURE N/A 04/12/2022    Procedure: open Colostomy reversal;  Surgeon: Yelena Guerra MD;  Location: [facility] MAIN OR;  Service: General;  Laterality: N/A;   • CORONARY ARTERY BYPASS GRAFT N/A 4/13/2023    Procedure: MAT STERNOTOMY CORONARY ARTERY BYPASS GRAFT TIMES 4 USING LEFT INTERNAL MAMMARY ARTERY AND RIGHT GREATER SAPHENOUS VEIN  PER ENDOSCOPIC VEIN HARVESTING, MITRAL VALVE REPAIR  AND PRP;  Surgeon: Mirtha Zuluaga MD;  Location: [facility] CVOR;  Service: Cardiothoracic;  Laterality: N/A;   • DIAGNOSTIC LAPAROSCOPY N/A 2/27/2023    Procedure: DIAGNOSTIC LAPAROSCOPY;  Surgeon: Murali Ferreira  MD Lee;  Location: [facility] OR;  Service: General;  Laterality: N/A;   • EXPLORATORY LAPAROTOMY N/A 09/06/2021    Procedure: Open sigmoind colectomy, colostomy, and appendectomy;  Surgeon: Yelena Guerra MD;  Location: [facility] MAIN OR;  Service: General;  Laterality: N/A;   • EYE SURGERY      CATARACTS   • KNEE ARTHROSCOPY Right    • NEPHRECTOMY PARTIAL  2007    Dr. Victor      General Information     Row Name 05/01/23 1217          Physical Therapy Time and Intention    Document Type therapy note (daily note)  -     Mode of Treatment physical therapy  -           User Key  (r) = Recorded By, (t) = Taken By, (c) = Cosigned By    Initials Name Provider Type    Kyara Tirado, DEENA Physical Therapist               Mobility     Row Name 05/01/23 1217          Bed Mobility    Supine-Sit [place] (Bed Mobility) standby assist  -     Sit-Supine [place] (Bed Mobility) standby assist  -     Assistive Device (Bed Mobility) bed rails;head of bed elevated  -     Row Name 05/01/23 1217          Sit-Stand Transfer    Sit-Stand [place] (Transfers) standby assist  -     Assistive Device (Sit-Stand Transfers) walker, front-wheeled  -KH     Row Name 05/01/23 1217          Gait/Stairs (Locomotion)    [place] Level (Gait) contact guard  -     Assistive Device (Gait) walker, front-wheeled  -KH     Distance in Feet (Gait) 220  -KH     Deviations/Abnormal Patterns (Gait) gait speed decreased;stride length decreased  -           User Key  (r) = Recorded By, (t) = Taken By, (c) = Cosigned By    Initials Name Provider Type    Kyara Tirado PT Physical Therapist               Obj/Interventions     Row Name 05/01/23 1217          Motor Skills    Therapeutic Exercise --  cardiac exercises x 5 reps  -           User Key  (r) = Recorded By, (t) = Taken By, (c) = Cosigned By    Initials Name Provider Type    Kyara Tirado, DEENA Physical Therapist                Goals/Plan    No documentation.                Clinical Impression     Row Name 05/01/23 1218          Pain    Pretreatment Pain Rating 0/10 - no pain  -     Posttreatment Pain Rating 0/10 - no pain  -     Row Name 05/01/23 1218          Plan of Care Review    Plan of Care Reviewed With patient  -     Outcome Evaluation Pt was in bed and agreeable to therapy. He required less assistance for all mobility and tolerated increased ambulation distance. He is slightly impulsive and requires verbal cues for safety at times.   Pt wants to go home, but wife is worried that he isn't ready yet and wants acute rehab. Pt is still walking with rwx, but could attempt with no AD next session. Encouraged pt to continue ambulating with nursing staff.  Discussed with CCP.  -     Row Name 05/01/23 1218          Positioning and Restraints    Pre-Treatment Position in bed  no alarm  -     Post Treatment Position bed  -KH     In Bed sitting;call light within reach;encouraged to call for assist  -           User Key  (r) = Recorded By, (t) = Taken By, (c) = Cosigned By    Initials Name Provider Type    Kyara Tirado, PT Physical Therapist               Outcome Measures     Row Name 05/01/23 1222          How much help from another person do you currently need...    Turning from your back to your side while in flat bed without using bedrails? 4  -KH     Moving from lying on back to sitting on the side of a flat bed without bedrails? 4  -KH     Moving to and from a bed to a chair (including a wheelchair)? 3  -KH     Standing up from a chair using your arms (e.g., wheelchair, bedside chair)? 3  -KH     Climbing 3-5 steps with a railing? 2  -KH     To walk in hospital room? 3  -KH     AM-PAC 6 Clicks Score (PT) 19  -KH     Highest level of mobility 6 --> Walked 10 steps or more  -THOMAS     Row Name 05/01/23 1222          Functional Assessment    Outcome Measure Options AM-PAC 6 Clicks Basic Mobility (PT)  -THOMAS            User Key  (r) = Recorded By, (t) = Taken By, (c) = Cosigned By    Initials Name Provider Type    Kyara Tirado, PT Physical Therapist                             Physical Therapy Education     Title: PT OT SLP Therapies (Done)     Topic: Physical Therapy (Done)     Point: Mobility training (Done)     Learning Progress Summary           Patient Acceptance, E,D, NR,VU by  at 5/1/2023 1222    Acceptance, E,D, DU by PC at 4/30/2023 1232    Acceptance, E,TB, VU by  at 4/30/2023 0901    Acceptance, E,TB, VU by  at 4/29/2023 1007    Acceptance, E, VU by  at 4/28/2023 1223    Acceptance, E, VU by EM at 4/28/2023 1034    Acceptance, E,TB, VU by  at 4/27/2023 1549    Acceptance, E, VU by  at 4/26/2023 1456    Acceptance, E,TB, VU,NR by  at 4/24/2023 1226    Acceptance, E,TB,D, VU,NR by  at 4/22/2023 1627    Acceptance, E,TB, VU by  at 4/21/2023 1756    Acceptance, E,TB,D, VU,NR by  at 4/21/2023 1626    Acceptance, E, VU by EM at 4/20/2023 1507    Acceptance, E, VU by  at 4/19/2023 1815    Acceptance, E, VU by EM at 4/19/2023 1156    Acceptance, E, NR by AR at 4/16/2023 1633    Acceptance, TB,E, VU by  at 4/11/2023 0900   Family Acceptance, E, VU by EM at 4/28/2023 1034    Acceptance, E, VU by  at 4/19/2023 1815    Acceptance, E, NR by AR at 4/16/2023 1633                   Point: Home exercise program (Done)     Learning Progress Summary           Patient Acceptance, E,D, NR,VU by  at 5/1/2023 1222    Acceptance, E,D, DU by PC at 4/30/2023 1232    Acceptance, E,TB, VU by  at 4/30/2023 0901    Acceptance, E,TB, VU by  at 4/29/2023 1007    Acceptance, E, VU by KN at 4/28/2023 1223    Acceptance, E,TB, VU by TH at 4/27/2023 1549    Acceptance, E, VU by KN at 4/26/2023 1456    Acceptance, E,TB, VU,NR by  at 4/24/2023 1226    Acceptance, E,TB,D, VU,NR by  at 4/22/2023 1627    Acceptance, E,TB, VU by  at 4/21/2023 1756    Acceptance, E,TB,D, VU,NR by  at 4/21/2023 1626     Acceptance, E, VU by EM at 4/20/2023 1507    Acceptance, E, VU by AH at 4/19/2023 1815    Acceptance, E, VU by EM at 4/19/2023 1156    Acceptance, E, NR by AR at 4/16/2023 1633    Acceptance, TB,E, VU by TH at 4/11/2023 0900   Family Acceptance, E, VU by AH at 4/19/2023 1815    Acceptance, E, NR by AR at 4/16/2023 1633                   Point: Body mechanics (Done)     Learning Progress Summary           Patient Acceptance, E,D, NR,VU by KH at 5/1/2023 1222    Acceptance, E,D, DU by PC at 4/30/2023 1232    Acceptance, E,TB, VU by TH at 4/30/2023 0901    Acceptance, E,TB, VU by TH at 4/29/2023 1007    Acceptance, E, VU by KN at 4/28/2023 1223    Acceptance, E,TB, VU by TH at 4/27/2023 1549    Acceptance, E, VU by KN at 4/26/2023 1456    Acceptance, E,TB, VU,NR by CB at 4/24/2023 1226    Acceptance, E,TB,D, VU,NR by CH at 4/22/2023 1627    Acceptance, E,TB, VU by TH at 4/21/2023 1756    Acceptance, E,TB,D, VU,NR by CH at 4/21/2023 1626    Acceptance, E, VU by AH at 4/19/2023 1815    Acceptance, E, NR by AR at 4/16/2023 1633    Acceptance, TB,E, VU by TH at 4/11/2023 0900   Family Acceptance, E, VU by AH at 4/19/2023 1815    Acceptance, E, NR by AR at 4/16/2023 1633                   Point: Precautions (Done)     Learning Progress Summary           Patient Acceptance, E,D, NR,VU by KH at 5/1/2023 1222    Acceptance, E,D, DU by PC at 4/30/2023 1232    Acceptance, E,TB, VU by TH at 4/30/2023 0901    Acceptance, E,TB, VU by TH at 4/29/2023 1007    Acceptance, E, VU by KN at 4/28/2023 1223    Acceptance, E,TB, VU by TH at 4/27/2023 1549    Acceptance, E, VU by KN at 4/26/2023 1456    Acceptance, E,TB, VU,NR by CB at 4/24/2023 1226    Acceptance, E,TB,D, VU,NR by CH at 4/22/2023 1627    Acceptance, E,TB, VU by TH at 4/21/2023 1756    Acceptance, E,TB,D, VU,NR by CH at 4/21/2023 1626    Acceptance, E, VU by  at 4/19/2023 1815    Acceptance, E, NR by AR at 4/16/2023 1633    Acceptance, TB,E, VU by  at 4/11/2023 0900    Family Acceptance, E, VU by  at 4/19/2023 1815    Acceptance, E, NR by AR at 4/16/2023 1633                               User Key     Initials Effective Dates Name Provider Type Discipline     06/16/21 -  Kyara Sunshine, PT Physical Therapist PT    CH 06/16/21 -  Kavitha Sen, PT Physical Therapist PT    PC 06/16/21 -  Deann Jarrell, PT Physical Therapist PT    EM 06/16/21 -  Jennie Cuevas, PT Physical Therapist PT    AR 06/16/21 -  Mihaela Shields, PT Physical Therapist PT     06/16/21 -  Kacy Deluca, RN Registered Nurse Nurse     10/22/21 -  Geni Pederson, PT Physical Therapist PT     12/20/21 -  Mitehs Sage, RN Registered Nurse Nurse     08/02/22 -  Robert Tadeo, OT Occupational Therapist OT              PT Recommendation and Plan     Plan of Care Reviewed With: patient  Outcome Evaluation: Pt was in bed and agreeable to therapy. He required less assistance for all mobility and tolerated increased ambulation distance. He is slightly impulsive and requires verbal cues for safety at times.   Pt wants to go home, but wife is worried that he isn't ready yet and wants acute rehab. Pt is still walking with rwx, but could attempt with no AD next session. Encouraged pt to continue ambulating with nursing staff.  Discussed with CCP.     Time Calculation:    PT Charges     Row Name 05/01/23 1222             Time Calculation    Start Time 0928  -      Stop Time 0940  -      Time Calculation (min) 12 min  -KH      PT Received On 05/01/23  -      PT - Next Appointment 05/02/23  -         Time Calculation- PT    Total Timed Code Minutes- PT 12 minute(s)  -         Timed Charges    18394 - PT Therapeutic Exercise Minutes 12  -KH         Total Minutes    Timed Charges Total Minutes 12  -KH       Total Minutes 12  -KH            User Key  (r) = Recorded By, (t) = Taken By, (c) = Cosigned By    Initials Name Provider Type    Kyara Tirado, PT Physical  Therapist              Therapy Charges for Today     Code Description Service Date Service Provider Modifiers Qty    18289022770 HC PT THER PROC EA 15 MIN 5/1/2023 Kyara Sunshine, PT GP 1          PT G-Codes  Outcome Measure Options: AM-PAC 6 Clicks Basic Mobility (PT)  AM-PAC 6 Clicks Score (PT): 19  AM-PAC 6 Clicks Score (OT): 19  PT Discharge Summary  Anticipated Discharge Disposition (PT): inpatient rehabilitation facility, home with assist, home with home health    Kyara Sunshine, PT  5/1/2023

## 2023-05-01 NOTE — CASE MANAGEMENT/SOCIAL WORK
Continued Stay Note  [facility]     Patient Name: Lefty Cai  MRN: [id number]  Today's Date: 5/1/2023    Admit Date: 4/8/2023    Plan: Home w/ [facility]  (referral pending);  Will need to update [facility] on Tuesday of Pt new d/c plan.   Discharge Plan     Row Name 05/01/23 1753       Plan    Plan Home w/ [facility]  (referral pending);  Pt current w/ HD at [facility], T/T/Sa scheduled.  Will need to update [facility] on Tuesday of Pt new d/c plan.    Plan Comments CCP received a call from [facility] this afternoon advising that [facility] re-reviewed Pt and he is too high functioning for Acute Rehab.  CCP discussed this with Pt spouse, Caterina in [facility].  Caterina is aware that there are only five sub-acute rehab with hemodialysis on site.  Caterina nor Pt are interested in SNF.  Pt wishes to discharge home and Caterina will transport him to and from outpatient hemodialysis at [facility].  Home health referral sent to [facility] (pending).  CCP will need to contact [facility] tomorrow to update them on Pt updated d/c plan.  CCP following...........Irene SUH/PRATIK CM               Discharge Codes    No documentation.               Expected Discharge Date and Time     Expected Discharge Date Expected Discharge Time    May 2, 2023             Irene Lawrence RN

## 2023-05-01 NOTE — PROGRESS NOTES
"   LOS: 23 days    Patient Care Team:  Daksha Ng APRN as PCP - General (Family Medicine)  Rudy Faith MD as Consulting Physician (Ophthalmology)  Jose Mccall MD as Consulting Physician (Nephrology)  Quang Reyes MD as Consulting Physician (Nephrology)    Chief Complaint:    Chief Complaint   Patient presents with   • Shortness of Breath   • Cough     Follow UP ESRD  Subjective     Interval History:   Walked around halls today.  Wants to go home.  Wife not sure he is strong enough.  Review of Systems:   As noted above    Objective     Vital Signs  Temp:  [97.5 °F (36.4 °C)-98.6 °F (37 °C)] 98.2 °F (36.8 °C)  Heart Rate:  [84-98] 84  Resp:  [16-18] 16  BP: (119-140)/(63-83) 128/83    Flowsheet Rows    Flowsheet Row First Filed Value   Admission Height 177.8 cm (70\") Documented at 04/08/2023 1028   Admission Weight 102 kg (225 lb) Documented at 04/08/2023 1028          I/O this shift:  In: 0   Out: 300 [Urine:300]  I/O last 3 completed shifts:  In: 1020 [P.O.:1020]  Out: 450 [Urine:450]    Intake/Output Summary (Last 24 hours) at 5/1/2023 0633  Last data filed at 5/1/2023 0420  Gross per 24 hour   Intake 420 ml   Output 300 ml   Net 120 ml       Physical Exam:  General Appearance: alert, oriented x 3, no acute distress,   Skin: warm and dry  HEENT: pupils round and reactive to light, oral mucosa normal, nonicteric sclera  Neck: supple, no JVD, trachea midline  Lungs: CTA, unlabored breathing effort  Heart: RRR, normal S1 and S2, no S3, no rub  Abdomen: soft, nontender, normoactive bowels  : no palpable bladder,  Extremities: no edema, cyanosis or clubbing  Neuro: normal speech and mental status       Results Review:    Results from last 7 days   Lab Units 05/01/23  0338 04/30/23  0327 04/29/23  0307 04/28/23  0319 04/27/23  0324 04/26/23  0300   SODIUM mmol/L 141 138 136 138  138 137 139  138   POTASSIUM mmol/L 4.0 3.6 3.6 4.2  4.3 3.8 4.0  3.9   CHLORIDE mmol/L 103 103 101 103  " 104 102 100  100   CO2 mmol/L 23.9 24.0 22.0 17.3*  22.0 20.6* 24.0  25.0   BUN mg/dL 25* 18 36* 24*  25* 41* 27*  26*   CREATININE mg/dL 5.49* 3.77* 5.14* 3.93*  3.38* 5.59* 4.21*  4.14*   CALCIUM mg/dL 9.1 8.3* 8.2* 8.4*  8.0* 9.0 8.6  8.5*   BILIRUBIN mg/dL  --   --   --  0.5 0.7 0.8   ALK PHOS U/L  --   --   --  102 109 121*   ALT (SGPT) U/L  --   --   --  21 23 23   AST (SGOT) U/L  --   --   --  38 34 48*   GLUCOSE mg/dL 117* 100* 100* 98  97 101* 106*  110*       Estimated Creatinine Clearance: 14.4 mL/min (A) (by C-G formula based on SCr of 5.49 mg/dL (H)).    Results from last 7 days   Lab Units 05/01/23 0338 04/30/23 0327 04/29/23  0307   MAGNESIUM mg/dL  --  1.8  --    PHOSPHORUS mg/dL 2.4* 2.1* 3.2             Results from last 7 days   Lab Units 05/01/23 0338 04/30/23  0327 04/29/23  0307 04/28/23  0319 04/27/23  1649 04/27/23  0324   WBC 10*3/mm3 7.39 6.67 7.49 11.66*  --  16.01*   HEMOGLOBIN g/dL 8.7* 8.6* 8.5* 8.7* 9.9* 9.2*   PLATELETS 10*3/mm3 180 179 189 191  --  175               Imaging Results (Last 24 Hours)     ** No results found for the last 24 hours. **        aspirin, 81 mg, Oral, Daily  atorvastatin, 40 mg, Oral, Nightly  enoxaparin, 30 mg, Subcutaneous, Q24H  epoetin bernice/bernice-epbx, 6,000 Units, Intravenous, Once per day on Mon Wed Fri  First Mouthwash (Magic Mouthwash), 5 mL, Swish & Spit, Q8H  guaiFENesin, 1,200 mg, Oral, Q12H  insulin lispro, 0-9 Units, Subcutaneous, 4x Daily With Meals & Nightly  ipratropium-albuterol, 3 mL, Nebulization, Q6H While Awake - RT  iron polysaccharides, 150 mg, Oral, Daily  levoFLOXacin, 250 mg, Oral, Q24H  Menthol-Zinc Oxide, 1 application, Topical, TID  midodrine, 7.5 mg, Oral, TID AC  pantoprazole, 40 mg, Oral, QAM  polyethylene glycol, 17 g, Oral, Daily  senna-docusate sodium, 2 tablet, Oral, BID           Medication Review:   Current Facility-Administered Medications   Medication Dose Route Frequency Provider Last Rate Last Admin   •  acetaminophen (TYLENOL) tablet 500 mg  500 mg Oral Q4H PRN Mirtha Zuluaga MD   500 mg at 04/28/23 0906   • aspirin EC tablet 81 mg  81 mg Oral Daily Laya Osman APRN   81 mg at 04/30/23 0804   • atorvastatin (LIPITOR) tablet 40 mg  40 mg Oral Nightly Laya Osman APRN   40 mg at 04/30/23 2058   • bisacodyl (DULCOLAX) EC tablet 10 mg  10 mg Oral Daily PRN Laya Osman APRN   10 mg at 04/21/23 1906   • bisacodyl (DULCOLAX) suppository 10 mg  10 mg Rectal Daily PRN Laya Osman APRN       • dextrose (D50W) (25 g/50 mL) IV injection 25 g  25 g Intravenous Q15 Min PRN Laya Osman APRN       • dextrose (GLUTOSE) oral gel 15 g  15 g Oral Q15 Min PRN Laya Osman APRN       • Enoxaparin Sodium (LOVENOX) syringe 30 mg  30 mg Subcutaneous Q24H Laya Osman APRN   30 mg at 04/30/23 1608   • epoetin bernice-epbx (RETACRIT) injection 6,000 Units  6,000 Units Intravenous Once per day on Mon Wed Fri ErbHyacinth clarke MD   6,000 Units at 04/28/23 0907   • First Mouthwash (Magic Mouthwash) 5 mL  5 mL Swish & Spit Q8H Arianna Meadows APRN   5 mL at 04/30/23 1609   • glucagon (GLUCAGEN) injection 1 mg  1 mg Intramuscular Q15 Min PRN Laya Osman APRN       • guaiFENesin (MUCINEX) 12 hr tablet 1,200 mg  1,200 mg Oral Q12H Arianna Meadows APRN   1,200 mg at 04/30/23 2058   • heparin (porcine) injection 4,000 Units  4,000 Units Intracatheter PRN Jose Mccall MD   4,000 Units at 04/27/23 1321   • insulin lispro (ADMELOG) injection 0-9 Units  0-9 Units Subcutaneous 4x Daily With Meals & Nightly Laya Osman APRN   2 Units at 04/30/23 1753   • ipratropium-albuterol (DUO-NEB) nebulizer solution 3 mL  3 mL Nebulization Q4H PRN Laya Osman APRN   3 mL at 04/17/23 1456   • ipratropium-albuterol (DUO-NEB) nebulizer solution 3 mL  3 mL Nebulization Q6H While Awake - RT Laya Osman APRN   3 mL at 04/30/23 1914   • iron polysaccharides (NIFEREX) capsule 150 mg  150 mg Oral  Daily Laya Osman APRN   150 mg at 04/30/23 0805   • levoFLOXacin (LEVAQUIN) tablet 250 mg  250 mg Oral Q24H Mirtha Zuluaga MD   250 mg at 04/30/23 1046   • Menthol-Zinc Oxide 1 application  1 application Topical TID Cade Schreiber MD   1 application at 04/30/23 1609   • midodrine (PROAMATINE) tablet 7.5 mg  7.5 mg Oral TID AC Yu El APRN   7.5 mg at 04/30/23 1752   • ondansetron (ZOFRAN) injection 4 mg  4 mg Intravenous Q6H PRN Laya Osman APRN       • pantoprazole (PROTONIX) EC tablet 40 mg  40 mg Oral QAM Laya Osman APRN   40 mg at 04/30/23 0641   • polyethylene glycol (MIRALAX) packet 17 g  17 g Oral Daily PRN Laya Osman APRN   17 g at 04/29/23 1508   • polyethylene glycol (MIRALAX) packet 17 g  17 g Oral Daily Hyacinth Fiore MD   17 g at 04/30/23 0805   • sennosides-docusate (PERICOLACE) 8.6-50 MG per tablet 2 tablet  2 tablet Oral BID Jim Anaya MD   2 tablet at 04/30/23 0804   • traZODone (DESYREL) tablet 25 mg  25 mg Oral Nightly PRN Mirtha Zuluaga MD   25 mg at 04/30/23 2058       Assessment & Plan         NSTEMI (non-ST elevated myocardial infarction)    Type 2 diabetes mellitus with diabetic chronic kidney disease    Essential hypertension    Hyperlipidemia    ESRD (end stage renal disease)    Rhinovirus    Abnormal findings on diagnostic imaging of heart and coronary circulation    Fatigue    Dysphagia    Acute superficial venous thrombosis of right lower extremity    Orthostatic hypotension    Acute UTI (urinary tract infection)      1. ESRD.  Patient seen during dialysis this morning.  Currently on dialysis every Tuesday, Thursday and Saturday to accommodate transfer to rehab  2. NSTEMI. SP 3 vessel CABG, MV repair 4/13/23.  3. PAF,  SR. off metoprolol  4. Anemia CKD and blood loss.  Hemoglobin stable around mid 8s  5.  Hypophosphatemia,   6. DM2, with renal complication  7. Deconditioning.    8. Hypotension, acute on chronic, leukocytosis  improved after treating his Pseudomonas UTI   9. Encephalopathy.  Resolved     PLAN:  1.  Continue dialysis TTS.  Cautious fluid removal with dialysis.  On oral midodrine for hypotension decrease to 5mg bid  2.  Increase procrit to 10,000  3.  Not on a phos binder increase protein intake  4.  Surveillance labs      Kanchan Tejada MD  05/01/23  06:33 EDT

## 2023-05-01 NOTE — PROGRESS NOTES
"Lefty Cai  1950 72 y.o.  [id number]      Patient Care Team:  Daksha Ng APRN as PCP - General (Family Medicine)  Rudy Faith MD as Consulting Physician (Ophthalmology)  Jose Mccall MD as Consulting Physician (Nephrology)  Quang Reyes MD as Consulting Physician (Nephrology)    CC: Non-STEMI,, end-stage renal failure, EF 30 to 35%, severe three-vessel coronary disease, severe MR, underwent three-vessel CABG and mitral valve repair, postop A-fib    Interval History: Doing well he got to see his dog yesterday      Objective   Vital Signs  Temp:  [97.5 °F (36.4 °C)-98.6 °F (37 °C)] 97.6 °F (36.4 °C)  Heart Rate:  [84-93] 93  Resp:  [16-18] 18  BP: (126-140)/(55-83) 127/55    Intake/Output Summary (Last 24 hours) at 5/1/2023 1015  Last data filed at 5/1/2023 0816  Gross per 24 hour   Intake 420 ml   Output 300 ml   Net 120 ml     Flowsheet Rows    Flowsheet Row First Filed Value   Admission Height 177.8 cm (70\") Documented at 04/08/2023 1028   Admission Weight 102 kg (225 lb) Documented at 04/08/2023 1028          Physical Exam:   General Appearance:    Alert,oriented, in no acute distress   Lungs:     Clear to auscultation,BS are equal    Heart:    Normal S1 and S2, RRR without murmur, gallop or rub   HEENT:    Sclerae are clear, no JVD or adenopathy   Abdomen:     Normal bowel sounds, soft nontender, nondistended, no HSM   Extremities:   Moves all extremities well, no edema, no cyanosis, no             Redness, no rash     Medication Review:      aspirin, 81 mg, Oral, Daily  atorvastatin, 40 mg, Oral, Nightly  enoxaparin, 30 mg, Subcutaneous, Q24H  epoetin bernice/bernice-epbx, 10,000 Units, Intravenous, Once per day on Mon Wed Fri  First Mouthwash (Magic Mouthwash), 5 mL, Swish & Spit, Q8H  guaiFENesin, 1,200 mg, Oral, Q12H  insulin lispro, 0-9 Units, Subcutaneous, 4x Daily With Meals & Nightly  ipratropium-albuterol, 3 mL, Nebulization, Q6H While Awake - RT  iron polysaccharides, " 150 mg, Oral, Daily  levoFLOXacin, 250 mg, Oral, Q24H  Menthol-Zinc Oxide, 1 application, Topical, TID  midodrine, 5 mg, Oral, BID AC  pantoprazole, 40 mg, Oral, QAM  polyethylene glycol, 17 g, Oral, Daily  senna-docusate sodium, 2 tablet, Oral, BID             I reviewed the patient's new clinical results.  I personally viewed and interpreted the patient's EKG/Telemetry data    Assessment/Plan  Active Hospital Problems    Diagnosis  POA   • **NSTEMI (non-ST elevated myocardial infarction) [I21.4]  Yes   • Orthostatic hypotension [I95.1]  Unknown   • Acute UTI (urinary tract infection) [N39.0]  Unknown   • Acute superficial venous thrombosis of right lower extremity [I82.811]  Clinically Undetermined   • Dysphagia [R13.10]  Unknown   • Fatigue [R53.83]  Unknown   • Rhinovirus [B34.8]  Yes   • Abnormal findings on diagnostic imaging of heart and coronary circulation [R93.1]  Yes   • Type 2 diabetes mellitus with diabetic chronic kidney disease [E11.22]  Yes   • Essential hypertension [I10]  Yes   • Hyperlipidemia [E78.5]  Yes   • ESRD (end stage renal disease) [N18.6]  Yes      Resolved Hospital Problems    Diagnosis Date Resolved POA   • Hypernatremia [E87.0] 04/19/2023 Yes   • Dyspnea on exertion [R06.09] 04/24/2023 Yes     Ischemic cardiomyopathy severe mitral insufficiency status post CABG and mitral valve repair on dialysis his blood pressure is pretty soft.  He has Pseudomonas in his urine his white count was elevated he continues to get better.  He is doing very well I think he is ready for rehab     Lobito Garcia MD  05/01/23  10:15 EDT

## 2023-05-01 NOTE — PROGRESS NOTES
Name: Lefty Cai ADMIT: 2023   : 1950  PCP: Daksha Ng ROMERO, DUSTY    MRN: [id number] LOS: 23 days   AGE/SEX: 72 y.o. male  ROOM: /     Subjective   Subjective   Resting in bed. Wife at bedside. He just ambulated around nurses' station with PT. PT feels patient may benefit from BAR or could potentially be safe for discharge home in the next day or so if he continues to improve. Wife is a little fearful about taking him home given need to transport to and from HD. Patient states he is usually weaker on HD days. He denies any pain except doesn't like his Jobst stockings. Some dizziness with exertion but improving each day and no near syncope with this. He denies any nausea or vomiting. Appetite is significantly improved. + BM yesterday.     Objective   Objective   Vital Signs  Temp:  [97.5 °F (36.4 °C)-98.6 °F (37 °C)] 97.6 °F (36.4 °C)  Heart Rate:  [84-92] 91  Resp:  [16-18] 18  BP: (126-140)/(55-83) 127/55  SpO2:  [95 %-96 %] 95 %  on  Flow (L/min):  [2] 2;   Device (Oxygen Therapy): room air  Body mass index is 28.9 kg/m².     Physical Exam  Vitals and nursing note reviewed.   Constitutional:       Appearance: He is chronically ill-appearing. He is not toxic-appearing.   Cardiovascular:      Rate and Rhythm: Normal rate. Regular rhythm.      Pulses: Normal pulses.   Pulmonary:      Effort: Pulmonary effort is normal. No respiratory distress.      Comments: Fairly clear, some coarseness to BUL. Is on RA. Poor inspiratory effort  Abdominal:      General: Bowel sounds are normal. There is no distension.      Palpations: Abdomen is soft.      Tenderness: There is no abdominal tenderness.   Musculoskeletal:         General: No deformity. Normal range of motion.   Skin:     General: Skin is warm and dry. Moderate BLE edema with Jobst in place.     Findings: No bruising.      Comments: Midline sternal incision C/D/I, TDC C/D/I  Neurological:      Mental Status: He is alert and oriented to  person, place, and time.      Motor: generalized Weakness present.      Coordination: Coordination normal.   Psychiatric:         Mood and Affect: Mood is pleasant/joking.     Behavior: Behavior normal.      Results Review:       I reviewed the patient's new clinical results.  Results from last 7 days   Lab Units 05/01/23 0338 04/30/23 0327 04/29/23 0307 04/28/23 0319   WBC 10*3/mm3 7.39 6.67 7.49 11.66*   HEMOGLOBIN g/dL 8.7* 8.6* 8.5* 8.7*   PLATELETS 10*3/mm3 180 179 189 191     Results from last 7 days   Lab Units 05/01/23 0338 04/30/23 0327 04/29/23 0307 04/28/23 0319   SODIUM mmol/L 141 138 136 138  138   POTASSIUM mmol/L 4.0 3.6 3.6 4.2  4.3   CHLORIDE mmol/L 103 103 101 103  104   CO2 mmol/L 23.9 24.0 22.0 17.3*  22.0   BUN mg/dL 25* 18 36* 24*  25*   CREATININE mg/dL 5.49* 3.77* 5.14* 3.93*  3.38*   GLUCOSE mg/dL 117* 100* 100* 98  97   Estimated Creatinine Clearance: 14.4 mL/min (A) (by C-G formula based on SCr of 5.49 mg/dL (H)).  Results from last 7 days   Lab Units 05/01/23 0338 04/30/23 0327 04/29/23 0307 04/28/23 0319 04/27/23 0324 04/26/23  0300   ALBUMIN g/dL 2.4* 2.6* 2.8* 2.5*  2.6* 2.7* 2.9*   BILIRUBIN mg/dL  --   --   --  0.5 0.7 0.8   ALK PHOS U/L  --   --   --  102 109 121*   AST (SGOT) U/L  --   --   --  38 34 48*   ALT (SGPT) U/L  --   --   --  21 23 23     Results from last 7 days   Lab Units 05/01/23 0338 04/30/23 0327 04/29/23 0307 04/28/23 0319   CALCIUM mg/dL 9.1 8.3* 8.2* 8.4*  8.0*   ALBUMIN g/dL 2.4* 2.6* 2.8* 2.5*  2.6*   MAGNESIUM mg/dL  --  1.8  --   --    PHOSPHORUS mg/dL 2.4* 2.1* 3.2 1.7*     Results from last 7 days   Lab Units 04/26/23  0300   PROCALCITONIN ng/mL 0.92*     Glucose   Date/Time Value Ref Range Status   05/01/2023 0644 107 70 - 130 mg/dL Final     Comment:     Meter: [id number] : [id number] Ricky ROSALES   04/30/2023 2019 129 70 - 130 mg/dL Final     Comment:     Meter: [id number] : [id number] Ricky ROSALES    04/30/2023 1626 177 (H) 70 - 130 mg/dL Final     Comment:     Meter: [id number] : [id number] Jailene ROSALES   04/30/2023 1105 135 (H) 70 - 130 mg/dL Final     Comment:     Meter: [id number] : [id number] Jailene ROSALES   04/30/2023 0644 102 70 - 130 mg/dL Final     Comment:     Meter: [id number] : [id number] Oral ROSALES   04/29/2023 2058 168 (H) 70 - 130 mg/dL Final     Comment:     Meter: [id number] : [id number] Oral ROSALES   04/29/2023 1646 95 70 - 130 mg/dL Final     Comment:     Meter: [id number] : [id number] Jailene ROSALES       aspirin, 81 mg, Oral, Daily  atorvastatin, 40 mg, Oral, Nightly  enoxaparin, 30 mg, Subcutaneous, Q24H  epoetin bernice/benrice-epbx, 10,000 Units, Intravenous, Once per day on Mon Wed Fri  First Mouthwash (Magic Mouthwash), 5 mL, Swish & Spit, Q8H  guaiFENesin, 1,200 mg, Oral, Q12H  insulin lispro, 0-9 Units, Subcutaneous, 4x Daily With Meals & Nightly  ipratropium-albuterol, 3 mL, Nebulization, Q6H While Awake - RT  iron polysaccharides, 150 mg, Oral, Daily  levoFLOXacin, 250 mg, Oral, Q24H  Menthol-Zinc Oxide, 1 application, Topical, TID  midodrine, 5 mg, Oral, BID AC  pantoprazole, 40 mg, Oral, QAM  polyethylene glycol, 17 g, Oral, Daily  senna-docusate sodium, 2 tablet, Oral, BID       Diet: Regular/House Diet; Texture: Regular Texture (IDDSI 7); Fluid Consistency: Thin (IDDSI 0)       Assessment/Plan     Active Hospital Problems    Diagnosis  POA   • **NSTEMI (non-ST elevated myocardial infarction) [I21.4]  Yes   • Orthostatic hypotension [I95.1]  Unknown   • Acute UTI (urinary tract infection) [N39.0]  Unknown   • Acute superficial venous thrombosis of right lower extremity [I82.811]  Clinically Undetermined   • Dysphagia [R13.10]  Unknown   • Fatigue [R53.83]  Unknown   • Rhinovirus [B34.8]  Yes   • Abnormal findings on diagnostic imaging of heart and coronary circulation [R93.1]  Yes   • Type 2 diabetes mellitus with diabetic chronic kidney disease  [E11.22]  Yes   • Essential hypertension [I10]  Yes   • Hyperlipidemia [E78.5]  Yes   • ESRD (end stage renal disease) [N18.6]  Yes      Resolved Hospital Problems    Diagnosis Date Resolved POA   • Hypernatremia [E87.0] 04/19/2023 Yes   • Dyspnea on exertion [R06.09] 04/24/2023 Yes     Mr. Cai is a 73yo gentleman who presented to the hospital with complaints of increased dyspnea in the setting of ESRD with HD compliance. He was found to have new T-wave inversions with troponin elevation and underwent heart cath that revealed severe three vessel CAD with occluded right coronary system. Cardiothoracic Surgery was consulted for CABG. RVP was positive for rhinovirus. ID was consulted given need for OR--they  cleared for surgery without antibiotics. He was taken for open heart on 4/13. He had hypotension and atrial fibrillation with RVR following surgery. Moved out of CICU on 4/20.     • NSTEMI/ PAF : S/p CABG x4 with MV repair on 4/13. Cardiology and Cardiothoracic Surgery managing. Echo 4/8 with EF 30-35%. On amiodarone and metoprolol with conversion of atrial fibrillation to NSR. Then had recurrence of afib RVR 4/20 so beta blocker increased. Had orthostatic hypotension so Nephrology/Cards then removed beta blocker. Compression stockings ordered- keep on during the day and remove at night time. On ASA/statin. We will leave timing (if any) of full anticoagulation up to Cardiology and Cardiothoracic Surgery     • Rhinovirus: Supportive care. ID saw and cleared for OR. Pulmonology followed (signed off 4/20). He is on Duonebs/Mucomyst. Remains on RA. Encouraged IS and flutter valve use at bedside.     • ESRD: Via TDC. Nephrology managing. Doppler of left arm AVF showed marginal flow and inadequate size.      • DM2: Sugars acceptable. Not requiring correctional insulin. Continue to encourage oral intake     • HTN: BPs acceptable. BB removed 4/25. Continue midodrine.      • Leukocytosis/acute UTI: Up to 18K on 4/21.  CXR negative. UA showed >100k Pseudomonas in ESRD patient. Respiratory culture with normal respiratory nikunj. ID consulted 4/23 and felt UA represented asymptomatic bacteruria and they did not recommend treatment at that time. He then developed confusion/dysuria on 4/25--Cefepime started. Had worsening WBC/confusion/low BP so ID re-consulted 4/26. Vancomycin added. CT A/P/chest without obvious source of infection. Felt to have true UTI. Vancomycin removed 4/28 and ID signed off with plans for oral Levaquin, renally dosed, for 7 days (end date 5/2).      • ABLA on chronic anemia of CKD: Looks like he trends mid 10 -11 range. Currently stable around 8.7. On DAVID per Nephrology. Continue monitoring Hgb.       • Constipation:  Continue Miralax/Senokot. Last BM 4/30     • Coccyx buttock skin breakdown: Encouraged turn every 2 hours, waffle cushion, pressure reduction measures, etc.      • Fatigue: Suspect multifactorial due to chronic comorbidities, prolonged hospital stay, open heart surgery, etc. Highly suspect underlying PATY as well. Would benefit from outpatient sleep study. Trazodone changed to PRN. BB stopped 4/25--this may also help with drowsiness. TSH/vitamin B12/folate all okay. Fatigue is much better.     • Dysphagia: ST re-evaluated and has now cleared for regular diet with thin liquids. Tolerating so far.     • Acute superficial thrombophlebitis RLE: Venous doppler showed greater saphenous thrombophlebitis--nothing deep. Continue compression and can use warm compresses as needed. He is already on VTE prophylaxis with Lovenox.     • Orthostatic hypotension: Jobst when out of bed. Continue Midodrine.     Appreciate all specialists' attention to pt.   D/w pt, wife, PT and nurse as well as CCP.     VTE Prophylaxis - Lovenox 30 mg SC daily   Code Status - Full code  Disposition - Anticipate discharge in next 1-2 days. Pending progress with PT. Home with HH or possible re-evaluation from BAR? CCP to  discuss.       DUSTY Roberts  [facility]  05/01/23  09:53 EDT

## 2023-05-01 NOTE — DISCHARGE PLACEMENT REQUEST
"Lefty Mireles (72 y.o. Male)     Date of Birth   1950    Social Security Number       Address   [address] [place] [address]    Home Phone   [phone]    MRN   [id number]       [place]   Unknown    Marital Status                               Admission Date   4/8/23    Admission Type   Emergency    Admitting Provider   Farida Gusman MD    Attending Provider   Cade Schreiber MD    Department, Room/Bed   [facility] CARDIOVASC UNIT, 2224/1       Discharge Date       Discharge Disposition       Discharge Destination                               Attending Provider: Cade Schreiber MD    Allergies: Contrast Dye (Echo Or Unknown Ct/mr), Latex    Isolation: None   Infection: None   Code Status: CPR    Ht: 182.9 cm (72\")   Wt: 96.7 kg (213 lb 1.6 oz)    Admission Cmt: None   Principal Problem: NSTEMI (non-ST elevated myocardial infarction) [I21.4]                 Active Insurance as of 4/8/2023     Primary Coverage     Payor Plan Insurance Group Employer/Plan Group    HUMANA MEDICARE REPLACEMENT HUMANA MEDICARE REPLACEMENT [id number]     Payor Plan Address Payor Plan Phone Number Payor Plan Fax Number Effective Dates    [address] [address] [phone]  1/1/2020 - None Entered    [facility] [address]       Subscriber Name Subscriber Birth Date Member ID       LEFTY MIRELES 1950 [id number]                 Emergency Contacts      (Rel.) Home Phone Work Phone Mobile Phone    TrellCaterina (Spouse) [phone] -- [phone]    BA BELL (Daughter) -- -- --            "

## 2023-05-01 NOTE — PROGRESS NOTES
" LOS: 23 days   Patient Care Team:  Daksha Ng APRN as PCP - General (Family Medicine)  Rudy Faith MD as Consulting Physician (Ophthalmology)  Jose Mccall MD as Consulting Physician (Nephrology)  Quang Reyes MD as Consulting Physician (Nephrology)    Chief Complaint: post op    Subjective:  Symptoms:  No shortness of breath, cough or chest pain.    Diet:  Adequate intake.  No nausea or vomiting.    Activity level: Impaired due to weakness.    Pain:  He complains of pain that is mild.  Pain is well controlled.      No issues overnight    Vital Signs  Temp:  [97.5 °F (36.4 °C)-98.6 °F (37 °C)] 98.2 °F (36.8 °C)  Heart Rate:  [84-98] 92  Resp:  [16-18] 18  BP: (119-140)/(63-83) 128/83  Body mass index is 28.9 kg/m².    Intake/Output Summary (Last 24 hours) at 5/1/2023 0812  Last data filed at 5/1/2023 0420  Gross per 24 hour   Intake 420 ml   Output 300 ml   Net 120 ml     No intake/output data recorded.          04/30/23  0406 04/30/23  0536 05/01/23  0620   Weight: 95.5 kg (210 lb 8 oz) 95.4 kg (210 lb 6.4 oz) 96.7 kg (213 lb 1.6 oz)         Objective:  General Appearance:  Comfortable and in no acute distress.    Vital signs: (most recent): Blood pressure 128/83, pulse 92, temperature 98.2 °F (36.8 °C), temperature source Oral, resp. rate 18, height 182.9 cm (72\"), weight 96.7 kg (213 lb 1.6 oz), SpO2 95 %.  Vital signs are normal.  No fever.    Output: Producing urine and producing stool.    Lungs:  Normal effort and normal respiratory rate.  There are decreased breath sounds.    Heart: Normal rate.  Regular rhythm.    Abdomen: Abdomen is soft.  Bowel sounds are normal.     Extremities: There is dependent edema (mild bilateral LE).    Pulses: Distal pulses are intact.    Neurological: Patient is alert and oriented to person, place and time.    Skin:  Warm and dry.  (Sternal incision clean, dry, and intact)        Results Review:        WBC WBC   Date Value Ref Range Status "   05/01/2023 7.39 3.40 - 10.80 10*3/mm3 Final   04/30/2023 6.67 3.40 - 10.80 10*3/mm3 Final   04/29/2023 7.49 3.40 - 10.80 10*3/mm3 Final      HGB Hemoglobin   Date Value Ref Range Status   05/01/2023 8.7 (L) 13.0 - 17.7 g/dL Final   04/30/2023 8.6 (L) 13.0 - 17.7 g/dL Final   04/29/2023 8.5 (L) 13.0 - 17.7 g/dL Final      HCT Hematocrit   Date Value Ref Range Status   05/01/2023 27.4 (L) 37.5 - 51.0 % Final   04/30/2023 26.9 (L) 37.5 - 51.0 % Final   04/29/2023 27.2 (L) 37.5 - 51.0 % Final      Platelets Platelets   Date Value Ref Range Status   05/01/2023 180 140 - 450 10*3/mm3 Final   04/30/2023 179 140 - 450 10*3/mm3 Final   04/29/2023 189 140 - 450 10*3/mm3 Final        PT/INR:  No results found for: PROTIME/No results found for: INR    Sodium Sodium   Date Value Ref Range Status   05/01/2023 141 136 - 145 mmol/L Final   04/30/2023 138 136 - 145 mmol/L Final   04/29/2023 136 136 - 145 mmol/L Final      Potassium Potassium   Date Value Ref Range Status   05/01/2023 4.0 3.5 - 5.2 mmol/L Final   04/30/2023 3.6 3.5 - 5.2 mmol/L Final   04/29/2023 3.6 3.5 - 5.2 mmol/L Final      Chloride Chloride   Date Value Ref Range Status   05/01/2023 103 98 - 107 mmol/L Final   04/30/2023 103 98 - 107 mmol/L Final   04/29/2023 101 98 - 107 mmol/L Final      Bicarbonate CO2   Date Value Ref Range Status   05/01/2023 23.9 22.0 - 29.0 mmol/L Final   04/30/2023 24.0 22.0 - 29.0 mmol/L Final   04/29/2023 22.0 22.0 - 29.0 mmol/L Final      BUN BUN   Date Value Ref Range Status   05/01/2023 25 (H) 8 - 23 mg/dL Final   04/30/2023 18 8 - 23 mg/dL Final   04/29/2023 36 (H) 8 - 23 mg/dL Final      Creatinine Creatinine   Date Value Ref Range Status   05/01/2023 5.49 (H) 0.76 - 1.27 mg/dL Final   04/30/2023 3.77 (H) 0.76 - 1.27 mg/dL Final   04/29/2023 5.14 (H) 0.76 - 1.27 mg/dL Final      Calcium Calcium   Date Value Ref Range Status   05/01/2023 9.1 8.6 - 10.5 mg/dL Final   04/30/2023 8.3 (L) 8.6 - 10.5 mg/dL Final   04/29/2023 8.2 (L)  8.6 - 10.5 mg/dL Final      Magnesium Magnesium   Date Value Ref Range Status   04/30/2023 1.8 1.6 - 2.4 mg/dL Final          aspirin, 81 mg, Oral, Daily  atorvastatin, 40 mg, Oral, Nightly  enoxaparin, 30 mg, Subcutaneous, Q24H  epoetin bernice/bernice-epbx, 10,000 Units, Intravenous, Once per day on Mon Wed Fri  First Mouthwash (Magic Mouthwash), 5 mL, Swish & Spit, Q8H  guaiFENesin, 1,200 mg, Oral, Q12H  insulin lispro, 0-9 Units, Subcutaneous, 4x Daily With Meals & Nightly  ipratropium-albuterol, 3 mL, Nebulization, Q6H While Awake - RT  iron polysaccharides, 150 mg, Oral, Daily  levoFLOXacin, 250 mg, Oral, Q24H  Menthol-Zinc Oxide, 1 application, Topical, TID  midodrine, 5 mg, Oral, BID AC  pantoprazole, 40 mg, Oral, QAM  polyethylene glycol, 17 g, Oral, Daily  senna-docusate sodium, 2 tablet, Oral, BID         Patient Active Problem List   Diagnosis Code   • 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.22   • Essential hypertension I10   • Hyperlipidemia E78.5   • Primary insomnia F51.01   • ESRD (end stage renal disease) N18.6   • Vitamin D deficiency E55.9   • Diverticulitis of large intestine with perforation and abscess without bleeding K57.20   • Obesity (BMI 30-39.9) E66.9   • Impaired mobility and ADLs Z74.09, Z78.9   • History of colon resection Z90.49   • Colon polyps K63.5   • Diverticulosis K57.90   • CKD (chronic kidney disease) stage 5, GFR less than 15 ml/min N18.5   • NSTEMI (non-ST elevated myocardial infarction) I21.4   • Rhinovirus B34.8   • Abnormal findings on diagnostic imaging of heart and coronary circulation R93.1   • Fatigue R53.83   • Dysphagia R13.10   • Acute superficial venous thrombosis of right lower extremity I82.811   • Orthostatic hypotension I95.1   • Acute UTI (urinary tract infection) N39.0       Assessment & Plan   - NSTEMI/multi-vessel CAD- s/p urgent CABGx4 LIMA/RSVG, MV repair-([place]) POD#19  - HFrEF--30-35% per echo 4/8  - rhinovirus infection  - ESRD on  HD  - hypertension  - hyperlipidemia  - DM II- A1c 5.0  -post op anemia- expected acute blood loss  -Leukocytosis- probable reactive/secondary to  -TCP- resolved    - acute UTI--culture with pseudomonas; on levofloxacin per ID     He looks good this morning  BP remains stable. Continue midodrine  Remains in SR. Beta blocker held d/t hypotension  WBC normalized today. Antibiotics changed to oral levofloxacin per ID  Increase activity-- continue PT and OT  I feel that he is nearly ready for d/c from surgical standpoint. Dr. Williamson recommends sub-acute rehab. CCP to arrange for placement  Follow up with our office on 5/31 at 9  Continue routine care    DUSTY Spring  05/01/23  08:12 EDT

## 2023-05-01 NOTE — PLAN OF CARE
Goal Outcome Evaluation:  Plan of Care Reviewed With: patient           Outcome Evaluation: Pt was in bed and agreeable to therapy. He required less assistance for all mobility and tolerated increased ambulation distance. He is slightly impulsive and requires verbal cues for safety at times.   Pt wants to go home, but wife is worried that he isn't ready yet and wants acute rehab. Pt is still walking with rwx, but could attempt with no AD next session. Encouraged pt to continue ambulating with nursing staff.  Discussed with CCP.

## 2023-05-01 NOTE — PLAN OF CARE
Goal Outcome Evaluation:  Plan of Care Reviewed With: patient        Progress: improving  Outcome Evaluation: Pt seen today for OT session. Demonstrated improvement with ADLs and all functional mobility. Upon arrival pt was seated on commode with wife present. Required SBA/CGA for STS from toilet. Ambulated within bathroom with no AD to sink with CGA. Stood and completed grooming with SBA and no seated rest break. Performed functional mobility within hallway to simulate household distances with rwx and CGA. Required no seated rest break with functional mobility. Report mild SOB after ambulation. O2 99%. Performed UE ther ex seated in chair. Pt to continue to benefit from skilled OT to address deficits and maximize independence.

## 2023-05-01 NOTE — THERAPY TREATMENT NOTE
Patient Name: Lefty Cai  : 1950    MRN: [id number]                              Today's Date: 2023       Admit Date: 2023    Visit Dx:     ICD-10-CM ICD-9-CM   1. Dyspnea on exertion  R06.09 786.09   2. NSTEMI (non-ST elevated myocardial infarction)  I21.4 410.70   3. ESRD (end stage renal disease)  N18.6 585.6   4. Former smoker  Z87.891 V15.82   5. Elevated blood pressure reading in office with diagnosis of hypertension  I10 401.9   6. Rhinovirus infection  B34.8 079.3   7. Abnormal findings on diagnostic imaging of heart and coronary circulation  R93.1 794.39   8. S/P CABG (coronary artery bypass graft)  Z95.1 V45.81   9. Orthostasis  I95.1 458.0     Patient Active Problem List   Diagnosis   • Type 2 diabetes mellitus with diabetic chronic kidney disease   • Essential hypertension   • Hyperlipidemia   • Primary insomnia   • ESRD (end stage renal disease)   • Vitamin D deficiency   • Diverticulitis of large intestine with perforation and abscess without bleeding   • Obesity (BMI 30-39.9)   • Impaired mobility and ADLs   • History of colon resection   • Colon polyps   • Diverticulosis   • CKD (chronic kidney disease) stage 5, GFR less than 15 ml/min   • NSTEMI (non-ST elevated myocardial infarction)   • Rhinovirus   • Abnormal findings on diagnostic imaging of heart and coronary circulation   • Fatigue   • Dysphagia   • Acute superficial venous thrombosis of right lower extremity   • Orthostatic hypotension   • Acute UTI (urinary tract infection)     Past Medical History:   Diagnosis Date   • Anemia    • Anesthesia complication     HYPOTENSION   • Arthritis    • Cancer of kidney, left    • CKD (chronic kidney disease)     STAGE 5   • Diabetes mellitus, type 2    • Fatigue    • GERD (gastroesophageal reflux disease)    • H/O renal cell carcinoma 2007    partial nephrectomy   • History of colostomy reversal 2022   • [place] (hard of hearing)     NO DEVICE   • Hyperlipidemia    • Hypertension     • Primary insomnia 06/13/2016   • Proteinuria    • Risk factors for obstructive sleep apnea    • Sinus drainage    • Vitamin D deficiency 08/14/2017     Past Surgical History:   Procedure Laterality Date   • ARTERIOVENOUS FISTULA/SHUNT SURGERY Left 08/15/2019    Procedure: LEFT MID FOREARM RADIAL CEPHALIC ARTERIAL VENOUS FISTULA;  Surgeon: Louann Miles Jr., MD;  Location: [facility] MAIN OR;  Service: Vascular   • CARDIAC CATHETERIZATION N/A 4/9/2023    Procedure: Left Heart Cath;  Surgeon: Lobito Garcia MD;  Location: [facility] CATH INVASIVE LOCATION;  Service: Cardiovascular;  Laterality: N/A;   • CARDIAC CATHETERIZATION N/A 4/9/2023    Procedure: Left ventriculography;  Surgeon: Lobito Garcia MD;  Location: [facility] CATH INVASIVE LOCATION;  Service: Cardiovascular;  Laterality: N/A;   • CARDIAC CATHETERIZATION N/A 4/9/2023    Procedure: Coronary angiography;  Surgeon: Lobito Garcia MD;  Location: [facility] CATH INVASIVE LOCATION;  Service: Cardiovascular;  Laterality: N/A;   • COLONOSCOPY  03/24/2022   • COLONOSCOPY N/A 03/24/2022    Procedure: COLONOSCOPY TO CECUM WITH POLYPECTOMY  (HOT SNARE);  Surgeon: Yelena Guerra MD;  Location: [facility] ENDOSCOPY;  Service: General;  Laterality: N/A;  PREOP/ HX OF DIVERTICULITIS, COLOSTOMY  POSTOP/ POLYPS, DIVERTICULOSIS    • COLOSTOMY  09/06/2021   • COLOSTOMY CLOSURE N/A 04/12/2022    Procedure: open Colostomy reversal;  Surgeon: Yelena Guerra MD;  Location: [facility] MAIN OR;  Service: General;  Laterality: N/A;   • CORONARY ARTERY BYPASS GRAFT N/A 4/13/2023    Procedure: MAT STERNOTOMY CORONARY ARTERY BYPASS GRAFT TIMES 4 USING LEFT INTERNAL MAMMARY ARTERY AND RIGHT GREATER SAPHENOUS VEIN  PER ENDOSCOPIC VEIN HARVESTING, MITRAL VALVE REPAIR  AND PRP;  Surgeon: Mirtha Zuluaga MD;  Location: [facility] CVOR;  Service: Cardiothoracic;  Laterality: N/A;   • DIAGNOSTIC LAPAROSCOPY N/A 2/27/2023    Procedure: DIAGNOSTIC LAPAROSCOPY;  Surgeon: Murali Ferreira  MD Lee;  Location: [facility] OR;  Service: General;  Laterality: N/A;   • EXPLORATORY LAPAROTOMY N/A 09/06/2021    Procedure: Open sigmoind colectomy, colostomy, and appendectomy;  Surgeon: Yelena Guerra MD;  Location[facility] MAIN OR;  Service: General;  Laterality: N/A;   • EYE SURGERY      CATARACTS   • KNEE ARTHROSCOPY Right    • NEPHRECTOMY PARTIAL  2007    Dr. Victor      General Information     Row Name 05/01/23 [facility]2          OT Time and Intention    Document Type therapy note (daily note)  -     Mode of Treatment individual therapy;occupational therapy  -     Row Name 05/01/23 1512          General Information    Patient Profile Reviewed yes  -     Existing Precautions/Restrictions cardiac;fall;sternal  -     Row Name 05/01/23 1512          Cognition    Orientation Status (Cognition) oriented x 3  -[facility] Name 05/01/23 1512          Safety Issues, Functional Mobility    Impairments Affecting Function (Mobility) endurance/activity tolerance;shortness of breath  -     Comment, Safety Issues/Impairments (Mobility) Nonskid socks and gait belt donned  -           User Key  (r) = Recorded By, (t) = Taken By, (c) = Cosigned By    Initials Name Provider Type     Tonie Jiang, BAUTISTA Occupational Therapist                 Mobility/ADL's     Row Name 05/01/23 1515          Bed Mobility    Comment, (Bed Mobility) On commode upon arrival and up in chair at the end of session  -     Row Name 05/01/23 1515          Transfers    Transfers sit-stand transfer;stand-sit transfer;toilet transfer  -     Row Name 05/01/23 1515          Bed-Chair Transfer    Bed-Chair [place] (Transfers) contact guard;verbal cues  -     Assistive Device (Bed-Chair Transfers) walker, front-wheeled  -     Row Name 05/01/23 1515          Sit-Stand Transfer    Sit-Stand [place] (Transfers) standby assist  -     Assistive Device (Sit-Stand Transfers) walker, front-wheeled  -     Row Name 05/01/23  1515          Toilet Transfer    Type (Toilet Transfer) sit-stand;stand-sit  -     [place] Level (Toilet Transfer) contact guard  -     Assistive Device (Toilet Transfer) walker, front-wheeled  -JOSE     Row Name 05/01/23 1515          Functional Mobility    Functional Mobility- Ind. Level contact guard assist  -     Functional Mobility- Device walker, front-wheeled  -JOSE     Functional Mobility-Distance (Feet) --  within room and around nurses unit x2 with rwx and CGA  -[facility] Name 05/01/23 1515          Activities of Daily Living    BADL Assessment/Intervention grooming  -[facility] Name 05/01/23 1515          Grooming Assessment/Training    [place] (Grooming) grooming skills;set up;oral care regimen  -     Comment, (Grooming) Standing at the sink with no AD  -           User Key  (r) = Recorded By, (t) = Taken By, (c) = Cosigned By    Initials Name Provider Type    Tonie Palm OT Occupational Therapist               Obj/Interventions     [facility] Name 05/01/23 1520          Shoulder (Therapeutic Exercise)    Shoulder (Therapeutic Exercise) AROM (active range of motion)  -     Shoulder AROM (Therapeutic Exercise) --  Cardiac protocal x10 reps  -[facility] Name 05/01/23 1520          Elbow/Forearm (Therapeutic Exercise)    Elbow/Forearm (Therapeutic Exercise) AROM (active range of motion)  -     Elbow/Forearm AROM (Therapeutic Exercise) flexion;extension;bilateral;10 repetitions;2 sets  -[facility] Name 05/01/23 1520          Motor Skills    Therapeutic Exercise shoulder;elbow/forearm  -[facility] Name 05/01/23 1520          Balance    Static Sitting Balance modified independence  -     Dynamic Sitting Balance standby assist  -     Static Standing Balance standby assist  -     Dynamic Standing Balance contact guard  -     Position/Device Used, Standing Balance walker, front-wheeled  -JOSE     Balance Interventions sitting;standing;sit to stand  -           User Key  (r) =  Recorded By, (t) = Taken By, (c) = Cosigned By    Initials Name Provider Type    Tonie Palm OT Occupational Therapist               Goals/Plan    No documentation.                Clinical Impression     Row Name 05/01/23 1522          Pain Assessment    Pretreatment Pain Rating 0/10 - no pain  -     Posttreatment Pain Rating 0/10 - no pain  -     Row Name 05/01/23 1522          Plan of Care Review    Plan of Care Reviewed With patient  -JOSE     Progress improving  -     Outcome Evaluation Pt seen today for OT session. Demonstrated improvement with ADLs and all functional mobility. Upon arrival pt was seated on commode with wife present. Required SBA/CGA for STS from toilet. Ambulated within bathroom with no AD to sink with CGA. Stood and completed grooming with SBA and no seated rest break. Performed functional mobility within hallway to simulate household distances with rwx and CGA. Required no seated rest break with functional mobility. Report mild SOB after ambulation. O2 99%. Performed UE ther ex seated in chair. Pt to continue to benefit from skilled OT to address deficits and maximize independence.  -     Row Name 05/01/23 1522          Vital Signs    Pre Patient Position Sitting  -     Intra Patient Position Standing  -     Post Patient Position Sitting  -[facility] Name 05/01/23 1522          Positioning and Restraints    Pre-Treatment Position bathroom  -KA     Post Treatment Position chair  -KA     In Chair reclined;call light within reach;encouraged to call for assist;exit alarm on;with family/caregiver  -JOSE           User Key  (r) = Recorded By, (t) = Taken By, (c) = Cosigned By    Initials Name Provider Type    Tonie Palm OT Occupational Therapist               Outcome Measures     Row Name 05/01/23 1526          How much help from another is currently needed...    Putting on and taking off regular lower body clothing? 2  -KA     Bathing (including washing, rinsing, and  drying) 3  -KA     Toileting (which includes using toilet bed pan or urinal) 3  -KA     Putting on and taking off regular upper body clothing 4  -KA     Taking care of personal grooming (such as brushing teeth) 4  -KA     Eating meals 4  -KA     AM-PAC 6 Clicks Score (OT) 20  -KA     Row Name 05/01/23 1222          How much help from another person do you currently need...    Turning from your back to your side while in flat bed without using bedrails? 4  -KH     Moving from lying on back to sitting on the side of a flat bed without bedrails? 4  -KH     Moving to and from a bed to a chair (including a wheelchair)? 3  -KH     Standing up from a chair using your arms (e.g., wheelchair, bedside chair)? 3  -KH     Climbing 3-5 steps with a railing? 2  -KH     To walk in hospital room? 3  -KH     AM-PAC 6 Clicks Score (PT) 19  -KH     Highest level of mobility 6 --> Walked 10 steps or more  -     Row Name 05/01/23 1526 05/01/23 1222       Functional Assessment    Outcome Measure Options AM-PAC 6 Clicks Daily Activity (OT)  - AM-PAC 6 Clicks Basic Mobility (PT)  -          User Key  (r) = Recorded By, (t) = Taken By, (c) = Cosigned By    Initials Name Provider Type    Kyara Tirado, PT Physical Therapist    Tonie Palm, OT Occupational Therapist                Occupational Therapy Education     Title: PT OT SLP Therapies (Done)     Topic: Occupational Therapy (Done)     Point: ADL training (Done)     Description:   Instruct learner(s) on proper safety adaptation and remediation techniques during self care or transfers.   Instruct in proper use of assistive devices.              Learning Progress Summary           Patient Acceptance, E,TB, VU by  at 4/30/2023 0901    Acceptance, E,TB, VU by  at 4/29/2023 1007    Acceptance, E, VU by KN at 4/28/2023 1223    Acceptance, E,TB, VU by  at 4/27/2023 1549    Acceptance, E, VU by KN at 4/26/2023 1456    Acceptance, E,TB, VU by  at 4/21/2023 4706     Acceptance, E, VU by  at 4/19/2023 1815    Eager, E, VU,DU by  at 4/19/2023 1251    Comment: ed on AE for self feeding  adl safety   Family Acceptance, E, VU by  at 4/19/2023 1815    Eager, E, VU,DU by  at 4/19/2023 1251    Comment: ed on AE for self feeding  adl safety                   Point: Home exercise program (Done)     Description:   Instruct learner(s) on appropriate technique for monitoring, assisting and/or progressing therapeutic exercises/activities.              Learning Progress Summary           Patient Acceptance, E,TB, VU by  at 4/30/2023 0901    Acceptance, E,TB, VU by  at 4/29/2023 1007    Acceptance, E, VU by  at 4/28/2023 1223    Acceptance, E,TB, VU by  at 4/27/2023 1549    Acceptance, E, VU by  at 4/26/2023 1456    Acceptance, E,TB, VU by  at 4/21/2023 1756    Acceptance, E, VU by  at 4/19/2023 1815   Family Acceptance, E, VU by  at 4/19/2023 1815                   Point: Precautions (Done)     Description:   Instruct learner(s) on prescribed precautions during self-care and functional transfers.              Learning Progress Summary           Patient Acceptance, E,TB, VU by  at 4/30/2023 0901    Acceptance, E,TB, VU by  at 4/29/2023 1007    Acceptance, E, VU by  at 4/28/2023 1223    Acceptance, E,TB, VU by  at 4/27/2023 1549    Acceptance, E, VU by  at 4/26/2023 1456    Acceptance, E,TB, VU by  at 4/21/2023 1756    Acceptance, E, VU by  at 4/19/2023 1815    Eager, E, VU,DU by  at 4/19/2023 1251    Comment: ed on AE for self feeding  adl safety   Family Acceptance, E, VU by  at 4/19/2023 1815    Eager, E, VU,DU by  at 4/19/2023 1251    Comment: ed on AE for self feeding  adl safety                   Point: Body mechanics (Done)     Description:   Instruct learner(s) on proper positioning and spine alignment during self-care, functional mobility activities and/or exercises.              Learning Progress Summary           Patient Acceptance,  E,TB, VU by  at 4/30/2023 0901    Acceptance, E,TB, VU by  at 4/29/2023 1007    Acceptance, E, VU by  at 4/28/2023 1223    Acceptance, E,TB, VU by  at 4/27/2023 1549    Acceptance, E, VU by  at 4/26/2023 1456    Acceptance, E,TB, VU by  at 4/21/2023 1756    Acceptance, E, VU by  at 4/19/2023 1815   Family Acceptance, E, VU by  at 4/19/2023 1815                               User Key     Initials Effective Dates Name Provider Type Discipline     06/16/21 -  Kacy Deluca, RN Registered Nurse Nurse     12/20/21 -  Mitesh Sage, RN Registered Nurse Nurse     08/02/22 -  Robert Tadeo OT Occupational Therapist OT     01/03/23 -  Elly Mccarthy OT Occupational Therapist OT              OT Recommendation and Plan     Plan of Care Review  Plan of Care Reviewed With: patient  Progress: improving  Outcome Evaluation: Pt seen today for OT session. Demonstrated improvement with ADLs and all functional mobility. Upon arrival pt was seated on commode with wife present. Required SBA/CGA for STS from toilet. Ambulated within bathroom with no AD to sink with CGA. Stood and completed grooming with SBA and no seated rest break. Performed functional mobility within hallway to simulate household distances with rwx and CGA. Required no seated rest break with functional mobility. Report mild SOB after ambulation. O2 99%. Performed UE ther ex seated in chair. Pt to continue to benefit from skilled OT to address deficits and maximize independence.     Time Calculation:    Time Calculation- OT     Row Name 05/01/23 1527             Time Calculation- OT    OT Start Time 1414  -KA      OT Stop Time 1439  -KA      OT Time Calculation (min) 25 min  -KA      Total Timed Code Minutes- OT 25 minute(s)  -KA      OT Received On 05/01/23  -      OT - Next Appointment 05/02/23  -      OT Goal Re-Cert Due Date 05/15/23  -KA         Timed Charges    78805 - OT Therapeutic Activity Minutes 10  -KA      13870 - OT Self  Care/Mgmt Minutes 15  -KA         Total Minutes    Timed Charges Total Minutes 25  -KA       Total Minutes 25  -KA            User Key  (r) = Recorded By, (t) = Taken By, (c) = Cosigned By    Initials Name Provider Type    Tonie Palm OT Occupational Therapist              Therapy Charges for Today     Code Description Service Date Service Provider Modifiers Qty    [id number]  OT THERAPEUTIC ACT EA 15 MIN 5/1/2023 Tonie Jiang OT GO 1    [id number]  OT SELF CARE/MGMT/TRAIN EA 15 MIN 5/1/2023 Tonie Jiang OT GO 1               Tonie Jiang OT  5/1/2023

## 2023-05-02 ENCOUNTER — READMISSION MANAGEMENT (OUTPATIENT)
Dept: CALL CENTER | Facility: HOSPITAL | Age: 73
End: 2023-05-02
Payer: MEDICARE

## 2023-05-02 ENCOUNTER — HOME HEALTH ADMISSION (OUTPATIENT)
Dept: HOME HEALTH SERVICES | Facility: HOME HEALTHCARE | Age: 73
End: 2023-05-02
Payer: MEDICARE

## 2023-05-02 VITALS
BODY MASS INDEX: 28.67 KG/M2 | HEIGHT: 72 IN | RESPIRATION RATE: 18 BRPM | WEIGHT: 211.64 LBS | HEART RATE: 98 BPM | DIASTOLIC BLOOD PRESSURE: 78 MMHG | SYSTOLIC BLOOD PRESSURE: 150 MMHG | OXYGEN SATURATION: 96 % | TEMPERATURE: 98.8 F

## 2023-05-02 PROBLEM — R13.10 DYSPHAGIA: Status: RESOLVED | Noted: 2023-04-25 | Resolved: 2023-05-02

## 2023-05-02 PROBLEM — B34.8 RHINOVIRUS: Status: RESOLVED | Noted: 2023-04-10 | Resolved: 2023-05-02

## 2023-05-02 LAB
ANION GAP SERPL CALCULATED.3IONS-SCNC: 17.4 MMOL/L (ref 5–15)
BUN SERPL-MCNC: 35 MG/DL (ref 8–23)
BUN/CREAT SERPL: 5.2 (ref 7–25)
CALCIUM SPEC-SCNC: 8.7 MG/DL (ref 8.6–10.5)
CHLORIDE SERPL-SCNC: 102 MMOL/L (ref 98–107)
CO2 SERPL-SCNC: 20.6 MMOL/L (ref 22–29)
CREAT SERPL-MCNC: 6.73 MG/DL (ref 0.76–1.27)
DEPRECATED RDW RBC AUTO: 51.3 FL (ref 37–54)
EGFRCR SERPLBLD CKD-EPI 2021: 8.1 ML/MIN/1.73
ERYTHROCYTE [DISTWIDTH] IN BLOOD BY AUTOMATED COUNT: 15.8 % (ref 12.3–15.4)
GLUCOSE BLDC GLUCOMTR-MCNC: 134 MG/DL (ref 70–130)
GLUCOSE BLDC GLUCOMTR-MCNC: 148 MG/DL (ref 70–130)
GLUCOSE BLDC GLUCOMTR-MCNC: 153 MG/DL (ref 70–130)
GLUCOSE SERPL-MCNC: 137 MG/DL (ref 65–99)
HCT VFR BLD AUTO: 27.1 % (ref 37.5–51)
HGB BLD-MCNC: 8.5 G/DL (ref 13–17.7)
MCH RBC QN AUTO: 27.9 PG (ref 26.6–33)
MCHC RBC AUTO-ENTMCNC: 31.4 G/DL (ref 31.5–35.7)
MCV RBC AUTO: 88.9 FL (ref 79–97)
PLATELET # BLD AUTO: 198 10*3/MM3 (ref 140–450)
PMV BLD AUTO: 9.2 FL (ref 6–12)
POTASSIUM SERPL-SCNC: 4 MMOL/L (ref 3.5–5.2)
RBC # BLD AUTO: 3.05 10*6/MM3 (ref 4.14–5.8)
SODIUM SERPL-SCNC: 140 MMOL/L (ref 136–145)
WBC NRBC COR # BLD: 8.59 10*3/MM3 (ref 3.4–10.8)

## 2023-05-02 PROCEDURE — 94799 UNLISTED PULMONARY SVC/PX: CPT

## 2023-05-02 PROCEDURE — 99024 POSTOP FOLLOW-UP VISIT: CPT | Performed by: NURSE PRACTITIONER

## 2023-05-02 PROCEDURE — 85027 COMPLETE CBC AUTOMATED: CPT | Performed by: NURSE PRACTITIONER

## 2023-05-02 PROCEDURE — 25010000002 HEPARIN (PORCINE) PER 1000 UNITS: Performed by: INTERNAL MEDICINE

## 2023-05-02 PROCEDURE — 94664 DEMO&/EVAL PT USE INHALER: CPT

## 2023-05-02 PROCEDURE — 63710000001 INSULIN LISPRO (HUMAN) PER 5 UNITS: Performed by: NURSE PRACTITIONER

## 2023-05-02 PROCEDURE — 94761 N-INVAS EAR/PLS OXIMETRY MLT: CPT

## 2023-05-02 PROCEDURE — 99232 SBSQ HOSP IP/OBS MODERATE 35: CPT | Performed by: INTERNAL MEDICINE

## 2023-05-02 PROCEDURE — 80048 BASIC METABOLIC PNL TOTAL CA: CPT | Performed by: NURSE PRACTITIONER

## 2023-05-02 PROCEDURE — 97116 GAIT TRAINING THERAPY: CPT | Performed by: PHYSICAL THERAPIST

## 2023-05-02 PROCEDURE — 82948 REAGENT STRIP/BLOOD GLUCOSE: CPT

## 2023-05-02 PROCEDURE — 25010000002 ENOXAPARIN PER 10 MG: Performed by: NURSE PRACTITIONER

## 2023-05-02 RX ORDER — TRAZODONE HYDROCHLORIDE 50 MG/1
25 TABLET ORAL NIGHTLY PRN
Qty: 90 TABLET | Refills: 3
Start: 2023-05-02

## 2023-05-02 RX ORDER — LEVOFLOXACIN 250 MG/1
250 TABLET ORAL EVERY 24 HOURS
Qty: 6 TABLET | Refills: 0 | Status: SHIPPED | OUTPATIENT
Start: 2023-05-03 | End: 2023-05-09

## 2023-05-02 RX ORDER — ATORVASTATIN CALCIUM 40 MG/1
40 TABLET, FILM COATED ORAL NIGHTLY
Qty: 90 TABLET | Refills: 0 | Status: SHIPPED | OUTPATIENT
Start: 2023-05-02

## 2023-05-02 RX ORDER — GUAIFENESIN 600 MG/1
1200 TABLET, EXTENDED RELEASE ORAL EVERY 12 HOURS SCHEDULED
Start: 2023-05-02

## 2023-05-02 RX ORDER — MIDODRINE HYDROCHLORIDE 5 MG/1
5 TABLET ORAL
Qty: 60 TABLET | Refills: 0 | Status: SHIPPED | OUTPATIENT
Start: 2023-05-02

## 2023-05-02 RX ORDER — TRAMADOL HYDROCHLORIDE 50 MG/1
25 TABLET ORAL EVERY 12 HOURS PRN
Qty: 7 TABLET | Refills: 0 | Status: SHIPPED | OUTPATIENT
Start: 2023-05-02 | End: 2023-05-02 | Stop reason: HOSPADM

## 2023-05-02 RX ORDER — PANTOPRAZOLE SODIUM 40 MG/1
40 TABLET, DELAYED RELEASE ORAL EVERY MORNING
Qty: 30 TABLET | Refills: 0 | Status: SHIPPED | OUTPATIENT
Start: 2023-05-03

## 2023-05-02 RX ORDER — TRAMADOL HYDROCHLORIDE 50 MG/1
25 TABLET ORAL EVERY 12 HOURS PRN
Start: 2023-05-02

## 2023-05-02 RX ORDER — TRAMADOL HYDROCHLORIDE 50 MG/1
25 TABLET ORAL EVERY 8 HOURS PRN
Start: 2023-05-02 | End: 2023-05-02 | Stop reason: SDUPTHER

## 2023-05-02 RX ORDER — IRON POLYSACCHARIDE COMPLEX 150 MG
150 CAPSULE ORAL DAILY
Start: 2023-05-03

## 2023-05-02 RX ADMIN — LEVOFLOXACIN 250 MG: 250 TABLET, FILM COATED ORAL at 11:36

## 2023-05-02 RX ADMIN — DOCUSATE SODIUM 50MG AND SENNOSIDES 8.6MG 2 TABLET: 8.6; 5 TABLET, FILM COATED ORAL at 08:18

## 2023-05-02 RX ADMIN — HEPARIN SODIUM 4000 UNITS: 1000 INJECTION INTRAVENOUS; SUBCUTANEOUS at 16:32

## 2023-05-02 RX ADMIN — DIPHENHYDRAMINE HYDROCHLORIDE AND LIDOCAINE HYDROCHLORIDE AND ALUMINUM HYDROXIDE AND MAGNESIUM HYDRO 5 ML: KIT at 06:20

## 2023-05-02 RX ADMIN — DIPHENHYDRAMINE HYDROCHLORIDE AND LIDOCAINE HYDROCHLORIDE AND ALUMINUM HYDROXIDE AND MAGNESIUM HYDRO 5 ML: KIT at 17:37

## 2023-05-02 RX ADMIN — INSULIN LISPRO 2 UNITS: 100 INJECTION, SOLUTION INTRAVENOUS; SUBCUTANEOUS at 11:36

## 2023-05-02 RX ADMIN — ENOXAPARIN SODIUM 30 MG: 100 INJECTION SUBCUTANEOUS at 17:36

## 2023-05-02 RX ADMIN — PANTOPRAZOLE SODIUM 40 MG: 40 TABLET, DELAYED RELEASE ORAL at 06:20

## 2023-05-02 RX ADMIN — IPRATROPIUM BROMIDE AND ALBUTEROL SULFATE 3 ML: 2.5; .5 SOLUTION RESPIRATORY (INHALATION) at 06:41

## 2023-05-02 RX ADMIN — ASPIRIN 81 MG: 81 TABLET, COATED ORAL at 08:18

## 2023-05-02 RX ADMIN — ANORECTAL OINTMENT 1 APPLICATION: 15.7; .44; 24; 20.6 OINTMENT TOPICAL at 08:18

## 2023-05-02 RX ADMIN — Medication 150 MG: at 08:18

## 2023-05-02 RX ADMIN — GUAIFENESIN 1200 MG: 600 TABLET, EXTENDED RELEASE ORAL at 08:18

## 2023-05-02 NOTE — PLAN OF CARE
Goal Outcome Evaluation:  Plan of Care Reviewed With: patient           Outcome Evaluation: Pt was in bed and agreeabel to therapy. Pt ambulated with no AD today. Gait was a little slower, stable. He safely anvigated a flight of stairs and plans to d/c home later today or tomorrow. Pt will sign off. Encouraged pt to continue to ambualte with nursing staff 3x/ day while admitted.

## 2023-05-02 NOTE — DISCHARGE SUMMARY
[facility]    Date of Admission: 4/8/2023  Date of Discharge:  5/2/2023    PCP: Daksha Ng, APRN    Discharge Diagnosis:   Active Hospital Problems    Diagnosis  POA   • **NSTEMI (non-ST elevated myocardial infarction) [I21.4]  Yes   • Orthostatic hypotension [I95.1]  Unknown   • Acute UTI (urinary tract infection) [N39.0]  Unknown   • Acute superficial venous thrombosis of right lower extremity [I82.811]  Clinically Undetermined   • Fatigue [R53.83]  Unknown   • Abnormal findings on diagnostic imaging of heart and coronary circulation [R93.1]  Yes   • Type 2 diabetes mellitus with diabetic chronic kidney disease [E11.22]  Yes   • Essential hypertension [I10]  Yes   • Hyperlipidemia [E78.5]  Yes   • ESRD (end stage renal disease) [N18.6]  Yes      Resolved Hospital Problems    Diagnosis Date Resolved POA   • Dysphagia [R13.10] 05/02/2023 Unknown   • Hypernatremia [E87.0] 04/19/2023 Yes   • Rhinovirus [B34.8] 05/02/2023 Yes   • Dyspnea on exertion [R06.09] 04/24/2023 Yes     Procedures Performed  Procedure(s):  MAT STERNOTOMY CORONARY ARTERY BYPASS GRAFT TIMES 4 USING LEFT INTERNAL MAMMARY ARTERY AND RIGHT GREATER SAPHENOUS VEIN  PER ENDOSCOPIC VEIN HARVESTING, MITRAL VALVE REPAIR  AND PRP     Consults     Date and Time Order Name Status Description    4/26/2023  8:33 AM Inpatient Infectious Diseases Consult Completed     4/23/2023  8:04 AM Inpatient Infectious Diseases Consult Completed     4/16/2023  8:31 AM Inpatient Pulmonology Consult Completed     4/10/2023 10:19 AM Inpatient Infectious Diseases Consult Completed     4/8/2023 12:30 PM Nephrology (on -call MD unless specified) Completed         Hospital Course  Please see history and physical for details. Patient is a 72 y.o. male who presented to the hospital with complaints of increased dyspnea in the setting of ESRD with HD compliance. He was found to have new T-wave inversions with troponin elevation  and underwent heart cath that revealed severe three vessel CAD with occluded right coronary system. Cardiothoracic Surgery was consulted for CABG. RVP was positive for rhinovirus. ID was consulted given need for OR--they cleared for surgery without antibiotics. He was taken for open heart on 4/13. He had hypotension and atrial fibrillation with RVR following surgery. Moved out of CICU on 4/20.   The patient has had a long and complicated stay post operatively. Nephrology, Cardiology as well as Cardiothoracic surgery have followed and managed. He was maintained on HD but had some issues with hypotension as well as orthostatic hypotension. He was initially placed on beta blocker therapy but this was removed given his pressures. He has maintained NSR since that time and has not been on full dosing anticoagulation. He is currently stable on ASA and statin and is on midodrine for pressure support. He has tolerated Jobst stockings and mobility substantially improved. He was noted to have an acute superficial thrombophlebitis at one time but this was stable with compression and inpatient use of Lovenox. He has been receiving his HD via TDC as doppler of his left arm AVF showed marginal flow and inadequate size. He can follow up with nephrology and vascular for ongoing care. Cardiology and cardiothoracic surgery are happy with his improvements and recommend follow up in office. He will need to see Cardiothoracic on 5/31 and see Cardiology in 1 month.    During the stay, he was found to have worsening leukocytosis and initially seen by ID. His initial elevation of WBC was felt secondary to constipation but he was noted to have Pseudomonas growing in his urine. They recommended no antibiotics at that time given lack of symptoms or fever. Unfortunately, he later developed worsening mentation as well as urinary complaints several days later and antibiotics were started. He was screening positive for possible sepsis so ID was  reconsulted and he was placed on Vancomycin in addition to Cefepime to cover possible skin source given his gluteal pressure sores. Blood cultures were obtained and negative as well as CT chest/A/P negative for any obvious source of infection. ID recommended treated for UTI and placed him on Levaquin (renally dosed). His mentation and symptoms vastly improved and he was able to work with therapy. He was initially planned for BAR versus CHIOMA but is now ambulating too well for either. He is going to discharge home with HH after HD today.    On the day of discharge, all services have signed off. He is doing very well considering his prolonged stay. He denies any chest pain, dyspnea, cough, fever, chills, nausea or vomiting. He is eating and drinking and eager to go home to see his dog. His wife and daughter will pick him up later today. I recommend he see his PCP in 1 week. I did resume his Cymbalta therapy for anxiety/depression at the request of his wife. I did get this dosing cleared with his nephrologist as well.     I discussed the patient's findings and my recommendations with patient, family, nursing staff, consulting provider and Dr. Schreiber.    Condition on Discharge: Improved.     Temp:  [97.3 °F (36.3 °C)-98.5 °F (36.9 °C)] 97.5 °F (36.4 °C)  Heart Rate:  [] 95  Resp:  [18-20] 18  BP: (119-156)/(64-80) 135/78  Body mass index is 29.16 kg/m².    Physical Exam  Vitals and nursing note reviewed.   Constitutional:       Appearance: He is chronically ill-appearing. He is not toxic-appearing.   Cardiovascular:      Rate and Rhythm: Normal rate. Regular rhythm.      Pulses: Normal pulses.   Pulmonary:      Effort: Pulmonary effort is normal. No respiratory distress.      Comments: Fairly clear, some coarseness to BUL. Is on RA.  Abdominal:      General: Bowel sounds are normal. There is no distension.      Palpations: Abdomen is soft.      Tenderness: There is no abdominal tenderness.   Musculoskeletal:          General: No deformity. Normal range of motion.   Skin:     General: Skin is warm and dry. Moderate BLE edema with Jobst in place.     Findings: No bruising.      Comments: Midline sternal incision C/D/I, TDC C/D/I  Neurological:      Mental Status: He is alert and oriented to person, place, and time.      Motor: generalized Weakness present.      Coordination: Coordination normal.   Psychiatric:         Mood and Affect: Mood is pleasant/joking.     Behavior: Behavior normal.           Discharge Medications      New Medications      Instructions Start Date   atorvastatin 40 MG tablet  Commonly known as: LIPITOR   40 mg, Oral, Nightly      guaiFENesin 600 MG 12 hr tablet  Commonly known as: MUCINEX   1,200 mg, Oral, Every 12 Hours Scheduled      iron polysaccharides 150 MG capsule  Commonly known as: NIFEREX   150 mg, Oral, Daily   Start Date: May 3, 2023     levoFLOXacin 250 MG tablet  Commonly known as: LEVAQUIN   250 mg, Oral, Every 24 Hours   Start Date: May 3, 2023     midodrine 5 MG tablet  Commonly known as: PROAMATINE   5 mg, Oral, 2 Times Daily Before Meals      pantoprazole 40 MG EC tablet  Commonly known as: PROTONIX   40 mg, Oral, Every Morning   Start Date: May 3, 2023        Changes to Medications      Instructions Start Date   DULoxetine 30 MG capsule  Commonly known as: CYMBALTA  What changed: when to take this   30 mg, Oral, Daily      traMADol 50 MG tablet  Commonly known as: ULTRAM  What changed:   · how much to take  · when to take this  · reasons to take this   25 mg, Oral, Every 12 Hours PRN      traZODone 50 MG tablet  Commonly known as: DESYREL  What changed: how much to take   25 mg, Oral, Nightly PRN         Continue These Medications      Instructions Start Date   acetaminophen 325 MG tablet  Commonly known as: TYLENOL   650 mg, Oral, Every 6 Hours PRN      aspirin 81 MG tablet   81 mg, Oral, Nightly, FOLLOW MD GUIDELINES ON WHEN/IF TO HOLD FOR DOS      cetirizine 10 MG  tablet  Commonly known as: zyrTEC   10 mg, Oral, Daily PRN      ondansetron 4 MG tablet  Commonly known as: Zofran   4 mg, Oral, Every 8 Hours PRN      sennosides-docusate 8.6-50 MG per tablet  Commonly known as: PERICOLACE   1 tablet, Oral, Nightly PRN      simethicone 80 MG chewable tablet  Commonly known as: MYLICON   80 mg, Oral, Every 6 Hours PRN      vitamin D3 125 MCG (5000 UT) capsule capsule   1 tablet, Oral, 2 Times Weekly, SAT AND WED         Stop These Medications    hydrALAZINE 50 MG tablet  Commonly known as: APRESOLINE     metoprolol succinate  MG 24 hr tablet  Commonly known as: TOPROL-XL     simvastatin 40 MG tablet  Commonly known as: ZOCOR     sodium bicarbonate 650 MG tablet           Diet Instructions     Diet: Regular/House Diet, Cardiac Diets, Diabetic Diets, Renal Diets; Healthy Heart (2-3 Na+); Regular Texture (IDDSI 7); Thin (IDDSI 0); Consistent Carbohydrate; Low Sodium (2-3g), Low Potassium, Low Phosphorus      Discharge Diet:  Regular/House Diet  Cardiac Diets  Diabetic Diets  Renal Diets       Cardiac Diet: Healthy Heart (2-3 Na+)    Texture: Regular Texture (IDDSI 7)    Fluid Consistency: Thin (IDDSI 0)    Diabetic Diet: Consistent Carbohydrate    Renal Diet:  Low Sodium (2-3g)  Low Potassium  Low Phosphorus            Activity Instructions     Activity as Tolerated           Additional Instructions for the Follow-ups that You Need to Schedule     Ambulatory Referral to Cardiac Rehab   As directed      Ambulatory Referral to Home Health   As directed      Face to Face Visit Date: 5/2/2023    Follow-up provider for Plan of Care?: I will be treating the patient on an ongoing basis.  Please send me the Plan of Care for signature.    Follow-up provider: WILI ROBERTS [[id number]]    Reason/Clinical Findings: post op open heart    Describe mobility limitations that make leaving home difficult: weakness    Nursing/Therapeutic Services Requested: Skilled Nursing Physical Therapy  Occupational Therapy    Skilled nursing orders: Post CABG care    PT orders: Strengthening    Occupational orders: Activities of daily living Strengthening    Frequency: 1 Week 1         Call MD With Problems / Concerns   As directed      Instructions:  Call office at [phone] for any drainage, increased redness, or fever over 100.5    Order Comments: Instructions:  Call office at [phone] for any drainage, increased redness, or fever over 100.5          Discharge Follow-up with PCP   As directed       Currently Documented PCP:    Daksha Ng APRN    PCP Phone Number:    [phone]     Follow Up Details: in 1 week         Discharge Follow-up with PCP   As directed       Currently Documented PCP:    Daksha Ng APRN    PCP Phone Number:    [phone]     Follow Up Details: see in 1 week for hospital follow up         Discharge Follow-up with Specialty: Cardiologist APRN/PA; 1 Week   As directed      Specialty: Cardiologist APRN/PA    Follow Up: 1 Week    Follow Up Details: bring all prescription bottles to appointment, call for appointment         Discharge Follow-up with Specified Provider: Cardiologist; 1 Month   As directed      To: Cardiologist    Follow Up: 1 Month    Follow Up Details: call for appointment, bring all medication bottles to appointment         Discharge Follow-up with Specified Provider: Indira Garcia CT surgery APRN on 5/31   As directed      To: Indira Garcia CT surgery APRN on 5/31    Follow Up Details: 4-6 weeks, bring all current medications to appointment         Discharge Follow-up with Specified Provider: Nephrology   As directed      To: Nephrology    Follow Up Details: with HD days            Contact information for follow-up providers     [facility] [place] Call.    Contact information:  [place] [id number] [phone]           [facility] .    Specialty: Cardiac Rehabilitation  Contact information:  Dejan Abarca  [facility] [id number] [phone]           Daksha Ng, APRN .    Specialty: Family Medicine  Why: in 1 week  Contact information:  [address] [facility]  [address]  [place] [id number]  [phone]             Lobito Garcia MD Follow up.    Specialty: Cardiology  Why: Please see DUSTY in 1 week. See Dr. Garcia in 4 weeks.  bring all prescription bottles to appointment, call for appointment  Contact information:  [address]  [facility] [address]  [place] [id number]  [phone]             Quang Reyes MD Follow up.    Specialty: Nephrology  Why: Follow up with dialysis.  Contact information:  [address]  [facility] [address]  [place] [id number] [phone]                   Contact information for after-discharge care     Dialysis/Infusion     [facility] .    Service: In-Center Hemodialysis  Contact information:  [address] [facility] [id number] [phone]                 Home Medical Care     [facility] .    Service: Home Health Services  Contact information:  [address] [facility] [address]  [facility] [address] [phone]                           Test Results Pending at Discharge     DUSTY Roberts  05/02/23  15:18 EDT    Discharge time spent greater than 30 minutes.

## 2023-05-02 NOTE — PROGRESS NOTES
Nutrition Services    Patient Name:  Lefty Cai  YOB: 1950  MRN: [id number]  Admit Date:  4/8/2023    Nutrition follow up.  Eating well, % at meals per chart PO data.  Noted plans for dc today.    Electronically signed by:  Karen Santos RD  05/02/23 15:26 EDT

## 2023-05-02 NOTE — CASE MANAGEMENT/SOCIAL WORK
Continued Stay Note  [facility]     Patient Name: Lefty Cai  MRN: [id number]  Today's Date: 5/2/2023    Admit Date: 4/8/2023    Plan: Home w/ Christianity ;  Pt to [facility] on his T/T/Sa schedule.   Discharge Plan     Row Name 05/02/23 1011       Plan    Plan Home w/ Christianity ;  Pt to continue with outpatient HD at [facility] on his T/T/Sa schedule.    Plan Comments CCP called Nancy Dale ([phone]) at 9:31 AM and spoke with Cata.  CCP update Cata that plan was for Pt to d/c home either today or tomorrow.  Cata advised Pt would be put on his regular (pre-hospitalization) schedule of T/T/Sa at 12:30 PM chair time.  [facility] accepted and will follow Pt at home.  CCP following..........Irene GOLDMAN/PRATIK CM               Discharge Codes    No documentation.               Expected Discharge Date and Time     Expected Discharge Date Expected Discharge Time    May 2, 2023             Irene Lawrence RN

## 2023-05-02 NOTE — PROGRESS NOTES
"Lefty Cai  1950 72 y.o.  [id number]      Patient Care Team:  Daksha Ng APRN as PCP - General (Family Medicine)  Rudy Faith MD as Consulting Physician (Ophthalmology)  Jose Mccall MD as Consulting Physician (Nephrology)  Quang Reyes MD as Consulting Physician (Nephrology)    CC: Non-STEMI,, end-stage renal failure, EF 30 to 35%, severe three-vessel coronary disease, severe MR, underwent three-vessel CABG and mitral valve repair, postop A-fib    Interval History: He is doing well      Objective   Vital Signs  Temp:  [97.3 °F (36.3 °C)-98.5 °F (36.9 °C)] 97.3 °F (36.3 °C)  Heart Rate:  [] 97  Resp:  [18-20] 18  BP: (119-156)/(56-80) 119/71    Intake/Output Summary (Last 24 hours) at 5/2/2023 0939  Last data filed at 5/2/2023 0755  Gross per 24 hour   Intake 820 ml   Output 650 ml   Net 170 ml     Flowsheet Rows    Flowsheet Row First Filed Value   Admission Height 177.8 cm (70\") Documented at 04/08/2023 1028   Admission Weight 102 kg (225 lb) Documented at 04/08/2023 1028          Physical Exam:   General Appearance:    Alert,oriented, in no acute distress   Lungs:     Clear to auscultation,BS are equal    Heart:    Normal S1 and S2, RRR without murmur, gallop or rub   HEENT:    Sclerae are clear, no JVD or adenopathy   Abdomen:     Normal bowel sounds, soft nontender, nondistended, no HSM   Extremities:   Moves all extremities well, no edema, no cyanosis, no             Redness, no rash     Medication Review:      aspirin, 81 mg, Oral, Daily  atorvastatin, 40 mg, Oral, Nightly  enoxaparin, 30 mg, Subcutaneous, Q24H  epoetin bernice/bernice-epbx, 10,000 Units, Intravenous, Once per day on Mon Wed Fri  First Mouthwash (Magic Mouthwash), 5 mL, Swish & Spit, Q8H  guaiFENesin, 1,200 mg, Oral, Q12H  insulin lispro, 0-9 Units, Subcutaneous, 4x Daily With Meals & Nightly  ipratropium-albuterol, 3 mL, Nebulization, Q6H While Awake - RT  iron polysaccharides, 150 mg, Oral, " Daily  levoFLOXacin, 250 mg, Oral, Q24H  Menthol-Zinc Oxide, 1 application, Topical, TID  midodrine, 5 mg, Oral, BID AC  pantoprazole, 40 mg, Oral, QAM  polyethylene glycol, 17 g, Oral, Daily  senna-docusate sodium, 2 tablet, Oral, BID             I reviewed the patient's new clinical results.  I personally viewed and interpreted the patient's EKG/Telemetry data    Assessment/Plan  Active Hospital Problems    Diagnosis  POA   • **NSTEMI (non-ST elevated myocardial infarction) [I21.4]  Yes   • Orthostatic hypotension [I95.1]  Unknown   • Acute UTI (urinary tract infection) [N39.0]  Unknown   • Acute superficial venous thrombosis of right lower extremity [I82.811]  Clinically Undetermined   • Dysphagia [R13.10]  Unknown   • Fatigue [R53.83]  Unknown   • Rhinovirus [B34.8]  Yes   • Abnormal findings on diagnostic imaging of heart and coronary circulation [R93.1]  Yes   • Type 2 diabetes mellitus with diabetic chronic kidney disease [E11.22]  Yes   • Essential hypertension [I10]  Yes   • Hyperlipidemia [E78.5]  Yes   • ESRD (end stage renal disease) [N18.6]  Yes      Resolved Hospital Problems    Diagnosis Date Resolved POA   • Hypernatremia [E87.0] 04/19/2023 Yes   • Dyspnea on exertion [R06.09] 04/24/2023 Yes     Ischemic cardiomyopathy severe mitral insufficiency status post CABG and mitral valve repair on dialysis his blood pressure is pretty soft.  He has Pseudomonas in his urine his white count was elevated he continues to get better.  He is doing well I think at some point we could think about stopping his midodrine but will keep going for now and address that as an outpatient I am signing off him he can come see me in the office in a month   Lobito Garcia MD  05/02/23  09:39 EDT

## 2023-05-02 NOTE — OUTREACH NOTE
Prep Survey    Flowsheet Row Responses   [facility] patient discharged from? [facility]   Is LACE score < 7 ? No   Eligibility [facility]   Date of Admission 04/08/23   Date of Discharge 05/02/23   Discharge Disposition Home or Self Care   Discharge diagnosis NSTEMI,   CORONARY ARTERY BYPASS GRAFT TIMES 4    Does the patient have one of the following disease processes/diagnoses(primary or secondary)? Cardiothoracic surgery   Does the patient have Home health ordered? Yes   What is the Home health agency?  [facility]   Is there a DME ordered? No   Prep survey completed? Yes          Rita MCKEON - Registered Nurse

## 2023-05-02 NOTE — PLAN OF CARE
Goal Outcome Evaluation:  Plan of Care Reviewed With: patient        Progress: improving  Outcome Evaluation: Pt is AO X4. All vs stable, No complaints of pain overnight. Q2 turns, Fall precautions and Safety maintained. WC    Problem: Adult Inpatient Plan of Care  Goal: Plan of Care Review  Outcome: Ongoing, Progressing  Flowsheets (Taken 5/2/2023 0418)  Progress: improving  Plan of Care Reviewed With: patient  Goal: Patient-Specific Goal (Individualized)  Outcome: Ongoing, Progressing  Goal: Absence of Hospital-Acquired Illness or Injury  Outcome: Ongoing, Progressing  Intervention: Identify and Manage Fall Risk  Description: Perform standard risk assessment on admission using a validated tool or comprehensive approach appropriate to the patient; reassess fall risk frequently, with change in status or transfer to another level of care.  Communicate fall injury risk to interprofessional healthcare team.  Determine need for increased observation, equipment and environmental modification, such as low bed, signage and supportive, nonskid footwear.  Adjust safety measures to individual developmental age, stage and identified risk factors.  Reinforce the importance of safety and physical activity with patient and family.  Perform regular intentional rounding to assess need for position change, pain assessment and personal needs, including assistance with toileting.  Recent Flowsheet Documentation  Taken 5/2/2023 0413 by Matthew Oviedo, RN  Safety Promotion/Fall Prevention:   assistive device/personal items within reach   activity supervised   clutter free environment maintained   fall prevention program maintained   muscle strengthening facilitated   nonskid shoes/slippers when out of bed   safety round/check completed   room organization consistent  Taken 5/2/2023 0200 by Matthew Oviedo, RN  Safety Promotion/Fall Prevention:   activity supervised   assistive device/personal items within reach   clutter free environment  maintained   fall prevention program maintained   muscle strengthening facilitated   nonskid shoes/slippers when out of bed   safety round/check completed   room organization consistent  Taken 5/2/2023 0020 by Matthew Oviedo RN  Safety Promotion/Fall Prevention:   activity supervised   assistive device/personal items within reach   clutter free environment maintained   fall prevention program maintained   muscle strengthening facilitated   nonskid shoes/slippers when out of bed   mobility aid in reach   safety round/check completed   room organization consistent  Taken 5/1/2023 2258 by Matthew Oviedo, PRATIK  Safety Promotion/Fall Prevention:   activity supervised   assistive device/personal items within reach   fall prevention program maintained   clutter free environment maintained   nonskid shoes/slippers when out of bed   muscle strengthening facilitated   safety round/check completed   room organization consistent  Taken 5/1/2023 2020 by Matthew Oviedo RN  Safety Promotion/Fall Prevention:   activity supervised   assistive device/personal items within reach   fall prevention program maintained   clutter free environment maintained   muscle strengthening facilitated   nonskid shoes/slippers when out of bed   safety round/check completed   room organization consistent  Intervention: Prevent Skin Injury  Description: Perform a screening for skin injury risk, such as pressure or moisture associated skin damage on admission and at regular intervals throughout hospital stay.  Keep all areas of skin (especially folds) clean and dry.  Maintain adequate skin hydration.  Relieve and redistribute pressure and protect bony prominences; implement measures based on patient-specific risk factors.  Match turning and repositioning schedule to clinical condition.  Encourage weight shift frequently; assist with reposition if unable to complete independently.  Float heels off bed; avoid pressure on the Achilles tendon.  Keep skin free  from extended contact with medical devices.  Encourage functional activity and mobility, as early as tolerated.  Use aids (e.g., slide boards, mechanical lift) during transfer.  Recent Flowsheet Documentation  Taken 5/2/2023 0413 by Matthew Oviedo RN  Body Position: position changed independently  Taken 5/2/2023 0200 by Matthew Oviedo RN  Body Position: position changed independently  Taken 5/2/2023 0020 by Matthew Oviedo RN  Body Position: position changed independently  Taken 5/1/2023 2258 by Matthew Oviedo RN  Body Position: position changed independently  Taken 5/1/2023 2020 by Matthew Oviedo RN  Body Position: position changed independently  Intervention: Prevent and Manage VTE (Venous Thromboembolism) Risk  Description: Assess for VTE (venous thromboembolism) risk.  Encourage and assist with early ambulation.  Initiate and maintain compression or other therapy, as indicated, based on identified risk in accordance with organizational protocol and provider order.  Encourage both active and passive leg exercises while in bed, if unable to ambulate.  Recent Flowsheet Documentation  Taken 5/2/2023 0413 by Matthew Oviedo RN  Activity Management: activity encouraged  VTE Prevention/Management:   bilateral   compression stockings on  Taken 5/2/2023 0200 by Matthew Oviedo RN  Activity Management: activity encouraged  Taken 5/2/2023 0020 by Matthew Oviedo RN  Activity Management: activity encouraged  Taken 5/1/2023 2258 by Matthew Oviedo RN  Activity Management: activity encouraged  Taken 5/1/2023 2020 by Matthew Oviedo RN  Activity Management: activity encouraged  VTE Prevention/Management:   bilateral   compression stockings on  Intervention: Prevent Infection  Description: Maintain skin and mucous membrane integrity; promote hand, oral and pulmonary hygiene.  Optimize fluid balance, nutrition, sleep and glycemic control to maximize infection resistance.  Identify potential sources of infection early to prevent or mitigate  progression of infection (e.g., wound, lines, devices).  Evaluate ongoing need for invasive devices; remove promptly when no longer indicated.  Recent Flowsheet Documentation  Taken 5/2/2023 0413 by Matthew Oviedo RN  Infection Prevention:   visitors restricted/screened   single patient room provided   personal protective equipment utilized   equipment surfaces disinfected   environmental surveillance performed   cohorting utilized   hand hygiene promoted   rest/sleep promoted  Taken 5/2/2023 0200 by Matthew Oviedo RN  Infection Prevention:   visitors restricted/screened   single patient room provided   rest/sleep promoted   personal protective equipment utilized   hand hygiene promoted   equipment surfaces disinfected   environmental surveillance performed   cohorting utilized  Taken 5/2/2023 0020 by Matthew Oviedo RN  Infection Prevention:   visitors restricted/screened   single patient room provided   rest/sleep promoted   personal protective equipment utilized   hand hygiene promoted   equipment surfaces disinfected   environmental surveillance performed   cohorting utilized  Taken 5/1/2023 2258 by Matthew Oviedo RN  Infection Prevention:   visitors restricted/screened   single patient room provided   rest/sleep promoted   personal protective equipment utilized   hand hygiene promoted   equipment surfaces disinfected   environmental surveillance performed   cohorting utilized  Taken 5/1/2023 2020 by Matthew Oviedo RN  Infection Prevention:   visitors restricted/screened   single patient room provided   rest/sleep promoted   personal protective equipment utilized   hand hygiene promoted   equipment surfaces disinfected   environmental surveillance performed   cohorting utilized  Goal: Optimal Comfort and Wellbeing  Outcome: Ongoing, Progressing  Intervention: Provide Person-Centered Care  Description: Use a family-focused approach to care.  Develop trust and rapport by proactively providing information, encouraging  questions, addressing concerns and offering reassurance.  Acknowledge emotional response to hospitalization.  Recognize and utilize personal coping strategies.  Honor spiritual and cultural preferences.  Recent Flowsheet Documentation  Taken 5/2/2023 0413 by Matthew Oviedo RN  Trust Relationship/Rapport:   thoughts/feelings acknowledged   reassurance provided   questions encouraged   empathic listening provided   choices provided   care explained   emotional support provided   questions answered  Taken 5/2/2023 0020 by Matthew Oviedo RN  Trust Relationship/Rapport:   thoughts/feelings acknowledged   reassurance provided   questions encouraged   questions answered   empathic listening provided   emotional support provided   choices provided   care explained  Taken 5/1/2023 2020 by Matthew Oviedo RN  Trust Relationship/Rapport:   thoughts/feelings acknowledged   reassurance provided   questions encouraged   questions answered   empathic listening provided   emotional support provided   choices provided  Goal: Readiness for Transition of Care  Outcome: Ongoing, Progressing

## 2023-05-02 NOTE — PROGRESS NOTES
Patient is discharging today . Spoke to patient who is agreeable and has no current home health. Face sheet information is correct and please call cell phone for visit scheduling- [phone].  Please note patient has dialysis Tuesday, Thursday , Saturday from 12-4. Orders in [facility] for home health SN,PT and OT. Thank you!

## 2023-05-02 NOTE — PLAN OF CARE
Problem: Adult Inpatient Plan of Care  Goal: Absence of Hospital-Acquired Illness or Injury  Outcome: Met  Intervention: Identify and Manage Fall Risk  Intervention: Prevent Skin Injury  Intervention: Prevent and Manage VTE (Venous Thromboembolism) Risk  Intervention: Prevent Infection     Problem: Adult Inpatient Plan of Care  Goal: Plan of Care Review  Outcome: Met     Problem: Adult Inpatient Plan of Care  Goal: Optimal Comfort and Wellbeing  Outcome: Met  Intervention: Monitor Pain and Promote Comfort  Intervention: Provide Person-Centered Care     Problem: Pain Acute  Goal: Acceptable Pain Control and Functional Ability  Outcome: Met  Intervention: Prevent or Manage Pain  Intervention: Develop Pain Management Plan  Intervention: Optimize Psychosocial Wellbeing     Problem: Fall Injury Risk  Goal: Absence of Fall and Fall-Related Injury  Outcome: Met  Intervention: Identify and Manage Contributors  Intervention: Promote Injury-Free Environment     Plan of Care Reviewed With: patient        Progress: improving      [facility]

## 2023-05-02 NOTE — PROGRESS NOTES
"   LOS: 24 days    Patient Care Team:  Daksha Ng APRN as PCP - General (Family Medicine)  Rudy Faith MD as Consulting Physician (Ophthalmology)  Jose Mccall MD as Consulting Physician (Nephrology)  Quang Reyes MD as Consulting Physician (Nephrology)    Chief Complaint:    Chief Complaint   Patient presents with   • Shortness of Breath   • Cough     Follow UP ESRD  Subjective     Interval History:   Asking to go home, CTS has ok'd him for d/c  Review of Systems:   As noted above    Objective     Vital Signs  Temp:  [97.3 °F (36.3 °C)-98.5 °F (36.9 °C)] 97.3 °F (36.3 °C)  Heart Rate:  [] 97  Resp:  [18-20] 18  BP: (119-156)/(56-80) 119/71    Flowsheet Rows    Flowsheet Row First Filed Value   Admission Height 177.8 cm (70\") Documented at 04/08/2023 1028   Admission Weight 102 kg (225 lb) Documented at 04/08/2023 1028          I/O this shift:  In: 240 [P.O.:240]  Out: -   I/O last 3 completed shifts:  In: 820 [P.O.:820]  Out: 950 [Urine:950]    Intake/Output Summary (Last 24 hours) at 5/2/2023 1134  Last data filed at 5/2/2023 0755  Gross per 24 hour   Intake 820 ml   Output 650 ml   Net 170 ml       Physical Exam:  General Appearance: alert, oriented x 3, no acute distress,   Skin: warm and dry  HEENT: pupils round and reactive to light, oral mucosa normal, nonicteric sclera  Neck: supple, no JVD, trachea midline  Lungs: CTA, unlabored breathing effort  Heart: RRR, normal S1 and S2, no S3, no rub  Abdomen: soft, nontender, normoactive bowels  : no palpable bladder,  Extremities: no edema, cyanosis or clubbing  Neuro: normal speech and mental status       Results Review:    Results from last 7 days   Lab Units 05/02/23  0343 05/01/23  0338 04/30/23  0327 04/29/23  0307 04/28/23  0319 04/27/23  0324 04/26/23  0300   SODIUM mmol/L 140 141 138   < > 138  138 137 139  138   POTASSIUM mmol/L 4.0 4.0 3.6   < > 4.2  4.3 3.8 4.0  3.9   CHLORIDE mmol/L 102 103 103   < > 103  104 102 " 100  100   CO2 mmol/L 20.6* 23.9 24.0   < > 17.3*  22.0 20.6* 24.0  25.0   BUN mg/dL 35* 25* 18   < > 24*  25* 41* 27*  26*   CREATININE mg/dL 6.73* 5.49* 3.77*   < > 3.93*  3.38* 5.59* 4.21*  4.14*   CALCIUM mg/dL 8.7 9.1 8.3*   < > 8.4*  8.0* 9.0 8.6  8.5*   BILIRUBIN mg/dL  --   --   --   --  0.5 0.7 0.8   ALK PHOS U/L  --   --   --   --  102 109 121*   ALT (SGPT) U/L  --   --   --   --  21 23 23   AST (SGOT) U/L  --   --   --   --  38 34 48*   GLUCOSE mg/dL 137* 117* 100*   < > 98  97 101* 106*  110*    < > = values in this interval not displayed.       Estimated Creatinine Clearance: 11.8 mL/min (A) (by C-G formula based on SCr of 6.73 mg/dL (H)).    Results from last 7 days   Lab Units 05/01/23  0338 04/30/23 0327 04/29/23  0307   MAGNESIUM mg/dL  --  1.8  --    PHOSPHORUS mg/dL 2.4* 2.1* 3.2             Results from last 7 days   Lab Units 05/02/23  0343 05/01/23  0338 04/30/23  0327 04/29/23  0307 04/28/23  0319   WBC 10*3/mm3 8.59 7.39 6.67 7.49 11.66*   HEMOGLOBIN g/dL 8.5* 8.7* 8.6* 8.5* 8.7*   PLATELETS 10*3/mm3 198 180 179 189 191               Imaging Results (Last 24 Hours)     ** No results found for the last 24 hours. **        aspirin, 81 mg, Oral, Daily  atorvastatin, 40 mg, Oral, Nightly  enoxaparin, 30 mg, Subcutaneous, Q24H  epoetin bernice/bernice-epbx, 10,000 Units, Intravenous, Once per day on Mon Wed Fri  First Mouthwash (Magic Mouthwash), 5 mL, Swish & Spit, Q8H  guaiFENesin, 1,200 mg, Oral, Q12H  insulin lispro, 0-9 Units, Subcutaneous, 4x Daily With Meals & Nightly  ipratropium-albuterol, 3 mL, Nebulization, Q6H While Awake - RT  iron polysaccharides, 150 mg, Oral, Daily  levoFLOXacin, 250 mg, Oral, Q24H  Menthol-Zinc Oxide, 1 application, Topical, TID  midodrine, 5 mg, Oral, BID AC  pantoprazole, 40 mg, Oral, QAM  polyethylene glycol, 17 g, Oral, Daily  senna-docusate sodium, 2 tablet, Oral, BID           Medication Review:   Current Facility-Administered Medications   Medication  Dose Route Frequency Provider Last Rate Last Admin   • acetaminophen (TYLENOL) tablet 500 mg  500 mg Oral Q4H PRN Mirtha Zuluaga MD   500 mg at 05/01/23 1033   • aspirin EC tablet 81 mg  81 mg Oral Daily Laya Osman APRN   81 mg at 05/02/23 0818   • atorvastatin (LIPITOR) tablet 40 mg  40 mg Oral Nightly Laya Osman APRN   40 mg at 05/01/23 2019   • bisacodyl (DULCOLAX) EC tablet 10 mg  10 mg Oral Daily PRN Laya Osman APRN   10 mg at 04/21/23 1906   • bisacodyl (DULCOLAX) suppository 10 mg  10 mg Rectal Daily PRN Laya Osman APRN       • dextrose (D50W) (25 g/50 mL) IV injection 25 g  25 g Intravenous Q15 Min PRN Laya Osman APRN       • dextrose (GLUTOSE) oral gel 15 g  15 g Oral Q15 Min PRN Laya Osman APRN       • Enoxaparin Sodium (LOVENOX) syringe 30 mg  30 mg Subcutaneous Q24H Laya Osman APRN   30 mg at 05/01/23 1538   • epoetin bernice-epbx (RETACRIT) injection 10,000 Units  10,000 Units Intravenous Once per day on Mon Wed Fri Kanchan Tejada MD   10,000 Units at 05/01/23 1151   • First Mouthwash (Magic Mouthwash) 5 mL  5 mL Swish & Spit Q8H Arianna Meadows APRN   5 mL at 05/02/23 0620   • glucagon (GLUCAGEN) injection 1 mg  1 mg Intramuscular Q15 Min PRN Laya Osman APRN       • guaiFENesin (MUCINEX) 12 hr tablet 1,200 mg  1,200 mg Oral Q12H Arianna Meadows APRN   1,200 mg at 05/02/23 0818   • heparin (porcine) injection 4,000 Units  4,000 Units Intracatheter PRN Jose Mccall MD   4,000 Units at 04/27/23 1321   • insulin lispro (ADMELOG) injection 0-9 Units  0-9 Units Subcutaneous 4x Daily With Meals & Nightly Laya Osman APRN   2 Units at 05/01/23 1708   • ipratropium-albuterol (DUO-NEB) nebulizer solution 3 mL  3 mL Nebulization Q4H PRN Laya Osman APRN   3 mL at 04/17/23 1456   • ipratropium-albuterol (DUO-NEB) nebulizer solution 3 mL  3 mL Nebulization Q6H While Awake - RT Laya Osman APRN   3 mL at 05/02/23 0641   •  iron polysaccharides (NIFEREX) capsule 150 mg  150 mg Oral Daily Laya Osman APRN   150 mg at 05/02/23 0818   • levoFLOXacin (LEVAQUIN) tablet 250 mg  250 mg Oral Q24H Mirtha Zuluaga MD   250 mg at 05/01/23 1033   • Menthol-Zinc Oxide 1 application  1 application Topical TID Cade Schreiber MD   1 application at 05/02/23 0818   • midodrine (PROAMATINE) tablet 5 mg  5 mg Oral BID AC Kanchan Tejada MD   5 mg at 05/01/23 1708   • ondansetron (ZOFRAN) injection 4 mg  4 mg Intravenous Q6H PRN Laya Osman APRN       • pantoprazole (PROTONIX) EC tablet 40 mg  40 mg Oral QAM Laya Osman APRN   40 mg at 05/02/23 0620   • polyethylene glycol (MIRALAX) packet 17 g  17 g Oral Daily PRN Laya Osman APRN   17 g at 04/29/23 1508   • polyethylene glycol (MIRALAX) packet 17 g  17 g Oral Daily Hyacinth Fiore MD   17 g at 05/01/23 0938   • sennosides-docusate (PERICOLACE) 8.6-50 MG per tablet 2 tablet  2 tablet Oral BID Jim Anaya MD   2 tablet at 05/02/23 0818   • traZODone (DESYREL) tablet 25 mg  25 mg Oral Nightly PRN Mirtha Zuluaga MD   25 mg at 04/30/23 2058       Assessment & Plan         NSTEMI (non-ST elevated myocardial infarction)    Type 2 diabetes mellitus with diabetic chronic kidney disease    Essential hypertension    Hyperlipidemia    ESRD (end stage renal disease)    Rhinovirus    Abnormal findings on diagnostic imaging of heart and coronary circulation    Fatigue    Dysphagia    Acute superficial venous thrombosis of right lower extremity    Orthostatic hypotension    Acute UTI (urinary tract infection)      1. ESRD. TTS schedule   2. NSTEMI. SP 3 vessel CABG, MV repair 4/13/23.  3. PAF,  SR. off metoprolol  4. Anemia CKD and blood loss.  Hemoglobin stable around mid 8s  5.  Hypophosphatemia,   6. DM2, with renal complication  7. Deconditioning.    8. Hypotension, acute on chronic, leukocytosis improved after treating his Pseudomonas UTI   9. Encephalopathy.   Resolved     PLAN:  1.  Continue dialysis TTS.  Cautious fluid removal with dialysis.  On oral midodrine for hypotension 5mg bid  2. Continue DAVID  3.  Surveillance labs  4. Ok for dc from renal side, PT has cleared him to go home. He will present to his outpatient dialysis center once discharged.       Kanchan Tejada MD  05/02/23  11:34 EDT

## 2023-05-02 NOTE — NURSING NOTE
This RN spoke with Mary Cordero [facility]. Per APRN, pt is to take midodrine at home. If self BP checks are > 160 systolic he is to call [facility] for a follow up. APRN is aware patients Bps today have been elevated with a recent systolic >150.

## 2023-05-02 NOTE — PROGRESS NOTES
" LOS: 24 days   Patient Care Team:  Daksha Ng APRN as PCP - General (Family Medicine)  Rudy Faith MD as Consulting Physician (Ophthalmology)  Jose Mccall MD as Consulting Physician (Nephrology)  Quang Reyes MD as Consulting Physician (Nephrology)    Chief Complaint: post op    Subjective:  Symptoms:  No shortness of breath, cough or chest pain.    Diet:  Adequate intake.  No nausea or vomiting.    Activity level: Impaired due to weakness.    Pain:  He complains of pain that is mild.  Pain is well controlled.      No issues overnight    Vital Signs  Temp:  [97.3 °F (36.3 °C)-98.5 °F (36.9 °C)] 97.3 °F (36.3 °C)  Heart Rate:  [] 97  Resp:  [18-20] 18  BP: (119-156)/(56-80) 119/71  Body mass index is 29.16 kg/m².    Intake/Output Summary (Last 24 hours) at 5/2/2023 0834  Last data filed at 5/2/2023 0755  Gross per 24 hour   Intake 820 ml   Output 650 ml   Net 170 ml     I/O this shift:  In: 240 [P.O.:240]  Out: -           04/30/23  0536 05/01/23  0620 05/02/23  0655   Weight: 95.4 kg (210 lb 6.4 oz) 96.7 kg (213 lb 1.6 oz) 97.5 kg (215 lb)         Objective:  General Appearance:  Comfortable and in no acute distress.    Vital signs: (most recent): Blood pressure 119/71, pulse 97, temperature 97.3 °F (36.3 °C), temperature source Oral, resp. rate 18, height 182.9 cm (72\"), weight 97.5 kg (215 lb), SpO2 97 %.  Vital signs are normal.  No fever.    Output: Producing urine and producing stool.    Lungs:  Normal effort and normal respiratory rate.  There are decreased breath sounds.    Heart: Normal rate.  Regular rhythm.    Abdomen: Abdomen is soft.  Bowel sounds are normal.     Extremities: There is dependent edema (mild bilateral LE).    Pulses: Distal pulses are intact.    Neurological: Patient is alert and oriented to person, place and time.    Skin:  Warm and dry.  (Sternal incision clean, dry, and intact)        Results Review:        WBC WBC   Date Value Ref Range Status "   05/02/2023 8.59 3.40 - 10.80 10*3/mm3 Final   05/01/2023 7.39 3.40 - 10.80 10*3/mm3 Final   04/30/2023 6.67 3.40 - 10.80 10*3/mm3 Final      HGB Hemoglobin   Date Value Ref Range Status   05/02/2023 8.5 (L) 13.0 - 17.7 g/dL Final   05/01/2023 8.7 (L) 13.0 - 17.7 g/dL Final   04/30/2023 8.6 (L) 13.0 - 17.7 g/dL Final      HCT Hematocrit   Date Value Ref Range Status   05/02/2023 27.1 (L) 37.5 - 51.0 % Final   05/01/2023 27.4 (L) 37.5 - 51.0 % Final   04/30/2023 26.9 (L) 37.5 - 51.0 % Final      Platelets Platelets   Date Value Ref Range Status   05/02/2023 198 140 - 450 10*3/mm3 Final   05/01/2023 180 140 - 450 10*3/mm3 Final   04/30/2023 179 140 - 450 10*3/mm3 Final        PT/INR:  No results found for: PROTIME/No results found for: INR    Sodium Sodium   Date Value Ref Range Status   05/02/2023 140 136 - 145 mmol/L Final   05/01/2023 141 136 - 145 mmol/L Final   04/30/2023 138 136 - 145 mmol/L Final      Potassium Potassium   Date Value Ref Range Status   05/02/2023 4.0 3.5 - 5.2 mmol/L Final   05/01/2023 4.0 3.5 - 5.2 mmol/L Final   04/30/2023 3.6 3.5 - 5.2 mmol/L Final      Chloride Chloride   Date Value Ref Range Status   05/02/2023 102 98 - 107 mmol/L Final   05/01/2023 103 98 - 107 mmol/L Final   04/30/2023 103 98 - 107 mmol/L Final      Bicarbonate CO2   Date Value Ref Range Status   05/02/2023 20.6 (L) 22.0 - 29.0 mmol/L Final   05/01/2023 23.9 22.0 - 29.0 mmol/L Final   04/30/2023 24.0 22.0 - 29.0 mmol/L Final      BUN BUN   Date Value Ref Range Status   05/02/2023 35 (H) 8 - 23 mg/dL Final   05/01/2023 25 (H) 8 - 23 mg/dL Final   04/30/2023 18 8 - 23 mg/dL Final      Creatinine Creatinine   Date Value Ref Range Status   05/02/2023 6.73 (H) 0.76 - 1.27 mg/dL Final   05/01/2023 5.49 (H) 0.76 - 1.27 mg/dL Final   04/30/2023 3.77 (H) 0.76 - 1.27 mg/dL Final      Calcium Calcium   Date Value Ref Range Status   05/02/2023 8.7 8.6 - 10.5 mg/dL Final   05/01/2023 9.1 8.6 - 10.5 mg/dL Final   04/30/2023 8.3 (L)  8.6 - 10.5 mg/dL Final      Magnesium Magnesium   Date Value Ref Range Status   04/30/2023 1.8 1.6 - 2.4 mg/dL Final          aspirin, 81 mg, Oral, Daily  atorvastatin, 40 mg, Oral, Nightly  enoxaparin, 30 mg, Subcutaneous, Q24H  epoetin bernice/bernice-epbx, 10,000 Units, Intravenous, Once per day on Mon Wed Fri  First Mouthwash (Magic Mouthwash), 5 mL, Swish & Spit, Q8H  guaiFENesin, 1,200 mg, Oral, Q12H  insulin lispro, 0-9 Units, Subcutaneous, 4x Daily With Meals & Nightly  ipratropium-albuterol, 3 mL, Nebulization, Q6H While Awake - RT  iron polysaccharides, 150 mg, Oral, Daily  levoFLOXacin, 250 mg, Oral, Q24H  Menthol-Zinc Oxide, 1 application, Topical, TID  midodrine, 5 mg, Oral, BID AC  pantoprazole, 40 mg, Oral, QAM  polyethylene glycol, 17 g, Oral, Daily  senna-docusate sodium, 2 tablet, Oral, BID         Patient Active Problem List   Diagnosis Code   • 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.22   • Essential hypertension I10   • Hyperlipidemia E78.5   • Primary insomnia F51.01   • ESRD (end stage renal disease) N18.6   • Vitamin D deficiency E55.9   • Diverticulitis of large intestine with perforation and abscess without bleeding K57.20   • Obesity (BMI 30-39.9) E66.9   • Impaired mobility and ADLs Z74.09, Z78.9   • History of colon resection Z90.49   • Colon polyps K63.5   • Diverticulosis K57.90   • CKD (chronic kidney disease) stage 5, GFR less than 15 ml/min N18.5   • NSTEMI (non-ST elevated myocardial infarction) I21.4   • Rhinovirus B34.8   • Abnormal findings on diagnostic imaging of heart and coronary circulation R93.1   • Fatigue R53.83   • Dysphagia R13.10   • Acute superficial venous thrombosis of right lower extremity I82.811   • Orthostatic hypotension I95.1   • Acute UTI (urinary tract infection) N39.0       Assessment & Plan   - NSTEMI/multi-vessel CAD- s/p urgent CABGx4 LIMA/RSVG, MV repair-([place]) POD#20  - HFrEF--30-35% per echo 4/8  - rhinovirus infection  - ESRD on  HD  - hypertension  - hyperlipidemia  - DM II- A1c 5.0  -post op anemia- expected acute blood loss  -Leukocytosis- probable reactive/secondary to  -TCP- resolved    - acute UTI--culture with pseudomonas; on levofloxacin per ID     He looks good this morning  BP remains stable. Continue midodrine  Remains in SR. Beta blocker held d/t hypotension  WBC normalized today. Antibiotics changed to oral levofloxacin per ID  Increase activity-- continue PT and OT  Okay for discharge from our standpoint-- will place home health orders and pain rx  Follow up with our office on 5/31 at 9  Continue routine care    DUSTY Spring  05/02/23  08:34 EDT

## 2023-05-02 NOTE — THERAPY DISCHARGE NOTE
Patient Name: Lefty Cai  : 1950    MRN: [id number]                              Today's Date: 2023       Admit Date: 2023    Visit Dx:     ICD-10-CM ICD-9-CM   1. Dyspnea on exertion  R06.09 786.09   2. NSTEMI (non-ST elevated myocardial infarction)  I21.4 410.70   3. ESRD (end stage renal disease)  N18.6 585.6   4. Former smoker  Z87.891 V15.82   5. Elevated blood pressure reading in office with diagnosis of hypertension  I10 401.9   6. Rhinovirus infection  B34.8 079.3   7. Abnormal findings on diagnostic imaging of heart and coronary circulation  R93.1 794.39   8. S/P CABG (coronary artery bypass graft)  Z95.1 V45.81   9. Orthostasis  I95.1 458.0   10. Peritoneal dialysis catheter in place  Z99.2 V45.11     Patient Active Problem List   Diagnosis   • Type 2 diabetes mellitus with diabetic chronic kidney disease   • Essential hypertension   • Hyperlipidemia   • Primary insomnia   • ESRD (end stage renal disease)   • Vitamin D deficiency   • Diverticulitis of large intestine with perforation and abscess without bleeding   • Obesity (BMI 30-39.9)   • Impaired mobility and ADLs   • History of colon resection   • Colon polyps   • Diverticulosis   • CKD (chronic kidney disease) stage 5, GFR less than 15 ml/min   • NSTEMI (non-ST elevated myocardial infarction)   • Rhinovirus   • Abnormal findings on diagnostic imaging of heart and coronary circulation   • Fatigue   • Dysphagia   • Acute superficial venous thrombosis of right lower extremity   • Orthostatic hypotension   • Acute UTI (urinary tract infection)     Past Medical History:   Diagnosis Date   • Anemia    • Anesthesia complication     HYPOTENSION   • Arthritis    • Cancer of kidney, left    • CKD (chronic kidney disease)     STAGE 5   • Diabetes mellitus, type 2    • Fatigue    • GERD (gastroesophageal reflux disease)    • H/O renal cell carcinoma     partial nephrectomy   • History of colostomy reversal 2022   • [place] (hard of  hearing)     NO DEVICE   • Hyperlipidemia    • Hypertension    • Primary insomnia 06/13/2016   • Proteinuria    • Risk factors for obstructive sleep apnea    • Sinus drainage    • Vitamin D deficiency 08/14/2017     Past Surgical History:   Procedure Laterality Date   • ARTERIOVENOUS FISTULA/SHUNT SURGERY Left 08/15/2019    Procedure: LEFT MID FOREARM RADIAL CEPHALIC ARTERIAL VENOUS FISTULA;  Surgeon: Louann Miles Jr., MD;  Location: [facility] OR;  Service: Vascular   • CARDIAC CATHETERIZATION N/A 4/9/2023    Procedure: Left Heart Cath;  Surgeon: Lobito Garcia MD;  Location: [facility] CATH INVASIVE LOCATION;  Service: Cardiovascular;  Laterality: N/A;   • CARDIAC CATHETERIZATION N/A 4/9/2023    Procedure: Left ventriculography;  Surgeon: Lobito Garcia MD;  Location: [facility] CATH INVASIVE LOCATION;  Service: Cardiovascular;  Laterality: N/A;   • CARDIAC CATHETERIZATION N/A 4/9/2023    Procedure: Coronary angiography;  Surgeon: Lobito Garcia MD;  Location: [facility] CATH INVASIVE LOCATION;  Service: Cardiovascular;  Laterality: N/A;   • COLONOSCOPY  03/24/2022   • COLONOSCOPY N/A 03/24/2022    Procedure: COLONOSCOPY TO CECUM WITH POLYPECTOMY  (HOT SNARE);  Surgeon: Yelena Guerra MD;  Location: [facility] ENDOSCOPY;  Service: General;  Laterality: N/A;  PREOP/ HX OF DIVERTICULITIS, COLOSTOMY  POSTOP/ POLYPS, DIVERTICULOSIS    • COLOSTOMY  09/06/2021   • COLOSTOMY CLOSURE N/A 04/12/2022    Procedure: open Colostomy reversal;  Surgeon: Yelena Guerra MD;  Location: [facility] MAIN OR;  Service: General;  Laterality: N/A;   • CORONARY ARTERY BYPASS GRAFT N/A 4/13/2023    Procedure: MAT STERNOTOMY CORONARY ARTERY BYPASS GRAFT TIMES 4 USING LEFT INTERNAL MAMMARY ARTERY AND RIGHT GREATER SAPHENOUS VEIN  PER ENDOSCOPIC VEIN HARVESTING, MITRAL VALVE REPAIR  AND PRP;  Surgeon: Mirtha Zuluaga MD;  Location: [facility] CVOR;  Service: Cardiothoracic;  Laterality: N/A;   • DIAGNOSTIC LAPAROSCOPY N/A 2/27/2023     Procedure: DIAGNOSTIC LAPAROSCOPY;  Surgeon: Murali Ferreira MD;  Location: [facility] MAIN OR;  Service: General;  Laterality: N/A;   • EXPLORATORY LAPAROTOMY N/A 09/06/2021    Procedure: Open sigmoind colectomy, colostomy, and appendectomy;  Surgeon: Yelena Guerra MD;  Location: [facility] MAIN OR;  Service: General;  Laterality: N/A;   • EYE SURGERY      CATARACTS   • KNEE ARTHROSCOPY Right    • NEPHRECTOMY PARTIAL  2007    Dr. Victor      General Information     Row Name 05/02/23 1128          Physical Therapy Time and Intention    Document Type therapy note (daily note)  -     Mode of Treatment individual therapy;physical therapy  -           User Key  (r) = Recorded By, (t) = Taken By, (c) = Cosigned By    Initials Name Provider Type    Kyara Tirado, DEENA Physical Therapist               Mobility     Row Name 05/02/23 1128          Bed Mobility    Supine-Sit [place] (Bed Mobility) standby assist  -     Sit-Supine [place] (Bed Mobility) standby assist  -     Assistive Device (Bed Mobility) bed rails;head of bed elevated  -     Row Name 05/02/23 1128          Sit-Stand Transfer    Sit-Stand [place] (Transfers) standby assist  -     Row Name 05/02/23 1128          Gait/Stairs (Locomotion)    [place] Level (Gait) contact guard  -     Assistive Device (Gait) --  No AD  -KH     Distance in Feet (Gait) 200 ft  -KH     Deviations/Abnormal Patterns (Gait) gait speed decreased;stride length decreased  -     [place] Level (Stairs) contact guard  -KH     Handrail Location (Stairs) right side (ascending)  -     Number of Steps (Stairs) 8  -KH     Ascending Technique (Stairs) step-to-step  -     Descending Technique (Stairs) step-to-step  -           User Key  (r) = Recorded By, (t) = Taken By, (c) = Cosigned By    Initials Name Provider Type    Kyara Tirado PT Physical Therapist               Obj/Interventions     Row Name 05/02/23 1128           Motor Skills    Therapeutic Exercise --  cardiac protocol x 5 reps  -THOMAS           User Key  (r) = Recorded By, (t) = Taken By, (c) = Cosigned By    Initials Name Provider Type    Kyara Tirado, PT Physical Therapist               Goals/Plan    No documentation.                Clinical Impression     Row Name 05/02/23 1129          Pain    Pretreatment Pain Rating 0/10 - no pain  -THOMAS     Posttreatment Pain Rating 0/10 - no pain  -     Row Name 05/02/23 1129          Plan of Care Review    Outcome Evaluation Pt was in bed and agreeabel to therapy. Pt ambulated with no AD today. Gait was a little slower, stable. He safely anvigated a flight of stairs and plans to d/c home later today or tomorrow. Pt will sign off. Encouraged pt to continue to ambualte with nursing staff 3x/ day while admitted.  -THOMAS     Row Name 05/02/23 1129          Positioning and Restraints    Pre-Treatment Position in bed  no alarm  -     Post Treatment Position bed  -     In Bed fowlers;call light within reach;encouraged to call for assist  -THOMAS           User Key  (r) = Recorded By, (t) = Taken By, (c) = Cosigned By    Initials Name Provider Type    Kyara Tirado, PT Physical Therapist               Outcome Measures     Row Name 05/02/23 1131          How much help from another person do you currently need...    Turning from your back to your side while in flat bed without using bedrails? 4  -KH     Moving from lying on back to sitting on the side of a flat bed without bedrails? 4  -KH     Moving to and from a bed to a chair (including a wheelchair)? 4  -KH     Standing up from a chair using your arms (e.g., wheelchair, bedside chair)? 4  -KH     Climbing 3-5 steps with a railing? 3  -KH     To walk in hospital room? 3  -KH     AM-PAC 6 Clicks Score (PT) 22  -KH     Highest level of mobility 7 --> Walked 25 feet or more  -THOMAS     Row Name 05/02/23 1131          Functional Assessment    Outcome Measure Options  AM-PAC 6 Clicks Basic Mobility (PT)  -           User Key  (r) = Recorded By, (t) = Taken By, (c) = Cosigned By    Initials Name Provider Type    Kyara Tirado PT Physical Therapist              Physical Therapy Education     Title: PT OT SLP Therapies (Done)     Topic: Physical Therapy (Done)     Point: Mobility training (Done)     Learning Progress Summary           Patient Acceptance, E,D, NR,VU by  at 5/1/2023 1222    Acceptance, E,D, DU by PC at 4/30/2023 1232    Acceptance, E,TB, VU by  at 4/30/2023 0901    Acceptance, E,TB, VU by  at 4/29/2023 1007    Acceptance, E, VU by  at 4/28/2023 1223    Acceptance, E, VU by EM at 4/28/2023 1034    Acceptance, E,TB, VU by  at 4/27/2023 1549    Acceptance, E, VU by KN at 4/26/2023 1456    Acceptance, E,TB, VU,NR by  at 4/24/2023 1226    Acceptance, E,TB,D, VU,NR by  at 4/22/2023 1627    Acceptance, E,TB, VU by  at 4/21/2023 1756    Acceptance, E,TB,D, VU,NR by  at 4/21/2023 1626    Acceptance, E, VU by EM at 4/20/2023 1507    Acceptance, E, VU by  at 4/19/2023 1815    Acceptance, E, VU by EM at 4/19/2023 1156    Acceptance, E, NR by AR at 4/16/2023 1633    Acceptance, TB,E, VU by  at 4/11/2023 0900   Family Acceptance, E, VU by EM at 4/28/2023 1034    Acceptance, E, VU by  at 4/19/2023 1815    Acceptance, E, NR by AR at 4/16/2023 1633                   Point: Home exercise program (Done)     Learning Progress Summary           Patient Acceptance, E,D, NR,VU by  at 5/1/2023 1222    Acceptance, E,D, DU by PC at 4/30/2023 1232    Acceptance, E,TB, VU by  at 4/30/2023 0901    Acceptance, E,TB, VU by TH at 4/29/2023 1007    Acceptance, E, VU by KN at 4/28/2023 1223    Acceptance, E,TB, VU by TH at 4/27/2023 1549    Acceptance, E, VU by KN at 4/26/2023 1456    Acceptance, E,TB, VU,NR by  at 4/24/2023 1226    Acceptance, E,TB,D, VU,NR by  at 4/22/2023 1627    Acceptance, E,TB, VU by  at 4/21/2023 1756    Acceptance, E,TB,D,  VU,NR by CH at 4/21/2023 1626    Acceptance, E, VU by EM at 4/20/2023 1507    Acceptance, E, VU by AH at 4/19/2023 1815    Acceptance, E, VU by EM at 4/19/2023 1156    Acceptance, E, NR by AR at 4/16/2023 1633    Acceptance, TB,E, VU by TH at 4/11/2023 0900   Family Acceptance, E, VU by AH at 4/19/2023 1815    Acceptance, E, NR by AR at 4/16/2023 1633                   Point: Body mechanics (Done)     Learning Progress Summary           Patient Acceptance, E,D, NR,VU by KH at 5/1/2023 1222    Acceptance, E,D, DU by PC at 4/30/2023 1232    Acceptance, E,TB, VU by TH at 4/30/2023 0901    Acceptance, E,TB, VU by TH at 4/29/2023 1007    Acceptance, E, VU by KN at 4/28/2023 1223    Acceptance, E,TB, VU by TH at 4/27/2023 1549    Acceptance, E, VU by KN at 4/26/2023 1456    Acceptance, E,TB, VU,NR by  at 4/24/2023 1226    Acceptance, E,TB,D, VU,NR by  at 4/22/2023 1627    Acceptance, E,TB, VU by  at 4/21/2023 1756    Acceptance, E,TB,D, VU,NR by  at 4/21/2023 1626    Acceptance, E, VU by AH at 4/19/2023 1815    Acceptance, E, NR by AR at 4/16/2023 1633    Acceptance, TB,E, VU by TH at 4/11/2023 0900   Family Acceptance, E, VU by AH at 4/19/2023 1815    Acceptance, E, NR by AR at 4/16/2023 1633                   Point: Precautions (Done)     Learning Progress Summary           Patient Acceptance, E,D, NR,VU by KH at 5/1/2023 1222    Acceptance, E,D, DU by PC at 4/30/2023 1232    Acceptance, E,TB, VU by TH at 4/30/2023 0901    Acceptance, E,TB, VU by TH at 4/29/2023 1007    Acceptance, E, VU by KN at 4/28/2023 1223    Acceptance, E,TB, VU by TH at 4/27/2023 1549    Acceptance, E, VU by KN at 4/26/2023 1456    Acceptance, E,TB, VU,NR by  at 4/24/2023 1226    Acceptance, E,TB,D, VU,NR by  at 4/22/2023 1627    Acceptance, E,TB, VU by TH at 4/21/2023 1756    Acceptance, E,TB,D, VU,NR by CH at 4/21/2023 1626    Acceptance, E, VU by  at 4/19/2023 1815    Acceptance, E, NR by AR at 4/16/2023 1633    Acceptance, TB,E,  VU by  at 4/11/2023 0900   Family Acceptance, E, VU by  at 4/19/2023 1815    Acceptance, E, NR by AR at 4/16/2023 1633                               User Key     Initials Effective Dates Name Provider Type Discipline     06/16/21 -  Kyara Sunshine, PT Physical Therapist PT    CH 06/16/21 -  Kavitha Sen, PT Physical Therapist PT    PC 06/16/21 -  Deann Jarrell, PT Physical Therapist PT    EM 06/16/21 -  Jennie Cuevas, PT Physical Therapist PT    AR 06/16/21 -  Mihaela Shields, PT Physical Therapist PT     06/16/21 -  Kacy Deluca, RN Registered Nurse Nurse     10/22/21 -  Geni Pederson, PT Physical Therapist PT     12/20/21 -  Mitesh Sage, RN Registered Nurse Nurse     08/02/22 -  Robert Tadeo OT Occupational Therapist OT              PT Recommendation and Plan     Plan of Care Reviewed With: patient  Outcome Evaluation: Pt was in bed and agreeabel to therapy. Pt ambulated with no AD today. Gait was a little slower, stable. He safely anvigated a flight of stairs and plans to d/c home later today or tomorrow. Pt will sign off. Encouraged pt to continue to ambualte with nursing staff 3x/ day while admitted.     Time Calculation:    PT Charges     Row Name 05/02/23 1131             Time Calculation    Start Time 1004  -      Stop Time 1016  -      Time Calculation (min) 12 min  -      PT Received On 05/02/23  -         Time Calculation- PT    Total Timed Code Minutes- PT 12 minute(s)  -         Timed Charges    45778 - Gait Training Minutes  12  -KH         Total Minutes    Timed Charges Total Minutes 12  -KH       Total Minutes 12  -KH            User Key  (r) = Recorded By, (t) = Taken By, (c) = Cosigned By    Initials Name Provider Type    Kyara Tirado, PT Physical Therapist              Therapy Charges for Today     Code Description Service Date Service Provider Modifiers Qty    29925052882 HC PT THER PROC EA 15 MIN 5/1/2023 Jong  Kyara Rodrigues, PT GP 1    09129692896 HC GAIT TRAINING EA 15 MIN 5/2/2023 Kyara Sunshine, PT GP 1          PT G-Codes  Outcome Measure Options: AM-PAC 6 Clicks Basic Mobility (PT)  AM-PAC 6 Clicks Score (PT): 22  AM-PAC 6 Clicks Score (OT): 20    PT Discharge Summary  Anticipated Discharge Disposition (PT): home with home health, home with assist    Kyara Sunshine, PT  5/2/2023

## 2023-05-03 ENCOUNTER — HOME CARE VISIT (OUTPATIENT)
Dept: HOME HEALTH SERVICES | Facility: HOME HEALTHCARE | Age: 73
End: 2023-05-03
Payer: MEDICARE

## 2023-05-03 ENCOUNTER — TRANSITIONAL CARE MANAGEMENT TELEPHONE ENCOUNTER (OUTPATIENT)
Dept: CALL CENTER | Facility: HOSPITAL | Age: 73
End: 2023-05-03
Payer: MEDICARE

## 2023-05-03 PROCEDURE — G0299 HHS/HOSPICE OF RN EA 15 MIN: HCPCS

## 2023-05-03 NOTE — OUTREACH NOTE
Call Center TCM Note    Flowsheet Row Responses   [facility] patient discharged from? [place]   Does the patient have one of the following disease processes/diagnoses(primary or secondary)? Cardiothoracic surgery   TCM attempt successful? No   Unsuccessful attempts Attempt 1          Norah Ferrell RN    5/3/2023, 08:58 EDT

## 2023-05-03 NOTE — PROGRESS NOTES
[facility]: National Cardiology Data Registry (NCDR) Medication Review    Lefty Cai is s/p CABG x4 . Pharmacy to review discharge medications to make sure appropriate medications have been prescribed.    Patient has been discharged on the following:  Aspirin: 81 mg once a day  High Intensity Statin: atorvastatin 40 mg once a day  Beta-blocker: BP too low to safely initiate and requiring midodrine, will address as outpatient   P2Y12 Inhibitor: n/a  LVEF <40: yes, BP too low to safely initiate and requiring midodrine, will address as outpatient     These medications meet the requirements for NCDR discharge medication for chest pain and MI.    Mendel Baker [facility]  Clinical Pharmacist

## 2023-05-03 NOTE — OUTREACH NOTE
Call Center TCM Note    Flowsheet Row Responses   [facility] patient discharged from? [place]   Does the patient have one of the following disease processes/diagnoses(primary or secondary)? Cardiothoracic surgery   TCM attempt successful? Yes   Call start time 1312   Call end time 1324   Discharge diagnosis NSTEMI,   CORONARY ARTERY BYPASS GRAFT TIMES 4    Person spoke with today (if not patient) and relationship Caterina Cai Spouse    Meds reviewed with patient/caregiver? Yes   Does the patient have all medications related to this admission filled (includes all antibiotics, pain medications, cardiac medications, etc.) Yes   Is the patient taking all medications as directed (includes completed medication regime)? Yes   Comments PCP Daksha Ng APRDIRK [facility] FOLLOW UP 5/12/2023  1:00 PM   Does the patient have an appointment with their PCP within 7 days of discharge? Yes   What is the Home health agency?  [facility]   Has home health visited the patient within 72 hours of discharge? Call prior to 72 hours  [Wife reports expecting  nurse today]   Psychosocial issues? No   Did the patient receive a copy of their discharge instructions? Yes   Nursing interventions Reviewed instructions with patient   What is the patient's perception of their health status since discharge? Improving   Is the patient/caregiver able to teach back normal signs of recovery? Nausea and lack of appetite  [Decreased appetite today]   Nursing interventions Reassured on normal signs of recovery   Is the patient /caregiver able to teach back basic post-op care? Use a clean wash cloth and antibacterial bar or liquid soap to clean incisions   Is the patient/caregiver able to teach back signs and symptoms of incisional infection? Increased redness, swelling or pain at the incisonal site, Increased drainage or bleeding, Fever   Is the patient/caregiver able to teach back steps to recovery at home? Set small, achievable goals for  return to baseline health, Rest and rebuild strength, gradually increase activity, Eat a well-balance diet   Is the patient/caregiver able to teach back the hierarchy of who to call/visit for symptoms/problems? PCP, Specialist, Home health nurse, Urgent Care, ED, 911 Yes   TCM call completed? Yes   Wrap up additional comments Antonella Lagos, Sat Dialysis   Call end time 1324   Would this patient benefit from a Referral to Amb Social Work? No   Is the patient interested in additional calls from an ambulatory ?  NOTE:  applies to high risk patients requiring additional follow-up. No          Norah Ferrell, RN    5/3/2023, 13:26 EDT

## 2023-05-04 NOTE — HOME HEALTH
SOC :  5/3    Home Health ordered for: disciplines : SN, PT, OT     Reason for Hosp/Primary Dx/Co-morbidities: SURGICAL AFTERCARE POST CARDIAC SURGERY  ( MITRAL VALVE REPAIR, AFTER NSTEMI)   HX: UTI, DM, HTN, HLD, ESRD, DIALYSIS TUE , THUR AND SAT. FISTULA TO LEFT ARM NOT USEABLE, HAS TEMPORARY CATH TO RIGHT CHEST.     Focus of Care:  AFTERCARE POST CARDIAC SURGERY, INCISION CARE, EDUCATE ON MEDICATIONS, AND HOME SAFETY     Current Functional status/mobility/DME: UP WITH WALKER,     HB status/Living Arrangements:  LIVES IN HIS HOME WITH HIS WIFE    Skin Integrity/wound status: INCISION TO CHEST AND LEFT UPPER LEG. BOTH OPEN TO AIR, GLUED SHUT.     Code Status: FULL    Fall Risk: YES    confirmed with: DR ROBERTS    Plan for next visit: TEACH AND INSTRUCT ON SURGICAL AFTERCARE  FOR MITRAL VALVE REPAIR, AFTER NSTEMI, HOME MED MGMT AND HOME SAFETY

## 2023-05-04 NOTE — CASE COMMUNICATION
This is to inform you of Major drug interactions noted on admission to [facility]   Drug-Drug: traZODone and DULoxetine   Serotonergic effects of trazodone and serotonin/norepinephrine reuptake inhibitors (SNRIs) may be additive. The risk of serotonin syndrome/toxicity may be increased.   Details Major   traZODone (DESYREL) 50 MG tablet   Long-term.     DULoxetine (CYMBALTA) 30 MG capsule   Long-term.   Drug-Drug  <jscript:void(0 )>  Drug-Drug: traMADol and traZODone   Serotonergic effects of this combination may be additive. The risk for serotonin syndrome/toxicity may be increased. Additionally, additive CNS depression may occur.   Details Major   traMADol (ULTRAM) 50 MG tablet     traZODone (DESYREL) 50 MG tablet   Long-term.   Drug-Drug  <jscript:void(0)>  Drug-Drug: traMADol and DULoxetine   Duloxetine may enhance the adverse/toxic effect of tramadol. The r isk for serotonin syndrome/serotonin toxicity and seizures may be increased with this combination. Duloxetine may diminish the therapeutic effect of tramadol.   Details Major  Thanks  ROMERO Vaughn RN

## 2023-05-05 ENCOUNTER — HOME CARE VISIT (OUTPATIENT)
Dept: HOME HEALTH SERVICES | Facility: HOME HEALTHCARE | Age: 73
End: 2023-05-05
Payer: MEDICARE

## 2023-05-05 PROCEDURE — G0151 HHCP-SERV OF PT,EA 15 MIN: HCPCS

## 2023-05-05 PROCEDURE — G0299 HHS/HOSPICE OF RN EA 15 MIN: HCPCS

## 2023-05-07 VITALS
HEART RATE: 72 BPM | OXYGEN SATURATION: 98 % | RESPIRATION RATE: 20 BRPM | TEMPERATURE: 98.8 F | DIASTOLIC BLOOD PRESSURE: 64 MMHG | SYSTOLIC BLOOD PRESSURE: 122 MMHG

## 2023-05-07 VITALS
SYSTOLIC BLOOD PRESSURE: 118 MMHG | HEART RATE: 97 BPM | DIASTOLIC BLOOD PRESSURE: 70 MMHG | OXYGEN SATURATION: 98 % | TEMPERATURE: 97 F | RESPIRATION RATE: 20 BRPM

## 2023-05-07 NOTE — HOME HEALTH
Pt is a 72 yo male s/p mitral valve repair after NSTEMI.  He has dialysis T/R/S. Patient lives with his spouse and has supportive family.  He is currently ambulating with rwx and fatigues very quickly, c/o SOA and dizziness.  PLOF pt was independent.    Plan for next visit:  pain mgt  gait training   HEP review and progression

## 2023-05-08 ENCOUNTER — HOME CARE VISIT (OUTPATIENT)
Dept: HOME HEALTH SERVICES | Facility: HOME HEALTHCARE | Age: 73
End: 2023-05-08
Payer: MEDICARE

## 2023-05-08 VITALS
SYSTOLIC BLOOD PRESSURE: 142 MMHG | DIASTOLIC BLOOD PRESSURE: 70 MMHG | OXYGEN SATURATION: 99 % | RESPIRATION RATE: 20 BRPM | HEART RATE: 97 BPM | TEMPERATURE: 97.1 F

## 2023-05-08 VITALS
HEART RATE: 90 BPM | DIASTOLIC BLOOD PRESSURE: 72 MMHG | RESPIRATION RATE: 18 BRPM | SYSTOLIC BLOOD PRESSURE: 124 MMHG | TEMPERATURE: 97.9 F | OXYGEN SATURATION: 98 %

## 2023-05-08 VITALS
TEMPERATURE: 97.1 F | RESPIRATION RATE: 20 BRPM | WEIGHT: 210.4 LBS | DIASTOLIC BLOOD PRESSURE: 90 MMHG | SYSTOLIC BLOOD PRESSURE: 150 MMHG | OXYGEN SATURATION: 99 % | HEART RATE: 97 BPM | BODY MASS INDEX: 28.54 KG/M2

## 2023-05-08 PROCEDURE — G0151 HHCP-SERV OF PT,EA 15 MIN: HCPCS

## 2023-05-08 PROCEDURE — G0300 HHS/HOSPICE OF LPN EA 15 MIN: HCPCS

## 2023-05-08 NOTE — HOME HEALTH
Pt reports he feels so short of air all the time.    Chest incision healing well.  SN coming later today to address wound.  Discussed SOA complaints and orthostatic hypotension with nursing today.    Plan for next visit:  cardio appt?  orthostatic symptoms?  progress with HEP  gait training/endurance training

## 2023-05-08 NOTE — HOME HEALTH
Routine Visit Note:    Skill/education provided: Reviewed all meds with patient. Patient denies any issues at this time. Instructed on meds dosage, scheduling and side effects. Patient voiced back understanding. Instructed on importance of daily weight checks, monitor weight every morning with the same amount of cloth, before eating/drinking, maintain a log, report any weight gain to MD as indicated above and signs of fluid retention such as edema, sudden cough and SOA. Patient able to teach back.    Patient/caregiver response: Patient stated understanding of instructions and edu given    Plan for next visit: Aftercare post cardiac surg, [facility] care, edu on med reg, falls prevention and cardiac rehab.

## 2023-05-09 NOTE — HOME HEALTH
SNV for cp assessment and teach on disease management r/t  s/p Cononary  bypass  Patient has Dialysis T-Th-Sa  Patient do not have medication Niferex and started on new medication [place]  Patient c/o SOB on min exertion and voiced he was receiving breathing tx in hospital  Nurse called Cardiologist DUSTY, Unable to leave message on MA line. Patient has upcoming appointment this week and informed to ask MD about mediction  Patient had lab H/H last week in dialysis  Nurse discussed new medicatios: Lipitor, mucinex, Levaquin, Midodrine, Protonix

## 2023-05-10 ENCOUNTER — HOSPITAL ENCOUNTER (OUTPATIENT)
Dept: GENERAL RADIOLOGY | Facility: HOSPITAL | Age: 73
Discharge: HOME | End: 2023-05-10
Admitting: NURSE PRACTITIONER
Payer: MEDICARE

## 2023-05-10 ENCOUNTER — TELEPHONE (OUTPATIENT)
Dept: CARDIOLOGY | Facility: CLINIC | Age: 73
End: 2023-05-10
Payer: MEDICARE

## 2023-05-10 ENCOUNTER — OFFICE VISIT (OUTPATIENT)
Dept: CARDIOLOGY | Facility: CLINIC | Age: 73
End: 2023-05-10
Payer: MEDICARE

## 2023-05-10 VITALS
SYSTOLIC BLOOD PRESSURE: 140 MMHG | WEIGHT: 210 LBS | DIASTOLIC BLOOD PRESSURE: 66 MMHG | HEIGHT: 70 IN | BODY MASS INDEX: 30.06 KG/M2 | HEART RATE: 99 BPM

## 2023-05-10 DIAGNOSIS — R00.2 PALPITATIONS: ICD-10-CM

## 2023-05-10 DIAGNOSIS — I21.4 NSTEMI (NON-ST ELEVATED MYOCARDIAL INFARCTION): ICD-10-CM

## 2023-05-10 DIAGNOSIS — I10 ESSENTIAL HYPERTENSION: Primary | ICD-10-CM

## 2023-05-10 DIAGNOSIS — R06.02 SHORTNESS OF BREATH: ICD-10-CM

## 2023-05-10 DIAGNOSIS — I95.1 ORTHOSTATIC HYPOTENSION: ICD-10-CM

## 2023-05-10 DIAGNOSIS — E78.5 HYPERLIPIDEMIA, UNSPECIFIED HYPERLIPIDEMIA TYPE: ICD-10-CM

## 2023-05-10 PROCEDURE — 71046 X-RAY EXAM CHEST 2 VIEWS: CPT

## 2023-05-10 NOTE — TELEPHONE ENCOUNTER
----- Message from DUSTY Jones sent at 5/10/2023 12:49 PM EDT -----  Can you get this aneta in to see Dr. Garcia in about 4 weeks? Thanks!

## 2023-05-10 NOTE — PROGRESS NOTES
Subjective:     Encounter Date:05/10/2023      Patient ID: Lefty Cai is a 73 y.o. male.    Chief Complaint:follow up CAD  History of Present Illness  This is a 74 y/o man who follows with Dr. Garcia and is new to me today. He has a pmhx of ESRD on HD, diabetes, hypertension and hyperlipidemia. He was admitted on 4/8/23 for complaints of worsening shortness of breath. His EKG showed new T wave inversion and his troponin was elevated. An echocardiogram was performed that showed a severely decreased left ventricular systolic function with an EF of 31-35%, moderately dilated LV cavity with grade 2 diastolic dysfunction, extensive regional wall motion abnormalities, and heavily thickened aortic valve with no stenosis but the valve appeared restricted. He underwent a cardiac catheterization that showed severe 3 vessel coronary disease involving the left main, LAD, circumflex and proximal right coronary artery, none of which was amenable to PCI. He was referred to CV surgery.    On 4/13, he underwent 4 vessel CABG with LIMA/RSVG and mitral valve repair. The course of his recovery was complicated with post op atrial fibrillation, hypotension and orthostatic hypotension. He also developed worsening leukocytosis and was found to have Pseudomonas growing in his urine. ID treated his UTI with antibiotics. He was eventually discharged home on 5/3/23 with plans to follow up with us in 1 week.    He is here today for his follow up visit. He reports that he is still pretty short of breath and is coughing up white sputum. He denies any weight gain. He reports he can barely walk from one room to another in his home because it makes him so short of breath. He denies any chest pain ro palpitations. He has a little swelling in his right lower extremity. He also has some dizziness but this is improving. He denies orthopnea or PND. He is fatigued as well. He has been compliant with his dialysis 3 days a week. He has not been  taking midodrine on a twice daily basis. He is only taking it when his blood pressure is on the lower side. He says he is using the incentive spirometry and flutter valve but his daughter and wife say he isn't using as often as he claims. He does note that his heart rate seems to be a little elevated, in the 90s-low 100s at times.    I have reviewed and updated as appropriate allergies, current medications, past family history, past medical history, past surgical history and problem list.    Review of Systems   Constitutional: Positive for malaise/fatigue. Negative for fever, weight gain and weight loss.   HENT: Negative for congestion, hoarse voice and sore throat.    Eyes: Negative for blurred vision and double vision.   Cardiovascular: Positive for dyspnea on exertion and leg swelling. Negative for chest pain, orthopnea, palpitations and syncope.   Respiratory: Positive for shortness of breath. Negative for cough and wheezing.    Gastrointestinal: Negative for abdominal pain, hematemesis, hematochezia and melena.   Genitourinary: Negative for dysuria and hematuria.   Neurological: Positive for dizziness. Negative for headaches, light-headedness and numbness.   Psychiatric/Behavioral: Negative for depression. The patient is not nervous/anxious.          Current Outpatient Medications:   •  acetaminophen (TYLENOL) 500 MG tablet, Take 1 tablet by mouth Every 4 (Four) Hours As Needed for Mild Pain. Indications: Fever, Pain, Disp: , Rfl:   •  aspirin 81 MG tablet, Take 1 tablet by mouth Every Night. FOLLOW MD GUIDELINES ON WHEN/IF TO HOLD FOR DOS  Indications: blood thinner, Disp: , Rfl:   •  atorvastatin (LIPITOR) 40 MG tablet, Take 1 tablet by mouth Every Night., Disp: 90 tablet, Rfl: 0  •  DULoxetine (CYMBALTA) 30 MG capsule, Take 1 capsule by mouth Daily. (Patient taking differently: Take 1 capsule by mouth Every Night.), Disp: 30 capsule, Rfl: 5  •  fexofenadine (ALLEGRA) 180 MG tablet, Take 1 tablet by mouth  Daily. Indications: Hayfever, Disp: , Rfl:   •  guaiFENesin (MUCINEX) 600 MG 12 hr tablet, Take 2 tablets by mouth Every 12 (Twelve) Hours., Disp: , Rfl:   •  iron polysaccharides (NIFEREX) 150 MG capsule, Take 1 capsule by mouth Daily. (Patient taking differently: Take 1 capsule by mouth Daily. Pt did not receive prescription.), Disp: , Rfl:   •  midodrine (PROAMATINE) 5 MG tablet, Take 1 tablet by mouth 2 (Two) Times a Day Before Meals., Disp: 60 tablet, Rfl: 0  •  pantoprazole (PROTONIX) 40 MG EC tablet, Take 1 tablet by mouth Every Morning., Disp: 30 tablet, Rfl: 0  •  polyethylene glycol (MIRALAX) 17 g packet, Take 17 g by mouth Daily As Needed (CONSTIPATION ). Indications: Constipation, Disp: , Rfl:   •  sennosides-docusate (senna-docusate sodium) 8.6-50 MG per tablet, Take 1 tablet by mouth At Night As Needed for Constipation., Disp: 60 tablet, Rfl: 0  •  sevelamer (RENAGEL) 800 MG tablet, Take 1 tablet by mouth 3 (Three) Times a Day With Meals. Indications: UNKNOWN, Disp: , Rfl:   •  simethicone (MYLICON) 80 MG chewable tablet, Chew 1 tablet Every 6 (Six) Hours As Needed for Flatulence. Indications: gas relief, Disp: , Rfl:   •  traZODone (DESYREL) 50 MG tablet, Take 0.5 tablets by mouth At Night As Needed for Sleep., Disp: 90 tablet, Rfl: 3  •  vitamin D3 125 MCG (5000 UT) capsule capsule, Take 1 tablet by mouth 2 (Two) Times a Week. SAT AND WED  Indications: supplement , Disp: , Rfl:   •  traMADol (ULTRAM) 50 MG tablet, Take 0.5 tablets by mouth Every 12 (Twelve) Hours As Needed for Severe Pain or Moderate Pain., Disp: , Rfl:     Past Medical History:   Diagnosis Date   • Anemia    • Anesthesia complication     HYPOTENSION   • Arthritis    • Cancer of kidney, left    • CKD (chronic kidney disease)     STAGE 5   • Diabetes mellitus, type 2    • Fatigue    • GERD (gastroesophageal reflux disease)    • H/O renal cell carcinoma 2007    partial nephrectomy   • History of colostomy reversal 04/2022   • [place] (hard  of hearing)     NO DEVICE   • Hyperlipidemia    • Hypertension    • Primary insomnia 06/13/2016   • Proteinuria    • Risk factors for obstructive sleep apnea    • Sinus drainage    • Vitamin D deficiency 08/14/2017       Past Surgical History:   Procedure Laterality Date   • ARTERIOVENOUS FISTULA/SHUNT SURGERY Left 08/15/2019    Procedure: LEFT MID FOREARM RADIAL CEPHALIC ARTERIAL VENOUS FISTULA;  Surgeon: Louann Miles Jr., MD;  Location: [facility] OR;  Service: Vascular   • CARDIAC CATHETERIZATION N/A 4/9/2023    Procedure: Left Heart Cath;  Surgeon: Lobito Garcia MD;  Location: [facility] CATH INVASIVE LOCATION;  Service: Cardiovascular;  Laterality: N/A;   • CARDIAC CATHETERIZATION N/A 4/9/2023    Procedure: Left ventriculography;  Surgeon: Lobito Garcia MD;  Location: [facility] CATH INVASIVE LOCATION;  Service: Cardiovascular;  Laterality: N/A;   • CARDIAC CATHETERIZATION N/A 4/9/2023    Procedure: Coronary angiography;  Surgeon: Lobito Garcia MD;  Location: [facility] CATH INVASIVE LOCATION;  Service: Cardiovascular;  Laterality: N/A;   • COLONOSCOPY  03/24/2022   • COLONOSCOPY N/A 03/24/2022    Procedure: COLONOSCOPY TO CECUM WITH POLYPECTOMY  (HOT SNARE);  Surgeon: Yelena Guerra MD;  Location: [facility] ENDOSCOPY;  Service: General;  Laterality: N/A;  PREOP/ HX OF DIVERTICULITIS, COLOSTOMY  POSTOP/ POLYPS, DIVERTICULOSIS    • COLOSTOMY  09/06/2021   • COLOSTOMY CLOSURE N/A 04/12/2022    Procedure: open Colostomy reversal;  Surgeon: Yelena Guerra MD;  Location: [facility] MAIN OR;  Service: General;  Laterality: N/A;   • CORONARY ARTERY BYPASS GRAFT N/A 4/13/2023    Procedure: MAT STERNOTOMY CORONARY ARTERY BYPASS GRAFT TIMES 4 USING LEFT INTERNAL MAMMARY ARTERY AND RIGHT GREATER SAPHENOUS VEIN  PER ENDOSCOPIC VEIN HARVESTING, MITRAL VALVE REPAIR  AND PRP;  Surgeon: Mirtha Zuluaga MD;  Location: [facility] CVOR;  Service: Cardiothoracic;  Laterality: N/A;   • DIAGNOSTIC LAPAROSCOPY N/A 2/27/2023  "   Procedure: DIAGNOSTIC LAPAROSCOPY;  Surgeon: Murali Ferreira MD;  Location: [facility] MAIN OR;  Service: General;  Laterality: N/A;   • EXPLORATORY LAPAROTOMY N/A 2021    Procedure: Open sigmoind colectomy, colostomy, and appendectomy;  Surgeon: Yelena Guerra MD;  Location: [facility] MAIN OR;  Service: General;  Laterality: N/A;   • EYE SURGERY      CATARACTS   • KNEE ARTHROSCOPY Right    • NEPHRECTOMY PARTIAL      Dr. Victor       Family History   Problem Relation Age of Onset   • Hypertension Mother    • Heart disease Father         ASCVD   • Macular degeneration Father    • Cancer Father         bladder   • Diabetes type I Sister    • Malig Hyperthermia Neg Hx        Social History     Tobacco Use   • Smoking status: Former     Packs/day: 2.00     Years: 25.00     Pack years: 50.00     Types: Cigarettes     Quit date:      Years since quittin.3   • Smokeless tobacco: Current     Types: Snuff   Vaping Use   • Vaping Use: Never used   Substance Use Topics   • Alcohol use: No   • Drug use: No         ECG 12 Lead    Date/Time: 5/10/2023 2:04 PM  Performed by: Danisha Machuca APRN  Authorized by: Danisha Machuca APRN   Comparison: compared with previous ECG from 2023  Similar to previous ECG  Rhythm: sinus rhythm  Q waves: V5, V2, V3 and V4    T inversion: V5 and V6  Comments: No significant change from previous EKG               Objective:     Visit Vitals  /66   Pulse 99   Ht 177.8 cm (70\")   Wt 95.3 kg (210 lb)   BMI 30.13 kg/m²             Physical Exam  Constitutional:       Appearance: Normal appearance. He is obese.   HENT:      Head: Normocephalic.   Neck:      Vascular: No carotid bruit.   Cardiovascular:      Rate and Rhythm: Normal rate and regular rhythm.      Chest Wall: PMI is not displaced.      Pulses: Normal pulses.           Radial pulses are 2+ on the right side and 2+ on the left side.        Posterior tibial pulses are 2+ on the right side and 2+ on the " left side.      Heart sounds: Normal heart sounds. No murmur heard.    No friction rub. No gallop.   Pulmonary:      Effort: Pulmonary effort is normal.      Breath sounds: Normal breath sounds.   Abdominal:      General: Bowel sounds are normal. There is no distension.      Palpations: Abdomen is soft.   Musculoskeletal:      Right lower le+ Edema present.      Left lower leg: No edema.   Skin:     General: Skin is warm and dry.      Capillary Refill: Capillary refill takes less than 2 seconds.          Neurological:      Mental Status: He is alert and oriented to person, place, and time.   Psychiatric:         Mood and Affect: Mood normal.         Behavior: Behavior normal.         Thought Content: Thought content normal.          Lab Review:   Lipid Panel        2022    08:45   Lipid Panel   Total Cholesterol 117     Triglycerides 103     HDL Cholesterol 37     VLDL Cholesterol 19     LDL Cholesterol  61           Cardiac Procedures:   1.     Assessment:         Diagnoses and all orders for this visit:    1. Essential hypertension (Primary)    2. Hyperlipidemia, unspecified hyperlipidemia type    3. NSTEMI (non-ST elevated myocardial infarction)    4. Orthostatic hypotension    5. Shortness of breath  -     XR Chest 2 View; Future  -     Holter Monitor - 48 Hour; Future    6. Palpitations  -     Holter Monitor - 48 Hour; Future    Other orders  -     ECG 12 Lead            Plan:       1. CAD: s/p 4 vessel bypass with LIMA/RSVG. On aspirin and statin therapy. EKG is stable with no new ischemic changes. No complaints of chest pain. He plans to attend cardiac rehab when able. Following up with CT surgery in a couple of week. Will continue current treatment plan  2. Shortness of breath: He doesn't appear volume overloaded on exam. Lungs are clear. I am going to obtain a chest xray to make sure he is not developing any type of respiratory infection. Encouraged him to utilize his IS and flutter valve.   3.  Tachycardia/palpitations: reported tachycardia at times at home. Heart in 90s today in office. Will place a Holter to make sure he is not going into afib and assess his overall average heart rate.  4. Orthostatic hypotension: taking midodrine on an as needed basis. Continue.  5. HLD: on statin. Goal LDL < 70    Thank you for allowing me to participate in this patient's care. Please call with any questions or concerns. Mr. Cai will follow up with Dr. Garcia in 4-6 weeks.          Your medication list          Accurate as of May 10, 2023  2:06 PM. If you have any questions, ask your nurse or doctor.            CHANGE how you take these medications      Instructions Last Dose Given Next Dose Due   DULoxetine 30 MG capsule  Commonly known as: CYMBALTA  What changed: when to take this      Take 1 capsule by mouth Daily.       iron polysaccharides 150 MG capsule  Commonly known as: NIFEREX  What changed: additional instructions      Take 1 capsule by mouth Daily.          CONTINUE taking these medications      Instructions Last Dose Given Next Dose Due   acetaminophen 500 MG tablet  Commonly known as: TYLENOL      Take 1 tablet by mouth Every 4 (Four) Hours As Needed for Mild Pain. Indications: Fever, Pain       aspirin 81 MG tablet      Take 1 tablet by mouth Every Night. FOLLOW MD GUIDELINES ON WHEN/IF TO HOLD FOR DOS  Indications: blood thinner       atorvastatin 40 MG tablet  Commonly known as: LIPITOR      Take 1 tablet by mouth Every Night.       fexofenadine 180 MG tablet  Commonly known as: ALLEGRA      Take 1 tablet by mouth Daily. Indications: Hayfever       guaiFENesin 600 MG 12 hr tablet  Commonly known as: MUCINEX      Take 2 tablets by mouth Every 12 (Twelve) Hours.       midodrine 5 MG tablet  Commonly known as: PROAMATINE      Take 1 tablet by mouth 2 (Two) Times a Day Before Meals.       pantoprazole 40 MG EC tablet  Commonly known as: PROTONIX      Take 1 tablet by mouth Every Morning.        polyethylene glycol 17 g packet  Commonly known as: MIRALAX      Take 17 g by mouth Daily As Needed (CONSTIPATION ). Indications: Constipation       sennosides-docusate 8.6-50 MG per tablet  Commonly known as: PERICOLACE      Take 1 tablet by mouth At Night As Needed for Constipation.       sevelamer 800 MG tablet  Commonly known as: RENAGEL      Take 1 tablet by mouth 3 (Three) Times a Day With Meals. Indications: UNKNOWN       simethicone 80 MG chewable tablet  Commonly known as: MYLICON      Chew 1 tablet Every 6 (Six) Hours As Needed for Flatulence. Indications: gas relief       traMADol 50 MG tablet  Commonly known as: ULTRAM      Take 0.5 tablets by mouth Every 12 (Twelve) Hours As Needed for Severe Pain or Moderate Pain.       traZODone 50 MG tablet  Commonly known as: DESYREL      Take 0.5 tablets by mouth At Night As Needed for Sleep.       vitamin D3 125 MCG (5000 UT) capsule capsule      Take 1 tablet by mouth 2 (Two) Times a Week. SAT AND WED  Indications: supplement                DUSTY Jones  05/10/23  8:21 AM EDT

## 2023-05-11 ENCOUNTER — READMISSION MANAGEMENT (OUTPATIENT)
Dept: CALL CENTER | Facility: HOSPITAL | Age: 73
End: 2023-05-11
Payer: MEDICARE

## 2023-05-11 NOTE — OUTREACH NOTE
CT Surgery Week 2 Survey    Flowsheet Row Responses   [facility] patient discharged from? [place]   Does the patient have one of the following disease processes/diagnoses(primary or secondary)? Cardiothoracic surgery   Week 2 attempt successful? No   Unsuccessful attempts Attempt 1          Antonia Astudillo Registered Nurse

## 2023-05-12 ENCOUNTER — HOME CARE VISIT (OUTPATIENT)
Dept: HOME HEALTH SERVICES | Facility: HOME HEALTHCARE | Age: 73
End: 2023-05-12
Payer: MEDICARE

## 2023-05-12 VITALS
DIASTOLIC BLOOD PRESSURE: 70 MMHG | TEMPERATURE: 97 F | SYSTOLIC BLOOD PRESSURE: 140 MMHG | OXYGEN SATURATION: 100 % | HEART RATE: 90 BPM | RESPIRATION RATE: 18 BRPM

## 2023-05-12 PROCEDURE — G0300 HHS/HOSPICE OF LPN EA 15 MIN: HCPCS

## 2023-05-12 PROCEDURE — G0152 HHCP-SERV OF OT,EA 15 MIN: HCPCS

## 2023-05-12 PROCEDURE — G0151 HHCP-SERV OF PT,EA 15 MIN: HCPCS

## 2023-05-12 NOTE — Clinical Note
OT evaluation completed 5-12-23, plan 2x/week for 2 weeks to address adl training, functional transfers, mobility, safety, UE ex.

## 2023-05-12 NOTE — HOME HEALTH
Pt reports he had a good day yesterday and then went to dialysis.  He reports he feels a little tired today but is about to get something to eat. He reports he has been doing some more walking and his breathing treatments.    Plan for next visit:  HEP progression  gait training/endurance

## 2023-05-13 NOTE — HOME HEALTH
SNV for cp assessment and teach on disease management r/t s/p Cononary bypass   Patient has Dialysis T-Th-Sa  Patient seen cardiologist and was informed neb tx will not benefit . Wife voiced she was instructed not to give medication midodrine if b/p wnl  Nurse instructed wife to let patient take medication with him to dialysis and they will suggest if patient should take mediation that day  Patient continue with hypotension  Patient incision healed and no issue   Next visit: Discharge....Therapy still in progression
No

## 2023-05-14 VITALS
RESPIRATION RATE: 18 BRPM | OXYGEN SATURATION: 95 % | DIASTOLIC BLOOD PRESSURE: 60 MMHG | SYSTOLIC BLOOD PRESSURE: 112 MMHG | HEART RATE: 95 BPM | TEMPERATURE: 98.6 F

## 2023-05-15 ENCOUNTER — HOME CARE VISIT (OUTPATIENT)
Dept: HOME HEALTH SERVICES | Facility: HOME HEALTHCARE | Age: 73
End: 2023-05-15
Payer: MEDICARE

## 2023-05-15 VITALS
OXYGEN SATURATION: 99 % | SYSTOLIC BLOOD PRESSURE: 132 MMHG | DIASTOLIC BLOOD PRESSURE: 70 MMHG | RESPIRATION RATE: 20 BRPM | HEART RATE: 99 BPM | TEMPERATURE: 97 F

## 2023-05-15 VITALS
SYSTOLIC BLOOD PRESSURE: 84 MMHG | TEMPERATURE: 97.2 F | DIASTOLIC BLOOD PRESSURE: 64 MMHG | HEART RATE: 98 BPM | OXYGEN SATURATION: 98 %

## 2023-05-15 PROCEDURE — G0151 HHCP-SERV OF PT,EA 15 MIN: HCPCS

## 2023-05-15 NOTE — HOME HEALTH
Reason for Referral: Recent hospitalization due to mitral valve repair due to NSTEMI    Medical History: UTI, HTN, HLD, ESRD, has dialysis T/Th/S, temporary cath to right chest, left arm fistula not useable    Subjective: Patient reports being dizzy when standing    Home Environment: Patient lives with supportive wife, has walker    PLOF:Independent    Medical Necessity: Patient requires skilled occupational therapy for remediation of deficits to improve safety in home and improve self care, functional transfers, mobility, strength, endurance, DME/adaptive equipment    Plan for Next Visit:  Safety with adl tasks

## 2023-05-15 NOTE — HOME HEALTH
Pt reports he is doing ok; has good and bad days.    Plan for next visit:  review walking program importance and HEP to be able to get to cardiac rehab

## 2023-05-16 ENCOUNTER — HOME CARE VISIT (OUTPATIENT)
Dept: HOME HEALTH SERVICES | Facility: HOME HEALTHCARE | Age: 73
End: 2023-05-16
Payer: MEDICARE

## 2023-05-16 ENCOUNTER — READMISSION MANAGEMENT (OUTPATIENT)
Dept: CALL CENTER | Facility: HOSPITAL | Age: 73
End: 2023-05-16
Payer: MEDICARE

## 2023-05-16 NOTE — OUTREACH NOTE
CT Surgery Week 2 Survey    Flowsheet Row Responses   [facility] patient discharged from? [place]   Does the patient have one of the following disease processes/diagnoses(primary or secondary)? Cardiothoracic surgery   Week 2 attempt successful? No   Unsuccessful attempts Attempt 2          Janet Astudillo Registered Nurse

## 2023-05-17 ENCOUNTER — HOME CARE VISIT (OUTPATIENT)
Dept: HOME HEALTH SERVICES | Facility: HOME HEALTHCARE | Age: 73
End: 2023-05-17
Payer: MEDICARE

## 2023-05-17 VITALS
RESPIRATION RATE: 20 BRPM | OXYGEN SATURATION: 99 % | SYSTOLIC BLOOD PRESSURE: 132 MMHG | TEMPERATURE: 97.2 F | DIASTOLIC BLOOD PRESSURE: 70 MMHG | HEART RATE: 106 BPM

## 2023-05-17 VITALS
BODY MASS INDEX: 28.43 KG/M2 | TEMPERATURE: 96.4 F | WEIGHT: 198.13 LBS | HEART RATE: 92 BPM | RESPIRATION RATE: 20 BRPM | DIASTOLIC BLOOD PRESSURE: 70 MMHG | OXYGEN SATURATION: 98 % | SYSTOLIC BLOOD PRESSURE: 122 MMHG

## 2023-05-17 PROCEDURE — G0152 HHCP-SERV OF OT,EA 15 MIN: HCPCS

## 2023-05-17 PROCEDURE — G0299 HHS/HOSPICE OF RN EA 15 MIN: HCPCS

## 2023-05-17 PROCEDURE — G0151 HHCP-SERV OF PT,EA 15 MIN: HCPCS

## 2023-05-17 NOTE — HOME HEALTH
Pt reports he does not feel well today.  He has little energy, he has not eaten, he is dizzy when he gets up and had a fall yesterday falling back on to his butt in the kitchen.  He reports he was able to get up with help and get to dialysis and nothing hurts.    He still c/o L bicep pain.  He reports he is not at all hungrey today.    Plan for next visit:  gait training  HEP progression  endurance training/energy conservation  cardiac rehab?

## 2023-05-18 VITALS
SYSTOLIC BLOOD PRESSURE: 106 MMHG | DIASTOLIC BLOOD PRESSURE: 54 MMHG | TEMPERATURE: 97.3 F | OXYGEN SATURATION: 98 % | HEART RATE: 101 BPM

## 2023-05-18 NOTE — HOME HEALTH
PT SEEN BY [facility] NSTEMI AND CABG X4 ON 4/13.  POST HOSPITAL STAY COMPLICATED  HX OF HD.   SN WAS TO [facility] TODAY BUT PT HAS NOT BEEN WEARING COMPRESSION HOSE FOR ORTHOSTATIC HYPOTENSION AND COMPLAINING OF SOA     PT IS ORTHOSTATIC PCG WAS GIVING MIDODRINE AM AND HS   I/S TO GIVE MIDODRINE AM AND 3-4  OCLOCK  NEED PRESSOR SUPPORT DURING THE DAY   RN DID GET THIGH HIGH COMPRESSION HOSE ON PT WITHOUT DIFFICULTY EVEN THOUGH HARD AS USUAL FOR  FOOT  WIFE ORDERED SLEEVE   I/S PT TO WEAR  THIGH HGH AND PUT ON IN AM AND TAKE OFF AT HS.  NOTED TO CHANGE POSITON SLOWLY  WITHOUT  THIGH HIGHS  B/P 122/70 HR 92 STANDING 86/56 AFTER 1 MIN      LATER WITH THIGH HIGH B/P 122/78 HR 88 STANDING 102/58    PT ASKED WHY SO SOA NOTED PROBABLY MORE FROM TACHYCARDIA TO COMPENSATE FOR LOWER B/P   RN DOES NOT FEEL TUBIGRIP WOULD  PROVIDE ADEQUATE COMPRESSION AND  THIGH HIGHS NEED TO BE USED  AS STATED SPOUSE TO GET FOOT  SLEEVE TO HELP WITH APPLICATION.  WILL CALL MD TOMORROW FOR FURTHER VISITS TO SEE HOW PT DOES WITH  THIGH HIGHS AND  MIDODRINE BEING GIVEN DURING THE DAY

## 2023-05-19 ENCOUNTER — HOME CARE VISIT (OUTPATIENT)
Dept: HOME HEALTH SERVICES | Facility: HOME HEALTHCARE | Age: 73
End: 2023-05-19
Payer: MEDICARE

## 2023-05-19 VITALS
RESPIRATION RATE: 20 BRPM | TEMPERATURE: 96.3 F | OXYGEN SATURATION: 100 % | HEART RATE: 92 BPM | DIASTOLIC BLOOD PRESSURE: 70 MMHG | SYSTOLIC BLOOD PRESSURE: 126 MMHG

## 2023-05-19 PROCEDURE — G0299 HHS/HOSPICE OF RN EA 15 MIN: HCPCS

## 2023-05-19 PROCEDURE — G0152 HHCP-SERV OF OT,EA 15 MIN: HCPCS

## 2023-05-19 NOTE — HOME HEALTH
Patient reports he is still having difficulty with blood pressure dropping, states he had a fall yesterday as he was leaving for dialysis, no injury.  Plan to address safety with adl tasks next visit.

## 2023-05-19 NOTE — HOME HEALTH
PT SEEN BY [facility] NSTEMI AND CABG X4 ON 4/13. POST HOSPITAL STAY COMPLICATED HX OF HD PT  FEELING BETTER HAS SUPPORT SOCKS ON   TAKING  MIDODRINE MORNING AND THEN 3-4 PM   EVALUATE ORTHOSTATIC B/P

## 2023-05-22 ENCOUNTER — HOME CARE VISIT (OUTPATIENT)
Dept: HOME HEALTH SERVICES | Facility: HOME HEALTHCARE | Age: 73
End: 2023-05-22
Payer: MEDICARE

## 2023-05-22 VITALS
HEART RATE: 107 BPM | SYSTOLIC BLOOD PRESSURE: 114 MMHG | TEMPERATURE: 95.8 F | OXYGEN SATURATION: 98 % | RESPIRATION RATE: 24 BRPM | BODY MASS INDEX: 29.13 KG/M2 | WEIGHT: 203 LBS | DIASTOLIC BLOOD PRESSURE: 66 MMHG

## 2023-05-22 VITALS
TEMPERATURE: 97.6 F | DIASTOLIC BLOOD PRESSURE: 70 MMHG | HEART RATE: 105 BPM | SYSTOLIC BLOOD PRESSURE: 140 MMHG | RESPIRATION RATE: 18 BRPM | OXYGEN SATURATION: 100 %

## 2023-05-22 VITALS — HEART RATE: 106 BPM | OXYGEN SATURATION: 98 %

## 2023-05-22 PROCEDURE — G0299 HHS/HOSPICE OF RN EA 15 MIN: HCPCS

## 2023-05-22 PROCEDURE — G0151 HHCP-SERV OF PT,EA 15 MIN: HCPCS

## 2023-05-22 NOTE — HOME HEALTH
Pt and spouse report he has had a good couple days and been feeling better, up more etc.    Plan for next visit:  PT dc to cardiac rehab

## 2023-05-22 NOTE — HOME HEALTH
Nurse finishing visit on arrival, patient doing better since last visit, blood pressure better. Reports he has MD appt this afternoon  Plan for discharge next visit, will address compression hose.

## 2023-05-23 NOTE — HOME HEALTH
PT SEEN BY [facility] NSTEMI AND CABG X4 ON 4/13. POST HOSPITAL STAY COMPLICATED HX OF HD  [facility] WED pt ,s temp 100.8F pt temp 38.6c pt febrile. temp of 102.1 axillary

## 2023-05-24 ENCOUNTER — HOME CARE VISIT (OUTPATIENT)
Dept: HOME HEALTH SERVICES | Facility: HOME HEALTHCARE | Age: 73
End: 2023-05-24
Payer: MEDICARE

## 2023-05-24 VITALS
SYSTOLIC BLOOD PRESSURE: 128 MMHG | BODY MASS INDEX: 29.27 KG/M2 | DIASTOLIC BLOOD PRESSURE: 64 MMHG | WEIGHT: 204 LBS | RESPIRATION RATE: 20 BRPM | TEMPERATURE: 97.1 F | HEART RATE: 104 BPM

## 2023-05-24 VITALS
HEART RATE: 100 BPM | DIASTOLIC BLOOD PRESSURE: 70 MMHG | RESPIRATION RATE: 18 BRPM | TEMPERATURE: 97.7 F | OXYGEN SATURATION: 99 % | SYSTOLIC BLOOD PRESSURE: 140 MMHG

## 2023-05-24 VITALS
TEMPERATURE: 97.1 F | HEART RATE: 94 BPM | OXYGEN SATURATION: 98 % | DIASTOLIC BLOOD PRESSURE: 81 MMHG | SYSTOLIC BLOOD PRESSURE: 132 MMHG

## 2023-05-24 PROCEDURE — G0152 HHCP-SERV OF OT,EA 15 MIN: HCPCS

## 2023-05-24 PROCEDURE — G0151 HHCP-SERV OF PT,EA 15 MIN: HCPCS

## 2023-05-24 PROCEDURE — G0299 HHS/HOSPICE OF RN EA 15 MIN: HCPCS

## 2023-05-24 NOTE — HOME HEALTH
Pt reports he was tired after dialysis yesterday but feeling good today. He denies any L bicep pain this week.  He begins OP cardiac rehab this week.  SN and OT to dc later today.

## 2023-05-25 ENCOUNTER — READMISSION MANAGEMENT (OUTPATIENT)
Dept: CALL CENTER | Facility: HOSPITAL | Age: 73
End: 2023-05-25
Payer: MEDICARE

## 2023-05-25 NOTE — OUTREACH NOTE
CT Surgery Week 3 Survey    Flowsheet Row Responses   [facility] patient discharged from? [place]   Does the patient have one of the following disease processes/diagnoses(primary or secondary)? Cardiothoracic surgery   Week 3 attempt successful? Yes   Call start time 0948   Unsuccessful attempts Attempt 1   Call end time 0951   General alerts for this patient HD-T, TH, S   Discharge diagnosis NSTEMI,   CORONARY ARTERY BYPASS GRAFT TIMES 4    Person spoke with today (if not patient) and relationship Caterina Cai Spouse    Meds reviewed with patient/caregiver? Yes   Is the patient taking all medications as directed (includes completed medication regime)? Yes   Does the patient have a primary care provider?  Yes   Does the patient have an appointment scheduled with their C/T surgeon? Yes   Has the patient kept scheduled appointments due by today? Yes   What is the Home health agency?  [facility]   Has home health visited the patient within 72 hours of discharge? Yes  [pt has been dc'd from ]   Psychosocial issues? No   What is the patient's perception of their health status since discharge? Improving   Is the patient /caregiver able to teach back basic post-op care? Lifting as instructed by MD in discharge instructions   Is the patient/caregiver able to teach back steps to recovery at home? Set small, achievable goals for return to baseline health   Is the patient /caregiver able to teach back the importance of cardiac rehab? Yes  [starts next week]   Week 3 call completed? Yes   Is the patient interested in additional calls from an ambulatory ?  NOTE:  applies to high risk patients requiring additional follow-up. No          Abby GOLDMAN - Registered Nurse

## 2023-05-25 NOTE — HOME HEALTH
PT SEEN BY [facility] NSTEMI AND CABG X4 ON 4/13. POST HOSPITAL STAY COMPLICATED HX OF HD AND  ORTHOSTATIC HYPOTENSION.  PROGESSING WELL  WALKED WITH RN AND SPOUSE  OUTSIDE DOWN SIDEWALK AND BACK AND TOLERATED WELL TO START CARDIAC REHAB FRIDAY

## 2023-05-26 ENCOUNTER — OFFICE VISIT (OUTPATIENT)
Dept: CARDIAC REHAB | Facility: HOSPITAL | Age: 73
End: 2023-05-26
Payer: MEDICARE

## 2023-05-26 DIAGNOSIS — I21.4 NSTEMI, INITIAL EPISODE OF CARE: Primary | ICD-10-CM

## 2023-05-26 PROCEDURE — 93798 PHYS/QHP OP CAR RHAB W/ECG: CPT

## 2023-05-31 ENCOUNTER — TREATMENT (OUTPATIENT)
Dept: CARDIAC REHAB | Facility: HOSPITAL | Age: 73
End: 2023-05-31

## 2023-05-31 ENCOUNTER — OFFICE VISIT (OUTPATIENT)
Dept: CARDIAC SURGERY | Facility: CLINIC | Age: 73
End: 2023-05-31

## 2023-05-31 VITALS
DIASTOLIC BLOOD PRESSURE: 85 MMHG | TEMPERATURE: 97.3 F | HEART RATE: 93 BPM | HEIGHT: 70 IN | BODY MASS INDEX: 29.2 KG/M2 | SYSTOLIC BLOOD PRESSURE: 150 MMHG | OXYGEN SATURATION: 99 % | WEIGHT: 204 LBS | RESPIRATION RATE: 18 BRPM

## 2023-05-31 DIAGNOSIS — Z95.1 S/P CABG X 4: Primary | ICD-10-CM

## 2023-05-31 DIAGNOSIS — I21.4 NSTEMI, INITIAL EPISODE OF CARE: Primary | ICD-10-CM

## 2023-05-31 DIAGNOSIS — Z98.890 S/P MVR (MITRAL VALVE REPAIR): ICD-10-CM

## 2023-05-31 PROCEDURE — 1159F MED LIST DOCD IN RCRD: CPT

## 2023-05-31 PROCEDURE — 1160F RVW MEDS BY RX/DR IN RCRD: CPT

## 2023-05-31 PROCEDURE — 3079F DIAST BP 80-89 MM HG: CPT

## 2023-05-31 PROCEDURE — 93798 PHYS/QHP OP CAR RHAB W/ECG: CPT

## 2023-05-31 PROCEDURE — 99024 POSTOP FOLLOW-UP VISIT: CPT

## 2023-05-31 PROCEDURE — 3077F SYST BP >= 140 MM HG: CPT

## 2023-05-31 RX ORDER — MIDODRINE HYDROCHLORIDE 5 MG/1
5 TABLET ORAL
Qty: 60 TABLET | Refills: 2 | Status: SHIPPED | OUTPATIENT
Start: 2023-05-31

## 2023-05-31 RX ORDER — ATORVASTATIN CALCIUM 40 MG/1
40 TABLET, FILM COATED ORAL NIGHTLY
Qty: 90 TABLET | Refills: 2 | Status: SHIPPED | OUTPATIENT
Start: 2023-05-31

## 2023-05-31 NOTE — PROGRESS NOTES
CARDIOVASCULAR SURGERY FOLLOW-UP PROGRESS NOTE    Chief Complaint: Postop      HPI:   Dear Dr. Ng, DUSTY Ramos and colleagues:    It was nice to see Lefty Cai in follow up 6 weeks after surgery.  As you know, he is a 73 y.o. male with a history of hypertension, hyperlipidemia, diabetes mellitus type 2, and ESRD on hemodialysis.  He presented to the hospital with complaints of worsening dyspnea over the last several days which continued to progress despite his adherence to his regular hemodialysis schedule.  He was noted to have some inferior lead T wave inversions on EKG and echocardiogram showed a greatly reduced left ventricular ejection fraction at 25 to 30%.  His troponins were also elevated on admission.  Preoperatively he also tested positive for rhinovirus.  He was diagnosed with NSTEMI and taken for left heart catheterization which revealed severe multivessel disease with a severely reduced LVEF of 20%.  He was referred to Dr. Zuluaga for surgical revascularization and on 4/13 he underwent CABG x4 and mitral valve repair.    He had a long and complicated postoperative course.  He was followed by nephrology for ESRD and HD.  He had issues with hypotension and orthostatic hypotension.  He was initially on beta-blocker therapy but this was removed and he was placed on midodrine given his blood pressure.  He has also been instructed to wear Jobst stockings which he has on today.  He has been receiving HD via TDC as he had a Doppler of his left arm aVF showed marginal flow and adequate size.  He is following up with nephrology and vascular surgery for management.  He was also followed by ID while in the hospital due to ongoing leukocytosis.  It was ultimately determined he had Pseudomonas of the urine.  He initially was not treated with antibiotics but then developed worsening mental status and urinary complaints.  He was placed on vancomycin in addition to cefepime given gluteal pressure sores.   "Blood cultures were obtained and remain negative.  He was then placed on a renally dosed course of Levaquin and mentation and urinary symptoms began to improve.  He was initially going to discharge from the hospital to rehab, but gait and ambulation improved and he was discharged home with home health on 5/2.   He comes in today complaining of nothing.  His activity level has been fair.  He walks with the assist of a walker.  From a surgical standpoint, the sternal incision is well approximated without erythema, edema, or drainage.  The sternum is stable to palpation, and the patient denies any popping or clicking with deep inspiration or coughing.      Physical Exam:         /85 (BP Location: Left arm, Patient Position: Sitting, Cuff Size: Adult)   Pulse 93   Temp 97.3 °F (36.3 °C)   Resp 18   Ht 177.8 cm (70\")   Wt 92.5 kg (204 lb)   SpO2 99%   BMI 29.27 kg/m²   Heart:  regular rate and rhythm, S1, S2 normal, no murmur, click, rub or gallop  Lungs:  diminished breath sounds bilaterally  Extremities:  no edema  Incision(s):  mid chest healing well, no significant drainage, no dehiscence, no significant erythema, right leg healing well, no significant drainage, no dehiscence, no significant erythema    Assessment/Plan:     S/P CABG and mitral valve repair. Overall, he is much improved.  His blood pressure is pretty elevated today.  He has been on midodrine twice daily for ongoing hypotension and orthostatic hypotension.  His wife states on HD days his SBP drops to the 80s.  He was told to continue his midodrine.  I have instructed her to start keeping a blood pressure log both on and off HD days.  I have instructed her to take her blood pressure before giving the midodrine in the morning and in the afternoon and holding it if SBP is elevated.  I told them at this point he may be able to stop midodrine on non-HD days.  She states she will keep a log of those blood pressures and heart rates to present " when he sees Dr. Garcia in the next 2 weeks.  We discussed given his low EF the importance of getting him placed on GDMT. Questions answered and verbalized understanding.  He understands the importance of starting cardiac rehab as well as keeping all follow-up appointments.    Post-op UTI, s/p antibiotics--resolved  Post-op bowed tension/orthostatic hypotension--ongoing    No heavy lifting > 10 pounds for 1 more weeks  Keep incisions clean and dry  OK to drive if not taking narcotic pain medicine  OK to begin cardiac rehab  Follow-up as scheduled with cardiology  Follow-up as scheduled with PCP  Follow-up with CT surgery prn  Lifelong antibiotics for dental procedures    Continue lifting restriction of 10 lbs until 6 weeks and 50 lbs until 12 weeks from the date of surgery, no excessive jarring motions or twisting motions until 12 weeks from the date of surgery    Return to clinic if any signs or symptoms of infection or sternal instability develop     Thank you for allowing me to participate in the care of your patient.      DUSTY Baker

## 2023-06-01 ENCOUNTER — TELEPHONE (OUTPATIENT)
Dept: CARDIOLOGY | Facility: CLINIC | Age: 73
End: 2023-06-01
Payer: MEDICARE

## 2023-06-01 NOTE — TELEPHONE ENCOUNTER
----- Message from DUSTY Jones sent at 6/1/2023 10:21 AM EDT -----  Attempted to call patient to discuss monitor results. No answer and no voicemail set up. If her returns call, please let him know his monitor did not show any atrial fibrillation.

## 2023-06-05 NOTE — TELEPHONE ENCOUNTER
Left voicemail for Lefty Cai requesting callback.    Thank you,  Eva HOOK RN  Triage Nurse [facility]   12:30 EDT

## 2023-06-05 NOTE — TELEPHONE ENCOUNTER
Lefty Cai returned call.    Reviewed results with Lefty Cai and his spouse Caterina.  Patient verbalized understanding of results.    Caterina mentioned that patient did 2 sessions of cardiac rehab but had to stop due to pain and swelling in his knee.  Patient has an appointment to see orthopedics on 6/19 to address knee and possible get a new knee brace.  Caterina stated cardiac rehab is aware and asked them to call back with an update after his appointment.    Encouraged patient to keep appointment with Dr. Garcia on 6/14 and he stated he will do this.    Thank you,  Eva HOOK RN  Triage Nurse [facility]  14:43 EDT

## 2023-06-08 ENCOUNTER — TELEPHONE (OUTPATIENT)
Dept: CARDIOLOGY | Facility: CLINIC | Age: 73
End: 2023-06-08

## 2023-06-08 RX ORDER — MIDODRINE HYDROCHLORIDE 5 MG/1
5 TABLET ORAL DAILY PRN
Qty: 90 TABLET | Refills: 3
Start: 2023-06-08

## 2023-06-08 NOTE — TELEPHONE ENCOUNTER
Patient wife calling stating her husbands BP has been running high yest. Morning his BP was 162/88. She states she gave him his Midodrine. Today right before he left to go to Dialysis his BP was 174/9 p 98. So she didn't give him the midodrine. She gave him a pill to take incase his BP went down at dialysis. She just really doesn't know if she should give him the Midodrine or not. States his BP has been running higher.  Also has a bulge on his right leg. Hard, not warm to touch and doesn't hurt .  She can be reached at     [phone]

## 2023-06-08 NOTE — TELEPHONE ENCOUNTER
Spoke with Mrs Robin regarding midodrine. Will stop midodrine for now. She will keep on hand in case he needs it on dialysis days. Otherwise, will just monitor BP at home. She will notify if any issues arise. We also discussed the bulge on his right leg. It does not sound concerning for a DVT as his leg is not red, swollen or warm. She feels like it is a varicose vein. Instructed to monitor and if he develops symptoms or it gets worse, she will contact us. Otherwise, he will be seen in office on 6/14 with Dr. Garcia

## 2023-06-08 NOTE — TELEPHONE ENCOUNTER
Caller: Caterina Cai     Relationship:WIFE    Best call back number: [phone]    What is your medical concern? PATIENT'S WIFE HAS CONCERN ABOUT BLOOD PRESSURE. HIS BP LAST NIGHT IT /88 AND /93. PLEASE REACH OUT TO PATIENT FOR CONSULTATION.    How long has this issue been going on? THIS PAST WEEK    Is your provider already aware of this issue? NO, FIRST TIME    Have you been treated for this issue? YES, NURSE PRACTITIONER ON 5/31/23 INFORMED PATIENT NOT TO TAKE MEDICATION-MIDODRINE IF BLOOD PRESSURE IS HIGH.

## 2023-06-14 ENCOUNTER — OFFICE VISIT (OUTPATIENT)
Dept: CARDIOLOGY | Facility: CLINIC | Age: 73
End: 2023-06-14
Payer: MEDICARE

## 2023-06-14 VITALS
HEART RATE: 99 BPM | BODY MASS INDEX: 29.29 KG/M2 | DIASTOLIC BLOOD PRESSURE: 62 MMHG | SYSTOLIC BLOOD PRESSURE: 110 MMHG | WEIGHT: 204.6 LBS | HEIGHT: 70 IN

## 2023-06-14 DIAGNOSIS — I21.4 NSTEMI (NON-ST ELEVATED MYOCARDIAL INFARCTION): Primary | ICD-10-CM

## 2023-06-14 DIAGNOSIS — Z98.890 S/P MVR (MITRAL VALVE REPAIR): ICD-10-CM

## 2023-06-14 DIAGNOSIS — N18.4 TYPE 2 DIABETES MELLITUS WITH STAGE 4 CHRONIC KIDNEY DISEASE, WITHOUT LONG-TERM CURRENT USE OF INSULIN: ICD-10-CM

## 2023-06-14 DIAGNOSIS — I25.5 ISCHEMIC CARDIOMYOPATHY: ICD-10-CM

## 2023-06-14 DIAGNOSIS — E11.22 TYPE 2 DIABETES MELLITUS WITH STAGE 4 CHRONIC KIDNEY DISEASE, WITHOUT LONG-TERM CURRENT USE OF INSULIN: ICD-10-CM

## 2023-06-14 PROBLEM — E66.9 OBESITY (BMI 30-39.9): Status: RESOLVED | Noted: 2021-09-07 | Resolved: 2023-06-14

## 2023-06-14 NOTE — PROGRESS NOTES
Date of Office Visit: 23  Encounter Provider: Lobito Garcia MD  Place of Service: [facility]  Patient Name: Lefty Cai  :1950  [id number]    Chief Complaint   Patient presents with   • Coronary Artery Disease   :     HPI: Lefty Cai is a 73 y.o. male he is here for follow-up.  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  He underwent bypass with Dr. Zuluaga and 2023.  He had a LIMA to his LAD him vein to a diagonal and vein to an OM1 and a vein to the JOAO.  He also had a annuloplasty ring around his mitral valve.  After bypass was a really rough course he had A-fib he had really soft blood pressures and then he got, urosepsis with Pseudomonas.  But once we treated that things got better.    He is here for follow-up he is actually doing pretty well he has periods where he gets lightheaded and sometimes he gets really low blood pressure on dialysis but other days his blood pressure is pretty high that they have been measuring he is healed up pretty well he is not having shortness of breath he is not having chest pain no swelling in his feet he is lost some weight.  He had a little bit of a cough he is not on any medicines basically for his heart other than his statin and an aspirin because his blood pressure would not tolerate it before    Past Medical History:   Diagnosis Date   • Anemia    • Anesthesia complication     HYPOTENSION   • Arthritis    • Cancer of kidney, left    • CKD (chronic kidney disease)     STAGE 5   • Diabetes mellitus, type 2    • Fatigue    • GERD (gastroesophageal reflux disease)    • H/O renal cell carcinoma 2007    partial nephrectomy   • History of colostomy reversal 2022   • [place] (hard of hearing)     NO DEVICE   • Hyperlipidemia    • Hypertension    • Primary insomnia 2016   • Proteinuria    • Risk factors for obstructive sleep apnea     • Sinus drainage    • Vitamin D deficiency 08/14/2017       Past Surgical History:   Procedure Laterality Date   • ARTERIOVENOUS FISTULA/SHUNT SURGERY Left 08/15/2019    Procedure: LEFT MID FOREARM RADIAL CEPHALIC ARTERIAL VENOUS FISTULA;  Surgeon: Louann Miles Jr., MD;  Location: [facility] MAIN OR;  Service: Vascular   • CARDIAC CATHETERIZATION N/A 4/9/2023    Procedure: Left Heart Cath;  Surgeon: Lobito Garcia MD;  Location: [facility] CATH INVASIVE LOCATION;  Service: Cardiovascular;  Laterality: N/A;   • CARDIAC CATHETERIZATION N/A 4/9/2023    Procedure: Left ventriculography;  Surgeon: Lobito Garcia MD;  Location: [facility] CATH INVASIVE LOCATION;  Service: Cardiovascular;  Laterality: N/A;   • CARDIAC CATHETERIZATION N/A 4/9/2023    Procedure: Coronary angiography;  Surgeon: Lobito Garcia MD;  Location: [facility] CATH INVASIVE LOCATION;  Service: Cardiovascular;  Laterality: N/A;   • COLONOSCOPY  03/24/2022   • COLONOSCOPY N/A 03/24/2022    Procedure: COLONOSCOPY TO CECUM WITH POLYPECTOMY  (HOT SNARE);  Surgeon: Yelena Guerra MD;  Location: [facility] ENDOSCOPY;  Service: General;  Laterality: N/A;  PREOP/ HX OF DIVERTICULITIS, COLOSTOMY  POSTOP/ POLYPS, DIVERTICULOSIS    • COLOSTOMY  09/06/2021   • COLOSTOMY CLOSURE N/A 04/12/2022    Procedure: open Colostomy reversal;  Surgeon: Yelena Guerra MD;  Location: [facility] MAIN OR;  Service: General;  Laterality: N/A;   • CORONARY ARTERY BYPASS GRAFT N/A 4/13/2023    Procedure: MAT STERNOTOMY CORONARY ARTERY BYPASS GRAFT TIMES 4 USING LEFT INTERNAL MAMMARY ARTERY AND RIGHT GREATER SAPHENOUS VEIN  PER ENDOSCOPIC VEIN HARVESTING, MITRAL VALVE REPAIR  AND PRP;  Surgeon: Mirtha Zuluaga MD;  Location: [facility] CVOR;  Service: Cardiothoracic;  Laterality: N/A;   • DIAGNOSTIC LAPAROSCOPY N/A 2/27/2023    Procedure: DIAGNOSTIC LAPAROSCOPY;  Surgeon: Murali Ferreira MD;  Location: [facility] MAIN OR;  Service: General;  Laterality: N/A;   • EXPLORATORY  LAPAROTOMY N/A 2021    Procedure: Open sigmoind colectomy, colostomy, and appendectomy;  Surgeon: Yelena Guerra MD;  Location: [facility] OR;  Service: General;  Laterality: N/A;   • EYE SURGERY      CATARACTS   • KNEE ARTHROSCOPY Right    • NEPHRECTOMY PARTIAL      Dr. Victor       Social History     Socioeconomic History   • Marital status:    Tobacco Use   • Smoking status: Former     Packs/day: 2.00     Years: 25.00     Pack years: 50.00     Types: Cigarettes     Quit date:      Years since quittin.4   • Smokeless tobacco: Current     Types: Snuff   Vaping Use   • Vaping Use: Never used   Substance and Sexual Activity   • Alcohol use: No   • Drug use: No   • Sexual activity: Defer       Family History   Problem Relation Age of Onset   • Hypertension Mother    • Heart disease Father         ASCVD   • Macular degeneration Father    • Cancer Father         bladder   • Diabetes type I Sister    • Malig Hyperthermia Neg Hx        Review of Systems   Constitutional: Negative for decreased appetite, fever, malaise/fatigue and weight loss.   HENT: Negative for nosebleeds.    Eyes: Negative for double vision.   Cardiovascular: Negative for chest pain, claudication, cyanosis, dyspnea on exertion, irregular heartbeat, leg swelling, near-syncope, orthopnea, palpitations, paroxysmal nocturnal dyspnea and syncope.   Respiratory: Negative for cough, hemoptysis and shortness of breath.    Hematologic/Lymphatic: Negative for bleeding problem.   Skin: Negative for rash.   Musculoskeletal: Negative for falls and myalgias.   Gastrointestinal: Negative for hematochezia, jaundice, melena, nausea and vomiting.   Genitourinary: Negative for hematuria.   Neurological: Negative for dizziness and seizures.   Psychiatric/Behavioral: Negative for altered mental status and memory loss.       Allergies   Allergen Reactions   • Contrast Dye (Echo Or Unknown Ct/Mr) Other (See Comments)     KIDNEY DISEASE   •  Latex Other (See Comments)     Blisters         Current Outpatient Medications:   •  acetaminophen (TYLENOL) 500 MG tablet, Take 1 tablet by mouth Every 4 (Four) Hours As Needed for Mild Pain. Indications: Fever, Pain, Disp: , Rfl:   •  aspirin 81 MG tablet, Take 1 tablet by mouth Every Night. FOLLOW MD GUIDELINES ON WHEN/IF TO HOLD FOR DOS  Indications: blood thinner, Disp: , Rfl:   •  atorvastatin (LIPITOR) 40 MG tablet, Take 1 tablet by mouth Every Night. Indications: High Amount of Fats in the Blood, Disp: 90 tablet, Rfl: 2  •  DULoxetine (CYMBALTA) 30 MG capsule, Take 1 capsule by mouth Daily. (Patient taking differently: Take 1 capsule by mouth Every Night.), Disp: 30 capsule, Rfl: 5  •  fexofenadine (ALLEGRA) 180 MG tablet, Take 1 tablet by mouth Daily. Indications: Hayfever, Disp: , Rfl:   •  polyethylene glycol (MIRALAX) 17 g packet, Take 17 g by mouth Daily As Needed (CONSTIPATION ). Indications: Constipation, Disp: , Rfl:   •  sennosides-docusate (senna-docusate sodium) 8.6-50 MG per tablet, Take 1 tablet by mouth At Night As Needed for Constipation., Disp: 60 tablet, Rfl: 0  •  sevelamer (RENAGEL) 800 MG tablet, Take 1 tablet by mouth 3 (Three) Times a Day With Meals. Indications: UNKNOWN, Disp: , Rfl:   •  traZODone (DESYREL) 50 MG tablet, Take 0.5 tablets by mouth At Night As Needed for Sleep., Disp: 90 tablet, Rfl: 3  •  vitamin D3 125 MCG (5000 UT) capsule capsule, Take 1 tablet by mouth 2 (Two) Times a Week. SAT AND WED  Indications: supplement , Disp: , Rfl:   •  iron polysaccharides (NIFEREX) 150 MG capsule, Take 1 capsule by mouth Daily. (Patient not taking: Reported on 6/14/2023), Disp: , Rfl:   •  midodrine (PROAMATINE) 5 MG tablet, Take 1 tablet by mouth Daily As Needed (on dialysis days). (Patient not taking: Reported on 6/14/2023), Disp: 90 tablet, Rfl: 3  •  pantoprazole (PROTONIX) 40 MG EC tablet, Take 1 tablet by mouth Every Morning. (Patient not taking: Reported on 6/14/2023), Disp: 30  "tablet, Rfl: 0  •  simethicone (MYLICON) 80 MG chewable tablet, Chew 1 tablet Every 6 (Six) Hours As Needed for Flatulence. Indications: gas relief (Patient not taking: Reported on 6/14/2023), Disp: , Rfl:       Objective:     Vitals:    06/14/23 1536   BP: 110/62   Pulse: 99   Weight: 92.8 kg (204 lb 9.6 oz)   Height: 177.8 cm (70\")     Body mass index is 29.36 kg/m².    Constitutional:       Appearance: Well-developed.   Eyes:      General: No scleral icterus.  HENT:      Head: Normocephalic.   Neck:      Thyroid: No thyromegaly.      Vascular: No JVD.      Lymphadenopathy: No cervical adenopathy.   Pulmonary:      Effort: Pulmonary effort is normal.      Breath sounds: Normal breath sounds. No wheezing. No rales.   Cardiovascular:      Normal rate. Regular rhythm.      No gallop.   Edema:     Peripheral edema absent.   Abdominal:      Palpations: Abdomen is soft.      Tenderness: There is no abdominal tenderness.   Musculoskeletal: Normal range of motion. Skin:     General: Skin is warm and dry.      Findings: No rash.   Neurological:      Mental Status: Alert and oriented to person, place, and time.           ECG 12 Lead    Date/Time: 6/14/2023 4:25 PM  Performed by: Lobito Garcia MD  Authorized by: Lobito Garcia MD   Comparison: compared with previous ECG   Similar to previous ECG  Rhythm: sinus rhythm  Other findings: non-specific ST-T wave changes    Clinical impression: abnormal EKG             Assessment:       Diagnosis Plan   1. NSTEMI (non-ST elevated myocardial infarction)        2. Type 2 diabetes mellitus with stage 4 chronic kidney disease, without long-term current use of insulin        3. Ischemic cardiomyopathy        4. S/P MVR (mitral valve repair)               Plan:       Well he actually looks really good compared to where he was think the aneta still is frail and we get a watch him, closely I am not can put him on any blood pressure medicine because I am worried it is getting low at " times I think we can watch it a little bit longer I thought his breath sounds are little down on his left chest I Norma send him over to get a chest film and I also think we need to get an echo as her new baseline for him I I really think probably his LV functions going to be improved.  I would like him to see Ashwini in 6 weeks we need to think about an ARB for him if his blood pressure would tolerate it    Return in about 3 months (around 9/14/2023) for See Ashwini Michelle in 6 weeks.     As always, it has been a pleasure to participate in your patient's care.      Sincerely,       Lobito Garcia MD

## 2023-06-26 ENCOUNTER — TELEPHONE (OUTPATIENT)
Dept: CARDIAC REHAB | Facility: HOSPITAL | Age: 73
End: 2023-06-26

## 2023-06-26 NOTE — TELEPHONE ENCOUNTER
Pt has dropped from Phase II Cardiac Rehab due to knee pain.  Pt completed 2 sessions.  Monitor showed ST with rare PACs.

## 2023-07-24 DIAGNOSIS — F32.1 CURRENT MODERATE EPISODE OF MAJOR DEPRESSIVE DISORDER, UNSPECIFIED WHETHER RECURRENT: ICD-10-CM

## 2023-07-24 RX ORDER — DULOXETIN HYDROCHLORIDE 30 MG/1
CAPSULE, DELAYED RELEASE ORAL
Qty: 90 CAPSULE | Refills: 1 | Status: SHIPPED | OUTPATIENT
Start: 2023-07-24

## 2023-07-25 DIAGNOSIS — E55.9 VITAMIN D DEFICIENCY: ICD-10-CM

## 2023-07-25 DIAGNOSIS — N18.4 TYPE 2 DIABETES MELLITUS WITH STAGE 4 CHRONIC KIDNEY DISEASE, WITHOUT LONG-TERM CURRENT USE OF INSULIN: ICD-10-CM

## 2023-07-25 DIAGNOSIS — E11.22 TYPE 2 DIABETES MELLITUS WITH STAGE 4 CHRONIC KIDNEY DISEASE, WITHOUT LONG-TERM CURRENT USE OF INSULIN: ICD-10-CM

## 2023-07-25 DIAGNOSIS — E78.5 HYPERLIPIDEMIA, UNSPECIFIED HYPERLIPIDEMIA TYPE: Primary | ICD-10-CM

## 2023-07-26 ENCOUNTER — TELEPHONE (OUTPATIENT)
Dept: FAMILY MEDICINE CLINIC | Facility: CLINIC | Age: 73
End: 2023-07-26

## 2023-07-26 ENCOUNTER — OFFICE VISIT (OUTPATIENT)
Dept: CARDIOLOGY | Facility: CLINIC | Age: 73
End: 2023-07-26
Payer: MEDICARE

## 2023-07-26 VITALS
OXYGEN SATURATION: 99 % | WEIGHT: 200 LBS | HEIGHT: 70 IN | BODY MASS INDEX: 28.63 KG/M2 | SYSTOLIC BLOOD PRESSURE: 164 MMHG | HEART RATE: 98 BPM | DIASTOLIC BLOOD PRESSURE: 80 MMHG

## 2023-07-26 DIAGNOSIS — I25.5 ISCHEMIC CARDIOMYOPATHY: ICD-10-CM

## 2023-07-26 DIAGNOSIS — I10 ESSENTIAL HYPERTENSION: ICD-10-CM

## 2023-07-26 DIAGNOSIS — I25.10 CORONARY ARTERY DISEASE INVOLVING NATIVE CORONARY ARTERY OF NATIVE HEART WITHOUT ANGINA PECTORIS: Primary | ICD-10-CM

## 2023-07-26 PROCEDURE — 1160F RVW MEDS BY RX/DR IN RCRD: CPT | Performed by: NURSE PRACTITIONER

## 2023-07-26 PROCEDURE — 99214 OFFICE O/P EST MOD 30 MIN: CPT | Performed by: NURSE PRACTITIONER

## 2023-07-26 PROCEDURE — 93000 ELECTROCARDIOGRAM COMPLETE: CPT | Performed by: NURSE PRACTITIONER

## 2023-07-26 PROCEDURE — 1159F MED LIST DOCD IN RCRD: CPT | Performed by: NURSE PRACTITIONER

## 2023-07-26 PROCEDURE — 3079F DIAST BP 80-89 MM HG: CPT | Performed by: NURSE PRACTITIONER

## 2023-07-26 PROCEDURE — 3077F SYST BP >= 140 MM HG: CPT | Performed by: NURSE PRACTITIONER

## 2023-07-26 RX ORDER — ATORVASTATIN CALCIUM 40 MG/1
40 TABLET, FILM COATED ORAL NIGHTLY
Qty: 90 TABLET | Refills: 2 | Status: SHIPPED | OUTPATIENT
Start: 2023-07-26

## 2023-07-26 RX ORDER — CARVEDILOL 6.25 MG
1 TABLET ORAL 2 TIMES DAILY WITH MEALS
COMMUNITY
Start: 2023-06-27

## 2023-07-26 NOTE — PROGRESS NOTES
Date of Office Visit: 2023  Encounter Provider: DUSTY Warren  Place of Service: [facility] CARDIOLOGY  Patient Name: Lefty Cai  :1950    Chief Complaint   Patient presents with    Coronary Artery Disease   :     HPI: Lefty Cai is a 73 y.o. male.  He is a patient of Dr. Garcia's with hypertension hyperlipidemia, diabetes, ESRD on HD, and coronary artery disease.  In April, he was admitted for shortness of breath and found to have decreased LV systolic function with an EF of 31 to 35%.  Cardiac catheterization demonstrated three-vessel disease.  Ultimately, he underwent a CABG x 4 as well as a mitral valve repair.  His postoperative course was complicated and prolonged including paroxysmal atrial fibrillation and urosepsis with Pseudomonas.  He was last seen in the office by Dr. Garcia in  at which time he was doing well overall.  He reported intermittent lightheadedness as well as hypotension on dialysis.  His blood pressure on other days was actually quite high.  Dr. Garcia noted decreased lung sounds and sent him for a chest x-ray.  He also ordered an echocardiogram.  The chest x-ray demonstrated a small left pleural effusion.  The echocardiogram demonstrated an EF of 52% as well as a large left pleural effusion.  He was advised to follow-up in 6 weeks.  He feels great.  He is still experiencing low blood pressure with dialysis.  However, on his off days his blood pressure is elevated for which he was given a prescription for carvedilol.  He denies any chest pain, shortness of breath, palpitations, edema, dizziness, or syncope.    Past Medical History:   Diagnosis Date    Anemia     Anesthesia complication     HYPOTENSION    Arthritis     Cancer of kidney, left     CKD (chronic kidney disease)     STAGE 5    Diabetes mellitus, type 2     Fatigue     GERD (gastroesophageal reflux disease)     H/O renal cell carcinoma     partial  nephrectomy    History of colostomy reversal 04/2022    [place] ([place])     NO DEVICE    Hyperlipidemia     Hypertension     Primary insomnia 06/13/2016    Proteinuria     Risk factors for obstructive sleep apnea     Sinus drainage     Vitamin D deficiency 08/14/2017       Past Surgical History:   Procedure Laterality Date    ARTERIOVENOUS FISTULA/SHUNT SURGERY Left 08/15/2019    Procedure: LEFT MID FOREARM RADIAL CEPHALIC ARTERIAL VENOUS FISTULA;  Surgeon: Louann Miles Jr., MD;  Location: [facility] MAIN OR;  Service: Vascular    CARDIAC CATHETERIZATION N/A 4/9/2023    Procedure: Left Heart Cath;  Surgeon: Lobito Garcia MD;  Location: [facility] CATH INVASIVE LOCATION;  Service: Cardiovascular;  Laterality: N/A;    CARDIAC CATHETERIZATION N/A 4/9/2023    Procedure: Left ventriculography;  Surgeon: Lobito Garcia MD;  Location: [facility] CATH INVASIVE LOCATION;  Service: Cardiovascular;  Laterality: N/A;    CARDIAC CATHETERIZATION N/A 4/9/2023    Procedure: Coronary angiography;  Surgeon: Lobito Garcia MD;  Location: [facility] CATH INVASIVE LOCATION;  Service: Cardiovascular;  Laterality: N/A;    COLONOSCOPY  03/24/2022    COLONOSCOPY N/A 03/24/2022    Procedure: COLONOSCOPY TO CECUM WITH POLYPECTOMY  (HOT SNARE);  Surgeon: Yelena Guerra MD;  Location: [facility] ENDOSCOPY;  Service: General;  Laterality: N/A;  PREOP/ HX OF DIVERTICULITIS, COLOSTOMY  POSTOP/ POLYPS, DIVERTICULOSIS     COLOSTOMY  09/06/2021    COLOSTOMY CLOSURE N/A 04/12/2022    Procedure: open Colostomy reversal;  Surgeon: Yelena Guerra MD;  Location: [facility] MAIN OR;  Service: General;  Laterality: N/A;    CORONARY ARTERY BYPASS GRAFT N/A 4/13/2023    Procedure: MAT STERNOTOMY CORONARY ARTERY BYPASS GRAFT TIMES 4 USING LEFT INTERNAL MAMMARY ARTERY AND RIGHT GREATER SAPHENOUS VEIN  PER ENDOSCOPIC VEIN HARVESTING, MITRAL VALVE REPAIR  AND PRP;  Surgeon: Mirtha Zuluaga MD;  Location: [facility] CVOR;  Service: Cardiothoracic;   Laterality: N/A;    DIAGNOSTIC LAPAROSCOPY N/A 2023    Procedure: DIAGNOSTIC LAPAROSCOPY;  Surgeon: Murali Ferreira MD;  Location: [facility] MAIN OR;  Service: General;  Laterality: N/A;    EXPLORATORY LAPAROTOMY N/A 2021    Procedure: Open sigmoind colectomy, colostomy, and appendectomy;  Surgeon: Yelena Guerra MD;  Location: [facility] MAIN OR;  Service: General;  Laterality: N/A;    EYE SURGERY      CATARACTS    KNEE ARTHROSCOPY Right     NEPHRECTOMY PARTIAL      Dr. Victor       Social History     Socioeconomic History    Marital status:    Tobacco Use    Smoking status: Former     Packs/day: 2.00     Years: 25.00     Pack years: 50.00     Types: Cigarettes     Quit date:      Years since quittin.    Smokeless tobacco: Current     Types: Snuff   Vaping Use    Vaping Use: Never used   Substance and Sexual Activity    Alcohol use: No    Drug use: No    Sexual activity: Defer       Family History   Problem Relation Age of Onset    Hypertension Mother     Heart disease Father         ASCVD    Macular degeneration Father     Cancer Father         bladder    Diabetes type I Sister     Malig Hyperthermia Neg Hx        Review of Systems   Constitutional: Negative.   Cardiovascular: Negative.  Negative for chest pain, dyspnea on exertion, leg swelling, orthopnea, paroxysmal nocturnal dyspnea and syncope.   Respiratory: Negative.     Hematologic/Lymphatic: Negative for bleeding problem.   Musculoskeletal:  Negative for falls.   Gastrointestinal:  Negative for melena.   Neurological:  Negative for dizziness and light-headedness.     Allergies   Allergen Reactions    Contrast Dye (Echo Or Unknown Ct/Mr) Other (See Comments)     KIDNEY DISEASE    Latex Other (See Comments)     Blisters         Current Outpatient Medications:     acetaminophen (TYLENOL) 500 MG tablet, Take 1 tablet by mouth Every 4 (Four) Hours As Needed for Mild Pain. Indications: Fever, Pain, Disp: , Rfl:      aspirin 81 MG tablet, Take 1 tablet by mouth Every Night. FOLLOW MD GUIDELINES ON WHEN/IF TO HOLD FOR DOS  Indications: blood thinner, Disp: , Rfl:     atorvastatin (LIPITOR) 40 MG tablet, Take 1 tablet by mouth Every Night. Indications: High Amount of Fats in the Blood, Disp: 90 tablet, Rfl: 2    Coreg 6.25 MG tablet, Take 1 tablet by mouth 2 (Two) Times a Day With Meals., Disp: , Rfl:     DULoxetine (CYMBALTA) 30 MG capsule, TAKE 1 CAPSULE EVERY DAY, Disp: 90 capsule, Rfl: 1    fexofenadine (ALLEGRA) 180 MG tablet, Take 1 tablet by mouth Daily. Indications: Hayfever, Disp: , Rfl:     iron polysaccharides (NIFEREX) 150 MG capsule, Take 1 capsule by mouth Daily. (Patient taking differently: Take 1 capsule by mouth Daily.), Disp: , Rfl:     Lidocaine-Prilocaine &Lido HCl 2.5-2.5 & 3.88 % kit, Apply  topically to the appropriate area as directed., Disp: , Rfl:     midodrine (PROAMATINE) 5 MG tablet, Take 1 tablet by mouth Daily As Needed (on dialysis days)., Disp: 90 tablet, Rfl: 3    polyethylene glycol (MIRALAX) 17 g packet, Take 17 g by mouth Daily As Needed (CONSTIPATION ). Indications: Constipation, Disp: , Rfl:     sennosides-docusate (senna-docusate sodium) 8.6-50 MG per tablet, Take 1 tablet by mouth At Night As Needed for Constipation., Disp: 60 tablet, Rfl: 0    sevelamer (RENAGEL) 800 MG tablet, Take 1 tablet by mouth 3 (Three) Times a Day With Meals. Indications: UNKNOWN, Disp: , Rfl:     simethicone (MYLICON) 80 MG chewable tablet, Chew 1 tablet Every 6 (Six) Hours As Needed for Flatulence., Disp: , Rfl:     traZODone (DESYREL) 50 MG tablet, Take 0.5 tablets by mouth At Night As Needed for Sleep., Disp: 90 tablet, Rfl: 3    vitamin D3 125 MCG (5000 UT) capsule capsule, Take 1 tablet by mouth 2 (Two) Times a Week. SAT AND WED  Indications: supplement , Disp: , Rfl:       Objective:     Vitals:    07/26/23 1244   BP: 164/80   BP Location: Right arm   Patient Position: Sitting   Pulse: 98   SpO2: 99%  "  Weight: 90.7 kg (200 lb)   Height: 177.8 cm (70\")     Body mass index is 28.7 kg/m².    PHYSICAL EXAM:    Neck:      Vascular: No JVD.   Pulmonary:      Effort: Pulmonary effort is normal.      Breath sounds: Normal breath sounds.   Cardiovascular:      Normal rate. Regular rhythm.      Murmurs: There is a systolic murmur.      No gallop.  No click. No rub.   Pulses:     Intact distal pulses.         ECG 12 Lead    Date/Time: 7/26/2023 12:56 PM  Performed by: Ashwini Michelle APRN  Authorized by: Ashwini Michelle APRN   Comparison: compared with previous ECG from 6/14/2023  Similar to previous ECG  Rhythm: sinus rhythm  Rate: normal  BPM: 98  Other findings: prolonged QTc interval          Assessment:       Diagnosis Plan   1. Coronary artery disease involving native coronary artery of native heart without angina pectoris  ECG 12 Lead      2. Ischemic cardiomyopathy        3. Essential hypertension          Orders Placed This Encounter   Procedures    ECG 12 Lead     This order was created via procedure documentation     Order Specific Question:   Release to patient     Answer:   Routine Release          Plan:       1.  Coronary artery disease.  Status post CABG x4 in April.  He denies any symptoms of angina.  Continue aspirin and atorvastatin      2.  Ischemic cardiomyopathy.  Echocardiogram from 6/29 demonstrated normal LV systolic function with an EF of 52%.  Continue current therapy.       3.  Hypertension.  His blood pressure is elevated today.  He has not yet taken the carvedilol.  His blood pressure is a little difficult to manage given the hypotension with dialysis.  I advised him to take a dose of carvedilol when he returns home.      Overall he is doing well.  I am not making any changes.  He will follow-up with Dr. Garcia on 10/4.      As always, it has been a pleasure to participate in your patient's care.      Sincerely,         DUSTY Dee  "

## 2023-08-07 ENCOUNTER — OFFICE VISIT (OUTPATIENT)
Dept: FAMILY MEDICINE CLINIC | Facility: CLINIC | Age: 73
End: 2023-08-07
Payer: MEDICARE

## 2023-08-07 VITALS
BODY MASS INDEX: 30.21 KG/M2 | SYSTOLIC BLOOD PRESSURE: 150 MMHG | HEART RATE: 75 BPM | DIASTOLIC BLOOD PRESSURE: 75 MMHG | WEIGHT: 211 LBS | OXYGEN SATURATION: 98 % | HEIGHT: 70 IN

## 2023-08-07 DIAGNOSIS — Z00.00 MEDICARE ANNUAL WELLNESS VISIT, SUBSEQUENT: Primary | ICD-10-CM

## 2023-08-07 PROCEDURE — 3077F SYST BP >= 140 MM HG: CPT | Performed by: NURSE PRACTITIONER

## 2023-08-07 PROCEDURE — 3078F DIAST BP <80 MM HG: CPT | Performed by: NURSE PRACTITIONER

## 2023-08-07 PROCEDURE — 96160 PT-FOCUSED HLTH RISK ASSMT: CPT | Performed by: NURSE PRACTITIONER

## 2023-08-07 PROCEDURE — 3044F HG A1C LEVEL LT 7.0%: CPT | Performed by: NURSE PRACTITIONER

## 2023-08-07 PROCEDURE — 1170F FXNL STATUS ASSESSED: CPT | Performed by: NURSE PRACTITIONER

## 2023-08-07 PROCEDURE — G0439 PPPS, SUBSEQ VISIT: HCPCS | Performed by: NURSE PRACTITIONER

## 2023-08-07 PROCEDURE — 1160F RVW MEDS BY RX/DR IN RCRD: CPT | Performed by: NURSE PRACTITIONER

## 2023-08-07 PROCEDURE — 1159F MED LIST DOCD IN RCRD: CPT | Performed by: NURSE PRACTITIONER

## 2023-08-07 NOTE — PATIENT INSTRUCTIONS
Medicare Wellness  Personal Prevention Plan of Service     Date of Office Visit:    Encounter Provider:  DUSTY Anaya  Place of Service:  [facility] PRIMARY CARE  Patient Name: Lefty Cai  :  1950    As part of the Medicare Wellness portion of your visit today, we are providing you with this personalized preventive plan of services (PPPS). This plan is based upon recommendations of the [place] Preventive Services Task Force (USPSTF) and the Advisory Committee on Immunization Practices (ACIP).    This lists the preventive care services that should be considered, and provides dates of when you are due. Items listed as completed are up-to-date and do not require any further intervention.    Health Maintenance   Topic Date Due    TDAP/TD VACCINES (1 - Tdap) Never done    ZOSTER VACCINE (1 of 2) Never done    DIABETIC FOOT EXAM  2021    COVID-19 Vaccine (4 - Pfizer series) 2022    URINE MICROALBUMIN  2022    Hepatitis B (2 of 2 - CpG 2-dose series) 2023    DIABETIC EYE EXAM  2023    INFLUENZA VACCINE  10/01/2023    HEMOGLOBIN A1C  2024    LIPID PANEL  2024    ANNUAL WELLNESS VISIT  2024    COLORECTAL CANCER SCREENING  2032    HEPATITIS C SCREENING  Completed    Pneumococcal Vaccine 65+  Completed    AAA SCREEN (ONE-TIME)  Completed       No orders of the defined types were placed in this encounter.      No follow-ups on file.

## 2023-08-07 NOTE — PROGRESS NOTES
The ABCs of the Annual Wellness Visit  Subsequent Medicare Wellness Visit    Subjective      Lefty Cai is a 73 y.o. male who presents for a Subsequent Medicare Wellness Visit.    The following portions of the patient's history were reviewed and   updated as appropriate: allergies, current medications, past family history, past medical history, past social history, past surgical history, and problem list.    Compared to one year ago, the patient feels his physical   health is better.    Compared to one year ago, the patient feels his mental   health is worse.    Recent Hospitalizations:  This patient has had a [facility] admission record on file within the last 365 days.    Current Medical Providers:  Patient Care Team:  Daksha Ng APRN as PCP - General (Family Medicine)  Rudy Faith MD as Consulting Physician (Ophthalmology)  Jose Mccall MD as Consulting Physician (Nephrology)  Quang Reyes MD as Consulting Physician (Nephrology)    Outpatient Medications Prior to Visit   Medication Sig Dispense Refill    acetaminophen (TYLENOL) 500 MG tablet Take 1 tablet by mouth Every 4 (Four) Hours As Needed for Mild Pain. Indications: Fever, Pain      aspirin 81 MG tablet Take 1 tablet by mouth Every Night. FOLLOW MD GUIDELINES ON WHEN/IF TO HOLD FOR DOS  Indications: blood thinner      atorvastatin (LIPITOR) 40 MG tablet Take 1 tablet by mouth Every Night. Indications: High Amount of Fats in the Blood 90 tablet 2    Coreg 6.25 MG tablet Take 1 tablet by mouth 2 (Two) Times a Day With Meals.      DULoxetine (CYMBALTA) 30 MG capsule TAKE 1 CAPSULE EVERY DAY 90 capsule 1    fexofenadine (ALLEGRA) 180 MG tablet Take 1 tablet by mouth Daily. Indications: Hayfever      polyethylene glycol (MIRALAX) 17 g packet Take 17 g by mouth Daily As Needed (CONSTIPATION ). Indications: Constipation      sennosides-docusate (senna-docusate sodium) 8.6-50 MG per tablet Take 1 tablet by mouth At Night  As Needed for Constipation. 60 tablet 0    sevelamer (RENAGEL) 800 MG tablet Take 1 tablet by mouth 2 (Two) Times a Day. Indications: UNKNOWN      traZODone (DESYREL) 50 MG tablet Take 0.5 tablets by mouth At Night As Needed for Sleep. 90 tablet 3    vitamin D3 125 MCG (5000 UT) capsule capsule Take 1 tablet by mouth 2 (Two) Times a Week. SAT AND WED  Indications: supplement       iron polysaccharides (NIFEREX) 150 MG capsule Take 1 capsule by mouth Daily. (Patient not taking: Reported on 8/7/2023)      Lidocaine-Prilocaine &Lido HCl 2.5-2.5 & 3.88 % kit Apply  topically to the appropriate area as directed. (Patient not taking: Reported on 8/7/2023)      midodrine (PROAMATINE) 5 MG tablet Take 1 tablet by mouth Daily As Needed (on dialysis days). (Patient not taking: Reported on 8/7/2023) 90 tablet 3    simethicone (MYLICON) 80 MG chewable tablet Chew 1 tablet Every 6 (Six) Hours As Needed for Flatulence. Indications: gas relief (Patient not taking: Reported on 8/7/2023)       No facility-administered medications prior to visit.       No opioid medication identified on active medication list. I have reviewed chart for other potential  high risk medication/s and harmful drug interactions in the elderly.        Aspirin is on active medication list. Aspirin use is indicated based on review of current medical condition/s. Pros and cons of this therapy have been discussed today. Benefits of this medication outweigh potential harm.  Patient has been encouraged to continue taking this medication.  .      Patient Active Problem List   Diagnosis    Type 2 diabetes mellitus with diabetic chronic kidney disease    Essential hypertension    Hyperlipidemia    Primary insomnia    ESRD (end stage renal disease)    Vitamin D deficiency    Diverticulitis of large intestine with perforation and abscess without bleeding    Impaired mobility and ADLs    History of colon resection    Colon polyps    Diverticulosis    CKD (chronic kidney  "disease) stage 5, GFR less than 15 ml/min    NSTEMI (non-ST elevated myocardial infarction)    Abnormal findings on diagnostic imaging of heart and coronary circulation    Fatigue    Acute superficial venous thrombosis of right lower extremity    Orthostatic hypotension    Acute UTI (urinary tract infection)    Ischemic cardiomyopathy    S/P MVR (mitral valve repair)    Coronary artery disease involving native coronary artery of native heart without angina pectoris     Advance Care Planning   Advance Care Planning     Advance Directive is not on file.  ACP discussion was held with the patient during this visit. Patient does not have an advance directive, declines further assistance.     Objective    Vitals:    23 1342   BP: 150/75   BP Location: Left arm   Patient Position: Sitting   Cuff Size: Adult   Pulse: 75   SpO2: 98%   Weight: 95.7 kg (211 lb)   Height: 177.8 cm (70\")     Estimated body mass index is 30.28 kg/mý as calculated from the following:    Height as of this encounter: 177.8 cm (70\").    Weight as of this encounter: 95.7 kg (211 lb).    BMI is >= 30 and <35. (Class 1 Obesity). The following options were offered after discussion;: weight loss educational material (shared in after visit summary)      Does the patient have evidence of cognitive impairment?   No            HEALTH RISK ASSESSMENT    Smoking Status:  Social History     Tobacco Use   Smoking Status Former    Packs/day: 2.00    Years: 25.00    Pack years: 50.00    Types: Cigarettes    Quit date:     Years since quittin.6   Smokeless Tobacco Current    Types: Snuff     Alcohol Consumption:  Social History     Substance and Sexual Activity   Alcohol Use No     Fall Risk Screen:    TIMADI Fall Risk Assessment was completed, and patient is at MODERATE risk for falls. Assessment completed on:2023    Depression Screenin/7/2023     1:53 PM   PHQ-2/PHQ-9 Depression Screening   Little Interest or Pleasure in Doing Things " 0-->not at all   Feeling Down, Depressed or Hopeless 1-->several days   PHQ-9: Brief Depression Severity Measure Score 1       Health Habits and Functional and Cognitive Screenin/7/2023     1:54 PM   Functional & Cognitive Status   Do you have difficulty preparing food and eating? No   Do you have difficulty bathing yourself, getting dressed or grooming yourself? No   Do you have difficulty using the toilet? No   Do you have difficulty moving around from place to place? No   Do you have trouble with steps or getting out of a bed or a chair? No   Do you need help using the phone?  No   Are you deaf or do you have serious difficulty hearing?  No   Do you need help to go to places out of walking distance? No   Do you need help preparing meals?  No   Do you need help with housework?  No   Do you need help with laundry? No   Do you need help taking your medications? No   Do you need help managing money? No   Do you ever drive or ride in a car without wearing a seat belt? No   Have you felt unusual stress, anger or loneliness in the last month? No   Who do you live with? Spouse   If you need help, do you have trouble finding someone available to you? No   Have you been bothered in the last four weeks by sexual problems? No   Do you have difficulty concentrating, remembering or making decisions? Yes       Age-appropriate Screening Schedule:  Refer to the list below for future screening recommendations based on patient's age, sex and/or medical conditions. Orders for these recommended tests are listed in the plan section. The patient has been provided with a written plan.    Health Maintenance   Topic Date Due    TDAP/TD VACCINES (1 - Tdap) Never done    ZOSTER VACCINE (1 of 2) Never done    DIABETIC FOOT EXAM  2021    COVID-19 Vaccine (4 - Pfizer series) 2022    URINE MICROALBUMIN  2022    Hepatitis B (2 of 2 - CpG 2-dose series) 2023    DIABETIC EYE EXAM  2023    INFLUENZA VACCINE   10/01/2023    HEMOGLOBIN A1C  01/31/2024    LIPID PANEL  07/31/2024    ANNUAL WELLNESS VISIT  08/07/2024    COLORECTAL CANCER SCREENING  03/24/2032    HEPATITIS C SCREENING  Completed    Pneumococcal Vaccine 65+  Completed    AAA SCREEN (ONE-TIME)  Completed                  CMS Preventative Services Quick Reference  Risk Factors Identified During Encounter:    None Identified    The above risks/problems have been discussed with the patient.  Pertinent information has been shared with the patient in the After Visit Summary.    Diagnoses and all orders for this visit:    1. Medicare annual wellness visit, subsequent (Primary)        Follow Up:   Next Medicare Wellness visit to be scheduled in 1 year.      An After Visit Summary and PPPS were made available to the patient.

## 2023-10-04 ENCOUNTER — OFFICE VISIT (OUTPATIENT)
Dept: CARDIOLOGY | Facility: CLINIC | Age: 73
End: 2023-10-04
Payer: MEDICARE

## 2023-10-04 VITALS
HEART RATE: 79 BPM | HEIGHT: 70 IN | DIASTOLIC BLOOD PRESSURE: 66 MMHG | SYSTOLIC BLOOD PRESSURE: 110 MMHG | BODY MASS INDEX: 28.72 KG/M2 | WEIGHT: 200.6 LBS

## 2023-10-04 DIAGNOSIS — I21.4 NSTEMI (NON-ST ELEVATED MYOCARDIAL INFARCTION): Primary | ICD-10-CM

## 2023-10-04 DIAGNOSIS — N18.4 TYPE 2 DIABETES MELLITUS WITH STAGE 4 CHRONIC KIDNEY DISEASE, WITHOUT LONG-TERM CURRENT USE OF INSULIN: ICD-10-CM

## 2023-10-04 DIAGNOSIS — E11.22 TYPE 2 DIABETES MELLITUS WITH STAGE 4 CHRONIC KIDNEY DISEASE, WITHOUT LONG-TERM CURRENT USE OF INSULIN: ICD-10-CM

## 2023-10-04 DIAGNOSIS — Z98.890 S/P MVR (MITRAL VALVE REPAIR): ICD-10-CM

## 2023-10-04 DIAGNOSIS — N18.6 ESRD (END STAGE RENAL DISEASE): ICD-10-CM

## 2023-10-04 RX ORDER — CARVEDILOL 3.12 MG/1
3.12 TABLET ORAL 2 TIMES DAILY WITH MEALS
Qty: 180 TABLET | Refills: 3 | Status: SHIPPED | OUTPATIENT
Start: 2023-10-04

## 2023-10-04 NOTE — PROGRESS NOTES
Date of Office Visit: 10/04/23  Encounter Provider: Lobito Garcia MD  Place of Service: [facility] CARDIOLOGY  Patient Name: Lefty Cai  :1950  [id number]    Chief Complaint   Patient presents with    Coronary Artery Disease   :     HPI: Lefty Cai is a 73 y.o. male he is here for follow-up.  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  He underwent bypass with Dr. Zuluaga and 2023.  He had a LIMA to his LAD him vein to a diagonal and vein to an OM1 and a vein to the JOAO.  He also had a annuloplasty ring around his mitral valve.  After bypass was a really rough course he had A-fib he had really soft blood pressures and then he got, urosepsis with Pseudomonas.  But once we treated that things got better.    He is here and actually doing pretty well he is having some difficulties with his dialysis access and bleeding but in general hey he looks much better than the first time.  He is not having chest pain PND orthopnea edema he gets lightheaded with dialysis.  Blood pressure is low but he took twice the normal dose of his carvedilol today by accident    Past Medical History:   Diagnosis Date    Anemia     Anesthesia complication     HYPOTENSION    Arthritis     Cancer of kidney, left     CKD (chronic kidney disease)     STAGE 5    Diabetes mellitus, type 2     Fatigue     GERD (gastroesophageal reflux disease)     H/O renal cell carcinoma     partial nephrectomy    History of colostomy reversal 2022    [place] (hard of hearing)     NO DEVICE    Hyperlipidemia     Hypertension     Primary insomnia 2016    Proteinuria     Risk factors for obstructive sleep apnea     Sinus drainage     Vitamin D deficiency 2017       Past Surgical History:   Procedure Laterality Date    ARTERIOVENOUS FISTULA/SHUNT SURGERY Left 08/15/2019    Procedure: LEFT MID FOREARM RADIAL CEPHALIC ARTERIAL  VENOUS FISTULA;  Surgeon: Louann Miles Jr., MD;  Location: [facility] MAIN OR;  Service: Vascular    CARDIAC CATHETERIZATION N/A 4/9/2023    Procedure: Left Heart Cath;  Surgeon: Lobito Garcia MD;  Location: [facility] CATH INVASIVE LOCATION;  Service: Cardiovascular;  Laterality: N/A;    CARDIAC CATHETERIZATION N/A 4/9/2023    Procedure: Left ventriculography;  Surgeon: Lobito Garcia MD;  Location: [facility] CATH INVASIVE LOCATION;  Service: Cardiovascular;  Laterality: N/A;    CARDIAC CATHETERIZATION N/A 4/9/2023    Procedure: Coronary angiography;  Surgeon: Lobito Garcia MD;  Location: [facility] CATH INVASIVE LOCATION;  Service: Cardiovascular;  Laterality: N/A;    COLONOSCOPY  03/24/2022    COLONOSCOPY N/A 03/24/2022    Procedure: COLONOSCOPY TO CECUM WITH POLYPECTOMY  (HOT SNARE);  Surgeon: Yelena Guerra MD;  Location: [facility] ENDOSCOPY;  Service: General;  Laterality: N/A;  PREOP/ HX OF DIVERTICULITIS, COLOSTOMY  POSTOP/ POLYPS, DIVERTICULOSIS     COLOSTOMY  09/06/2021    COLOSTOMY CLOSURE N/A 04/12/2022    Procedure: open Colostomy reversal;  Surgeon: Yelena Guerra MD;  Location: [facility] MAIN OR;  Service: General;  Laterality: N/A;    CORONARY ARTERY BYPASS GRAFT N/A 4/13/2023    Procedure: MAT STERNOTOMY CORONARY ARTERY BYPASS GRAFT TIMES 4 USING LEFT INTERNAL MAMMARY ARTERY AND RIGHT GREATER SAPHENOUS VEIN  PER ENDOSCOPIC VEIN HARVESTING, MITRAL VALVE REPAIR  AND PRP;  Surgeon: Mirtha Zuluaga MD;  Location: [facility] CVOR;  Service: Cardiothoracic;  Laterality: N/A;    DIAGNOSTIC LAPAROSCOPY N/A 2/27/2023    Procedure: DIAGNOSTIC LAPAROSCOPY;  Surgeon: Murali Ferreira MD;  Location: [facility] MAIN OR;  Service: General;  Laterality: N/A;    EXPLORATORY LAPAROTOMY N/A 09/06/2021    Procedure: Open sigmoind colectomy, colostomy, and appendectomy;  Surgeon: Yelena Guerra MD;  Location: [facility] MAIN OR;  Service: General;  Laterality: N/A;    EYE SURGERY      CATARACTS    KNEE  ARTHROSCOPY Right     NEPHRECTOMY PARTIAL  2007    Dr. Victor       Social History     Socioeconomic History    Marital status:    Tobacco Use    Smoking status: Former     Packs/day: 2.00     Years: 25.00     Pack years: 50.00     Types: Cigarettes     Quit date:      Years since quittin.7    Smokeless tobacco: Current     Types: Snuff   Vaping Use    Vaping Use: Never used   Substance and Sexual Activity    Alcohol use: No    Drug use: No    Sexual activity: Defer       Family History   Problem Relation Age of Onset    Hypertension Mother     Heart disease Father         ASCVD    Macular degeneration Father     Cancer Father         bladder    Diabetes type I Sister     Malig Hyperthermia Neg Hx        Review of Systems   Constitutional: Negative for decreased appetite, fever, malaise/fatigue and weight loss.   HENT:  Negative for nosebleeds.    Eyes:  Negative for double vision.   Cardiovascular:  Negative for chest pain, claudication, cyanosis, dyspnea on exertion, irregular heartbeat, leg swelling, near-syncope, orthopnea, palpitations, paroxysmal nocturnal dyspnea and syncope.   Respiratory:  Negative for cough, hemoptysis and shortness of breath.    Hematologic/Lymphatic: Negative for bleeding problem.   Skin:  Negative for rash.   Musculoskeletal:  Negative for falls and myalgias.   Gastrointestinal:  Negative for hematochezia, jaundice, melena, nausea and vomiting.   Genitourinary:  Negative for hematuria.   Neurological:  Negative for dizziness and seizures.   Psychiatric/Behavioral:  Negative for altered mental status and memory loss.      Allergies   Allergen Reactions    Contrast Dye (Echo Or Unknown Ct/Mr) Other (See Comments)     KIDNEY DISEASE    Latex Other (See Comments)     Blisters         Current Outpatient Medications:     acetaminophen (TYLENOL) 500 MG tablet, Take 1 tablet by mouth Every 4 (Four) Hours As Needed for Mild Pain. Indications: Fever, Pain, Disp: , Rfl:      "aspirin 81 MG tablet, Take 1 tablet by mouth Every Night. FOLLOW MD GUIDELINES ON WHEN/IF TO HOLD FOR DOS  Indications: blood thinner, Disp: , Rfl:     atorvastatin (LIPITOR) 40 MG tablet, Take 1 tablet by mouth Every Night. Indications: High Amount of Fats in the Blood, Disp: 90 tablet, Rfl: 2    Coreg 6.25 MG tablet, Take 1 tablet by mouth 2 (Two) Times a Day With Meals., Disp: , Rfl:     DULoxetine (CYMBALTA) 30 MG capsule, TAKE 1 CAPSULE EVERY DAY, Disp: 90 capsule, Rfl: 1    fexofenadine (ALLEGRA) 180 MG tablet, Take 1 tablet by mouth Daily. Indications: Hayfever, Disp: , Rfl:     polyethylene glycol (MIRALAX) 17 g packet, Take 17 g by mouth Daily As Needed (CONSTIPATION ). Indications: Constipation, Disp: , Rfl:     sennosides-docusate (senna-docusate sodium) 8.6-50 MG per tablet, Take 1 tablet by mouth At Night As Needed for Constipation., Disp: 60 tablet, Rfl: 0    sevelamer (RENAGEL) 800 MG tablet, Take 1 tablet by mouth 2 (Two) Times a Day. Indications: UNKNOWN, Disp: , Rfl:     traZODone (DESYREL) 50 MG tablet, Take 0.5 tablets by mouth At Night As Needed for Sleep., Disp: 90 tablet, Rfl: 3    vitamin D3 125 MCG (5000 UT) capsule capsule, Take 1 tablet by mouth 2 (Two) Times a Week. SAT AND WED  Indications: supplement , Disp: , Rfl:       Objective:     Vitals:    10/04/23 1450   BP: 110/66   Pulse: 79   Weight: 91 kg (200 lb 9.6 oz)   Height: 177.8 cm (70\")     Body mass index is 28.78 kg/m².    Constitutional:       Appearance: Well-developed.   Eyes:      General: No scleral icterus.  HENT:      Head: Normocephalic.   Neck:      Thyroid: No thyromegaly.      Vascular: No JVD.      Lymphadenopathy: No cervical adenopathy.   Pulmonary:      Effort: Pulmonary effort is normal.      Breath sounds: Normal breath sounds. No wheezing. No rales.   Cardiovascular:      Normal rate. Regular rhythm.      No gallop.    Edema:     Peripheral edema absent.   Abdominal:      Palpations: Abdomen is soft.      " Tenderness: There is no abdominal tenderness.   Musculoskeletal: Normal range of motion. Skin:     General: Skin is warm and dry.      Findings: No rash.   Neurological:      Mental Status: Alert and oriented to person, place, and time.         ECG 12 Lead    Date/Time: 10/4/2023 3:25 PM  Performed by: Lobito Garcia MD  Authorized by: Lobito Garcia MD   Comparison: compared with previous ECG   Similar to previous ECG  Rhythm: sinus rhythm  Other findings: non-specific ST-T wave changes    Clinical impression: abnormal EKG         Assessment:       Diagnosis Plan   1. NSTEMI (non-ST elevated myocardial infarction)        2. S/P MVR (mitral valve repair)        3. Type 2 diabetes mellitus with stage 4 chronic kidney disease, without long-term current use of insulin        4. ESRD (end stage renal disease)                 Plan:       Well he is actually doing pretty darn well and we looked at his last echo his EF was normal.  Which is fantastic.  He was near death in the hospital and I am pleased to see how he is doing I think we are a little overmedicated he is on dialysis I think he is getting lightheaded and I am going to recommend that we decrease his carvedilol to 3.125 twice a day I Norma have him come back and see Ashwini in 6 months sooner if he has trouble    No follow-ups on file.     As always, it has been a pleasure to participate in your patient's care.      Sincerely,       Lobito Garcia MD

## 2023-10-31 NOTE — CASE MANAGEMENT/SOCIAL WORK
Case Management Discharge Note      Final Note: Pt discharged home, 5/2/23 with [facility] ……..Irene [facility]PRAT[facility] CM    Provided Post Acute Provider List?: N/A  N/A Provider List Comment: Pending surgical outcome  Provided Post Acute Provider Quality & Resource List?: Yes  Post Acute Provider Quality and Resource List: Nursing Home  Delivered To: Support Person  Support Person: santa Diggs  Method of Delivery: In person    Selected Continued Care - Discharged on 5/2/2023 Admission date: 4/8/2023 - Discharge disposition: Home-Health Care c    Destination    No services have been selected for the patient.              Durable Medical Equipment    No services have been selected for the patient.              Dialysis/Infusion Coordination complete.    Service Provider Selected Services Address Phone Fax Patient Preferred    [facility] [address] [id number] [phone] --          Home Medical Care Coordination complete.    Service Provider Selected Services Address Phone Fax Patient Preferred    [facility] [address] [place] [id number] [phone] --          Therapy    No services have been selected for the patient.              Community Resources    No services have been selected for the patient.              Community & DME    No services have been selected for the patient.                       Final Discharge Disposition Code: 06 - home with home health care   oral

## 2024-01-15 RX ORDER — TRAZODONE HYDROCHLORIDE 50 MG/1
50 TABLET ORAL NIGHTLY PRN
Qty: 90 TABLET | Refills: 3 | Status: SHIPPED | OUTPATIENT
Start: 2024-01-15

## 2024-02-14 ENCOUNTER — OFFICE VISIT (OUTPATIENT)
Dept: FAMILY MEDICINE CLINIC | Facility: CLINIC | Age: 74
End: 2024-02-14
Payer: MEDICARE

## 2024-02-14 VITALS
HEIGHT: 70 IN | BODY MASS INDEX: 29.07 KG/M2 | SYSTOLIC BLOOD PRESSURE: 112 MMHG | HEART RATE: 84 BPM | OXYGEN SATURATION: 98 % | WEIGHT: 203.1 LBS | DIASTOLIC BLOOD PRESSURE: 64 MMHG

## 2024-02-14 DIAGNOSIS — E11.22 TYPE 2 DIABETES MELLITUS WITH STAGE 4 CHRONIC KIDNEY DISEASE, WITHOUT LONG-TERM CURRENT USE OF INSULIN: Primary | ICD-10-CM

## 2024-02-14 DIAGNOSIS — N18.4 TYPE 2 DIABETES MELLITUS WITH STAGE 4 CHRONIC KIDNEY DISEASE, WITHOUT LONG-TERM CURRENT USE OF INSULIN: Primary | ICD-10-CM

## 2024-02-14 PROCEDURE — 3074F SYST BP LT 130 MM HG: CPT | Performed by: NURSE PRACTITIONER

## 2024-02-14 PROCEDURE — 3078F DIAST BP <80 MM HG: CPT | Performed by: NURSE PRACTITIONER

## 2024-02-14 PROCEDURE — 99213 OFFICE O/P EST LOW 20 MIN: CPT | Performed by: NURSE PRACTITIONER

## 2024-02-14 PROCEDURE — 1160F RVW MEDS BY RX/DR IN RCRD: CPT | Performed by: NURSE PRACTITIONER

## 2024-02-14 PROCEDURE — 1159F MED LIST DOCD IN RCRD: CPT | Performed by: NURSE PRACTITIONER

## 2024-02-14 RX ORDER — LIDOCAINE AND PRILOCAINE 25; 25 MG/G; MG/G
CREAM TOPICAL
COMMUNITY
Start: 2024-02-10

## 2024-02-14 RX ORDER — MIDODRINE HYDROCHLORIDE 10 MG/1
TABLET ORAL
COMMUNITY
Start: 2024-01-10

## 2024-04-03 ENCOUNTER — OFFICE VISIT (OUTPATIENT)
Dept: CARDIOLOGY | Facility: CLINIC | Age: 74
End: 2024-04-03
Payer: MEDICARE

## 2024-04-03 VITALS
WEIGHT: 195 LBS | BODY MASS INDEX: 27.92 KG/M2 | HEART RATE: 79 BPM | DIASTOLIC BLOOD PRESSURE: 60 MMHG | SYSTOLIC BLOOD PRESSURE: 120 MMHG | HEIGHT: 70 IN

## 2024-04-03 DIAGNOSIS — I34.0 NONRHEUMATIC MITRAL VALVE REGURGITATION: ICD-10-CM

## 2024-04-03 DIAGNOSIS — Z98.890 S/P MVR (MITRAL VALVE REPAIR): ICD-10-CM

## 2024-04-03 DIAGNOSIS — Z95.1 S/P CABG (CORONARY ARTERY BYPASS GRAFT): ICD-10-CM

## 2024-04-03 DIAGNOSIS — I25.5 ISCHEMIC CARDIOMYOPATHY: ICD-10-CM

## 2024-04-03 DIAGNOSIS — I25.10 CORONARY ARTERY DISEASE INVOLVING NATIVE CORONARY ARTERY OF NATIVE HEART WITHOUT ANGINA PECTORIS: Primary | ICD-10-CM

## 2024-04-03 DIAGNOSIS — I10 ESSENTIAL HYPERTENSION: ICD-10-CM

## 2024-04-03 PROCEDURE — 3074F SYST BP LT 130 MM HG: CPT | Performed by: NURSE PRACTITIONER

## 2024-04-03 PROCEDURE — 99214 OFFICE O/P EST MOD 30 MIN: CPT | Performed by: NURSE PRACTITIONER

## 2024-04-03 PROCEDURE — 1159F MED LIST DOCD IN RCRD: CPT | Performed by: NURSE PRACTITIONER

## 2024-04-03 PROCEDURE — 1160F RVW MEDS BY RX/DR IN RCRD: CPT | Performed by: NURSE PRACTITIONER

## 2024-04-03 PROCEDURE — 3078F DIAST BP <80 MM HG: CPT | Performed by: NURSE PRACTITIONER

## 2024-04-03 PROCEDURE — 93000 ELECTROCARDIOGRAM COMPLETE: CPT | Performed by: NURSE PRACTITIONER

## 2024-04-03 NOTE — PROGRESS NOTES
Date of Office Visit: 2024  Encounter Provider: DUSTY Warren  Place of Service: [facility] CARDIOLOGY  Patient Name: Lefty Cai  :1950    Chief Complaint   Patient presents with    Cardiomyopathy   :     HPI: Lefty Cai is a 73 y.o. male patient of Dr. Garcai's with hypertension hyperlipidemia, diabetes, ESRD on HD, and coronary artery disease.      In 2023, he was admitted for shortness of breath and found to have decreased LV systolic function with an EF of 31 to 35%.  Cardiac catheterization demonstrated three-vessel disease.  Ultimately, he underwent a CABG x 4 as well as a mitral valve repair.  His postoperative course was complicated and prolonged including paroxysmal atrial fibrillation and urosepsis with Pseudomonas.    Echocardiogram from 2023 demonstrated normal LV systolic function.    He was last seen in the office by Dr. Garcia in 2023 at which time he was overall doing well.  He was reporting lightheadedness and low blood pressure.  Dr. Garcia thought he was overmedicated and decreased the carvedilol dose.  Otherwise, he was advised to follow-up in 6 months.    From a cardiac standpoint, he has been doing well overall.  He denies any chest pain, palpitations, edema, dizziness, or syncope.  He reports shortness of breath when he is due for dialysis.  He is still having issues with low blood pressure during dialysis.  The carvedilol was eventually discontinued.  He now takes midodrine on dialysis days.    Past Medical History:   Diagnosis Date    Anemia     Anesthesia complication     HYPOTENSION    Arthritis     Cancer of kidney, left     CKD (chronic kidney disease)     STAGE 5    Diabetes mellitus, type 2     Fatigue     GERD (gastroesophageal reflux disease)     H/O renal cell carcinoma 2007    partial nephrectomy    History of colostomy reversal 2022    [place] (hard of hearing)     NO DEVICE    Hyperlipidemia      Hypertension     Primary insomnia 06/13/2016    Proteinuria     Risk factors for obstructive sleep apnea     Sinus drainage     Vitamin D deficiency 08/14/2017       Past Surgical History:   Procedure Laterality Date    ARTERIOVENOUS FISTULA/SHUNT SURGERY Left 08/15/2019    Procedure: LEFT MID FOREARM RADIAL CEPHALIC ARTERIAL VENOUS FISTULA;  Surgeon: Louann Miles Jr., MD;  Location: [facility] MAIN OR;  Service: Vascular    CARDIAC CATHETERIZATION N/A 4/9/2023    Procedure: Left Heart Cath;  Surgeon: Lobito Garcia MD;  Location: [facility] CATH INVASIVE LOCATION;  Service: Cardiovascular;  Laterality: N/A;    CARDIAC CATHETERIZATION N/A 4/9/2023    Procedure: Left ventriculography;  Surgeon: Lobito Garcia MD;  Location: [facility] CATH INVASIVE LOCATION;  Service: Cardiovascular;  Laterality: N/A;    CARDIAC CATHETERIZATION N/A 4/9/2023    Procedure: Coronary angiography;  Surgeon: Lobito Garcia MD;  Location: [facility] CATH INVASIVE LOCATION;  Service: Cardiovascular;  Laterality: N/A;    COLONOSCOPY  03/24/2022    COLONOSCOPY N/A 03/24/2022    Procedure: COLONOSCOPY TO CECUM WITH POLYPECTOMY  (HOT SNARE);  Surgeon: Yelena Guerra MD;  Location: [facility] ENDOSCOPY;  Service: General;  Laterality: N/A;  PREOP/ HX OF DIVERTICULITIS, COLOSTOMY  POSTOP/ POLYPS, DIVERTICULOSIS     COLOSTOMY  09/06/2021    COLOSTOMY CLOSURE N/A 04/12/2022    Procedure: open Colostomy reversal;  Surgeon: Yelena Guerra MD;  Location: [facility] MAIN OR;  Service: General;  Laterality: N/A;    CORONARY ARTERY BYPASS GRAFT N/A 4/13/2023    Procedure: MAT STERNOTOMY CORONARY ARTERY BYPASS GRAFT TIMES 4 USING LEFT INTERNAL MAMMARY ARTERY AND RIGHT GREATER SAPHENOUS VEIN  PER ENDOSCOPIC VEIN HARVESTING, MITRAL VALVE REPAIR  AND PRP;  Surgeon: Mirtha Zuluaga MD;  Location: [facility] CVOR;  Service: Cardiothoracic;  Laterality: N/A;    DIAGNOSTIC LAPAROSCOPY N/A 2/27/2023    Procedure: DIAGNOSTIC LAPAROSCOPY;  Surgeon: Murali Ferreira  MD Lee;  Location: [facility] MAIN OR;  Service: General;  Laterality: N/A;    EXPLORATORY LAPAROTOMY N/A 2021    Procedure: Open sigmoind colectomy, colostomy, and appendectomy;  Surgeon: Yelena Guerra MD;  Location: [facility] MAIN OR;  Service: General;  Laterality: N/A;    EYE SURGERY      CATARACTS    KNEE ARTHROSCOPY Right     NEPHRECTOMY PARTIAL      Dr. Victor       Social History     Socioeconomic History    Marital status:    Tobacco Use    Smoking status: Former     Current packs/day: 0.00     Average packs/day: 2.0 packs/day for 25.0 years (50.0 ttl pk-yrs)     Types: Cigarettes     Start date:      Quit date:      Years since quittin.2    Smokeless tobacco: Current     Types: Snuff   Vaping Use    Vaping status: Never Used   Substance and Sexual Activity    Alcohol use: No    Drug use: No    Sexual activity: Defer       Family History   Problem Relation Age of Onset    Hypertension Mother     Heart disease Father         ASCVD    Macular degeneration Father     Cancer Father         bladder    Diabetes type I Sister     [place] Hyperthermia Neg Hx        Review of Systems   Constitutional: Negative.   Cardiovascular: Negative.  Negative for chest pain, dyspnea on exertion, leg swelling, orthopnea, paroxysmal nocturnal dyspnea and syncope.   Respiratory: Negative.     Hematologic/Lymphatic: Negative for bleeding problem.   Musculoskeletal:  Negative for falls.   Gastrointestinal:  Negative for melena.   Neurological:  Negative for dizziness and light-headedness.       Allergies   Allergen Reactions    Contrast Dye (Echo Or Unknown Ct/Mr) Other (See Comments)     KIDNEY DISEASE    Latex Other (See Comments)     Blisters         Current Outpatient Medications:     acetaminophen (TYLENOL) 500 MG tablet, Take 1 tablet by mouth Every 4 (Four) Hours As Needed for Mild Pain. Indications: Fever, Pain, Disp: , Rfl:     aspirin 81 MG tablet, Take 1 tablet by mouth Every Night.  "FOLLOW MD GUIDELINES ON WHEN/IF TO HOLD FOR DOS  Indications: blood thinner, Disp: , Rfl:     atorvastatin (LIPITOR) 40 MG tablet, Take 1 tablet by mouth Every Night. Indications: High Amount of Fats in the Blood, Disp: 90 tablet, Rfl: 2    DULoxetine (CYMBALTA) 30 MG capsule, TAKE 1 CAPSULE EVERY DAY, Disp: 90 capsule, Rfl: 1    fexofenadine (ALLEGRA) 180 MG tablet, Take 1 tablet by mouth Daily. Indications: Hayfever, Disp: , Rfl:     lidocaine-prilocaine (EMLA) 2.5-2.5 % cream, APPLY TO SKIN OVER DIALYSIS ACCESS 30 MINUTES BEFORE, Disp: , Rfl:     midodrine (PROAMATINE) 10 MG tablet, TAKE 1 TABLET BY MOUTH 3 TIMES PER WEEK ON DIALYSIS DAYS., Disp: , Rfl:     polyethylene glycol (MIRALAX) 17 g packet, Take 17 g by mouth Daily As Needed (CONSTIPATION ). Indications: Constipation, Disp: , Rfl:     sennosides-docusate (senna-docusate sodium) 8.6-50 MG per tablet, Take 1 tablet by mouth At Night As Needed for Constipation., Disp: 60 tablet, Rfl: 0    sevelamer (RENAGEL) 800 MG tablet, Take 1 tablet by mouth 2 (Two) Times a Day. Indications: UNKNOWN, Disp: , Rfl:     traZODone (DESYREL) 50 MG tablet, TAKE 1 TABLET AT NIGHT AS NEEDED FOR SLEEP, Disp: 90 tablet, Rfl: 3    vitamin D3 125 MCG (5000 UT) capsule capsule, Take 1 tablet by mouth 2 (Two) Times a Week. SAT AND WED  Indications: supplement , Disp: , Rfl:       Objective:     Vitals:    04/03/24 1314   BP: 120/60   Pulse: 79   Weight: 88.5 kg (195 lb)   Height: 177.8 cm (70\")     Body mass index is 27.98 kg/m².    PHYSICAL EXAM:    Neck:      Vascular: No JVD.   Pulmonary:      Effort: Pulmonary effort is normal.      Breath sounds: Normal breath sounds.   Cardiovascular:      Normal rate. Regular rhythm.      Murmurs: There is no murmur.      No gallop.  No click. No rub.   Pulses:     Intact distal pulses.           ECG 12 Lead    Date/Time: 4/3/2024 1:27 PM  Performed by: Ashwini Michelle APRN    Authorized by: Ashwini Michelle APRN  Comparison: compared " with previous ECG from 10/4/2023  Similar to previous ECG  Rhythm: sinus rhythm  Rate: normal  BPM: 79            Assessment:       Diagnosis Plan   1. Coronary artery disease involving native coronary artery of native heart without angina pectoris  ECG 12 Lead      2. S/P CABG (coronary artery bypass graft)        3. Ischemic cardiomyopathy        4. Nonrheumatic mitral valve regurgitation        5. S/P MVR (mitral valve repair)        6. Essential hypertension          Orders Placed This Encounter   Procedures    ECG 12 Lead     This order was created via procedure documentation     Order Specific Question:   Release to patient     Answer:   Routine Release [[id number]]          Plan:       1.  Coronary artery disease.  Status post CABG in April 2023.  He denies any symptoms of angina.  Continue aspirin and atorvastatin.      2.  Ischemic cardiomyopathy.  Preoperatively his EF was 31 to 35%.  Echocardiogram from June 2023 demonstrated normalization of his LV systolic function.  Low blood pressure precludes GDMT.      3.  Mitral regurgitation.  Status post mitral valve repair in April 2023.  Echocardiogram from June 2023 demonstrated a mitral valve ring and moderate mitral valve stenosis.      4.  Hypertension.  He is actually struggling with hypotension during dialysis and is now on midodrine.      I think he is doing well overall.  I am not recommending any changes, and he will follow-up with Dr. Garcia in 6 months.      As always, it has been a pleasure to participate in your patient's care.      Sincerely,         DUSTY Dee

## 2024-05-02 NOTE — TELEPHONE ENCOUNTER
Failed protocol    NOV-10/09/24-WD  LOV-04/03/24-RS    Plan:       1.  Coronary artery disease.  Status post CABG in April 2023.  He denies any symptoms of angina.  Continue aspirin and atorvastatin.        2.  Ischemic cardiomyopathy.  Preoperatively his EF was 31 to 35%.  Echocardiogram from June 2023 demonstrated normalization of his LV systolic function.  Low blood pressure precludes GDMT.        3.  Mitral regurgitation.  Status post mitral valve repair in April 2023.  Echocardiogram from June 2023 demonstrated a mitral valve ring and moderate mitral valve stenosis.        4.  Hypertension.  He is actually struggling with hypotension during dialysis and is now on midodrine.        I think he is doing well overall.  I am not recommending any changes, and he will follow-up with Dr. Garcia in 6 months.

## 2024-05-03 RX ORDER — ATORVASTATIN CALCIUM 40 MG/1
40 TABLET, FILM COATED ORAL NIGHTLY
Qty: 90 TABLET | Refills: 3 | Status: SHIPPED | OUTPATIENT
Start: 2024-05-03

## 2024-05-10 NOTE — TELEPHONE ENCOUNTER
Caller: Caterina Cai    Relationship: Emergency Contact    Best call back number: [phone]     What orders are you requesting (i.e. lab or imaging): LABS    Where will you receive your lab/imaging services: [facility]    Additional notes: PATIENT'S WIFE STATES THAT HE HAS DIALYSIS SCHEDULED, AND THEY WERE TOLD THE [facility] COULD DRAW HIS LABS FOR HIS AWV AT THE SAME TIME IF THE RECEIVED ORDERS.    [facility] PHONE: [phone]       Patient notified of message per Dr. Schmitt and voiced understanding of what was read to them.   Pt states she can take the appt on Monday at 3:50 PM. Pt states she will take the extra dose per Dr Schmitt. She says she had a low reading last night of 23. She says she treated it and woke up with a reading over 500. Pt states she looks forward to the appt Monday.

## 2024-05-21 NOTE — PLAN OF CARE
Goal Outcome Evaluation:  Plan of Care Reviewed With: patient, spouse           Outcome Evaluation: Pt with improved ability to tolerate therapy, he has been sitting up in chair, able to tolerate upright activity, pt was able to work on ambulation, presents with significant deficits due to core weakness with pelvic instability, reliance on walker, gait is unsteady and legs are shaky. Pt able to tolerate seated and standing exercises to address core weakness, he is highly motivated, due to pt's improved ability to tolerate activity, his previous independent level of function, and good family support, pt may now be a good candidate for acute rehab   - - -

## 2024-06-28 NOTE — PROGRESS NOTES
Chief Complaint  Hemodialysis Access    Subjective        Lefty Cai presents to [facility] VASCULAR SURGERY  History of Present Illness  Lefty Cai is a 74-year-old male patient with stage IV chronic kidney disease initially, now ESRD, who had a left mid forearm radiocephalic AV fistula that was created on August 15, 2019. He has had no issues with the fistula. He has had four-vessel coronary artery bypass grafting and valve replacement.  He returned on January 3, 2024 with an AV fistula scan. The scan demonstrated a widely patent fistula with excellent flow volumes. Very mild stenosis proximally.  He was seen again on July 3, 2024 with an AV fistula scan.  This demonstrated mild to moderate stenosis at the anastomosis but flow volumes remain very good.      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   Surgical History: has a past surgical history that includes Partial nephrectomy (2007); Knee arthroscopy (Right); Eye surgery; arteriovenous fistula/shunt surgery (Left, 08/15/2019);  "Exploratory Laparotomy (N/A, 09/06/2021); Colonoscopy (03/24/2022); Colonoscopy (N/A, 03/24/2022); Colostomy closure (N/A, 04/12/2022); Colostomy (09/06/2021); Laparoscopy (N/A, 2/27/2023); Cardiac catheterization (N/A, 4/9/2023); Cardiac catheterization (N/A, 4/9/2023); Cardiac catheterization (N/A, 4/9/2023); and Coronary artery bypass graft (N/A, 4/13/2023).   Family History: family history includes Cancer in his father; Diabetes type I in his sister; Heart disease in his father; Hypertension in his mother; Kidney failure in his father; Macular degeneration in his father.   Social History: reports that he quit smoking about 25 years ago. His smoking use included cigarettes. He started smoking about 50 years ago. He has a 50 pack-year smoking history. His smokeless tobacco use includes snuff. He reports that he does not drink alcohol and does not use drugs.    (Not in a hospital admission)     Allergies: Contrast dye (echo or unknown ct/mr) and Latex   Objective   Vital Signs:  /76   Pulse 80   Ht 177.8 cm (70\")   Wt 92.1 kg (203 lb)   BMI 29.13 kg/m²   Estimated body mass index is 29.13 kg/m² as calculated from the following:    Height as of this encounter: 177.8 cm (70\").    Weight as of this encounter: 92.1 kg (203 lb).           Lefty LAVON Cai  reports that he quit smoking about 25 years ago. His smoking use included cigarettes. He started smoking about 50 years ago. He has a 50 pack-year smoking history. His smokeless tobacco use includes snuff.       Physical Exam easily palpable thrill in the AV fistula below the elbow but more pulsation above the elbow.  Aneurysmal dilatation of the proximal fistula in the forearm.  Result Review :        Data reviewed : AV fistula scanned from January 3, 2024 and July 3, 2024, findings as above.             Assessment and Plan     Diagnoses and all orders for this visit:    1. ESRD (end stage renal disease) (Primary)  -     Duplex Hemodialysis Access CAR; " Future  -     lidocaine-prilocaine (EMLA) 2.5-2.5 % cream 1 Application    2. Coronary artery disease involving native coronary artery of native heart without angina pectoris    3. S/P MVR (mitral valve repair)    4. S/P CABG (coronary artery bypass graft)    5. Essential hypertension    6. NSTEMI (non-ST elevated myocardial infarction)    He does have stenosis at the anastomosis of his AV fistula but this is not flow-limiting.  His flow volumes remain excellent, greater than normal.  The diameter of the anastomosis is 5 mm.  This should be more than adequate.  Therefore, we will continue to monitor.  He is not having any issues with angina, palpitations or congestive heart failure although he does have hypotension after hemodialysis each time.         Follow Up     Return in about 6 months (around 1/3/2025) for With AV fistula scan.  Patient was given instructions and counseling regarding his condition or for health maintenance advice. Please see specific information pulled into the AVS if appropriate.

## 2024-07-01 DIAGNOSIS — F32.1 CURRENT MODERATE EPISODE OF MAJOR DEPRESSIVE DISORDER, UNSPECIFIED WHETHER RECURRENT: ICD-10-CM

## 2024-07-01 RX ORDER — DULOXETIN HYDROCHLORIDE 30 MG/1
CAPSULE, DELAYED RELEASE ORAL
Qty: 90 CAPSULE | Refills: 3 | Status: SHIPPED | OUTPATIENT
Start: 2024-07-01

## 2024-07-02 ENCOUNTER — HOSPITAL ENCOUNTER (EMERGENCY)
Facility: HOSPITAL | Age: 74
Discharge: HOME | End: 2024-07-02
Attending: EMERGENCY MEDICINE
Payer: MEDICARE

## 2024-07-02 ENCOUNTER — APPOINTMENT (OUTPATIENT)
Dept: CT IMAGING | Facility: HOSPITAL | Age: 74
End: 2024-07-02
Payer: MEDICARE

## 2024-07-02 VITALS
HEIGHT: 70 IN | HEART RATE: 77 BPM | WEIGHT: 203 LBS | OXYGEN SATURATION: 98 % | RESPIRATION RATE: 16 BRPM | TEMPERATURE: 97.4 F | DIASTOLIC BLOOD PRESSURE: 77 MMHG | SYSTOLIC BLOOD PRESSURE: 154 MMHG | BODY MASS INDEX: 29.06 KG/M2

## 2024-07-02 DIAGNOSIS — S00.03XA CONTUSION OF SCALP, INITIAL ENCOUNTER: Primary | ICD-10-CM

## 2024-07-02 DIAGNOSIS — Z99.2 ESRD ON DIALYSIS: ICD-10-CM

## 2024-07-02 DIAGNOSIS — N18.6 ESRD ON DIALYSIS: ICD-10-CM

## 2024-07-02 PROBLEM — I77.0 AV FISTULA: Status: ACTIVE | Noted: 2024-07-02

## 2024-07-02 PROCEDURE — 99284 EMERGENCY DEPT VISIT MOD MDM: CPT

## 2024-07-02 PROCEDURE — 70450 CT HEAD/BRAIN W/O DYE: CPT

## 2024-07-02 NOTE — ED PROVIDER NOTES
EMERGENCY DEPARTMENT ENCOUNTER    Room Number:  30/30  PCP: Daksha Ng APRN  Historian: Patient      HPI:  Chief Complaint: Fall out of bed, head injury  A complete HPI/ROS/PMH/PSH/SH/FH are unobtainable due to: None    Context: Lefty Cai is a 74 y.o. male who presents to the ED via private vehicle from home c/o acute head injury after he rolled out of bed this morning.  Fall woke him up.  Has a small abrasion to the back of his left ear and the left posterior scalp.  Patient ambulatory without difficulty, no nausea or vomiting, no altered mental status or neurodeficits.  Not on any anticoagulation.      MEDICAL RECORD REVIEW    External (non-ED) record review: No anticoagulants noted on chart review in epic              PAST MEDICAL HISTORY  Active Ambulatory Problems     Diagnosis Date Noted    Type 2 diabetes mellitus with diabetic chronic kidney disease 06/13/2016    Essential hypertension 06/13/2016    Hyperlipidemia 06/13/2016    Primary insomnia 06/13/2016    ESRD (end stage renal disease) 06/13/2016    Vitamin D deficiency 08/14/2017    Diverticulitis of large intestine with perforation and abscess without bleeding 09/07/2021    Impaired mobility and ADLs 09/12/2021    History of colon resection 01/11/2022    Colon polyps 03/24/2022    Diverticulosis 03/24/2022    CKD (chronic kidney disease) stage 5, GFR less than 15 ml/min 02/23/2023    NSTEMI (non-ST elevated myocardial infarction) 04/08/2023    Abnormal findings on diagnostic imaging of heart and coronary circulation 04/08/2023    Fatigue 04/24/2023    Acute superficial venous thrombosis of right lower extremity 04/26/2023    Orthostatic hypotension 04/27/2023    Acute UTI (urinary tract infection) 04/27/2023    Ischemic cardiomyopathy 06/14/2023    S/P MVR (mitral valve repair) 06/14/2023    Coronary artery disease involving native coronary artery of native heart without angina pectoris 07/26/2023    S/P CABG (coronary artery bypass  graft) 04/03/2024    Nonrheumatic mitral valve regurgitation 04/03/2024     Resolved Ambulatory Problems     Diagnosis Date Noted    Free intraperitoneal air 09/07/2021    Obesity (BMI 30-39.9) 09/07/2021    Colostomy in place 01/11/2022    Dyspnea on exertion 04/08/2023    Rhinovirus 04/10/2023    Hypernatremia 04/14/2023    Dysphagia 04/25/2023     Past Medical History:   Diagnosis Date    Anemia     Anesthesia complication     Arthritis     Cancer of kidney, left     CKD (chronic kidney disease)     Diabetes mellitus, type 2     GERD (gastroesophageal reflux disease)     H/O renal cell carcinoma 2007    History of colostomy reversal 04/2022    [place] (hard of hearing)     Hypertension     Proteinuria     Risk factors for obstructive sleep apnea     Sinus drainage          PAST SURGICAL HISTORY  Past Surgical History:   Procedure Laterality Date    ARTERIOVENOUS FISTULA/SHUNT SURGERY Left 08/15/2019    Procedure: LEFT MID FOREARM RADIAL CEPHALIC ARTERIAL VENOUS FISTULA;  Surgeon: Louann Miles Jr., MD;  Location: [facility] OR;  Service: Vascular    CARDIAC CATHETERIZATION N/A 4/9/2023    Procedure: Left Heart Cath;  Surgeon: Lobito Garcia MD;  Location: [facility] CATH INVASIVE LOCATION;  Service: Cardiovascular;  Laterality: N/A;    CARDIAC CATHETERIZATION N/A 4/9/2023    Procedure: Left ventriculography;  Surgeon: Lobito Garcia MD;  Location: [facility] CATH INVASIVE LOCATION;  Service: Cardiovascular;  Laterality: N/A;    CARDIAC CATHETERIZATION N/A 4/9/2023    Procedure: Coronary angiography;  Surgeon: Lobito Garcia MD;  Location: [facility] CATH INVASIVE LOCATION;  Service: Cardiovascular;  Laterality: N/A;    COLONOSCOPY  03/24/2022    COLONOSCOPY N/A 03/24/2022    Procedure: COLONOSCOPY TO CECUM WITH POLYPECTOMY  (HOT SNARE);  Surgeon: Yelena Guerra MD;  Location: [facility] ENDOSCOPY;  Service: General;  Laterality: N/A;  PREOP/ HX OF DIVERTICULITIS, COLOSTOMY  POSTOP/ POLYPS, DIVERTICULOSIS      COLOSTOMY  2021    COLOSTOMY CLOSURE N/A 2022    Procedure: open Colostomy reversal;  Surgeon: Yleena Guerra MD;  Location: [facility] OR;  Service: General;  Laterality: N/A;    CORONARY ARTERY BYPASS GRAFT N/A 2023    Procedure: MAT STERNOTOMY CORONARY ARTERY BYPASS GRAFT TIMES 4 USING LEFT INTERNAL MAMMARY ARTERY AND RIGHT GREATER SAPHENOUS VEIN  PER ENDOSCOPIC VEIN HARVESTING, MITRAL VALVE REPAIR  AND PRP;  Surgeon: Mirtha Zuluaga MD;  Location: [facility] CVOR;  Service: Cardiothoracic;  Laterality: N/A;    DIAGNOSTIC LAPAROSCOPY N/A 2023    Procedure: DIAGNOSTIC LAPAROSCOPY;  Surgeon: Murali Ferreira MD;  Location: [facility];  Service: General;  Laterality: N/A;    EXPLORATORY LAPAROTOMY N/A 2021    Procedure: Open sigmoind colectomy, colostomy, and appendectomy;  Surgeon: Yelena Guerra MD;  Location: [facility];  Service: General;  Laterality: N/A;    EYE SURGERY      CATARACTS    KNEE ARTHROSCOPY Right     NEPHRECTOMY PARTIAL      Dr. Victor         FAMILY HISTORY  Family History   Problem Relation Age of Onset    Hypertension Mother     Heart disease Father         ASCVD    Macular degeneration Father     Cancer Father         bladder    Diabetes type I Sister     Malig Hyperthermia Neg Hx          SOCIAL HISTORY  Social History     Socioeconomic History    Marital status:    Tobacco Use    Smoking status: Former     Current packs/day: 0.00     Average packs/day: 2.0 packs/day for 25.0 years (50.0 ttl pk-yrs)     Types: Cigarettes     Start date:      Quit date:      Years since quittin.5    Smokeless tobacco: Current     Types: Snuff   Vaping Use    Vaping status: Never Used   Substance and Sexual Activity    Alcohol use: No    Drug use: No    Sexual activity: Defer         ALLERGIES  Contrast dye (echo or unknown ct/mr) and Latex        REVIEW OF SYSTEMS  Review of Systems     All systems reviewed and negative except for  those discussed in HPI.       PHYSICAL EXAM    I have reviewed the triage vital signs and nursing notes.    ED Triage Vitals   Temp Heart Rate Resp BP SpO2   07/02/24 1051 07/02/24 1051 07/02/24 1051 07/02/24 1055 07/02/24 1051   97.4 °F (36.3 °C) 97 16 158/72 98 %      Temp src Heart Rate Source Patient Position BP Location FiO2 (%)   07/02/24 1051 07/02/24 1051 -- -- --   Tympanic Monitor          Physical Exam  General: No acute distress, nontoxic  HEENT: EOMI, left occipital scalp abrasion with small contusion, no active bleeding.  Superficial abrasion behind the left ear without active bleeding  Pulm: Symmetric chest rise, nonlabored breathing  CV: Regular rate and rhythm  GI: Nondistended  MSK: No deformity  Skin: Warm, dry  Neuro: Awake, alert, oriented x 4, moving all extremities, no focal deficits  Psych: Calm, cooperative    Vital signs and nursing notes reviewed.         LAB RESULTS  No results found for this or any previous visit (from the past 24 hour(s)).    Ordered the above labs and independently interpreted results. My findings will be discussed in the medical decision making section below        RADIOLOGY  CT Head Without Contrast    Result Date: 7/2/2024  CT HEAD WITHOUT CONTRAST  CLINICAL HISTORY: fall, head injury  TECHNIQUE: CT scan of the head was obtained with 3 mm axial soft tissue and 2 mm axial bone algorithm algorithm images. No intravenous contrast was administered. Sagittal and coronal reconstructions were obtained.  COMPARISON: None.  FINDINGS:   There is no evidence for a calvarial fracture or an acute extra-axial hemorrhage.  Mild changes of chronic small vessel ischemic phenomena are noted. The ventricles, sulci, and cisterns are age-appropriate. The gray-white matter differentiation is within normal limits. The basal ganglia and thalami are unremarkable. The posterior fossa structures are within normal limits.       No evidence for acute traumatic intracranial pathology.     Radiation dose reduction techniques were utilized, including automated exposure control and exposure modulation based on body size.  This report was finalized on 7/2/2024 11:29 AM by Dr. Isaak Howell M.D on Workstation: [id number]       Ordered the above noted radiological studies.  Independently interpreted by me and my independent review of findings can be found in the ED Course.  See dictation for official radiology interpretation.      PROCEDURES    Procedures        MEDICATIONS GIVEN IN ER    Medications - No data to display      PROGRESS, DATA ANALYSIS, CONSULTS, AND MEDICAL DECISION MAKING    Please note that this section constitutes my independent interpretation of clinical data including lab results, radiology, EKG's.  This constitutes my independent professional opinion regarding differential diagnosis and management of this patient.  It may include any factors such as history from outside sources, review of external records, social determinants of health, management of medications, response to those treatments, and discussions with other providers.    Differential Diagnosis and Plan: Plan for CT head to eval for any signs of skull fracture or intracranial hemorrhage, will reevaluate with results.  Well-appearing in no acute distress.    Additional sources:  - Discussed/ obtained information from independent historians:       - (Social Determinants of Health): None     - Shared decision making:  Patient and wife at bedside fully updated on and in agreement with the course and plan moving forward    ED Course as of 07/02/24 1154   Tue Jul 02, 2024   1119 CT Head Without Contrast  Per my independent interpretation of the CT head, no overtly obvious intracranial hemorrhage although subtle finding could be missed will defer to final radiology report. [DC]   1154 CT head nonemergent with no bleed or fracture.  Patient well-appearing at neuro baseline.  Safe for discharge with outpatient follow-up as needed.   Supportive care measures discussed.  ED return for worsening symptoms as needed.  All questions and concerns addressed. [DC]      ED Course User Index  [DC] Darryl Guerrero MD       Hospitalization Considered?:     Orders Placed During This Visit:  Orders Placed This Encounter   Procedures    CT Head Without Contrast       Additional orders considered but not placed:        Independent interpretation of labs, radiology studies, and discussions with consultants: See ED Course        AS OF 11:54 EDT VITALS:    BP - 158/72  HR - 97  TEMP - 97.4 °F (36.3 °C) (Tympanic)  02 SATS - 98%          DIAGNOSIS  Final diagnoses:   Contusion of scalp, initial encounter   ESRD on dialysis         DISPOSITION  ED Disposition       ED Disposition   Discharge    Condition   Stable    Comment   --                Please note that portions of this document were completed with a voice recognition program.    Note Disclaimer: At [facility], we believe that sharing information builds trust and better relationships. You are receiving this note because you recently visited [facility]. It is possible you will see health information before a provider has talked with you about it. This kind of information can be easy to misunderstand. To help you fully understand what it means for your health, we urge you to discuss this note with your provider.                       Darryl Guerrero MD  07/02/24 [id number]

## 2024-07-02 NOTE — DISCHARGE INSTRUCTIONS
Ice for pain or swelling, continue current medications, outpatient PCP follow-up for recheck within 1 week as needed, ED return for worsening symptoms as needed.

## 2024-07-02 NOTE — ED TRIAGE NOTES
Pt to ED from home due to a fall and head injury. Pt states he was dreaming and rolled out of bed. The fall woke him up. Pt denies any LOC and take a baby aspirin.

## 2024-07-03 ENCOUNTER — HOSPITAL ENCOUNTER (OUTPATIENT)
Facility: HOSPITAL | Age: 74
Discharge: HOME | End: 2024-07-03
Admitting: SURGERY
Payer: MEDICARE

## 2024-07-03 ENCOUNTER — OFFICE VISIT (OUTPATIENT)
Age: 74
End: 2024-07-03
Payer: MEDICARE

## 2024-07-03 VITALS
SYSTOLIC BLOOD PRESSURE: 133 MMHG | WEIGHT: 203 LBS | BODY MASS INDEX: 29.06 KG/M2 | HEART RATE: 80 BPM | HEIGHT: 70 IN | DIASTOLIC BLOOD PRESSURE: 76 MMHG

## 2024-07-03 DIAGNOSIS — I25.10 CORONARY ARTERY DISEASE INVOLVING NATIVE CORONARY ARTERY OF NATIVE HEART WITHOUT ANGINA PECTORIS: ICD-10-CM

## 2024-07-03 DIAGNOSIS — Z95.1 S/P CABG (CORONARY ARTERY BYPASS GRAFT): ICD-10-CM

## 2024-07-03 DIAGNOSIS — I10 ESSENTIAL HYPERTENSION: ICD-10-CM

## 2024-07-03 DIAGNOSIS — N18.6 ESRD (END STAGE RENAL DISEASE): Primary | ICD-10-CM

## 2024-07-03 DIAGNOSIS — Z98.890 S/P MVR (MITRAL VALVE REPAIR): ICD-10-CM

## 2024-07-03 DIAGNOSIS — I21.4 NSTEMI (NON-ST ELEVATED MYOCARDIAL INFARCTION): ICD-10-CM

## 2024-07-03 DIAGNOSIS — Z99.2 DEPENDENCE ON HEMODIALYSIS: ICD-10-CM

## 2024-07-03 LAB
BH CV VAS DIALYSIS ARTERIAL ANASTOMOSIS DIAMETER: 0.48 CM
BH CV VAS DIALYSIS ARTERIAL ANASTOMOSIS EDV: 529 CM/SEC
BH CV VAS DIALYSIS ARTERIAL ANASTOMOSIS PSV: 875 CM/SEC
BH CV VAS DIALYSIS CONDUIT DIST DEPTH: 0.4 CM
BH CV VAS DIALYSIS CONDUIT DIST DIAMETER: 0.64 CM
BH CV VAS DIALYSIS CONDUIT DIST EDV: 222 CM/SEC
BH CV VAS DIALYSIS CONDUIT DIST PSV: 353 CM/SEC
BH CV VAS DIALYSIS CONDUIT MID DEPTH: 0.24 CM
BH CV VAS DIALYSIS CONDUIT MID DIAMETER: 1.84 CM
BH CV VAS DIALYSIS CONDUIT MID EDV: 44 CM/SEC
BH CV VAS DIALYSIS CONDUIT MID PSV: 134 CM/SEC
BH CV VAS DIALYSIS CONDUIT MID/DIST DEPTH: 0.33 CM
BH CV VAS DIALYSIS CONDUIT MID/DIST DIAMETER: 0.85 CM
BH CV VAS DIALYSIS CONDUIT MID/DIST EDV: 63 CM/SEC
BH CV VAS DIALYSIS CONDUIT MID/DIST FLOW VOL: 1185 ML/MIN
BH CV VAS DIALYSIS CONDUIT MID/DIST PSV: 144 CM/SEC
BH CV VAS DIALYSIS CONDUIT PROX DIAMETER: 0.47 CM
BH CV VAS DIALYSIS CONDUIT PROX EDV: 471 CM/SEC
BH CV VAS DIALYSIS CONDUIT PROX PSV: 779 CM/SEC
BH CV VAS DIALYSIS CONDUIT PROX/MID DEPTH: 0.56 CM
BH CV VAS DIALYSIS CONDUIT PROX/MID DIAMETER: 0.82 CM
BH CV VAS DIALYSIS CONDUIT PROX/MID EDV: 207 CM/SEC
BH CV VAS DIALYSIS CONDUIT PROX/MID FLOW VOL: 2734 ML/MIN
BH CV VAS DIALYSIS CONDUIT PROX/MID PSV: 361 CM/SEC
BH CV VAS DIALYSIS PRE-INFLOW RADIAL DIAMETER: 0.6 CM
BH CV VAS DIALYSIS PRE-INFLOW RADIAL EDV: 82 CM/SEC
BH CV VAS DIALYSIS PRE-INFLOW RADIAL FLOW VOL: 982 ML/MIN
BH CV VAS DIALYSIS PRE-INFLOW RADIAL PSV: 178 CM/SEC
BH CV VAS DIALYSIS VENOUS OUTFLOW AXILLARY DIAMETER: 0.95 CM
BH CV VAS DIALYSIS VENOUS OUTFLOW AXILLARY EDV: 63 CM/SEC
BH CV VAS DIALYSIS VENOUS OUTFLOW AXILLARY PSV: 89 CM/SEC
BH CV VAS DIALYSIS VENOUS OUTFLOW BASILIC VEIN DIAMETER: 0.82 CM
BH CV VAS DIALYSIS VENOUS OUTFLOW BASILIC VEIN EDV: 211 CM/SEC
BH CV VAS DIALYSIS VENOUS OUTFLOW BASILIC VEIN PSV: 335 CM/SEC
BH CV VAS DIALYSIS VENOUS OUTFLOW SUBCLAVIAN DIAMETER: 1.28 CM
BH CV VAS DIALYSIS VENOUS OUTFLOW SUBCLAVIAN EDV: 120 CM/SEC
BH CV VAS DIALYSIS VENOUS OUTFLOW SUBCLAVIAN PSV: 172 CM/SEC

## 2024-07-03 PROCEDURE — 93990 DOPPLER FLOW TESTING: CPT

## 2024-07-03 RX ORDER — LIDOCAINE AND PRILOCAINE 25; 25 MG/G; MG/G
1 CREAM TOPICAL ONCE
Status: SHIPPED | OUTPATIENT
Start: 2024-07-03

## 2024-08-21 ENCOUNTER — OFFICE VISIT (OUTPATIENT)
Dept: FAMILY MEDICINE CLINIC | Facility: CLINIC | Age: 74
End: 2024-08-21
Payer: MEDICARE

## 2024-08-21 VITALS
HEIGHT: 70 IN | BODY MASS INDEX: 30.06 KG/M2 | SYSTOLIC BLOOD PRESSURE: 120 MMHG | HEART RATE: 82 BPM | DIASTOLIC BLOOD PRESSURE: 74 MMHG | OXYGEN SATURATION: 99 % | WEIGHT: 210 LBS

## 2024-08-21 DIAGNOSIS — Z00.00 MEDICARE ANNUAL WELLNESS VISIT, SUBSEQUENT: Primary | ICD-10-CM

## 2024-08-21 PROCEDURE — 1126F AMNT PAIN NOTED NONE PRSNT: CPT | Performed by: NURSE PRACTITIONER

## 2024-08-21 PROCEDURE — G0439 PPPS, SUBSEQ VISIT: HCPCS | Performed by: NURSE PRACTITIONER

## 2024-08-21 PROCEDURE — 1160F RVW MEDS BY RX/DR IN RCRD: CPT | Performed by: NURSE PRACTITIONER

## 2024-08-21 PROCEDURE — 3074F SYST BP LT 130 MM HG: CPT | Performed by: NURSE PRACTITIONER

## 2024-08-21 PROCEDURE — 96160 PT-FOCUSED HLTH RISK ASSMT: CPT | Performed by: NURSE PRACTITIONER

## 2024-08-21 PROCEDURE — 1159F MED LIST DOCD IN RCRD: CPT | Performed by: NURSE PRACTITIONER

## 2024-08-21 PROCEDURE — 1170F FXNL STATUS ASSESSED: CPT | Performed by: NURSE PRACTITIONER

## 2024-08-21 PROCEDURE — 3078F DIAST BP <80 MM HG: CPT | Performed by: NURSE PRACTITIONER

## 2024-08-21 RX ORDER — SEVELAMER CARBONATE 800 MG/1
2 TABLET, FILM COATED ORAL 3 TIMES DAILY
COMMUNITY
Start: 2024-03-07

## 2024-08-21 NOTE — PATIENT INSTRUCTIONS
Medicare Wellness  Personal Prevention Plan of Service     Date of Office Visit:    Encounter Provider:  DUSTY Anaya  Place of Service:  [facility] PRIMARY CARE  Patient Name: Lefty Cai  :  1950    As part of the Medicare Wellness portion of your visit today, we are providing you with this personalized preventive plan of services (PPPS). This plan is based upon recommendations of the [place] Preventive Services Task Force (USPSTF) and the Advisory Committee on Immunization Practices (ACIP).    This lists the preventive care services that should be considered, and provides dates of when you are due. Items listed as completed are up-to-date and do not require any further intervention.    Health Maintenance   Topic Date Due    TDAP/TD VACCINES (1 - Tdap) Never done    ZOSTER VACCINE (1 of 2) Never done    RSV Vaccine - Adults (1 - 1-dose 60+ series) Never done    DIABETIC FOOT EXAM  2021    URINE MICROALBUMIN  2022    Hepatitis B (2 of 2 - CpG 2-dose series) 2023    COVID-19 Vaccine (4 - - season) 2023    ANNUAL WELLNESS VISIT  2024    BMI FOLLOWUP  2024    INFLUENZA VACCINE  2024    DIABETIC EYE EXAM  11/15/2024    HEMOGLOBIN A1C  2025    LIPID PANEL  2025    COLORECTAL CANCER SCREENING  2032    HEPATITIS C SCREENING  Completed    Pneumococcal Vaccine 65+  Completed    AAA SCREEN (ONE-TIME)  Completed    LUNG CANCER SCREENING  Discontinued       No orders of the defined types were placed in this encounter.      No follow-ups on file.

## 2024-08-21 NOTE — PROGRESS NOTES
Subjective   The ABCs of the Annual Wellness Visit  Medicare Wellness Visit      Lefty Cai is a 74 y.o. patient who presents for a Medicare Wellness Visit.    The following portions of the patient's history were reviewed and   updated as appropriate: allergies, current medications, past family history, past medical history, past social history, past surgical history, and problem list.    Compared to one year ago, the patient's physical   health is better.  Compared to one year ago, the patient's mental   health is better.    Recent Hospitalizations:  He was not admitted to the hospital during the last year.     Current Medical Providers:  Patient Care Team:  Daksha Ng APRN as PCP - General (Family Medicine)  Rudy Faith MD as Consulting Physician (Ophthalmology)  Jose Mccall MD as Consulting Physician (Nephrology)  Quang Reyes MD as Consulting Physician (Nephrology)    Outpatient Medications Prior to Visit   Medication Sig Dispense Refill    acetaminophen (TYLENOL) 500 MG tablet Take 1 tablet by mouth Every 4 (Four) Hours As Needed for Mild Pain. Indications: Fever, Pain      aspirin 81 MG tablet Take 1 tablet by mouth Every Night. FOLLOW MD GUIDELINES ON WHEN/IF TO HOLD FOR DOS  Indications: blood thinner      atorvastatin (LIPITOR) 40 MG tablet TAKE 1 TABLET BY MOUTH EVERY NIGHT. INDICATIONS: HIGH AMOUNT OF FATS IN THE BLOOD 90 tablet 3    DULoxetine (CYMBALTA) 30 MG capsule TAKE 1 CAPSULE EVERY DAY 90 capsule 3    fexofenadine (ALLEGRA) 180 MG tablet Take 1 tablet by mouth Daily. Indications: Hayfever      hydrALAZINE (APRESOLINE) 25 MG tablet Take 1 tablet by mouth 3 (Three) Times a Day.      lidocaine-prilocaine (EMLA) 2.5-2.5 % cream APPLY TO SKIN OVER DIALYSIS ACCESS 30 MINUTES BEFORE      metoprolol succinate XL (TOPROL-XL) 100 MG 24 hr tablet Take 1 tablet by mouth Daily.      midodrine (PROAMATINE) 10 MG tablet TAKE 1 TABLET BY MOUTH 3 TIMES PER WEEK ON DIALYSIS  DAYS.      polyethylene glycol (MIRALAX) 17 g packet Take 17 g by mouth Daily As Needed (CONSTIPATION ). Indications: Constipation      sennosides-docusate (senna-docusate sodium) 8.6-50 MG per tablet Take 1 tablet by mouth At Night As Needed for Constipation. 60 tablet 0    sevelamer (RENAGEL) 800 MG tablet Take 1 tablet by mouth 2 (Two) Times a Day. Indications: UNKNOWN      sevelamer (RENVELA) 800 MG tablet Take 2 tablets by mouth 3 (Three) Times a Day.      simvastatin (ZOCOR) 40 MG tablet Take 1 tablet by mouth Daily.      sodium bicarbonate 650 MG tablet Take 1 tablet by mouth 3 times a day.      traZODone (DESYREL) 50 MG tablet TAKE 1 TABLET AT NIGHT AS NEEDED FOR SLEEP 90 tablet 3    vitamin D3 125 MCG (5000 UT) capsule capsule Take 1 tablet by mouth 2 (Two) Times a Week. SAT AND WED  Indications: supplement        Facility-Administered Medications Prior to Visit   Medication Dose Route Frequency Provider Last Rate Last Admin    lidocaine-prilocaine (EMLA) 2.5-2.5 % cream 1 Application  1 Application Topical Once Louann Miles Jr., MD         No opioid medication identified on active medication list. I have reviewed chart for other potential  high risk medication/s and harmful drug interactions in the elderly.      Aspirin is on active medication list. Aspirin use is indicated based on review of current medical condition/s. Pros and cons of this therapy have been discussed today. Benefits of this medication outweigh potential harm.  Patient has been encouraged to continue taking this medication.  .      Patient Active Problem List   Diagnosis    Type 2 diabetes mellitus with diabetic chronic kidney disease    Essential hypertension    Hyperlipidemia    Primary insomnia    ESRD (end stage renal disease)    Vitamin D deficiency    Diverticulitis of large intestine with perforation and abscess without bleeding    Impaired mobility and ADLs    History of colon resection    Colon polyps    Diverticulosis  "   CKD (chronic kidney disease) stage 5, GFR less than 15 ml/min    NSTEMI (non-ST elevated myocardial infarction)    Abnormal findings on diagnostic imaging of heart and coronary circulation    Fatigue    Acute superficial venous thrombosis of right lower extremity    Orthostatic hypotension    Acute UTI (urinary tract infection)    Ischemic cardiomyopathy    S/P MVR (mitral valve repair)    Coronary artery disease involving native coronary artery of native heart without angina pectoris    S/P CABG (coronary artery bypass graft)    Nonrheumatic mitral valve regurgitation    AV fistula     Advance Care Planning Advance Directive is not on file.  ACP discussion was held with the patient during this visit. He thinks he has one on file            Objective   Vitals:    24 1319   BP: 120/74   BP Location: Left arm   Patient Position: Sitting   Cuff Size: Adult   Pulse: 82   SpO2: 99%   Weight: 95.3 kg (210 lb)   Height: 177.8 cm (70\")       Estimated body mass index is 30.13 kg/m² as calculated from the following:    Height as of this encounter: 177.8 cm (70\").    Weight as of this encounter: 95.3 kg (210 lb).    BMI is >= 30 and <35. (Class 1 Obesity). The following options were offered after discussion;: patient is on dialysis and weight varies due to that.       Does the patient have evidence of cognitive impairment? No  Lab Results   Component Value Date    HGBA1C 5.4 2024                                                                                               Health  Risk Assessment    Smoking Status:  Social History     Tobacco Use   Smoking Status Former    Current packs/day: 0.00    Average packs/day: 2.0 packs/day for 25.0 years (50.0 ttl pk-yrs)    Types: Cigarettes    Start date:     Quit date:     Years since quittin.6   Smokeless Tobacco Current    Types: Snuff   Tobacco Comments    Patient quit smoking     Alcohol Consumption:  Social History     Substance and Sexual Activity "   Alcohol Use No       Fall Risk Screen  STEADI Fall Risk Assessment was completed, and patient is at LOW risk for falls.Assessment completed on:2024    Depression Screenin/21/2024     1:33 PM   PHQ-2/PHQ-9 Depression Screening   Little Interest or Pleasure in Doing Things 0-->not at all   Feeling Down, Depressed or Hopeless 0-->not at all   PHQ-9: Brief Depression Severity Measure Score 0     Health Habits and Functional and Cognitive Screenin/21/2024     1:30 PM   Functional & Cognitive Status   Do you have difficulty preparing food and eating? No   Do you have difficulty bathing yourself, getting dressed or grooming yourself? No   Do you have difficulty using the toilet? No   Do you have difficulty moving around from place to place? No   Do you have trouble with steps or getting out of a bed or a chair? No   Current Diet Well Balanced Diet   Dental Exam Up to date   Eye Exam Unknown   Exercise (times per week) 7 times per week   Current Exercises Include House Cleaning;Walking   Do you need help using the phone?  No   Are you deaf or do you have serious difficulty hearing?  No   Do you need help to go to places out of walking distance? No   Do you need help shopping? No   Do you need help preparing meals?  No   Do you need help with housework?  No   Do you need help with laundry? No   Do you need help taking your medications? No   Do you need help managing money? No   Do you ever drive or ride in a car without wearing a seat belt? No   Have you felt unusual stress, anger or loneliness in the last month? No   Who do you live with? Spouse   If you need help, do you have trouble finding someone available to you? No   Have you been bothered in the last four weeks by sexual problems? No   Do you have difficulty concentrating, remembering or making decisions? No           Age-appropriate Screening Schedule:  Refer to the list below for future screening recommendations based on patient's age, sex  and/or medical conditions. Orders for these recommended tests are listed in the plan section. The patient has been provided with a written plan.    Health Maintenance List  Health Maintenance   Topic Date Due    TDAP/TD VACCINES (1 - Tdap) Never done    ZOSTER VACCINE (1 of 2) Never done    RSV Vaccine - Adults (1 - 1-dose 60+ series) Never done    DIABETIC FOOT EXAM  12/16/2021    URINE MICROALBUMIN  06/23/2022    Hepatitis B (2 of 2 - CpG 2-dose series) 04/14/2023    COVID-19 Vaccine (4 - 2023-24 season) 09/01/2023    ANNUAL WELLNESS VISIT  08/07/2024    BMI FOLLOWUP  08/07/2024    INFLUENZA VACCINE  08/01/2024    DIABETIC EYE EXAM  11/15/2024    HEMOGLOBIN A1C  01/11/2025    LIPID PANEL  02/01/2025    COLORECTAL CANCER SCREENING  03/24/2032    HEPATITIS C SCREENING  Completed    Pneumococcal Vaccine 65+  Completed    AAA SCREEN (ONE-TIME)  Completed    LUNG CANCER SCREENING  Discontinued                                                                                                                                                CMS Preventative Services Quick Reference  Risk Factors Identified During Encounter  None Identified    The above risks/problems have been discussed with the patient.  Pertinent information has been shared with the patient in the After Visit Summary.  An After Visit Summary and PPPS were made available to the patient.    Follow Up:   Next Medicare Wellness visit to be scheduled in 1 year.     Assessment & Plan               Follow Up:   No follow-ups on file.

## 2024-09-05 ENCOUNTER — TELEPHONE (OUTPATIENT)
Dept: CARDIOLOGY | Facility: CLINIC | Age: 74
End: 2024-09-05
Payer: MEDICARE

## 2024-10-25 ENCOUNTER — OFFICE VISIT (OUTPATIENT)
Dept: CARDIOLOGY | Facility: CLINIC | Age: 74
End: 2024-10-25
Payer: MEDICARE

## 2024-10-25 VITALS
WEIGHT: 207.4 LBS | HEIGHT: 70 IN | HEART RATE: 74 BPM | SYSTOLIC BLOOD PRESSURE: 142 MMHG | DIASTOLIC BLOOD PRESSURE: 80 MMHG | BODY MASS INDEX: 29.69 KG/M2

## 2024-10-25 DIAGNOSIS — Z95.1 S/P CABG (CORONARY ARTERY BYPASS GRAFT): ICD-10-CM

## 2024-10-25 DIAGNOSIS — Z98.890 S/P MVR (MITRAL VALVE REPAIR): ICD-10-CM

## 2024-10-25 DIAGNOSIS — E78.5 HYPERLIPIDEMIA, UNSPECIFIED HYPERLIPIDEMIA TYPE: ICD-10-CM

## 2024-10-25 DIAGNOSIS — I21.4 NSTEMI (NON-ST ELEVATED MYOCARDIAL INFARCTION): ICD-10-CM

## 2024-10-25 DIAGNOSIS — I25.5 ISCHEMIC CARDIOMYOPATHY: ICD-10-CM

## 2024-10-25 DIAGNOSIS — I10 ESSENTIAL HYPERTENSION: Primary | ICD-10-CM

## 2024-10-25 PROCEDURE — 93000 ELECTROCARDIOGRAM COMPLETE: CPT | Performed by: INTERNAL MEDICINE

## 2024-10-25 PROCEDURE — 3079F DIAST BP 80-89 MM HG: CPT | Performed by: INTERNAL MEDICINE

## 2024-10-25 PROCEDURE — 99214 OFFICE O/P EST MOD 30 MIN: CPT | Performed by: INTERNAL MEDICINE

## 2024-10-25 PROCEDURE — 3077F SYST BP >= 140 MM HG: CPT | Performed by: INTERNAL MEDICINE

## 2024-10-25 NOTE — PROGRESS NOTES
Date of Office Visit: 10/25/24  Encounter Provider: Lobito Garcia MD  Place of Service: [facility] CARDIOLOGY  Patient Name: Lefty Cai  :1950  [id number]    Chief Complaint   Patient presents with    Coronary Artery Disease   :     HPI: Lefty Cai is a 74 y.o. male he is here for follow-up.  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  He underwent bypass with Dr. Zuluaga in 2023.  He had a LIMA to his LAD, a vein to a diagonal, a vein to an OM1 and a vein to the JOAO.  He also had a annuloplasty ring around his mitral valve.  After bypass was a really rough course he had A-fib he had really soft blood pressures and then he got, urosepsis with Pseudomonas.  But once we treated that things got better.    He is here for follow-up and looks fantastic.  He does not have any chest discomfort no shortness of breath no PND orthopnea no edema no syncope or palpitation.  He hates hemodialysis he feels terrible afterward no energy tired he cannot get peritoneal and he does not have anyone that can hook him up to the machine and his wife cannot do it to do like 5 days a week so he is, stuck    Past Medical History:   Diagnosis Date    Anemia     Anesthesia complication     HYPOTENSION    Arthritis     CAD (coronary artery disease) 2023    unspecified    Cancer of kidney, left     CKD (chronic kidney disease)     STAGE 5    Diabetes mellitus, type 2     End stage renal disease 2023    Essential (primary) hypertension 2019    Fatigue     Fibrosis due to vascular prosthetic devices, implants and grafts, initial encounter 2023    GERD (gastroesophageal reflux disease)     H/O renal cell carcinoma 2007    partial nephrectomy    History of colostomy reversal 2022    [place] (hard of hearing)     NO DEVICE    Hyperlipidemia     Hyperlipidemia, unspecified     Hypertension      Primary insomnia 06/13/2016    Proteinuria     Risk factors for obstructive sleep apnea     Sinus drainage     Stage 4 chronic kidney disease 07/02/2019    Type 2 diabetes mellitus with diabetic chronic kidney disease     Type 2 diabetes mellitus with hyperglycemia 07/02/2019    Vitamin D deficiency 08/14/2017    Vitamin D deficiency, unspecified        Past Surgical History:   Procedure Laterality Date    ARTERIOVENOUS FISTULA/SHUNT SURGERY Left 08/15/2019    Procedure: LEFT MID FOREARM RADIAL CEPHALIC ARTERIAL VENOUS FISTULA;  Surgeon: Louann Miles Jr., MD;  Location: [facility] MAIN OR;  Service: Vascular    CARDIAC CATHETERIZATION N/A 4/9/2023    Procedure: Left Heart Cath;  Surgeon: Lobito Garcia MD;  Location: [facility] CATH INVASIVE LOCATION;  Service: Cardiovascular;  Laterality: N/A;    CARDIAC CATHETERIZATION N/A 4/9/2023    Procedure: Left ventriculography;  Surgeon: Lobito Garcia MD;  Location: [facility] CATH INVASIVE LOCATION;  Service: Cardiovascular;  Laterality: N/A;    CARDIAC CATHETERIZATION N/A 4/9/2023    Procedure: Coronary angiography;  Surgeon: Lobito Garcia MD;  Location: [facility] CATH INVASIVE LOCATION;  Service: Cardiovascular;  Laterality: N/A;    COLONOSCOPY  03/24/2022    COLONOSCOPY N/A 03/24/2022    Procedure: COLONOSCOPY TO CECUM WITH POLYPECTOMY  (HOT SNARE);  Surgeon: Yelena Guerra MD;  Location: [facility] ENDOSCOPY;  Service: General;  Laterality: N/A;  PREOP/ HX OF DIVERTICULITIS, COLOSTOMY  POSTOP/ POLYPS, DIVERTICULOSIS     COLOSTOMY  09/06/2021    COLOSTOMY CLOSURE N/A 04/12/2022    Procedure: open Colostomy reversal;  Surgeon: Yelena Guerra MD;  Location: [facility] MAIN OR;  Service: General;  Laterality: N/A;    CORONARY ARTERY BYPASS GRAFT N/A 4/13/2023    Procedure: MAT STERNOTOMY CORONARY ARTERY BYPASS GRAFT TIMES 4 USING LEFT INTERNAL MAMMARY ARTERY AND RIGHT GREATER SAPHENOUS VEIN  PER ENDOSCOPIC VEIN HARVESTING, MITRAL VALVE REPAIR  AND PRP;  Surgeon: Zan  Mirtha Edward MD;  Location: [facility];  Service: Cardiothoracic;  Laterality: N/A;    DIAGNOSTIC LAPAROSCOPY N/A 2023    Procedure: DIAGNOSTIC LAPAROSCOPY;  Surgeon: Murali Ferreira MD;  Location: [facility] MAIN OR;  Service: General;  Laterality: N/A;    EXPLORATORY LAPAROTOMY N/A 2021    Procedure: Open sigmoind colectomy, colostomy, and appendectomy;  Surgeon: Yelena Guerra MD;  Location: [facility] MAIN OR;  Service: General;  Laterality: N/A;    EYE SURGERY      CATARACTS    KNEE ARTHROSCOPY Right     NEPHRECTOMY PARTIAL      Dr. Victor       Social History     Socioeconomic History    Marital status:    Tobacco Use    Smoking status: Former     Current packs/day: 0.00     Average packs/day: 2.0 packs/day for 25.0 years (50.0 ttl pk-yrs)     Types: Cigarettes     Start date:      Quit date:      Years since quittin.8    Smokeless tobacco: Current     Types: Snuff    Tobacco comments:     Patient quit smoking   Vaping Use    Vaping status: Never Used   Substance and Sexual Activity    Alcohol use: No    Drug use: No    Sexual activity: Defer       Family History   Problem Relation Age of Onset    Hypertension Mother     Heart disease Father         ASCVD    Macular degeneration Father     Cancer Father         bladder    Kidney failure Father     Diabetes type I Sister     Malig Hyperthermia Neg Hx        Review of Systems   Constitutional: Negative for decreased appetite, fever, malaise/fatigue and weight loss.   HENT:  Negative for nosebleeds.    Eyes:  Negative for double vision.   Cardiovascular:  Negative for chest pain, claudication, cyanosis, dyspnea on exertion, irregular heartbeat, leg swelling, near-syncope, orthopnea, palpitations, paroxysmal nocturnal dyspnea and syncope.   Respiratory:  Negative for cough, hemoptysis and shortness of breath.    Hematologic/Lymphatic: Negative for bleeding problem.   Skin:  Negative for rash.   Musculoskeletal:  Negative  for falls and myalgias.   Gastrointestinal:  Negative for hematochezia, jaundice, melena, nausea and vomiting.   Genitourinary:  Negative for hematuria.   Neurological:  Negative for dizziness and seizures.   Psychiatric/Behavioral:  Negative for altered mental status and memory loss.        Allergies   Allergen Reactions    Contrast Dye (Echo Or Unknown Ct/Mr) Other (See Comments)     KIDNEY DISEASE    Latex Other (See Comments)     Blisters         Current Outpatient Medications:     aspirin 81 MG tablet, Take 1 tablet by mouth Every Night. FOLLOW MD GUIDELINES ON WHEN/IF TO HOLD FOR DOS  Indications: blood thinner, Disp: , Rfl:     atorvastatin (LIPITOR) 40 MG tablet, TAKE 1 TABLET BY MOUTH EVERY NIGHT. INDICATIONS: HIGH AMOUNT OF FATS IN THE BLOOD, Disp: 90 tablet, Rfl: 3    DULoxetine (CYMBALTA) 30 MG capsule, TAKE 1 CAPSULE EVERY DAY, Disp: 90 capsule, Rfl: 3    fexofenadine (ALLEGRA) 180 MG tablet, Take 1 tablet by mouth Daily. Indications: Hayfever, Disp: , Rfl:     lidocaine-prilocaine (EMLA) 2.5-2.5 % cream, APPLY TO SKIN OVER DIALYSIS ACCESS 30 MINUTES BEFORE, Disp: , Rfl:     midodrine (PROAMATINE) 10 MG tablet, TAKE 1 TABLET BY MOUTH 3 TIMES PER WEEK ON DIALYSIS DAYS., Disp: , Rfl:     traZODone (DESYREL) 50 MG tablet, TAKE 1 TABLET AT NIGHT AS NEEDED FOR SLEEP, Disp: 90 tablet, Rfl: 3    vitamin D3 125 MCG (5000 UT) capsule capsule, Take 1 tablet by mouth 2 (Two) Times a Week. SAT AND WED  Indications: supplement , Disp: , Rfl:     acetaminophen (TYLENOL) 500 MG tablet, Take 1 tablet by mouth Every 4 (Four) Hours As Needed for Mild Pain. Indications: Fever, Pain (Patient not taking: Reported on 10/25/2024), Disp: , Rfl:     hydrALAZINE (APRESOLINE) 25 MG tablet, Take 1 tablet by mouth 3 (Three) Times a Day. (Patient not taking: Reported on 10/25/2024), Disp: , Rfl:     metoprolol succinate XL (TOPROL-XL) 100 MG 24 hr tablet, Take 1 tablet by mouth Daily. (Patient not taking: Reported on 10/25/2024),  "Disp: , Rfl:     polyethylene glycol (MIRALAX) 17 g packet, Take 17 g by mouth Daily As Needed (CONSTIPATION ). Indications: Constipation (Patient not taking: Reported on 10/25/2024), Disp: , Rfl:     sennosides-docusate (senna-docusate sodium) 8.6-50 MG per tablet, Take 1 tablet by mouth At Night As Needed for Constipation., Disp: 60 tablet, Rfl: 0    sevelamer (RENAGEL) 800 MG tablet, Take 1 tablet by mouth 2 (Two) Times a Day. Indications: UNKNOWN, Disp: , Rfl:     sevelamer (RENVELA) 800 MG tablet, Take 2 tablets by mouth 3 (Three) Times a Day., Disp: , Rfl:     simvastatin (ZOCOR) 40 MG tablet, Take 1 tablet by mouth Daily. (Patient not taking: Reported on 10/25/2024), Disp: , Rfl:     sodium bicarbonate 650 MG tablet, Take 1 tablet by mouth 3 times a day. (Patient not taking: Reported on 10/25/2024), Disp: , Rfl:     Current Facility-Administered Medications:     lidocaine-prilocaine (EMLA) 2.5-2.5 % cream 1 Application, 1 Application, Topical, Once, Louann Miles Jr., MD      Objective:     Vitals:    10/25/24 1120   BP: 142/80   Pulse: 74   Weight: 94.1 kg (207 lb 6.4 oz)   Height: 177.8 cm (70\")     Body mass index is 29.76 kg/m².    Constitutional:       Appearance: Well-developed.   Eyes:      General: No scleral icterus.  HENT:      Head: Normocephalic.   Neck:      Thyroid: No thyromegaly.      Vascular: No JVD.      Lymphadenopathy: No cervical adenopathy.   Pulmonary:      Effort: Pulmonary effort is normal.      Breath sounds: Normal breath sounds. No wheezing. No rales.   Cardiovascular:      Normal rate. Regular rhythm.      Murmurs: There is a grade 2/6 plateau systolic murmur.      No gallop.    Edema:     Peripheral edema absent.   Abdominal:      Palpations: Abdomen is soft.      Tenderness: There is no abdominal tenderness.   Musculoskeletal: Normal range of motion. Skin:     General: Skin is warm and dry.      Findings: No rash.   Neurological:      Mental Status: Alert and oriented " to person, place, and time.           ECG 12 Lead    Date/Time: 10/25/2024 11:37 AM  Performed by: Lobito Garcia MD    Authorized by: Lobito Garcia MD  Comparison: compared with previous ECG   Similar to previous ECG  Rhythm: sinus rhythm  Ectopy: unifocal PVCs    Clinical impression: abnormal EKG           Assessment:       Diagnosis Plan   1. Essential hypertension        2. Hyperlipidemia, unspecified hyperlipidemia type        3. NSTEMI (non-ST elevated myocardial infarction)        4. Ischemic cardiomyopathy        5. S/P MVR (mitral valve repair)        6. S/P CABG (coronary artery bypass graft)                   Plan:       Well he looks great and from a cardiac standpoint is asymptomatic some very happy about that we have not ever gotten a follow-up echo on him and I would like to get 1 I am not can to change his medicines because his blood pressures get low on dialysis and even the day after he says so I will just have him come back and see us in a year sooner if he is having trouble    No follow-ups on file.     As always, it has been a pleasure to participate in your patient's care.      Sincerely,       Lobito Garcia MD

## 2024-11-04 RX ORDER — TRAZODONE HYDROCHLORIDE 50 MG/1
50 TABLET, FILM COATED ORAL NIGHTLY PRN
Qty: 90 TABLET | Refills: 3 | Status: SHIPPED | OUTPATIENT
Start: 2024-11-04

## 2024-11-18 ENCOUNTER — HOSPITAL ENCOUNTER (OUTPATIENT)
Dept: CARDIOLOGY | Facility: HOSPITAL | Age: 74
Discharge: HOME | End: 2024-11-18
Admitting: INTERNAL MEDICINE
Payer: MEDICARE

## 2024-11-18 VITALS
HEART RATE: 80 BPM | SYSTOLIC BLOOD PRESSURE: 142 MMHG | BODY MASS INDEX: 29.63 KG/M2 | DIASTOLIC BLOOD PRESSURE: 70 MMHG | WEIGHT: 207 LBS | HEIGHT: 70 IN

## 2024-11-18 DIAGNOSIS — Z95.1 S/P CABG (CORONARY ARTERY BYPASS GRAFT): ICD-10-CM

## 2024-11-18 DIAGNOSIS — I21.4 NSTEMI (NON-ST ELEVATED MYOCARDIAL INFARCTION): ICD-10-CM

## 2024-11-18 DIAGNOSIS — I10 ESSENTIAL HYPERTENSION: ICD-10-CM

## 2024-11-18 DIAGNOSIS — E78.5 HYPERLIPIDEMIA, UNSPECIFIED HYPERLIPIDEMIA TYPE: ICD-10-CM

## 2024-11-18 DIAGNOSIS — I25.5 ISCHEMIC CARDIOMYOPATHY: ICD-10-CM

## 2024-11-18 DIAGNOSIS — Z98.890 S/P MVR (MITRAL VALVE REPAIR): ICD-10-CM

## 2024-11-18 LAB
AORTIC ARCH: 2.6 CM
AORTIC DIMENSIONLESS INDEX: 0.49 (DI)
ASCENDING AORTA: 2.7 CM
BH CV ECHO MEAS - ACS: 1.03 CM
BH CV ECHO MEAS - AO MAX PG: 29.4 MMHG
BH CV ECHO MEAS - AO MEAN PG: 18.1 MMHG
BH CV ECHO MEAS - AO ROOT DIAM: 3.3 CM
BH CV ECHO MEAS - AO V2 MAX: 270.9 CM/SEC
BH CV ECHO MEAS - AO V2 VTI: 54.4 CM
BH CV ECHO MEAS - AVA(I,D): 1.97 CM2
BH CV ECHO MEAS - EDV(CUBED): 107.1 ML
BH CV ECHO MEAS - EDV(MOD-SP2): 109 ML
BH CV ECHO MEAS - EDV(MOD-SP4): 85 ML
BH CV ECHO MEAS - EF(MOD-BP): 61.4 %
BH CV ECHO MEAS - EF(MOD-SP2): 61.5 %
BH CV ECHO MEAS - EF(MOD-SP4): 60 %
BH CV ECHO MEAS - ESV(CUBED): 47.2 ML
BH CV ECHO MEAS - ESV(MOD-SP2): 42 ML
BH CV ECHO MEAS - ESV(MOD-SP4): 34 ML
BH CV ECHO MEAS - FS: 23.9 %
BH CV ECHO MEAS - IVS/LVPW: 1.04 CM
BH CV ECHO MEAS - IVSD: 1.18 CM
BH CV ECHO MEAS - LAT PEAK E' VEL: 6.9 CM/SEC
BH CV ECHO MEAS - LV DIASTOLIC VOL/BSA (35-75): 40.1 CM2
BH CV ECHO MEAS - LV MASS(C)D: 203.2 GRAMS
BH CV ECHO MEAS - LV MAX PG: 5.8 MMHG
BH CV ECHO MEAS - LV MEAN PG: 3.8 MMHG
BH CV ECHO MEAS - LV SYSTOLIC VOL/BSA (12-30): 16.1 CM2
BH CV ECHO MEAS - LV V1 MAX: 120 CM/SEC
BH CV ECHO MEAS - LV V1 VTI: 26.8 CM
BH CV ECHO MEAS - LVIDD: 4.7 CM
BH CV ECHO MEAS - LVIDS: 3.6 CM
BH CV ECHO MEAS - LVOT AREA: 4 CM2
BH CV ECHO MEAS - LVOT DIAM: 2.26 CM
BH CV ECHO MEAS - LVPWD: 1.13 CM
BH CV ECHO MEAS - MED PEAK E' VEL: 5.1 CM/SEC
BH CV ECHO MEAS - MV A DUR: 0.15 SEC
BH CV ECHO MEAS - MV A MAX VEL: 157.4 CM/SEC
BH CV ECHO MEAS - MV DEC SLOPE: 931.9 CM/SEC2
BH CV ECHO MEAS - MV DEC TIME: 0.27 SEC
BH CV ECHO MEAS - MV E MAX VEL: 206 CM/SEC
BH CV ECHO MEAS - MV E/A: 1.31
BH CV ECHO MEAS - MV MAX PG: 19.2 MMHG
BH CV ECHO MEAS - MV MEAN PG: 10.6 MMHG
BH CV ECHO MEAS - MV P1/2T: 69.1 MSEC
BH CV ECHO MEAS - MV V2 VTI: 69.2 CM
BH CV ECHO MEAS - MVA(P1/2T): 3.2 CM2
BH CV ECHO MEAS - MVA(VTI): 1.55 CM2
BH CV ECHO MEAS - PA ACC TIME: 0.08 SEC
BH CV ECHO MEAS - PA V2 MAX: 169.4 CM/SEC
BH CV ECHO MEAS - PULM DIAS VEL: 61.8 CM/SEC
BH CV ECHO MEAS - PULM S/D: 1.1
BH CV ECHO MEAS - PULM SYS VEL: 68.2 CM/SEC
BH CV ECHO MEAS - RAP SYSTOLE: 3 MMHG
BH CV ECHO MEAS - RV MAX PG: 2.7 MMHG
BH CV ECHO MEAS - RV V1 MAX: 81.4 CM/SEC
BH CV ECHO MEAS - RV V1 VTI: 17.7 CM
BH CV ECHO MEAS - RVSP: 46 MMHG
BH CV ECHO MEAS - SUP REN AO DIAM: 1.8 CM
BH CV ECHO MEAS - SV(LVOT): 107.3 ML
BH CV ECHO MEAS - SV(MOD-SP2): 67 ML
BH CV ECHO MEAS - SV(MOD-SP4): 51 ML
BH CV ECHO MEAS - SVI(LVOT): 50.6 ML/M2
BH CV ECHO MEAS - SVI(MOD-SP2): 31.6 ML/M2
BH CV ECHO MEAS - SVI(MOD-SP4): 24.1 ML/M2
BH CV ECHO MEAS - TAPSE (>1.6): 1.46 CM
BH CV ECHO MEAS - TR MAX PG: 42.9 MMHG
BH CV ECHO MEAS - TR MAX VEL: 327.4 CM/SEC
BH CV ECHO MEASUREMENTS AVERAGE E/E' RATIO: 34.33
BH CV XLRA - RV BASE: 3.4 CM
BH CV XLRA - RV LENGTH: 7 CM
BH CV XLRA - RV MID: 2.7 CM
BH CV XLRA - TDI S': 11.7 CM/SEC
LEFT ATRIUM VOLUME INDEX: 24.6 ML/M2
SINUS: 3.7 CM
STJ: 2.46 CM

## 2024-11-18 PROCEDURE — 25510000001 PERFLUTREN 6.52 MG/ML SUSPENSION 2 ML VIAL: Performed by: INTERNAL MEDICINE

## 2024-11-18 PROCEDURE — 93306 TTE W/DOPPLER COMPLETE: CPT | Performed by: INTERNAL MEDICINE

## 2024-11-18 PROCEDURE — 93306 TTE W/DOPPLER COMPLETE: CPT

## 2024-11-18 RX ADMIN — PERFLUTREN 2.5 ML: 6.52 INJECTION, SUSPENSION INTRAVENOUS at 14:36

## 2025-01-08 NOTE — PROGRESS NOTES
Chief Complaint  Hemodialysis Access  Follow-up AV access    Subjective        Lefty Cai presents to [facility] VASCULAR SURGERY  History of Present Illness  Lefty Cai is a 74-year-old male patient with stage IV chronic kidney disease initially, now ESRD, who had a left mid forearm radiocephalic AV fistula that was created on August 15, 2019. He has had no issues with the fistula. He has had four-vessel coronary artery bypass grafting and valve replacement.    He was seen on July 3, 2024 with an AV fistula scan.  This demonstrated mild to moderate stenosis at the anastomosis but flow volumes remain very good.  He was seen again on January 15, 2025 with an AV fistula scan.  No stenosis noted at the anastomosis and flow volumes remain very good.  Aneurysmal changes noted in the proximal segment.  He continues to do well from a coronary artery standpoint and mitral valve standpoint.  These issues remain stable with no medication changes other than discontinuance of his antihypertensive medication.        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, implants and grafts, initial encounter (09/13/2023), GERD (gastroesophageal reflux disease), H/O renal cell carcinoma (2007), History of colostomy reversal (04/2022), [place] (hard of hearing), Hyperlipidemia, Hyperlipidemia, unspecified, Hypertension, Primary insomnia (06/13/2016), Proteinuria, Risk factors for obstructive sleep apnea, Sinus drainage, Stage 4 chronic kidney disease (07/02/2019), Type 2 diabetes mellitus with diabetic chronic kidney disease, Type 2 diabetes mellitus with hyperglycemia (07/02/2019), Vitamin D  "deficiency (08/14/2017), and Vitamin D deficiency, unspecified.   Surgical History: has a past surgical history that includes Partial nephrectomy (2007); Knee arthroscopy (Right); Eye surgery; arteriovenous fistula/shunt surgery (Left, 08/15/2019); Exploratory Laparotomy (N/A, 09/06/2021); Colonoscopy (03/24/2022); Colonoscopy (N/A, 03/24/2022); Colostomy closure (N/A, 04/12/2022); Colostomy (09/06/2021); Laparoscopy (N/A, 2/27/2023); Cardiac catheterization (N/A, 4/9/2023); Cardiac catheterization (N/A, 4/9/2023); Cardiac catheterization (N/A, 4/9/2023); and Coronary artery bypass graft (N/A, 4/13/2023).   Family History: family history includes Cancer in his father; Diabetes type I in his sister; Heart disease in his father; Hypertension in his mother; Kidney failure in his father; Macular degeneration in his father.   Social History: reports that he quit smoking about 26 years ago. His smoking use included cigarettes. He started smoking about 51 years ago. He has a 50 pack-year smoking history. His smokeless tobacco use includes snuff. He reports that he does not drink alcohol and does not use drugs.    (Not in a hospital admission)     Allergies: Contrast dye (echo or unknown ct/mr) and Latex   Objective   Vital Signs:  Ht 177.8 cm (70\")   Wt 91.2 kg (201 lb)   BMI 28.84 kg/m²   Estimated body mass index is 28.84 kg/m² as calculated from the following:    Height as of this encounter: 177.8 cm (70\").    Weight as of this encounter: 91.2 kg (201 lb).          Lefty DOLL Trell  reports that he quit smoking about 26 years ago. His smoking use included cigarettes. He started smoking about 51 years ago. He has a 50 pack-year smoking history. His smokeless tobacco use includes snuff.         Physical Exam aneurysmal changes noted in the proximal fistula with strong bruit noted.  Result Review :        Data reviewed : AV fistula scan from January 15, 2025, findings as above             Assessment and Plan "     Diagnoses and all orders for this visit:    1. ESRD (end stage renal disease) (Primary)  -     Duplex Hemodialysis Access CAR; Future    2. S/P CABG (coronary artery bypass graft)    3. S/P MVR (mitral valve repair)    4. Essential hypertension    He is doing very well following creation of his AV fistula with no issues with flow or access.  His scan looks very good.  He has had issues with hypotension after dialysis and his antihypertensive agents have been discontinued.  Also uses midodrine as needed.  I will plan on reassessing him with an AV fistula scan in 6 months.         Follow Up     Return in about 6 months (around 7/15/2025) for With AV fistula scan.  Patient was given instructions and counseling regarding his condition or for health maintenance advice. Please see specific information pulled into the AVS if appropriate.

## 2025-01-15 ENCOUNTER — OFFICE VISIT (OUTPATIENT)
Age: 75
End: 2025-01-15
Payer: MEDICARE

## 2025-01-15 ENCOUNTER — HOSPITAL ENCOUNTER (OUTPATIENT)
Facility: HOSPITAL | Age: 75
Discharge: HOME | End: 2025-01-15
Admitting: SURGERY
Payer: MEDICARE

## 2025-01-15 VITALS — HEIGHT: 70 IN | BODY MASS INDEX: 28.77 KG/M2 | WEIGHT: 201 LBS

## 2025-01-15 DIAGNOSIS — I10 ESSENTIAL HYPERTENSION: ICD-10-CM

## 2025-01-15 DIAGNOSIS — Z98.890 S/P MVR (MITRAL VALVE REPAIR): ICD-10-CM

## 2025-01-15 DIAGNOSIS — Z95.1 S/P CABG (CORONARY ARTERY BYPASS GRAFT): ICD-10-CM

## 2025-01-15 DIAGNOSIS — N18.6 ESRD (END STAGE RENAL DISEASE): Primary | ICD-10-CM

## 2025-01-15 DIAGNOSIS — N18.6 ESRD (END STAGE RENAL DISEASE): ICD-10-CM

## 2025-01-15 LAB
BH CV VAS DIALYSIS ARTERIAL ANASTOMOSIS DIAMETER: 0.4 CM
BH CV VAS DIALYSIS ARTERIAL ANASTOMOSIS EDV: 439 CM/SEC
BH CV VAS DIALYSIS ARTERIAL ANASTOMOSIS PSV: 736 CM/SEC
BH CV VAS DIALYSIS CONDUIT DIST DEPTH: 0.3 CM
BH CV VAS DIALYSIS CONDUIT DIST DIAMETER: 0.9 CM
BH CV VAS DIALYSIS CONDUIT DIST EDV: 90 CM/SEC
BH CV VAS DIALYSIS CONDUIT DIST FLOW VOL: 2327 ML/MIN
BH CV VAS DIALYSIS CONDUIT DIST PSV: 191 CM/SEC
BH CV VAS DIALYSIS CONDUIT MID DEPTH: 0.2 CM
BH CV VAS DIALYSIS CONDUIT MID DIAMETER: 2.1 CM
BH CV VAS DIALYSIS CONDUIT MID EDV: 72 CM/SEC
BH CV VAS DIALYSIS CONDUIT MID PSV: 146 CM/SEC
BH CV VAS DIALYSIS CONDUIT MID/DIST DEPTH: 0.3 CM
BH CV VAS DIALYSIS CONDUIT MID/DIST DIAMETER: 1 CM
BH CV VAS DIALYSIS CONDUIT MID/DIST EDV: 73 CM/SEC
BH CV VAS DIALYSIS CONDUIT MID/DIST FLOW VOL: 1322 ML/MIN
BH CV VAS DIALYSIS CONDUIT MID/DIST PSV: 134 CM/SEC
BH CV VAS DIALYSIS CONDUIT PROX DEPTH: 0.7 CM
BH CV VAS DIALYSIS CONDUIT PROX DIAMETER: 0.9 CM
BH CV VAS DIALYSIS CONDUIT PROX EDV: 320 CM/SEC
BH CV VAS DIALYSIS CONDUIT PROX PSV: 494 CM/SEC
BH CV VAS DIALYSIS CONDUIT PROX/MID DEPTH: 0.2 CM
BH CV VAS DIALYSIS CONDUIT PROX/MID DIAMETER: 1.9 CM
BH CV VAS DIALYSIS CONDUIT PROX/MID EDV: 189 CM/SEC
BH CV VAS DIALYSIS CONDUIT PROX/MID FLOW VOL: 3780 ML/MIN
BH CV VAS DIALYSIS CONDUIT PROX/MID PSV: 367 CM/SEC
BH CV VAS DIALYSIS PRE-INFLOW RADIAL DIAMETER: 0.7 CM
BH CV VAS DIALYSIS PRE-INFLOW RADIAL EDV: 96 CM/SEC
BH CV VAS DIALYSIS PRE-INFLOW RADIAL FLOW VOL: 1561 ML/MIN
BH CV VAS DIALYSIS PRE-INFLOW RADIAL PSV: 172 CM/SEC
BH CV VAS DIALYSIS VENOUS OUTFLOW AXILLARY DIAMETER: 1.2 CM
BH CV VAS DIALYSIS VENOUS OUTFLOW AXILLARY EDV: 67 CM/SEC
BH CV VAS DIALYSIS VENOUS OUTFLOW AXILLARY PSV: 155 CM/SEC
BH CV VAS DIALYSIS VENOUS OUTFLOW BASILIC VEIN DIAMETER: 0.9 CM
BH CV VAS DIALYSIS VENOUS OUTFLOW BASILIC VEIN EDV: 62 CM/SEC
BH CV VAS DIALYSIS VENOUS OUTFLOW BASILIC VEIN PSV: 139 CM/SEC
BH CV VAS DIALYSIS VENOUS OUTFLOW SUBCLAVIAN DIAMETER: 1.2 CM
BH CV VAS DIALYSIS VENOUS OUTFLOW SUBCLAVIAN EDV: 43 CM/SEC
BH CV VAS DIALYSIS VENOUS OUTFLOW SUBCLAVIAN PSV: 95 CM/SEC

## 2025-01-15 PROCEDURE — 93990 DOPPLER FLOW TESTING: CPT

## 2025-02-21 ENCOUNTER — TELEPHONE (OUTPATIENT)
Dept: FAMILY MEDICINE CLINIC | Facility: CLINIC | Age: 75
End: 2025-02-21
Payer: MEDICARE

## 2025-02-21 RX ORDER — TRAZODONE HYDROCHLORIDE 50 MG/1
50 TABLET, FILM COATED ORAL NIGHTLY PRN
Qty: 90 TABLET | Refills: 3 | Status: SHIPPED | OUTPATIENT
Start: 2025-02-21

## 2025-02-21 NOTE — TELEPHONE ENCOUNTER
Caller: Caterina Cai    Relationship: Emergency Contact    Best call back number:     Requested Prescriptions:   Requested Prescriptions     Pending Prescriptions Disp Refills    traZODone (DESYREL) 50 MG tablet 90 tablet 3     Sig: Take 1 tablet by mouth At Night As Needed for Sleep.        Pharmacy where request should be sent: [id number] [place], [place] - [address] [facility] [address] [facility] [phone] [facility] [phone]      Last office visit with prescribing clinician: 8/21/2024   Last telemedicine visit with prescribing clinician: Visit date not found   Next office visit with prescribing clinician: 3/5/2025     Additional details provided by patient:     Does the patient have less than a 3 day supply:  [x] Yes  [] No      Mathieu Horton Rep   02/21/25 13:02 EST

## 2025-04-04 ENCOUNTER — HOSPITAL ENCOUNTER (INPATIENT)
Facility: HOSPITAL | Age: 75
LOS: 2 days | Discharge: HOME | DRG: 388 | End: 2025-04-07
Attending: EMERGENCY MEDICINE | Admitting: STUDENT IN AN ORGANIZED HEALTH CARE EDUCATION/TRAINING PROGRAM
Payer: MEDICARE

## 2025-04-04 ENCOUNTER — APPOINTMENT (OUTPATIENT)
Dept: CT IMAGING | Facility: HOSPITAL | Age: 75
DRG: 388 | End: 2025-04-04
Payer: MEDICARE

## 2025-04-04 ENCOUNTER — APPOINTMENT (OUTPATIENT)
Dept: GENERAL RADIOLOGY | Facility: HOSPITAL | Age: 75
DRG: 388 | End: 2025-04-04
Payer: MEDICARE

## 2025-04-04 DIAGNOSIS — Z99.2 DIALYSIS PATIENT: ICD-10-CM

## 2025-04-04 DIAGNOSIS — K56.609 SMALL BOWEL OBSTRUCTION: Primary | ICD-10-CM

## 2025-04-04 LAB
ALBUMIN SERPL-MCNC: 4.6 G/DL (ref 3.5–5.2)
ALBUMIN/GLOB SERPL: 1.4 G/DL
ALP SERPL-CCNC: 105 U/L (ref 39–117)
ALT SERPL W P-5'-P-CCNC: 12 U/L (ref 1–41)
ANION GAP SERPL CALCULATED.3IONS-SCNC: 16 MMOL/L (ref 5–15)
AST SERPL-CCNC: 17 U/L (ref 1–40)
BASOPHILS # BLD AUTO: 0.01 10*3/MM3 (ref 0–0.2)
BASOPHILS NFR BLD AUTO: 0.1 % (ref 0–1.5)
BILIRUB SERPL-MCNC: 0.6 MG/DL (ref 0–1.2)
BUN SERPL-MCNC: 36 MG/DL (ref 8–23)
BUN/CREAT SERPL: 4.9 (ref 7–25)
CALCIUM SPEC-SCNC: 11.6 MG/DL (ref 8.6–10.5)
CHLORIDE SERPL-SCNC: 92 MMOL/L (ref 98–107)
CO2 SERPL-SCNC: 26 MMOL/L (ref 22–29)
CREAT SERPL-MCNC: 7.36 MG/DL (ref 0.76–1.27)
D-LACTATE SERPL-SCNC: 2.7 MMOL/L (ref 0.5–2)
DEPRECATED RDW RBC AUTO: 45.5 FL (ref 37–54)
EGFRCR SERPLBLD CKD-EPI 2021: 7.2 ML/MIN/1.73
EOSINOPHIL # BLD AUTO: 0 10*3/MM3 (ref 0–0.4)
EOSINOPHIL NFR BLD AUTO: 0 % (ref 0.3–6.2)
ERYTHROCYTE [DISTWIDTH] IN BLOOD BY AUTOMATED COUNT: 13 % (ref 12.3–15.4)
GLOBULIN UR ELPH-MCNC: 3.4 GM/DL
GLUCOSE SERPL-MCNC: 166 MG/DL (ref 65–99)
HCT VFR BLD AUTO: 44.3 % (ref 37.5–51)
HGB BLD-MCNC: 14.8 G/DL (ref 13–17.7)
IMM GRANULOCYTES # BLD AUTO: 0.02 10*3/MM3 (ref 0–0.05)
IMM GRANULOCYTES NFR BLD AUTO: 0.2 % (ref 0–0.5)
LIPASE SERPL-CCNC: 33 U/L (ref 13–60)
LYMPHOCYTES # BLD AUTO: 0.98 10*3/MM3 (ref 0.7–3.1)
LYMPHOCYTES NFR BLD AUTO: 8 % (ref 19.6–45.3)
MCH RBC QN AUTO: 31.4 PG (ref 26.6–33)
MCHC RBC AUTO-ENTMCNC: 33.4 G/DL (ref 31.5–35.7)
MCV RBC AUTO: 94.1 FL (ref 79–97)
MONOCYTES # BLD AUTO: 0.67 10*3/MM3 (ref 0.1–0.9)
MONOCYTES NFR BLD AUTO: 5.4 % (ref 5–12)
NEUTROPHILS NFR BLD AUTO: 10.64 10*3/MM3 (ref 1.7–7)
NEUTROPHILS NFR BLD AUTO: 86.3 % (ref 42.7–76)
PLATELET # BLD AUTO: 220 10*3/MM3 (ref 140–450)
PMV BLD AUTO: 9.5 FL (ref 6–12)
POTASSIUM SERPL-SCNC: 4.4 MMOL/L (ref 3.5–5.2)
PROT SERPL-MCNC: 8 G/DL (ref 6–8.5)
RBC # BLD AUTO: 4.71 10*6/MM3 (ref 4.14–5.8)
SODIUM SERPL-SCNC: 134 MMOL/L (ref 136–145)
WBC NRBC COR # BLD AUTO: 12.32 10*3/MM3 (ref 3.4–10.8)

## 2025-04-04 PROCEDURE — 25010000002 ONDANSETRON PER 1 MG: Performed by: EMERGENCY MEDICINE

## 2025-04-04 PROCEDURE — 74019 RADEX ABDOMEN 2 VIEWS: CPT

## 2025-04-04 PROCEDURE — 25810000003 SODIUM CHLORIDE 0.9 % SOLUTION: Performed by: EMERGENCY MEDICINE

## 2025-04-04 PROCEDURE — 25010000002 PIPERACILLIN SOD-TAZOBACTAM PER 1 G: Performed by: EMERGENCY MEDICINE

## 2025-04-04 PROCEDURE — 85025 COMPLETE CBC W/AUTO DIFF WBC: CPT | Performed by: EMERGENCY MEDICINE

## 2025-04-04 PROCEDURE — 99285 EMERGENCY DEPT VISIT HI MDM: CPT | Performed by: EMERGENCY MEDICINE

## 2025-04-04 PROCEDURE — 36415 COLL VENOUS BLD VENIPUNCTURE: CPT

## 2025-04-04 PROCEDURE — 80053 COMPREHEN METABOLIC PANEL: CPT | Performed by: EMERGENCY MEDICINE

## 2025-04-04 PROCEDURE — 99285 EMERGENCY DEPT VISIT HI MDM: CPT

## 2025-04-04 PROCEDURE — 83690 ASSAY OF LIPASE: CPT | Performed by: EMERGENCY MEDICINE

## 2025-04-04 PROCEDURE — 87040 BLOOD CULTURE FOR BACTERIA: CPT | Performed by: EMERGENCY MEDICINE

## 2025-04-04 PROCEDURE — 83605 ASSAY OF LACTIC ACID: CPT | Performed by: EMERGENCY MEDICINE

## 2025-04-04 PROCEDURE — 25010000002 MORPHINE PER 10 MG: Performed by: EMERGENCY MEDICINE

## 2025-04-04 PROCEDURE — 74176 CT ABD & PELVIS W/O CONTRAST: CPT

## 2025-04-04 RX ORDER — MORPHINE SULFATE 4 MG/ML
4 INJECTION, SOLUTION INTRAMUSCULAR; INTRAVENOUS ONCE
Status: COMPLETED | OUTPATIENT
Start: 2025-04-04 | End: 2025-04-04

## 2025-04-04 RX ORDER — MORPHINE SULFATE 4 MG/ML
4 INJECTION, SOLUTION INTRAMUSCULAR; INTRAVENOUS ONCE
Status: COMPLETED | OUTPATIENT
Start: 2025-04-05 | End: 2025-04-05

## 2025-04-04 RX ORDER — ONDANSETRON 2 MG/ML
4 INJECTION INTRAMUSCULAR; INTRAVENOUS ONCE
Status: COMPLETED | OUTPATIENT
Start: 2025-04-04 | End: 2025-04-04

## 2025-04-04 RX ADMIN — MORPHINE SULFATE 4 MG: 4 INJECTION, SOLUTION INTRAMUSCULAR; INTRAVENOUS at 21:27

## 2025-04-04 RX ADMIN — MORPHINE SULFATE 4 MG: 4 INJECTION, SOLUTION INTRAMUSCULAR; INTRAVENOUS at 22:37

## 2025-04-04 RX ADMIN — ONDANSETRON 4 MG: 2 INJECTION INTRAMUSCULAR; INTRAVENOUS at 21:27

## 2025-04-04 RX ADMIN — SODIUM CHLORIDE 1000 ML: 0.9 INJECTION, SOLUTION INTRAVENOUS at 22:30

## 2025-04-04 RX ADMIN — SODIUM CHLORIDE 500 ML: 9 INJECTION, SOLUTION INTRAVENOUS at 21:28

## 2025-04-04 RX ADMIN — PIPERACILLIN AND TAZOBACTAM 3.38 G: 3; .375 INJECTION, POWDER, FOR SOLUTION INTRAVENOUS at 23:48

## 2025-04-05 ENCOUNTER — APPOINTMENT (OUTPATIENT)
Dept: GENERAL RADIOLOGY | Facility: HOSPITAL | Age: 75
DRG: 388 | End: 2025-04-05
Payer: MEDICARE

## 2025-04-05 PROBLEM — K56.609 SMALL BOWEL OBSTRUCTION: Status: ACTIVE | Noted: 2025-04-05

## 2025-04-05 LAB
ALBUMIN SERPL-MCNC: 3.7 G/DL (ref 3.5–5.2)
ALBUMIN/GLOB SERPL: 1.2 G/DL
ALP SERPL-CCNC: 88 U/L (ref 39–117)
ALT SERPL W P-5'-P-CCNC: 12 U/L (ref 1–41)
ANION GAP SERPL CALCULATED.3IONS-SCNC: 15.4 MMOL/L (ref 5–15)
AST SERPL-CCNC: 19 U/L (ref 1–40)
BILIRUB SERPL-MCNC: 0.5 MG/DL (ref 0–1.2)
BUN SERPL-MCNC: 34 MG/DL (ref 8–23)
BUN/CREAT SERPL: 5 (ref 7–25)
CALCIUM SPEC-SCNC: 10.3 MG/DL (ref 8.6–10.5)
CHLORIDE SERPL-SCNC: 98 MMOL/L (ref 98–107)
CO2 SERPL-SCNC: 26.6 MMOL/L (ref 22–29)
CREAT SERPL-MCNC: 6.82 MG/DL (ref 0.76–1.27)
D-LACTATE SERPL-SCNC: 1.2 MMOL/L (ref 0.5–2)
D-LACTATE SERPL-SCNC: 1.8 MMOL/L (ref 0.5–2)
DEPRECATED RDW RBC AUTO: 44.2 FL (ref 37–54)
EGFRCR SERPLBLD CKD-EPI 2021: 7.8 ML/MIN/1.73
ERYTHROCYTE [DISTWIDTH] IN BLOOD BY AUTOMATED COUNT: 13 % (ref 12.3–15.4)
GLOBULIN UR ELPH-MCNC: 3 GM/DL
GLUCOSE BLDC GLUCOMTR-MCNC: 81 MG/DL (ref 70–130)
GLUCOSE BLDC GLUCOMTR-MCNC: 91 MG/DL (ref 70–130)
GLUCOSE BLDC GLUCOMTR-MCNC: 93 MG/DL (ref 70–130)
GLUCOSE BLDC GLUCOMTR-MCNC: 95 MG/DL (ref 70–130)
GLUCOSE SERPL-MCNC: 112 MG/DL (ref 65–99)
HCT VFR BLD AUTO: 41.1 % (ref 37.5–51)
HGB BLD-MCNC: 14 G/DL (ref 13–17.7)
MCH RBC QN AUTO: 32.1 PG (ref 26.6–33)
MCHC RBC AUTO-ENTMCNC: 34.1 G/DL (ref 31.5–35.7)
MCV RBC AUTO: 94.3 FL (ref 79–97)
PLATELET # BLD AUTO: 198 10*3/MM3 (ref 140–450)
PMV BLD AUTO: 9.2 FL (ref 6–12)
POTASSIUM SERPL-SCNC: 4.8 MMOL/L (ref 3.5–5.2)
PROT SERPL-MCNC: 6.7 G/DL (ref 6–8.5)
RBC # BLD AUTO: 4.36 10*6/MM3 (ref 4.14–5.8)
SODIUM SERPL-SCNC: 140 MMOL/L (ref 136–145)
WBC NRBC COR # BLD AUTO: 13.55 10*3/MM3 (ref 3.4–10.8)

## 2025-04-05 PROCEDURE — 25010000002 MORPHINE PER 10 MG: Performed by: EMERGENCY MEDICINE

## 2025-04-05 PROCEDURE — 97162 PT EVAL MOD COMPLEX 30 MIN: CPT

## 2025-04-05 PROCEDURE — 83605 ASSAY OF LACTIC ACID: CPT | Performed by: EMERGENCY MEDICINE

## 2025-04-05 PROCEDURE — 97530 THERAPEUTIC ACTIVITIES: CPT

## 2025-04-05 PROCEDURE — 99223 1ST HOSP IP/OBS HIGH 75: CPT | Performed by: SURGERY

## 2025-04-05 PROCEDURE — 83605 ASSAY OF LACTIC ACID: CPT | Performed by: NURSE PRACTITIONER

## 2025-04-05 PROCEDURE — 74018 RADEX ABDOMEN 1 VIEW: CPT

## 2025-04-05 PROCEDURE — 80053 COMPREHEN METABOLIC PANEL: CPT | Performed by: NURSE PRACTITIONER

## 2025-04-05 PROCEDURE — 82948 REAGENT STRIP/BLOOD GLUCOSE: CPT

## 2025-04-05 PROCEDURE — 85027 COMPLETE CBC AUTOMATED: CPT | Performed by: NURSE PRACTITIONER

## 2025-04-05 PROCEDURE — 5A1D70Z PERFORMANCE OF URINARY FILTRATION, INTERMITTENT, LESS THAN 6 HOURS PER DAY: ICD-10-PCS | Performed by: STUDENT IN AN ORGANIZED HEALTH CARE EDUCATION/TRAINING PROGRAM

## 2025-04-05 RX ORDER — NICOTINE POLACRILEX 4 MG
15 LOZENGE BUCCAL
Status: DISCONTINUED | OUTPATIENT
Start: 2025-04-05 | End: 2025-04-07 | Stop reason: HOSPADM

## 2025-04-05 RX ORDER — AMOXICILLIN 250 MG
1 CAPSULE ORAL NIGHTLY PRN
Status: DISCONTINUED | OUTPATIENT
Start: 2025-04-05 | End: 2025-04-07 | Stop reason: HOSPADM

## 2025-04-05 RX ORDER — IBUPROFEN 600 MG/1
1 TABLET ORAL
Status: DISCONTINUED | OUTPATIENT
Start: 2025-04-05 | End: 2025-04-07 | Stop reason: HOSPADM

## 2025-04-05 RX ORDER — SEVELAMER CARBONATE 800 MG/1
1600 TABLET, FILM COATED ORAL 3 TIMES DAILY
Status: DISCONTINUED | OUTPATIENT
Start: 2025-04-05 | End: 2025-04-07 | Stop reason: HOSPADM

## 2025-04-05 RX ORDER — FAMOTIDINE 20 MG/1
20 TABLET, FILM COATED ORAL 2 TIMES DAILY PRN
Status: DISCONTINUED | OUTPATIENT
Start: 2025-04-05 | End: 2025-04-07 | Stop reason: HOSPADM

## 2025-04-05 RX ORDER — MORPHINE SULFATE 2 MG/ML
2 INJECTION, SOLUTION INTRAMUSCULAR; INTRAVENOUS EVERY 4 HOURS PRN
Status: DISCONTINUED | OUTPATIENT
Start: 2025-04-05 | End: 2025-04-07 | Stop reason: HOSPADM

## 2025-04-05 RX ORDER — ATORVASTATIN CALCIUM 20 MG/1
40 TABLET, FILM COATED ORAL NIGHTLY
Status: DISCONTINUED | OUTPATIENT
Start: 2025-04-05 | End: 2025-04-07 | Stop reason: HOSPADM

## 2025-04-05 RX ORDER — ASPIRIN 81 MG/1
81 TABLET ORAL NIGHTLY
Status: DISCONTINUED | OUTPATIENT
Start: 2025-04-05 | End: 2025-04-07 | Stop reason: HOSPADM

## 2025-04-05 RX ORDER — INSULIN LISPRO 100 [IU]/ML
2-7 INJECTION, SOLUTION INTRAVENOUS; SUBCUTANEOUS
Status: DISCONTINUED | OUTPATIENT
Start: 2025-04-05 | End: 2025-04-07 | Stop reason: HOSPADM

## 2025-04-05 RX ORDER — ACETAMINOPHEN 325 MG/1
650 TABLET ORAL EVERY 4 HOURS PRN
Status: DISCONTINUED | OUTPATIENT
Start: 2025-04-05 | End: 2025-04-07 | Stop reason: HOSPADM

## 2025-04-05 RX ORDER — ONDANSETRON 2 MG/ML
4 INJECTION INTRAMUSCULAR; INTRAVENOUS EVERY 6 HOURS PRN
Status: DISCONTINUED | OUTPATIENT
Start: 2025-04-05 | End: 2025-04-07 | Stop reason: HOSPADM

## 2025-04-05 RX ORDER — SODIUM CHLORIDE 9 MG/ML
40 INJECTION, SOLUTION INTRAVENOUS AS NEEDED
Status: DISCONTINUED | OUTPATIENT
Start: 2025-04-05 | End: 2025-04-07 | Stop reason: HOSPADM

## 2025-04-05 RX ORDER — NALOXONE HCL 0.4 MG/ML
0.4 VIAL (ML) INJECTION
Status: DISCONTINUED | OUTPATIENT
Start: 2025-04-05 | End: 2025-04-07 | Stop reason: HOSPADM

## 2025-04-05 RX ORDER — SODIUM CHLORIDE 0.9 % (FLUSH) 0.9 %
10 SYRINGE (ML) INJECTION AS NEEDED
Status: DISCONTINUED | OUTPATIENT
Start: 2025-04-05 | End: 2025-04-07 | Stop reason: HOSPADM

## 2025-04-05 RX ORDER — TRAZODONE HYDROCHLORIDE 50 MG/1
50 TABLET ORAL NIGHTLY PRN
Status: DISCONTINUED | OUTPATIENT
Start: 2025-04-05 | End: 2025-04-07 | Stop reason: HOSPADM

## 2025-04-05 RX ORDER — HYDRALAZINE HYDROCHLORIDE 20 MG/ML
10 INJECTION INTRAMUSCULAR; INTRAVENOUS EVERY 4 HOURS PRN
Status: DISCONTINUED | OUTPATIENT
Start: 2025-04-05 | End: 2025-04-07 | Stop reason: HOSPADM

## 2025-04-05 RX ORDER — DULOXETIN HYDROCHLORIDE 20 MG/1
20 CAPSULE, DELAYED RELEASE ORAL DAILY
Status: DISCONTINUED | OUTPATIENT
Start: 2025-04-05 | End: 2025-04-07 | Stop reason: HOSPADM

## 2025-04-05 RX ORDER — SODIUM CHLORIDE 0.9 % (FLUSH) 0.9 %
10 SYRINGE (ML) INJECTION EVERY 12 HOURS SCHEDULED
Status: DISCONTINUED | OUTPATIENT
Start: 2025-04-05 | End: 2025-04-07 | Stop reason: HOSPADM

## 2025-04-05 RX ORDER — ACETAMINOPHEN 650 MG/1
650 SUPPOSITORY RECTAL EVERY 4 HOURS PRN
Status: DISCONTINUED | OUTPATIENT
Start: 2025-04-05 | End: 2025-04-07 | Stop reason: HOSPADM

## 2025-04-05 RX ORDER — ACETAMINOPHEN 160 MG/5ML
650 SOLUTION ORAL EVERY 4 HOURS PRN
Status: DISCONTINUED | OUTPATIENT
Start: 2025-04-05 | End: 2025-04-07 | Stop reason: HOSPADM

## 2025-04-05 RX ORDER — DEXTROSE MONOHYDRATE 25 G/50ML
25 INJECTION, SOLUTION INTRAVENOUS
Status: DISCONTINUED | OUTPATIENT
Start: 2025-04-05 | End: 2025-04-07 | Stop reason: HOSPADM

## 2025-04-05 RX ADMIN — MORPHINE SULFATE 4 MG: 4 INJECTION, SOLUTION INTRAMUSCULAR; INTRAVENOUS at 00:07

## 2025-04-05 RX ADMIN — DULOXETINE 20 MG: 20 CAPSULE, DELAYED RELEASE ORAL at 16:19

## 2025-04-05 RX ADMIN — Medication 10 ML: at 21:47

## 2025-04-05 RX ADMIN — ASPIRIN 81 MG: 81 TABLET, COATED ORAL at 21:40

## 2025-04-05 RX ADMIN — Medication 10 ML: at 09:00

## 2025-04-05 RX ADMIN — ATORVASTATIN CALCIUM 40 MG: 20 TABLET, FILM COATED ORAL at 21:40

## 2025-04-05 RX ADMIN — TRAZODONE HYDROCHLORIDE 50 MG: 50 TABLET ORAL at 21:40

## 2025-04-05 NOTE — THERAPY EVALUATION
Patient Name: Lefty Cai  : 1950    MRN: [id number]                              Today's Date: 2025       Admit Date: 2025    Visit Dx:     ICD-10-CM ICD-9-CM   1. Small bowel obstruction  K56.609 560.9   2. Dialysis patient  Z99.2 V45.11     Patient Active Problem List   Diagnosis    Type 2 diabetes mellitus with diabetic chronic kidney disease    Essential hypertension    Hyperlipidemia    Primary insomnia    ESRD (end stage renal disease)    Vitamin D deficiency    Diverticulitis of large intestine with perforation and abscess without bleeding    Impaired mobility and ADLs    History of colon resection    Colon polyps    Diverticulosis    CKD (chronic kidney disease) stage 5, GFR less than 15 ml/min    NSTEMI (non-ST elevated myocardial infarction)    Abnormal findings on diagnostic imaging of heart and coronary circulation    Fatigue    Acute superficial venous thrombosis of right lower extremity    Orthostatic hypotension    Acute UTI (urinary tract infection)    Ischemic cardiomyopathy    S/P MVR (mitral valve repair)    Coronary artery disease involving native coronary artery of native heart without angina pectoris    S/P CABG (coronary artery bypass graft)    Nonrheumatic mitral valve regurgitation    AV fistula    Small bowel obstruction     Past Medical History:   Diagnosis Date    Anemia     Anesthesia complication     HYPOTENSION    Arthritis     CAD (coronary artery disease) 2023    unspecified    Cancer of kidney, left     CKD (chronic kidney disease)     STAGE 5    Diabetes mellitus, type 2     End stage renal disease 2023    Essential (primary) hypertension 2019    Fatigue     Fibrosis due to vascular prosthetic devices, implants and grafts, initial encounter 2023    GERD (gastroesophageal reflux disease)     H/O renal cell carcinoma 2007    partial nephrectomy    History of colostomy reversal 2022    [place] (hard of hearing)     NO DEVICE     Hyperlipidemia     Hyperlipidemia, unspecified     Hypertension     Primary insomnia 06/13/2016    Proteinuria     Risk factors for obstructive sleep apnea     Sinus drainage     Stage 4 chronic kidney disease 07/02/2019    Type 2 diabetes mellitus with diabetic chronic kidney disease     Type 2 diabetes mellitus with hyperglycemia 07/02/2019    Vitamin D deficiency 08/14/2017    Vitamin D deficiency, unspecified      Past Surgical History:   Procedure Laterality Date    ARTERIOVENOUS FISTULA/SHUNT SURGERY Left 08/15/2019    Procedure: LEFT MID FOREARM RADIAL CEPHALIC ARTERIAL VENOUS FISTULA;  Surgeon: Louann Miles Jr., MD;  Location: [facility] MAIN OR;  Service: Vascular    CARDIAC CATHETERIZATION N/A 4/9/2023    Procedure: Left Heart Cath;  Surgeon: Lobito Garcia MD;  Location: [facility] CATH INVASIVE LOCATION;  Service: Cardiovascular;  Laterality: N/A;    CARDIAC CATHETERIZATION N/A 4/9/2023    Procedure: Left ventriculography;  Surgeon: Lobito Garcia MD;  Location: [facility] CATH INVASIVE LOCATION;  Service: Cardiovascular;  Laterality: N/A;    CARDIAC CATHETERIZATION N/A 4/9/2023    Procedure: Coronary angiography;  Surgeon: Lobito Garcia MD;  Location: [facility] CATH INVASIVE LOCATION;  Service: Cardiovascular;  Laterality: N/A;    COLONOSCOPY  03/24/2022    COLONOSCOPY N/A 03/24/2022    Procedure: COLONOSCOPY TO CECUM WITH POLYPECTOMY  (HOT SNARE);  Surgeon: Yelena Guerra MD;  Location: [facility] ENDOSCOPY;  Service: General;  Laterality: N/A;  PREOP/ HX OF DIVERTICULITIS, COLOSTOMY  POSTOP/ POLYPS, DIVERTICULOSIS     COLOSTOMY  09/06/2021    COLOSTOMY CLOSURE N/A 04/12/2022    Procedure: open Colostomy reversal;  Surgeon: Yelena Guerra MD;  Location: [facility] MAIN OR;  Service: General;  Laterality: N/A;    CORONARY ARTERY BYPASS GRAFT N/A 4/13/2023    Procedure: MAT STERNOTOMY CORONARY ARTERY BYPASS GRAFT TIMES 4 USING LEFT INTERNAL MAMMARY ARTERY AND RIGHT GREATER SAPHENOUS VEIN  PER  ENDOSCOPIC VEIN HARVESTING, MITRAL VALVE REPAIR  AND PRP;  Surgeon: Mirtha Zuluaga MD;  Location: [facility] CVOR;  Service: Cardiothoracic;  Laterality: N/A;    DIAGNOSTIC LAPAROSCOPY N/A 2/27/2023    Procedure: DIAGNOSTIC LAPAROSCOPY;  Surgeon: Murali Ferreira MD;  Location: [facility] MAIN OR;  Service: General;  Laterality: N/A;    EXPLORATORY LAPAROTOMY N/A 09/06/2021    Procedure: Open sigmoind colectomy, colostomy, and appendectomy;  Surgeon: Yelena Guerra MD;  Location: [facility] MAIN OR;  Service: General;  Laterality: N/A;    EYE SURGERY      CATARACTS    KNEE ARTHROSCOPY Right     NEPHRECTOMY PARTIAL  2007    Dr. Victor      General Information       Row Name 04/05/25 1332          Physical Therapy Time and Intention    Document Type evaluation;discharge evaluation/summary  -     Mode of Treatment individual therapy;physical therapy  -       Row Name 04/05/25 1332          General Information    Patient Profile Reviewed yes  -JS     Prior Level of Function independent:;all household mobility;community mobility;gait;transfer;bed mobility;ADL's;driving  -     Existing Precautions/Restrictions no known precautions/restrictions  -     Barriers to Rehab none identified  -       Row Name 04/05/25 1332          Living Environment    Current Living Arrangements home  -     People in Home spouse  -       Row Name 04/05/25 1332          Home Main Entrance    Number of Stairs, Main Entrance four  -JS     Stair Railings, Main Entrance railings safe and in good condition  -       Row Name 04/05/25 1332          Stairs Within Home, Primary    Number of Stairs, Within Home, Primary none  -       Row Name 04/05/25 1332          Cognition    Orientation Status (Cognition) oriented x 4  -JS       Row Name 04/05/25 1332          Safety Issues/Impairments Affecting Functional Mobility    Comment, Safety Issues/Impairments (Mobility) gait belt and pt personal tennis shoes worn throughout session   -JS               User Key  (r) = Recorded By, (t) = Taken By, (c) = Cosigned By      Initials Name Provider Type    Eva Mccormack PT Physical Therapist                   Mobility       Row Name 04/05/25 1333          Bed Mobility    Bed Mobility supine-sit  -JS     Supine-Sit [place] (Bed Mobility) independent  -JS       Row Name 04/05/25 1333          Bed-Chair Transfer    Bed-Chair [place] (Transfers) standby assist  -JS       Row Name 04/05/25 1333          Sit-Stand Transfer    Sit-Stand [place] (Transfers) modified independence  -JS     Comment, (Sit-Stand Transfer) push up from bed  -JS       Row Name 04/05/25 1333          Gait/Stairs (Locomotion)    [place] Level (Gait) standby assist  -JS     Distance in Feet (Gait) 50  -JS     Deviations/Abnormal Patterns (Gait) gait speed decreased  -JS     Bilateral Gait Deviations forward flexed posture  -JS     [place] Level (Stairs) stand by assist  -JS     Handrail Location (Stairs) left side (ascending);right side (descending)  -JS     Number of Steps (Stairs) 4  -JS     Ascending Technique (Stairs) step-over-step  -JS     Descending Technique (Stairs) step-over-step  -JS     Comment, (Gait/Stairs) pt demo at baseline function with gait and stairs  -JS               User Key  (r) = Recorded By, (t) = Taken By, (c) = Cosigned By      Initials Name Provider Type    Eva Mccormack PT Physical Therapist                   Obj/Interventions       Row Name 04/05/25 1335          Range of Motion Comprehensive    General Range of Motion no range of motion deficits identified  -JS       Row Name 04/05/25 1335          Strength Comprehensive (MMT)    General Manual Muscle Testing (MMT) Assessment no strength deficits identified  -JS     Comment, General Manual Muscle Testing (MMT) Assessment at baseline strength  -JS       Row Name 04/05/25 1335          Balance    Comment, Balance demo good and safe balance with all functional tasks   -JS               User Key  (r) = Recorded By, (t) = Taken By, (c) = Cosigned By      Initials Name Provider Type    Eva Mccormack, PT Physical Therapist                   Goals/Plan    No documentation.                  Clinical Impression       Row Name 04/05/25 1335          Pain    Pretreatment Pain Rating 0/10 - no pain  -JS     Posttreatment Pain Rating 0/10 - no pain  -JS       Row Name 04/05/25 1335          Plan of Care Review    Plan of Care Reviewed With patient;spouse  -JS     Progress improving  -       Row Name 04/05/25 1335          Therapy Assessment/Plan (PT)    Criteria for Skilled Interventions Met (PT) no problems identified which require skilled intervention  -       Row Name 04/05/25 1335          Vital Signs    O2 Delivery Pre Treatment room air  -JS     O2 Delivery Intra Treatment room air  -JS     O2 Delivery Post Treatment room air  -JS     Pre Patient Position Supine  -JS     Intra Patient Position Standing  -JS     Post Patient Position Sitting  -       Row Name 04/05/25 1335          Positioning and Restraints    Pre-Treatment Position in bed  -JS     Post Treatment Position chair  -JS     In Chair sitting;call light within reach;encouraged to call for assist;with family/caregiver  -JS               User Key  (r) = Recorded By, (t) = Taken By, (c) = Cosigned By      Initials Name Provider Type    Eva Mccormack, PT Physical Therapist                   Outcome Measures       Row Name 04/05/25 1336 04/05/25 0859       How much help from another person do you currently need...    Turning from your back to your side while in flat bed without using bedrails? 4  -JS 4  -DL    Moving from lying on back to sitting on the side of a flat bed without bedrails? 4  -JS 4  -DL    Moving to and from a bed to a chair (including a wheelchair)? 4  -JS 4  -DL    Standing up from a chair using your arms (e.g., wheelchair, bedside chair)? 4  -JS 4  -DL    Climbing 3-5 steps with a railing?  4  -JS 4  -DL    To walk in hospital room? 4  -JS 4  -DL    AM-PAC 6 Clicks Score (PT) 24  -JS 24  -DL    Highest Level of Mobility Goal 8 --> Walked 250 feet or more  -JS 8 --> Walked 250 feet or more  -DL      Row Name 04/05/25 0150          How much help from another person do you currently need...    Turning from your back to your side while in flat bed without using bedrails? 4  -RK     Moving from lying on back to sitting on the side of a flat bed without bedrails? 4  -RK     Moving to and from a bed to a chair (including a wheelchair)? 4  -RK     Standing up from a chair using your arms (e.g., wheelchair, bedside chair)? 3  -RK     Climbing 3-5 steps with a railing? 3  -RK     To walk in hospital room? 3  -RK     AM-PAC 6 Clicks Score (PT) 21  -RK     Highest Level of Mobility Goal 6 --> Walk 10 steps or more  -RK       Row Name 04/05/25 1336          Functional Assessment    Outcome Measure Options AM-PAC 6 Clicks Basic Mobility (PT)  -JS               User Key  (r) = Recorded By, (t) = Taken By, (c) = Cosigned By      Initials Name Provider Type    Eva Mccormack, DEENA Physical Therapist    Yelena Packer, RN Registered Nurse    Deisy Gamez RN Registered Nurse                                   PT Recommendation and Plan     Progress: improving     Time Calculation:         PT Charges       Row Name 04/05/25 1338             Time Calculation    Start Time 0845  -JS      Stop Time 0910  -JS      Time Calculation (min) 25 min  -JS      PT Received On 04/05/25  -JS                User Key  (r) = Recorded By, (t) = Taken By, (c) = Cosigned By      Initials Name Provider Type    Eva Mccormack, PT Physical Therapist                  Therapy Charges for Today       Code Description Service Date Service Provider Modifiers Qty    [id number]  PT EVAL MOD COMPLEXITY 3 4/5/2025 Eva Mayberry, PT GP 1    [id number]  PT THERAPEUTIC ACT EA 15 MIN 4/5/2025 Eva Mayberry, PT GP 1             PT G-Codes  Outcome Measure Options: AM-PAC 6 Clicks Basic Mobility (PT)  AM-PAC 6 Clicks Score (PT): 24  PT Discharge Summary  Anticipated Discharge Disposition (PT): home    Eva Mayberry, PT  4/5/2025

## 2025-04-05 NOTE — PLAN OF CARE
Goal Outcome Evaluation:  Plan of Care Reviewed With: patient        Progress: no change  Outcome Evaluation: admitted last nigh to ED. NG tube in place with low suction. strict NPO. L arm fistula bruit an thrill present. x1 assist BRP. dialysis thursday, sat, monday. pain controled with IV meds. possible sx.

## 2025-04-05 NOTE — H&P
Patient Name:  Lefty Cai  YOB: 1950  MRN:  [id number]  Admit Date:  4/4/2025  Patient Care Team:  Daksha Ng APRN as PCP - General (Family Medicine)  Rudy Faith MD as Consulting Physician (Ophthalmology)  Jose Mccall MD as Consulting Physician (Nephrology)  Quang Reyes MD as Consulting Physician (Nephrology)      Subjective   History Present Illness     Chief Complaint   Patient presents with    Diarrhea    Vomiting         History of Present Illness  Patient is a 74-year-old male with a history of including but not limited to ESRD on dialysis, ischemic cardiomyopathy, CAD status post CABG, severe mitral re regurgitation status post mitral valve repair, HTN, type II DM, presented to the ED complaining of acute nausea vomiting and diarrhea.  Patient reports he went out to eat, ate hamburger and fries, and soon after arriving home he started having acute vomiting nausea and diarrhea associated with abdominal pain.  No sick contacts.  No fevers no chills.  Denies treatment antibiotics.  Symptoms currently improving.        Review of Systems   GENERAL: No fevers, chills, sweats.    RESPIRATORY: No cough, shortness of breath, hemoptysis or wheezing.   CVS: No chest pain, palpitations, orthopnea, dyspnea on exertion   GI: No melena or hematochezia. + abdominal pain. + nausea, +vomiting, constipation, diarrhea    Personal History     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, implants and grafts, initial encounter 09/13/2023    GERD (gastroesophageal reflux disease)     H/O renal cell carcinoma 2007     partial nephrectomy    History of colostomy reversal 04/2022    [place]     NO DEVICE    Hyperlipidemia     Hyperlipidemia, unspecified     Hypertension     Primary insomnia 06/13/2016    Proteinuria     Risk factors for obstructive sleep apnea     Sinus drainage     Stage 4 chronic kidney disease 07/02/2019    Type 2 diabetes mellitus with diabetic chronic kidney disease     Type 2 diabetes mellitus with hyperglycemia 07/02/2019    Vitamin D deficiency 08/14/2017    Vitamin D deficiency, unspecified      Past Surgical History:   Procedure Laterality Date    ARTERIOVENOUS FISTULA/SHUNT SURGERY Left 08/15/2019    Procedure: LEFT MID FOREARM RADIAL CEPHALIC ARTERIAL VENOUS FISTULA;  Surgeon: Louann Miles Jr., MD;  Location: [facility] OR;  Service: Vascular    CARDIAC CATHETERIZATION N/A 4/9/2023    Procedure: Left Heart Cath;  Surgeon: Lobito Garcia MD;  Location: [facility] CATH INVASIVE LOCATION;  Service: Cardiovascular;  Laterality: N/A;    CARDIAC CATHETERIZATION N/A 4/9/2023    Procedure: Left ventriculography;  Surgeon: Lobito Garcia MD;  Location: [facility] CATH INVASIVE LOCATION;  Service: Cardiovascular;  Laterality: N/A;    CARDIAC CATHETERIZATION N/A 4/9/2023    Procedure: Coronary angiography;  Surgeon: Lobito Garcia MD;  Location: [facility] CATH INVASIVE LOCATION;  Service: Cardiovascular;  Laterality: N/A;    COLONOSCOPY  03/24/2022    COLONOSCOPY N/A 03/24/2022    Procedure: COLONOSCOPY TO CECUM WITH POLYPECTOMY  (HOT SNARE);  Surgeon: Yelena Guerra MD;  Location: [facility] ENDOSCOPY;  Service: General;  Laterality: N/A;  PREOP/ HX OF DIVERTICULITIS, COLOSTOMY  POSTOP/ POLYPS, DIVERTICULOSIS     COLOSTOMY  09/06/2021    COLOSTOMY CLOSURE N/A 04/12/2022    Procedure: open Colostomy reversal;  Surgeon: Yelena Guerra MD;  Location: [facility] MAIN OR;  Service: General;  Laterality: N/A;    CORONARY ARTERY BYPASS GRAFT N/A 4/13/2023    Procedure: MAT STERNOTOMY CORONARY ARTERY  BYPASS GRAFT TIMES 4 USING LEFT INTERNAL MAMMARY ARTERY AND RIGHT GREATER SAPHENOUS VEIN  PER ENDOSCOPIC VEIN HARVESTING, MITRAL VALVE REPAIR  AND PRP;  Surgeon: Mirtha Zuluaga MD;  Location: [facility];  Service: Cardiothoracic;  Laterality: N/A;    DIAGNOSTIC LAPAROSCOPY N/A 2023    Procedure: DIAGNOSTIC LAPAROSCOPY;  Surgeon: Murali Ferreira MD;  Location: [facility] MAIN OR;  Service: General;  Laterality: N/A;    EXPLORATORY LAPAROTOMY N/A 2021    Procedure: Open sigmoind colectomy, colostomy, and appendectomy;  Surgeon: Yelena Guerra MD;  Location: [facility] MAIN OR;  Service: General;  Laterality: N/A;    EYE SURGERY      CATARACTS    KNEE ARTHROSCOPY Right     NEPHRECTOMY PARTIAL      Dr. Victor     Family History   Problem Relation Age of Onset    Hypertension Mother     Heart disease Father         ASCVD    Macular degeneration Father     Cancer Father         bladder    Kidney failure Father     Diabetes type I Sister     Malig Hyperthermia Neg Hx      Social History     Tobacco Use    Smoking status: Former     Current packs/day: 0.00     Average packs/day: 2.0 packs/day for 25.0 years (50.0 ttl pk-yrs)     Types: Cigarettes     Start date:      Quit date:      Years since quittin.2    Smokeless tobacco: Current     Types: Snuff    Tobacco comments:     Patient quit smoking   Vaping Use    Vaping status: Never Used   Substance Use Topics    Alcohol use: No    Drug use: No     No current facility-administered medications on file prior to encounter.     Current Outpatient Medications on File Prior to Encounter   Medication Sig Dispense Refill    aspirin 81 MG tablet Take 1 tablet by mouth Every Night. FOLLOW MD GUIDELINES ON WHEN/IF TO HOLD FOR DOS  Indications: blood thinner      atorvastatin (LIPITOR) 40 MG tablet TAKE 1 TABLET BY MOUTH EVERY NIGHT. INDICATIONS: HIGH AMOUNT OF FATS IN THE BLOOD 90 tablet 3    DULoxetine (CYMBALTA) 30 MG capsule TAKE 1 CAPSULE  EVERY DAY 90 capsule 3    fexofenadine (ALLEGRA) 180 MG tablet Take 1 tablet by mouth Daily. Indications: Hayfever      lidocaine-prilocaine (EMLA) 2.5-2.5 % cream APPLY TO SKIN OVER DIALYSIS ACCESS 30 MINUTES BEFORE      midodrine (PROAMATINE) 10 MG tablet TAKE 1 TABLET BY MOUTH 3 TIMES PER WEEK ON DIALYSIS DAYS.      sennosides-docusate (senna-docusate sodium) 8.6-50 MG per tablet Take 1 tablet by mouth At Night As Needed for Constipation. 60 tablet 0    sevelamer (RENAGEL) 800 MG tablet Take 1 tablet by mouth 2 (Two) Times a Day. Indications: UNKNOWN      sevelamer (RENVELA) 800 MG tablet Take 2 tablets by mouth 3 (Three) Times a Day.      traZODone (DESYREL) 50 MG tablet Take 1 tablet by mouth At Night As Needed for Sleep. 90 tablet 3    vitamin D3 125 MCG (5000 UT) capsule capsule Take 1 tablet by mouth 2 (Two) Times a Week. SAT AND WED  Indications: supplement        Allergies   Allergen Reactions    Contrast Dye (Echo Or Unknown Ct/Mr) Other (See Comments)     KIDNEY DISEASE    Latex Other (See Comments)     Blisters       Objective    Objective     Vital Signs  Temp:  [97.9 °F (36.6 °C)-98.9 °F (37.2 °C)] 97.9 °F (36.6 °C)  Heart Rate:  [76-88] 81  Resp:  [12-16] 14  BP: (152-182)/() 152/76  SpO2:  [97 %-100 %] 97 %  on   ;   Device (Oxygen Therapy): room air  Body mass index is 28.84 kg/m².    Physical Exam  General: Alert, lying in bed, not in distress,  HEENT: Normocephalic, atraumatic  Eyes: PERRL, EOMI, anicteric sclerae  Lungs: CTA, no wheezing  CV: Regular rate and rhythm, + systolic murmur  Abdomen: Soft, mildly distended, generalized TTP, hypoactive bowel sounds  Extremities: No significant peripheral edema  Skin: Clean/dry/intact, no rashes  Neuro: Cranial nerves II through XII intact, no gross focal neurological deficits appreciated  Psych: Appropriate mood and affect    Results Review:  I reviewed the patient's new clinical results.  I reviewed the patient's new imaging results and agree  with the interpretation.  I reviewed the patient's other test results and agree with the interpretation  I personally viewed and interpreted the patient's EKG/Telemetry data  Discussed with ED provider.    Lab Results (last 24 hours)       Procedure Component Value Units Date/Time    CBC & Differential [[id number]]  (Abnormal) Collected: 04/04/25 2130    Specimen: Blood Updated: 04/04/25 2135    Narrative:      The following orders were created for panel order CBC & Differential.  Procedure                               Abnormality         Status                     ---------                               -----------         ------                     CBC Auto Differential[[id number]]        Abnormal            Final result                 Please view results for these tests on the individual orders.    Comprehensive Metabolic Panel [[id number]]  (Abnormal) Collected: 04/04/25 2130    Specimen: Blood Updated: 04/04/25 2157     Glucose 166 mg/dL      BUN 36 mg/dL      Creatinine 7.36 mg/dL      Sodium 134 mmol/L      Potassium 4.4 mmol/L      Chloride 92 mmol/L      CO2 26.0 mmol/L      Calcium 11.6 mg/dL      Total Protein 8.0 g/dL      Albumin 4.6 g/dL      ALT (SGPT) 12 U/L      AST (SGOT) 17 U/L      Alkaline Phosphatase 105 U/L      Total Bilirubin 0.6 mg/dL      Globulin 3.4 gm/dL      A/G Ratio 1.4 g/dL      BUN/Creatinine Ratio 4.9     Anion Gap 16.0 mmol/L      eGFR 7.2 mL/min/1.73     Narrative:      GFR Categories in Chronic Kidney Disease (CKD)      GFR Category          GFR (mL/min/1.73)    Interpretation  G1                     90 or greater         Normal or high (1)  G2                      60-89                Mild decrease (1)  G3a                   45-59                Mild to moderate decrease  G3b                   30-44                Moderate to severe decrease  G4                    15-29                Severe decrease  G5                    14 or less           Kidney failure          (1)In the  absence of evidence of kidney disease, neither GFR category G1 or G2 fulfill the criteria for CKD.    eGFR calculation 2021 CKD-EPI creatinine equation, which does not include race as a factor    Lipase [[id number]]  (Normal) Collected: 04/04/25 2130    Specimen: Blood Updated: 04/04/25 2157     Lipase 33 U/L     Lactic Acid, Plasma [[id number]]  (Abnormal) Collected: 04/04/25 2130    Specimen: Blood Updated: 04/04/25 2158     Lactate 2.7 mmol/L     CBC Auto Differential [[id number]]  (Abnormal) Collected: 04/04/25 2130    Specimen: Blood Updated: 04/04/25 2135     WBC 12.32 10*3/mm3      RBC 4.71 10*6/mm3      Hemoglobin 14.8 g/dL      Hematocrit 44.3 %      MCV 94.1 fL      MCH 31.4 pg      MCHC 33.4 g/dL      RDW 13.0 %      RDW-SD 45.5 fl      MPV 9.5 fL      Platelets 220 10*3/mm3      Neutrophil % 86.3 %      Lymphocyte % 8.0 %      Monocyte % 5.4 %      Eosinophil % 0.0 %      Basophil % 0.1 %      Immature Grans % 0.2 %      Neutrophils, Absolute 10.64 10*3/mm3      Lymphocytes, Absolute 0.98 10*3/mm3      Monocytes, Absolute 0.67 10*3/mm3      Eosinophils, Absolute 0.00 10*3/mm3      Basophils, Absolute 0.01 10*3/mm3      Immature Grans, Absolute 0.02 10*3/mm3     Blood Culture - Blood, Arm, Right [[id number]] Collected: 04/04/25 2342    Specimen: Blood from Arm, Right Updated: 04/05/25 0026    Blood Culture - Blood, Hand, Right [[id number]] Collected: 04/04/25 2342    Specimen: Blood from Hand, Right Updated: 04/05/25 0026    STAT Lactic Acid, Reflex [[id number]]  (Normal) Collected: 04/05/25 0040    Specimen: Blood Updated: 04/05/25 0132     Lactate 1.8 mmol/L     CBC (No Diff) [[id number]]  (Abnormal) Collected: 04/05/25 0416    Specimen: Blood from Hand, Right Updated: 04/05/25 0448     WBC 13.55 10*3/mm3      RBC 4.36 10*6/mm3      Hemoglobin 14.0 g/dL      Hematocrit 41.1 %      MCV 94.3 fL      MCH 32.1 pg      MCHC 34.1 g/dL      RDW 13.0 %      RDW-SD 44.2 fl      MPV 9.2 fL      Platelets 198 10*3/mm3      Comprehensive Metabolic Panel [[id number]]  (Abnormal) Collected: 04/05/25 0416    Specimen: Blood from Hand, Right Updated: 04/05/25 0514     Glucose 112 mg/dL      BUN 34 mg/dL      Creatinine 6.82 mg/dL      Sodium 140 mmol/L      Potassium 4.8 mmol/L      Chloride 98 mmol/L      CO2 26.6 mmol/L      Calcium 10.3 mg/dL      Total Protein 6.7 g/dL      Albumin 3.7 g/dL      ALT (SGPT) 12 U/L      AST (SGOT) 19 U/L      Alkaline Phosphatase 88 U/L      Total Bilirubin 0.5 mg/dL      Globulin 3.0 gm/dL      A/G Ratio 1.2 g/dL      BUN/Creatinine Ratio 5.0     Anion Gap 15.4 mmol/L      eGFR 7.8 mL/min/1.73     Narrative:      GFR Categories in Chronic Kidney Disease (CKD)      GFR Category          GFR (mL/min/1.73)    Interpretation  G1                     90 or greater         Normal or high (1)  G2                      60-89                Mild decrease (1)  G3a                   45-59                Mild to moderate decrease  G3b                   30-44                Moderate to severe decrease  G4                    15-29                Severe decrease  G5                    14 or less           Kidney failure          (1)In the absence of evidence of kidney disease, neither GFR category G1 or G2 fulfill the criteria for CKD.    eGFR calculation 2021 CKD-EPI creatinine equation, which does not include race as a factor    Lactic Acid, Plasma [[id number]]  (Normal) Collected: 04/05/25 0416    Specimen: Blood from Hand, Right Updated: 04/05/25 0507     Lactate 1.2 mmol/L     POC Glucose Once [[id number]]  (Normal) Collected: 04/05/25 0616    Specimen: Blood Updated: 04/05/25 0617     Glucose 91 mg/dL             Imaging Results (Last 24 Hours)       Procedure Component Value Units Date/Time    XR Abdomen KUB [[id number]] Collected: 04/05/25 0114     Updated: 04/05/25 0120    Narrative:      SUPINE AP ABDOMEN / KUB     INDICATION:  NG tube placement.     TECHNIQUE:  Supine  AP radiographs of the abdomen.      COMPARISON:  4/4/2025 at 9:43 p.m.     FINDINGS:     NG tube in place, distal tip mid stomach.     Redemonstrated changes of early or partial small bowel obstruction.       Impression:         NG tube distal tip mid stomach.     This report was finalized on 4/5/2025 1:17 AM by Dr. Joey Omer M.D  on Workstation: [id number]       CT Abdomen Pelvis Without Contrast [[id number]] Collected: 04/04/25 2259     Updated: 04/04/25 2313    Narrative:      CT ABDOMEN AND PELVIS WITHOUT CONTRAST:     HISTORY: Nausea vomiting diarrhea upper abdominal pain     TECHNIQUE: Helical CT was performed of the abdomen and pelvis from the  lung bases through the lesser trochanters without IV contrast.  Reformatted images were provided in the coronal and sagittal planes.  Radiation dose reduction techniques were utilized, including automated  exposure control, and exposure modulation based on body size.     COMPARISON: CT chest abdomen pelvis 4/26/2023     FINDINGS:     Lung bases: Chronic changes at the left lung base, no acute findings.     Abdomen: The liver and gallbladder are normal.  The spleen and pancreas  appear normal.  Both adrenal glands are normal. Both kidneys are  atrophic, without acute abnormality. There is mild aneurysmal dilatation  abdominal aorta to 3.1 cm.     Moderate dilatation of the proximal to mid small bowel. The colon and  distal small bowel are decompressed. However, a discrete transition site  is not confidently identified. No evidence of hernia or abnormal fluid  or air collection.     Pelvis:  There is no pelvic inflammatory change or other abnormal fluid  collection.  There is no pelvic adenopathy or other soft tissue mass.   There is no CT evidence of hernia.     There is no acute bony abnormality.       Impression:         Mid small bowel obstruction, though a discrete transition zone is not  identified. No evidence of hernia or mass, free air or abscess.              This report was finalized  on 4/4/2025 11:10 PM by Dr. Joey Omer M.D on Workstation: [id number]       XR Abdomen Flat & Upright [[id number]] Collected: 04/04/25 2209     Updated: 04/04/25 2217    Narrative:      FLAT & UPRIGHT AP ABDOMEN     HISTORY: Pain     TECHNIQUE: Supine and erect AP abdomen.     Previous exam:  None      FINDINGS:     Multiple dilated small bowel loops with a paucity of colonic gas, with  multiple small bowel air-fluid levels.     No evidence of free intraperitoneal air.       Impression:         FINDINGS suspicious for early or partial small bowel obstruction.        This report was finalized on 4/4/2025 10:14 PM by Dr. Joey Omer M.D on Workstation: [id number]               Results for orders placed during the hospital encounter of 11/18/24    Adult Transthoracic Echo Complete W/ Cont if Necessary Per Protocol    Interpretation Summary    Left ventricular systolic function is normal. Calculated left ventricular EF = 61.4%    Left ventricular diastolic function was indeterminate.    Mild aortic valve stenosis is present. Aortic valve area is 2 cm2.    Peak velocity of the flow distal to the aortic valve is 270.9 cm/s. Aortic valve mean pressure gradient is 18 mmHg. Aortic valve dimensionless index is 0.49 .    There is a mitral valve ring present.    The mitral valve mean gradient is 11 mmHg.    Mild to moderate tricuspid valve regurgitation is present. Estimated right ventricular systolic pressure from tricuspid regurgitation is moderately elevated (45-55 mmHg). Calculated right ventricular systolic pressure from tricuspid regurgitation is 46 mmHg.      No orders to display        Assessment/Plan     Active Hospital Problems    Diagnosis  POA    **Small bowel obstruction [K56.609]  Yes    Ischemic cardiomyopathy [I25.5]  Yes    S/P MVR (mitral valve repair) [Z98.890]  Not Applicable    ESRD (end stage renal disease) [N18.6]  Yes    Essential hypertension [I10]  Yes    Type 2 diabetes mellitus with  diabetic chronic kidney disease [E11.22]  Yes      Resolved Hospital Problems   No resolved problems to display.     Patient is a 74-year-old male with a history of including but not limited to ESRD on dialysis, ischemic cardiomyopathy, CAD status post CABG, severe mitral regurgitation status post mitral valve repair, HTN, type II DM, presented to the ED complaining of acute nausea vomiting and diarrhea after eating going out to eat hamburger and fries.    Small bowel obstruction  Nausea vomiting/diarrhea  -CT scan showed mid small bowel obstruction, though a discrete transition zone is not  identified. No evidence of hernia or mass, free air or abscess.  WBC mildly elevated.  Patient is afebrile  -Food poisoning?  Her symptoms started soon after patient ate hamburger outpatient.  Gastrointestinal panel pending.- N.p.o., symptom control, antiemetics  - General Surgery consulted      ESRD on dialysis  -Nephrology consulted.  Plan for dialysis today    Type II DM  - Initiate SSI    Ischemic cardiomyopathy  CAD status post CABG  Severe mitral regurgitation status post repair  Resume outpatient meds once cleared for p.o. intake.              I discussed the patient's findings and my recommendations with patient.    VTE Prophylaxis - SCDs.  Code Status - Full code.       Doug Bolton MD  [facility]  04/05/25  06:54 EDT

## 2025-04-05 NOTE — ED NOTES
Nursing report ED to floor  Lefty Cai  74 y.o.  male    HPI :  HPI  Stated Reason for Visit: Onset last night.  Multiple episodes.  Possible food poisoning.  Hx of diverticulitis.    Chief Complaint  Chief Complaint   Patient presents with    Diarrhea    Vomiting       Admitting doctor:   Xochilt Eagle DO    Admitting diagnosis:   The primary encounter diagnosis was Small bowel obstruction. A diagnosis of Dialysis patient was also pertinent to this visit.    Code status:   Current Code Status       Date Active Code Status Order ID Comments User Context       Prior            Allergies:   Contrast dye (echo or unknown ct/mr) and Latex    Isolation:   No active isolations    Intake and Output    Intake/Output Summary (Last 24 hours) at 4/5/2025 0025  Last data filed at 4/4/2025 2226  Gross per 24 hour   Intake 500 ml   Output --   Net 500 ml       Weight:       04/04/25 2055   Weight: 91.2 kg (201 lb)       Most recent vitals:   Vitals:    04/04/25 2229 04/04/25 2230 04/04/25 2300 04/04/25 2330   BP:  157/69 159/79 153/72   Pulse: 82 83 83 86   Resp:       Temp:       TempSrc:       SpO2: 98% 98% 100% 98%   Weight:       Height:           Active LDAs/IV Access:   Lines, Drains & Airways       Active LDAs       Name Placement date Placement time Site Days    Peripheral IV 04/04/25 2104 Anterior;Right Forearm 04/04/25 2104  Forearm  less than 1    NG/OG Tube Nasogastric 14 Fr Left nostril 04/05/25  0024  Left nostril  less than 1    Hemodialysis Cath Double 03/24/23  --  Subclavian  743                    Labs (abnormal labs have a star):   Labs Reviewed   COMPREHENSIVE METABOLIC PANEL - Abnormal; Notable for the following components:       Result Value    Glucose 166 (*)     BUN 36 (*)     Creatinine 7.36 (*)     Sodium 134 (*)     Chloride 92 (*)     Calcium 11.6 (*)     BUN/Creatinine Ratio 4.9 (*)     Anion Gap 16.0 (*)     eGFR 7.2 (*)     All other components within normal limits    Narrative:     GFR  Categories in Chronic Kidney Disease (CKD)      GFR Category          GFR (mL/min/1.73)    Interpretation  G1                     90 or greater         Normal or high (1)  G2                      60-89                Mild decrease (1)  G3a                   45-59                Mild to moderate decrease  G3b                   30-44                Moderate to severe decrease  G4                    15-29                Severe decrease  G5                    14 or less           Kidney failure          (1)In the absence of evidence of kidney disease, neither GFR category G1 or G2 fulfill the criteria for CKD.    eGFR calculation 2021 CKD-EPI creatinine equation, which does not include race as a factor   LACTIC ACID, PLASMA - Abnormal; Notable for the following components:    Lactate 2.7 (*)     All other components within normal limits   CBC WITH AUTO DIFFERENTIAL - Abnormal; Notable for the following components:    WBC 12.32 (*)     Neutrophil % 86.3 (*)     Lymphocyte % 8.0 (*)     Eosinophil % 0.0 (*)     Neutrophils, Absolute 10.64 (*)     All other components within normal limits   LIPASE - Normal   BLOOD CULTURE   BLOOD CULTURE   LACTIC ACID, REFLEX   CBC AND DIFFERENTIAL    Narrative:     The following orders were created for panel order CBC & Differential.  Procedure                               Abnormality         Status                     ---------                               -----------         ------                     CBC Auto Differential[[id number]]        Abnormal            Final result                 Please view results for these tests on the individual orders.       EKG:   No orders to display       Meds given in ED:   Medications   sodium chloride 0.9 % bolus 500 mL (0 mL Intravenous Stopped 4/4/25 2226)   Morphine sulfate (PF) injection 4 mg (4 mg Intravenous Given 4/4/25 2127)   ondansetron (ZOFRAN) injection 4 mg (4 mg Intravenous Given 4/4/25 2127)   sodium chloride 0.9 % bolus 1,000 mL (0  mL Intravenous Stopped 25 0023)   Morphine sulfate (PF) injection 4 mg (4 mg Intravenous Given 257)   piperacillin-tazobactam (ZOSYN) 3.375 g IVPB in 100 mL NS MBP (CD) (0 g Intravenous Stopped 25)   Morphine sulfate (PF) injection 4 mg (4 mg Intravenous Given 257)       Imaging results:  CT Abdomen Pelvis Without Contrast  Result Date: 2025   Mid small bowel obstruction, though a discrete transition zone is not identified. No evidence of hernia or mass, free air or abscess.     This report was finalized on 2025 11:10 PM by Dr. Joey Omer M.D on Workstation: [facility]      XR Abdomen Flat & Upright  Result Date: 2025   FINDINGS suspicious for early or partial small bowel obstruction.   This report was finalized on 2025 10:14 PM by Dr. Joey Omer M.D on Workstation: [facility]        Ambulatory status:   - up as tolerated    Social issues:   Social History     Socioeconomic History    Marital status:    Tobacco Use    Smoking status: Former     Current packs/day: 0.00     Average packs/day: 2.0 packs/day for 25.0 years (50.0 ttl pk-yrs)     Types: Cigarettes     Start date:      Quit date:      Years since quittin.2    Smokeless tobacco: Current     Types: Snuff    Tobacco comments:     Patient quit smoking   Vaping Use    Vaping status: Never Used   Substance and Sexual Activity    Alcohol use: No    Drug use: No    Sexual activity: Defer       Peripheral Neurovascular  Peripheral Neurovascular (Adult)  Peripheral Neurovascular WDL: WDL    Neuro Cognitive  Neuro Cognitive (Adult)  Cognitive/Neuro/Behavioral WDL: WDL  Sedation Group  POSS ([place]ero Opioid-Induced Sed Scale): S - Sleep, easy to arouse    Learning  Learning Assessment  Learning Readiness and Ability: no barriers identified  Education Provided  Person Taught: patient, spouse, family member/friend  Teaching Method: verbal instruction  Teaching Focus: symptom/problem overview,  risk factors/triggers, self-management, assistive device, diagnostic test, diet modification, medication actions and side effects, medical device/equipment use  Education Outcome Evaluation: eager to learn    Respiratory  Respiratory  Airway WDL: WDL  Respiratory WDL  Respiratory WDL: WDL    Abdominal Pain       Pain Assessments  Pain (Adult)  (0-10) Pain Rating: Rest: 3  (0-10) Pain Rating: Activity: 8  Patient requested Medication Prescribed for Lower Pain Scale: No  Pain Location: abdomen  Response to Pain Interventions: interventions effective per patient    NIH Stroke Scale       Irma Lorenz RN  04/05/25 00:25 EDT

## 2025-04-05 NOTE — CONSULTS
General Surgery Consultation    Consulting Physician: Yelena Guerra MD  Referring Physician: DUSTY Bryan    Reason for consultation: Small bowel obstruction    CC: Abdominal pain, nausea/vomiting    HPI:   The patient is a very pleasant 74 y.o. male that presented to the hospital with acute onset generalized stabbing constant abdominal pain that began Thursday evening after dinner.  His wife had similar symptoms that lasted for only a few hours and then subsided.  He had had diarrhea for most of Thursday night and into Friday, but then the diarrhea resolved and he had a normal bowel movement last night.  Quickly after the abdominal pain began he had dark brown feculent vomiting accompanied by nausea.  He went to the [facility] emergency room where a CT abdomen/pelvis demonstrated evidence of a small bowel obstruction.  He was then transferred here for further management.    Past Medical History:  End-stage renal disease  Coronary artery disease  Type 2 diabetes  Hypertension  History of renal cell carcinoma  History of perforated diverticulitis with colostomy  GERD  Vitamin D deficiency    Past Surgical History:  Coronary artery bypass graft  Colonoscopy (2022 by me)  Diagnostic laparoscopy (February 2023 by Dr. Ferreira)  Open colostomy reversal (April 2022 by me)  Open sigmoid colectomy with colostomy (September 2021 by me)  Partial left nephrectomy (2007, Dr. Victor)  Left arm AV fistula  Cataract extraction  Knee arthroscopy    Medications:  Facility-Administered Medications Prior to Admission   Medication Dose Route Frequency Provider Last Rate Last Admin    lidocaine-prilocaine (EMLA) 2.5-2.5 % cream 1 Application  1 Application Topical Once Louann Miles Jr., MD         Medications Prior to Admission   Medication Sig Dispense Refill Last Dose/Taking    aspirin 81 MG tablet Take 1 tablet by mouth Every Night. FOLLOW MD GUIDELINES ON WHEN/IF TO HOLD FOR DOS  Indications: blood thinner        atorvastatin (LIPITOR) 40 MG tablet TAKE 1 TABLET BY MOUTH EVERY NIGHT. INDICATIONS: HIGH AMOUNT OF FATS IN THE BLOOD 90 tablet 3     DULoxetine (CYMBALTA) 30 MG capsule TAKE 1 CAPSULE EVERY DAY 90 capsule 3     fexofenadine (ALLEGRA) 180 MG tablet Take 1 tablet by mouth Daily. Indications: Hayfever       lidocaine-prilocaine (EMLA) 2.5-2.5 % cream APPLY TO SKIN OVER DIALYSIS ACCESS 30 MINUTES BEFORE       midodrine (PROAMATINE) 10 MG tablet TAKE 1 TABLET BY MOUTH 3 TIMES PER WEEK ON DIALYSIS DAYS.       sennosides-docusate (senna-docusate sodium) 8.6-50 MG per tablet Take 1 tablet by mouth At Night As Needed for Constipation. 60 tablet 0     sevelamer (RENAGEL) 800 MG tablet Take 1 tablet by mouth 2 (Two) Times a Day. Indications: UNKNOWN       sevelamer (RENVELA) 800 MG tablet Take 2 tablets by mouth 3 (Three) Times a Day.       traZODone (DESYREL) 50 MG tablet Take 1 tablet by mouth At Night As Needed for Sleep. 90 tablet 3     vitamin D3 125 MCG (5000 UT) capsule capsule Take 1 tablet by mouth 2 (Two) Times a Week. SAT AND WED  Indications: supplement           Allergies: Contrast dye (contraindicated due to chronic kidney disease), latex (blisters)    Social History: , former smoker, frequent chewing tobacco use, no regular alcohol use    Family History: Mother with history of hypertension, father with history of bladder cancer and coronary artery disease    Review of Systems:  Constitutional: denies any weight changes, fatigue, or weakness  Eyes: denies blurred/double vision or scleral icterus  Cardiovascular: denies chest pain, palpitations, or edemas  Respiratory: denies cough, sputum, or dyspnea  Gastrointestinal: Positive for nausea, vomiting, diarrhea, and abdominal pain; denies melena or hematochezia  Genitourinary: denies dysuria or hematuria  Endocrine: denies cold intolerance, lethargy, or flushing  Hematologic: denies excessive bruising or bleeding  Musculoskeletal: denies weakness, joint  swelling, joint pain, or stiffness  Neurologic: denies seizures, CVA, paresthesia, or peripheral neuropathy  Skin: denies change in nevi, rashes, masses, or jaundice     All other systems reviewed and were negative.    Physical Exam:   Vitals:    04/05/25 0745   BP: 159/86   Pulse: 82   Resp: 14   Temp: 97.9 °F (36.6 °C)   SpO2: 98%     Height: 177 cm  Weight: 91 kg  BMI: 28.84  GENERAL: awake and alert, no acute distress, oriented to person, place, and time  HEENT: normocephalic, atraumatic, no scleral icterus, moist mucous membranes  NECK: Supple, there is no thyromegaly or lymphadenopathy  RESPIRATORY: clear to auscultation, no wheezes, rales or rhonchi, symmetric air entry  CARDIOVASCULAR: regular rate and rhythm    GASTROINTESTINAL: Soft, relatively nondistended, no appreciable tenderness currently with no evidence of peritonitis when all 4 quadrants are palpated  MUSCULOSKELETAL: no cyanosis, clubbing, or edema   NEUROLOGIC: alert and oriented, normal speech, cranial nerves 2-12 grossly intact, no focal deficits   SKIN: Moist, warm, no rashes, no jaundice    Diagnostic workup:   Pertinent labs:   Results from last 7 days   Lab Units 04/05/25  0416 04/04/25  2130   WBC 10*3/mm3 13.55* 12.32*   HEMOGLOBIN g/dL 14.0 14.8   HEMATOCRIT % 41.1 44.3   PLATELETS 10*3/mm3 198 220     Results from last 7 days   Lab Units 04/05/25  0416 04/04/25  2130   SODIUM mmol/L 140 134*   POTASSIUM mmol/L 4.8 4.4   CHLORIDE mmol/L 98 92*   CO2 mmol/L 26.6 26.0   BUN mg/dL 34* 36*   CREATININE mg/dL 6.82* 7.36*   CALCIUM mg/dL 10.3 11.6*   BILIRUBIN mg/dL 0.5 0.6   ALK PHOS U/L 88 105   ALT (SGPT) U/L 12 12   AST (SGOT) U/L 19 17   GLUCOSE mg/dL 112* 166*       IMAGING:  CT ABDOMEN/PELVIS:  IMPRESSION:  Mid small bowel obstruction, though a discrete transition zone is not identified. No evidence of hernia or mass, free air or abscess.    Assessment and plan:     The patient is a 74 y.o. male with partial small bowel obstruction  versus gastroenteritis    I reviewed his CT scan images/report as well as the plain abdominal film images done at the [facility] emergency room.  He appears to have multiple air-fluid levels throughout the small bowel consistent with partial small bowel obstruction.  His clinical symptoms are more suggestive of a gastroenteritis, particularly in light of his wife having the same symptoms for a shorter period of time.  I would recommend this be treated conservatively for now with nasogastric tube decompression and bowel rest other than ice chips and sips of water with medications.  I will repeat a flat and upright abdominal film on him tomorrow and will continue to follow along.  I see nothing at this time that would warrant urgent surgery.  Thank you very much for this consult, I am happy to assist in his care.    Yelena Guerra MD  General, Robotic, and Endoscopic Surgery  [facility]    [address], [address]  [place] [address]  P: [phone]  F: [phone]

## 2025-04-05 NOTE — NURSING NOTE
Hd completed . 1.5 liters net uf. Uf adj to erika due s/s hypotension. Avf w/o issues. Post vss 107/49 hr 78

## 2025-04-05 NOTE — CONSULTS
[facility] Consult Note      Patient Name: Lefty Cai  : 1950  MRN: [id number]  Primary Care Physician:  Daksha Ng APRN  Referring Physician: DUSTY Anaya  Date of admission: 2025    Subjective     Reason for Consult: Management of end-stage renal disease on hemodialysis    HPI:   Lefty Cai is a 74 y.o. male known to have end-stage renal disease on maintenance hemodialysis on TTS schedule at [facility] under the care of Dr. Mcacll using left upper extremity AV fistula history of coronary artery disease, hypertension, type 2 diabetes mellitus and hyperlipidemia who presented to the hospital with nausea and vomiting.  Initial investigation revealed small bowel obstruction and NG tube was placed for decompression.  Nephrology was consulted for management of dialysis.  Currently denies any chest pain or shortness of breath.  No fever no chills.        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, implants and grafts, initial encounter 2023    GERD (gastroesophageal reflux disease)     H/O renal cell carcinoma 2007    partial nephrectomy    History of colostomy reversal 2022    [place] (hard of hearing)     NO DEVICE    Hyperlipidemia     Hyperlipidemia, unspecified     Hypertension     Primary insomnia 2016    Proteinuria     Risk factors for obstructive sleep apnea     Sinus drainage     Stage 4 chronic kidney disease 2019    Type 2  diabetes mellitus with diabetic chronic kidney disease     Type 2 diabetes mellitus with hyperglycemia 07/02/2019    Vitamin D deficiency 08/14/2017    Vitamin D deficiency, unspecified        Past Surgical History:   Procedure Laterality Date    ARTERIOVENOUS FISTULA/SHUNT SURGERY Left 08/15/2019    Procedure: LEFT MID FOREARM RADIAL CEPHALIC ARTERIAL VENOUS FISTULA;  Surgeon: Louann Miles Jr., MD;  Location: [facility] MAIN OR;  Service: Vascular    CARDIAC CATHETERIZATION N/A 4/9/2023    Procedure: Left Heart Cath;  Surgeon: Lobito Garcia MD;  Location: [facility] CATH INVASIVE LOCATION;  Service: Cardiovascular;  Laterality: N/A;    CARDIAC CATHETERIZATION N/A 4/9/2023    Procedure: Left ventriculography;  Surgeon: Lobito Garcia MD;  Location: [facility] CATH INVASIVE LOCATION;  Service: Cardiovascular;  Laterality: N/A;    CARDIAC CATHETERIZATION N/A 4/9/2023    Procedure: Coronary angiography;  Surgeon: Lobito Garcia MD;  Location: [facility] CATH INVASIVE LOCATION;  Service: Cardiovascular;  Laterality: N/A;    COLONOSCOPY  03/24/2022    COLONOSCOPY N/A 03/24/2022    Procedure: COLONOSCOPY TO CECUM WITH POLYPECTOMY  (HOT SNARE);  Surgeon: Yelena Guerra MD;  Location: [facility] ENDOSCOPY;  Service: General;  Laterality: N/A;  PREOP/ HX OF DIVERTICULITIS, COLOSTOMY  POSTOP/ POLYPS, DIVERTICULOSIS     COLOSTOMY  09/06/2021    COLOSTOMY CLOSURE N/A 04/12/2022    Procedure: open Colostomy reversal;  Surgeon: Yelena Guerra MD;  Location: [facility] MAIN OR;  Service: General;  Laterality: N/A;    CORONARY ARTERY BYPASS GRAFT N/A 4/13/2023    Procedure: MAT STERNOTOMY CORONARY ARTERY BYPASS GRAFT TIMES 4 USING LEFT INTERNAL MAMMARY ARTERY AND RIGHT GREATER SAPHENOUS VEIN  PER ENDOSCOPIC VEIN HARVESTING, MITRAL VALVE REPAIR  AND PRP;  Surgeon: Mirtha Zuluaga MD;  Location: [facility] CVOR;  Service: Cardiothoracic;  Laterality: N/A;    DIAGNOSTIC LAPAROSCOPY N/A 2/27/2023    Procedure: DIAGNOSTIC LAPAROSCOPY;   Surgeon: Murali Ferreira MD;  Location: [facility] MAIN OR;  Service: General;  Laterality: N/A;    EXPLORATORY LAPAROTOMY N/A 09/06/2021    Procedure: Open sigmoind colectomy, colostomy, and appendectomy;  Surgeon: Yelena Guerra MD;  Location: [facility] MAIN OR;  Service: General;  Laterality: N/A;    EYE SURGERY      CATARACTS    KNEE ARTHROSCOPY Right     NEPHRECTOMY PARTIAL  2007    Dr. Victor       Family History: family history includes Cancer in his father; Diabetes type I in his sister; Heart disease in his father; Hypertension in his mother; Kidney failure in his father; Macular degeneration in his father.    Social History:  reports that he quit smoking about 26 years ago. His smoking use included cigarettes. He started smoking about 51 years ago. He has a 50 pack-year smoking history. His smokeless tobacco use includes snuff. He reports that he does not drink alcohol and does not use drugs.    Home Medications:  Prior to Admission medications    Medication Sig Start Date End Date Taking? Authorizing Provider   aspirin 81 MG tablet Take 1 tablet by mouth Every Night. FOLLOW MD GUIDELINES ON WHEN/IF TO HOLD FOR DOS  Indications: blood thinner    Rina Montanez MD   atorvastatin (LIPITOR) 40 MG tablet TAKE 1 TABLET BY MOUTH EVERY NIGHT. INDICATIONS: HIGH AMOUNT OF FATS IN THE BLOOD 5/3/24   Ashwini Michelle APRN   DULoxetine (CYMBALTA) 30 MG capsule TAKE 1 CAPSULE EVERY DAY 7/1/24   Daksha Ng APRN   fexofenadine (ALLEGRA) 180 MG tablet Take 1 tablet by mouth Daily. Indications: Hayfever 5/3/23   Rina Montanez MD   lidocaine-prilocaine (EMLA) 2.5-2.5 % cream APPLY TO SKIN OVER DIALYSIS ACCESS 30 MINUTES BEFORE 2/10/24   Rina Montanez MD   midodrine (PROAMATINE) 10 MG tablet TAKE 1 TABLET BY MOUTH 3 TIMES PER WEEK ON DIALYSIS DAYS. 1/10/24   Rina Montanez MD   sennosides-docusate (senna-docusate sodium) 8.6-50 MG per tablet Take 1 tablet by mouth At Night As  Needed for Constipation. 2/27/23   Murali Ferreira MD   sevelamer (RENAGEL) 800 MG tablet Take 1 tablet by mouth 2 (Two) Times a Day. Indications: UNKNOWN 5/8/23   Quang Reyes MD   sevelamer (RENVELA) 800 MG tablet Take 2 tablets by mouth 3 (Three) Times a Day. 3/7/24   ProviderRina MD   traZODone (DESYREL) 50 MG tablet Take 1 tablet by mouth At Night As Needed for Sleep. 2/21/25   Daksha Ng, APRN   vitamin D3 125 MCG (5000 UT) capsule capsule Take 1 tablet by mouth 2 (Two) Times a Week. SAT AND WED  Indications: supplement     Provider, MD Rina       Allergies:  Allergies   Allergen Reactions    Contrast Dye (Echo Or Unknown Ct/Mr) Other (See Comments)     KIDNEY DISEASE    Latex Other (See Comments)     Blisters       Objective     Vitals:   Temp:  [97.9 °F (36.6 °C)-98.9 °F (37.2 °C)] 97.9 °F (36.6 °C)  Heart Rate:  [76-88] 82  Resp:  [12-16] 14  BP: (152-182)/() 159/86    Intake/Output Summary (Last 24 hours) at 4/5/2025 1007  Last data filed at 4/5/2025 0124  Gross per 24 hour   Intake 500 ml   Output 450 ml   Net 50 ml       Physical Exam:   Constitutional: Awake, alert, not in  acute distress.  HEENT: Sclera anicteric, no conjunctival injection  Neck: Supple, no thyromegaly, no lymphadenopathy, trachea at midline, no JVD  Respiratory: Clear to auscultation bilaterally, nonlabored respiration  Cardiovascular: RRR, no murmurs, no rubs or gallops, no carotid bruit  Gastrointestinal: Positive bowel sounds, abdomen is soft, nontender mildly distended  : No palpable bladder  Musculoskeletal: No edema, no clubbing or cyanosis  Psychiatric: Appropriate affect, cooperative  Neurologic: Oriented x3, moving all extremities, normal speech and mental status  Skin: Warm and dry   Access left upper extremity AV fistula with good thrill    Scheduled Meds:     insulin lispro, 2-7 Units, Subcutaneous, 4x Daily AC & at Bedtime  sodium chloride, 10 mL, Intravenous, Q12H      IV  Meds:        Results Reviewed:   I have personally reviewed the results from the time of this admission to 4/5/2025 10:07 EDT     Lab Results   Component Value Date    GLUCOSE 112 (H) 04/05/2025    CALCIUM 10.3 04/05/2025     04/05/2025    K 4.8 04/05/2025    CO2 26.6 04/05/2025    CL 98 04/05/2025    BUN 34 (H) 04/05/2025    CREATININE 6.82 (H) 04/05/2025    EGFRIFAFRI  09/12/2021      Comment:      <15 Indicative of kidney failure.    EGFRIFNONA 12 (L) 09/12/2021    BCR 5.0 (L) 04/05/2025    ANIONGAP 15.4 (H) 04/05/2025      Lab Results   Component Value Date    MG 1.8 04/30/2023    PHOS 2.4 (L) 05/01/2023    ALBUMIN 3.7 04/05/2025           Assessment / Plan     ASSESSMENT:  End-stage renal disease on maintenance hemodialysis on TTS schedule, seems to be compliant with dialysis treatment  Hypertension with CKD: Blood pressure is uncontrolled  Type 2 diabetes mellitus: Management by primary  Small bowel obstruction status post NG tube placement for decompression  Diastolic heart failure  Pulmonary hypertension  High anion gap antibiotic acidosis secondary to end-stage renal disease  History of hyperphosphatemia on binder      PLAN:  Will dialyze today per schedule with limited UF given small bowel obstruction  Hold binders while he is not able to eat  Hydralazine as needed for systolic blood pressure above 160  Surveillance labs      Thank you for involving us in the care of Lefty LAVON Cai.  Please feel free to call with any questions.    Page Santana MD  04/05/25  10:07 EDT    [facility]  [phone]    Parts of this note may be an electronic transcription/translation of spoken language to printed text using the Dragon dictation system.

## 2025-04-05 NOTE — PLAN OF CARE
Goal Outcome Evaluation:           Progress: no change  Outcome Evaluation: vss, no c/o pain, ngt to lws, only 100ml output, HD completed, npo with ice chips and sips, pending abdominal xrays, will CTM per shift.

## 2025-04-05 NOTE — PLAN OF CARE
Goal Outcome Evaluation:  Plan of Care Reviewed With: patient, spouse        Progress: improving     Pt is a 74 y.o. male admitted to [facility] with c/o abdominal pain on 4/4/2025. Work up reveals small bowel obstruction. Prior to hospital admission, pt reports residing in single story home with his wife and 4 TIM. Prior to hospital stay, pt was IADLs and denies using AD at baseline. Pt required Steph for bed mobility, Steph for STS transfers, and SBA for ambulation without AD for 50 ft. Pt demo ability to navigate 4 steps with single HR support with step over step pattern with SBA. Pt at baseline function and denies need for skilled PT. Recommend d/c home. PT will sign off.     Anticipated Discharge Disposition (PT): home

## 2025-04-05 NOTE — FSED PROVIDER NOTE
Subjective   History of Present Illness  Patient ate out to dinner he developed last night after eating diarrhea nausea and vomiting.  His wife just developed mild symptoms got over it by today the patient has not.  He got dialyzed then he went out to eat.  The patient gets dialyzed tomorrow at 11:00.  As it states he is a dialysis patient.  Patient denies any fevers or chills.  States his stomach just keeps him up at night although he is in no extremis and his vitals are very stable.      Review of Systems   Gastrointestinal:  Positive for abdominal pain, diarrhea, nausea and vomiting.   All other systems reviewed and are negative.      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, implants and grafts, initial encounter 09/13/2023    GERD (gastroesophageal reflux disease)     H/O renal cell carcinoma 2007    partial nephrectomy    History of colostomy reversal 04/2022    [place] (hard of hearing)     NO DEVICE    Hyperlipidemia     Hyperlipidemia, unspecified     Hypertension     Primary insomnia 06/13/2016    Proteinuria     Risk factors for obstructive sleep apnea     Sinus drainage     Stage 4 chronic kidney disease 07/02/2019    Type 2 diabetes mellitus with diabetic chronic kidney disease     Type 2 diabetes mellitus with hyperglycemia 07/02/2019    Vitamin D deficiency 08/14/2017    Vitamin D deficiency, unspecified        Allergies   Allergen Reactions    Contrast Dye (Echo Or Unknown Ct/Mr) Other (See Comments)     KIDNEY DISEASE    Latex Other (See Comments)     Blisters       Past Surgical History:   Procedure Laterality Date    ARTERIOVENOUS  FISTULA/SHUNT SURGERY Left 08/15/2019    Procedure: LEFT MID FOREARM RADIAL CEPHALIC ARTERIAL VENOUS FISTULA;  Surgeon: Louann Miles Jr., MD;  Location: [facility] MAIN OR;  Service: Vascular    CARDIAC CATHETERIZATION N/A 4/9/2023    Procedure: Left Heart Cath;  Surgeon: Lobito Garcia MD;  Location: [facility] CATH INVASIVE LOCATION;  Service: Cardiovascular;  Laterality: N/A;    CARDIAC CATHETERIZATION N/A 4/9/2023    Procedure: Left ventriculography;  Surgeon: Lobito Garcia MD;  Location: [facility] CATH INVASIVE LOCATION;  Service: Cardiovascular;  Laterality: N/A;    CARDIAC CATHETERIZATION N/A 4/9/2023    Procedure: Coronary angiography;  Surgeon: Lobito Garcia MD;  Location: [facility] CATH INVASIVE LOCATION;  Service: Cardiovascular;  Laterality: N/A;    COLONOSCOPY  03/24/2022    COLONOSCOPY N/A 03/24/2022    Procedure: COLONOSCOPY TO CECUM WITH POLYPECTOMY  (HOT SNARE);  Surgeon: Yelena Guerra MD;  Location: [facility] ENDOSCOPY;  Service: General;  Laterality: N/A;  PREOP/ HX OF DIVERTICULITIS, COLOSTOMY  POSTOP/ POLYPS, DIVERTICULOSIS     COLOSTOMY  09/06/2021    COLOSTOMY CLOSURE N/A 04/12/2022    Procedure: open Colostomy reversal;  Surgeon: Yelena Guerra MD;  Location: [facility] OR;  Service: General;  Laterality: N/A;    CORONARY ARTERY BYPASS GRAFT N/A 4/13/2023    Procedure: MAT STERNOTOMY CORONARY ARTERY BYPASS GRAFT TIMES 4 USING LEFT INTERNAL MAMMARY ARTERY AND RIGHT GREATER SAPHENOUS VEIN  PER ENDOSCOPIC VEIN HARVESTING, MITRAL VALVE REPAIR  AND PRP;  Surgeon: Mirtha Zuluaga MD;  Location: [facility] CVOR;  Service: Cardiothoracic;  Laterality: N/A;    DIAGNOSTIC LAPAROSCOPY N/A 2/27/2023    Procedure: DIAGNOSTIC LAPAROSCOPY;  Surgeon: Murali Ferreira MD;  Location: [facility] MAIN OR;  Service: General;  Laterality: N/A;    EXPLORATORY LAPAROTOMY N/A 09/06/2021    Procedure: Open sigmoind colectomy, colostomy, and appendectomy;  Surgeon: Yelena Guerra MD;  Location:   [facility] MAIN OR;  Service: General;  Laterality: N/A;    EYE SURGERY      CATARACTS    KNEE ARTHROSCOPY Right     NEPHRECTOMY PARTIAL      Dr. Victor       Family History   Problem Relation Age of Onset    Hypertension Mother     Heart disease Father         ASCVD    Macular degeneration Father     Cancer Father         bladder    Kidney failure Father     Diabetes type I Sister     Malig Hyperthermia Neg Hx        Social History     Socioeconomic History    Marital status:    Tobacco Use    Smoking status: Former     Current packs/day: 0.00     Average packs/day: 2.0 packs/day for 25.0 years (50.0 ttl pk-yrs)     Types: Cigarettes     Start date:      Quit date:      Years since quittin.2    Smokeless tobacco: Current     Types: Snuff    Tobacco comments:     Patient quit smoking   Vaping Use    Vaping status: Never Used   Substance and Sexual Activity    Alcohol use: No    Drug use: No    Sexual activity: Defer           Objective   Physical Exam  Vitals and nursing note reviewed.   Constitutional:       General: He is not in acute distress.     Appearance: Normal appearance. He is not ill-appearing, toxic-appearing or diaphoretic.   HENT:      Head: Normocephalic and atraumatic.      Nose: Nose normal.      Mouth/Throat:      Mouth: Mucous membranes are dry.   Eyes:      Extraocular Movements: Extraocular movements intact.      Pupils: Pupils are equal, round, and reactive to light.   Cardiovascular:      Rate and Rhythm: Normal rate and regular rhythm.   Pulmonary:      Effort: Pulmonary effort is normal.      Breath sounds: Normal breath sounds.   Abdominal:      General: Bowel sounds are normal.      Palpations: Abdomen is soft.      Comments: Fuhs tenderness upper abdomen along diaphragm   Musculoskeletal:         General: Normal range of motion.      Cervical back: Normal range of motion and neck supple.   Skin:     General: Skin is warm and dry.      Capillary Refill: Capillary refill  takes less than 2 seconds.   Neurological:      General: No focal deficit present.      Mental Status: He is alert and oriented to person, place, and time.   Psychiatric:         Mood and Affect: Mood normal.         Procedures           ED Course                                           Medical Decision Making  Patient has an elevated white count and elevated lactic acid of 2.7 white count was 12.32.  The patient has a mid small bowel obstruction NG tube was placed Zosyn was ordered.  The patient was sleeping he is resting comfortably his vital signs have remained stable 157/6998.7 and afebrile 83 heart rate 15 respirations sats 98% on room air.  Calling the surgeon on-call the patient is comfortable with that as well as the hospitalist eventually to determine who is admitting patient is dialysis patient hence admission to internal medicine with a surgical consult and recheck in the morning to determine if the patient's mid small bowel obstruction has resolved itself with the NG tube to low intermittent suction.    I discussed with the Dr. Abdirahman Miles surgeon and he will consult the patient patient should be admitted to the hospitalist.  I discussed case with check wet and the patient will be admitted to Dr. Eagle at [facility].    Problems Addressed:  Dialysis patient: complicated acute illness or injury  Small bowel obstruction: complicated acute illness or injury    Amount and/or Complexity of Data Reviewed  Labs: ordered.     Details: His laboratories demonstrated lactic acid of 2.7 and a white count of 12.32.  At that point I gave him an additional liter of fluid after the 500.  His glucose elevated 166 BUN and creatinine 36 and 7.36 he is dialysis patient will be dialyzed Tuesday Thursday Saturday or tomorrow.  Patient's lipase is normal at 33.  Radiology: ordered.     Details: Abdominal series demonstrates multiple fluid levels consistent with possible obstruction CT scan was ordered  CT scan  shows mid small bowel obstruction without any free air or abscess.    Risk  Prescription drug management.        Final diagnoses:   Small bowel obstruction   Dialysis patient       ED Disposition  ED Disposition       ED Disposition   Decision to Admit    Condition   --    Comment   Level of Care: Med/Surg [1]   Diagnosis: Small bowel obstruction [566477]   Admitting Physician: ROBERTA ELAINE [[id number]]   Attending Physician: ROBERTA ELAINE [[id number]]   Isolate for COVID?: No [0]   Certification: I Certify That Inpatient Hospital Services Are Medically Necessary For Greater Than 2 Midnights                 No follow-up provider specified.       Medication List      No changes were made to your prescriptions during this visit.          No

## 2025-04-06 ENCOUNTER — APPOINTMENT (OUTPATIENT)
Dept: GENERAL RADIOLOGY | Facility: HOSPITAL | Age: 75
DRG: 388 | End: 2025-04-06
Payer: MEDICARE

## 2025-04-06 LAB
ALBUMIN SERPL-MCNC: 3.9 G/DL (ref 3.5–5.2)
ANION GAP SERPL CALCULATED.3IONS-SCNC: 12.9 MMOL/L (ref 5–15)
BUN SERPL-MCNC: 28 MG/DL (ref 8–23)
BUN/CREAT SERPL: 4.8 (ref 7–25)
CALCIUM SPEC-SCNC: 9.8 MG/DL (ref 8.6–10.5)
CHLORIDE SERPL-SCNC: 96 MMOL/L (ref 98–107)
CO2 SERPL-SCNC: 27.1 MMOL/L (ref 22–29)
CREAT SERPL-MCNC: 5.88 MG/DL (ref 0.76–1.27)
DEPRECATED RDW RBC AUTO: 44.7 FL (ref 37–54)
EGFRCR SERPLBLD CKD-EPI 2021: 9.4 ML/MIN/1.73
ERYTHROCYTE [DISTWIDTH] IN BLOOD BY AUTOMATED COUNT: 12.8 % (ref 12.3–15.4)
GLUCOSE BLDC GLUCOMTR-MCNC: 101 MG/DL (ref 70–130)
GLUCOSE BLDC GLUCOMTR-MCNC: 110 MG/DL (ref 70–130)
GLUCOSE BLDC GLUCOMTR-MCNC: 73 MG/DL (ref 70–130)
GLUCOSE BLDC GLUCOMTR-MCNC: 85 MG/DL (ref 70–130)
GLUCOSE SERPL-MCNC: 77 MG/DL (ref 65–99)
HCT VFR BLD AUTO: 41.9 % (ref 37.5–51)
HGB BLD-MCNC: 13.5 G/DL (ref 13–17.7)
MAGNESIUM SERPL-MCNC: 2 MG/DL (ref 1.6–2.4)
MCH RBC QN AUTO: 30.8 PG (ref 26.6–33)
MCHC RBC AUTO-ENTMCNC: 32.2 G/DL (ref 31.5–35.7)
MCV RBC AUTO: 95.7 FL (ref 79–97)
PHOSPHATE SERPL-MCNC: 3.7 MG/DL (ref 2.5–4.5)
PLATELET # BLD AUTO: 176 10*3/MM3 (ref 140–450)
PMV BLD AUTO: 9.1 FL (ref 6–12)
POTASSIUM SERPL-SCNC: 5.1 MMOL/L (ref 3.5–5.2)
RBC # BLD AUTO: 4.38 10*6/MM3 (ref 4.14–5.8)
SODIUM SERPL-SCNC: 136 MMOL/L (ref 136–145)
WBC NRBC COR # BLD AUTO: 9.57 10*3/MM3 (ref 3.4–10.8)

## 2025-04-06 PROCEDURE — 74019 RADEX ABDOMEN 2 VIEWS: CPT

## 2025-04-06 PROCEDURE — 99232 SBSQ HOSP IP/OBS MODERATE 35: CPT | Performed by: SURGERY

## 2025-04-06 PROCEDURE — 83735 ASSAY OF MAGNESIUM: CPT | Performed by: STUDENT IN AN ORGANIZED HEALTH CARE EDUCATION/TRAINING PROGRAM

## 2025-04-06 PROCEDURE — 85027 COMPLETE CBC AUTOMATED: CPT | Performed by: STUDENT IN AN ORGANIZED HEALTH CARE EDUCATION/TRAINING PROGRAM

## 2025-04-06 PROCEDURE — 80069 RENAL FUNCTION PANEL: CPT | Performed by: HOSPITALIST

## 2025-04-06 PROCEDURE — 82948 REAGENT STRIP/BLOOD GLUCOSE: CPT

## 2025-04-06 RX ORDER — FLUTICASONE PROPIONATE 50 MCG
2 SPRAY, SUSPENSION (ML) NASAL DAILY
Status: DISCONTINUED | OUTPATIENT
Start: 2025-04-06 | End: 2025-04-07 | Stop reason: HOSPADM

## 2025-04-06 RX ORDER — CETIRIZINE HYDROCHLORIDE 10 MG/1
10 TABLET ORAL DAILY
Status: DISCONTINUED | OUTPATIENT
Start: 2025-04-06 | End: 2025-04-07 | Stop reason: HOSPADM

## 2025-04-06 RX ADMIN — ATORVASTATIN CALCIUM 40 MG: 20 TABLET, FILM COATED ORAL at 21:06

## 2025-04-06 RX ADMIN — ASPIRIN 81 MG: 81 TABLET, COATED ORAL at 21:06

## 2025-04-06 RX ADMIN — SEVELAMER CARBONATE 1600 MG: 800 TABLET, FILM COATED ORAL at 21:06

## 2025-04-06 RX ADMIN — Medication 10 ML: at 08:35

## 2025-04-06 RX ADMIN — FLUTICASONE PROPIONATE 2 SPRAY: 50 SPRAY, METERED NASAL at 16:07

## 2025-04-06 RX ADMIN — Medication 5 MG: at 21:06

## 2025-04-06 RX ADMIN — Medication 10 ML: at 21:06

## 2025-04-06 RX ADMIN — TRAZODONE HYDROCHLORIDE 50 MG: 50 TABLET ORAL at 21:09

## 2025-04-06 RX ADMIN — SEVELAMER CARBONATE 1600 MG: 800 TABLET, FILM COATED ORAL at 16:06

## 2025-04-06 RX ADMIN — CETIRIZINE HYDROCHLORIDE 10 MG: 10 TABLET, FILM COATED ORAL at 16:06

## 2025-04-06 RX ADMIN — DULOXETINE 20 MG: 20 CAPSULE, DELAYED RELEASE ORAL at 08:35

## 2025-04-06 NOTE — PLAN OF CARE
Goal Outcome Evaluation:  Plan of Care Reviewed With: patient        Progress: improving  Outcome Evaluation: vss. NG tube CDI low suction. NPO with ice chips and sips. abdominal x-rays pending. standby assist. plan to d/c when medically stable.

## 2025-04-06 NOTE — PROGRESS NOTES
Name: Lefty Cai ADMIT: 2025   : 1950  PCP: Daksha Ng APRN    MRN: [id number] LOS: 1 days   AGE/SEX: 74 y.o. male  ROOM: [facility]     Subjective   Subjective   Patient seen this afternoon, spouse present at bedside.  No further episodes of diarrhea or any BM since admission.  No nausea no vomiting.  NG tube remains in place.  Denies abdominal pain.  Feeling better.  Spouse reports she also ate at [employer] but she ate different food, chicken.  Had diarrhea as well but no significant nausea or vomiting and symptoms quickly subsided.  Patient also complains of postnasal drainage and allergies, reports chronic.    Review of Systems   As above  Objective   Objective   Vital Signs  Temp:  [97.4 °F (36.3 °C)-98.4 °F (36.9 °C)] 98.4 °F (36.9 °C)  Heart Rate:  [75-79] 79  Resp:  [14-18] 18  BP: (118-144)/(60-70) 118/60  SpO2:  [97 %-99 %] 98 %  on   ;   Device (Oxygen Therapy): room air  Body mass index is 28.84 kg/m².  Physical Exam    General: Alert, lying in bed, not in distress,  HEENT: Normocephalic, atraumatic  CV: Regular rate and rhythm, no murmurs rubs or gallops  Lungs: Clear to auscultation bilaterally, no crackles or wheezes  Abdomen: Soft, nontender, nondistended  Extremities: No significant peripheral edema , no cyanosis     Results Review     I reviewed the patient's new clinical results.  Results from last 7 days   Lab Units 25  0000   WBC 10*3/mm3 9.57 13.55* 12.32*  --    HEMOGLOBIN g/dL 13.5 14.0 14.8 12.6*  37.8*   PLATELETS 10*3/mm3 176 198 220  --      Results from last 7 days   Lab Units 2538 25  2130   SODIUM mmol/L 136 140 134*   POTASSIUM mmol/L 5.1 4.8 4.4   CHLORIDE mmol/L 96* 98 92*   CO2 mmol/L 27.1 26.6 26.0   BUN mg/dL 28* 34* 36*   CREATININE mg/dL 5.88* 6.82* 7.36*   GLUCOSE mg/dL 77 112* 166*   Estimated Creatinine Clearance: 12.3 mL/min (A) (by C-G formula based on SCr of 5.88  mg/dL (H)).  Results from last 7 days   Lab Units 04/06/25  0638 04/05/25  0416 04/04/25  2130   ALBUMIN g/dL 3.9 3.7 4.6   BILIRUBIN mg/dL  --  0.5 0.6   ALK PHOS U/L  --  88 105   AST (SGOT) U/L  --  19 17   ALT (SGPT) U/L  --  12 12     Results from last 7 days   Lab Units 04/06/25  0638 04/05/25  0416 04/04/25  2130   CALCIUM mg/dL 9.8 10.3 11.6*   ALBUMIN g/dL 3.9 3.7 4.6   MAGNESIUM mg/dL 2.0  --   --    PHOSPHORUS mg/dL 3.7  --   --      Results from last 7 days   Lab Units 04/05/25  0416 04/05/25  0040 04/04/25  2130   LACTATE mmol/L 1.2 1.8 2.7*     COVID19   Date Value Ref Range Status   04/12/2023 Not Detected Not Detected - Ref. Range Final   04/08/2023 Not Detected Not Detected - Ref. Range Final     Glucose   Date/Time Value Ref Range Status   04/06/2025 1116 85 70 - 130 mg/dL Final   04/06/2025 0637 73 70 - 130 mg/dL Final   04/05/2025 2036 81 70 - 130 mg/dL Final   04/05/2025 1554 93 70 - 130 mg/dL Final   04/05/2025 1055 95 70 - 130 mg/dL Final   04/05/2025 0616 91 70 - 130 mg/dL Final           XR Abdomen Flat & Upright  Narrative: XR ABDOMEN FLAT AND UPRIGHT-     INDICATIONS: Small bowel obstruction, follow-up.     TECHNIQUE: Supine and upright views of the abdomen     COMPARISON: 4/5/2025     FINDINGS:     NG tube extends to the upper medial left upper quadrant.     Mildly dilated loops of small bowel again demonstrated, overall similar  to prior exam. Gas is also present in the colon. Several air-fluid  levels are seen on the upright view. No free air is noted under the  diaphragm. Continued follow-up is advised as indications persist.     Mild left pleural effusion is apparent.     Impression:    As described.           This report was finalized on 4/6/2025 9:01 AM by Dr. Jd Vargas M.D on Workstation: [id number]       Scheduled Medications  aspirin, 81 mg, Oral, Nightly  atorvastatin, 40 mg, Oral, Nightly  cetirizine, 10 mg, Oral, Daily  DULoxetine, 20 mg, Oral, Daily  fluticasone, 2  spray, Each Nare, Daily  insulin lispro, 2-7 Units, Subcutaneous, 4x Daily AC & at Bedtime  sevelamer, 1,600 mg, Oral, TID  sodium chloride, 10 mL, Intravenous, Q12H    Infusions   Diet  Diet: Liquid, Diabetic; Full Liquid; Consistent Carbohydrate; Fluid Consistency: Thin (IDDSI 0)    I have personally reviewed     [x]  Laboratory   [x]  Microbiology   [x]  Radiology   [x]  EKG/Telemetry  []  Cardiology/Vascular   []  Pathology    []  Records       Assessment/Plan     Active Hospital Problems    Diagnosis  POA    **Small bowel obstruction [K56.609]  Yes    Ischemic cardiomyopathy [I25.5]  Yes    S/P MVR (mitral valve repair) [Z98.890]  Not Applicable    ESRD (end stage renal disease) [N18.6]  Yes    Essential hypertension [I10]  Yes    Type 2 diabetes mellitus with diabetic chronic kidney disease [E11.22]  Yes      Resolved Hospital Problems   No resolved problems to display.       Patient is a 74-year-old male with a history of including but not limited to ESRD on dialysis, ischemic cardiomyopathy, CAD status post CABG, severe mitral regurgitation status post mitral valve repair, HTN, type II DM, presented to the ED complaining of acute nausea vomiting and diarrhea after eating going out to eat hamburger and fries.     Small bowel obstruction  Nausea vomiting/diarrhea   Suspected Food poisoning  -CT scan showed mid small bowel obstruction, though a discrete transition zone is not  identified. No evidence of hernia or mass, free air or abscess.  WBC mildly elevated.  Patient is afebrile  - Most likely food poisoning/gastroenteritis, symptoms started soon after patient ate hamburger at [employer].  Spouse also ate at [employer] and had diarrhea afterwards which quickly subsided and was not as severe.  -General Surgery following  -KUB shows improvement.  Symptoms improving as well.  NG tube removed per surgery, initiated on clear liquid diet.        ESRD on dialysis  -Nephrology managing     Type II DM  - Initiate  SSI     Ischemic cardiomyopathy  CAD status post CABG  Severe mitral regurgitation status post repair  Continue aspirin, statin.  Fluid management with dialysis.         SCDs DVT prophylaxis.  Full code.  Discussed with patient.    Discussed with ,   Disposition: Home, likely tomorrow if continues to improve  Expected Discharge Date: 4/7/2025; Expected Discharge Time:        Copied text in this note has been reviewed and is accurate as of 04/06/25.         Dictated utilizing Dragon dictation        Doug Bolton MD  [facility]  04/06/25  14:15 EDT

## 2025-04-06 NOTE — PAYOR COMM NOTE
"Lefty Mireles (74 y.o. Male)     ATTN: NURSE REVIEWER  RE: INITIAL INPT AUTH CLINICALS   REF: [id number]  PLS REPLY TO RD [place] [phone] OR FAX# [phone]      Date of Birth   1950    Social Security Number       Address   [address] [place], [place] [address]    Home Phone   [phone]    MRN   [id number]       Worship   None    Marital Status                               Admission Date   4/4/2025    Admission Type   Urgent    Admitting Provider   Doug Bolton MD    Attending Provider   Doug Bolton MD    Department, Room/Bed   [address], P781/1       Discharge Date       Discharge Disposition       Discharge Destination                                 Attending Provider: Doug Bolton MD    Allergies: Contrast Dye (Echo Or Unknown Ct/mr), Latex    Isolation: None   Infection: None   Code Status: CPR    Ht: 177.8 cm (70\")   Wt: 91.2 kg (201 lb)    Admission Cmt: None   Principal Problem: Small bowel obstruction [K56.609]                   Active Insurance as of 4/4/2025       Primary Coverage       Payor Plan Insurance Group Employer/Plan Group    ANTHEM MEDICARE REPLACEMENT ANTHEM MEDICARE ADVANTAGE PPO [id number]       Payor Plan Address Payor Plan Phone Number Payor Plan Fax Number Effective Dates    [address] [phone]  1/1/2025 - None Entered    [facility] [address]         Subscriber Name Subscriber Birth Date Member ID       LEFTY MIRELES 1950 [id number]                     Emergency Contacts        (Rel.) Home Phone Work Phone Mobile Phone    Caterina Mireles (Spouse) [phone] -- [phone]    BA BELL (Daughter) [phone] -- [phone]                 History & Physical        Doug Bolton MD at 04/05/25 0654              Patient Name:  Lefty Mireles  YOB: 1950  MRN:  [id number]  Admit Date:  4/4/2025  Patient Care Team:  Daksha Ng, APRN " as PCP - General (Family Medicine)  Rudy Faith MD as Consulting Physician (Ophthalmology)  Jose Mccall MD as Consulting Physician (Nephrology)  Quang Reyes MD as Consulting Physician (Nephrology)      Subjective  History Present Illness     Chief Complaint   Patient presents with    Diarrhea    Vomiting         History of Present Illness  Patient is a 74-year-old male with a history of including but not limited to ESRD on dialysis, ischemic cardiomyopathy, CAD status post CABG, severe mitral re regurgitation status post mitral valve repair, HTN, type II DM, presented to the ED complaining of acute nausea vomiting and diarrhea.  Patient reports he went out to eat, ate hamburger and fries, and soon after arriving home he started having acute vomiting nausea and diarrhea associated with abdominal pain.  No sick contacts.  No fevers no chills.  Denies treatment antibiotics.  Symptoms currently improving.        Review of Systems   GENERAL: No fevers, chills, sweats.    RESPIRATORY: No cough, shortness of breath, hemoptysis or wheezing.   CVS: No chest pain, palpitations, orthopnea, dyspnea on exertion   GI: No melena or hematochezia. + abdominal pain. + nausea, +vomiting, constipation, diarrhea    Personal History     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, implants and grafts, initial encounter 09/13/2023    GERD (gastroesophageal reflux disease)     H/O renal cell carcinoma 2007    partial nephrectomy    History of colostomy reversal 04/2022    [place] (hard of hearing)     NO DEVICE    Hyperlipidemia     Hyperlipidemia,  unspecified     Hypertension     Primary insomnia 06/13/2016    Proteinuria     Risk factors for obstructive sleep apnea     Sinus drainage     Stage 4 chronic kidney disease 07/02/2019    Type 2 diabetes mellitus with diabetic chronic kidney disease     Type 2 diabetes mellitus with hyperglycemia 07/02/2019    Vitamin D deficiency 08/14/2017    Vitamin D deficiency, unspecified      Past Surgical History:   Procedure Laterality Date    ARTERIOVENOUS FISTULA/SHUNT SURGERY Left 08/15/2019    Procedure: LEFT MID FOREARM RADIAL CEPHALIC ARTERIAL VENOUS FISTULA;  Surgeon: Louann Miles Jr., MD;  Location: [facility] OR;  Service: Vascular    CARDIAC CATHETERIZATION N/A 4/9/2023    Procedure: Left Heart Cath;  Surgeon: Lobito Garcia MD;  Location: [facility] CATH INVASIVE LOCATION;  Service: Cardiovascular;  Laterality: N/A;    CARDIAC CATHETERIZATION N/A 4/9/2023    Procedure: Left ventriculography;  Surgeon: Lobito Garcia MD;  Location: [facility] CATH INVASIVE LOCATION;  Service: Cardiovascular;  Laterality: N/A;    CARDIAC CATHETERIZATION N/A 4/9/2023    Procedure: Coronary angiography;  Surgeon: Lobito Garcia MD;  Location: [facility] CATH INVASIVE LOCATION;  Service: Cardiovascular;  Laterality: N/A;    COLONOSCOPY  03/24/2022    COLONOSCOPY N/A 03/24/2022    Procedure: COLONOSCOPY TO CECUM WITH POLYPECTOMY  (HOT SNARE);  Surgeon: Yelena Guerra MD;  Location: [facility] ENDOSCOPY;  Service: General;  Laterality: N/A;  PREOP/ HX OF DIVERTICULITIS, COLOSTOMY  POSTOP/ POLYPS, DIVERTICULOSIS     COLOSTOMY  09/06/2021    COLOSTOMY CLOSURE N/A 04/12/2022    Procedure: open Colostomy reversal;  Surgeon: Yelena Guerra MD;  Location: [facility] MAIN OR;  Service: General;  Laterality: N/A;    CORONARY ARTERY BYPASS GRAFT N/A 4/13/2023    Procedure: MAT STERNOTOMY CORONARY ARTERY BYPASS GRAFT TIMES 4 USING LEFT INTERNAL MAMMARY ARTERY AND RIGHT GREATER SAPHENOUS VEIN  PER ENDOSCOPIC VEIN HARVESTING, MITRAL VALVE  REPAIR  AND PRP;  Surgeon: Mirtha Zuluaga MD;  Location: [facility];  Service: Cardiothoracic;  Laterality: N/A;    DIAGNOSTIC LAPAROSCOPY N/A 2023    Procedure: DIAGNOSTIC LAPAROSCOPY;  Surgeon: Murali Ferreira MD;  Location: [facility] MAIN OR;  Service: General;  Laterality: N/A;    EXPLORATORY LAPAROTOMY N/A 2021    Procedure: Open sigmoind colectomy, colostomy, and appendectomy;  Surgeon: Yelena Guerra MD;  Location: [facility] MAIN OR;  Service: General;  Laterality: N/A;    EYE SURGERY      CATARACTS    KNEE ARTHROSCOPY Right     NEPHRECTOMY PARTIAL      Dr. Victor     Family History   Problem Relation Age of Onset    Hypertension Mother     Heart disease Father         ASCVD    Macular degeneration Father     Cancer Father         bladder    Kidney failure Father     Diabetes type I Sister     Malig Hyperthermia Neg Hx      Social History     Tobacco Use    Smoking status: Former     Current packs/day: 0.00     Average packs/day: 2.0 packs/day for 25.0 years (50.0 ttl pk-yrs)     Types: Cigarettes     Start date:      Quit date:      Years since quittin.2    Smokeless tobacco: Current     Types: Snuff    Tobacco comments:     Patient quit smoking   Vaping Use    Vaping status: Never Used   Substance Use Topics    Alcohol use: No    Drug use: No     No current facility-administered medications on file prior to encounter.     Current Outpatient Medications on File Prior to Encounter   Medication Sig Dispense Refill    aspirin 81 MG tablet Take 1 tablet by mouth Every Night. FOLLOW MD GUIDELINES ON WHEN/IF TO HOLD FOR DOS  Indications: blood thinner      atorvastatin (LIPITOR) 40 MG tablet TAKE 1 TABLET BY MOUTH EVERY NIGHT. INDICATIONS: HIGH AMOUNT OF FATS IN THE BLOOD 90 tablet 3    DULoxetine (CYMBALTA) 30 MG capsule TAKE 1 CAPSULE EVERY DAY 90 capsule 3    fexofenadine (ALLEGRA) 180 MG tablet Take 1 tablet by mouth Daily. Indications: Hayfever       lidocaine-prilocaine (EMLA) 2.5-2.5 % cream APPLY TO SKIN OVER DIALYSIS ACCESS 30 MINUTES BEFORE      midodrine (PROAMATINE) 10 MG tablet TAKE 1 TABLET BY MOUTH 3 TIMES PER WEEK ON DIALYSIS DAYS.      sennosides-docusate (senna-docusate sodium) 8.6-50 MG per tablet Take 1 tablet by mouth At Night As Needed for Constipation. 60 tablet 0    sevelamer (RENAGEL) 800 MG tablet Take 1 tablet by mouth 2 (Two) Times a Day. Indications: UNKNOWN      sevelamer (RENVELA) 800 MG tablet Take 2 tablets by mouth 3 (Three) Times a Day.      traZODone (DESYREL) 50 MG tablet Take 1 tablet by mouth At Night As Needed for Sleep. 90 tablet 3    vitamin D3 125 MCG (5000 UT) capsule capsule Take 1 tablet by mouth 2 (Two) Times a Week. SAT AND WED  Indications: supplement        Allergies   Allergen Reactions    Contrast Dye (Echo Or Unknown Ct/Mr) Other (See Comments)     KIDNEY DISEASE    Latex Other (See Comments)     Blisters       Objective   Objective     Vital Signs  Temp:  [97.9 °F (36.6 °C)-98.9 °F (37.2 °C)] 97.9 °F (36.6 °C)  Heart Rate:  [76-88] 81  Resp:  [12-16] 14  BP: (152-182)/() 152/76  SpO2:  [97 %-100 %] 97 %  on   ;   Device (Oxygen Therapy): room air  Body mass index is 28.84 kg/m².    Physical Exam  General: Alert, lying in bed, not in distress,  HEENT: Normocephalic, atraumatic  Eyes: PERRL, EOMI, anicteric sclerae  Lungs: CTA, no wheezing  CV: Regular rate and rhythm, + systolic murmur  Abdomen: Soft, mildly distended, generalized TTP, hypoactive bowel sounds  Extremities: No significant peripheral edema  Skin: Clean/dry/intact, no rashes  Neuro: Cranial nerves II through XII intact, no gross focal neurological deficits appreciated  Psych: Appropriate mood and affect    Results Review:  I reviewed the patient's new clinical results.  I reviewed the patient's new imaging results and agree with the interpretation.  I reviewed the patient's other test results and agree with the interpretation  I personally  viewed and interpreted the patient's EKG/Telemetry data  Discussed with ED provider.    Lab Results (last 24 hours)       Procedure Component Value Units Date/Time    CBC & Differential [[id number]]  (Abnormal) Collected: 04/04/25 2130    Specimen: Blood Updated: 04/04/25 2135    Narrative:      The following orders were created for panel order CBC & Differential.  Procedure                               Abnormality         Status                     ---------                               -----------         ------                     CBC Auto Differential[[id number]]        Abnormal            Final result                 Please view results for these tests on the individual orders.    Comprehensive Metabolic Panel [[id number]]  (Abnormal) Collected: 04/04/25 2130    Specimen: Blood Updated: 04/04/25 2157     Glucose 166 mg/dL      BUN 36 mg/dL      Creatinine 7.36 mg/dL      Sodium 134 mmol/L      Potassium 4.4 mmol/L      Chloride 92 mmol/L      CO2 26.0 mmol/L      Calcium 11.6 mg/dL      Total Protein 8.0 g/dL      Albumin 4.6 g/dL      ALT (SGPT) 12 U/L      AST (SGOT) 17 U/L      Alkaline Phosphatase 105 U/L      Total Bilirubin 0.6 mg/dL      Globulin 3.4 gm/dL      A/G Ratio 1.4 g/dL      BUN/Creatinine Ratio 4.9     Anion Gap 16.0 mmol/L      eGFR 7.2 mL/min/1.73     Narrative:      GFR Categories in Chronic Kidney Disease (CKD)      GFR Category          GFR (mL/min/1.73)    Interpretation  G1                     90 or greater         Normal or high (1)  G2                      60-89                Mild decrease (1)  G3a                   45-59                Mild to moderate decrease  G3b                   30-44                Moderate to severe decrease  G4                    15-29                Severe decrease  G5                    14 or less           Kidney failure          (1)In the absence of evidence of kidney disease, neither GFR category G1 or G2 fulfill the criteria for CKD.    eGFR calculation  2021 CKD-EPI creatinine equation, which does not include race as a factor    Lipase [[id number]]  (Normal) Collected: 04/04/25 2130    Specimen: Blood Updated: 04/04/25 2157     Lipase 33 U/L     Lactic Acid, Plasma [[id number]]  (Abnormal) Collected: 04/04/25 2130    Specimen: Blood Updated: 04/04/25 2158     Lactate 2.7 mmol/L     CBC Auto Differential [[id number]]  (Abnormal) Collected: 04/04/25 2130    Specimen: Blood Updated: 04/04/25 2135     WBC 12.32 10*3/mm3      RBC 4.71 10*6/mm3      Hemoglobin 14.8 g/dL      Hematocrit 44.3 %      MCV 94.1 fL      MCH 31.4 pg      MCHC 33.4 g/dL      RDW 13.0 %      RDW-SD 45.5 fl      MPV 9.5 fL      Platelets 220 10*3/mm3      Neutrophil % 86.3 %      Lymphocyte % 8.0 %      Monocyte % 5.4 %      Eosinophil % 0.0 %      Basophil % 0.1 %      Immature Grans % 0.2 %      Neutrophils, Absolute 10.64 10*3/mm3      Lymphocytes, Absolute 0.98 10*3/mm3      Monocytes, Absolute 0.67 10*3/mm3      Eosinophils, Absolute 0.00 10*3/mm3      Basophils, Absolute 0.01 10*3/mm3      Immature Grans, Absolute 0.02 10*3/mm3     Blood Culture - Blood, Arm, Right [[id number]] Collected: 04/04/25 2342    Specimen: Blood from Arm, Right Updated: 04/05/25 0026    Blood Culture - Blood, Hand, Right [[id number]] Collected: 04/04/25 2342    Specimen: Blood from Hand, Right Updated: 04/05/25 0026    STAT Lactic Acid, Reflex [[id number]]  (Normal) Collected: 04/05/25 0040    Specimen: Blood Updated: 04/05/25 0132     Lactate 1.8 mmol/L     CBC (No Diff) [[id number]]  (Abnormal) Collected: 04/05/25 0416    Specimen: Blood from Hand, Right Updated: 04/05/25 0448     WBC 13.55 10*3/mm3      RBC 4.36 10*6/mm3      Hemoglobin 14.0 g/dL      Hematocrit 41.1 %      MCV 94.3 fL      MCH 32.1 pg      MCHC 34.1 g/dL      RDW 13.0 %      RDW-SD 44.2 fl      MPV 9.2 fL      Platelets 198 10*3/mm3     Comprehensive Metabolic Panel [[id number]]  (Abnormal) Collected: 04/05/25 0416    Specimen: Blood from Hand, Right  Updated: 04/05/25 0514     Glucose 112 mg/dL      BUN 34 mg/dL      Creatinine 6.82 mg/dL      Sodium 140 mmol/L      Potassium 4.8 mmol/L      Chloride 98 mmol/L      CO2 26.6 mmol/L      Calcium 10.3 mg/dL      Total Protein 6.7 g/dL      Albumin 3.7 g/dL      ALT (SGPT) 12 U/L      AST (SGOT) 19 U/L      Alkaline Phosphatase 88 U/L      Total Bilirubin 0.5 mg/dL      Globulin 3.0 gm/dL      A/G Ratio 1.2 g/dL      BUN/Creatinine Ratio 5.0     Anion Gap 15.4 mmol/L      eGFR 7.8 mL/min/1.73     Narrative:      GFR Categories in Chronic Kidney Disease (CKD)      GFR Category          GFR (mL/min/1.73)    Interpretation  G1                     90 or greater         Normal or high (1)  G2                      60-89                Mild decrease (1)  G3a                   45-59                Mild to moderate decrease  G3b                   30-44                Moderate to severe decrease  G4                    15-29                Severe decrease  G5                    14 or less           Kidney failure          (1)In the absence of evidence of kidney disease, neither GFR category G1 or G2 fulfill the criteria for CKD.    eGFR calculation 2021 CKD-EPI creatinine equation, which does not include race as a factor    Lactic Acid, Plasma [[id number]]  (Normal) Collected: 04/05/25 0416    Specimen: Blood from Hand, Right Updated: 04/05/25 0507     Lactate 1.2 mmol/L     POC Glucose Once [[id number]]  (Normal) Collected: 04/05/25 0616    Specimen: Blood Updated: 04/05/25 0617     Glucose 91 mg/dL             Imaging Results (Last 24 Hours)       Procedure Component Value Units Date/Time    XR Abdomen KUB [[id number]] Collected: 04/05/25 0114     Updated: 04/05/25 0120    Narrative:      SUPINE AP ABDOMEN / KUB     INDICATION:  NG tube placement.     TECHNIQUE:  Supine  AP radiographs of the abdomen.     COMPARISON:  4/4/2025 at 9:43 p.m.     FINDINGS:     NG tube in place, distal tip mid stomach.     Redemonstrated changes of  early or partial small bowel obstruction.       Impression:         NG tube distal tip mid stomach.     This report was finalized on 4/5/2025 1:17 AM by Dr. Joey Omer M.D  on Workstation: [id number]       CT Abdomen Pelvis Without Contrast [[id number]] Collected: 04/04/25 2259     Updated: 04/04/25 2313    Narrative:      CT ABDOMEN AND PELVIS WITHOUT CONTRAST:     HISTORY: Nausea vomiting diarrhea upper abdominal pain     TECHNIQUE: Helical CT was performed of the abdomen and pelvis from the  lung bases through the lesser trochanters without IV contrast.  Reformatted images were provided in the coronal and sagittal planes.  Radiation dose reduction techniques were utilized, including automated  exposure control, and exposure modulation based on body size.     COMPARISON: CT chest abdomen pelvis 4/26/2023     FINDINGS:     Lung bases: Chronic changes at the left lung base, no acute findings.     Abdomen: The liver and gallbladder are normal.  The spleen and pancreas  appear normal.  Both adrenal glands are normal. Both kidneys are  atrophic, without acute abnormality. There is mild aneurysmal dilatation  abdominal aorta to 3.1 cm.     Moderate dilatation of the proximal to mid small bowel. The colon and  distal small bowel are decompressed. However, a discrete transition site  is not confidently identified. No evidence of hernia or abnormal fluid  or air collection.     Pelvis:  There is no pelvic inflammatory change or other abnormal fluid  collection.  There is no pelvic adenopathy or other soft tissue mass.   There is no CT evidence of hernia.     There is no acute bony abnormality.       Impression:         Mid small bowel obstruction, though a discrete transition zone is not  identified. No evidence of hernia or mass, free air or abscess.              This report was finalized on 4/4/2025 11:10 PM by Dr. Joey Omer M.D on Workstation: [id number]       XR Abdomen Flat & Upright [[id number]]  Collected: 04/04/25 2209     Updated: 04/04/25 2217    Narrative:      FLAT & UPRIGHT AP ABDOMEN     HISTORY: Pain     TECHNIQUE: Supine and erect AP abdomen.     Previous exam:  None      FINDINGS:     Multiple dilated small bowel loops with a paucity of colonic gas, with  multiple small bowel air-fluid levels.     No evidence of free intraperitoneal air.       Impression:         FINDINGS suspicious for early or partial small bowel obstruction.        This report was finalized on 4/4/2025 10:14 PM by Dr. Joey Omer M.D on Workstation: [id number]               Results for orders placed during the hospital encounter of 11/18/24    Adult Transthoracic Echo Complete W/ Cont if Necessary Per Protocol    Interpretation Summary    Left ventricular systolic function is normal. Calculated left ventricular EF = 61.4%    Left ventricular diastolic function was indeterminate.    Mild aortic valve stenosis is present. Aortic valve area is 2 cm2.    Peak velocity of the flow distal to the aortic valve is 270.9 cm/s. Aortic valve mean pressure gradient is 18 mmHg. Aortic valve dimensionless index is 0.49 .    There is a mitral valve ring present.    The mitral valve mean gradient is 11 mmHg.    Mild to moderate tricuspid valve regurgitation is present. Estimated right ventricular systolic pressure from tricuspid regurgitation is moderately elevated (45-55 mmHg). Calculated right ventricular systolic pressure from tricuspid regurgitation is 46 mmHg.      No orders to display        Assessment/Plan     Active Hospital Problems    Diagnosis  POA    **Small bowel obstruction [K56.609]  Yes    Ischemic cardiomyopathy [I25.5]  Yes    S/P MVR (mitral valve repair) [Z98.890]  Not Applicable    ESRD (end stage renal disease) [N18.6]  Yes    Essential hypertension [I10]  Yes    Type 2 diabetes mellitus with diabetic chronic kidney disease [E11.22]  Yes      Resolved Hospital Problems   No resolved problems to display.     Patient  is a 74-year-old male with a history of including but not limited to ESRD on dialysis, ischemic cardiomyopathy, CAD status post CABG, severe mitral regurgitation status post mitral valve repair, HTN, type II DM, presented to the ED complaining of acute nausea vomiting and diarrhea after eating going out to eat hamburger and fries.    Small bowel obstruction  Nausea vomiting/diarrhea  -CT scan showed mid small bowel obstruction, though a discrete transition zone is not  identified. No evidence of hernia or mass, free air or abscess.  WBC mildly elevated.  Patient is afebrile  -Food poisoning?  Her symptoms started soon after patient ate hamburger outpatient.  C. difficile panel ordered.  - N.p.o., symptom control, antiemetics  - General Surgery consulted      ESRD on dialysis  -Nephrology consulted.  Plan for dialysis today    Type II DM  - Initiate SSI    Ischemic cardiomyopathy  CAD status post CABG  Severe mitral regurgitation status post repair  Resume outpatient meds once cleared for p.o. intake.              I discussed the patient's findings and my recommendations with patient.    VTE Prophylaxis - SCDs.  Code Status - Full code.       Doug Bolton MD  [facility]  04/05/25  06:54 EDT      Electronically signed by Doug Bolton MD at 04/05/25 1401       Facility-Administered Medications as of 4/6/2025   Medication Dose Route Frequency Provider Last Rate Last Admin    acetaminophen (TYLENOL) tablet 650 mg  650 mg Oral Q4H PRN Stacie Ag APRN        Or    acetaminophen (TYLENOL) 160 MG/5ML oral solution 650 mg  650 mg Oral Q4H PRN Stacie Ag APRN        Or    acetaminophen (TYLENOL) suppository 650 mg  650 mg Rectal Q4H PRN Stacie Ag APRN        aspirin EC tablet 81 mg  81 mg Oral Nightly Doug Bolton MD   81 mg at 04/05/25 2140    atorvastatin (LIPITOR) tablet 40 mg  40 mg Oral Nightly Doug Bolton MD   40 mg at 04/05/25 2140     dextrose (D50W) (25 g/50 mL) IV injection 25 g  25 g Intravenous Q15 Min PRN Stacie Ag APRN        dextrose (GLUTOSE) oral gel 15 g  15 g Oral Q15 Min PRN Stacie Ag APRN        DULoxetine (CYMBALTA) DR capsule 20 mg  20 mg Oral Daily Doug Bolton MD   20 mg at 04/06/25 0835    famotidine (PEPCID) tablet 20 mg  20 mg Oral BID PRN Stacie Ag APRN        glucagon (GLUCAGEN) injection 1 mg  1 mg Intramuscular Q15 Min PRN Stacie Ag APRN        hydrALAZINE (APRESOLINE) injection 10 mg  10 mg Intravenous Q4H PRN Page Santana MD        insulin lispro (HUMALOG/ADMELOG) injection 2-7 Units  2-7 Units Subcutaneous 4x Daily AC & at Bedtime Stacie Ag APRN        melatonin tablet 5 mg  5 mg Oral Nightly PRN Stacie Ag APRN        morphine injection 2 mg  2 mg Intravenous Q4H PRN Stacie Ag APRN        And    naloxone (NARCAN) injection 0.4 mg  0.4 mg Intravenous Q5 Min PRN Stacie Ag APRN        [COMPLETED] Morphine sulfate (PF) injection 4 mg  4 mg Intravenous Once Wild Garcia MD   4 mg at 04/04/25 2127    [COMPLETED] Morphine sulfate (PF) injection 4 mg  4 mg Intravenous Once Wild Garcia MD   4 mg at 04/04/25 2237    [COMPLETED] Morphine sulfate (PF) injection 4 mg  4 mg Intravenous Once Wild Garcia MD   4 mg at 04/05/25 0007    [COMPLETED] ondansetron (ZOFRAN) injection 4 mg  4 mg Intravenous Once Wild Garcia MD   4 mg at 04/04/25 2127    ondansetron (ZOFRAN) injection 4 mg  4 mg Intravenous Q6H PRN Stacie Ag APRN        [COMPLETED] piperacillin-tazobactam (ZOSYN) 3.375 g IVPB in 100 mL NS MBP (CD)  3.375 g Intravenous Once Wild Garcia MD   Stopped at 04/05/25 0023    sennosides-docusate (PERICOLACE) 8.6-50 MG per tablet 1 tablet  1 tablet Oral Nightly DAYDAYN Doug Bolton MD        sevelamer (RENVELA) tablet 1,600 mg  1,600 mg Oral TID  Doug Bolton MD        [COMPLETED] sodium chloride 0.9 % bolus 1,000 mL  1,000 mL Intravenous Once Wild Garcia MD   Stopped at 04/05/25 0023    [COMPLETED] sodium chloride 0.9 % bolus 500 mL  500 mL Intravenous Once Wild Garcia MD   Stopped at 04/04/25 2226    sodium chloride 0.9 % flush 10 mL  10 mL Intravenous Q12H Stacie Ag APRN   10 mL at 04/06/25 0835    sodium chloride 0.9 % flush 10 mL  10 mL Intravenous PRN Stacie Ag APRN        sodium chloride 0.9 % infusion 40 mL  40 mL Intravenous PRN Stacie Ag APRN        traZODone (DESYREL) tablet 50 mg  50 mg Oral Nightly PRN Doug Bolton MD   50 mg at 04/05/25 2140     Lab Results (last 24 hours)       Procedure Component Value Units Date/Time    Renal Function Panel [[id number]]  (Abnormal) Collected: 04/06/25 0638    Specimen: Blood Updated: 04/06/25 0708     Glucose 77 mg/dL      BUN 28 mg/dL      Creatinine 5.88 mg/dL      Sodium 136 mmol/L      Potassium 5.1 mmol/L      Chloride 96 mmol/L      CO2 27.1 mmol/L      Calcium 9.8 mg/dL      Albumin 3.9 g/dL      Phosphorus 3.7 mg/dL      Anion Gap 12.9 mmol/L      BUN/Creatinine Ratio 4.8     eGFR 9.4 mL/min/1.73     Narrative:      GFR Categories in Chronic Kidney Disease (CKD)      GFR Category          GFR (mL/min/1.73)    Interpretation  G1                     90 or greater         Normal or high (1)  G2                      60-89                Mild decrease (1)  G3a                   45-59                Mild to moderate decrease  G3b                   30-44                Moderate to severe decrease  G4                    15-29                Severe decrease  G5                    14 or less           Kidney failure          (1)In the absence of evidence of kidney disease, neither GFR category G1 or G2 fulfill the criteria for CKD.    eGFR calculation 2021 CKD-EPI creatinine equation, which does not include race as a factor     Magnesium [[id number]]  (Normal) Collected: 04/06/25 0638    Specimen: Blood Updated: 04/06/25 0708     Magnesium 2.0 mg/dL     CBC (No Diff) [[id number]]  (Normal) Collected: 04/06/25 0638    Specimen: Blood Updated: 04/06/25 0652     WBC 9.57 10*3/mm3      RBC 4.38 10*6/mm3      Hemoglobin 13.5 g/dL      Hematocrit 41.9 %      MCV 95.7 fL      MCH 30.8 pg      MCHC 32.2 g/dL      RDW 12.8 %      RDW-SD 44.7 fl      MPV 9.1 fL      Platelets 176 10*3/mm3     POC Glucose Once [[id number]]  (Normal) Collected: 04/06/25 0637    Specimen: Blood Updated: 04/06/25 0638     Glucose 73 mg/dL     Blood Culture - Blood, Arm, Right [[id number]]  (Normal) Collected: 04/04/25 2342    Specimen: Blood from Arm, Right Updated: 04/06/25 0030     Blood Culture No growth at 24 hours    Blood Culture - Blood, Hand, Right [[id number]]  (Normal) Collected: 04/04/25 2342    Specimen: Blood from Hand, Right Updated: 04/06/25 0030     Blood Culture No growth at 24 hours    POC Glucose Once [[id number]]  (Normal) Collected: 04/05/25 2036    Specimen: Blood Updated: 04/05/25 2037     Glucose 81 mg/dL     POC Glucose Once [[id number]]  (Normal) Collected: 04/05/25 1554    Specimen: Blood Updated: 04/05/25 1555     Glucose 93 mg/dL     POC Glucose Once [[id number]]  (Normal) Collected: 04/05/25 1055    Specimen: Blood Updated: 04/05/25 1057     Glucose 95 mg/dL           Imaging Results (Last 24 Hours)       Procedure Component Value Units Date/Time    XR Abdomen Flat & Upright [[id number]] Resulted: 04/06/25 0857     Updated: 04/06/25 0854          ECG/EMG Results (last 24 hours)       ** No results found for the last 24 hours. **          Orders (last 24 hrs)        Start     Ordered    04/07/25 0730  Auto Discontinue GI Panel in 48 Hours if not Collected  ONCE GI PANEL         04/05/25 0729    04/06/25 0639  POC Glucose Once  PROCEDURE ONCE        Comments: Complete no more than 45 minutes prior to patient eating      04/06/25 0637    04/06/25 0600   Renal Function Panel  Morning Draw         04/05/25 1018    04/06/25 0600  Basic Metabolic Panel  Daily       04/05/25 2010 04/06/25 0600  Magnesium  Daily       04/05/25 2010 04/06/25 0600  Phosphorus  Daily       04/05/25 2010 04/06/25 0600  CBC (No Diff)  Daily       04/05/25 2010 04/06/25 0000  XR Abdomen Flat & Upright  1 Time Imaging         04/05/25 1410    04/05/25 2100  aspirin EC tablet 81 mg  Nightly         04/05/25 1422    04/05/25 2100  atorvastatin (LIPITOR) tablet 40 mg  Nightly         04/05/25 1422    04/05/25 2038  POC Glucose Once  PROCEDURE ONCE        Comments: Complete no more than 45 minutes prior to patient eating      04/05/25 2036 04/05/25 1600  sevelamer (RENVELA) tablet 1,600 mg  3 Times Daily         04/05/25 1422    04/05/25 1556  POC Glucose Once  PROCEDURE ONCE        Comments: Complete no more than 45 minutes prior to patient eating      04/05/25 1554    04/05/25 1515  DULoxetine (CYMBALTA) DR capsule 20 mg  Daily         04/05/25 1422    04/05/25 1422  traZODone (DESYREL) tablet 50 mg  Nightly PRN         04/05/25 1422    04/05/25 1422  sennosides-docusate (PERICOLACE) 8.6-50 MG per tablet 1 tablet  Nightly PRN         04/05/25 1422    04/05/25 1411  NPO Diet NPO Type: Sips with Meds, Ice Chips  Diet Effective Now         04/05/25 1410    04/05/25 1058  POC Glucose Once  PROCEDURE ONCE        Comments: Complete no more than 45 minutes prior to patient eating      04/05/25 1055    04/05/25 1019  Hemodialysis Inpatient  Once         04/05/25 1018    04/05/25 1014  hydrALAZINE (APRESOLINE) injection 10 mg  Every 4 Hours PRN         04/05/25 1018    04/05/25 0900  sodium chloride 0.9 % flush 10 mL  Every 12 Hours Scheduled         04/05/25 0254    04/05/25 0800  Oral Care  2 Times Daily       04/05/25 0254    04/05/25 0730  insulin lispro (HUMALOG/ADMELOG) injection 2-7 Units  4 Times Daily Before Meals & Nightly         04/05/25 0254    04/05/25 0700  POC Glucose 4x  "Daily Before Meals & at Bedtime  4 Times Daily Before Meals & at Bedtime      Comments: Complete no more than 45 minutes prior to patient eating      04/05/25 0254 04/05/25 0400  Vital Signs  Every 4 Hours       04/05/25 0254 04/05/25 0253  morphine injection 2 mg  Every 4 Hours PRN        Placed in \"And\" Linked Group    04/05/25 0254    04/05/25 0253  naloxone (NARCAN) injection 0.4 mg  Every 5 Minutes PRN        Placed in \"And\" Linked Group    04/05/25 0254 04/05/25 0252  melatonin tablet 5 mg  Nightly PRN         04/05/25 0254    04/05/25 0252  dextrose (GLUTOSE) oral gel 15 g  Every 15 Minutes PRN         04/05/25 0254 04/05/25 0252  dextrose (D50W) (25 g/50 mL) IV injection 25 g  Every 15 Minutes PRN         04/05/25 0254 04/05/25 0252  glucagon (GLUCAGEN) injection 1 mg  Every 15 Minutes PRN         04/05/25 0254    04/05/25 0252  Intake & Output  Every Shift       04/05/25 0254    04/05/25 0251  sodium chloride 0.9 % flush 10 mL  As Needed         04/05/25 0254 04/05/25 0251  sodium chloride 0.9 % infusion 40 mL  As Needed         04/05/25 0254    04/05/25 0251  acetaminophen (TYLENOL) tablet 650 mg  Every 4 Hours PRN        Placed in \"Or\" Linked Group    04/05/25 0254    04/05/25 0251  acetaminophen (TYLENOL) 160 MG/5ML oral solution 650 mg  Every 4 Hours PRN        Placed in \"Or\" Linked Group    04/05/25 0254    04/05/25 0251  acetaminophen (TYLENOL) suppository 650 mg  Every 4 Hours PRN        Placed in \"Or\" Linked Group    04/05/25 0254    04/05/25 0251  famotidine (PEPCID) tablet 20 mg  2 Times Daily PRN         04/05/25 0254    04/05/25 0251  ondansetron (ZOFRAN) injection 4 mg  Every 6 Hours PRN         04/05/25 0254    Unscheduled  Up With Assistance  As Needed       04/05/25 0254    Unscheduled  Oxygen Therapy- Nasal Cannula; Titrate 1-6 LPM Per SpO2; 90 - 95%  Continuous PRN       04/05/25 0254    Unscheduled  Follow Hypoglycemia Standing Orders For Blood Glucose <70 & Notify " Provider of Treatment  As Needed      Comments: Follow Hypoglycemia Orders As Outlined in Process Instructions (Open Order Report to View Full Instructions)  Notify Provider Any Time Hypoglycemia Treatment is Administered    04/05/25 0254                  Operative/Procedure Notes (last 24 hours)  Notes from 04/05/25 0859 through 04/06/25 0859   No notes of this type exist for this encounter.       Physician Progress Notes (last 24 hours)  Notes from 04/05/25 0859 through 04/06/25 0859   No notes of this type exist for this encounter.          Consult Notes (last 24 hours)        Yelena Guerra MD at 04/05/25 1344        Consult Orders    1. Inpatient General Surgery Consult [[id number]] ordered by Stacie Ag APRN at 04/05/25 0254                 General Surgery Consultation    Consulting Physician: Yelena Guerra MD  Referring Physician: DUSTY Bryan    Reason for consultation: Small bowel obstruction    CC: Abdominal pain, nausea/vomiting    HPI:   The patient is a very pleasant 74 y.o. male that presented to the hospital with acute onset generalized stabbing constant abdominal pain that began Thursday evening after dinner.  His wife had similar symptoms that lasted for only a few hours and then subsided.  He had had diarrhea for most of Thursday night and into Friday, but then the diarrhea resolved and he had a normal bowel movement last night.  Quickly after the abdominal pain began he had dark brown feculent vomiting accompanied by nausea.  He went to the [facility] emergency room where a CT abdomen/pelvis demonstrated evidence of a small bowel obstruction.  He was then transferred here for further management.    Past Medical History:  End-stage renal disease  Coronary artery disease  Type 2 diabetes  Hypertension  History of renal cell carcinoma  History of perforated diverticulitis with colostomy  GERD  Vitamin D deficiency    Past Surgical History:  Coronary artery bypass  graft  Colonoscopy (2022 by me)  Diagnostic laparoscopy (February 2023 by Dr. Ferreira)  Open colostomy reversal (April 2022 by me)  Open sigmoid colectomy with colostomy (September 2021 by me)  Partial left nephrectomy (2007, Dr. Victor)  Left arm AV fistula  Cataract extraction  Knee arthroscopy    Medications:  Facility-Administered Medications Prior to Admission   Medication Dose Route Frequency Provider Last Rate Last Admin    lidocaine-prilocaine (EMLA) 2.5-2.5 % cream 1 Application  1 Application Topical Once Louann Miles Jr., MD         Medications Prior to Admission   Medication Sig Dispense Refill Last Dose/Taking    aspirin 81 MG tablet Take 1 tablet by mouth Every Night. FOLLOW MD GUIDELINES ON WHEN/IF TO HOLD FOR DOS  Indications: blood thinner       atorvastatin (LIPITOR) 40 MG tablet TAKE 1 TABLET BY MOUTH EVERY NIGHT. INDICATIONS: HIGH AMOUNT OF FATS IN THE BLOOD 90 tablet 3     DULoxetine (CYMBALTA) 30 MG capsule TAKE 1 CAPSULE EVERY DAY 90 capsule 3     fexofenadine (ALLEGRA) 180 MG tablet Take 1 tablet by mouth Daily. Indications: Hayfever       lidocaine-prilocaine (EMLA) 2.5-2.5 % cream APPLY TO SKIN OVER DIALYSIS ACCESS 30 MINUTES BEFORE       midodrine (PROAMATINE) 10 MG tablet TAKE 1 TABLET BY MOUTH 3 TIMES PER WEEK ON DIALYSIS DAYS.       sennosides-docusate (senna-docusate sodium) 8.6-50 MG per tablet Take 1 tablet by mouth At Night As Needed for Constipation. 60 tablet 0     sevelamer (RENAGEL) 800 MG tablet Take 1 tablet by mouth 2 (Two) Times a Day. Indications: UNKNOWN       sevelamer (RENVELA) 800 MG tablet Take 2 tablets by mouth 3 (Three) Times a Day.       traZODone (DESYREL) 50 MG tablet Take 1 tablet by mouth At Night As Needed for Sleep. 90 tablet 3     vitamin D3 125 MCG (5000 UT) capsule capsule Take 1 tablet by mouth 2 (Two) Times a Week. SAT AND WED  Indications: supplement           Allergies: Contrast dye (contraindicated due to chronic kidney disease), latex  (blisters)    Social History: , former smoker, frequent chewing tobacco use, no regular alcohol use    Family History: Mother with history of hypertension, father with history of bladder cancer and coronary artery disease    Review of Systems:  Constitutional: denies any weight changes, fatigue, or weakness  Eyes: denies blurred/double vision or scleral icterus  Cardiovascular: denies chest pain, palpitations, or edemas  Respiratory: denies cough, sputum, or dyspnea  Gastrointestinal: Positive for nausea, vomiting, diarrhea, and abdominal pain; denies melena or hematochezia  Genitourinary: denies dysuria or hematuria  Endocrine: denies cold intolerance, lethargy, or flushing  Hematologic: denies excessive bruising or bleeding  Musculoskeletal: denies weakness, joint swelling, joint pain, or stiffness  Neurologic: denies seizures, CVA, paresthesia, or peripheral neuropathy  Skin: denies change in nevi, rashes, masses, or jaundice     All other systems reviewed and were negative.    Physical Exam:   Vitals:    04/05/25 0745   BP: 159/86   Pulse: 82   Resp: 14   Temp: 97.9 °F (36.6 °C)   SpO2: 98%     Height: 177 cm  Weight: 91 kg  BMI: 28.84  GENERAL: awake and alert, no acute distress, oriented to person, place, and time  HEENT: normocephalic, atraumatic, no scleral icterus, moist mucous membranes  NECK: Supple, there is no thyromegaly or lymphadenopathy  RESPIRATORY: clear to auscultation, no wheezes, rales or rhonchi, symmetric air entry  CARDIOVASCULAR: regular rate and rhythm    GASTROINTESTINAL: Soft, relatively nondistended, no appreciable tenderness currently with no evidence of peritonitis when all 4 quadrants are palpated  MUSCULOSKELETAL: no cyanosis, clubbing, or edema   NEUROLOGIC: alert and oriented, normal speech, cranial nerves 2-12 grossly intact, no focal deficits   SKIN: Moist, warm, no rashes, no jaundice    Diagnostic workup:   Pertinent labs:   Results from last 7 days   Lab Units  04/05/25  0416 04/04/25  2130   WBC 10*3/mm3 13.55* 12.32*   HEMOGLOBIN g/dL 14.0 14.8   HEMATOCRIT % 41.1 44.3   PLATELETS 10*3/mm3 198 220     Results from last 7 days   Lab Units 04/05/25  0416 04/04/25  2130   SODIUM mmol/L 140 134*   POTASSIUM mmol/L 4.8 4.4   CHLORIDE mmol/L 98 92*   CO2 mmol/L 26.6 26.0   BUN mg/dL 34* 36*   CREATININE mg/dL 6.82* 7.36*   CALCIUM mg/dL 10.3 11.6*   BILIRUBIN mg/dL 0.5 0.6   ALK PHOS U/L 88 105   ALT (SGPT) U/L 12 12   AST (SGOT) U/L 19 17   GLUCOSE mg/dL 112* 166*       IMAGING:  CT ABDOMEN/PELVIS:  IMPRESSION:  Mid small bowel obstruction, though a discrete transition zone is not identified. No evidence of hernia or mass, free air or abscess.    Assessment and plan:     The patient is a 74 y.o. male with partial small bowel obstruction versus gastroenteritis    I reviewed his CT scan images/report as well as the plain abdominal film images done at the [facility] emergency room.  He appears to have multiple air-fluid levels throughout the small bowel consistent with partial small bowel obstruction.  His clinical symptoms are more suggestive of a gastroenteritis, particularly in light of his wife having the same symptoms for a shorter period of time.  I would recommend this be treated conservatively for now with nasogastric tube decompression and bowel rest other than ice chips and sips of water with medications.  I will repeat a flat and upright abdominal film on him tomorrow and will continue to follow along.  I see nothing at this time that would warrant urgent surgery.  Thank you very much for this consult, I am happy to assist in his care.    Yelena Guerra MD  General, Robotic, and Endoscopic Surgery  [facility]    [address], [address]  [place] [address]  P: [phone]  F: [phone]       Electronically signed by Yelena Guerra MD at 04/05/25 8250       Page Santana MD at 04/05/25 1008        Consult Orders    1. Inpatient  Nephrology Consult [[id number]] ordered by Stacie Ag APRN at 25 0254                   [facility] Consult Note      Patient Name: Lefty Cai  : 1950  MRN: [id number]  Primary Care Physician:  Daksha Ng APRN  Referring Physician: DUSTY Anaya  Date of admission: 2025    Subjective     Reason for Consult: Management of end-stage renal disease on hemodialysis    HPI:   Lefty Cai is a 74 y.o. male known to have end-stage renal disease on maintenance hemodialysis on TTS schedule at [facility] under the care of Dr. Mccall using left upper extremity AV fistula history of coronary artery disease, hypertension, type 2 diabetes mellitus and hyperlipidemia who presented to the hospital with nausea and vomiting.  Initial investigation revealed small bowel obstruction and NG tube was placed for decompression.  Nephrology was consulted for management of dialysis.  Currently denies any chest pain or shortness of breath.  No fever no chills.        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, implants and grafts, initial encounter 2023    GERD (gastroesophageal reflux disease)     H/O renal cell carcinoma 2007    partial nephrectomy    History of colostomy reversal 2022    [place] (hard of hearing)     NO DEVICE    Hyperlipidemia     Hyperlipidemia, unspecified     Hypertension     Primary insomnia 2016    Proteinuria     Risk factors for  obstructive sleep apnea     Sinus drainage     Stage 4 chronic kidney disease 07/02/2019    Type 2 diabetes mellitus with diabetic chronic kidney disease     Type 2 diabetes mellitus with hyperglycemia 07/02/2019    Vitamin D deficiency 08/14/2017    Vitamin D deficiency, unspecified        Past Surgical History:   Procedure Laterality Date    ARTERIOVENOUS FISTULA/SHUNT SURGERY Left 08/15/2019    Procedure: LEFT MID FOREARM RADIAL CEPHALIC ARTERIAL VENOUS FISTULA;  Surgeon: Louann Miles Jr., MD;  Location: [facility] MAIN OR;  Service: Vascular    CARDIAC CATHETERIZATION N/A 4/9/2023    Procedure: Left Heart Cath;  Surgeon: Lobito Garcia MD;  Location: [facility] CATH INVASIVE LOCATION;  Service: Cardiovascular;  Laterality: N/A;    CARDIAC CATHETERIZATION N/A 4/9/2023    Procedure: Left ventriculography;  Surgeon: Lobito Garcia MD;  Location: [facility] CATH INVASIVE LOCATION;  Service: Cardiovascular;  Laterality: N/A;    CARDIAC CATHETERIZATION N/A 4/9/2023    Procedure: Coronary angiography;  Surgeon: Lobito Garcia MD;  Location: [facility] CATH INVASIVE LOCATION;  Service: Cardiovascular;  Laterality: N/A;    COLONOSCOPY  03/24/2022    COLONOSCOPY N/A 03/24/2022    Procedure: COLONOSCOPY TO CECUM WITH POLYPECTOMY  (HOT SNARE);  Surgeon: Yelena Guerra MD;  Location: [facility] ENDOSCOPY;  Service: General;  Laterality: N/A;  PREOP/ HX OF DIVERTICULITIS, COLOSTOMY  POSTOP/ POLYPS, DIVERTICULOSIS     COLOSTOMY  09/06/2021    COLOSTOMY CLOSURE N/A 04/12/2022    Procedure: open Colostomy reversal;  Surgeon: Yelena Guerra MD;  Location: [facility] MAIN OR;  Service: General;  Laterality: N/A;    CORONARY ARTERY BYPASS GRAFT N/A 4/13/2023    Procedure: MAT STERNOTOMY CORONARY ARTERY BYPASS GRAFT TIMES 4 USING LEFT INTERNAL MAMMARY ARTERY AND RIGHT GREATER SAPHENOUS VEIN  PER ENDOSCOPIC VEIN HARVESTING, MITRAL VALVE REPAIR  AND PRP;  Surgeon: Mirtha Zuluaga MD;  Location: [facility] CVOR;  Service:  Cardiothoracic;  Laterality: N/A;    DIAGNOSTIC LAPAROSCOPY N/A 2/27/2023    Procedure: DIAGNOSTIC LAPAROSCOPY;  Surgeon: Murali Ferreira MD;  Location: [facility] OR;  Service: General;  Laterality: N/A;    EXPLORATORY LAPAROTOMY N/A 09/06/2021    Procedure: Open sigmoind colectomy, colostomy, and appendectomy;  Surgeon: Yelena Guerra MD;  Location: [facility] OR;  Service: General;  Laterality: N/A;    EYE SURGERY      CATARACTS    KNEE ARTHROSCOPY Right     NEPHRECTOMY PARTIAL  2007    Dr. Victor       Family History: family history includes Cancer in his father; Diabetes type I in his sister; Heart disease in his father; Hypertension in his mother; Kidney failure in his father; Macular degeneration in his father.    Social History:  reports that he quit smoking about 26 years ago. His smoking use included cigarettes. He started smoking about 51 years ago. He has a 50 pack-year smoking history. His smokeless tobacco use includes snuff. He reports that he does not drink alcohol and does not use drugs.    Home Medications:  Prior to Admission medications    Medication Sig Start Date End Date Taking? Authorizing Provider   aspirin 81 MG tablet Take 1 tablet by mouth Every Night. FOLLOW MD GUIDELINES ON WHEN/IF TO HOLD FOR DOS  Indications: blood thinner    ProviderRina MD   atorvastatin (LIPITOR) 40 MG tablet TAKE 1 TABLET BY MOUTH EVERY NIGHT. INDICATIONS: HIGH AMOUNT OF FATS IN THE BLOOD 5/3/24   Ashwini Michelle APRN   DULoxetine (CYMBALTA) 30 MG capsule TAKE 1 CAPSULE EVERY DAY 7/1/24   Daksha Ng APRN   fexofenadine (ALLEGRA) 180 MG tablet Take 1 tablet by mouth Daily. Indications: Hayfever 5/3/23   Rina Montanez MD   lidocaine-prilocaine (EMLA) 2.5-2.5 % cream APPLY TO SKIN OVER DIALYSIS ACCESS 30 MINUTES BEFORE 2/10/24   Rina Montanez MD   midodrine (PROAMATINE) 10 MG tablet TAKE 1 TABLET BY MOUTH 3 TIMES PER WEEK ON DIALYSIS DAYS. 1/10/24   Lisseth  MD Rina   sennosides-docusate (senna-docusate sodium) 8.6-50 MG per tablet Take 1 tablet by mouth At Night As Needed for Constipation. 2/27/23   Murali Ferreira MD   sevelamer (RENAGEL) 800 MG tablet Take 1 tablet by mouth 2 (Two) Times a Day. Indications: UNKNOWN 5/8/23   Quang Reyes MD   sevelamer (RENVELA) 800 MG tablet Take 2 tablets by mouth 3 (Three) Times a Day. 3/7/24   ProviderRina MD   traZODone (DESYREL) 50 MG tablet Take 1 tablet by mouth At Night As Needed for Sleep. 2/21/25   Daksha Ng, APRN   vitamin D3 125 MCG (5000 UT) capsule capsule Take 1 tablet by mouth 2 (Two) Times a Week. SAT AND WED  Indications: supplement     ProviderRina MD       Allergies:  Allergies   Allergen Reactions    Contrast Dye (Echo Or Unknown Ct/Mr) Other (See Comments)     KIDNEY DISEASE    Latex Other (See Comments)     Blisters       Objective     Vitals:   Temp:  [97.9 °F (36.6 °C)-98.9 °F (37.2 °C)] 97.9 °F (36.6 °C)  Heart Rate:  [76-88] 82  Resp:  [12-16] 14  BP: (152-182)/() 159/86    Intake/Output Summary (Last 24 hours) at 4/5/2025 1007  Last data filed at 4/5/2025 0124  Gross per 24 hour   Intake 500 ml   Output 450 ml   Net 50 ml       Physical Exam:   Constitutional: Awake, alert, not in  acute distress.  HEENT: Sclera anicteric, no conjunctival injection  Neck: Supple, no thyromegaly, no lymphadenopathy, trachea at midline, no JVD  Respiratory: Clear to auscultation bilaterally, nonlabored respiration  Cardiovascular: RRR, no murmurs, no rubs or gallops, no carotid bruit  Gastrointestinal: Positive bowel sounds, abdomen is soft, nontender mildly distended  : No palpable bladder  Musculoskeletal: No edema, no clubbing or cyanosis  Psychiatric: Appropriate affect, cooperative  Neurologic: Oriented x3, moving all extremities, normal speech and mental status  Skin: Warm and dry   Access left upper extremity AV fistula with good thrill    Scheduled Meds:      insulin lispro, 2-7 Units, Subcutaneous, 4x Daily AC & at Bedtime  sodium chloride, 10 mL, Intravenous, Q12H      IV Meds:        Results Reviewed:   I have personally reviewed the results from the time of this admission to 4/5/2025 10:07 EDT     Lab Results   Component Value Date    GLUCOSE 112 (H) 04/05/2025    CALCIUM 10.3 04/05/2025     04/05/2025    K 4.8 04/05/2025    CO2 26.6 04/05/2025    CL 98 04/05/2025    BUN 34 (H) 04/05/2025    CREATININE 6.82 (H) 04/05/2025    EGFRIFAFRI  09/12/2021      Comment:      <15 Indicative of kidney failure.    EGFRIFNONA 12 (L) 09/12/2021    BCR 5.0 (L) 04/05/2025    ANIONGAP 15.4 (H) 04/05/2025      Lab Results   Component Value Date    MG 1.8 04/30/2023    PHOS 2.4 (L) 05/01/2023    ALBUMIN 3.7 04/05/2025           Assessment / Plan     ASSESSMENT:  End-stage renal disease on maintenance hemodialysis on TTS schedule, seems to be compliant with dialysis treatment  Hypertension with CKD: Blood pressure is uncontrolled  Type 2 diabetes mellitus: Management by primary  Small bowel obstruction status post NG tube placement for decompression  Diastolic heart failure  Pulmonary hypertension  High anion gap antibiotic acidosis secondary to end-stage renal disease  History of hyperphosphatemia on binder      PLAN:  Will dialyze today per schedule with limited UF given small bowel obstruction  Hold binders while he is not able to eat  Hydralazine as needed for systolic blood pressure above 160  Surveillance labs      Thank you for involving us in the care of Lefty Cai.  Please feel free to call with any questions.    Page Santana MD  04/05/25  10:07 EDT    [facility]  [phone]    Parts of this note may be an electronic transcription/translation of spoken language to printed text using the Dragon dictation system.      Electronically signed by Page Santana MD at 04/05/25 [id number]

## 2025-04-06 NOTE — PROGRESS NOTES
"General Surgery  Progress Note    CC: Follow-up partial small bowel obstruction    S: He has been passing abundant flatus but has had no bowel movement since arrival to the hospital.  He denies any nausea or vomiting and his nasogastric tube output is clear, similar to saliva.    ROS: Positive for constipation; denies nausea, vomiting, abdominal pain, melena, or hematochezia    O:/70 (BP Location: Right arm, Patient Position: Lying)   Pulse 79   Temp 97.9 °F (36.6 °C) (Oral)   Resp 18   Ht 177.8 cm (70\")   Wt 91.2 kg (201 lb)   SpO2 97%   BMI 28.84 kg/m²     GENERAL: alert, well appearing, and in no distress  HEENT: normocephalic, atraumatic, moist mucous membranes, clear sclerae   CHEST: clear to auscultation, no wheezes, rales or rhonchi, symmetric air entry  CARDIAC: regular rate and rhythm    ABDOMEN: Soft, completely nontender, nondistended, no evidence of peritonitis  EXTREMITIES: no cyanosis, clubbing, or edema   SKIN: Warm and moist, no rashes    LABS  Results from last 7 days   Lab Units 04/06/25  0638 04/05/25  0416 04/04/25  2130   WBC 10*3/mm3 9.57 13.55* 12.32*   HEMOGLOBIN g/dL 13.5 14.0 14.8   HEMATOCRIT % 41.9 41.1 44.3   PLATELETS 10*3/mm3 176 198 220     Results from last 7 days   Lab Units 04/06/25  0638 04/05/25  0416 04/04/25  2130   SODIUM mmol/L 136 140 134*   POTASSIUM mmol/L 5.1 4.8 4.4   CHLORIDE mmol/L 96* 98 92*   CO2 mmol/L 27.1 26.6 26.0   BUN mg/dL 28* 34* 36*   CREATININE mg/dL 5.88* 6.82* 7.36*   CALCIUM mg/dL 9.8 10.3 11.6*   BILIRUBIN mg/dL  --  0.5 0.6   ALK PHOS U/L  --  88 105   ALT (SGPT) U/L  --  12 12   AST (SGOT) U/L  --  19 17   GLUCOSE mg/dL 77 112* 166*         A/P: 74 y.o. male with improving partial small bowel obstruction versus gastroenteritis    He looks much better today and his KUB, which I personally reviewed, shows improvement in his bowel gas pattern.  I feel comfortable removing his nasogastric tube and advancing him to clear liquids.  I will let " the nurses know they can advance his diet as tolerated.  This was discussed with Dr. Bolton at bedside.    Yelena Guerra MD  General, Robotic, and Endoscopic Surgery  [facility]    [address], [address]  [place] [address]  P: [phone]  F: [phone]

## 2025-04-06 NOTE — PLAN OF CARE
Goal Outcome Evaluation:           Progress: improving  Outcome Evaluation: vss, removed ngt, tolerating po, ambulating independently, no c/o pain, possible dc tomorrow, will ctm per shift.

## 2025-04-06 NOTE — PROGRESS NOTES
[facility] Progress Note      Patient Name: Lefty Cai  : 1950  MRN: [id number]  Primary Care Physician:  Daksha Ng APRN  Date of admission: 2025    Subjective     Interval History:   The patient was seen and examined today for follow-up on doing better today.  Afebrile.  Denies nausea or vomiting  Dialyzed yesterday with 1.5 L of fluid removed diarrhea resolved.  Abdominal pain has also resolved.    Review of Systems:   As noted above    Objective     Vitals:   Temp:  [97.4 °F (36.3 °C)-98.4 °F (36.9 °C)] 98.4 °F (36.9 °C)  Heart Rate:  [75-79] 79  Resp:  [14-18] 18  BP: (118-144)/(60-70) 118/60    Intake/Output Summary (Last 24 hours) at 2025 1524  Last data filed at 2025 1300  Gross per 24 hour   Intake 1200 ml   Output 750 ml   Net 450 ml       Physical Exam:    General Appearance: alert, oriented x 3, no acute distress   Skin: warm and dry  HEENT: oral mucosa normal, nonicteric sclera  Neck: supple, no JVD  Lungs: CTA  Heart: RRR, normal S1 and S2  Abdomen: soft, nontender, nondistended  : no palpable bladder  Extremities: no edema, cyanosis or clubbing  Neuro: normal speech and mental status   Left upper extremity AV fistula with good thrill  Scheduled Meds:     aspirin, 81 mg, Oral, Nightly  atorvastatin, 40 mg, Oral, Nightly  cetirizine, 10 mg, Oral, Daily  DULoxetine, 20 mg, Oral, Daily  fluticasone, 2 spray, Each Nare, Daily  insulin lispro, 2-7 Units, Subcutaneous, 4x Daily AC & at Bedtime  sevelamer, 1,600 mg, Oral, TID  sodium chloride, 10 mL, Intravenous, Q12H      IV Meds:        Results Reviewed:   I have personally reviewed the results from the time of this admission to 2025 15:24 EDT     Results from last 7 days   Lab Units 25  0638 25  0416 25  2130   SODIUM mmol/L 136 140 134*   POTASSIUM mmol/L 5.1 4.8 4.4   CHLORIDE mmol/L 96* 98 92*   CO2 mmol/L 27.1 26.6 26.0   BUN mg/dL 28* 34* 36*   CREATININE mg/dL  5.88* 6.82* 7.36*   CALCIUM mg/dL 9.8 10.3 11.6*   BILIRUBIN mg/dL  --  0.5 0.6   ALK PHOS U/L  --  88 105   ALT (SGPT) U/L  --  12 12   AST (SGOT) U/L  --  19 17   GLUCOSE mg/dL 77 112* 166*       Estimated Creatinine Clearance: 12.3 mL/min (A) (by C-G formula based on SCr of 5.88 mg/dL (H)).    Results from last 7 days   Lab Units 04/06/25  0638   MAGNESIUM mg/dL 2.0   PHOSPHORUS mg/dL 3.7             Results from last 7 days   Lab Units 04/06/25  0638 04/05/25  0416 04/04/25  2130 04/03/25  0000   WBC 10*3/mm3 9.57 13.55* 12.32*  --    HEMOGLOBIN g/dL 13.5 14.0 14.8 12.6*  37.8*   PLATELETS 10*3/mm3 176 198 220  --              Assessment / Plan     ASSESSMENT:  End-stage renal disease on maintenance hemodialysis on TTS schedule, seems to be compliant with dialysis treatment  Hypertension with CKD: Blood pressure is uncontrolled  Type 2 diabetes mellitus: Management by primary  Small bowel obstruction status post NG tube placement for decompression.  KUB showed improvement  Diastolic heart failure  Pulmonary hypertension  High anion gap antibiotic acidosis secondary to end-stage renal disease  History of hyperphosphatemia on binder        PLAN:  Continue dialysis on TTS schedule plan for dialysis on Tuesday  Labs in a.m.      Thank you for involving us in the care of Lefty LAVON Cai.  Please feel free to call with any questions.    Page Santana MD  04/06/25  15:24 EDT    [facility]  [phone]    Parts of this note may be an electronic transcription/translation of spoken language to printed text using the Dragon dictation system.

## 2025-04-07 ENCOUNTER — READMISSION MANAGEMENT (OUTPATIENT)
Dept: CALL CENTER | Facility: HOSPITAL | Age: 75
End: 2025-04-07
Payer: MEDICARE

## 2025-04-07 VITALS
DIASTOLIC BLOOD PRESSURE: 63 MMHG | TEMPERATURE: 97.3 F | RESPIRATION RATE: 18 BRPM | HEIGHT: 70 IN | BODY MASS INDEX: 28.77 KG/M2 | OXYGEN SATURATION: 98 % | HEART RATE: 70 BPM | SYSTOLIC BLOOD PRESSURE: 138 MMHG | WEIGHT: 201 LBS

## 2025-04-07 PROBLEM — A05.9 FOOD POISONING: Status: ACTIVE | Noted: 2025-04-07

## 2025-04-07 LAB
ALBUMIN SERPL-MCNC: 3.5 G/DL (ref 3.5–5.2)
ANION GAP SERPL CALCULATED.3IONS-SCNC: 13.6 MMOL/L (ref 5–15)
BUN SERPL-MCNC: 43 MG/DL (ref 8–23)
BUN/CREAT SERPL: 6 (ref 7–25)
CALCIUM SPEC-SCNC: 8.6 MG/DL (ref 8.6–10.5)
CHLORIDE SERPL-SCNC: 91 MMOL/L (ref 98–107)
CO2 SERPL-SCNC: 25.4 MMOL/L (ref 22–29)
CREAT SERPL-MCNC: 7.17 MG/DL (ref 0.76–1.27)
DEPRECATED RDW RBC AUTO: 42.9 FL (ref 37–54)
EGFRCR SERPLBLD CKD-EPI 2021: 7.4 ML/MIN/1.73
ERYTHROCYTE [DISTWIDTH] IN BLOOD BY AUTOMATED COUNT: 12.4 % (ref 12.3–15.4)
GLUCOSE BLDC GLUCOMTR-MCNC: 95 MG/DL (ref 70–130)
GLUCOSE SERPL-MCNC: 93 MG/DL (ref 65–99)
HCT VFR BLD AUTO: 33.8 % (ref 37.5–51)
HGB BLD-MCNC: 11.5 G/DL (ref 13–17.7)
MCH RBC QN AUTO: 32 PG (ref 26.6–33)
MCHC RBC AUTO-ENTMCNC: 34 G/DL (ref 31.5–35.7)
MCV RBC AUTO: 94.2 FL (ref 79–97)
PHOSPHATE SERPL-MCNC: 4.5 MG/DL (ref 2.5–4.5)
PLATELET # BLD AUTO: 167 10*3/MM3 (ref 140–450)
PMV BLD AUTO: 9.3 FL (ref 6–12)
POTASSIUM SERPL-SCNC: 4.4 MMOL/L (ref 3.5–5.2)
RBC # BLD AUTO: 3.59 10*6/MM3 (ref 4.14–5.8)
SODIUM SERPL-SCNC: 130 MMOL/L (ref 136–145)
WBC NRBC COR # BLD AUTO: 8.2 10*3/MM3 (ref 3.4–10.8)

## 2025-04-07 PROCEDURE — 80069 RENAL FUNCTION PANEL: CPT | Performed by: HOSPITALIST

## 2025-04-07 PROCEDURE — 82948 REAGENT STRIP/BLOOD GLUCOSE: CPT

## 2025-04-07 PROCEDURE — 85027 COMPLETE CBC AUTOMATED: CPT | Performed by: STUDENT IN AN ORGANIZED HEALTH CARE EDUCATION/TRAINING PROGRAM

## 2025-04-07 RX ADMIN — FLUTICASONE PROPIONATE 2 SPRAY: 50 SPRAY, METERED NASAL at 08:20

## 2025-04-07 RX ADMIN — SEVELAMER CARBONATE 1600 MG: 800 TABLET, FILM COATED ORAL at 08:19

## 2025-04-07 RX ADMIN — DULOXETINE 20 MG: 20 CAPSULE, DELAYED RELEASE ORAL at 08:19

## 2025-04-07 RX ADMIN — CETIRIZINE HYDROCHLORIDE 10 MG: 10 TABLET, FILM COATED ORAL at 08:19

## 2025-04-07 RX ADMIN — Medication 10 ML: at 08:21

## 2025-04-07 NOTE — OUTREACH NOTE
Prep Survey      Flowsheet Row Responses   [facility] facility patient discharged from? [place]   Is LACE score < 7 ? No   Eligibility [facility]   Date of Admission 04/04/25   Date of Discharge 04/07/25   Discharge Disposition Home or Self Care   Discharge diagnosis Small bowel obstruction   Does the patient have one of the following disease processes/diagnoses(primary or secondary)? Other   Does the patient have Home health ordered? No   Is there a DME ordered? No   Prep survey completed? Yes            MIRNA A - Registered Nurse

## 2025-04-07 NOTE — PROGRESS NOTES
[facility] Progress Note      Patient Name: Lefty Cai  : 1950  MRN: [id number]  Primary Care Physician:  Daksha Ng APRN  Date of admission: 2025    Subjective     Interval History:   The patient was seen and examined today for follow-up on doing better today.    Patient sitting on the side of the bed eating breakfast  Denies any chest pain, or shortness of breath.  Tolerating oral intake without abdominal pain patient continues to deny any bowel movement but continues to pass gas      Review of Systems:   As noted above    Objective     Vitals:   Temp:  [97.7 °F (36.5 °C)-98.4 °F (36.9 °C)] 98.2 °F (36.8 °C)  Heart Rate:  [71-79] 71  Resp:  [16-18] 16  BP: (118-136)/(60-70) 124/67    Intake/Output Summary (Last 24 hours) at 2025 0743  Last data filed at 2025 1934  Gross per 24 hour   Intake 1200 ml   Output --   Net 1200 ml       Physical Exam:    General Appearance: alert, oriented x 3, no acute distress   Skin: warm and dry  HEENT: oral mucosa normal, nonicteric sclera  Neck: supple, no JVD  Lungs: CTA  Heart: RRR, normal S1 and S2  Abdomen: soft, nontender, nondistended  : no palpable bladder  Extremities: no edema, cyanosis or clubbing  Neuro: normal speech and mental status   Left upper extremity AV fistula with good thrill and bruit     Scheduled Meds:     aspirin, 81 mg, Oral, Nightly  atorvastatin, 40 mg, Oral, Nightly  cetirizine, 10 mg, Oral, Daily  DULoxetine, 20 mg, Oral, Daily  fluticasone, 2 spray, Each Nare, Daily  insulin lispro, 2-7 Units, Subcutaneous, 4x Daily AC & at Bedtime  sevelamer, 1,600 mg, Oral, TID  sodium chloride, 10 mL, Intravenous, Q12H      IV Meds:        Results Reviewed:   I have personally reviewed the results from the time of this admission to 2025 07:43 EDT     Results from last 7 days   Lab Units 25  0437 25  0638 25  0416 04/04/25  2130   SODIUM mmol/L 130* 136 140 134*   POTASSIUM mmol/L  4.4 5.1 4.8 4.4   CHLORIDE mmol/L 91* 96* 98 92*   CO2 mmol/L 25.4 27.1 26.6 26.0   BUN mg/dL 43* 28* 34* 36*   CREATININE mg/dL 7.17* 5.88* 6.82* 7.36*   CALCIUM mg/dL 8.6 9.8 10.3 11.6*   BILIRUBIN mg/dL  --   --  0.5 0.6   ALK PHOS U/L  --   --  88 105   ALT (SGPT) U/L  --   --  12 12   AST (SGOT) U/L  --   --  19 17   GLUCOSE mg/dL 93 77 112* 166*       Estimated Creatinine Clearance: 10.1 mL/min (A) (by C-G formula based on SCr of 7.17 mg/dL (H)).    Results from last 7 days   Lab Units 04/07/25  0437 04/06/25  0638   MAGNESIUM mg/dL  --  2.0   PHOSPHORUS mg/dL 4.5 3.7             Results from last 7 days   Lab Units 04/07/25  0437 04/06/25  0638 04/05/25  0416 04/04/25  2130 04/03/25  0000   WBC 10*3/mm3 8.20 9.57 13.55* 12.32*  --    HEMOGLOBIN g/dL 11.5* 13.5 14.0 14.8 12.6*  37.8*   PLATELETS 10*3/mm3 167 176 198 220  --              Assessment / Plan     ASSESSMENT:  End-stage renal disease on maintenance hemodialysis on TTS schedule, electrolytes volume status stable  Hypertension with CKD: Controlled on current regimen  Type 2 diabetes mellitus: Management by primary  Small bowel obstruction status post NG tube placement for decompression.  KUB showed improvement  Diastolic heart failure currently controlled.  Pulmonary hypertension  High anion gap antibiotic acidosis secondary to end-stage renal disease  Hyperphosphatemia on renal diet and binders      PLAN:  -Will continue to arrange hemodialysis during his admission.  No acute indication for hemodialysis today  - Upon discharge patient can resume hemodialysis on his regular schedule  -Continue surveillance labs    Thank you for involving us in the care of Lefty Cai.  Please feel free to call with any questions.    Enrique Linda MD  04/07/25  07:43 EDT    [facility]  [phone]    Parts of this note may be an electronic transcription/translation of spoken language to printed text using the Dragon dictation  system.

## 2025-04-07 NOTE — DISCHARGE SUMMARY
Patient Name: Lefty Cai  : 1950  MRN: [id number]    Date of Admission: 2025  Date of Discharge:  2025  Primary Care Physician: Daksha Ng APRN      Chief Complaint:   Diarrhea and Vomiting      Discharge Diagnoses     Active Hospital Problems    Diagnosis  POA    **Small bowel obstruction [K56.609]  Yes    Food poisoning [A05.9]  Unknown    Ischemic cardiomyopathy [I25.5]  Yes    S/P MVR (mitral valve repair) [Z98.890]  Not Applicable    ESRD (end stage renal disease) [N18.6]  Yes    Essential hypertension [I10]  Yes    Type 2 diabetes mellitus with diabetic chronic kidney disease [E11.22]  Yes      Resolved Hospital Problems   No resolved problems to display.        Hospital Course     Patient is a 74-year-old male with a history of including but not limited to ESRD on dialysis, ischemic cardiomyopathy, CAD status post CABG, severe mitral regurgitation status post mitral valve repair, HTN, type II DM, presented to the ED complaining of acute nausea vomiting and diarrhea after eating going out to eat hamburger and fries at [employer]       Presumed food poisoning/gastroenteritis  Nausea vomiting/diarrhea   -CT scan showed mid small bowel obstruction, though a discrete transition zone is not  identified. No evidence of hernia or mass, free air or abscess.. Most likely toxin induced food poisoning  such as enterotoxin due to Staphylococcus aureus due to rapid onset of symptoms within  few hours of food  consumption.  Spouse also was ill, but was not of severe  compared  to patient.  General surgery was consulted.  Symptoms were likely due to gastroenteritis rather than small bowel obstruction   [er general surgery.   Symptoms gradually improved, abdominal pain resolved.  Patient was tolerating advance diet.  Gastrointestinal panel was ordered but was not obtained, and patient had only 1 bowel movement prior to discharge.    ESRD on dialysis  - Underwent inpatient dialysis per  nephrology.  Plan to resume regular outpatient schedule dialysis starting tomorrow.      At the time of discharge patient was told to take all medications as prescribed, keep all follow-up appointments, and call their doctor or return to the hospital with any worsening or concerning symptoms.               Day of Discharge     Subjective:  Patient was seen in the room.  Spouse present at bedside.  Was walking.  Eager to be discharged home.  Reports abdominal pain resolved, had BM this morning which was normal per patient.  Denies nausea or vomiting.  Tolerating diet.    Physical Exam:  Temp:  [97.3 °F (36.3 °C)-98.2 °F (36.8 °C)] 97.3 °F (36.3 °C)  Heart Rate:  [70-75] 70  Resp:  [16-18] 18  BP: (124-138)/(62-67) 138/63  Body mass index is 28.84 kg/m².  Physical Exam    General: Alert, laying in bed, not in distress  HEENT: Normocephalic, atraumatic  CV: Regular rate and rhythm, no murmurs rubs or gallops  Lungs: Clear to auscultation bilaterally, no crackles or wheezes  Abdomen: Soft, nontender, nondistended  Extremities: No significant peripheral edema , no cyanosis     Consultants     Consult Orders (all) (From admission, onward)       Start     Ordered    04/05/25 0702  Inpatient Nephrology Consult  IN AM        Specialty:  Nephrology  Provider:  Jose Mccall MD    04/05/25 0254    04/05/25 0702  Inpatient General Surgery Consult  IN AM        Specialty:  General Surgery  Provider:  Abdirahman Miles Jr., MD    04/05/25 0254    04/05/25 0159  Inpatient Case Management  Consult  Once,   Status:  Canceled        Provider:  (Not yet assigned)    04/05/25 0158                  Procedures     * Surgery not found *      Imaging Results (All)       Procedure Component Value Units Date/Time    XR Abdomen Flat & Upright [[id number]] Collected: 04/06/25 0857     Updated: 04/06/25 0904    Narrative:      XR ABDOMEN FLAT AND UPRIGHT-     INDICATIONS: Small bowel obstruction, follow-up.     TECHNIQUE:  Supine and upright views of the abdomen     COMPARISON: 4/5/2025     FINDINGS:     NG tube extends to the upper medial left upper quadrant.     Mildly dilated loops of small bowel again demonstrated, overall similar  to prior exam. Gas is also present in the colon. Several air-fluid  levels are seen on the upright view. No free air is noted under the  diaphragm. Continued follow-up is advised as indications persist.     Mild left pleural effusion is apparent.       Impression:         As described.           This report was finalized on 4/6/2025 9:01 AM by Dr. Jd Vargas M.D on Workstation: [id number]       XR Abdomen KUB [[id number]] Collected: 04/05/25 0114     Updated: 04/05/25 0120    Narrative:      SUPINE AP ABDOMEN / KUB     INDICATION:  NG tube placement.     TECHNIQUE:  Supine  AP radiographs of the abdomen.     COMPARISON:  4/4/2025 at 9:43 p.m.     FINDINGS:     NG tube in place, distal tip mid stomach.     Redemonstrated changes of early or partial small bowel obstruction.       Impression:         NG tube distal tip mid stomach.     This report was finalized on 4/5/2025 1:17 AM by Dr. Joey Omer M.D  on Workstation: [id number]       CT Abdomen Pelvis Without Contrast [[id number]] Collected: 04/04/25 2259     Updated: 04/04/25 2313    Narrative:      CT ABDOMEN AND PELVIS WITHOUT CONTRAST:     HISTORY: Nausea vomiting diarrhea upper abdominal pain     TECHNIQUE: Helical CT was performed of the abdomen and pelvis from the  lung bases through the lesser trochanters without IV contrast.  Reformatted images were provided in the coronal and sagittal planes.  Radiation dose reduction techniques were utilized, including automated  exposure control, and exposure modulation based on body size.     COMPARISON: CT chest abdomen pelvis 4/26/2023     FINDINGS:     Lung bases: Chronic changes at the left lung base, no acute findings.     Abdomen: The liver and gallbladder are normal.  The spleen and  pancreas  appear normal.  Both adrenal glands are normal. Both kidneys are  atrophic, without acute abnormality. There is mild aneurysmal dilatation  abdominal aorta to 3.1 cm.     Moderate dilatation of the proximal to mid small bowel. The colon and  distal small bowel are decompressed. However, a discrete transition site  is not confidently identified. No evidence of hernia or abnormal fluid  or air collection.     Pelvis:  There is no pelvic inflammatory change or other abnormal fluid  collection.  There is no pelvic adenopathy or other soft tissue mass.   There is no CT evidence of hernia.     There is no acute bony abnormality.       Impression:         Mid small bowel obstruction, though a discrete transition zone is not  identified. No evidence of hernia or mass, free air or abscess.              This report was finalized on 4/4/2025 11:10 PM by Dr. Joey Omer M.D on Workstation: [id number]       XR Abdomen Flat & Upright [[id number]] Collected: 04/04/25 2209     Updated: 04/04/25 2217    Narrative:      FLAT & UPRIGHT AP ABDOMEN     HISTORY: Pain     TECHNIQUE: Supine and erect AP abdomen.     Previous exam:  None      FINDINGS:     Multiple dilated small bowel loops with a paucity of colonic gas, with  multiple small bowel air-fluid levels.     No evidence of free intraperitoneal air.       Impression:         FINDINGS suspicious for early or partial small bowel obstruction.        This report was finalized on 4/4/2025 10:14 PM by Dr. Joey Omer M.D on Workstation: [id number]             Results for orders placed during the hospital encounter of 01/15/25    Duplex Hemodialysis Access CAR    Interpretation Summary    The left sided autogenous arteriovenous (AV) fistula is patent without significant defect, other than aneurysmal dilatation of the proximal segment..    Left sided flow volumes are adequate.    Results for orders placed during the hospital encounter of 11/18/24    Adult  Transthoracic Echo Complete W/ Cont if Necessary Per Protocol    Interpretation Summary    Left ventricular systolic function is normal. Calculated left ventricular EF = 61.4%    Left ventricular diastolic function was indeterminate.    Mild aortic valve stenosis is present. Aortic valve area is 2 cm2.    Peak velocity of the flow distal to the aortic valve is 270.9 cm/s. Aortic valve mean pressure gradient is 18 mmHg. Aortic valve dimensionless index is 0.49 .    There is a mitral valve ring present.    The mitral valve mean gradient is 11 mmHg.    Mild to moderate tricuspid valve regurgitation is present. Estimated right ventricular systolic pressure from tricuspid regurgitation is moderately elevated (45-55 mmHg). Calculated right ventricular systolic pressure from tricuspid regurgitation is 46 mmHg.    Pertinent Labs     Results from last 7 days   Lab Units 04/07/25 0437 04/06/25 0638 04/05/25 0416 04/04/25  2130   WBC 10*3/mm3 8.20 9.57 13.55* 12.32*   HEMOGLOBIN g/dL 11.5* 13.5 14.0 14.8   PLATELETS 10*3/mm3 167 176 198 220     Results from last 7 days   Lab Units 04/07/25 0437 04/06/25 0638 04/05/25 0416 04/04/25  2130   SODIUM mmol/L 130* 136 140 134*   POTASSIUM mmol/L 4.4 5.1 4.8 4.4   CHLORIDE mmol/L 91* 96* 98 92*   CO2 mmol/L 25.4 27.1 26.6 26.0   BUN mg/dL 43* 28* 34* 36*   CREATININE mg/dL 7.17* 5.88* 6.82* 7.36*   GLUCOSE mg/dL 93 77 112* 166*   Estimated Creatinine Clearance: 10.1 mL/min (A) (by C-G formula based on SCr of 7.17 mg/dL (H)).  Results from last 7 days   Lab Units 04/07/25 0437 04/06/25 0638 04/05/25 0416 04/04/25  2130   ALBUMIN g/dL 3.5 3.9 3.7 4.6   BILIRUBIN mg/dL  --   --  0.5 0.6   ALK PHOS U/L  --   --  88 105   AST (SGOT) U/L  --   --  19 17   ALT (SGPT) U/L  --   --  12 12     Results from last 7 days   Lab Units 04/07/25 0437 04/06/25  0638 04/05/25  0416 04/04/25  2130   CALCIUM mg/dL 8.6 9.8 10.3 11.6*   ALBUMIN g/dL 3.5 3.9 3.7 4.6   MAGNESIUM mg/dL  --  2.0  --    "--    PHOSPHORUS mg/dL 4.5 3.7  --   --      Results from last 7 days   Lab Units 04/04/25  2130   LIPASE U/L 33             Invalid input(s): \"LDLCALC\"  Results from last 7 days   Lab Units 04/04/25  2342   BLOODCX  No growth at 2 days  No growth at 2 days         Test Results Pending at Discharge     Pending Labs       Order Current Status    Blood Culture - Blood, Arm, Right Preliminary result    Blood Culture - Blood, Hand, Right Preliminary result            Discharge Details        Discharge Medications        Continue These Medications        Instructions Start Date   aspirin 81 MG tablet   1 tablet, Nightly      atorvastatin 40 MG tablet  Commonly known as: LIPITOR   40 mg, Oral, Nightly      DULoxetine 30 MG capsule  Commonly known as: CYMBALTA   TAKE 1 CAPSULE EVERY DAY      fexofenadine 180 MG tablet  Commonly known as: ALLEGRA   180 mg, Daily      lidocaine-prilocaine 2.5-2.5 % cream  Commonly known as: EMLA   APPLY TO SKIN OVER DIALYSIS ACCESS 30 MINUTES BEFORE      midodrine 10 MG tablet  Commonly known as: PROAMATINE   Take 1 tablet by mouth 3 (Three) Times a Day. Only takes on tues, Thursday , and saturday      sennosides-docusate 8.6-50 MG per tablet  Commonly known as: PERICOLACE   1 tablet, Oral, Nightly PRN      sevelamer 800 MG tablet  Commonly known as: RENAGEL   800 mg, 2 Times Daily      sevelamer 800 MG tablet  Commonly known as: RENVELA   2 tablets, 3 Times Daily      traZODone 50 MG tablet  Commonly known as: DESYREL   50 mg, Oral, Nightly PRN      vitamin D3 125 MCG (5000 UT) capsule capsule   1 tablet, 2 Times Weekly               Allergies   Allergen Reactions    Contrast Dye (Echo Or Unknown Ct/Mr) Other (See Comments)     KIDNEY DISEASE    Latex Other (See Comments)     Blisters       Discharge Disposition:  Home or Self Care      Discharge Diet:  No active diet order      Discharge Activity:       CODE STATUS:    Code Status and Medical Interventions: CPR (Attempt to Resuscitate); " Full Support   Ordered at: 04/05/25 0254     Code Status (Patient has no pulse and is not breathing):    CPR (Attempt to Resuscitate)     Medical Interventions (Patient has pulse or is breathing):    Full Support       No future appointments.   Follow-up Information       Daksha Ng, APRN .    Specialty: Family Medicine  Contact information:  [address] [place] [id number]  [phone]                             Time Spent on Discharge:  Greater than 30 minutes      Doug Bolton MD  [facility]  04/07/25  17:35 EDT

## 2025-04-07 NOTE — PROGRESS NOTES
Case Management Discharge Note      Final Note: DC home no needs         Selected Continued Care - Discharged on 4/7/2025 Admission date: 4/4/2025 - Discharge disposition: Home or Self Care      Destination    No services have been selected for the patient.                Durable Medical Equipment    No services have been selected for the patient.                Dialysis/Infusion    No services have been selected for the patient.                Home Medical Care    No services have been selected for the patient.                Therapy    No services have been selected for the patient.                Community Resources    No services have been selected for the patient.                Community & DME    No services have been selected for the patient.                         Final Discharge Disposition Code: 01 - home or self-care

## 2025-04-07 NOTE — PLAN OF CARE
Problem: Adult Inpatient Plan of Care  Goal: Plan of Care Review  Outcome: Progressing  Flowsheets (Taken 4/7/2025 0218)  Plan of Care Reviewed With: patient  Goal: Patient-Specific Goal (Individualized)  Outcome: Progressing  Goal: Absence of Hospital-Acquired Illness or Injury  Outcome: Progressing  Intervention: Identify and Manage Fall Risk  Recent Flowsheet Documentation  Taken 4/7/2025 0200 by Abby Amaral RN  Safety Promotion/Fall Prevention: safety round/check completed  Taken 4/7/2025 0000 by Abby Amaral RN  Safety Promotion/Fall Prevention: safety round/check completed  Taken 4/6/2025 2142 by Abby Amaral RN  Safety Promotion/Fall Prevention: safety round/check completed  Taken 4/6/2025 2000 by Abby Amaral RN  Safety Promotion/Fall Prevention: safety round/check completed  Intervention: Prevent Skin Injury  Recent Flowsheet Documentation  Taken 4/7/2025 0200 by Abby Amaral RN  Body Position: position changed independently  Taken 4/7/2025 0000 by Abby Amaral RN  Body Position: position changed independently  Taken 4/6/2025 2142 by Abby Amaral RN  Body Position: position changed independently  Taken 4/6/2025 2000 by Abby Amaral RN  Body Position: position changed independently  Skin Protection: protective footwear used  Intervention: Prevent and Manage VTE (Venous Thromboembolism) Risk  Recent Flowsheet Documentation  Taken 4/6/2025 2000 by Abby Amaral RN  VTE Prevention/Management: (up ad cherrie)   bilateral   SCDs (sequential compression devices) off   patient refused intervention   other (see comments)  Intervention: Prevent Infection  Recent Flowsheet Documentation  Taken 4/6/2025 2000 by Abby Amaral RN  Infection Prevention: single patient room provided  Goal: Optimal Comfort and Wellbeing  Outcome: Progressing  Intervention: Provide Person-Centered Care  Recent Flowsheet Documentation  Taken 4/6/2025 2000 by Abby Amaral RN  Trust Relationship/Rapport:   choices  provided   care explained  Goal: Readiness for Transition of Care  Outcome: Progressing  Goal: Plan of Care Review  Outcome: Progressing  Flowsheets (Taken 4/7/2025 0218)  Plan of Care Reviewed With: patient  Goal: Patient-Specific Goal (Individualized)  Outcome: Progressing  Goal: Absence of Hospital-Acquired Illness or Injury  Outcome: Progressing  Intervention: Identify and Manage Fall Risk  Recent Flowsheet Documentation  Taken 4/7/2025 0200 by Abby Amaral RN  Safety Promotion/Fall Prevention: safety round/check completed  Taken 4/7/2025 0000 by Abby Amaral RN  Safety Promotion/Fall Prevention: safety round/check completed  Taken 4/6/2025 2142 by Abby Amaral RN  Safety Promotion/Fall Prevention: safety round/check completed  Taken 4/6/2025 2000 by Abby Amaral RN  Safety Promotion/Fall Prevention: safety round/check completed  Intervention: Prevent Skin Injury  Recent Flowsheet Documentation  Taken 4/7/2025 0200 by Abby Amaral RN  Body Position: position changed independently  Taken 4/7/2025 0000 by Abby Amaral RN  Body Position: position changed independently  Taken 4/6/2025 2142 by Abby Amaral RN  Body Position: position changed independently  Taken 4/6/2025 2000 by Abby Amaral RN  Body Position: position changed independently  Skin Protection: protective footwear used  Intervention: Prevent and Manage VTE (Venous Thromboembolism) Risk  Recent Flowsheet Documentation  Taken 4/6/2025 2000 by Abby Amaral RN  VTE Prevention/Management: (up ad cherrie)   bilateral   SCDs (sequential compression devices) off   patient refused intervention   other (see comments)  Intervention: Prevent Infection  Recent Flowsheet Documentation  Taken 4/6/2025 2000 by Abby Amaral RN  Infection Prevention: single patient room provided  Goal: Optimal Comfort and Wellbeing  Outcome: Progressing  Intervention: Provide Person-Centered Care  Recent Flowsheet Documentation  Taken 4/6/2025 2000 by Cristin  PRATIK Mora  Trust Relationship/Rapport:   choices provided   care explained  Goal: Readiness for Transition of Care  Outcome: Progressing     Problem: Pain Acute  Goal: Optimal Pain Control and Function  Outcome: Progressing  Intervention: Optimize Psychosocial Wellbeing  Recent Flowsheet Documentation  Taken 4/6/2025 2000 by Abby Amaral RN  Supportive Measures: active listening utilized  Diversional Activities: television  Intervention: Prevent or Manage Pain  Recent Flowsheet Documentation  Taken 4/6/2025 2000 by Abby Amaral RN  Sensory Stimulation Regulation: care clustered  Sleep/Rest Enhancement: awakenings minimized  Medication Review/Management: medications reviewed     Problem: Fall Injury Risk  Goal: Absence of Fall and Fall-Related Injury  Outcome: Progressing  Intervention: Identify and Manage Contributors  Recent Flowsheet Documentation  Taken 4/6/2025 2000 by Abby Amaral RN  Medication Review/Management: medications reviewed  Intervention: Promote Injury-Free Environment  Recent Flowsheet Documentation  Taken 4/7/2025 0200 by Abby Amaral RN  Safety Promotion/Fall Prevention: safety round/check completed  Taken 4/7/2025 0000 by Abby Amaral RN  Safety Promotion/Fall Prevention: safety round/check completed  Taken 4/6/2025 2142 by Abby Amaral RN  Safety Promotion/Fall Prevention: safety round/check completed  Taken 4/6/2025 2000 by Abby Amaral RN  Safety Promotion/Fall Prevention: safety round/check completed   Goal Outcome Evaluation:  Plan of Care Reviewed With: patient

## 2025-04-08 ENCOUNTER — TRANSITIONAL CARE MANAGEMENT TELEPHONE ENCOUNTER (OUTPATIENT)
Dept: CALL CENTER | Facility: HOSPITAL | Age: 75
End: 2025-04-08
Payer: MEDICARE

## 2025-04-08 NOTE — OUTREACH NOTE
Call Center TCM Note      Flowsheet Row Responses   [facility] patient discharged from? [place]   Does the patient have one of the following disease processes/diagnoses(primary or secondary)? Other   TCM attempt successful? No   Unsuccessful attempts Attempt 1   Call Status Left message            Bushra Astudillo Registered Nurse    4/8/2025, 12:16 EDT

## 2025-04-08 NOTE — OUTREACH NOTE
Call Center TCM Note      Flowsheet Row Responses   [facility] patient discharged from? [place]   Does the patient have one of the following disease processes/diagnoses(primary or secondary)? Other   TCM attempt successful? No   Unsuccessful attempts Attempt 2   Call Status Left message            Bushra Astudillo Registered Nurse    4/8/2025, 14:28 EDT

## 2025-04-09 ENCOUNTER — TRANSITIONAL CARE MANAGEMENT TELEPHONE ENCOUNTER (OUTPATIENT)
Dept: CALL CENTER | Facility: HOSPITAL | Age: 75
End: 2025-04-09
Payer: MEDICARE

## 2025-04-09 NOTE — OUTREACH NOTE
Call Center TCM Note      Flowsheet Row Responses   [facility] facility patient discharged from? [place]   Does the patient have one of the following disease processes/diagnoses(primary or secondary)? Other   TCM attempt successful? No  [vr for wife]   Unsuccessful attempts Attempt 3            COTY Astudillo Registered Nurse    4/9/2025, 11:53 EDT

## 2025-04-10 LAB
BACTERIA SPEC AEROBE CULT: NORMAL
BACTERIA SPEC AEROBE CULT: NORMAL

## 2025-05-14 ENCOUNTER — OFFICE VISIT (OUTPATIENT)
Dept: INTERNAL MEDICINE | Facility: CLINIC | Age: 75
End: 2025-05-14
Payer: MEDICARE

## 2025-05-14 VITALS
BODY MASS INDEX: 29.35 KG/M2 | DIASTOLIC BLOOD PRESSURE: 78 MMHG | HEIGHT: 70 IN | HEART RATE: 78 BPM | OXYGEN SATURATION: 97 % | RESPIRATION RATE: 20 BRPM | TEMPERATURE: 98.2 F | SYSTOLIC BLOOD PRESSURE: 114 MMHG | WEIGHT: 205 LBS

## 2025-05-14 DIAGNOSIS — N18.6 ESRD (END STAGE RENAL DISEASE): ICD-10-CM

## 2025-05-14 DIAGNOSIS — Z76.89 ENCOUNTER TO ESTABLISH CARE: Primary | ICD-10-CM

## 2025-05-14 DIAGNOSIS — I25.10 CORONARY ARTERY DISEASE INVOLVING NATIVE CORONARY ARTERY OF NATIVE HEART WITHOUT ANGINA PECTORIS: ICD-10-CM

## 2025-05-14 RX ORDER — FOLIC ACID/VIT B COMPLEX AND C 0.8 MG
1 TABLET ORAL DAILY
COMMUNITY
Start: 2025-05-08

## 2025-05-14 RX ORDER — TRAZODONE HYDROCHLORIDE 50 MG/1
50 TABLET ORAL NIGHTLY PRN
Qty: 90 TABLET | Refills: 1 | Status: SHIPPED | OUTPATIENT
Start: 2025-05-14

## 2025-05-14 NOTE — PATIENT INSTRUCTIONS
Important information to be aware of as a patient of [facility]:    All routine health maintenance, refills, changes to medications or changes to treatment plan need to be addressed by myself as your PCP.    Acute illnesses (ex: colds, URI, UTI) can be addressed by another provider in our office if they have a same day appointment available. If a same day appointment is not available at your convenience please feel free to visit [facility] on the first floor if needed .  Completion/Review of paperwork require an appointment and may have additional charges.     The best way to get a hold of provider is to call the office during normal business hours, M-F 8:00-4:00 at [phone] and ask to speak to my medical assistant Jefe. Provider does not work on Thursdays and will not return calls until the following day.  Please arrive in person or virtually 10-15 minutes prior to appointment to allow for registration/sign in/questionnaires.  If you are seen virtually please review after visit summary (AVS)/patient instructions via daysoft for a summary of plan of care/changes in treatment plan.   If you are having difficulties logging on or accessing daysoft please contact the office for assistance.  Please request a refill through pharmacy or via daysoft prior to contacting office (unless a controlled substance is prescribed - you must call).   If a referral is placed, please give the office staff time to place referral to appropriate provider.  If you have not heard anything within 2 weeks please contact my office to follow-up on status of the referral.  Medication decisions and care are based upon the discretion of the provider based on their judgement and expertise.  Please treat our providers with the respect you would expect to be treated with during office visits.    No show policy:  We understand unexpected circumstances arise; however, anytime you miss your appointment we are unable to provide you appropriate  care.  In addition, each appointment missed could have been used to provide care for others.  We ask that you call at least 24 hours in advance to cancel or reschedule an appointment. We would like to take this opportunity to remind you of our policy stating patients who miss THREE or more appointments without cancelling or rescheduling 24 hours in advance of the appointment may be subject to cancellation of any further visits with our clinic. Please call [phone] to reschedule your appointment. If there are reasons that make it difficult for you to keep the appointments, please call and let us know how we can help. Please understand that medication prescribing will not continue without seeing your provider.

## 2025-05-14 NOTE — PROGRESS NOTES
"Chief Complaint  Establish Care ([facility]) and Hyperlipidemia    Subjective        Lefty Cai presents to [facility]  History of Present Illness  This is a 75-year-old male with chronic medical conditions of hypertension, CAD status post CABG, end-stage renal disease dialysis, type 2 diabetes, regurgitation with a history of history of mitral valve repair who presents to [facility].  He is accompanied by his wife.     Of note he was hospitalized from April 4 through April 7 regarding a small bowel obstruction.  Initially presented with symptoms of nausea vomiting diarrhea after a meal at [employer].  He was evaluated by general surgery and the partial small bowel obstruction was monitored conservatively.    Type 2 diabetes: This is diet controlled.  He is on 40 mg atorvastatin.  Last A1c 5.2% done at dialysis  Depressive disorder: Taking 30 mg Cymbalta daily  End stage renal disease on dialysis: He follows with Dr. Mccall of nephrology.  He is on sevelamer and 10 mg midodrine 3 times a day on Tuesday Thursday Saturday with dialysis.  Access is a left forearm AV fistula done in August 2019.  Follows with Dr. Miles.  Coronary artery disease, ischemic cardiomyopathy, history of severe mitral regurgitation: Underwent CABG in April 2023.  Follows with Dr. Garcia.  Postoperative course complicated by atrial fibrillation and Pseudomonas urosepsis.    Objective   Vital Signs:  /78 (BP Location: Right arm, Patient Position: Sitting, Cuff Size: Adult)   Pulse 78   Temp 98.2 °F (36.8 °C) (Oral)   Resp 20   Ht 177.8 cm (70\")   Wt 93 kg (205 lb)   SpO2 97%   BMI 29.41 kg/m²   Estimated body mass index is 29.41 kg/m² as calculated from the following:    Height as of this encounter: 177.8 cm (70\").    Weight as of this encounter: 93 kg (205 lb).            Physical Exam  Vitals reviewed.   Constitutional:       General: He is not in acute distress.     Appearance: Normal " appearance.   Cardiovascular:      Rate and Rhythm: Normal rate and regular rhythm.      Heart sounds: No murmur heard.  Pulmonary:      Effort: Pulmonary effort is normal. No respiratory distress.      Breath sounds: Normal breath sounds. No wheezing.   Skin:     General: Skin is warm and dry.      Comments: Left AV fistula   Neurological:      Mental Status: He is alert and oriented to person, place, and time.   Psychiatric:         Mood and Affect: Mood normal.         Thought Content: Thought content normal.         Judgment: Judgment normal.        Result Review :          He had a recent blood work done at dialysis that was reviewed.     Assessment and Plan   Diagnoses and all orders for this visit:    1. Encounter to establish care (Primary)    2. ESRD (end stage renal disease)    3. Coronary artery disease involving native coronary artery of native heart without angina pectoris    Other orders  -     traZODone (DESYREL) 50 MG tablet; Take 1 tablet by mouth At Night As Needed for Sleep.  Dispense: 90 tablet; Refill: 1             Follow Up   Return in about 6 months (around 11/14/2025) for Medicare Wellness.  Patient was given instructions and counseling regarding his condition or for health maintenance advice. Please see specific information pulled into the AVS if appropriate.

## 2025-05-15 ENCOUNTER — PATIENT ROUNDING (BHMG ONLY) (OUTPATIENT)
Dept: INTERNAL MEDICINE | Facility: CLINIC | Age: 75
End: 2025-05-15
Payer: MEDICARE

## (undated) DEVICE — STPLR SKIN VISISTAT WD 35CT

## (undated) DEVICE — ST TOURNI COMPL A/ 7IN

## (undated) DEVICE — DRP SLUSH WARMR MACH RECTG 66X44IN

## (undated) DEVICE — 8 FOOT DISPOSABLE EXTENSION CABLE WITH SAFE CONNECT / ALLIGATOR CLIP

## (undated) DEVICE — SENSR CERBRL O2 PK/2

## (undated) DEVICE — BNDG ELAS ELITE V/CLOSE 6IN 5YD LF STRL

## (undated) DEVICE — MEDI-VAC YANKAUER SUCTION HANDLE W/BULBOUS TIP: Brand: CARDINAL HEALTH

## (undated) DEVICE — GLV SURG BIOGEL LTX PF 7 1/2

## (undated) DEVICE — WOUND RETRACTOR AND PROTECTOR: Brand: ALEXIS WOUND PROTECTOR-RETRACTOR

## (undated) DEVICE — SNAR POLYP SENSATION STDOVL 27 240 BX40

## (undated) DEVICE — TR BAND RADIAL ARTERY COMPRESSION DEVICE: Brand: TR BAND

## (undated) DEVICE — TOTAL TRAY, 16FR 10ML SIL FOLEY, URN: Brand: MEDLINE

## (undated) DEVICE — TBG ART PRESS 60 IN

## (undated) DEVICE — SENSR O2 OXIMAX FNGR A/ 18IN NONSTR

## (undated) DEVICE — GLV SURG SENSICARE POLYISPRN W/ALOE PF LF 6.5 GRN STRL

## (undated) DEVICE — SUT SILK 3/0 TIES 18IN A184H

## (undated) DEVICE — ST PERFUS M/

## (undated) DEVICE — Device

## (undated) DEVICE — ANTIBACTERIAL UNDYED BRAIDED (POLYGLACTIN 910), SYNTHETIC ABSORBABLE SUTURE: Brand: COATED VICRYL

## (undated) DEVICE — 3M™ STERI-STRIP™ COMPOUND BENZOIN TINCTURE 40 BAGS/CARTON 4 CARTONS/CASE C1544: Brand: 3M™ STERI-STRIP™

## (undated) DEVICE — JACKSON-PRATT 100CC BULB RESERVOIR: Brand: CARDINAL HEALTH

## (undated) DEVICE — TEMP PACING WIRE: Brand: MYO/WIRE

## (undated) DEVICE — OPTIFOAM GENTLE SA, POSTOP, 4X12: Brand: MEDLINE

## (undated) DEVICE — ST IRR CYSTO W/SPK 77IN LF

## (undated) DEVICE — SUT MNCRYL PLS ANTIB UD 4/0 PS2 18IN

## (undated) DEVICE — KT MANIFLD CARDIAC

## (undated) DEVICE — SUT PROLN 1 CT1 30IN 8425H

## (undated) DEVICE — KT ORCA ORCAPOD DISP STRL

## (undated) DEVICE — DRSNG SURESITE WNDW 4X4.5

## (undated) DEVICE — SOL ISO/ALC 70PCT 4OZ

## (undated) DEVICE — HEMOCONCENTRATOR PERFUS LPS06

## (undated) DEVICE — ELECTRD BLD EZ CLN STD 6.5IN

## (undated) DEVICE — SUT PROLN 6/0 BV1 D/A 30IN 8709H

## (undated) DEVICE — NDL HYPO ECLPS SFTY 18G 1 1/2IN

## (undated) DEVICE — ORGANIZER SUT SHELIGH 3T 213013

## (undated) DEVICE — GOWN ,SIRUS,NONREINFORCED SMALL: Brand: MEDLINE

## (undated) DEVICE — SOL NACL 0.9PCT 1000ML

## (undated) DEVICE — TBG INSUFFLATION LUER LOCK: Brand: MEDLINE INDUSTRIES, INC.

## (undated) DEVICE — ENDOPATH XCEL BLADELESS TROCARS WITH STABILITY SLEEVES: Brand: ENDOPATH XCEL

## (undated) DEVICE — NDL PERC 1PRT THNWALL W/BASEPLT 18G 7CM

## (undated) DEVICE — SUT SILK 2/0 TIES 18IN A185H

## (undated) DEVICE — GLV SURG BIOGEL LTX PF 6

## (undated) DEVICE — SYS PERFUS SEP PLATLT W TIPS CUST

## (undated) DEVICE — ADHS SKIN DERMABOND TOP ADVANCED

## (undated) DEVICE — PK AV FISTL/SHNT 40

## (undated) DEVICE — ST. SORBAVIEW ULTIMATE IJ SYSTEM A,C: Brand: CENTURION

## (undated) DEVICE — CANN IRR VEN BVL TP

## (undated) DEVICE — SYS VASOVIEW HEMOPRO ENDOSCOPIC HARVST VESL

## (undated) DEVICE — DISPOSABLE MONOPOLAR ENDOSCOPIC CORD 10 FT. (3M): Brand: KIRWAN

## (undated) DEVICE — POOLE SUCTION HANDLE: Brand: CARDINAL HEALTH

## (undated) DEVICE — BG TRANSF W/COUPLER SPK 600ML

## (undated) DEVICE — ADHS SKIN SURG TISS VISC PREMIERPRO EXOFIN HI/VISC FAST/DRY

## (undated) DEVICE — SPONGE,DISSECTOR,K,XRAY,9/16"X1/4",STRL: Brand: MEDLINE

## (undated) DEVICE — TRAP FLD MINIVAC MEGADYNE 100ML

## (undated) DEVICE — SCANLAN® VASCU-STATT® II SINGLE-USE BULLDOG CLAMP W/FIRMER CLAMPING PRESS - MIDI ANGLED 45° (YELLOW), CLAMPING PRESSURE 75-80 G (2/STERILE PKG): Brand: SCANLAN® VASCU-STATT® II SINGLE-USE BULLDOG CLAMP W/FIRMER CLAMPING PRESS

## (undated) DEVICE — TBG PENCL TELESCP MEGADYNE SMOKE EVAC 10FT

## (undated) DEVICE — TTL1LYR 16FR10ML 100%SIL TMPST TR: Brand: MEDLINE

## (undated) DEVICE — DEV TUNNELING SUBCONTANIOUS

## (undated) DEVICE — ROTATING SURGICAL PUNCHES, 1 PER POUCH: Brand: A&E MEDICAL / ROTATING SURGICAL PUNCHES

## (undated) DEVICE — CANN VESL FREE FLO 2MM

## (undated) DEVICE — PK ATS CUST W CARDIOTOMY RESEVOIR

## (undated) DEVICE — CATH DIAG IMPULSE FL3.5 5F 100CM

## (undated) DEVICE — STPCK 3WY INTRALOCK TRNSP

## (undated) DEVICE — CATH IV INSYTE AUTOGARD SHLD 20G 1.88IN

## (undated) DEVICE — GLIDESHEATH BASIC HYDROPHILIC COATED INTRODUCER SHEATH: Brand: GLIDESHEATH

## (undated) DEVICE — BNDG ELAS ELITE V/CLOSE 4IN 5YD LF STRL

## (undated) DEVICE — PK HEART OPN 40

## (undated) DEVICE — ADAPT CLN BIOGUARD AIR/H2O DISP

## (undated) DEVICE — CATH DIAG IMPULSE FR4 5F 100CM

## (undated) DEVICE — BIOPATCH™ ANTIMICROBIAL DRESSING WITH CHLORHEXIDINE GLUCONATE IS A HYDROPHILLIC POLYURETHANE ABSORPTIVE FOAM WITH CHLORHEXIDINE GLUCONATE (CHG) WHICH INHIBITS BACTERIAL GROWTH UNDER THE DRESSING. THE DRESSING IS INTENDED TO BE USED TO ABSORB EXUDATE, COVER A WOUND CAUSED BY VASCULAR AND NONVASCULAR PERCUTANEOUS MEDICAL DEVICES DURING SURGERY, AS WELL AS REDUCE LOCAL INFECTION AND COLONIZATION OF MICROORGANISMS.: Brand: BIOPATCH

## (undated) DEVICE — SNAR POLYP CAPTIVATOR2 RND STFF 2.4 25MM 240CM

## (undated) DEVICE — SPNG GZ STRL 2S 4X4 12PLY

## (undated) DEVICE — PK PROC MAJ 40

## (undated) DEVICE — COVER,MAYO STAND,STERILE: Brand: MEDLINE

## (undated) DEVICE — SPNG GZ WOVN 4X4IN 12PLY 10/BX STRL

## (undated) DEVICE — SYRINGE, LUER LOCK, 60ML: Brand: MEDLINE

## (undated) DEVICE — SUT SILK 4/0 TIES 18IN A183H

## (undated) DEVICE — KT CLN CLEANOR SCPE

## (undated) DEVICE — MARKR SKIN W/RULR AND LBL

## (undated) DEVICE — ENDOPATH XCEL UNIVERSAL TROCAR STABLILITY SLEEVES: Brand: ENDOPATH XCEL

## (undated) DEVICE — CANN RETRGR STYLET RSCP 15F

## (undated) DEVICE — DRSNG SURESITE WNDW 2.38X2.75

## (undated) DEVICE — SUT SILK 2/0 SH CR8 18IN CR8 C012D

## (undated) DEVICE — OASIS DRAIN, SINGLE, INLINE & ATS COMPATIBLE: Brand: OASIS

## (undated) DEVICE — CATH VENT MIV RADL PIG ST TIP 5F 110CM

## (undated) DEVICE — SUT SILK 3/0 SH CR5 18IN C0135

## (undated) DEVICE — PK CATH CARD 40

## (undated) DEVICE — CANN O2 ETCO2 FITS ALL CONN CO2 SMPL A/ 7IN DISP LF

## (undated) DEVICE — SUT PROLN 0 CT1 30IN 8424H

## (undated) DEVICE — SUT PDS 1 XLH LP 99IN Z881G

## (undated) DEVICE — GLV SURG SENSICARE MICRO PF LF 7.5 STRL

## (undated) DEVICE — ELECTRD BLD EZ CLN MOD XLNG 2.75IN

## (undated) DEVICE — SUT PROLN 5/0 RB1 D/A 36IN 8556H

## (undated) DEVICE — SOL NS 500ML

## (undated) DEVICE — LAPAROVUE VISIBILITY SYSTEM LAPAROSCOPIC SOLUTIONS: Brand: LAPAROVUE

## (undated) DEVICE — COLOSTOMY/ILEOSTOMY KIT, FLEXWEAR: Brand: NEW IMAGE

## (undated) DEVICE — BANDAGE,GAUZE,BULKEE II,4.5"X4.1YD,STRL: Brand: MEDLINE

## (undated) DEVICE — PK PERFUS CUST W/CARDIOPLEGIA

## (undated) DEVICE — SUT PDS 3/0 SH 27IN DYED Z316H

## (undated) DEVICE — APPL CHLORAPREP HI/LITE 26ML ORNG

## (undated) DEVICE — TUBING, SUCTION, 1/4" X 10', STRAIGHT: Brand: MEDLINE

## (undated) DEVICE — CANN AORT ROOT DLP VNT 14G 7F

## (undated) DEVICE — ISOLATION BAG: Brand: CONVERTORS

## (undated) DEVICE — THE SINGLE USE ETRAP – POLYP TRAP IS USED FOR SUCTION RETRIEVAL OF ENDOSCOPICALLY REMOVED POLYPS.: Brand: ETRAP

## (undated) DEVICE — DECANTER BAG 9": Brand: MEDLINE INDUSTRIES, INC.

## (undated) DEVICE — CLAMP INSERT: Brand: STEALTH® CLAMP INSERT

## (undated) DEVICE — PENCL ES MEGADINE EZ/CLEAN BUTN W/HOLSTR 10FT

## (undated) DEVICE — BLOWER/MISTER AXIOUS OPCAB W/TBG

## (undated) DEVICE — CORONARY ARTERY BYPASS GRAFT MARKERS, STAINLESS STEEL, DISTAL, WITHOUT HOLDER: Brand: ANASTOMARK CORONARY ARTERY BYPASS GRAFT MARKERS, STAINLESS STEEL, DISTAL

## (undated) DEVICE — ACCESSRAIL PLATFORM (STANDARD BLADE): Brand: ACCESSRAIL PLATFORM (STANDARD BLADE)

## (undated) DEVICE — 28 FR STRAIGHT – SOFT PVC CATHETER: Brand: PVC THORACIC CATHETERS

## (undated) DEVICE — LOU LAP CHOLE: Brand: MEDLINE INDUSTRIES, INC.

## (undated) DEVICE — ENDOCUT SCISSOR TIP, DISPOSABLE: Brand: RENEW

## (undated) DEVICE — JELLY,LUBE,STERILE,FLIP TOP,TUBE,4-OZ: Brand: MEDLINE

## (undated) DEVICE — LEGGINGS, PAIR, CLEAR, STERILE: Brand: MEDLINE

## (undated) DEVICE — SYR LUERLOK 30CC

## (undated) DEVICE — 3M™ STERI-DRAPE™ INSTRUMENT POUCH 1018: Brand: STERI-DRAPE™

## (undated) DEVICE — DRSNG WND BORDR/ADHS NONADHR/GZ LF 4X14IN STRL

## (undated) DEVICE — GW EMR FIX EXCHG J STD .035 3MM 260CM

## (undated) DEVICE — SUT PDS 0 TP1 LP 60IN Z991G

## (undated) DEVICE — GOWN,PREVENTION PLUS,XLONG/XLARGE,STRL: Brand: MEDLINE

## (undated) DEVICE — NDL HYPO PRECISIONGLIDE REG 25G 1 1/2